# Patient Record
Sex: FEMALE | Race: WHITE | NOT HISPANIC OR LATINO | Employment: OTHER | URBAN - METROPOLITAN AREA
[De-identification: names, ages, dates, MRNs, and addresses within clinical notes are randomized per-mention and may not be internally consistent; named-entity substitution may affect disease eponyms.]

---

## 2017-01-06 ENCOUNTER — HOSPITAL ENCOUNTER (INPATIENT)
Facility: HOSPITAL | Age: 82
LOS: 4 days | Discharge: HOME | DRG: 065 | End: 2017-01-10
Attending: EMERGENCY MEDICINE | Admitting: INTERNAL MEDICINE
Payer: COMMERCIAL

## 2017-01-06 ENCOUNTER — APPOINTMENT (EMERGENCY)
Dept: CT IMAGING | Facility: HOSPITAL | Age: 82
DRG: 065 | End: 2017-01-06
Payer: COMMERCIAL

## 2017-01-06 DIAGNOSIS — I63.9 CVA (CEREBRAL VASCULAR ACCIDENT) (HCC): Primary | ICD-10-CM

## 2017-01-06 DIAGNOSIS — E11.9 DIABETES MELLITUS (HCC): ICD-10-CM

## 2017-01-06 DIAGNOSIS — R20.0 FACIAL NUMBNESS: ICD-10-CM

## 2017-01-06 DIAGNOSIS — F40.298 OTHER SPECIFIED PHOBIA: ICD-10-CM

## 2017-01-06 LAB
ALBUMIN SERPL BCP-MCNC: 3.9 G/DL (ref 3.5–5)
ALP SERPL-CCNC: 90 U/L (ref 46–116)
ALT SERPL W P-5'-P-CCNC: 36 U/L (ref 12–78)
ANION GAP SERPL CALCULATED.3IONS-SCNC: 10 MMOL/L (ref 4–13)
APTT PPP: 29 SECONDS (ref 24–36)
AST SERPL W P-5'-P-CCNC: 11 U/L (ref 5–45)
BACTERIA UR QL AUTO: ABNORMAL /HPF
BASOPHILS # BLD AUTO: 0.03 THOUSANDS/ΜL (ref 0–0.1)
BASOPHILS NFR BLD AUTO: 1 % (ref 0–1)
BILIRUB SERPL-MCNC: 0.71 MG/DL (ref 0.2–1)
BILIRUB UR QL STRIP: NEGATIVE
BUN SERPL-MCNC: 14 MG/DL (ref 5–25)
CALCIUM SERPL-MCNC: 9.2 MG/DL (ref 8.3–10.1)
CHLORIDE SERPL-SCNC: 98 MMOL/L (ref 100–108)
CLARITY UR: CLEAR
CO2 SERPL-SCNC: 28 MMOL/L (ref 21–32)
COLOR UR: YELLOW
CREAT SERPL-MCNC: 0.81 MG/DL (ref 0.6–1.3)
EOSINOPHIL # BLD AUTO: 0.12 THOUSAND/ΜL (ref 0–0.61)
EOSINOPHIL NFR BLD AUTO: 2 % (ref 0–6)
ERYTHROCYTE [DISTWIDTH] IN BLOOD BY AUTOMATED COUNT: 13.1 % (ref 11.6–15.1)
GFR SERPL CREATININE-BSD FRML MDRD: >60 ML/MIN/1.73SQ M
GLUCOSE SERPL-MCNC: 322 MG/DL (ref 65–140)
GLUCOSE UR STRIP-MCNC: ABNORMAL MG/DL
HCT VFR BLD AUTO: 42.8 % (ref 34.8–46.1)
HGB BLD-MCNC: 14.9 G/DL (ref 11.5–15.4)
HGB UR QL STRIP.AUTO: ABNORMAL
INR PPP: 1 (ref 0.86–1.16)
KETONES UR STRIP-MCNC: NEGATIVE MG/DL
LEUKOCYTE ESTERASE UR QL STRIP: NEGATIVE
LYMPHOCYTES # BLD AUTO: 1.8 THOUSANDS/ΜL (ref 0.6–4.47)
LYMPHOCYTES NFR BLD AUTO: 27 % (ref 14–44)
MCH RBC QN AUTO: 31.8 PG (ref 26.8–34.3)
MCHC RBC AUTO-ENTMCNC: 34.8 G/DL (ref 31.4–37.4)
MCV RBC AUTO: 91 FL (ref 82–98)
MONOCYTES # BLD AUTO: 0.42 THOUSAND/ΜL (ref 0.17–1.22)
MONOCYTES NFR BLD AUTO: 6 % (ref 4–12)
NEUTROPHILS # BLD AUTO: 4.28 THOUSANDS/ΜL (ref 1.85–7.62)
NEUTS SEG NFR BLD AUTO: 64 % (ref 43–75)
NITRITE UR QL STRIP: NEGATIVE
NON-SQ EPI CELLS URNS QL MICRO: ABNORMAL /HPF
NRBC BLD AUTO-RTO: 0 /100 WBCS
PH UR STRIP.AUTO: 6 [PH] (ref 4.5–8)
PLATELET # BLD AUTO: 338 THOUSANDS/UL (ref 149–390)
PMV BLD AUTO: 10.9 FL (ref 8.9–12.7)
POTASSIUM SERPL-SCNC: 4.1 MMOL/L (ref 3.5–5.3)
PROT SERPL-MCNC: 7.8 G/DL (ref 6.4–8.2)
PROT UR STRIP-MCNC: NEGATIVE MG/DL
PROTHROMBIN TIME: 13.2 SECONDS (ref 12–14.3)
RBC # BLD AUTO: 4.69 MILLION/UL (ref 3.81–5.12)
RBC #/AREA URNS AUTO: ABNORMAL /HPF
SODIUM SERPL-SCNC: 136 MMOL/L (ref 136–145)
SP GR UR STRIP.AUTO: 1.01 (ref 1–1.03)
UROBILINOGEN UR QL STRIP.AUTO: 0.2 E.U./DL
WBC # BLD AUTO: 6.65 THOUSAND/UL (ref 4.31–10.16)
WBC #/AREA URNS AUTO: ABNORMAL /HPF

## 2017-01-06 PROCEDURE — 80053 COMPREHEN METABOLIC PANEL: CPT

## 2017-01-06 PROCEDURE — 87086 URINE CULTURE/COLONY COUNT: CPT

## 2017-01-06 PROCEDURE — 85730 THROMBOPLASTIN TIME PARTIAL: CPT

## 2017-01-06 PROCEDURE — 85610 PROTHROMBIN TIME: CPT

## 2017-01-06 PROCEDURE — 70450 CT HEAD/BRAIN W/O DYE: CPT

## 2017-01-06 PROCEDURE — 36415 COLL VENOUS BLD VENIPUNCTURE: CPT

## 2017-01-06 PROCEDURE — 85025 COMPLETE CBC W/AUTO DIFF WBC: CPT

## 2017-01-06 PROCEDURE — 99285 EMERGENCY DEPT VISIT HI MDM: CPT

## 2017-01-06 PROCEDURE — 93005 ELECTROCARDIOGRAM TRACING: CPT

## 2017-01-06 PROCEDURE — 81001 URINALYSIS AUTO W/SCOPE: CPT

## 2017-01-06 RX ORDER — HYDRALAZINE HYDROCHLORIDE 20 MG/ML
5 INJECTION INTRAMUSCULAR; INTRAVENOUS EVERY 4 HOURS PRN
Status: DISCONTINUED | OUTPATIENT
Start: 2017-01-06 | End: 2017-01-10 | Stop reason: HOSPADM

## 2017-01-06 RX ORDER — GLIPIZIDE 10 MG/1
10 TABLET ORAL
Status: ON HOLD | COMMUNITY
End: 2017-01-19

## 2017-01-06 RX ORDER — SIMVASTATIN 80 MG
80 TABLET ORAL
Status: DISCONTINUED | OUTPATIENT
Start: 2017-01-07 | End: 2017-01-10 | Stop reason: HOSPADM

## 2017-01-06 RX ORDER — SIMVASTATIN 80 MG
80 TABLET ORAL
COMMUNITY
End: 2018-03-09 | Stop reason: SDUPTHER

## 2017-01-06 RX ORDER — MINERAL OIL AND PETROLATUM 150; 830 MG/G; MG/G
OINTMENT OPHTHALMIC
Status: DISCONTINUED | OUTPATIENT
Start: 2017-01-06 | End: 2017-01-06

## 2017-01-06 RX ORDER — PANTOPRAZOLE SODIUM 40 MG/1
40 TABLET, DELAYED RELEASE ORAL
Status: DISCONTINUED | OUTPATIENT
Start: 2017-01-07 | End: 2017-01-10 | Stop reason: HOSPADM

## 2017-01-06 RX ORDER — DORZOLAMIDE HCL 20 MG/ML
1 SOLUTION/ DROPS OPHTHALMIC 3 TIMES DAILY
Status: DISCONTINUED | OUTPATIENT
Start: 2017-01-06 | End: 2017-01-06

## 2017-01-06 RX ORDER — LOSARTAN POTASSIUM 50 MG/1
50 TABLET ORAL DAILY
Status: CANCELLED | OUTPATIENT
Start: 2017-01-07

## 2017-01-06 RX ORDER — HYDRALAZINE HYDROCHLORIDE 20 MG/ML
10 INJECTION INTRAMUSCULAR; INTRAVENOUS EVERY 4 HOURS PRN
Status: DISCONTINUED | OUTPATIENT
Start: 2017-01-06 | End: 2017-01-06

## 2017-01-06 RX ORDER — LOSARTAN POTASSIUM 50 MG/1
50 TABLET ORAL DAILY
Status: ON HOLD | COMMUNITY
End: 2017-01-10

## 2017-01-06 RX ORDER — CYCLOSPORINE 0.5 MG/ML
1 EMULSION OPHTHALMIC 2 TIMES DAILY
COMMUNITY
End: 2017-01-16 | Stop reason: ALTCHOICE

## 2017-01-06 RX ORDER — LOSARTAN POTASSIUM 50 MG/1
50 TABLET ORAL DAILY
Status: DISCONTINUED | OUTPATIENT
Start: 2017-01-07 | End: 2017-01-10 | Stop reason: HOSPADM

## 2017-01-06 RX ORDER — PRAVASTATIN SODIUM 40 MG
40 TABLET ORAL ONCE
Status: DISCONTINUED | OUTPATIENT
Start: 2017-01-06 | End: 2017-01-10 | Stop reason: HOSPADM

## 2017-01-06 RX ORDER — ONDANSETRON 2 MG/ML
4 INJECTION INTRAMUSCULAR; INTRAVENOUS EVERY 4 HOURS PRN
Status: DISCONTINUED | OUTPATIENT
Start: 2017-01-06 | End: 2017-01-10 | Stop reason: HOSPADM

## 2017-01-06 RX ORDER — DORZOLAMIDE HCL 20 MG/ML
1 SOLUTION/ DROPS OPHTHALMIC 3 TIMES DAILY
Status: DISCONTINUED | OUTPATIENT
Start: 2017-01-06 | End: 2017-01-10 | Stop reason: HOSPADM

## 2017-01-06 RX ORDER — ACETAMINOPHEN 325 MG/1
650 TABLET ORAL EVERY 4 HOURS PRN
Status: DISCONTINUED | OUTPATIENT
Start: 2017-01-06 | End: 2017-01-10 | Stop reason: HOSPADM

## 2017-01-06 RX ORDER — CYCLOSPORINE 0.5 MG/ML
1 EMULSION OPHTHALMIC 2 TIMES DAILY
Status: DISCONTINUED | OUTPATIENT
Start: 2017-01-06 | End: 2017-01-07 | Stop reason: SDUPTHER

## 2017-01-06 RX ORDER — POLYVINYL ALCOHOL 14 MG/ML
1 SOLUTION/ DROPS OPHTHALMIC
Status: DISCONTINUED | OUTPATIENT
Start: 2017-01-06 | End: 2017-01-10 | Stop reason: HOSPADM

## 2017-01-06 RX ORDER — ASPIRIN 81 MG/1
81 TABLET, CHEWABLE ORAL DAILY
Status: DISCONTINUED | OUTPATIENT
Start: 2017-01-07 | End: 2017-01-10 | Stop reason: HOSPADM

## 2017-01-06 RX ORDER — CYCLOSPORINE 0.5 MG/ML
1 EMULSION OPHTHALMIC 2 TIMES DAILY
Status: DISCONTINUED | OUTPATIENT
Start: 2017-01-06 | End: 2017-01-06

## 2017-01-06 RX ORDER — ASPIRIN 325 MG
325 TABLET ORAL ONCE
Status: COMPLETED | OUTPATIENT
Start: 2017-01-06 | End: 2017-01-06

## 2017-01-06 RX ADMIN — DORZOLAMIDE HYDROCHLORIDE 1 DROP: 20 SOLUTION/ DROPS OPHTHALMIC at 21:21

## 2017-01-06 RX ADMIN — HYDRALAZINE HYDROCHLORIDE 5 MG: 20 INJECTION INTRAMUSCULAR; INTRAVENOUS at 19:32

## 2017-01-06 RX ADMIN — BIMATOPROST 1 DROP: 0.1 SOLUTION/ DROPS OPHTHALMIC at 21:21

## 2017-01-06 RX ADMIN — ASPIRIN 325 MG: 325 TABLET ORAL at 19:34

## 2017-01-07 ENCOUNTER — APPOINTMENT (INPATIENT)
Dept: MRI IMAGING | Facility: HOSPITAL | Age: 82
DRG: 065 | End: 2017-01-07
Payer: COMMERCIAL

## 2017-01-07 ENCOUNTER — GENERIC CONVERSION - ENCOUNTER (OUTPATIENT)
Dept: OTHER | Facility: OTHER | Age: 82
End: 2017-01-07

## 2017-01-07 LAB
ANION GAP SERPL CALCULATED.3IONS-SCNC: 9 MMOL/L (ref 4–13)
ATRIAL RATE: 80 BPM
BACTERIA UR CULT: NORMAL
BUN SERPL-MCNC: 13 MG/DL (ref 5–25)
CALCIUM SERPL-MCNC: 8.8 MG/DL (ref 8.3–10.1)
CHLORIDE SERPL-SCNC: 100 MMOL/L (ref 100–108)
CHOLEST SERPL-MCNC: 148 MG/DL (ref 50–200)
CO2 SERPL-SCNC: 27 MMOL/L (ref 21–32)
CREAT SERPL-MCNC: 0.7 MG/DL (ref 0.6–1.3)
GFR SERPL CREATININE-BSD FRML MDRD: >60 ML/MIN/1.73SQ M
GLUCOSE SERPL-MCNC: 191 MG/DL (ref 65–140)
HDLC SERPL-MCNC: 47 MG/DL (ref 40–60)
LDLC SERPL CALC-MCNC: 69 MG/DL (ref 0–100)
P AXIS: 32 DEGREES
POTASSIUM SERPL-SCNC: 3.9 MMOL/L (ref 3.5–5.3)
PR INTERVAL: 136 MS
QRS AXIS: 2 DEGREES
QRSD INTERVAL: 82 MS
QT INTERVAL: 350 MS
QTC INTERVAL: 403 MS
SODIUM SERPL-SCNC: 136 MMOL/L (ref 136–145)
T WAVE AXIS: 58 DEGREES
TRIGL SERPL-MCNC: 160 MG/DL
VENTRICULAR RATE: 80 BPM
VIT B12 SERPL-MCNC: 138 PG/ML (ref 100–900)

## 2017-01-07 PROCEDURE — 70544 MR ANGIOGRAPHY HEAD W/O DYE: CPT

## 2017-01-07 PROCEDURE — 70551 MRI BRAIN STEM W/O DYE: CPT

## 2017-01-07 PROCEDURE — G8978 MOBILITY CURRENT STATUS: HCPCS

## 2017-01-07 PROCEDURE — G8980 MOBILITY D/C STATUS: HCPCS

## 2017-01-07 PROCEDURE — 97161 PT EVAL LOW COMPLEX 20 MIN: CPT

## 2017-01-07 PROCEDURE — 82607 VITAMIN B-12: CPT | Performed by: INTERNAL MEDICINE

## 2017-01-07 PROCEDURE — 80048 BASIC METABOLIC PNL TOTAL CA: CPT | Performed by: PHYSICIAN ASSISTANT

## 2017-01-07 PROCEDURE — G8979 MOBILITY GOAL STATUS: HCPCS

## 2017-01-07 PROCEDURE — 80061 LIPID PANEL: CPT | Performed by: PHYSICIAN ASSISTANT

## 2017-01-07 RX ORDER — LOTEPREDNOL ETABONATE 5 MG/G
1 GEL OPHTHALMIC EVERY 12 HOURS SCHEDULED
Status: DISCONTINUED | OUTPATIENT
Start: 2017-01-07 | End: 2017-01-10 | Stop reason: HOSPADM

## 2017-01-07 RX ORDER — ESCITALOPRAM OXALATE 10 MG/1
5 TABLET ORAL DAILY
Status: DISCONTINUED | OUTPATIENT
Start: 2017-01-07 | End: 2017-01-10 | Stop reason: HOSPADM

## 2017-01-07 RX ORDER — CYCLOSPORINE 0.5 MG/ML
1 EMULSION OPHTHALMIC EVERY 12 HOURS SCHEDULED
Status: DISCONTINUED | OUTPATIENT
Start: 2017-01-07 | End: 2017-01-10 | Stop reason: HOSPADM

## 2017-01-07 RX ORDER — CHLORTHALIDONE 25 MG/1
25 TABLET ORAL DAILY
Status: DISCONTINUED | OUTPATIENT
Start: 2017-01-07 | End: 2017-01-10 | Stop reason: HOSPADM

## 2017-01-07 RX ADMIN — LOTEPREDNOL ETABONATE 1 DROP: 5 GEL OPHTHALMIC at 21:47

## 2017-01-07 RX ADMIN — DORZOLAMIDE HYDROCHLORIDE 1 DROP: 20 SOLUTION/ DROPS OPHTHALMIC at 09:32

## 2017-01-07 RX ADMIN — ENOXAPARIN SODIUM 40 MG: 40 INJECTION SUBCUTANEOUS at 09:32

## 2017-01-07 RX ADMIN — LOSARTAN POTASSIUM 50 MG: 50 TABLET, FILM COATED ORAL at 09:32

## 2017-01-07 RX ADMIN — HYDRALAZINE HYDROCHLORIDE 5 MG: 20 INJECTION INTRAMUSCULAR; INTRAVENOUS at 12:11

## 2017-01-07 RX ADMIN — ESCITALOPRAM OXALATE 5 MG: 10 TABLET ORAL at 20:03

## 2017-01-07 RX ADMIN — CYCLOSPORINE 1 DROP: 0.5 EMULSION OPHTHALMIC at 21:33

## 2017-01-07 RX ADMIN — PANTOPRAZOLE SODIUM 40 MG: 40 TABLET, DELAYED RELEASE ORAL at 06:44

## 2017-01-07 RX ADMIN — CHLORTHALIDONE 25 MG: 25 TABLET ORAL at 13:15

## 2017-01-07 RX ADMIN — Medication 80 MG: at 21:13

## 2017-01-07 RX ADMIN — ASPIRIN 81 MG 81 MG: 81 TABLET ORAL at 09:32

## 2017-01-07 RX ADMIN — BIMATOPROST 1 DROP: 0.1 SOLUTION/ DROPS OPHTHALMIC at 21:13

## 2017-01-07 RX ADMIN — DORZOLAMIDE HYDROCHLORIDE 1 DROP: 20 SOLUTION/ DROPS OPHTHALMIC at 21:40

## 2017-01-08 LAB
25(OH)D3 SERPL-MCNC: 12.7 NG/ML (ref 30–100)
ANION GAP SERPL CALCULATED.3IONS-SCNC: 12 MMOL/L (ref 4–13)
BUN SERPL-MCNC: 14 MG/DL (ref 5–25)
CALCIUM SERPL-MCNC: 9.1 MG/DL (ref 8.3–10.1)
CHLORIDE SERPL-SCNC: 96 MMOL/L (ref 100–108)
CO2 SERPL-SCNC: 25 MMOL/L (ref 21–32)
CREAT SERPL-MCNC: 0.94 MG/DL (ref 0.6–1.3)
ERYTHROCYTE [DISTWIDTH] IN BLOOD BY AUTOMATED COUNT: 12.8 % (ref 11.6–15.1)
EST. AVERAGE GLUCOSE BLD GHB EST-MCNC: 189 MG/DL
GFR SERPL CREATININE-BSD FRML MDRD: 57.2 ML/MIN/1.73SQ M
GLUCOSE SERPL-MCNC: 310 MG/DL (ref 65–140)
GLUCOSE SERPL-MCNC: 341 MG/DL (ref 65–140)
GLUCOSE SERPL-MCNC: 355 MG/DL (ref 65–140)
GLUCOSE SERPL-MCNC: 357 MG/DL (ref 65–140)
HBA1C MFR BLD: 8.2 % (ref 4.2–6.3)
HCT VFR BLD AUTO: 40.8 % (ref 34.8–46.1)
HGB BLD-MCNC: 13.9 G/DL (ref 11.5–15.4)
MCH RBC QN AUTO: 30.5 PG (ref 26.8–34.3)
MCHC RBC AUTO-ENTMCNC: 34.1 G/DL (ref 31.4–37.4)
MCV RBC AUTO: 90 FL (ref 82–98)
PLATELET # BLD AUTO: 328 THOUSANDS/UL (ref 149–390)
PMV BLD AUTO: 10.4 FL (ref 8.9–12.7)
POTASSIUM SERPL-SCNC: 4 MMOL/L (ref 3.5–5.3)
RBC # BLD AUTO: 4.55 MILLION/UL (ref 3.81–5.12)
SODIUM SERPL-SCNC: 133 MMOL/L (ref 136–145)
WBC # BLD AUTO: 8.13 THOUSAND/UL (ref 4.31–10.16)

## 2017-01-08 PROCEDURE — 82306 VITAMIN D 25 HYDROXY: CPT | Performed by: INTERNAL MEDICINE

## 2017-01-08 PROCEDURE — 80048 BASIC METABOLIC PNL TOTAL CA: CPT | Performed by: INTERNAL MEDICINE

## 2017-01-08 PROCEDURE — 92610 EVALUATE SWALLOWING FUNCTION: CPT

## 2017-01-08 PROCEDURE — 85027 COMPLETE CBC AUTOMATED: CPT | Performed by: INTERNAL MEDICINE

## 2017-01-08 PROCEDURE — 83036 HEMOGLOBIN GLYCOSYLATED A1C: CPT | Performed by: INTERNAL MEDICINE

## 2017-01-08 PROCEDURE — 82948 REAGENT STRIP/BLOOD GLUCOSE: CPT

## 2017-01-08 RX ORDER — ALPRAZOLAM 0.25 MG/1
0.25 TABLET ORAL 3 TIMES DAILY PRN
Status: DISCONTINUED | OUTPATIENT
Start: 2017-01-08 | End: 2017-01-10 | Stop reason: HOSPADM

## 2017-01-08 RX ADMIN — Medication 80 MG: at 23:45

## 2017-01-08 RX ADMIN — HYDRALAZINE HYDROCHLORIDE 5 MG: 20 INJECTION INTRAMUSCULAR; INTRAVENOUS at 08:56

## 2017-01-08 RX ADMIN — INSULIN LISPRO 3 UNITS: 100 INJECTION, SOLUTION INTRAVENOUS; SUBCUTANEOUS at 16:47

## 2017-01-08 RX ADMIN — LOSARTAN POTASSIUM 50 MG: 50 TABLET, FILM COATED ORAL at 08:23

## 2017-01-08 RX ADMIN — ALPRAZOLAM 0.25 MG: 0.25 TABLET ORAL at 21:48

## 2017-01-08 RX ADMIN — CYCLOSPORINE 1 DROP: 0.5 EMULSION OPHTHALMIC at 23:44

## 2017-01-08 RX ADMIN — CYCLOSPORINE 1 DROP: 0.5 EMULSION OPHTHALMIC at 08:23

## 2017-01-08 RX ADMIN — METOPROLOL TARTRATE 12.5 MG: 25 TABLET ORAL at 21:47

## 2017-01-08 RX ADMIN — ASPIRIN 81 MG 81 MG: 81 TABLET ORAL at 08:23

## 2017-01-08 RX ADMIN — INSULIN LISPRO 4 UNITS: 100 INJECTION, SOLUTION INTRAVENOUS; SUBCUTANEOUS at 12:24

## 2017-01-08 RX ADMIN — POLYVINYL ALCOHOL 1 DROP: 14 SOLUTION/ DROPS OPHTHALMIC at 21:39

## 2017-01-08 RX ADMIN — ENOXAPARIN SODIUM 40 MG: 40 INJECTION SUBCUTANEOUS at 08:23

## 2017-01-08 RX ADMIN — LOTEPREDNOL ETABONATE 1 DROP: 5 GEL OPHTHALMIC at 21:40

## 2017-01-08 RX ADMIN — ESCITALOPRAM OXALATE 5 MG: 10 TABLET ORAL at 08:18

## 2017-01-08 RX ADMIN — DORZOLAMIDE HYDROCHLORIDE 1 DROP: 20 SOLUTION/ DROPS OPHTHALMIC at 08:23

## 2017-01-08 RX ADMIN — DORZOLAMIDE HYDROCHLORIDE 1 DROP: 20 SOLUTION/ DROPS OPHTHALMIC at 21:39

## 2017-01-08 RX ADMIN — BIMATOPROST 1 DROP: 0.1 SOLUTION/ DROPS OPHTHALMIC at 21:39

## 2017-01-08 RX ADMIN — PANTOPRAZOLE SODIUM 40 MG: 40 TABLET, DELAYED RELEASE ORAL at 07:12

## 2017-01-08 RX ADMIN — ONDANSETRON 4 MG: 2 INJECTION INTRAMUSCULAR; INTRAVENOUS at 00:27

## 2017-01-08 RX ADMIN — ALPRAZOLAM 0.25 MG: 0.25 TABLET ORAL at 12:31

## 2017-01-08 RX ADMIN — CHLORTHALIDONE 25 MG: 25 TABLET ORAL at 08:23

## 2017-01-08 RX ADMIN — LOTEPREDNOL ETABONATE 1 DROP: 5 GEL OPHTHALMIC at 08:23

## 2017-01-08 RX ADMIN — DORZOLAMIDE HYDROCHLORIDE 1 DROP: 20 SOLUTION/ DROPS OPHTHALMIC at 16:05

## 2017-01-09 ENCOUNTER — APPOINTMENT (INPATIENT)
Dept: NON INVASIVE DIAGNOSTICS | Facility: HOSPITAL | Age: 82
DRG: 065 | End: 2017-01-09
Payer: COMMERCIAL

## 2017-01-09 LAB
GLUCOSE SERPL-MCNC: 224 MG/DL (ref 65–140)
GLUCOSE SERPL-MCNC: 293 MG/DL (ref 65–140)
GLUCOSE SERPL-MCNC: 298 MG/DL (ref 65–140)
GLUCOSE SERPL-MCNC: 335 MG/DL (ref 65–140)

## 2017-01-09 PROCEDURE — 92526 ORAL FUNCTION THERAPY: CPT

## 2017-01-09 PROCEDURE — 93306 TTE W/DOPPLER COMPLETE: CPT

## 2017-01-09 PROCEDURE — 82948 REAGENT STRIP/BLOOD GLUCOSE: CPT

## 2017-01-09 PROCEDURE — 93880 EXTRACRANIAL BILAT STUDY: CPT

## 2017-01-09 RX ADMIN — CYCLOSPORINE 1 DROP: 0.5 EMULSION OPHTHALMIC at 22:47

## 2017-01-09 RX ADMIN — DORZOLAMIDE HYDROCHLORIDE 1 DROP: 20 SOLUTION/ DROPS OPHTHALMIC at 16:06

## 2017-01-09 RX ADMIN — ALPRAZOLAM 0.25 MG: 0.25 TABLET ORAL at 22:47

## 2017-01-09 RX ADMIN — DORZOLAMIDE HYDROCHLORIDE 1 DROP: 20 SOLUTION/ DROPS OPHTHALMIC at 08:02

## 2017-01-09 RX ADMIN — PANTOPRAZOLE SODIUM 40 MG: 40 TABLET, DELAYED RELEASE ORAL at 05:43

## 2017-01-09 RX ADMIN — INSULIN LISPRO 2 UNITS: 100 INJECTION, SOLUTION INTRAVENOUS; SUBCUTANEOUS at 07:56

## 2017-01-09 RX ADMIN — INSULIN LISPRO 3 UNITS: 100 INJECTION, SOLUTION INTRAVENOUS; SUBCUTANEOUS at 17:00

## 2017-01-09 RX ADMIN — CHLORTHALIDONE 25 MG: 25 TABLET ORAL at 08:01

## 2017-01-09 RX ADMIN — METOPROLOL TARTRATE 12.5 MG: 25 TABLET ORAL at 08:01

## 2017-01-09 RX ADMIN — ESCITALOPRAM OXALATE 5 MG: 10 TABLET ORAL at 08:00

## 2017-01-09 RX ADMIN — CYCLOSPORINE 1 DROP: 0.5 EMULSION OPHTHALMIC at 08:02

## 2017-01-09 RX ADMIN — LOSARTAN POTASSIUM 50 MG: 50 TABLET, FILM COATED ORAL at 08:01

## 2017-01-09 RX ADMIN — ENOXAPARIN SODIUM 40 MG: 40 INJECTION SUBCUTANEOUS at 08:01

## 2017-01-09 RX ADMIN — INSULIN LISPRO 5 UNITS: 100 INJECTION, SOLUTION INTRAVENOUS; SUBCUTANEOUS at 11:35

## 2017-01-09 RX ADMIN — LOTEPREDNOL ETABONATE 1 DROP: 5 GEL OPHTHALMIC at 08:02

## 2017-01-09 RX ADMIN — ASPIRIN 81 MG 81 MG: 81 TABLET ORAL at 08:01

## 2017-01-09 RX ADMIN — METFORMIN HYDROCHLORIDE 250 MG: 500 TABLET, FILM COATED ORAL at 17:00

## 2017-01-09 RX ADMIN — BIMATOPROST 1 DROP: 0.1 SOLUTION/ DROPS OPHTHALMIC at 21:34

## 2017-01-09 RX ADMIN — METOPROLOL TARTRATE 25 MG: 25 TABLET ORAL at 20:50

## 2017-01-09 RX ADMIN — DORZOLAMIDE HYDROCHLORIDE 1 DROP: 20 SOLUTION/ DROPS OPHTHALMIC at 20:52

## 2017-01-09 RX ADMIN — Medication 80 MG: at 21:34

## 2017-01-10 VITALS
HEIGHT: 58 IN | SYSTOLIC BLOOD PRESSURE: 140 MMHG | RESPIRATION RATE: 16 BRPM | WEIGHT: 161.82 LBS | BODY MASS INDEX: 33.97 KG/M2 | DIASTOLIC BLOOD PRESSURE: 64 MMHG | OXYGEN SATURATION: 96 % | TEMPERATURE: 97.5 F | HEART RATE: 56 BPM

## 2017-01-10 LAB
GLUCOSE SERPL-MCNC: 241 MG/DL (ref 65–140)
GLUCOSE SERPL-MCNC: 264 MG/DL (ref 65–140)

## 2017-01-10 PROCEDURE — 82948 REAGENT STRIP/BLOOD GLUCOSE: CPT

## 2017-01-10 RX ORDER — ASPIRIN 81 MG/1
81 TABLET, CHEWABLE ORAL DAILY
Qty: 30 TABLET | Refills: 0 | Status: SHIPPED | OUTPATIENT
Start: 2017-01-10 | End: 2017-01-16 | Stop reason: ALTCHOICE

## 2017-01-10 RX ORDER — LOSARTAN POTASSIUM 50 MG/1
50 TABLET ORAL DAILY
Qty: 30 TABLET | Refills: 0 | Status: SHIPPED | OUTPATIENT
Start: 2017-01-10 | End: 2017-09-11 | Stop reason: ALTCHOICE

## 2017-01-10 RX ORDER — ESCITALOPRAM OXALATE 5 MG/1
5 TABLET ORAL DAILY
Qty: 30 TABLET | Refills: 0 | Status: SHIPPED | OUTPATIENT
Start: 2017-01-10 | End: 2017-01-10

## 2017-01-10 RX ORDER — CHLORTHALIDONE 25 MG/1
25 TABLET ORAL DAILY
Qty: 30 TABLET | Refills: 0 | Status: SHIPPED | OUTPATIENT
Start: 2017-01-10 | End: 2017-01-19 | Stop reason: HOSPADM

## 2017-01-10 RX ORDER — LOSARTAN POTASSIUM 50 MG/1
50 TABLET ORAL DAILY
Qty: 30 TABLET | Refills: 0 | Status: SHIPPED | OUTPATIENT
Start: 2017-01-10 | End: 2017-01-16 | Stop reason: ALTCHOICE

## 2017-01-10 RX ORDER — ALPRAZOLAM 0.25 MG/1
0.25 TABLET ORAL 3 TIMES DAILY PRN
Qty: 20 TABLET | Refills: 0 | Status: SHIPPED | OUTPATIENT
Start: 2017-01-10 | End: 2017-01-16 | Stop reason: ALTCHOICE

## 2017-01-10 RX ORDER — CHLORTHALIDONE 25 MG/1
25 TABLET ORAL DAILY
Qty: 30 TABLET | Refills: 0 | Status: SHIPPED | OUTPATIENT
Start: 2017-01-10 | End: 2017-01-10

## 2017-01-10 RX ORDER — ASPIRIN 81 MG/1
81 TABLET, CHEWABLE ORAL DAILY
Qty: 30 TABLET | Refills: 0 | Status: SHIPPED | OUTPATIENT
Start: 2017-01-10 | End: 2017-01-10

## 2017-01-10 RX ORDER — ESCITALOPRAM OXALATE 5 MG/1
5 TABLET ORAL DAILY
Qty: 30 TABLET | Refills: 0 | Status: SHIPPED | OUTPATIENT
Start: 2017-01-10 | End: 2018-01-24 | Stop reason: SDUPTHER

## 2017-01-10 RX ADMIN — INSULIN LISPRO 3 UNITS: 100 INJECTION, SOLUTION INTRAVENOUS; SUBCUTANEOUS at 08:25

## 2017-01-10 RX ADMIN — LOSARTAN POTASSIUM 50 MG: 50 TABLET, FILM COATED ORAL at 08:22

## 2017-01-10 RX ADMIN — METOPROLOL TARTRATE 25 MG: 25 TABLET ORAL at 08:23

## 2017-01-10 RX ADMIN — CYCLOSPORINE 1 DROP: 0.5 EMULSION OPHTHALMIC at 08:24

## 2017-01-10 RX ADMIN — DORZOLAMIDE HYDROCHLORIDE 1 DROP: 20 SOLUTION/ DROPS OPHTHALMIC at 08:28

## 2017-01-10 RX ADMIN — METFORMIN HYDROCHLORIDE 250 MG: 500 TABLET, FILM COATED ORAL at 08:22

## 2017-01-10 RX ADMIN — ESCITALOPRAM OXALATE 5 MG: 10 TABLET ORAL at 08:23

## 2017-01-10 RX ADMIN — PANTOPRAZOLE SODIUM 40 MG: 40 TABLET, DELAYED RELEASE ORAL at 06:16

## 2017-01-10 RX ADMIN — ASPIRIN 81 MG 81 MG: 81 TABLET ORAL at 08:22

## 2017-01-10 RX ADMIN — CHLORTHALIDONE 25 MG: 25 TABLET ORAL at 08:25

## 2017-01-13 ENCOUNTER — ALLSCRIPTS OFFICE VISIT (OUTPATIENT)
Dept: OTHER | Facility: OTHER | Age: 82
End: 2017-01-13

## 2017-01-16 ENCOUNTER — APPOINTMENT (EMERGENCY)
Dept: RADIOLOGY | Facility: HOSPITAL | Age: 82
DRG: 683 | End: 2017-01-16
Payer: COMMERCIAL

## 2017-01-16 ENCOUNTER — HOSPITAL ENCOUNTER (INPATIENT)
Facility: HOSPITAL | Age: 82
LOS: 3 days | Discharge: HOME HEALTH | DRG: 683 | End: 2017-01-19
Attending: EMERGENCY MEDICINE | Admitting: INTERNAL MEDICINE
Payer: COMMERCIAL

## 2017-01-16 DIAGNOSIS — E87.6 HYPOKALEMIA: ICD-10-CM

## 2017-01-16 DIAGNOSIS — E87.1 HYPONATREMIA: ICD-10-CM

## 2017-01-16 DIAGNOSIS — E86.0 DEHYDRATION: ICD-10-CM

## 2017-01-16 DIAGNOSIS — D72.829 LEUKOCYTOSIS: ICD-10-CM

## 2017-01-16 DIAGNOSIS — R53.81 PHYSICAL DECONDITIONING: ICD-10-CM

## 2017-01-16 DIAGNOSIS — I10 HYPERTENSION: ICD-10-CM

## 2017-01-16 DIAGNOSIS — R11.2 NAUSEA & VOMITING: ICD-10-CM

## 2017-01-16 DIAGNOSIS — N17.9 ARF (ACUTE RENAL FAILURE) (HCC): Primary | ICD-10-CM

## 2017-01-16 DIAGNOSIS — R74.8 ELEVATED LIPASE: ICD-10-CM

## 2017-01-16 PROBLEM — F41.8 DEPRESSION WITH ANXIETY: Status: ACTIVE | Noted: 2017-01-16

## 2017-01-16 PROBLEM — R11.10 VOMITING: Status: ACTIVE | Noted: 2017-01-16

## 2017-01-16 LAB
ALBUMIN SERPL BCP-MCNC: 3.9 G/DL (ref 3.5–5)
ALP SERPL-CCNC: 83 U/L (ref 46–116)
ALT SERPL W P-5'-P-CCNC: 29 U/L (ref 12–78)
ANION GAP SERPL CALCULATED.3IONS-SCNC: 11 MMOL/L (ref 4–13)
ANION GAP SERPL CALCULATED.3IONS-SCNC: 13 MMOL/L (ref 4–13)
AST SERPL W P-5'-P-CCNC: 11 U/L (ref 5–45)
ATRIAL RATE: 54 BPM
BASOPHILS # BLD AUTO: 0.01 THOUSANDS/ΜL (ref 0–0.1)
BASOPHILS NFR BLD AUTO: 0 % (ref 0–1)
BILIRUB SERPL-MCNC: 0.74 MG/DL (ref 0.2–1)
BUN SERPL-MCNC: 52 MG/DL (ref 5–25)
BUN SERPL-MCNC: 52 MG/DL (ref 5–25)
CALCIUM SERPL-MCNC: 8.5 MG/DL (ref 8.3–10.1)
CALCIUM SERPL-MCNC: 9 MG/DL (ref 8.3–10.1)
CHLORIDE SERPL-SCNC: 85 MMOL/L (ref 100–108)
CHLORIDE SERPL-SCNC: 91 MMOL/L (ref 100–108)
CO2 SERPL-SCNC: 23 MMOL/L (ref 21–32)
CO2 SERPL-SCNC: 27 MMOL/L (ref 21–32)
CREAT SERPL-MCNC: 1.58 MG/DL (ref 0.6–1.3)
CREAT SERPL-MCNC: 1.91 MG/DL (ref 0.6–1.3)
EOSINOPHIL # BLD AUTO: 0.04 THOUSAND/ΜL (ref 0–0.61)
EOSINOPHIL NFR BLD AUTO: 0 % (ref 0–6)
ERYTHROCYTE [DISTWIDTH] IN BLOOD BY AUTOMATED COUNT: 12.2 % (ref 11.6–15.1)
GFR SERPL CREATININE-BSD FRML MDRD: 25.2 ML/MIN/1.73SQ M
GFR SERPL CREATININE-BSD FRML MDRD: 31.4 ML/MIN/1.73SQ M
GLUCOSE SERPL-MCNC: 124 MG/DL (ref 65–140)
GLUCOSE SERPL-MCNC: 163 MG/DL (ref 65–140)
GLUCOSE SERPL-MCNC: 207 MG/DL (ref 65–140)
GLUCOSE SERPL-MCNC: 213 MG/DL (ref 65–140)
HCT VFR BLD AUTO: 40.5 % (ref 34.8–46.1)
HGB BLD-MCNC: 15.1 G/DL (ref 11.5–15.4)
LIPASE SERPL-CCNC: 602 U/L (ref 73–393)
LYMPHOCYTES # BLD AUTO: 2.02 THOUSANDS/ΜL (ref 0.6–4.47)
LYMPHOCYTES NFR BLD AUTO: 17 % (ref 14–44)
MCH RBC QN AUTO: 32.1 PG (ref 26.8–34.3)
MCHC RBC AUTO-ENTMCNC: 37.3 G/DL (ref 31.4–37.4)
MCV RBC AUTO: 86 FL (ref 82–98)
MONOCYTES # BLD AUTO: 1.12 THOUSAND/ΜL (ref 0.17–1.22)
MONOCYTES NFR BLD AUTO: 10 % (ref 4–12)
NEUTROPHILS # BLD AUTO: 8.6 THOUSANDS/ΜL (ref 1.85–7.62)
NEUTS SEG NFR BLD AUTO: 73 % (ref 43–75)
NRBC BLD AUTO-RTO: 0 /100 WBCS
P AXIS: 31 DEGREES
PLATELET # BLD AUTO: 364 THOUSANDS/UL (ref 149–390)
PMV BLD AUTO: 10.5 FL (ref 8.9–12.7)
POTASSIUM SERPL-SCNC: 3 MMOL/L (ref 3.5–5.3)
POTASSIUM SERPL-SCNC: 3.5 MMOL/L (ref 3.5–5.3)
PR INTERVAL: 150 MS
PROT SERPL-MCNC: 7.3 G/DL (ref 6.4–8.2)
QRS AXIS: 4 DEGREES
QRSD INTERVAL: 80 MS
QT INTERVAL: 438 MS
QTC INTERVAL: 415 MS
RBC # BLD AUTO: 4.7 MILLION/UL (ref 3.81–5.12)
SODIUM SERPL-SCNC: 125 MMOL/L (ref 136–145)
SODIUM SERPL-SCNC: 125 MMOL/L (ref 136–145)
SPECIMEN SOURCE: NORMAL
T WAVE AXIS: 30 DEGREES
TROPONIN I BLD-MCNC: 0.01 NG/ML (ref 0–0.08)
VENTRICULAR RATE: 54 BPM
WBC # BLD AUTO: 11.79 THOUSAND/UL (ref 4.31–10.16)

## 2017-01-16 PROCEDURE — C9113 INJ PANTOPRAZOLE SODIUM, VIA: HCPCS | Performed by: EMERGENCY MEDICINE

## 2017-01-16 PROCEDURE — 84484 ASSAY OF TROPONIN QUANT: CPT

## 2017-01-16 PROCEDURE — 83690 ASSAY OF LIPASE: CPT | Performed by: EMERGENCY MEDICINE

## 2017-01-16 PROCEDURE — 36415 COLL VENOUS BLD VENIPUNCTURE: CPT | Performed by: EMERGENCY MEDICINE

## 2017-01-16 PROCEDURE — 82948 REAGENT STRIP/BLOOD GLUCOSE: CPT

## 2017-01-16 PROCEDURE — 80053 COMPREHEN METABOLIC PANEL: CPT | Performed by: EMERGENCY MEDICINE

## 2017-01-16 PROCEDURE — 85025 COMPLETE CBC W/AUTO DIFF WBC: CPT | Performed by: EMERGENCY MEDICINE

## 2017-01-16 PROCEDURE — 93005 ELECTROCARDIOGRAM TRACING: CPT | Performed by: EMERGENCY MEDICINE

## 2017-01-16 PROCEDURE — 96375 TX/PRO/DX INJ NEW DRUG ADDON: CPT

## 2017-01-16 PROCEDURE — 74022 RADEX COMPL AQT ABD SERIES: CPT

## 2017-01-16 PROCEDURE — 96374 THER/PROPH/DIAG INJ IV PUSH: CPT

## 2017-01-16 PROCEDURE — 99285 EMERGENCY DEPT VISIT HI MDM: CPT

## 2017-01-16 PROCEDURE — 80048 BASIC METABOLIC PNL TOTAL CA: CPT | Performed by: PHYSICIAN ASSISTANT

## 2017-01-16 RX ORDER — POTASSIUM CHLORIDE 20 MEQ/1
40 TABLET, EXTENDED RELEASE ORAL ONCE
Status: COMPLETED | OUTPATIENT
Start: 2017-01-16 | End: 2017-01-16

## 2017-01-16 RX ORDER — DORZOLAMIDE HYDROCHLORIDE AND TIMOLOL MALEATE 20; 5 MG/ML; MG/ML
1 SOLUTION/ DROPS OPHTHALMIC 2 TIMES DAILY
Status: DISCONTINUED | OUTPATIENT
Start: 2017-01-16 | End: 2017-01-19 | Stop reason: HOSPADM

## 2017-01-16 RX ORDER — ESCITALOPRAM OXALATE 10 MG/1
5 TABLET ORAL DAILY
Status: DISCONTINUED | OUTPATIENT
Start: 2017-01-17 | End: 2017-01-19 | Stop reason: HOSPADM

## 2017-01-16 RX ORDER — HYDRALAZINE HYDROCHLORIDE 20 MG/ML
10 INJECTION INTRAMUSCULAR; INTRAVENOUS EVERY 6 HOURS PRN
Status: DISCONTINUED | OUTPATIENT
Start: 2017-01-16 | End: 2017-01-17

## 2017-01-16 RX ORDER — SODIUM CHLORIDE AND POTASSIUM CHLORIDE .9; .15 G/100ML; G/100ML
75 SOLUTION INTRAVENOUS CONTINUOUS
Status: DISCONTINUED | OUTPATIENT
Start: 2017-01-16 | End: 2017-01-19 | Stop reason: HOSPADM

## 2017-01-16 RX ORDER — ONDANSETRON 2 MG/ML
4 INJECTION INTRAMUSCULAR; INTRAVENOUS EVERY 6 HOURS PRN
Status: DISCONTINUED | OUTPATIENT
Start: 2017-01-16 | End: 2017-01-19 | Stop reason: HOSPADM

## 2017-01-16 RX ORDER — DORZOLAMIDE HCL 20 MG/ML
1 SOLUTION/ DROPS OPHTHALMIC 3 TIMES DAILY
Status: DISCONTINUED | OUTPATIENT
Start: 2017-01-16 | End: 2017-01-16 | Stop reason: SDUPTHER

## 2017-01-16 RX ORDER — PANTOPRAZOLE SODIUM 40 MG/1
40 INJECTION, POWDER, FOR SOLUTION INTRAVENOUS ONCE
Status: COMPLETED | OUTPATIENT
Start: 2017-01-16 | End: 2017-01-16

## 2017-01-16 RX ORDER — ACETAMINOPHEN 325 MG/1
650 TABLET ORAL EVERY 6 HOURS PRN
Status: DISCONTINUED | OUTPATIENT
Start: 2017-01-16 | End: 2017-01-19 | Stop reason: HOSPADM

## 2017-01-16 RX ORDER — CYCLOSPORINE 0.5 MG/ML
1 EMULSION OPHTHALMIC 2 TIMES DAILY
Status: DISCONTINUED | OUTPATIENT
Start: 2017-01-16 | End: 2017-01-19 | Stop reason: HOSPADM

## 2017-01-16 RX ORDER — ATORVASTATIN CALCIUM 40 MG/1
40 TABLET, FILM COATED ORAL
Status: DISCONTINUED | OUTPATIENT
Start: 2017-01-16 | End: 2017-01-19 | Stop reason: HOSPADM

## 2017-01-16 RX ORDER — MINERAL OIL AND PETROLATUM 150; 830 MG/G; MG/G
OINTMENT OPHTHALMIC
Status: DISCONTINUED | OUTPATIENT
Start: 2017-01-16 | End: 2017-01-19 | Stop reason: HOSPADM

## 2017-01-16 RX ORDER — HEPARIN SODIUM 5000 [USP'U]/ML
5000 INJECTION, SOLUTION INTRAVENOUS; SUBCUTANEOUS EVERY 8 HOURS SCHEDULED
Status: DISCONTINUED | OUTPATIENT
Start: 2017-01-16 | End: 2017-01-19 | Stop reason: HOSPADM

## 2017-01-16 RX ORDER — ONDANSETRON 2 MG/ML
4 INJECTION INTRAMUSCULAR; INTRAVENOUS ONCE
Status: COMPLETED | OUTPATIENT
Start: 2017-01-16 | End: 2017-01-16

## 2017-01-16 RX ORDER — ONDANSETRON 2 MG/ML
4 INJECTION INTRAMUSCULAR; INTRAVENOUS ONCE AS NEEDED
Status: DISCONTINUED | OUTPATIENT
Start: 2017-01-16 | End: 2017-01-19 | Stop reason: HOSPADM

## 2017-01-16 RX ORDER — MAGNESIUM HYDROXIDE/ALUMINUM HYDROXICE/SIMETHICONE 120; 1200; 1200 MG/30ML; MG/30ML; MG/30ML
30 SUSPENSION ORAL EVERY 6 HOURS PRN
Status: DISCONTINUED | OUTPATIENT
Start: 2017-01-16 | End: 2017-01-19 | Stop reason: HOSPADM

## 2017-01-16 RX ORDER — CYCLOSPORINE 0.5 MG/ML
1 EMULSION OPHTHALMIC 2 TIMES DAILY
COMMUNITY
End: 2018-01-12 | Stop reason: ALTCHOICE

## 2017-01-16 RX ADMIN — DORZOLAMIDE HYDROCHLORIDE AND TIMOLOL MALEATE 1 DROP: 20; 5 SOLUTION/ DROPS OPHTHALMIC at 21:33

## 2017-01-16 RX ADMIN — CYCLOSPORINE 1 DROP: 0.5 EMULSION OPHTHALMIC at 21:33

## 2017-01-16 RX ADMIN — SODIUM CHLORIDE AND POTASSIUM CHLORIDE 75 ML/HR: .9; .15 SOLUTION INTRAVENOUS at 17:19

## 2017-01-16 RX ADMIN — SODIUM CHLORIDE 1000 ML: 0.9 INJECTION, SOLUTION INTRAVENOUS at 13:55

## 2017-01-16 RX ADMIN — PANTOPRAZOLE SODIUM 40 MG: 40 INJECTION, POWDER, FOR SOLUTION INTRAVENOUS at 13:16

## 2017-01-16 RX ADMIN — POTASSIUM CHLORIDE 40 MEQ: 1500 TABLET, EXTENDED RELEASE ORAL at 13:55

## 2017-01-16 RX ADMIN — METOPROLOL TARTRATE 12.5 MG: 25 TABLET ORAL at 21:33

## 2017-01-16 RX ADMIN — ONDANSETRON 4 MG: 2 INJECTION INTRAMUSCULAR; INTRAVENOUS at 13:20

## 2017-01-16 RX ADMIN — HEPARIN SODIUM 5000 UNITS: 5000 INJECTION, SOLUTION INTRAVENOUS; SUBCUTANEOUS at 21:33

## 2017-01-16 RX ADMIN — BIMATOPROST 1 DROP: 0.1 SOLUTION/ DROPS OPHTHALMIC at 21:33

## 2017-01-17 LAB
ANION GAP SERPL CALCULATED.3IONS-SCNC: 10 MMOL/L (ref 4–13)
BILIRUB UR QL STRIP: NEGATIVE
BUN SERPL-MCNC: 45 MG/DL (ref 5–25)
CALCIUM SERPL-MCNC: 8.1 MG/DL (ref 8.3–10.1)
CHLORIDE SERPL-SCNC: 94 MMOL/L (ref 100–108)
CLARITY UR: CLEAR
CO2 SERPL-SCNC: 24 MMOL/L (ref 21–32)
COLOR UR: YELLOW
CREAT SERPL-MCNC: 1.13 MG/DL (ref 0.6–1.3)
GFR SERPL CREATININE-BSD FRML MDRD: 46.2 ML/MIN/1.73SQ M
GLUCOSE SERPL-MCNC: 106 MG/DL (ref 65–140)
GLUCOSE SERPL-MCNC: 160 MG/DL (ref 65–140)
GLUCOSE SERPL-MCNC: 236 MG/DL (ref 65–140)
GLUCOSE SERPL-MCNC: 243 MG/DL (ref 65–140)
GLUCOSE SERPL-MCNC: 315 MG/DL (ref 65–140)
GLUCOSE UR STRIP-MCNC: ABNORMAL MG/DL
HGB UR QL STRIP.AUTO: NEGATIVE
KETONES UR STRIP-MCNC: NEGATIVE MG/DL
LEUKOCYTE ESTERASE UR QL STRIP: NEGATIVE
LIPASE SERPL-CCNC: 285 U/L (ref 73–393)
NITRITE UR QL STRIP: NEGATIVE
PH UR STRIP.AUTO: 5.5 [PH] (ref 4.5–8)
POTASSIUM SERPL-SCNC: 3.3 MMOL/L (ref 3.5–5.3)
PROT UR STRIP-MCNC: NEGATIVE MG/DL
SODIUM SERPL-SCNC: 128 MMOL/L (ref 136–145)
SP GR UR STRIP.AUTO: 1.01 (ref 1–1.03)
UROBILINOGEN UR QL STRIP.AUTO: 0.2 E.U./DL

## 2017-01-17 PROCEDURE — 80048 BASIC METABOLIC PNL TOTAL CA: CPT | Performed by: PHYSICIAN ASSISTANT

## 2017-01-17 PROCEDURE — 82948 REAGENT STRIP/BLOOD GLUCOSE: CPT

## 2017-01-17 PROCEDURE — 87493 C DIFF AMPLIFIED PROBE: CPT | Performed by: INTERNAL MEDICINE

## 2017-01-17 PROCEDURE — 97162 PT EVAL MOD COMPLEX 30 MIN: CPT | Performed by: PHYSICAL THERAPIST

## 2017-01-17 PROCEDURE — 87015 SPECIMEN INFECT AGNT CONCNTJ: CPT | Performed by: INTERNAL MEDICINE

## 2017-01-17 PROCEDURE — G8980 MOBILITY D/C STATUS: HCPCS | Performed by: PHYSICAL THERAPIST

## 2017-01-17 PROCEDURE — G8979 MOBILITY GOAL STATUS: HCPCS | Performed by: PHYSICAL THERAPIST

## 2017-01-17 PROCEDURE — 87045 FECES CULTURE AEROBIC BACT: CPT | Performed by: INTERNAL MEDICINE

## 2017-01-17 PROCEDURE — 81003 URINALYSIS AUTO W/O SCOPE: CPT | Performed by: INTERNAL MEDICINE

## 2017-01-17 PROCEDURE — 87899 AGENT NOS ASSAY W/OPTIC: CPT | Performed by: INTERNAL MEDICINE

## 2017-01-17 PROCEDURE — 87046 STOOL CULTR AEROBIC BACT EA: CPT | Performed by: INTERNAL MEDICINE

## 2017-01-17 PROCEDURE — G8978 MOBILITY CURRENT STATUS: HCPCS | Performed by: PHYSICAL THERAPIST

## 2017-01-17 PROCEDURE — 83690 ASSAY OF LIPASE: CPT | Performed by: PHYSICIAN ASSISTANT

## 2017-01-17 RX ORDER — HYDRALAZINE HYDROCHLORIDE 20 MG/ML
10 INJECTION INTRAMUSCULAR; INTRAVENOUS EVERY 6 HOURS PRN
Status: DISCONTINUED | OUTPATIENT
Start: 2017-01-17 | End: 2017-01-19 | Stop reason: HOSPADM

## 2017-01-17 RX ORDER — ASPIRIN 81 MG/1
81 TABLET ORAL DAILY
Status: DISCONTINUED | OUTPATIENT
Start: 2017-01-18 | End: 2017-01-19 | Stop reason: HOSPADM

## 2017-01-17 RX ORDER — ASPIRIN 81 MG/1
81 TABLET ORAL DAILY
Status: DISCONTINUED | OUTPATIENT
Start: 2017-01-17 | End: 2017-01-17

## 2017-01-17 RX ORDER — POTASSIUM CHLORIDE 20 MEQ/1
40 TABLET, EXTENDED RELEASE ORAL ONCE
Status: COMPLETED | OUTPATIENT
Start: 2017-01-17 | End: 2017-01-17

## 2017-01-17 RX ORDER — ALPRAZOLAM 0.25 MG/1
0.25 TABLET ORAL 2 TIMES DAILY PRN
Status: DISCONTINUED | OUTPATIENT
Start: 2017-01-17 | End: 2017-01-19 | Stop reason: HOSPADM

## 2017-01-17 RX ADMIN — DORZOLAMIDE HYDROCHLORIDE AND TIMOLOL MALEATE 1 DROP: 20; 5 SOLUTION/ DROPS OPHTHALMIC at 21:43

## 2017-01-17 RX ADMIN — INSULIN LISPRO 3 UNITS: 100 INJECTION, SOLUTION INTRAVENOUS; SUBCUTANEOUS at 16:27

## 2017-01-17 RX ADMIN — ESCITALOPRAM OXALATE 5 MG: 10 TABLET ORAL at 09:12

## 2017-01-17 RX ADMIN — INSULIN LISPRO 1 UNITS: 100 INJECTION, SOLUTION INTRAVENOUS; SUBCUTANEOUS at 12:38

## 2017-01-17 RX ADMIN — POTASSIUM CHLORIDE 40 MEQ: 1500 TABLET, EXTENDED RELEASE ORAL at 09:13

## 2017-01-17 RX ADMIN — CYCLOSPORINE 1 DROP: 0.5 EMULSION OPHTHALMIC at 21:43

## 2017-01-17 RX ADMIN — SODIUM CHLORIDE AND POTASSIUM CHLORIDE 75 ML/HR: .9; .15 SOLUTION INTRAVENOUS at 18:45

## 2017-01-17 RX ADMIN — METOPROLOL TARTRATE 12.5 MG: 25 TABLET ORAL at 09:12

## 2017-01-17 RX ADMIN — HEPARIN SODIUM 5000 UNITS: 5000 INJECTION, SOLUTION INTRAVENOUS; SUBCUTANEOUS at 13:17

## 2017-01-17 RX ADMIN — HEPARIN SODIUM 5000 UNITS: 5000 INJECTION, SOLUTION INTRAVENOUS; SUBCUTANEOUS at 22:29

## 2017-01-17 RX ADMIN — DORZOLAMIDE HYDROCHLORIDE AND TIMOLOL MALEATE 1 DROP: 20; 5 SOLUTION/ DROPS OPHTHALMIC at 09:12

## 2017-01-17 RX ADMIN — INSULIN LISPRO 2 UNITS: 100 INJECTION, SOLUTION INTRAVENOUS; SUBCUTANEOUS at 09:13

## 2017-01-17 RX ADMIN — BIMATOPROST 1 DROP: 0.1 SOLUTION/ DROPS OPHTHALMIC at 21:43

## 2017-01-17 RX ADMIN — CYCLOSPORINE 1 DROP: 0.5 EMULSION OPHTHALMIC at 09:14

## 2017-01-17 RX ADMIN — HEPARIN SODIUM 5000 UNITS: 5000 INJECTION, SOLUTION INTRAVENOUS; SUBCUTANEOUS at 05:45

## 2017-01-17 RX ADMIN — SODIUM CHLORIDE AND POTASSIUM CHLORIDE 75 ML/HR: .9; .15 SOLUTION INTRAVENOUS at 05:48

## 2017-01-18 LAB
ANION GAP SERPL CALCULATED.3IONS-SCNC: 9 MMOL/L (ref 4–13)
BUN SERPL-MCNC: 18 MG/DL (ref 5–25)
C DIFF TOX GENS STL QL NAA+PROBE: NORMAL
CALCIUM SERPL-MCNC: 8.1 MG/DL (ref 8.3–10.1)
CHLORIDE SERPL-SCNC: 97 MMOL/L (ref 100–108)
CO2 SERPL-SCNC: 25 MMOL/L (ref 21–32)
CREAT SERPL-MCNC: 0.67 MG/DL (ref 0.6–1.3)
ERYTHROCYTE [DISTWIDTH] IN BLOOD BY AUTOMATED COUNT: 12.7 % (ref 11.6–15.1)
GFR SERPL CREATININE-BSD FRML MDRD: >60 ML/MIN/1.73SQ M
GLUCOSE SERPL-MCNC: 150 MG/DL (ref 65–140)
GLUCOSE SERPL-MCNC: 168 MG/DL (ref 65–140)
GLUCOSE SERPL-MCNC: 192 MG/DL (ref 65–140)
GLUCOSE SERPL-MCNC: 250 MG/DL (ref 65–140)
HCT VFR BLD AUTO: 34.1 % (ref 34.8–46.1)
HGB BLD-MCNC: 12.1 G/DL (ref 11.5–15.4)
MCH RBC QN AUTO: 31.5 PG (ref 26.8–34.3)
MCHC RBC AUTO-ENTMCNC: 35.5 G/DL (ref 31.4–37.4)
MCV RBC AUTO: 89 FL (ref 82–98)
PLATELET # BLD AUTO: 308 THOUSANDS/UL (ref 149–390)
PMV BLD AUTO: 10.9 FL (ref 8.9–12.7)
POTASSIUM SERPL-SCNC: 3.8 MMOL/L (ref 3.5–5.3)
RBC # BLD AUTO: 3.84 MILLION/UL (ref 3.81–5.12)
SODIUM SERPL-SCNC: 131 MMOL/L (ref 136–145)
WBC # BLD AUTO: 6.2 THOUSAND/UL (ref 4.31–10.16)

## 2017-01-18 PROCEDURE — 85027 COMPLETE CBC AUTOMATED: CPT | Performed by: INTERNAL MEDICINE

## 2017-01-18 PROCEDURE — 82948 REAGENT STRIP/BLOOD GLUCOSE: CPT

## 2017-01-18 PROCEDURE — 80048 BASIC METABOLIC PNL TOTAL CA: CPT | Performed by: INTERNAL MEDICINE

## 2017-01-18 RX ORDER — LOSARTAN POTASSIUM 50 MG/1
50 TABLET ORAL DAILY
Status: DISCONTINUED | OUTPATIENT
Start: 2017-01-19 | End: 2017-01-19 | Stop reason: HOSPADM

## 2017-01-18 RX ADMIN — INSULIN LISPRO 2 UNITS: 100 INJECTION, SOLUTION INTRAVENOUS; SUBCUTANEOUS at 12:12

## 2017-01-18 RX ADMIN — SODIUM CHLORIDE AND POTASSIUM CHLORIDE 75 ML/HR: .9; .15 SOLUTION INTRAVENOUS at 08:24

## 2017-01-18 RX ADMIN — HEPARIN SODIUM 5000 UNITS: 5000 INJECTION, SOLUTION INTRAVENOUS; SUBCUTANEOUS at 22:02

## 2017-01-18 RX ADMIN — CYCLOSPORINE 1 DROP: 0.5 EMULSION OPHTHALMIC at 08:25

## 2017-01-18 RX ADMIN — SODIUM CHLORIDE AND POTASSIUM CHLORIDE 75 ML/HR: .9; .15 SOLUTION INTRAVENOUS at 23:04

## 2017-01-18 RX ADMIN — DORZOLAMIDE HYDROCHLORIDE AND TIMOLOL MALEATE 1 DROP: 20; 5 SOLUTION/ DROPS OPHTHALMIC at 17:26

## 2017-01-18 RX ADMIN — INSULIN LISPRO 1 UNITS: 100 INJECTION, SOLUTION INTRAVENOUS; SUBCUTANEOUS at 08:26

## 2017-01-18 RX ADMIN — CYCLOSPORINE 1 DROP: 0.5 EMULSION OPHTHALMIC at 22:04

## 2017-01-18 RX ADMIN — METOPROLOL TARTRATE 12.5 MG: 25 TABLET ORAL at 08:26

## 2017-01-18 RX ADMIN — BIMATOPROST 1 DROP: 0.1 SOLUTION/ DROPS OPHTHALMIC at 22:03

## 2017-01-18 RX ADMIN — HEPARIN SODIUM 5000 UNITS: 5000 INJECTION, SOLUTION INTRAVENOUS; SUBCUTANEOUS at 06:28

## 2017-01-18 RX ADMIN — HEPARIN SODIUM 5000 UNITS: 5000 INJECTION, SOLUTION INTRAVENOUS; SUBCUTANEOUS at 13:42

## 2017-01-18 RX ADMIN — ESCITALOPRAM OXALATE 5 MG: 10 TABLET ORAL at 08:27

## 2017-01-18 RX ADMIN — DORZOLAMIDE HYDROCHLORIDE AND TIMOLOL MALEATE 1 DROP: 20; 5 SOLUTION/ DROPS OPHTHALMIC at 08:26

## 2017-01-19 VITALS
DIASTOLIC BLOOD PRESSURE: 74 MMHG | WEIGHT: 165 LBS | RESPIRATION RATE: 16 BRPM | OXYGEN SATURATION: 96 % | TEMPERATURE: 97.2 F | BODY MASS INDEX: 34.49 KG/M2 | SYSTOLIC BLOOD PRESSURE: 179 MMHG | HEART RATE: 57 BPM

## 2017-01-19 PROBLEM — E87.6 HYPOKALEMIA: Status: RESOLVED | Noted: 2017-01-16 | Resolved: 2017-01-19

## 2017-01-19 PROBLEM — E86.0 DEHYDRATION: Status: ACTIVE | Noted: 2017-01-19

## 2017-01-19 PROBLEM — E86.0 DEHYDRATION: Status: RESOLVED | Noted: 2017-01-19 | Resolved: 2017-01-19

## 2017-01-19 PROBLEM — R11.10 VOMITING: Status: RESOLVED | Noted: 2017-01-16 | Resolved: 2017-01-19

## 2017-01-19 LAB
ANION GAP SERPL CALCULATED.3IONS-SCNC: 7 MMOL/L (ref 4–13)
BUN SERPL-MCNC: 8 MG/DL (ref 5–25)
CALCIUM SERPL-MCNC: 8.4 MG/DL (ref 8.3–10.1)
CHLORIDE SERPL-SCNC: 100 MMOL/L (ref 100–108)
CO2 SERPL-SCNC: 25 MMOL/L (ref 21–32)
CREAT SERPL-MCNC: 0.51 MG/DL (ref 0.6–1.3)
GFR SERPL CREATININE-BSD FRML MDRD: >60 ML/MIN/1.73SQ M
GLUCOSE SERPL-MCNC: 172 MG/DL (ref 65–140)
GLUCOSE SERPL-MCNC: 179 MG/DL (ref 65–140)
GLUCOSE SERPL-MCNC: 218 MG/DL (ref 65–140)
POTASSIUM SERPL-SCNC: 4.1 MMOL/L (ref 3.5–5.3)
SODIUM SERPL-SCNC: 132 MMOL/L (ref 136–145)

## 2017-01-19 PROCEDURE — 82948 REAGENT STRIP/BLOOD GLUCOSE: CPT

## 2017-01-19 PROCEDURE — 80048 BASIC METABOLIC PNL TOTAL CA: CPT | Performed by: INTERNAL MEDICINE

## 2017-01-19 RX ORDER — GLIPIZIDE 10 MG/1
5 TABLET ORAL
Qty: 30 TABLET | Refills: 0 | Status: SHIPPED | OUTPATIENT
Start: 2017-01-19 | End: 2018-03-01 | Stop reason: SDUPTHER

## 2017-01-19 RX ORDER — ALPRAZOLAM 0.25 MG/1
0.25 TABLET ORAL 2 TIMES DAILY PRN
Qty: 5 TABLET | Refills: 0 | Status: SHIPPED | OUTPATIENT
Start: 2017-01-19 | End: 2017-09-11 | Stop reason: ALTCHOICE

## 2017-01-19 RX ORDER — ASPIRIN 81 MG/1
81 TABLET ORAL DAILY
Qty: 30 TABLET | Refills: 0 | Status: SHIPPED | OUTPATIENT
Start: 2017-01-19 | End: 2017-09-11 | Stop reason: ALTCHOICE

## 2017-01-19 RX ADMIN — CYCLOSPORINE 1 DROP: 0.5 EMULSION OPHTHALMIC at 08:27

## 2017-01-19 RX ADMIN — LOSARTAN POTASSIUM 50 MG: 50 TABLET, FILM COATED ORAL at 08:27

## 2017-01-19 RX ADMIN — ESCITALOPRAM OXALATE 5 MG: 10 TABLET ORAL at 08:27

## 2017-01-19 RX ADMIN — ASPIRIN 81 MG: 81 TABLET, COATED ORAL at 08:27

## 2017-01-19 RX ADMIN — INSULIN LISPRO 1 UNITS: 100 INJECTION, SOLUTION INTRAVENOUS; SUBCUTANEOUS at 08:27

## 2017-01-19 RX ADMIN — DORZOLAMIDE HYDROCHLORIDE AND TIMOLOL MALEATE 1 DROP: 20; 5 SOLUTION/ DROPS OPHTHALMIC at 08:28

## 2017-01-19 RX ADMIN — HEPARIN SODIUM 5000 UNITS: 5000 INJECTION, SOLUTION INTRAVENOUS; SUBCUTANEOUS at 05:49

## 2017-01-19 RX ADMIN — INSULIN LISPRO 2 UNITS: 100 INJECTION, SOLUTION INTRAVENOUS; SUBCUTANEOUS at 11:28

## 2017-01-20 LAB
BACTERIA STL CULT: NORMAL
BACTERIA STL CULT: NORMAL

## 2017-01-24 ENCOUNTER — ALLSCRIPTS OFFICE VISIT (OUTPATIENT)
Dept: OTHER | Facility: OTHER | Age: 82
End: 2017-01-24

## 2017-01-24 ENCOUNTER — GENERIC CONVERSION - ENCOUNTER (OUTPATIENT)
Dept: OTHER | Facility: OTHER | Age: 82
End: 2017-01-24

## 2017-01-24 ENCOUNTER — LAB CONVERSION - ENCOUNTER (OUTPATIENT)
Dept: OTHER | Facility: OTHER | Age: 82
End: 2017-01-24

## 2017-01-24 LAB
CHOLEST SERPL-MCNC: 145 MG/DL (ref 125–200)
CHOLEST/HDLC SERPL: 3 (CALC)
HBA1C MFR BLD HPLC: 8.5 % OF TOTAL HGB
HDLC SERPL-MCNC: 48 MG/DL
LDL CHOLESTEROL (HISTORICAL): 67 MG/DL (CALC)
NON-HDL-CHOL (CHOL-HDL) (HISTORICAL): 97 MG/DL (CALC)
TRIGL SERPL-MCNC: 148 MG/DL

## 2017-01-27 ENCOUNTER — ALLSCRIPTS OFFICE VISIT (OUTPATIENT)
Dept: OTHER | Facility: OTHER | Age: 82
End: 2017-01-27

## 2017-01-30 ENCOUNTER — ALLSCRIPTS OFFICE VISIT (OUTPATIENT)
Dept: OTHER | Facility: OTHER | Age: 82
End: 2017-01-30

## 2017-02-03 ENCOUNTER — GENERIC CONVERSION - ENCOUNTER (OUTPATIENT)
Dept: OTHER | Facility: OTHER | Age: 82
End: 2017-02-03

## 2017-02-06 ENCOUNTER — ALLSCRIPTS OFFICE VISIT (OUTPATIENT)
Dept: OTHER | Facility: OTHER | Age: 82
End: 2017-02-06

## 2017-02-10 ENCOUNTER — ALLSCRIPTS OFFICE VISIT (OUTPATIENT)
Dept: OTHER | Facility: OTHER | Age: 82
End: 2017-02-10

## 2017-02-20 ENCOUNTER — GENERIC CONVERSION - ENCOUNTER (OUTPATIENT)
Dept: OTHER | Facility: OTHER | Age: 82
End: 2017-02-20

## 2017-03-02 ENCOUNTER — GENERIC CONVERSION - ENCOUNTER (OUTPATIENT)
Dept: OTHER | Facility: OTHER | Age: 82
End: 2017-03-02

## 2017-03-29 DIAGNOSIS — I63.9 CEREBRAL INFARCTION (HCC): ICD-10-CM

## 2017-03-29 DIAGNOSIS — R26.81 UNSTEADINESS ON FEET: ICD-10-CM

## 2017-03-29 DIAGNOSIS — M85.80 OTHER SPECIFIED DISORDERS OF BONE DENSITY AND STRUCTURE, UNSPECIFIED SITE: ICD-10-CM

## 2017-04-14 ENCOUNTER — GENERIC CONVERSION - ENCOUNTER (OUTPATIENT)
Dept: OTHER | Facility: OTHER | Age: 82
End: 2017-04-14

## 2017-04-24 ENCOUNTER — GENERIC CONVERSION - ENCOUNTER (OUTPATIENT)
Dept: OTHER | Facility: OTHER | Age: 82
End: 2017-04-24

## 2017-04-24 ENCOUNTER — LAB CONVERSION - ENCOUNTER (OUTPATIENT)
Dept: OTHER | Facility: OTHER | Age: 82
End: 2017-04-24

## 2017-04-24 LAB
A/G RATIO (HISTORICAL): 1.5 (CALC) (ref 1–2.5)
ALBUMIN SERPL BCP-MCNC: 4 G/DL (ref 3.6–5.1)
ALP SERPL-CCNC: 89 U/L (ref 33–130)
ALT SERPL W P-5'-P-CCNC: 26 U/L (ref 6–29)
AST SERPL W P-5'-P-CCNC: 11 U/L (ref 10–35)
BASOPHILS # BLD AUTO: 0.6 %
BASOPHILS # BLD AUTO: 30 CELLS/UL (ref 0–200)
BILIRUB SERPL-MCNC: 0.5 MG/DL (ref 0.2–1.2)
BUN SERPL-MCNC: 20 MG/DL (ref 7–25)
BUN/CREA RATIO (HISTORICAL): ABNORMAL (CALC) (ref 6–22)
CALCIUM SERPL-MCNC: 9.1 MG/DL (ref 8.6–10.4)
CHLORIDE SERPL-SCNC: 101 MMOL/L (ref 98–110)
CHOLEST SERPL-MCNC: 124 MG/DL (ref 125–200)
CHOLEST/HDLC SERPL: 2.3 (CALC)
CO2 SERPL-SCNC: 28 MMOL/L (ref 20–31)
CREAT SERPL-MCNC: 0.72 MG/DL (ref 0.6–0.88)
DEPRECATED RDW RBC AUTO: 13.7 % (ref 11–15)
EGFR AFRICAN AMERICAN (HISTORICAL): 90 ML/MIN/1.73M2
EGFR-AMERICAN CALC (HISTORICAL): 78 ML/MIN/1.73M2
EOSINOPHIL # BLD AUTO: 355 CELLS/UL (ref 15–500)
EOSINOPHIL # BLD AUTO: 7.1 %
GAMMA GLOBULIN (HISTORICAL): 2.6 G/DL (CALC) (ref 1.9–3.7)
GLUCOSE (HISTORICAL): 192 MG/DL (ref 65–99)
HBA1C MFR BLD HPLC: 8.8 % OF TOTAL HGB
HCT VFR BLD AUTO: 39.9 % (ref 35–45)
HDLC SERPL-MCNC: 53 MG/DL
HGB BLD-MCNC: 13.3 G/DL (ref 11.7–15.5)
LDL CHOLESTEROL (HISTORICAL): 57 MG/DL (CALC)
LYMPHOCYTES # BLD AUTO: 1870 CELLS/UL (ref 850–3900)
LYMPHOCYTES # BLD AUTO: 37.4 %
MCH RBC QN AUTO: 30.3 PG (ref 27–33)
MCHC RBC AUTO-ENTMCNC: 33.3 G/DL (ref 32–36)
MCV RBC AUTO: 91.1 FL (ref 80–100)
MONOCYTES # BLD AUTO: 415 CELLS/UL (ref 200–950)
MONOCYTES (HISTORICAL): 8.3 %
NEUTROPHILS # BLD AUTO: 2330 CELLS/UL (ref 1500–7800)
NEUTROPHILS # BLD AUTO: 46.6 %
NON-HDL-CHOL (CHOL-HDL) (HISTORICAL): 71 MG/DL (CALC)
PLATELET # BLD AUTO: 259 THOUSAND/UL (ref 140–400)
PMV BLD AUTO: 10 FL (ref 7.5–12.5)
POTASSIUM SERPL-SCNC: 4.2 MMOL/L (ref 3.5–5.3)
RBC # BLD AUTO: 4.39 MILLION/UL (ref 3.8–5.1)
SODIUM SERPL-SCNC: 136 MMOL/L (ref 135–146)
TOTAL PROTEIN (HISTORICAL): 6.6 G/DL (ref 6.1–8.1)
TRIGL SERPL-MCNC: 72 MG/DL
WBC # BLD AUTO: 5 THOUSAND/UL (ref 3.8–10.8)

## 2017-04-25 ENCOUNTER — ALLSCRIPTS OFFICE VISIT (OUTPATIENT)
Dept: OTHER | Facility: OTHER | Age: 82
End: 2017-04-25

## 2017-05-02 ENCOUNTER — HOSPITAL ENCOUNTER (OUTPATIENT)
Dept: BONE DENSITY | Facility: MEDICAL CENTER | Age: 82
Discharge: HOME | End: 2017-05-02
Payer: COMMERCIAL

## 2017-05-02 DIAGNOSIS — M85.80 SENILE OSTEOPENIA: ICD-10-CM

## 2017-05-02 DIAGNOSIS — M85.80 OTHER SPECIFIED DISORDERS OF BONE DENSITY AND STRUCTURE, UNSPECIFIED SITE: ICD-10-CM

## 2017-05-02 PROCEDURE — 77080 DXA BONE DENSITY AXIAL: CPT

## 2017-05-04 ENCOUNTER — GENERIC CONVERSION - ENCOUNTER (OUTPATIENT)
Dept: OTHER | Facility: OTHER | Age: 82
End: 2017-05-04

## 2017-05-10 ENCOUNTER — GENERIC CONVERSION - ENCOUNTER (OUTPATIENT)
Dept: OTHER | Facility: OTHER | Age: 82
End: 2017-05-10

## 2017-08-21 ENCOUNTER — GENERIC CONVERSION - ENCOUNTER (OUTPATIENT)
Dept: OTHER | Facility: OTHER | Age: 82
End: 2017-08-21

## 2017-08-21 LAB
A/G RATIO (HISTORICAL): 1.6 (CALC) (ref 1–2.5)
ALBUMIN SERPL BCP-MCNC: 4.4 G/DL (ref 3.6–5.1)
ALP SERPL-CCNC: 78 U/L (ref 33–130)
ALT SERPL W P-5'-P-CCNC: 15 U/L (ref 6–29)
AST SERPL W P-5'-P-CCNC: 9 U/L (ref 10–35)
BILIRUB SERPL-MCNC: 0.5 MG/DL (ref 0.2–1.2)
BUN SERPL-MCNC: 18 MG/DL (ref 7–25)
BUN/CREA RATIO (HISTORICAL): ABNORMAL (CALC) (ref 6–22)
CALCIUM (ADJUSTED FOR ALBUMIN) (HISTORICAL): 9.8 MG/DL (CALC) (ref 8.6–10.2)
CALCIUM SERPL-MCNC: 9.8 MG/DL (ref 8.6–10.4)
CHLORIDE SERPL-SCNC: 102 MMOL/L (ref 98–110)
CHOLEST SERPL-MCNC: 158 MG/DL (ref 125–200)
CHOLEST/HDLC SERPL: 2.6 (CALC)
CO2 SERPL-SCNC: 30 MMOL/L (ref 20–31)
CREAT SERPL-MCNC: 0.73 MG/DL (ref 0.6–0.88)
CREATININE, RANDOM URINE (HISTORICAL): 40 MG/DL (ref 20–320)
EGFR AFRICAN AMERICAN (HISTORICAL): 89 ML/MIN/1.73M2
EGFR-AMERICAN CALC (HISTORICAL): 77 ML/MIN/1.73M2
EST. AVERAGE GLUCOSE BLD GHB EST-MCNC: 171 (CALC)
EST. AVERAGE GLUCOSE BLD GHB EST-MCNC: 9.5 (CALC)
GAMMA GLOBULIN (HISTORICAL): 2.7 G/DL (CALC) (ref 1.9–3.7)
GLUCOSE (HISTORICAL): 181 MG/DL (ref 65–99)
HBA1C MFR BLD HPLC: 7.6 % OF TOTAL HGB
HDLC SERPL-MCNC: 60 MG/DL
LDL CHOLESTEROL (HISTORICAL): 73 MG/DL (CALC)
MAGNESIUM, UR (HISTORICAL): 0.3 MG/DL
MICROALBUMIN/CREATININE RATIO (HISTORICAL): 8 MCG/MG CREAT
NON-HDL-CHOL (CHOL-HDL) (HISTORICAL): 98 MG/DL (CALC)
POTASSIUM SERPL-SCNC: 4.9 MMOL/L (ref 3.5–5.3)
SODIUM SERPL-SCNC: 138 MMOL/L (ref 135–146)
TOTAL PROTEIN (HISTORICAL): 7.1 G/DL (ref 6.1–8.1)
TRIGL SERPL-MCNC: 126 MG/DL
TSH SERPL DL<=0.05 MIU/L-ACNC: 3.27 MIU/L (ref 0.4–4.5)

## 2017-08-25 ENCOUNTER — ALLSCRIPTS OFFICE VISIT (OUTPATIENT)
Dept: OTHER | Facility: OTHER | Age: 82
End: 2017-08-25

## 2017-08-28 ENCOUNTER — TRANSCRIBE ORDERS (OUTPATIENT)
Dept: ADMINISTRATIVE | Facility: HOSPITAL | Age: 82
End: 2017-08-28

## 2017-08-28 DIAGNOSIS — G47.8 SLEEP AROUSAL DISORDER: Primary | ICD-10-CM

## 2017-09-11 ENCOUNTER — HOSPITAL ENCOUNTER (INPATIENT)
Facility: HOSPITAL | Age: 82
LOS: 3 days | Discharge: HOME | DRG: 308 | End: 2017-09-14
Attending: EMERGENCY MEDICINE | Admitting: INTERNAL MEDICINE
Payer: COMMERCIAL

## 2017-09-11 ENCOUNTER — APPOINTMENT (EMERGENCY)
Dept: CT IMAGING | Facility: HOSPITAL | Age: 82
DRG: 308 | End: 2017-09-11
Payer: COMMERCIAL

## 2017-09-11 ENCOUNTER — APPOINTMENT (EMERGENCY)
Dept: RADIOLOGY | Facility: HOSPITAL | Age: 82
DRG: 308 | End: 2017-09-11
Payer: COMMERCIAL

## 2017-09-11 DIAGNOSIS — I50.9 ACUTE CONGESTIVE HEART FAILURE (HCC): Primary | ICD-10-CM

## 2017-09-11 DIAGNOSIS — I48.91 ATRIAL FIBRILLATION (HCC): ICD-10-CM

## 2017-09-11 DIAGNOSIS — R06.00 DYSPNEA ON EXERTION: ICD-10-CM

## 2017-09-11 DIAGNOSIS — R09.02 HYPOXIA: ICD-10-CM

## 2017-09-11 PROBLEM — R20.0 FACIAL NUMBNESS: Status: RESOLVED | Noted: 2017-01-06 | Resolved: 2017-09-11

## 2017-09-11 LAB
ANION GAP SERPL CALCULATED.3IONS-SCNC: 9 MMOL/L (ref 4–13)
BASOPHILS # BLD AUTO: 0.04 THOUSANDS/ΜL (ref 0–0.1)
BASOPHILS NFR BLD AUTO: 0 % (ref 0–1)
BUN SERPL-MCNC: 18 MG/DL (ref 5–25)
CALCIUM SERPL-MCNC: 9.2 MG/DL (ref 8.3–10.1)
CHLORIDE SERPL-SCNC: 100 MMOL/L (ref 100–108)
CO2 SERPL-SCNC: 26 MMOL/L (ref 21–32)
CREAT SERPL-MCNC: 0.84 MG/DL (ref 0.6–1.3)
EOSINOPHIL # BLD AUTO: 0.08 THOUSAND/ΜL (ref 0–0.61)
EOSINOPHIL NFR BLD AUTO: 1 % (ref 0–6)
ERYTHROCYTE [DISTWIDTH] IN BLOOD BY AUTOMATED COUNT: 13.9 % (ref 11.6–15.1)
GFR SERPL CREATININE-BSD FRML MDRD: 65 ML/MIN/1.73SQ M
GLUCOSE SERPL-MCNC: 234 MG/DL (ref 65–140)
GLUCOSE SERPL-MCNC: 316 MG/DL (ref 65–140)
HCT VFR BLD AUTO: 40 % (ref 34.8–46.1)
HGB BLD-MCNC: 13.9 G/DL (ref 11.5–15.4)
LYMPHOCYTES # BLD AUTO: 2.36 THOUSANDS/ΜL (ref 0.6–4.47)
LYMPHOCYTES NFR BLD AUTO: 27 % (ref 14–44)
MCH RBC QN AUTO: 31.7 PG (ref 26.8–34.3)
MCHC RBC AUTO-ENTMCNC: 34.8 G/DL (ref 31.4–37.4)
MCV RBC AUTO: 91 FL (ref 82–98)
MONOCYTES # BLD AUTO: 0.74 THOUSAND/ΜL (ref 0.17–1.22)
MONOCYTES NFR BLD AUTO: 8 % (ref 4–12)
NEUTROPHILS # BLD AUTO: 5.68 THOUSANDS/ΜL (ref 1.85–7.62)
NEUTS SEG NFR BLD AUTO: 64 % (ref 43–75)
NRBC BLD AUTO-RTO: 0 /100 WBCS
NT-PROBNP SERPL-MCNC: 2796 PG/ML
P AXIS: 0 DEGREES
PLATELET # BLD AUTO: 252 THOUSANDS/UL (ref 149–390)
PMV BLD AUTO: 11.4 FL (ref 8.9–12.7)
POTASSIUM SERPL-SCNC: 4.4 MMOL/L (ref 3.5–5.3)
QRS AXIS: 57 DEGREES
QRSD INTERVAL: 74 MS
QT INTERVAL: 290 MS
QTC INTERVAL: 399 MS
RBC # BLD AUTO: 4.38 MILLION/UL (ref 3.81–5.12)
SODIUM SERPL-SCNC: 135 MMOL/L (ref 136–145)
SPECIMEN SOURCE: NORMAL
T WAVE AXIS: 231 DEGREES
TROPONIN I BLD-MCNC: 0 NG/ML (ref 0–0.08)
TROPONIN I SERPL-MCNC: <0.02 NG/ML
TSH SERPL DL<=0.05 MIU/L-ACNC: 6.7 UIU/ML (ref 0.36–3.74)
VENTRICULAR RATE: 114 BPM
WBC # BLD AUTO: 8.9 THOUSAND/UL (ref 4.31–10.16)

## 2017-09-11 PROCEDURE — 93005 ELECTROCARDIOGRAM TRACING: CPT | Performed by: EMERGENCY MEDICINE

## 2017-09-11 PROCEDURE — 84436 ASSAY OF TOTAL THYROXINE: CPT | Performed by: PHYSICIAN ASSISTANT

## 2017-09-11 PROCEDURE — 84484 ASSAY OF TROPONIN QUANT: CPT | Performed by: PHYSICIAN ASSISTANT

## 2017-09-11 PROCEDURE — 83880 ASSAY OF NATRIURETIC PEPTIDE: CPT | Performed by: EMERGENCY MEDICINE

## 2017-09-11 PROCEDURE — 82948 REAGENT STRIP/BLOOD GLUCOSE: CPT

## 2017-09-11 PROCEDURE — 36415 COLL VENOUS BLD VENIPUNCTURE: CPT | Performed by: EMERGENCY MEDICINE

## 2017-09-11 PROCEDURE — 80048 BASIC METABOLIC PNL TOTAL CA: CPT | Performed by: EMERGENCY MEDICINE

## 2017-09-11 PROCEDURE — 85025 COMPLETE CBC W/AUTO DIFF WBC: CPT | Performed by: EMERGENCY MEDICINE

## 2017-09-11 PROCEDURE — 71275 CT ANGIOGRAPHY CHEST: CPT

## 2017-09-11 PROCEDURE — 99291 CRITICAL CARE FIRST HOUR: CPT

## 2017-09-11 PROCEDURE — 84484 ASSAY OF TROPONIN QUANT: CPT

## 2017-09-11 PROCEDURE — 84443 ASSAY THYROID STIM HORMONE: CPT | Performed by: PHYSICIAN ASSISTANT

## 2017-09-11 RX ORDER — AMLODIPINE BESYLATE 5 MG/1
5 TABLET ORAL DAILY
COMMUNITY
End: 2018-01-24 | Stop reason: SDUPTHER

## 2017-09-11 RX ORDER — AMLODIPINE BESYLATE 5 MG/1
5 TABLET ORAL DAILY
Status: DISCONTINUED | OUTPATIENT
Start: 2017-09-12 | End: 2017-09-12

## 2017-09-11 RX ORDER — MAGNESIUM HYDROXIDE/ALUMINUM HYDROXICE/SIMETHICONE 120; 1200; 1200 MG/30ML; MG/30ML; MG/30ML
30 SUSPENSION ORAL EVERY 6 HOURS PRN
Status: DISCONTINUED | OUTPATIENT
Start: 2017-09-11 | End: 2017-09-14 | Stop reason: HOSPADM

## 2017-09-11 RX ORDER — DORZOLAMIDE HCL 20 MG/ML
1 SOLUTION/ DROPS OPHTHALMIC 3 TIMES DAILY
Status: DISCONTINUED | OUTPATIENT
Start: 2017-09-11 | End: 2017-09-14 | Stop reason: HOSPADM

## 2017-09-11 RX ORDER — FUROSEMIDE 10 MG/ML
40 INJECTION INTRAMUSCULAR; INTRAVENOUS
Status: DISCONTINUED | OUTPATIENT
Start: 2017-09-12 | End: 2017-09-14

## 2017-09-11 RX ORDER — DILTIAZEM HYDROCHLORIDE 5 MG/ML
15 INJECTION INTRAVENOUS ONCE
Status: COMPLETED | OUTPATIENT
Start: 2017-09-11 | End: 2017-09-11

## 2017-09-11 RX ORDER — SIMVASTATIN 80 MG
80 TABLET ORAL
Status: DISCONTINUED | OUTPATIENT
Start: 2017-09-11 | End: 2017-09-14 | Stop reason: HOSPADM

## 2017-09-11 RX ORDER — ONDANSETRON 2 MG/ML
4 INJECTION INTRAMUSCULAR; INTRAVENOUS EVERY 6 HOURS PRN
Status: DISCONTINUED | OUTPATIENT
Start: 2017-09-11 | End: 2017-09-14 | Stop reason: HOSPADM

## 2017-09-11 RX ORDER — MINERAL OIL AND PETROLATUM 150; 830 MG/G; MG/G
OINTMENT OPHTHALMIC
Status: DISCONTINUED | OUTPATIENT
Start: 2017-09-11 | End: 2017-09-14 | Stop reason: HOSPADM

## 2017-09-11 RX ORDER — CYCLOSPORINE 0.5 MG/ML
1 EMULSION OPHTHALMIC 2 TIMES DAILY
Status: DISCONTINUED | OUTPATIENT
Start: 2017-09-11 | End: 2017-09-14 | Stop reason: HOSPADM

## 2017-09-11 RX ORDER — ACETAMINOPHEN 325 MG/1
650 TABLET ORAL EVERY 6 HOURS PRN
Status: DISCONTINUED | OUTPATIENT
Start: 2017-09-11 | End: 2017-09-14 | Stop reason: HOSPADM

## 2017-09-11 RX ORDER — ESCITALOPRAM OXALATE 5 MG/1
5 TABLET ORAL DAILY
Status: DISCONTINUED | OUTPATIENT
Start: 2017-09-12 | End: 2017-09-14 | Stop reason: HOSPADM

## 2017-09-11 RX ORDER — CYCLOSPORINE 0.5 MG/ML
1 EMULSION OPHTHALMIC 2 TIMES DAILY
Status: DISCONTINUED | OUTPATIENT
Start: 2017-09-11 | End: 2017-09-11

## 2017-09-11 RX ORDER — FUROSEMIDE 10 MG/ML
40 INJECTION INTRAMUSCULAR; INTRAVENOUS ONCE
Status: COMPLETED | OUTPATIENT
Start: 2017-09-11 | End: 2017-09-11

## 2017-09-11 RX ADMIN — INSULIN LISPRO 3 UNITS: 100 INJECTION, SOLUTION INTRAVENOUS; SUBCUTANEOUS at 22:14

## 2017-09-11 RX ADMIN — BIMATOPROST 1 DROP: 0.1 SOLUTION/ DROPS OPHTHALMIC at 22:13

## 2017-09-11 RX ADMIN — DILTIAZEM HYDROCHLORIDE 15 MG: 5 INJECTION INTRAVENOUS at 18:55

## 2017-09-11 RX ADMIN — FUROSEMIDE 40 MG: 10 INJECTION, SOLUTION INTRAMUSCULAR; INTRAVENOUS at 18:55

## 2017-09-11 RX ADMIN — DORZOLAMIDE HYDROCHLORIDE 1 DROP: 20 SOLUTION/ DROPS OPHTHALMIC at 21:58

## 2017-09-11 RX ADMIN — Medication 80 MG: at 21:58

## 2017-09-11 RX ADMIN — IOHEXOL 85 ML: 350 INJECTION, SOLUTION INTRAVENOUS at 18:19

## 2017-09-11 RX ADMIN — CYCLOSPORINE 1 DROP: 0.5 EMULSION OPHTHALMIC at 23:01

## 2017-09-11 RX ADMIN — DILTIAZEM HYDROCHLORIDE 5 MG/HR: 5 INJECTION INTRAVENOUS at 21:27

## 2017-09-11 RX ADMIN — ENOXAPARIN SODIUM 40 MG: 40 INJECTION SUBCUTANEOUS at 21:38

## 2017-09-12 LAB
ANION GAP SERPL CALCULATED.3IONS-SCNC: 5 MMOL/L (ref 4–13)
APTT PPP: 32 SECONDS (ref 23–35)
APTT PPP: 56 SECONDS (ref 23–35)
BUN SERPL-MCNC: 20 MG/DL (ref 5–25)
CALCIUM SERPL-MCNC: 8.9 MG/DL (ref 8.3–10.1)
CHLORIDE SERPL-SCNC: 100 MMOL/L (ref 100–108)
CO2 SERPL-SCNC: 29 MMOL/L (ref 21–32)
CREAT SERPL-MCNC: 0.88 MG/DL (ref 0.6–1.3)
ERYTHROCYTE [DISTWIDTH] IN BLOOD BY AUTOMATED COUNT: 13.8 % (ref 11.6–15.1)
ERYTHROCYTE [DISTWIDTH] IN BLOOD BY AUTOMATED COUNT: 13.8 % (ref 11.6–15.1)
EST. AVERAGE GLUCOSE BLD GHB EST-MCNC: 180 MG/DL
GFR SERPL CREATININE-BSD FRML MDRD: 61 ML/MIN/1.73SQ M
GLUCOSE SERPL-MCNC: 186 MG/DL (ref 65–140)
GLUCOSE SERPL-MCNC: 214 MG/DL (ref 65–140)
GLUCOSE SERPL-MCNC: 219 MG/DL (ref 65–140)
GLUCOSE SERPL-MCNC: 238 MG/DL (ref 65–140)
GLUCOSE SERPL-MCNC: 265 MG/DL (ref 65–140)
HBA1C MFR BLD: 7.9 % (ref 4.2–6.3)
HCT VFR BLD AUTO: 38 % (ref 34.8–46.1)
HCT VFR BLD AUTO: 38.2 % (ref 34.8–46.1)
HGB BLD-MCNC: 13 G/DL (ref 11.5–15.4)
HGB BLD-MCNC: 13.2 G/DL (ref 11.5–15.4)
INR PPP: 1.08 (ref 0.86–1.16)
MAGNESIUM SERPL-MCNC: 1.5 MG/DL (ref 1.6–2.6)
MCH RBC QN AUTO: 31.1 PG (ref 26.8–34.3)
MCH RBC QN AUTO: 31.5 PG (ref 26.8–34.3)
MCHC RBC AUTO-ENTMCNC: 34 G/DL (ref 31.4–37.4)
MCHC RBC AUTO-ENTMCNC: 34.7 G/DL (ref 31.4–37.4)
MCV RBC AUTO: 91 FL (ref 82–98)
MCV RBC AUTO: 91 FL (ref 82–98)
PHOSPHATE SERPL-MCNC: 4 MG/DL (ref 2.3–4.1)
PLATELET # BLD AUTO: 236 THOUSANDS/UL (ref 149–390)
PLATELET # BLD AUTO: 273 THOUSANDS/UL (ref 149–390)
PMV BLD AUTO: 11.5 FL (ref 8.9–12.7)
PMV BLD AUTO: 11.5 FL (ref 8.9–12.7)
POTASSIUM SERPL-SCNC: 3.7 MMOL/L (ref 3.5–5.3)
PROTHROMBIN TIME: 14 SECONDS (ref 12.1–14.4)
RBC # BLD AUTO: 4.18 MILLION/UL (ref 3.81–5.12)
RBC # BLD AUTO: 4.19 MILLION/UL (ref 3.81–5.12)
SODIUM SERPL-SCNC: 134 MMOL/L (ref 136–145)
T3 SERPL-MCNC: 0.7 NG/ML (ref 0.6–1.8)
T4 SERPL-MCNC: 11 UG/DL (ref 4.7–13.3)
TROPONIN I SERPL-MCNC: <0.02 NG/ML
WBC # BLD AUTO: 6.78 THOUSAND/UL (ref 4.31–10.16)
WBC # BLD AUTO: 9.31 THOUSAND/UL (ref 4.31–10.16)

## 2017-09-12 PROCEDURE — 84100 ASSAY OF PHOSPHORUS: CPT | Performed by: PHYSICIAN ASSISTANT

## 2017-09-12 PROCEDURE — 85027 COMPLETE CBC AUTOMATED: CPT | Performed by: INTERNAL MEDICINE

## 2017-09-12 PROCEDURE — 80048 BASIC METABOLIC PNL TOTAL CA: CPT | Performed by: PHYSICIAN ASSISTANT

## 2017-09-12 PROCEDURE — 85027 COMPLETE CBC AUTOMATED: CPT | Performed by: PHYSICIAN ASSISTANT

## 2017-09-12 PROCEDURE — 82948 REAGENT STRIP/BLOOD GLUCOSE: CPT

## 2017-09-12 PROCEDURE — 84484 ASSAY OF TROPONIN QUANT: CPT | Performed by: PHYSICIAN ASSISTANT

## 2017-09-12 PROCEDURE — 83036 HEMOGLOBIN GLYCOSYLATED A1C: CPT | Performed by: PHYSICIAN ASSISTANT

## 2017-09-12 PROCEDURE — 85610 PROTHROMBIN TIME: CPT | Performed by: INTERNAL MEDICINE

## 2017-09-12 PROCEDURE — 83735 ASSAY OF MAGNESIUM: CPT | Performed by: PHYSICIAN ASSISTANT

## 2017-09-12 PROCEDURE — 84480 ASSAY TRIIODOTHYRONINE (T3): CPT | Performed by: PHYSICIAN ASSISTANT

## 2017-09-12 PROCEDURE — 85730 THROMBOPLASTIN TIME PARTIAL: CPT | Performed by: INTERNAL MEDICINE

## 2017-09-12 RX ORDER — METOPROLOL SUCCINATE 25 MG/1
25 TABLET, EXTENDED RELEASE ORAL EVERY 12 HOURS
Status: DISCONTINUED | OUTPATIENT
Start: 2017-09-12 | End: 2017-09-14

## 2017-09-12 RX ORDER — DILTIAZEM HYDROCHLORIDE 5 MG/ML
10 INJECTION INTRAVENOUS EVERY 4 HOURS PRN
Status: DISCONTINUED | OUTPATIENT
Start: 2017-09-12 | End: 2017-09-14 | Stop reason: HOSPADM

## 2017-09-12 RX ORDER — HEPARIN SODIUM 10000 [USP'U]/100ML
12 INJECTION, SOLUTION INTRAVENOUS
Status: DISCONTINUED | OUTPATIENT
Start: 2017-09-12 | End: 2017-09-14 | Stop reason: HOSPADM

## 2017-09-12 RX ORDER — HEPARIN SODIUM 1000 [USP'U]/ML
2000 INJECTION, SOLUTION INTRAVENOUS; SUBCUTANEOUS AS NEEDED
Status: DISCONTINUED | OUTPATIENT
Start: 2017-09-12 | End: 2017-09-14 | Stop reason: HOSPADM

## 2017-09-12 RX ORDER — HEPARIN SODIUM 1000 [USP'U]/ML
4000 INJECTION, SOLUTION INTRAVENOUS; SUBCUTANEOUS AS NEEDED
Status: DISCONTINUED | OUTPATIENT
Start: 2017-09-12 | End: 2017-09-14 | Stop reason: HOSPADM

## 2017-09-12 RX ORDER — MAGNESIUM SULFATE HEPTAHYDRATE 40 MG/ML
4 INJECTION, SOLUTION INTRAVENOUS ONCE
Status: COMPLETED | OUTPATIENT
Start: 2017-09-12 | End: 2017-09-12

## 2017-09-12 RX ADMIN — INSULIN LISPRO 2 UNITS: 100 INJECTION, SOLUTION INTRAVENOUS; SUBCUTANEOUS at 12:14

## 2017-09-12 RX ADMIN — ESCITALOPRAM 5 MG: 5 TABLET, FILM COATED ORAL at 08:48

## 2017-09-12 RX ADMIN — Medication 80 MG: at 22:04

## 2017-09-12 RX ADMIN — DORZOLAMIDE HYDROCHLORIDE 1 DROP: 20 SOLUTION/ DROPS OPHTHALMIC at 22:03

## 2017-09-12 RX ADMIN — METOPROLOL SUCCINATE 25 MG: 25 TABLET, EXTENDED RELEASE ORAL at 12:13

## 2017-09-12 RX ADMIN — FUROSEMIDE 40 MG: 10 INJECTION, SOLUTION INTRAMUSCULAR; INTRAVENOUS at 16:12

## 2017-09-12 RX ADMIN — HEPARIN SODIUM 2000 UNITS: 1000 INJECTION, SOLUTION INTRAVENOUS; SUBCUTANEOUS at 22:40

## 2017-09-12 RX ADMIN — DILTIAZEM HYDROCHLORIDE 2.5 MG/HR: 5 INJECTION INTRAVENOUS at 16:05

## 2017-09-12 RX ADMIN — FUROSEMIDE 40 MG: 10 INJECTION, SOLUTION INTRAMUSCULAR; INTRAVENOUS at 08:48

## 2017-09-12 RX ADMIN — HEPARIN SODIUM AND DEXTROSE 12 UNITS/KG/HR: 10000; 5 INJECTION INTRAVENOUS at 15:45

## 2017-09-12 RX ADMIN — INSULIN LISPRO 1 UNITS: 100 INJECTION, SOLUTION INTRAVENOUS; SUBCUTANEOUS at 22:04

## 2017-09-12 RX ADMIN — INSULIN LISPRO 1 UNITS: 100 INJECTION, SOLUTION INTRAVENOUS; SUBCUTANEOUS at 16:16

## 2017-09-12 RX ADMIN — MAGNESIUM SULFATE HEPTAHYDRATE 4 G: 40 INJECTION, SOLUTION INTRAVENOUS at 12:47

## 2017-09-12 RX ADMIN — DILTIAZEM HYDROCHLORIDE 2.5 MG/HR: 5 INJECTION INTRAVENOUS at 15:00

## 2017-09-12 RX ADMIN — INSULIN LISPRO 2 UNITS: 100 INJECTION, SOLUTION INTRAVENOUS; SUBCUTANEOUS at 08:49

## 2017-09-12 RX ADMIN — AMLODIPINE BESYLATE 5 MG: 5 TABLET ORAL at 08:48

## 2017-09-12 RX ADMIN — BIMATOPROST 1 DROP: 0.1 SOLUTION/ DROPS OPHTHALMIC at 22:02

## 2017-09-12 RX ADMIN — DORZOLAMIDE HYDROCHLORIDE 1 DROP: 20 SOLUTION/ DROPS OPHTHALMIC at 08:48

## 2017-09-12 RX ADMIN — DORZOLAMIDE HYDROCHLORIDE 1 DROP: 20 SOLUTION/ DROPS OPHTHALMIC at 16:13

## 2017-09-12 RX ADMIN — METOPROLOL SUCCINATE 25 MG: 25 TABLET, EXTENDED RELEASE ORAL at 23:41

## 2017-09-13 LAB
APTT PPP: 102 SECONDS (ref 23–35)
APTT PPP: 58 SECONDS (ref 23–35)
APTT PPP: 63 SECONDS (ref 23–35)
GLUCOSE SERPL-MCNC: 218 MG/DL (ref 65–140)
GLUCOSE SERPL-MCNC: 254 MG/DL (ref 65–140)
GLUCOSE SERPL-MCNC: 270 MG/DL (ref 65–140)
GLUCOSE SERPL-MCNC: 468 MG/DL (ref 65–140)
MAGNESIUM SERPL-MCNC: 2.1 MG/DL (ref 1.6–2.6)

## 2017-09-13 PROCEDURE — 85730 THROMBOPLASTIN TIME PARTIAL: CPT | Performed by: INTERNAL MEDICINE

## 2017-09-13 PROCEDURE — 82948 REAGENT STRIP/BLOOD GLUCOSE: CPT

## 2017-09-13 PROCEDURE — 83735 ASSAY OF MAGNESIUM: CPT | Performed by: INTERNAL MEDICINE

## 2017-09-13 RX ORDER — DILTIAZEM HYDROCHLORIDE 60 MG/1
60 TABLET, FILM COATED ORAL EVERY 8 HOURS SCHEDULED
Status: DISCONTINUED | OUTPATIENT
Start: 2017-09-13 | End: 2017-09-14

## 2017-09-13 RX ORDER — WARFARIN SODIUM 5 MG/1
5 TABLET ORAL
Status: DISCONTINUED | OUTPATIENT
Start: 2017-09-13 | End: 2017-09-14

## 2017-09-13 RX ORDER — GLIPIZIDE 5 MG/1
5 TABLET ORAL
Status: DISCONTINUED | OUTPATIENT
Start: 2017-09-13 | End: 2017-09-14 | Stop reason: HOSPADM

## 2017-09-13 RX ADMIN — CYCLOSPORINE 1 DROP: 0.5 EMULSION OPHTHALMIC at 19:53

## 2017-09-13 RX ADMIN — GLIPIZIDE 5 MG: 5 TABLET ORAL at 16:21

## 2017-09-13 RX ADMIN — DILTIAZEM HYDROCHLORIDE 60 MG: 60 TABLET, FILM COATED ORAL at 21:33

## 2017-09-13 RX ADMIN — WARFARIN SODIUM 5 MG: 5 TABLET ORAL at 18:09

## 2017-09-13 RX ADMIN — FUROSEMIDE 40 MG: 10 INJECTION, SOLUTION INTRAMUSCULAR; INTRAVENOUS at 16:21

## 2017-09-13 RX ADMIN — BIMATOPROST 1 DROP: 0.1 SOLUTION/ DROPS OPHTHALMIC at 21:33

## 2017-09-13 RX ADMIN — Medication 80 MG: at 21:34

## 2017-09-13 RX ADMIN — DORZOLAMIDE HYDROCHLORIDE 1 DROP: 20 SOLUTION/ DROPS OPHTHALMIC at 08:37

## 2017-09-13 RX ADMIN — DILTIAZEM HYDROCHLORIDE 60 MG: 60 TABLET, FILM COATED ORAL at 15:29

## 2017-09-13 RX ADMIN — INSULIN LISPRO 5 UNITS: 100 INJECTION, SOLUTION INTRAVENOUS; SUBCUTANEOUS at 12:27

## 2017-09-13 RX ADMIN — INSULIN LISPRO 1 UNITS: 100 INJECTION, SOLUTION INTRAVENOUS; SUBCUTANEOUS at 21:42

## 2017-09-13 RX ADMIN — FUROSEMIDE 40 MG: 10 INJECTION, SOLUTION INTRAMUSCULAR; INTRAVENOUS at 08:37

## 2017-09-13 RX ADMIN — HEPARIN SODIUM AND DEXTROSE 12 UNITS/KG/HR: 10000; 5 INJECTION INTRAVENOUS at 18:12

## 2017-09-13 RX ADMIN — METOPROLOL SUCCINATE 25 MG: 25 TABLET, EXTENDED RELEASE ORAL at 12:29

## 2017-09-13 RX ADMIN — INSULIN LISPRO 3 UNITS: 100 INJECTION, SOLUTION INTRAVENOUS; SUBCUTANEOUS at 16:24

## 2017-09-13 RX ADMIN — DILTIAZEM HYDROCHLORIDE 60 MG: 60 TABLET, FILM COATED ORAL at 10:37

## 2017-09-13 RX ADMIN — INSULIN LISPRO 2 UNITS: 100 INJECTION, SOLUTION INTRAVENOUS; SUBCUTANEOUS at 08:37

## 2017-09-13 RX ADMIN — ESCITALOPRAM 5 MG: 5 TABLET, FILM COATED ORAL at 08:37

## 2017-09-13 RX ADMIN — DORZOLAMIDE HYDROCHLORIDE 1 DROP: 20 SOLUTION/ DROPS OPHTHALMIC at 16:23

## 2017-09-13 RX ADMIN — DORZOLAMIDE HYDROCHLORIDE 1 DROP: 20 SOLUTION/ DROPS OPHTHALMIC at 21:34

## 2017-09-13 RX ADMIN — HEPARIN SODIUM 2200 UNITS: 1000 INJECTION INTRAVENOUS; SUBCUTANEOUS at 20:44

## 2017-09-14 VITALS
RESPIRATION RATE: 19 BRPM | OXYGEN SATURATION: 94 % | BODY MASS INDEX: 34.19 KG/M2 | WEIGHT: 163.58 LBS | SYSTOLIC BLOOD PRESSURE: 129 MMHG | HEART RATE: 65 BPM | DIASTOLIC BLOOD PRESSURE: 61 MMHG | TEMPERATURE: 98.9 F

## 2017-09-14 LAB
ANION GAP SERPL CALCULATED.3IONS-SCNC: 8 MMOL/L (ref 4–13)
APTT PPP: 146 SECONDS (ref 23–35)
APTT PPP: 63 SECONDS (ref 23–35)
BUN SERPL-MCNC: 19 MG/DL (ref 5–25)
CALCIUM SERPL-MCNC: 8.6 MG/DL (ref 8.3–10.1)
CHLORIDE SERPL-SCNC: 97 MMOL/L (ref 100–108)
CO2 SERPL-SCNC: 31 MMOL/L (ref 21–32)
CREAT SERPL-MCNC: 0.85 MG/DL (ref 0.6–1.3)
GFR SERPL CREATININE-BSD FRML MDRD: 64 ML/MIN/1.73SQ M
GLUCOSE SERPL-MCNC: 200 MG/DL (ref 65–140)
GLUCOSE SERPL-MCNC: 259 MG/DL (ref 65–140)
GLUCOSE SERPL-MCNC: 281 MG/DL (ref 65–140)
GLUCOSE SERPL-MCNC: 378 MG/DL (ref 65–140)
INR PPP: 1.11 (ref 0.86–1.16)
POTASSIUM SERPL-SCNC: 3.2 MMOL/L (ref 3.5–5.3)
PROTHROMBIN TIME: 14.3 SECONDS (ref 12.1–14.4)
SODIUM SERPL-SCNC: 136 MMOL/L (ref 136–145)

## 2017-09-14 PROCEDURE — 85730 THROMBOPLASTIN TIME PARTIAL: CPT | Performed by: INTERNAL MEDICINE

## 2017-09-14 PROCEDURE — 82948 REAGENT STRIP/BLOOD GLUCOSE: CPT

## 2017-09-14 PROCEDURE — 80048 BASIC METABOLIC PNL TOTAL CA: CPT | Performed by: INTERNAL MEDICINE

## 2017-09-14 PROCEDURE — 85610 PROTHROMBIN TIME: CPT | Performed by: INTERNAL MEDICINE

## 2017-09-14 RX ORDER — METOPROLOL SUCCINATE 50 MG/1
50 TABLET, EXTENDED RELEASE ORAL
Status: DISCONTINUED | OUTPATIENT
Start: 2017-09-14 | End: 2017-09-14 | Stop reason: HOSPADM

## 2017-09-14 RX ORDER — POTASSIUM CHLORIDE 20 MEQ/1
20 TABLET, EXTENDED RELEASE ORAL ONCE
Status: COMPLETED | OUTPATIENT
Start: 2017-09-14 | End: 2017-09-14

## 2017-09-14 RX ORDER — METOPROLOL SUCCINATE 50 MG/1
50 TABLET, EXTENDED RELEASE ORAL
Qty: 30 TABLET | Refills: 0 | Status: SHIPPED | OUTPATIENT
Start: 2017-09-14 | End: 2018-01-24 | Stop reason: SDUPTHER

## 2017-09-14 RX ADMIN — METOPROLOL SUCCINATE 25 MG: 25 TABLET, EXTENDED RELEASE ORAL at 11:58

## 2017-09-14 RX ADMIN — GLIPIZIDE 5 MG: 5 TABLET ORAL at 08:13

## 2017-09-14 RX ADMIN — DORZOLAMIDE HYDROCHLORIDE 1 DROP: 20 SOLUTION/ DROPS OPHTHALMIC at 08:13

## 2017-09-14 RX ADMIN — DORZOLAMIDE HYDROCHLORIDE 1 DROP: 20 SOLUTION/ DROPS OPHTHALMIC at 16:04

## 2017-09-14 RX ADMIN — GLIPIZIDE 5 MG: 5 TABLET ORAL at 16:04

## 2017-09-14 RX ADMIN — INSULIN LISPRO 2 UNITS: 100 INJECTION, SOLUTION INTRAVENOUS; SUBCUTANEOUS at 08:13

## 2017-09-14 RX ADMIN — ESCITALOPRAM 5 MG: 5 TABLET, FILM COATED ORAL at 08:13

## 2017-09-14 RX ADMIN — INSULIN LISPRO 3 UNITS: 100 INJECTION, SOLUTION INTRAVENOUS; SUBCUTANEOUS at 17:48

## 2017-09-14 RX ADMIN — FUROSEMIDE 40 MG: 10 INJECTION, SOLUTION INTRAMUSCULAR; INTRAVENOUS at 08:13

## 2017-09-14 RX ADMIN — MINERAL OIL AND WHITE PETROLATUM: 150; 830 OINTMENT OPHTHALMIC at 08:13

## 2017-09-14 RX ADMIN — RIVAROXABAN 20 MG: 20 TABLET, FILM COATED ORAL at 16:04

## 2017-09-14 RX ADMIN — INSULIN LISPRO 4 UNITS: 100 INJECTION, SOLUTION INTRAVENOUS; SUBCUTANEOUS at 11:58

## 2017-09-14 RX ADMIN — DILTIAZEM HYDROCHLORIDE 60 MG: 60 TABLET, FILM COATED ORAL at 05:41

## 2017-09-14 RX ADMIN — POTASSIUM CHLORIDE 20 MEQ: 1500 TABLET, EXTENDED RELEASE ORAL at 16:04

## 2017-09-21 ENCOUNTER — ALLSCRIPTS OFFICE VISIT (OUTPATIENT)
Dept: OTHER | Facility: OTHER | Age: 82
End: 2017-09-21

## 2017-09-25 ENCOUNTER — HOSPITAL ENCOUNTER (OUTPATIENT)
Dept: SLEEP CENTER | Facility: CLINIC | Age: 82
Discharge: HOME | End: 2017-09-25
Payer: COMMERCIAL

## 2017-09-25 DIAGNOSIS — G47.33 OBSTRUCTIVE SLEEP APNEA (ADULT) (PEDIATRIC): ICD-10-CM

## 2017-09-25 DIAGNOSIS — G47.8 SLEEP AROUSAL DISORDER: ICD-10-CM

## 2017-09-25 PROCEDURE — 95810 POLYSOM 6/> YRS 4/> PARAM: CPT

## 2017-09-29 ENCOUNTER — TRANSCRIBE ORDERS (OUTPATIENT)
Dept: SLEEP CENTER | Facility: CLINIC | Age: 82
End: 2017-09-29

## 2017-09-29 DIAGNOSIS — G47.33 OSA (OBSTRUCTIVE SLEEP APNEA): Primary | ICD-10-CM

## 2017-11-13 ENCOUNTER — TRANSCRIBE ORDERS (OUTPATIENT)
Dept: ADMINISTRATIVE | Facility: HOSPITAL | Age: 82
End: 2017-11-13

## 2017-11-13 DIAGNOSIS — G47.8 SLEEP AROUSAL DISORDER: Primary | ICD-10-CM

## 2017-12-04 ENCOUNTER — ALLSCRIPTS OFFICE VISIT (OUTPATIENT)
Dept: OTHER | Facility: OTHER | Age: 82
End: 2017-12-04

## 2017-12-06 NOTE — PROGRESS NOTES
Assessment    1  Left knee pain (829 46) (M25 562)   2  Recurrent falls (V15 88) (R29 6)    Plan  Atrial fibrillation    · Metoprolol Succinate ER 50 MG Oral Tablet Extended Release 24 Hour; TAKE1 TABLET DAILY AS     DIRECTED   · Xarelto 20 MG Oral Tablet; Take 1 tablet daily  Depression with anxiety    · Escitalopram Oxalate 5 MG Oral Tablet; take 1 tablet by mouth once daily  Diabetes mellitus type 2, uncontrolled    · GlipiZIDE ER 10 MG Oral Tablet Extended Release 24 Hour; take 2 tabletdaily   · MetFORMIN HCl - 1000 MG Oral Tablet; Take 1 tablet daily  Hyperlipidemia    · Simvastatin 80 MG Oral Tablet; take 1 tablet by mouth once daily  Hypertension    · AmLODIPine Besylate 5 MG Oral Tablet; take 1 tablet by mouth once daily   · CloNIDine HCl - 0 1 MG Oral Tablet; TAKE 1 TABLET BY MOUTH IF BLOODPRESSURE >180  Recurrent falls    · There ways to avoid falling ; Status:Complete;   Done: 31LGI5734 05:11PM   · These are things you can do to prevent falls in and around the home ; Status:Complete;  Done: 06TBO8587 05:11PM    Discussion/Summary    May use tylenol for pain  Do not use motrin/ibuprofen/aleve because she is on xalreto  physical therapy but pt refused  precautions  as scheduled  Possible side effects of new medications were reviewed with the patient/guardian today  The treatment plan was reviewed with the patient/guardian  The patient/guardian understands and agrees with the treatment plan      Chief Complaint  Patient is here because family is concerned that she has been falling, since her falls she has been having pain in her left leg along with some swelling      History of Present Illness  HPI: Pt is here with her daughter  fell 2 weeks ago because she did not see the step  She fell on her knees but she can walk and had no problem  fell again last night because she did not see the recliner very well and hit the corner of recliner  She fell on her forehead, denies LOC   She hit her left knee and left knee was a little sore  She can walk without problems  dizzy, SOB, chest pain, n/v/abd pain  bleeding signs  She is on xarelto for Afib opthalmology for glaucoma regularly had a cane but she does not use it  Review of Systems   Constitutional: No fever, no chills, feels well, no tiredness, no recent weight gain or loss  Cardiovascular: no complaints of slow or fast heart rate, no chest pain, no palpitations, no leg claudication or lower extremity edema  Respiratory: no complaints of shortness of breath, no wheezing, no dyspnea on exertion, no orthopnea or PND  Gastrointestinal: no complaints of abdominal pain, no constipation, no nausea or diarrhea, no vomiting, no bloody stools  Musculoskeletal: as noted in HPI  Active Problems  1  Acute CHF (congestive heart failure) (428 0) (I50 9)   2  Atrial fibrillation (427 31) (I48 91)   3  CVA (cerebral vascular accident) (434 91) (I63 9)   4  Depression with anxiety (300 4) (F41 8)   5  Diabetes mellitus type 2, uncontrolled (250 02) (E11 65)   6  Glaucoma (365 9) (H40 9)   7  Hearing Loss (389 9)   8  Hospital discharge follow-up (V67 59) (Z09)   9  Hyperlipidemia (272 4) (E78 5)   10  Hypertension (401 9) (I10)   11  Insomnia (780 52) (G47 00)   12  Medicare annual wellness visit, initial (V70 0) (Z00 00)   13  Medicare annual wellness visit, subsequent (V70 0) (Z00 00)   14  PERI (obstructive sleep apnea) (327 23) (G47 33)   15  Osteoarthritis (715 90) (M19 90)   16  Osteoporosis (733 00) (M81 0)   17  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)   18  Periodic limb movement sleep disorder (327 51) (G47 61)   19  Sleep talking (307 49) (G47 8)   20  Tinnitus, unspecified laterality (388 30) (H93 19)   21  Tricuspid valve disorders, 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)   22  Unsteady gait (781 2) (R26 81)    Past Medical History  1  History of Arm numbness (782 0) (R20 0)   2  History of Cough (786 2) (R05)   3   History of Dysfunction of Eustachian tube, unspecified laterality (381 81) (H69 80)   4  History of Flank Pain Right (789 09)   5  History of bronchitis (V12 69) (Z87 09)   6  History of cholelithiasis (V12 79) (Z87 19)   7  History of dizziness (V13 89) (Z87 898)   8  History of hematuria (V13 09) (Z87 448)   9  History of nausea (V12 79) (Z87 898)   10  History of sinusitis (V12 69) (Z87 09)   11  History of upper respiratory infection (V12 09) (Z87 09)   12  History of urinary frequency (V13 09) (Z87 898)   13  History of urinary tract infection (V13 02) (Z87 440)   14  History of vertigo (V12 49) (Z87 898)   15  History of viral gastroenteritis (V12 09) (Z86 19)   16  History of viral gastroenteritis (V12 09) (Z86 19)   17  History of viral infection (V12 09) (Z86 19)   18  History of wheezing (V12 69) (Z87 898)   19  History of Hospital discharge follow-up (V67 59) (Z09)   20  History of Hyponatremia (276 1) (E87 1)   21  History of Lumbar Strain (847 2)   22  History of Otitis media, unspecified laterality   23  History of Tinnitus, unspecified laterality (388 30) (H93 19)    Family History  Father    1  Family history of Diabetes Mellitus (V18 0)   2  Family history of Stroke Syndrome (V17 1)  Daughter    3  Family history of Breast Cancer (V16 3)  Sister    4  Family history of Coronary Artery Disease (V17 49)    Social History   · Denied: History of Alcohol   · Never A Smoker    Surgical History    1  History of Appendectomy   2  History of Tubal Ligation    Current Meds   1  AmLODIPine Besylate 5 MG Oral Tablet; take 1 tablet by mouth once daily; Therapy: 19OOQ7776 to (Evaluate:20Jan2018)  Requested for: 38Gfd4742; Last Rx:43Cfu7689 Ordered   2  Artificial Tears 1 4 % Ophthalmic Solution; INSTILL 1-2 DROPS INTO AFFECTED EYE(S)  4 TIMES DAILY AS DIRECTED; Therapy: (Lilian Rodriguez) to Recorded   3   CloNIDine HCl - 0 1 MG Oral Tablet; TAKE 1 TABLET BY MOUTH IF BLOOD PRESSURE >180 Therapy: 23AFW5953 to (Evaluate:12Mar2017)  Requested for: 88HCB8878; Last Rx:87Ytg1715 Ordered   4  Dorzolamide HCl-Timolol Mal 22 3-6 8 MG/ML Ophthalmic Solution; SOLN OP X 30; Therapy: 60QVQ4990 to (Evaluate:02Mar2016); Last Rx:55Trd9736 Ordered   5  Escitalopram Oxalate 5 MG Oral Tablet; take 1 tablet by mouth once daily; Therapy: 77HUW7610 to (CQKZKYLB:41RAI0456)  Requested for: 01Wcs0063; Last Rx:66Zlo4439 Ordered   6  GlipiZIDE ER 10 MG Oral Tablet Extended Release 24 Hour; take 2 tablet daily; Therapy: 50CPN6032 to (Evaluate:78Ueq8419)  Requested for: 52Tra7827; Last Rx:84Fzn3434 Ordered   7  Latanoprost 0 005 % Ophthalmic Solution; SOLN OP X 25; Therapy: 09YFT1865 to (Last Olu Roc)  Requested for: 84PZL2810 Ordered   8  MetFORMIN HCl - 1000 MG Oral Tablet; Take 1 tablet daily; Therapy: 15BES1974 to (Evaluate:20Mar2018)  Requested for: 30VQC7647; Last Rx:21Bla6401 Ordered   9  Metoprolol Succinate ER 50 MG Oral Tablet Extended Release 24 Hour; TAKE 1 TABLET DAILY AS     DIRECTED; Therapy: 06GFK6723 to (Evaluate:16Sep2018)  Requested for: 29AMD8377; Last Rx:56Jbo5546 Ordered   10  OneTouch Lancets Miscellaneous; Check blood sugar 2 times daily; Therapy: 18VSD7691 to (Evaluate:06Eee7854)  Requested for: 74TJJ2741; Last  Rx:05Wjn6285 Ordered   11  OneTouch Ultra Blue In [employer]; test twice daily; Therapy: 71FWI8214 to (YFTRUMDK:48PEL8854)  Requested for: 18AWQ1780; Last  Rx:34Xqp0706 Ordered   12  Restasis 0 05 % Ophthalmic Emulsion; USE AS DIRECTED; Therapy: (Recorded:28Tiu7155) to Recorded   13  Simvastatin 80 MG Oral Tablet; take 1 tablet by mouth once daily; Therapy: 95SCL1396 to (Evaluate:19Mar2018)  Requested for: 07RVD0989; Last  Rx:12Vfi8398 Ordered   14  Xarelto 20 MG Oral Tablet; Take 1 tablet daily; Therapy: 82ZUC0333 to (Evaluate:20Mar2018)  Requested for: 93LQX5030; Last  Rx:34Kdz3596 Ordered    Allergies  1  HydroCHLOROthiazide TABS   2  Metoprolol Tartrate TABS   3  Aspirin Buffered TABS   4  Lipitor TABS   5   Zocor TABS    Vitals Recorded: 45SFY8857 04:25PM   Temperature 98 4 F   Heart Rate 60   Respiration 16   Systolic 195   Diastolic 78   Height 4 ft 10 in   Weight 166 lb 12 8 oz   BMI Calculated 34 86   BSA Calculated 1 69       Physical Exam   Constitutional  General appearance: No acute distress, well appearing and well nourished  Pulmonary  Respiratory effort: No increased work of breathing or signs of respiratory distress  Auscultation of lungs: Clear to auscultation  Cardiovascular  Auscultation of heart: Normal rate and rhythm, normal S1 and S2, without murmurs  Examination of extremities for edema and/or varicosities: Normal    Carotid pulses: Normal    Abdomen  Abdomen: Non-tender, no masses  Liver and spleen: No hepatomegaly or splenomegaly  Lymphatic  Palpation of lymph nodes in neck: No lymphadenopathy  Musculoskeletal  Gait and station: Normal    Inspection/palpation of joints, bones, and muscles: Abnormal  -- Left knee skin abrasion  No bruise or erythema  Future Appointments    Date/Time Provider Specialty Site   12/22/2017 01:20 PM Jim Costello   02/07/2018 03:00 PM Don Colin MD [facility] [address]       Signatures   Electronically signed by :  Robert Dean MD; Dec  4 2017  5:12PM EST                       (Author)

## 2017-12-08 ENCOUNTER — GENERIC CONVERSION - ENCOUNTER (OUTPATIENT)
Dept: OTHER | Facility: OTHER | Age: 82
End: 2017-12-08

## 2017-12-12 ENCOUNTER — GENERIC CONVERSION - ENCOUNTER (OUTPATIENT)
Dept: OTHER | Facility: OTHER | Age: 82
End: 2017-12-12

## 2017-12-18 ENCOUNTER — GENERIC CONVERSION - ENCOUNTER (OUTPATIENT)
Dept: OTHER | Facility: OTHER | Age: 82
End: 2017-12-18

## 2017-12-18 LAB
A/G RATIO (HISTORICAL): 1.7 (CALC) (ref 1–2.5)
ALBUMIN SERPL BCP-MCNC: 4.3 G/DL (ref 3.6–5.1)
ALP SERPL-CCNC: 77 U/L (ref 33–130)
ALT SERPL W P-5'-P-CCNC: 14 U/L (ref 6–29)
AST SERPL W P-5'-P-CCNC: 10 U/L (ref 10–35)
BILIRUB SERPL-MCNC: 0.5 MG/DL (ref 0.2–1.2)
BUN SERPL-MCNC: 21 MG/DL (ref 7–25)
BUN/CREA RATIO (HISTORICAL): ABNORMAL (CALC) (ref 6–22)
CALCIUM (ADJUSTED FOR ALBUMIN) (HISTORICAL): 9.5 MG/DL (CALC) (ref 8.6–10.2)
CALCIUM SERPL-MCNC: 9.4 MG/DL (ref 8.6–10.4)
CHLORIDE SERPL-SCNC: 103 MMOL/L (ref 98–110)
CHOLEST SERPL-MCNC: 130 MG/DL
CHOLEST/HDLC SERPL: 2.5 (CALC)
CO2 SERPL-SCNC: 28 MMOL/L (ref 20–31)
CREAT SERPL-MCNC: 0.79 MG/DL (ref 0.6–0.88)
EGFR AFRICAN AMERICAN (HISTORICAL): 81 ML/MIN/1.73M2
EGFR-AMERICAN CALC (HISTORICAL): 70 ML/MIN/1.73M2
EST. AVERAGE GLUCOSE BLD GHB EST-MCNC: 177 (CALC)
EST. AVERAGE GLUCOSE BLD GHB EST-MCNC: 9.8 (CALC)
GAMMA GLOBULIN (HISTORICAL): 2.6 G/DL (CALC) (ref 1.9–3.7)
GLUCOSE (HISTORICAL): 216 MG/DL (ref 65–99)
HBA1C MFR BLD HPLC: 7.8 % OF TOTAL HGB
HDLC SERPL-MCNC: 51 MG/DL
LDL CHOLESTEROL (HISTORICAL): 61 MG/DL (CALC)
NON-HDL-CHOL (CHOL-HDL) (HISTORICAL): 79 MG/DL (CALC)
POTASSIUM SERPL-SCNC: 4.8 MMOL/L (ref 3.5–5.3)
SODIUM SERPL-SCNC: 138 MMOL/L (ref 135–146)
TOTAL PROTEIN (HISTORICAL): 6.9 G/DL (ref 6.1–8.1)
TRIGL SERPL-MCNC: 99 MG/DL

## 2017-12-22 ENCOUNTER — GENERIC CONVERSION - ENCOUNTER (OUTPATIENT)
Dept: OTHER | Facility: OTHER | Age: 82
End: 2017-12-22

## 2018-01-09 NOTE — MISCELLANEOUS
Message   Recorded as Task   Date: 07/01/2016 11:32 AM, Created By: Ebony Flood   Task Name: Medical Complaint Callback   Assigned To: Berta Whipple   Regarding Patient: Autumn Ponce, Status: Active   Comment:    Goldie Cisneros - 01 Jul 2016 11:32 AM     TASK CREATED  Caller: Melissa Sexton, Adult Child  Patient seen Tuesday,  has 1 antibiotic left  Patient not much better  Told daughter that med works for    10 days  Please advise     [phone]   Irvin Dubois - 01 Jul 2016 3:24 PM     TASK REPLIED TO: Previously Assigned To [address]  daughter back  Daughter states pt still cough and wheezing  Denies fever, SOB, CP, n/v/abd pain  Pt uses rescue inhaler once per day  I will send prednisone to pharmacy  Go to ER if symptoms get worse or develop fever, SOB, CP etc  Daughter agreed  Plan  Wheezing    · PredniSONE 10 MG Oral Tablet; Take 3 tablets daily for 3 days, 2 tablets daily for 3  days, one tablet daily for 3 days then stop  Take with food    Signatures   Electronically signed by :  Rico Grigsby MD; Jul 1 2016  3:24PM EST                       (Author)

## 2018-01-09 NOTE — PSYCH
Reason For Visit  Reason For Visit Free Text Note Form: Met with patient for 20 minutes  Unfortunately, insurance not covering visits here at [address] [place] [address]  since her PCP is [facility]  Made patient aware of same  However, her mood continues to be more stable  Has malena more engaged with family and has been more active and receptive to activities outside of home  Denies any depressive symptoms  Patient does not feel she needs continued sessions and I agree  Provided my contact information and reinforced that she could see me at [facility] if needed moving forward  Active Problems    1  Anxiety and depression (300 00,311) (F41 9,F32 9)   2  CVA (cerebral vascular accident) (434 91) (I63 9)   3  Depression with anxiety (300 4) (F41 8)   4  Diabetes mellitus type 2, uncontrolled (250 02) (E11 65)   5  Dizziness (780 4) (R42)   6  Glaucoma (365 9) (H40 9)   7  Hearing Loss (389 9)   8  Hyperlipidemia (272 4) (E78 5)   9  Hypertension (401 9) (I10)   10  Hyponatremia (276 1) (E87 1)   11  Insomnia (780 52) (G47 00)   12  Medicare annual wellness visit, initial (V70 0) (Z00 00)   13  Osteoarthritis (715 90) (M19 90)   14  Osteopenia (733 90) (M85 80)   15  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)   16  Tinnitus, unspecified laterality (388 30) (H93 19)   17  Tricuspid valve disorders, 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)   18  Unsteady gait (781 2) (R26 81)    Current Meds   1  ALPRAZolam 0 25 MG Oral Tablet; TAKE 1 TABLET Every 8 hours PRN anxiety; Therapy: 19FSA9487 to (Evaluate:23Jan2017); Last Rx:13Jan2017 Ordered   2  AmLODIPine Besylate 5 MG Oral Tablet; Take 1 tablet daily; Therapy: 86EKI0284 to (Evaluate:09Aug2017)  Requested for: 55XUY7407; Last   Rx:10Feb2017 Ordered   3  Artificial Tears 1 4 % Ophthalmic Solution; INSTILL 1-2 DROPS INTO AFFECTED EYE(S)    4 TIMES DAILY AS DIRECTED; Therapy: (Dorinda Ku) to Recorded   4   CloNIDine HCl - 0 1 MG Oral Tablet; TAKE 1 TABLET BY MOUTH IF BLOOD PRESSURE   >180   Therapy: 89PQV2016 to (Evaluate:12Mar2017)  Requested for: 26ORE2545; Last   Rx:51Okv8155 Ordered   5  Dorzolamide HCl-Timolol Mal 22 3-6 8 MG/ML Ophthalmic Solution; SOLN OP X 30; Therapy: 91KMS8276 to (Evaluate:02Mar2016); Last Rx:45Kec3899 Ordered   6  Escitalopram Oxalate 5 MG Oral Tablet; TAKE 1 TABLET DAILY; Therapy: 32GBI2301 to (Evaluate:02Aug2017)  Requested for: 28GQQ2558; Last   Rx:44Yiz8089 Ordered   7  GlipiZIDE XL 10 MG Oral Tablet Extended Release 24 Hour; Take 2 tablets daily; Therapy: 65WME2967 to (Evaluate:12Aug2017)  Requested for: 71ZTL1550; Last   Rx:17Aka3134 Ordered   8  Latanoprost 0 005 % Ophthalmic Solution; SOLN OP X 25; Therapy: 54FFD9453 to (Last [facility])  Requested for: 31QML8070 Ordered   9  Losartan Potassium 50 MG Oral Tablet; Take 1 tablet twice daily; Therapy: 81TWE8378 to (Evaluate:02Aug2017)  Requested for: 30NXS1604; Last   Rx:14Psl3445 Ordered   10  OneTouch Lancets Miscellaneous; Check blood sugar 1 time daily; Therapy: 19EON8660 to (Evaluate:23Jul2017)  Requested for: 08Gjh2684; Last    Rx:33Bck6134 Ordered   11  OneTouch Ultra Blue In [employer] Strip; TEST ONCE DAILY; Therapy: 99HMS6015 to (Evaluate:13Lcs5236)  Requested for: 53Nko2345; Last    Rx:38Ial1060 Ordered   12  Restasis 0 05 % Ophthalmic Emulsion; USE AS DIRECTED; Therapy: (Recorded:66Oix6364) to Recorded   13  Simvastatin 80 MG Oral Tablet; take 1 tablet by mouth once daily; Therapy: 86QOU0804 to (Evaluate:20Mar2017)  Requested for: 26BFL4703; Last    Rx:57Wcm6475 Ordered    Allergies    1  HydroCHLOROthiazide TABS   2  Metoprolol Tartrate TABS   3  Aspirin Buffered TABS   4  Lipitor TABS   5   Zocor TABS    Signatures   Electronically signed by : William Barker LCSW; Feb 20 2017  1:56PM EST                       (Author)

## 2018-01-10 ENCOUNTER — HOSPITAL ENCOUNTER (OUTPATIENT)
Dept: SLEEP CENTER | Facility: CLINIC | Age: 83
Discharge: HOME | End: 2018-01-11
Payer: COMMERCIAL

## 2018-01-10 ENCOUNTER — TRANSCRIBE ORDERS (OUTPATIENT)
Dept: SLEEP CENTER | Facility: CLINIC | Age: 83
End: 2018-01-10

## 2018-01-10 ENCOUNTER — GENERIC CONVERSION - ENCOUNTER (OUTPATIENT)
Dept: FAMILY MEDICINE CLINIC | Facility: CLINIC | Age: 83
End: 2018-01-10

## 2018-01-10 DIAGNOSIS — G25.81 RESTLESS LEG SYNDROME: Primary | ICD-10-CM

## 2018-01-10 DIAGNOSIS — G47.8 SLEEP AROUSAL DISORDER: ICD-10-CM

## 2018-01-10 NOTE — PROGRESS NOTES
Cons[address]ation - [address] 1935  MRN: [id number]      Assessment:  1  Obstructive sleep apnea-no clear indication for treatment  2  Periodic limb movement syndrome-given the patient's daytime sleepiness, a trial of therapy is worthwhile  I will start pramipexole and assess response to treatment  3   REM behavior disorder-no treatment necessary early unless the patient becomes more active during sleep and puts herself at risk  Plan:  Pramipexole 0 125 mg up to 0 75 mg at bedtime    Follow up:  2 months    History of Present Illness:  80 y  o female with a history of crying out during the night  Her speech is purposeful and seems to be related to dreams  She sometimes has dream recall  She has not experienced physical harm as a result of these episodes  She has not gotten out of bed  The episodes occur 2 to 3 times per week  She has no history of Parkinson's disease, dementia or any neurologic illness  She has a history of stroke from which she recovered completely  She underwent nocturnal polysomnography which demonstrated mild obstructive sleep apnea  She does have a history of snoring but denies awakening with a choking sensation  She has excessive daytime sleepiness  Her AHI = 12 4  She also had frequent periodic limb movements which were associated with arousal   Her periodic limb movement index was 79 0 with an arousal index of 28 1  She denies restless legs      Review of Systems:  Headache, chronic pain, irrepressible sleep, visual hallucinations, thrashing during sleep, somniloquoy, disturbing dreams, poor short-term memory, poor concentration, depression and anxiety    Historical Information    Past Medical History:  Diabetes, glaucoma, atrial fibrillation    Past Surgical History:  Appendectomy, tubal ligation    Social History  History   Alcohol use: Not on file     History   Drug Use Not on file     History   Smoking Status    Not on file   Smokeless Tobacco    Not on file     Family History: non-contributory     Sleep History: See above     Sleep Schedule:  Unremarkable     Snoring/Apnea:  Snoring but no apnea    Medications/Allergies:  See chart    Objective:    Vital Signs:   See Chart    Independence Sleepiness Scale:  10    Physical Exam:    General: Alert, appropriate, cooperative, overweight    Head: NC/AT, mild retrognathia    Nose: No septal deviation, nares partially obstructed, mucosa normal    Throat: Airway lumen narrowed, tongue base thickened, no tonsils visualized    Neck: Normal, no thyromegaly or lymphadenopathy, no JVD    Heart: RR, normal S1 and S2, no murmurs    Chest: Clear bilaterally    Extremity: No clubbing, cyanosis, no edema    Skin: Warm, dry    Neuro: No motor abnormalities, cranial nerves appear intact    Sleep Study Results:   As above      Counseling / Coordination of Care  Total clinic time spent today 30 minutes  Greater than 50% of total time was spent with the patient and / or family counseling and / or coordination of care  A description of the counseling / coordination of care:  Discussed the pathophysiology of REM behavior disorder    HALIMA Moura    Board Certified Sleep Specialist

## 2018-01-10 NOTE — PSYCH
History of Present Illness  Psychotherapy Provided [facility]: Individual Psychotherapy 40 minutes minutes provided today  Goals addressed in session:   Met with patient along with her daughter Dorothy Chu son-in-law, with whom she resides  Has become more depressed and anxious  Has suffered multiple losses of children ad   Has become more sedentary and isolative which has not been helpful  At present time she is not sleeping consistently  Patient verbal nd cooperative  HPI - Psych: Despite hr history, she has never seen a counselor  Has experienced multiple losses and has now transitioned to living at daughter's home  Has become less active physically and socially  Has not been getting out around other people  Having issues with anxiety/worry, as well as with lack of energy, motivation and emotion  Denies any SI   Note   Note:   Provided supportive therapy  Bean reviewing coping strategies to utilize to break pattern of isolation and to become more active to alleviate depression and anxiety  Will see her in one week  Assessment    1   Depression with anxiety (300 4) (F41 8)    Signatures   Electronically signed by : Jennifer Delaney LCSW; Jan 24 2017 12:20PM EST                       (Author)

## 2018-01-10 NOTE — PROGRESS NOTES
Assessment    1  Diabetes mellitus type 2, uncontrolled (250 02) (E11 65)   2  Hyperlipidemia (272 4) (E78 5)   3  Hypertension (401 9) (I10)   4  Depression with anxiety (300 4) (F41 8)    Plan  Depression with anxiety, Diabetes mellitus type 2, uncontrolled, Hyperlipidemia,  Hypertension    · (Q) CBC (INCLUDES DIFF/PLT) (REFL); Status:Active; Requested for:01May2016;    · (Q) COMPREHENSIVE METABOLIC PNL W/ADJUSTED CALCIUM; Status:Active; Requested for:01May2016;    · (Q) HEMOGLOBIN A1c WITH eAG; Status:Active; Requested for:01May2016;    · (Q) LIPID PANEL WITH REFLEX TO DIRECT LDL; Status:Active; Requested  for:01May2016;    · (Q) MICROALBUMIN, RANDOM URINE (W/CREATININE); Status:Active; Requested  for:01May2016;    · (Q) TSH, 3RD GENERATION W/REFLEX TO FT4; Status:Active; Requested  for:01May2016;   Diabetes mellitus type 2, uncontrolled    · GlipiZIDE ER 10 MG Oral Tablet Extended Release 24 Hour   · MetFORMIN HCl - 1000 MG Oral Tablet; take 1 tablet by mouth twice a day   · GlipiZIDE XL 10 MG Oral Tablet Extended Release 24 Hour; take 1 tablet by  mouth twice a day  Hyperlipidemia    · Simvastatin 80 MG Oral Tablet; take 1 tablet by mouth once daily  Hypertension    · Losartan Potassium 50 MG Oral Tablet; TAKE 1 TABLET DAILY    Discussion/Summary    Reviewed lab in 1/2016  Lipid 138/164/51/54  hgA1C 8 7 worse than 8 1 Pt states she ate a lot of cookies during holidays  CMP ok  Cont current meds  Pt refused more meds for BS control  Would like to try low carb diet more  FU opthalmology and podiatry as scheduled  Refused flu shot and pneumovax/prevnar  Dexa 8/2012 showed osteoporosis in forearm; Pt states she is not taking Ca/VitD or bisphosphate  She knows risk of fracture  Refused more Dexa  Fall precautions  RTO in 4 months  Chief Complaint  Patient is here for her 4 months f/u exam visit      History of Present Illness  Pt is here by herself    DM---1/2016 HgA1C 8 7   FU opthalmology   Lynn Q 4 months for glaucoma  Next appt 1/28/2016  FU podiatry Q 3 months per pt  A lot of stress in her life recently  Denies suicidal ideation  She refused to take any meds for depression now  Feels tired  Pt lives with son and granddaughter  Does all ADL's  Still driving  Denies recent falls  The patient states her hyperlipidemia has been under good control since the last visit  Comorbid Illnesses: diabetes mellitus and hypertension  Symptoms: denies chest pain, denies intermittent leg claudication, denies muscle pain and denies muscle weakness  Medications: the patient is adherent with her medication regimen  She denies medication side effects  the patient's LDL goal is 100 mg/dL  the patient's last LDL was 73 mg/dL  1/2016  The patient states she has been stable with her Type II Diabetes control since the last visit  Comorbid Illnesses: hyperlipidemia  Complications: retinopathy, but no peripheral neuropathy, no nephropathy and no coronary artery disease  Symptoms: denies numbness of the feet, denies a foot ulcer, denies foot pain, denies vomiting and denies visual impairment  Home monitoring: The patient checks her blood sugar sporadically  the patient reports no symptomatic hypoglycemic episodes  BS at home 130-150  Lifestyle: Diet: She consumes a diverse and healthy diet  Weight Issues: She has weight concerns  Exercise: She exercises regularly  Smoking: She does not use tobacco Alcohol: She denies alcohol use  Drug Use: She denies drug use  Medications: the patient is adherent with her medication regimen  She denies medication side effects  The patient presents for follow-up of essential hypertension  The patient states she has been doing well with her blood pressure control since the last visit  She has no comorbid illnesses  Symptoms: denies impaired vision, denies dyspnea, denies chest pain, denies intermittent leg claudication and denies lower extremity edema     Home monitoring: The patient is not checking blood pressure at home  Medications: the patient is adherent with her medication regimen  She denies medication side effects  Review of Systems    Constitutional: No fever, no chills, feels well, no tiredness, no recent weight gain or weight loss  ENT: no complaints of earache, no loss of hearing, no nose bleeds, no nasal discharge, no sore throat, no hoarseness  Cardiovascular: No complaints of slow heart rate, no fast heart rate, no chest pain, no palpitations, no leg claudication, no lower extremity edema  Respiratory: No complaints of shortness of breath, no wheezing, no cough, no SOB on exertion, no orthopnea, no PND  Gastrointestinal: No complaints of abdominal pain, no constipation, no nausea or vomiting, no diarrhea, no bloody stools  Musculoskeletal: No complaints of arthralgias, no myalgias, no joint swelling or stiffness, no limb pain or swelling  Preventive Quality 65 and Older: The patient is currently asymptomatic Symptoms Include: no recent fall       Active Problems    1  Arm numbness (782 0) (R20 0)   2  Bronchitis (490) (J40)   3  Depression with anxiety (300 4) (F41 8)   4  Diabetes mellitus type 2, uncontrolled (250 02) (E11 65)   5  Dizziness (780 4) (R42)   6  Hearing Loss (389 9)   7  Hyperlipidemia (272 4) (E78 5)   8  Hypertension (401 9) (I10)   9  Osteoarthritis (715 90) (M19 90)   10  Osteopenia (733 90) (M85 80)   11  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)   12  Tinnitus, unspecified laterality (388 30) (H93 19)   13  Tricuspid valve disorders, 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)    Past Medical History    1  History of Dysfunction of eustachian tube, unspecified laterality (381 81) (H69 80)   2  History of Flank Pain Right (789 09)   3  History of cholelithiasis (V12 79) (Z87 19)   4  History of hematuria (V13 09) (Z87 448)   5  History of nausea (V12 79) (Z87 898)   6  History of sinusitis (V12 69) (Z87 09)   7  History of upper respiratory infection (V12 09) (Z87 09)   8  History of urinary frequency (V13 09) (Z87 898)   9  History of vertigo (V12 49) (Z87 898)   10  History of viral gastroenteritis (V12 09) (Z86 19)   11  History of viral infection (V12 09) (Z86 19)   12  History of Lumbar Strain (847 2)   13  History of Otitis media, unspecified laterality   14  History of Tinnitus, unspecified laterality (388 30) (H93 19)    Surgical History    1  History of Appendectomy   2  History of Tubal Ligation    Family History    1  Family history of Diabetes Mellitus (V18 0)   2  Family history of Stroke Syndrome (V17 1)    3  Family history of Breast Cancer (V16 3)    4  Family history of Coronary Artery Disease (V17 49)    Social History    · Denied: History of Alcohol   · Never A Smoker    Current Meds   1  Artificial Tears 1 4 % Ophthalmic Solution; INSTILL 1-2 DROPS INTO AFFECTED EYE(S)    4 TIMES DAILY AS DIRECTED; Therapy: (Recorded:14Svi3707) to Recorded   2  GlipiZIDE ER 10 MG Oral Tablet Extended Release 24 Hour; TAKE ONE (1) TABLET(S)   TWICE DAILY; Therapy: 66GNX5791 to (Arely Florentino)  Requested for: 78WZJ1361; Last   MU:49NFR0939 Ordered   3  GlipiZIDE XL 10 MG Oral Tablet Extended Release 24 Hour; take 1 tablet by mouth twice   a day; Therapy: 18AVV6223 to (Evaluate:25Jun2016)  Requested for: 0676 543 19 15; Last   Rx:39Nhv9146 Ordered   4  Latanoprost 0 005 % Ophthalmic Solution; SOLN OP X 25; Therapy: 43OPD9654 to ([facility])  Requested for: 52NOW2119 Ordered   5  Losartan Potassium 50 MG Oral Tablet; TAKE 1 TABLET DAILY; Therapy: 59DHQ0843 to (Evaluate:24Apr2016)  Requested for: 77Yxv6406; Last   Rx:60Exj4604 Ordered   6  MetFORMIN HCl - 1000 MG Oral Tablet; take 1 tablet by mouth twice a day; Therapy: 06CTW2694 to (Evaluate:05Jan2016)  Requested for: 16PQK8560; Last   Rx:[id number] Ordered   7  OneTouch Lancets Miscellaneous; Check blood sugar 1 time daily;    Therapy: 98BHP7013 to (Evaluate:84Jlp3128)  Requested for: 05AFY0189; Last   Rx:15Tpf7389 Ordered   8  OneTouch Ultra Blue In Vitro Strip; TEST ONCE DAILY; Therapy: 59EXS3885 to (Evaluate:93Txa3946)  Requested for: 31WAF6450; Last   Rx:17Wqu7506 Ordered   9  Restasis 0 05 % Ophthalmic Emulsion; USE AS DIRECTED; Therapy: (Flora Jackson) to Recorded   10  Simvastatin 80 MG Oral Tablet; take 1 tablet by mouth once daily; Therapy: 86WVD6164 to (Evaluate:28Mcm1266)  Requested for: 42BYE0744; Last    Rx:84Hkb2072 Ordered    Allergies    1  Hydrochlorothiazide TABS   2  Metoprolol Tartrate TABS   3  Aspirin Buffered TABS   4  Lipitor TABS   5  Zocor TABS    Vitals  Vital Signs [Data Includes: Current Encounter]    Recorded: 48WXB4950 08:34AM   Temperature 97 7 F   Heart Rate 64   Respiration 16   Systolic 719   Diastolic 80   Height 4 ft 10 in   Weight 161 lb    BMI Calculated 33 65   BSA Calculated 1 65   O2 Saturation 98   Pain Scale 0     Physical Exam    Constitutional   General appearance: No acute distress, well appearing and well nourished  Ears, Nose, Mouth, and Throat   External inspection of ears and nose: Normal     Otoscopic examination: Tympanic membranes translucent with normal light reflex  Canals patent without erythema  Nasal mucosa, septum, and turbinates: Normal without edema or erythema  Oropharynx: Normal with no erythema, edema, exudate or lesions  Pulmonary   Respiratory effort: No increased work of breathing or signs of respiratory distress  Auscultation of lungs: Clear to auscultation  Cardiovascular   Auscultation of heart: Normal rate and rhythm, normal S1 and S2, without murmurs  Examination of extremities for edema and/or varicosities: Normal     Carotid pulses: Normal     Abdomen   Abdomen: Non-tender, no masses  Liver and spleen: No hepatomegaly or splenomegaly  Lymphatic   Palpation of lymph nodes in neck: No lymphadenopathy      Musculoskeletal   Gait and station: Normal  Results/Data  Results   (1) COMPREHENSIVE METABOLIC PANEL [id number] 75:18YJ Matthew Matamoros     Test Name Result Flag Reference   GLUCOSE 258 mg/dL H 65-99   Fasting reference interval   UREA NITROGEN (BUN) 17 mg/dL  7-25   CREATININE 0 66 mg/dL  0 60-0 88   For patients >52years of age, the reference limit  for Creatinine is approximately 13% higher for people  identified as -American  eGFR NON-AFR  AMERICAN 83 mL/min/1 73m2  > OR = 60   eGFR AFRICAN AMERICAN 97 mL/min/1 73m2  > OR = 60   BUN/CREATININE RATIO   5-30   NOT APPLICABLE (calc)   SODIUM 136 mmol/L  135-146   POTASSIUM 4 9 mmol/L  3 5-5 3   CHLORIDE 98 mmol/L     CARBON DIOXIDE 29 mmol/L  19-30   CALCIUM 9 6 mg/dL  8 6-10 4   PROTEIN, TOTAL 6 7 g/dL  6 1-8 1   ALBUMIN 4 3 g/dL  3 6-5 1   GLOBULIN 2 4 g/dL (calc)  1 9-3 7   ALBUMIN/GLOBULIN RATIO 1 8 (calc)  1 0-2 5   BILIRUBIN, TOTAL 0 8 mg/dL  0 2-1 2   ALKALINE PHOSPHATASE 74 U/L     AST 7 U/L L 10-35   ALT 14 U/L  6-29     (Q) LIPID PANEL WITH REFLEX TO DIRECT LDL [id number] 08:05AM Matthew Matamoros     Test Name Result Flag Reference   CHOLESTEROL, TOTAL 138 mg/dL  125-200   HDL CHOLESTEROL 51 mg/dL  > OR = 46   TRIGLICERIDES 813 mg/dL H <150   LDL-CHOLESTEROL 54 mg/dL (calc)  <130   Desirable range <100 mg/dL for patients with CHD or  diabetes and <70 mg/dL for diabetic patients with  known heart disease  CHOL/HDLC RATIO 2 7 (calc)  < OR = 5 0   NON HDL CHOLESTEROL 87 mg/dL (calc)     Target for non-HDL cholesterol is 30 mg/dL higher than   LDL cholesterol target  (Q) HEMOGLOBIN A1c [id number] 08:05AM Matthew Matamoros   REPORT COMMENT:  FASTING:YES     Test Name Result Flag Reference   HEMOGLOBIN A1c 8 7 % of total Hgb H <5 7   According to ADA guidelines, hemoglobin A1c <7 0%  represents optimal control in non-pregnant diabetic  patients  Different metrics may apply to specific  patient populations  Standards of Medical Care in    Diabetes Care   2013;36:s11-s66     For the purpose of screening for the presence of  diabetes  <5 7%       Consistent with the absence of diabetes  5 7-6 4%    Consistent with increased risk for diabetes              (prediabetes)  >or=6 5%    Consistent with diabetes     This assay result is consistent with diabetes  mellitus  Currently, no consensus exists for use of hemoglobin  A1c for diagnosis of diabetes for children  (Q) COMPREHENSIVE METABOLIC PNL W/ADJUSTED CALCIUM [id number] 08:08AM [facility]     Test Name Result Flag Reference   GLUCOSE 235 mg/dL H 65-99   Fasting reference interval   UREA NITROGEN (BUN) 16 mg/dL  7-25   CREATININE 0 66 mg/dL  0 60-0 88   For patients >52years of age, the reference limit  for Creatinine is approximately 13% higher for people  identified as -American  eGFR NON-AFR   AMERICAN 83 mL/min/1 73m2  > OR = 60   eGFR AFRICAN AMERICAN 97 mL/min/1 73m2  > OR = 60   BUN/CREATININE RATIO   2-39   NOT APPLICABLE (calc)   SODIUM 140 mmol/L  135-146   POTASSIUM 4 8 mmol/L  3 5-5 3   CHLORIDE 101 mmol/L     CARBON DIOXIDE 27 mmol/L  19-30   CALCIUM 10 0 mg/dL  8 6-10 4   CALCIUM (ADJUSTED FOR$ALBUMIN) 9 9 mg/dL (calc)  8 6-10 2   PROTEIN, TOTAL 7 0 g/dL  6 1-8 1   ALBUMIN 4 5 g/dL  3 6-5 1   GLOBULIN 2 5 g/dL (calc)  1 9-3 7   ALBUMIN/GLOBULIN RATIO 1 8 (calc)  1 0-2 5   BILIRUBIN, TOTAL 0 6 mg/dL  0 2-1 2   ALKALINE PHOSPHATASE 66 U/L     AST 8 U/L L 10-35   ALT 16 U/L  6-29     (Q) CBC (INCLUDES DIFF/PLT) (REFL) [id number] 08:08AM [facility]     Test Name Result Flag Reference   WHITE BLOOD CELL COUNT 7 0 Thousand/uL  3 8-10 8   RED BLOOD CELL COUNT 4 52 Million/uL  3 80-5 10   HEMOGLOBIN 13 9 g/dL  11 7-15 5   HEMATOCRIT 43 3 %  35 0-45 0   MCV 95 8 fL  80 0-100 0   MCH 30 9 pg  27 0-33 0   MCHC 32 2 g/dL  32 0-36 0   RDW 14 5 %  11 0-15 0   PLATELET COUNT 510 Thousand/uL  140-400   MPV 9 9 fL  7 5-11 5   ABSOLUTE NEUTROPHILS 4263 cells/uL  8546-9821   ABSOLUTE LYMPHOCYTES 2002 cells/uL  850-3900 ABSOLUTE MONOCYTES 546 cells/uL  200-950   ABSOLUTE EOSINOPHILS 168 cells/uL     ABSOLUTE BASOPHILS 21 cells/uL  0-200   NEUTROPHILS 60 9 %     LYMPHOCYTES 28 6 %     MONOCYTES 7 8 %     EOSINOPHILS 2 4 %     BASOPHILS 0 3 %       (Q) HEMOGLOBIN A1c WITH eAG [id number] 08:08AM Reyes Serg   REPORT COMMENT:  FASTING:YES     Test Name Result Flag Reference   HEMOGLOBIN A1c 8 1 % of total Hgb H <5 7   According to ADA guidelines, hemoglobin A1c <7 0%  represents optimal control in non-pregnant diabetic  patients  Different metrics may apply to specific  patient populations  Standards of Medical Care in    Diabetes Care  2013;36:s11-s66     For the purpose of screening for the presence of  diabetes  <5 7%       Consistent with the absence of diabetes  5 7-6 4%    Consistent with increased risk for diabetes              (prediabetes)  >or=6 5%    Consistent with diabetes     This assay result is consistent with diabetes  mellitus  Currently, no consensus exists for use of hemoglobin  A1c for diagnosis of diabetes for children  eAG (mg/dL) 186 (calc)     eAG (mmol/L) 10 3 (calc)       (Q) MICROALBUMIN, RANDOM URINE (W/CREATININE) [id number] 08:08AM Reyes Serg     Test Name Result Flag Reference   CREATININE, RANDOM URINE 63 mg/dL     MICROALBUMIN 0 4 mg/dL     Reference Range  Not established   MICROALBUMIN/CREATININE$RATIO, RANDOM URINE 6 mcg/mg creat  <30   The ADA defines abnormalities in albumin  excretion as follows:     Category         Result (mcg/mg creatinine)     Normal                    <30  Microalbuminuria            Clinical albuminuria   > OR = 300     The ADA recommends that at least two of three  specimens collected within a 3-6 month period be  abnormal before considering a patient to be  within a diagnostic category       (Q) LIPID PANEL WITH DIRECT LDL [id number] 08:08AM Amy Ulrich     Test Name Result Flag Reference   CHOLESTEROL, TOTAL 122 mg/dL L 125-200   HDL CHOLESTEROL 49 mg/dL  > OR = 46   TRIGLICERIDES 723 mg/dL  <150   DIRECT LDL 61 mg/dL  <130   Desirable range <100 mg/dL for patients with CHD or  diabetes and <70 mg/dL for diabetic patients with  known heart disease  CHOL/HDLC RATIO 2 5 (calc)  < OR = 5 0   NON HDL CHOLESTEROL 73 mg/dL (calc)     Target for non-HDL cholesterol is 30 mg/dL higher than   LDL cholesterol target  (Q) TSH, 3RD GENERATION W/REFLEX TO FT4 [id number] 08:08AM Josiah Estuardo     Test Name Result Flag Reference   TSH W/REFLEX TO FT4 2 29 mIU/L  0 40-4 50     Signatures   Electronically signed by :  Melba Ladd MD; Jan 22 2016  9:10AM EST                       (Author)

## 2018-01-11 NOTE — RESULT NOTES
Message   Urine culture showed no growth  If pt still has symptoms, please let me know        Verified Results  (1) URINE CULTURE [id number] 12:00AM Dmitri Sow     Test Name Result Flag Reference   CULTURE, URINE, ROUTINE      CULTURE, URINE, ROUTINE         MICRO NUMBER:      [id number]    TEST STATUS:       FINAL    SPECIMEN SOURCE:   URINE    SPECIMEN QUALITY:  ADEQUATE    RESULT:            No Growth

## 2018-01-11 NOTE — RESULT NOTES
Message   DW pt on 5/20/2016 OV  Verified Results  (Q) CBC (INCLUDES DIFF/PLT) (REFL) 30Apr2016 07:43AM [facility]     Test Name Result Flag Reference   WHITE BLOOD CELL COUNT 5 7 Thousand/uL  3 8-10 8   RED BLOOD CELL COUNT 4 43 Million/uL  3 80-5 10   HEMOGLOBIN 13 6 g/dL  11 7-15 5   HEMATOCRIT 41 8 %  35 0-45 0   MCV 94 5 fL  80 0-100 0   MCH 30 8 pg  27 0-33 0   MCHC 32 6 g/dL  32 0-36 0   RDW 14 2 %  11 0-15 0   PLATELET COUNT 603 Thousand/uL  140-400   MPV 9 9 fL  7 5-11 5   ABSOLUTE NEUTROPHILS 3158 cells/uL  0045-2533   ABSOLUTE LYMPHOCYTES 1853 cells/uL  850-3900   ABSOLUTE MONOCYTES 428 cells/uL  200-950   ABSOLUTE EOSINOPHILS 217 cells/uL     ABSOLUTE BASOPHILS 46 cells/uL  0-200   NEUTROPHILS 55 4 %     LYMPHOCYTES 32 5 %     MONOCYTES 7 5 %     EOSINOPHILS 3 8 %     BASOPHILS 0 8 %       (Q) COMPREHENSIVE METABOLIC PNL W/ADJUSTED CALCIUM 30Apr2016 07:43AM [facility]     Test Name Result Flag Reference   GLUCOSE 245 mg/dL H 65-99   Fasting reference interval   UREA NITROGEN (BUN) 17 mg/dL  7-25   CREATININE 0 65 mg/dL  0 60-0 88   For patients >52years of age, the reference limit  for Creatinine is approximately 13% higher for people  identified as -American  eGFR NON-AFR   AMERICAN 83 mL/min/1 73m2  > OR = 60   eGFR AFRICAN AMERICAN 97 mL/min/1 73m2  > OR = 60   BUN/CREATININE RATIO   6-15   NOT APPLICABLE (calc)   SODIUM 138 mmol/L  135-146   POTASSIUM 4 6 mmol/L  3 5-5 3   CHLORIDE 102 mmol/L     CARBON DIOXIDE 26 mmol/L  19-30   CALCIUM 9 2 mg/dL  8 6-10 4   CALCIUM (ADJUSTED FOR$ALBUMIN) 9 4 mg/dL (calc)  8 6-10 2   PROTEIN, TOTAL 6 7 g/dL  6 1-8 1   ALBUMIN 4 1 g/dL  3 6-5 1   GLOBULIN 2 6 g/dL (calc)  1 9-3 7   ALBUMIN/GLOBULIN RATIO 1 6 (calc)  1 0-2 5   BILIRUBIN, TOTAL 0 5 mg/dL  0 2-1 2   ALKALINE PHOSPHATASE 63 U/L     AST 9 U/L L 10-35   ALT 19 U/L  6-29     (Q) LIPID PANEL WITH REFLEX TO DIRECT LDL 30Apr2016 07:43AM Beny Morales     Test Name Result Flag Reference   CHOLESTEROL, TOTAL 150 mg/dL  125-200   HDL CHOLESTEROL 45 mg/dL L > OR = 46   TRIGLICERIDES 235 mg/dL  <150   LDL-CHOLESTEROL 81 mg/dL (calc)  <130   Desirable range <100 mg/dL for patients with CHD or  diabetes and <70 mg/dL for diabetic patients with  known heart disease  CHOL/HDLC RATIO 3 3 (calc)  < OR = 5 0   NON HDL CHOLESTEROL 105 mg/dL (calc)     Target for non-HDL cholesterol is 30 mg/dL higher than   LDL cholesterol target  (Q) TSH, 3RD GENERATION W/REFLEX TO FT4 30Apr2016 07:43AM Katia Maria     Test Name Result Flag Reference   TSH W/REFLEX TO FT4 2 30 mIU/L  0 40-4 50     (Q) MICROALBUMIN, RANDOM URINE (W/CREATININE) 30Apr2016 07:43AM Katia Maria     Test Name Result Flag Reference   CREATININE, RANDOM URINE 97 mg/dL     MICROALBUMIN 0 9 mg/dL     Reference Range  Not established   MICROALBUMIN/CREATININE$RATIO, RANDOM URINE 9 mcg/mg creat  <30   The ADA defines abnormalities in albumin  excretion as follows:     Category         Result (mcg/mg creatinine)     Normal                    <30  Microalbuminuria            Clinical albuminuria   > OR = 300     The ADA recommends that at least two of three  specimens collected within a 3-6 month period be  abnormal before considering a patient to be  within a diagnostic category  (Q) HEMOGLOBIN A1c WITH eAG 30Apr2016 07:43AM Katia Maria   REPORT COMMENT:  FASTING:YES     Test Name Result Flag Reference   HEMOGLOBIN A1c 8 6 % of total Hgb H <5 7   According to ADA guidelines, hemoglobin A1c <7 0%  represents optimal control in non-pregnant diabetic  patients  Different metrics may apply to specific  patient populations  Standards of Medical Care in    Diabetes Care   2013;36:s11-s66     For the purpose of screening for the presence of  diabetes  <5 7%       Consistent with the absence of diabetes  5 7-6 4%    Consistent with increased risk for diabetes              (prediabetes)  >or=6 5%    Consistent with diabetes     This assay result is consistent with diabetes  mellitus  Currently, no consensus exists for use of hemoglobin  A1c for diagnosis of diabetes for children     eAG (mg/dL) 200 (calc)     eAG (mmol/L) 11 1 (calc)

## 2018-01-11 NOTE — MISCELLANEOUS
Assessment    1  Hospital discharge follow-up (V67 59) (Z09)   2  Atrial fibrillation (427 31) (I48 91)   3  Diabetes mellitus type 2, uncontrolled (250 02) (E11 65)   4  Hypertension (401 9) (I10)   5  Acute CHF (congestive heart failure) (428 0) (I50 9)    Plan  Atrial fibrillation    · Metoprolol Succinate ER 50 MG Oral Tablet Extended Release 24 Hour; TAKE 1  TABLET DAILY AS     DIRECTED   Rx By: Gilmar Mccarty; Dispense: 90 Days ; #:90 Tablet Extended Release 24 Hour; Refill: 3; For: Atrial fibrillation; DIAN = N; Verified Transmission to [place] [address]; Last Updated By: System, [employer]; 9/21/2017 2:35:12 PM   · Xarelto 20 MG Oral Tablet; Take 1 tablet daily   Rx By: Gilmar Mccarty; Dispense: 30 Days ; #:30 Tablet; Refill: 5; For: Atrial fibrillation; DIAN = N; Verified Transmission to [address] [place] [address]; Last Updated By: System, [employer]; 9/21/2017 2:35:12 PM  Depression with anxiety    · Escitalopram Oxalate 5 MG Oral Tablet; take 1 tablet by mouth once daily   Rx By: Gilmar Mccarty; Dispense: 30 Days ; #:30 TAB; Refill: 5; For: Depression with anxiety; DIAN = N; Verified Transmission to [address]  Diabetes mellitus type 2, uncontrolled    · From  MetFORMIN HCl - 1000 MG Oral Tablet Take 1 tablet twice daily To  MetFORMIN HCl - 1000 MG Oral Tablet Take 1 tablet daily   Rx By: Gilmar Mccarty; Dispense: 30 Days ; #:30 Tablet; Refill: 5; For: Diabetes mellitus type 2, uncontrolled; DIAN = N; Record   · GlipiZIDE ER 10 MG Oral Tablet Extended Release 24 Hour; take 2 tablet daily   Rx By: Gilmar Mccarty; Dispense: 30 Days ; #:60 TAB; Refill: 5; For: Diabetes mellitus type 2, uncontrolled; DIAN = N; Verified Transmission to [address]  Hyperlipidemia    · Simvastatin 80 MG Oral Tablet; take 1 tablet by mouth once daily   Rx By: Gilmar Mccarty; Dispense: 90 Days ; #:90 TAB;  Refill: 1; For: Hyperlipidemia; DIAN = N; Verified Transmission to [address] [place] [address] ST  Hypertension    · AmLODIPine Besylate 5 MG Oral Tablet; take 1 tablet by mouth once daily   Rx By: Valencia Flores; Dispense: 30 Days ; #:30 TAB; Refill: 5; For: Hypertension; DIAN = N; Verified Transmission to [address]   · CloNIDine HCl - 0 1 MG Oral Tablet; TAKE 1 TABLET BY MOUTH IF BLOOD  PRESSURE >180   Rx By: Valencia Flores; Dispense: 15 Days ; #:15 Tablet; Refill: 1; For: Hypertension; DIAN = N; Verified Transmission to [address] [place] [address]    Discussion/Summary  Discussion Summary:   Reviewed hospital records  Continue current meds  Educated pt about SE of Xarelto and metoprolol  Fall precautions  RTO in 3 months  Medication SE Review and Pt Understands Tx: Possible side effects of new medications were reviewed with the patient/guardian today  The treatment plan was reviewed with the patient/guardian  The patient/guardian understands and agrees with the treatment plan      Chief Complaint  Chief Complaint Free Text Note Form: Atrial fibrillation with rapid ventricular response  History of Present Illness  TCM Communication [facility]: The patient is being contacted for follow-up after hospitalization  She was hospitalized at   The dates of hospitalization: 09/11/2017-09/14/2017, date of admission: 09/11/17, date of discharge: 09/14/17  Diagnosis: Atrial fibrillation  She was discharged to home  Medications were not reviewed today  She scheduled a follow up appointment  Counseling was provided to the patient  Topics counseled included importance of compliance with treatment  Communication performed and completed by Raheem Guerra   HPI: Pt is here with daughter  Was in hospital 9/11-9/14/2017 for "Afib, acute CHF"  Pt had SOB and went to hospital  BNP was 2796  CT showed no PE  EKG showed Afib  Cardiology consulted  Pt started xarelto and metoprolol  Discharged home  Pt feels fine now  Denies fever, SOB, chest pain, palpitation, n/v/abd pain  DM---Pt states she cannot tolerate metformin 1000mg bid, she had headache, diarrhea etc  She can only take metformin 1000mg QD  Review of Systems  Complete-Female:   Constitutional: No fever, no chills, feels well, no tiredness, no recent weight gain or weight loss  ENT: no complaints of earache, no loss of hearing, no nose bleeds, no nasal discharge, no sore throat, no hoarseness  Cardiovascular: No complaints of slow heart rate, no fast heart rate, no chest pain, no palpitations, no leg claudication, no lower extremity edema  Respiratory: No complaints of shortness of breath, no wheezing, no cough, no SOB on exertion, no orthopnea, no PND  Gastrointestinal: No complaints of abdominal pain, no constipation, no nausea or vomiting, no diarrhea, no bloody stools  Musculoskeletal: No complaints of arthralgias, no myalgias, no joint swelling or stiffness, no limb pain or swelling  Active Problems    1  CVA (cerebral vascular accident) (434 91) (I63 9)   2  Depression with anxiety (300 4) (F41 8)   3  Diabetes mellitus type 2, uncontrolled (250 02) (E11 65)   4  Glaucoma (365 9) (H40 9)   5  Hearing Loss (389 9)   6  Hyperlipidemia (272 4) (E78 5)   7  Hypertension (401 9) (I10)   8  Insomnia (780 52) (G47 00)   9  Medicare annual wellness visit, initial (V70 0) (Z00 00)   10  Medicare annual wellness visit, subsequent (V70 0) (Z00 00)   11  Osteoarthritis (715 90) (M19 90)   12  Osteoporosis (733 00) (M81 0)   13  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)   14  Sleep talking (307 49) (G47 8)   15  Tinnitus, unspecified laterality (388 30) (H93 19)   16  Tricuspid valve disorders, 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)   17  Unsteady gait (781 2) (R26 81)    Past Medical History    1  History of Arm numbness (782 0) (R20 0)   2  History of Cough (786 2) (R05)   3  History of Dysfunction of Eustachian tube, unspecified laterality (381 81) (H69 80)   4   History of Flank Pain Right (789 09) 5  History of bronchitis (V12 69) (Z87 09)   6  History of cholelithiasis (V12 79) (Z87 19)   7  History of dizziness (V13 89) (Z87 898)   8  History of hematuria (V13 09) (Z87 448)   9  History of nausea (V12 79) (Z87 898)   10  History of sinusitis (V12 69) (Z87 09)   11  History of upper respiratory infection (V12 09) (Z87 09)   12  History of urinary frequency (V13 09) (Z87 898)   13  History of urinary tract infection (V13 02) (Z87 440)   14  History of vertigo (V12 49) (Z87 898)   15  History of viral gastroenteritis (V12 09) (Z86 19)   16  History of viral gastroenteritis (V12 09) (Z86 19)   17  History of viral infection (V12 09) (Z86 19)   18  History of wheezing (V12 69) (Z87 898)   19  History of Hospital discharge follow-up (V67 59) (Z09)   20  History of Hyponatremia (276 1) (E87 1)   21  History of Lumbar Strain (847 2)   22  History of Otitis media, unspecified laterality   23  History of Tinnitus, unspecified laterality (388 30) (H93 19)    Surgical History    1  History of Appendectomy   2  History of Tubal Ligation    Family History  Father    1  Family history of Diabetes Mellitus (V18 0)   2  Family history of Stroke Syndrome (V17 1)  Daughter    3  Family history of Breast Cancer (V16 3)  Sister    4  Family history of Coronary Artery Disease (V17 49)    Social History    · Denied: History of Alcohol   · Never A Smoker    Current Meds   1  AmLODIPine Besylate 5 MG Oral Tablet; take 1 tablet by mouth once daily; Therapy: 86TCQ5806 to (Evaluate:20Jan2018)  Requested for: 34Vcd7845; Last   Rx:[id number] Ordered   2  Artificial Tears 1 4 % Ophthalmic Solution; INSTILL 1-2 DROPS INTO AFFECTED EYE(S)    4 TIMES DAILY AS DIRECTED; Therapy: (Aga Hameed) to Recorded   3  CloNIDine HCl - 0 1 MG Oral Tablet; TAKE 1 TABLET BY MOUTH IF BLOOD PRESSURE   >180   Therapy: 09YJN1892 to (Evaluate:12Mar2017)  Requested for: 28Sur7719; Last   Rx:08Isp5992 Ordered   4   Dorzolamide HCl-Timolol Mal 22 3-6 8 MG/ML Ophthalmic Solution; SOLN OP X 30; Therapy: 20PRX0216 to (Evaluate:02Mar2016); Last Rx:[id number] Ordered   5  Escitalopram Oxalate 5 MG Oral Tablet; take 1 tablet by mouth once daily; Therapy: 50IXM5977 to [phone])  Requested for: 72Swr0167; Last   Rx:04Hgu7340 Ordered   6  GlipiZIDE ER 10 MG Oral Tablet Extended Release 24 Hour; take 2 tablet daily; Therapy: 97GFB1206 to (Evaluate:25Vhx5979)  Requested for: 01Txb6623; Last   Rx:20Dyd6887 Ordered   7  Latanoprost 0 005 % Ophthalmic Solution; SOLN OP X 25; Therapy: 75LUP1865 to (Last Shade Cocker)  Requested for: 30EFO1469 Ordered   8  MetFORMIN HCl - 1000 MG Oral Tablet; Take 1 tablet twice daily; Therapy: 01JGY1256 to (21 )  Requested for: 03Sgk2002; Last   Rx:25Apr2017 Ordered   9  Metoprolol Succinate ER 50 MG Oral Tablet Extended Release 24 Hour; TAKE 1 TABLET   DAILY AS     DIRECTED; Therapy: 64EWY4866 to (Evaluate:00Thz6619) Recorded   10  OneTouch Lancets Miscellaneous; Check blood sugar 2 times daily; Therapy: 01BSB0419 to (Evaluate:98Yrf2304)  Requested for: 20CEH6735; Last    Rx:10Fxh8952 Ordered   11  OneTouch Ultra Blue In [employer]; test twice daily; Therapy: 54HMN5046 to (BFZBQAF:50CKG0018)  Requested for: 88RJU0432; Last    Rx:11Uht2595 Ordered   12  Restasis 0 05 % Ophthalmic Emulsion; USE AS DIRECTED; Therapy: (Recorded:74Kwc6143) to Recorded   13  Simvastatin 80 MG Oral Tablet; take 1 tablet by mouth once daily; Therapy: 82HYS0757 to (21 )  Requested for: 61Aen6752; Last    Rx:25Apr2017 Ordered   14  Xarelto 20 MG Oral Tablet; Take 1 tablet daily; Therapy: 46IKJ7852 to (Evaluate:21Oct2017) Recorded    Allergies    1  HydroCHLOROthiazide TABS   2  Metoprolol Tartrate TABS   3  Aspirin Buffered TABS   4  Lipitor TABS   5   Zocor TABS    Vitals  Signs   Recorded: 49UIR8787 79:88SV   Systolic: 467  Diastolic: 72  Recorded: 70KSV4239 02:11PM   Temperature: 97 9 F, Tympanic  Heart Rate: 60, L Radial  Respiration: 16  Systolic: 193, LUE  Diastolic: 98, LUE  Height: 4 ft 10 in  Weight: 163 lb 6 4 oz  BMI Calculated: 34 15  BSA Calculated: 1 67  Pain Scale: 0    Physical Exam    Constitutional   General appearance: No acute distress, well appearing and well nourished  Pulmonary   Respiratory effort: No increased work of breathing or signs of respiratory distress  Auscultation of lungs: Clear to auscultation  Cardiovascular   Auscultation of heart: Normal rate and rhythm, normal S1 and S2, without murmurs  Examination of extremities for edema and/or varicosities: Normal     Carotid pulses: Normal     Abdomen   Abdomen: Non-tender, no masses  Liver and spleen: No hepatomegaly or splenomegaly  Lymphatic   Palpation of lymph nodes in neck: No lymphadenopathy  Musculoskeletal   Gait and station: Normal          Signatures   Electronically signed by :  Olga Malik MD; Sep 21 2017  2:50PM EST                       (Author)

## 2018-01-11 NOTE — MISCELLANEOUS
Assessment    1  Hospital discharge follow-up (V67 59) (Z09)   2  Anxiety and depression (300 00,311) (F41 9,F32 9)   3  Diabetes mellitus type 2, uncontrolled (250 02) (E11 65)   4  Depression with anxiety (300 4) (F41 8)   5  CVA (cerebral vascular accident) (434 91) (I63 9)   6  Hypertension (401 9) (I10)   7  Viral gastroenteritis (008 8) (A08 4)   8  Hyponatremia (276 1) (E87 1)    Plan  CVA (cerebral vascular accident), Depression with anxiety, Diabetes mellitus type 2,  uncontrolled, Dizziness, Hyperlipidemia, Hypertension, Insomnia    · (1) COMPREHENSIVE METABOLIC PANEL; Status:Hold For - Exact Date; Requested  for:Approx [id number];    Perform:[facility];Ordered; For:CVA (cerebral vascular accident), Depression with anxiety, Diabetes mellitus type 2, uncontrolled, Dizziness, Hyperlipidemia, Hypertension, Insomnia; Ordered By:Mary Mead;  CVA (cerebral vascular accident), Diabetes mellitus type 2, uncontrolled, Hyperlipidemia,  Hypertension    · (1) LIPID PANEL FASTING W DIRECT LDL REFLEX; Status:Hold For - Exact Date; Requested for:Approx [id number];    Perform:[facility];Ordered; For:CVA (cerebral vascular accident), Diabetes mellitus type 2, uncontrolled, Hyperlipidemia, Hypertension; Ordered By:Mary Mead;  CVA (cerebral vascular accident), Hyperlipidemia, Hypertension, Insomnia, Other  nonrheumatic aortic valve disorder    · (1) CBC/PLT/DIFF; Status:Hold For - Exact Date; Requested for:Approx [id number];    Perform:[facility];Ordered; For:CVA (cerebral vascular accident), Hyperlipidemia, Hypertension, Insomnia, Other nonrheumatic aortic valve disorder; Ordered By:Mary Mead;  Diabetes mellitus type 2, uncontrolled    · GlipiZIDE 10 MG Oral Tablet   Rx By: Negin Robins; Dispense: 90 Days ; #:180 Tablet;  Refill: 1; For: Diabetes mellitus type 2, uncontrolled; DIAN = N; Record   · GlipiZIDE XL 10 MG Oral Tablet Extended Release 24 Hour   Rx By: Dash Sheikh; Dispense: 30 Days ; #:60 Tablet Extended Release 24 Hour; Refill: 5; For: Diabetes mellitus type 2, uncontrolled; DIAN = Y; Sent To: [facility]; Last Updated By: Victoria Lara; 1/27/2017 9:49:59 AM   · MetFORMIN HCl - 1000 MG Oral Tablet   Rx By: Dash Sheikh; Dispense: 30 Days ; #:60 X 60 Tablet Bottle; Refill: 5; For: Diabetes mellitus type 2, uncontrolled; DIAN = N; Sent To: [facility]; Last Updated By: Victoria Lara; 1/27/2017 9:51:56 AM   · GlipiZIDE XL 10 MG Oral Tablet Extended Release 24 Hour; Take 2 tablets daily   Rx By: Victoria Lara; Dispense: 90 Days ; #:180 Tablet Extended Release 24 Hour; Refill: 1; For: Diabetes mellitus type 2, uncontrolled; DIAN = N; Verified Transmission to [facility]; Last Updated By: System, [employer]; 1/27/2017 12:13:55 PM  Diabetes mellitus type 2, uncontrolled, Hyponatremia    · (1) HEMOGLOBIN A1C; Status:Hold For - Exact Date; Requested for:Approx [id number];    Perform:[facility];Ordered; For:Diabetes mellitus type 2, uncontrolled, Hyponatremia; Ordered By:Leighann Mead;  Hypertension    · Chlorthalidone 25 MG Oral Tablet   Rx By: Dash Sheikh; Dispense: 30 Days ; #:30 Tablet; Refill: 0; For: Hypertension; DIAN = N; Record; Last Updated By: Victoria Lara; 1/27/2017 9:49:59 AM   · Metoprolol Tartrate 25 MG Oral Tablet   Rx By: Dash Sheikh; Dispense: 30 Days ; #:60 Tablet; Refill: 0; For: Hypertension; DIAN = N; Record; Last Updated By: Victoria Lara; 1/27/2017 9:49:59 AM   · From  Losartan Potassium 50 MG Oral Tablet take 1 tablet by mouth once  daily To Losartan Potassium 100 MG Oral Tablet take 1 tablet by mouth once daily   Rx By: Victoria Lara; Dispense: 30 Days ; #:30 Tablet; Refill: 6; For: Hypertension; DIAN = N; Record    Discussion/Summary  Discussion Summary:   Recent hospital records reviewed today  1  Viral Gastroenteritis- resolved  2  Acute hyponatremia- Recent Sodium at 133  Will continue to monitor  3  Hypokalemia- resolved  4  MOHINDER- resolved  Will continue to monitor kidney function  Stay well-hydrated  Chlorthalidone discontinued  5  DM Type 2- uncontrolled  When I was speaking to the patient's son in law, Dorothy Ko, on the phone, he stated that he is not comfortable with us increasing her Glipizide dosage and that we need to "give it time" I explained that her Metformin was also recently stopped  Has been taking Glipizide 10mg for over a week and was taking Glipizide ER 10mg for the past few years  The phone connection was poor  I will call him later today to discuss further but my recommendation at this time is to increase dosage of Glipizide for better glucose control  Continue to check blood sugars BID and drop log off in 2 weeks  Follow a low carb diet  5  Sinus bradycardia- improved with the discontinuation of Metoprolol  Patient is asymptomatic  6  HTN- BP is elevated following the stop of Chlorthalidone and Metoprolol  Will increase Losartan to 100mg once daily  She may take Losartan 50mg two tablets until she runs out of these  I updated her med list so when she needs a refill, it will be for the correct current dosage of Losartan 100mg  Watch salt intake  7  Anxiety with depression- I again explained that Lexapro may take 4-6 weeks to see the full effects  If no improvement in 2 weeks, we will increase dosage to 10mg  She or her family will call with an update in 2 weeks  Continue Xanax prn  Trazodone nightly for insomnia  Follow up with therapist as scheduled on Monday, 1/30/17  She also has Mickie Arguello (behavioral therapist) contact information  8  H/O CVA- Continue Simvastatin  We discussed the importance of blood pressure control today related to CVAs  Refuses ASA or Plavix at this time  [facility] will continue to follow with patient  To get blood work 1-2 weeks prior to next appointment  F/u 3 months/ sooner if needed  Medication SE Review and Pt Understands Tx: Possible side effects of new medications were reviewed with the patient/guardian today  The treatment plan was reviewed with the patient/guardian  The patient/guardian understands and agrees with the treatment plan      Chief Complaint  Chief Complaint Free Text Note Form: Patient is an 78-year-old female who lives with her daughter  She presents with 2-3 days of vomiting and lethargy  History of Present Illness  TCM Communication [facility]: The patient is being contacted for follow-up after hospitalization  She was hospitalized at [facility]  The dates of hospitalization: 01/16/2017-01/19/2017, date of admission: 01/16/2017, date of discharge: 01/19/2017  Diagnosis: Hyponatremia  She was discharged to home  Medications were not reviewed today  She scheduled a follow up appointment  Counseling was provided to the patient  Topics counseled included importance of compliance with treatment  Communication performed and completed by Angel Gil CMA   HPI: Pt presents with her son today for a KACI  She was reently seen by Dr Maria C Bauman for a KACI on 1/13/17 following a possible TIA  More recently, she was admitted to [facility] from 1/15/17- 1/19/17 for viral gastroenteritis  Stool cultures and C diff negative  KUB without any obstruction  Symtpoms resolved with IV fluids and antiemetics  Acute hyponatremia- most likely 2/2 to hypovolemia, vomitting and diarrhea  Possibly related to Chlorthalidone- this was stopped for this reason  Her sodium improved with isotonic fluids  Sodium at time of discharge at 132  She recently had blood work done on 1/23/17 which showed a sodium of 133  Hypokalemia- d/t vomiting and diarrhea as well as diuretic  Potassium was replaced and resolved  Recent potassium at 4 6  MOHINDER- d/t volume depletion/ dehydration  Creatinine at 1 9 originally  IV fluids were given   Creatinine on day of discharge at 0 5 Recent Creatinine at 0 79  Again, her Chlorthalidone was discontinued  DM Type 2- Her son in law, Dorothy Ko reports her Glipizide was changed from Glipizide ER 10mg to Glipizide 10mg  Metformin was discontinued for potential GI upset/ intolerance  Her Blood sugar log which she brought today shows her sugars are still elevated  FBS range from 158 to 242  Her son that is with her today reports that Dorothy Ko (Nevaeh's son in law) handles all of her medications  We were able to briefly speak to Jeremy on the phone who informed us of the change in Glipizide  She is limiting her carb and sweets intake  Recent A1C at 8 5%, FBS at 178  Sinus bradycardia- 2/2 to beta blocker, Metoprolol  Her Metoprolol was discontinued and her HR improved  HTN- Taking Losartan 50mg daily  Metoprolol discontinued  Chlorthalidone discontinued d/t hyponatremia and dehydration  BP at home have been elevated ranging 150-low 200s/ 60s-80s  Denies frequent HAs, tinnitus, facial flushing, change in vision, CP, palpitations, SOB, edema  Anxiety with depression- Is taking Lexapro 5mg and Xanax prn  She has been on the Lexapro for about 2 weeks with slight improvement  Reports she feels like a burden to her family  Was also recently started on Trazodone for insomnia  She reports she has an appointment with a therapist with [facility] on [facility] on Monday, 1/ 30/17  She denies any SI      H/O chronic CVA- She was advised to take simvastatin and ASA  Refuses ASA d/t GI upset  She refused to start Plavix  Agrees to take Simvastatin  Today, she reports she is feeling well  Feels her health is declining and worries about this  Wishes she could do activities as she used to do  Denies any N/V/D, abdominal pain, CP, palpitations, edema, SOB, fevers/chills  [facility] home health is seeing the patient twice weekly  Review of Systems  Complete-Female:   Constitutional: no fever, not feeling poorly, no chills and not feeling tired     ENT: no sore throat and no nasal discharge  Cardiovascular: no chest pain, no intermittent leg claudication, no palpitations and no lower extremity edema  Respiratory: no shortness of breath, no cough, no wheezing and no shortness of breath during exertion  Gastrointestinal: no abdominal pain, no nausea, no vomiting, no constipation, no diarrhea and no blood in stools  Genitourinary: no dysuria  Integumentary: Bruising form recent IVs/ blood draws, but no rashes  Neurological: no headache, no numbness, no tingling and no dizziness  Psychiatric: anxiety, sleep disturbances and depression, but not suicidal  no feelings of weakness   Hematologic/Lymphatic: no swollen glands, no tendency for easy bleeding and no tendency for easy bruising  ROS Reviewed:   ROS reviewed  Active Problems    1  Anxiety and depression (300 00,311) (F41 9,F32 9)   2  Arm numbness (782 0) (R20 0)   3  CVA (cerebral vascular accident) (434 91) (I63 9)   4  Depression with anxiety (300 4) (F41 8)   5  Diabetes mellitus type 2, uncontrolled (250 02) (E11 65)   6  Dizziness (780 4) (R42)   7  Glaucoma (365 9) (H40 9)   8  Hearing Loss (389 9)   9  Hyperlipidemia (272 4) (E78 5)   10  Hypertension (401 9) (I10)   11  Insomnia (780 52) (G47 00)   12  Medicare annual wellness visit, initial (V70 0) (Z00 00)   13  Nausea (787 02) (R11 0)   14  Osteoarthritis (715 90) (M19 90)   15  Osteopenia (733 90) (M85 80)   16  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)   17  Tinnitus, unspecified laterality (388 30) (H93 19)   18  Tricuspid valve disorders, 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)   19  Unsteady gait (781 2) (R26 81)    Past Medical History    1  History of Cough (786 2) (R05)   2  History of Dysfunction of eustachian tube, unspecified laterality (381 81) (H69 80)   3  History of Flank Pain Right (789 09)   4  History of bronchitis (V12 69) (Z87 09)   5  History of cholelithiasis (V12 79) (Z87 19)   6   History of hematuria (V13 09) (Z87 448)   7  History of sinusitis (V12 69) (Z87 09)   8  History of upper respiratory infection (V12 09) (Z87 09)   9  History of urinary frequency (V13 09) (Z87 898)   10  History of urinary tract infection (V13 02) (Z87 440)   11  History of vertigo (V12 49) (Z87 898)   12  History of viral gastroenteritis (V12 09) (Z86 19)   13  History of viral infection (V12 09) (Z86 19)   14  History of wheezing (V12 69) (Z87 898)   15  History of Lumbar Strain (847 2)   16  History of Otitis media, unspecified laterality   17  History of Tinnitus, unspecified laterality (388 30) (H93 19)    Surgical History    1  History of Appendectomy   2  History of Tubal Ligation    Family History  Father    1  Family history of Diabetes Mellitus (V18 0)   2  Family history of Stroke Syndrome (V17 1)  Daughter    3  Family history of Breast Cancer (V16 3)  Sister    4  Family history of Coronary Artery Disease (V17 49)    Social History    · Denied: History of Alcohol   · Never A Smoker  Social History Reviewed: The social history was reviewed and updated today  The social history was reviewed and is unchanged  Current Meds   1  ALPRAZolam 0 25 MG Oral Tablet; TAKE 1 TABLET Every 8 hours PRN anxiety; Therapy: 26KPJ3930 to (Evaluate:23Jan2017); Last Rx:13Jan2017 Ordered   2  Artificial Tears 1 4 % Ophthalmic Solution; INSTILL 1-2 DROPS INTO AFFECTED EYE(S)    4 TIMES DAILY AS DIRECTED; Therapy: (Meir Iraheta) to Recorded   3  Chlorthalidone 25 MG Oral Tablet; Take 1 tablet daily; Therapy: 74QGP4608 to (Evaluate:12Feb2017); Last Rx:13Jan2017 Ordered   4  Dorzolamide HCl-Timolol Mal 22 3-6 8 MG/ML Ophthalmic Solution; SOLN OP X 30; Therapy: 48PTJ9460 to (Evaluate:02Mar2016); Last Rx:01Feb2016 Ordered   5  Escitalopram Oxalate 5 MG Oral Tablet; TAKE 1 TABLET DAILY; Therapy: 15DGB1759 to (Evaluate:86Key8593); Last Rx:13Jan2017 Ordered   6   GlipiZIDE XL 10 MG Oral Tablet Extended Release 24 Hour; take 1 tablet by mouth twice   a day; Therapy: 17KHK1389 to (21 )  Requested for: 43IIY3985; Last   Rx:76Nsc8135 Ordered   7  Latanoprost 0 005 % Ophthalmic Solution; SOLN OP X 25; Therapy: 42IUF5671 to (Last Felicita Gilbert)  Requested for: 57PYN4736 Ordered   8  Losartan Potassium 50 MG Oral Tablet; take 1 tablet by mouth once daily; Therapy: 17PWN4930 to (Evaluate:73Npz3035)  Requested for: 52PFV0389; Last   Rx:52Ymi5450 Ordered   9  MetFORMIN HCl - 1000 MG Oral Tablet; take 1 tablet by mouth twice a day; Therapy: 40OXX7986 to (Olivia Pierce)  Requested for: 79NGB2362; Last   Rx:53Bhr4752 Ordered   10  Metoprolol Tartrate 25 MG Oral Tablet; TAKE 1 TABLET TWICE DAILY; Therapy: 16XUI1793 to (Evaluate:12Gmw3056); Last Rx:13Jan2017 Ordered   11  OneTouch Lancets Miscellaneous; Check blood sugar 1 time daily; Therapy: 36VVG7632 to (Evaluate:03Rlj2848)  Requested for: 93Mlr7530; Last    Rx:21Nhs1773 Ordered   12  OneTouch Ultra Blue In Vitro Strip; TEST ONCE DAILY; Therapy: 33ALM3160 to (Evaluate:64Dsu0934)  Requested for: 87Ypo7754; Last    Rx:41Udz2492 Ordered   13  Restasis 0 05 % Ophthalmic Emulsion; USE AS DIRECTED; Therapy: (Recorded:86Bxi4742) to Recorded   14  Simvastatin 80 MG Oral Tablet; take 1 tablet by mouth once daily; Therapy: 76LIA4227 to (Evaluate:20Mar2017)  Requested for: 66GRJ5467; Last    Rx:17Pac0735 Ordered   15  TraZODone HCl - 50 MG Oral Tablet; Take 1 tablet daily; Therapy: 84USI9282 to 585 812 351)  Requested for: 96LJS4711; Last    Rx:25Jan2017 Ordered  Medication List Reviewed: The medication list was reviewed and updated today  Allergies    1  HydroCHLOROthiazide TABS   2  Metoprolol Tartrate TABS   3  Aspirin Buffered TABS   4  Lipitor TABS   5   Zocor TABS    Vitals  Signs   Recorded: 84FQD1931 79:65OA   Systolic: 429, LUE, Sitting  Diastolic: 92, LUE, Sitting  Recorded: 68ZZU6815 09:00AM   Temperature: 97 6 F  Heart Rate: 56  Respiration: 16  Systolic: 639  Diastolic: 74  Height: 4 ft 10 in  Weight: 158 lb   BMI Calculated: 33 02  BSA Calculated: 1 64  Pain Scale: 0    Physical Exam    Constitutional   General appearance: No acute distress, well appearing and well nourished  Eyes   Conjunctiva and lids: No swelling, erythema or discharge  Pupils and irises: Equal, round and reactive to light  Ears, Nose, Mouth, and Throat   External inspection of ears and nose: Normal     Otoscopic examination: Tympanic membranes translucent with normal light reflex  Canals patent without erythema  Nasal mucosa, septum, and turbinates: Normal without edema or erythema  Oropharynx: Normal with no erythema, edema, exudate or lesions  Pulmonary   Respiratory effort: No increased work of breathing or signs of respiratory distress  Auscultation of lungs: Clear to auscultation  Cardiovascular   Auscultation of heart: Normal rate and rhythm, normal S1 and S2, without murmurs  Examination of extremities for edema and/or varicosities: Normal     Carotid pulses: Normal     Abdomen   Abdomen: Non-tender, no masses  Liver and spleen: No hepatomegaly or splenomegaly  Lymphatic   Palpation of lymph nodes in neck: No lymphadenopathy  Musculoskeletal   Gait and station: Normal     Skin   Skin and subcutaneous tissue: Abnormal   B/L AC ecchymosis  Neurologic   Cranial nerves: Cranial nerves 2-12 intact  Sensation: No sensory loss  Psychiatric   Orientation to person, place, and time: Normal     Mood and affect: Normal          Attending Note  Collaborating Physician Note: Collaborating Physician: I did not interview and examine the patient and I agree with the Advanced Practitioner note        Future Appointments    Date/Time Provider Specialty Site   04/28/2017 02:20 PM Jim Parra   01/30/2017 01:30 PM Beatriz Gabriel  [facility] [address] [facility] PCP MELANIE Benjamin Electronically signed by : Yon Dukes, [address] ; Jan 27 2017 11:55AM EST                       (Author)    Electronically signed by :  Althea James MD; Jan 27 2017 12:22PM EST                       (Author)

## 2018-01-11 NOTE — RESULT NOTES
Message   DW pt on 9/21/2016 OV  Verified Results  (1) COMPREHENSIVE METABOLIC PANEL 61QXN0675 55:76NG Leta Macias     Test Name Result Flag Reference   GLUCOSE 180 mg/dL H 65-99   Fasting reference interval   UREA NITROGEN (BUN) 13 mg/dL  7-25   CREATININE 0 71 mg/dL  0 60-0 88   For patients >52years of age, the reference limit  for Creatinine is approximately 13% higher for people  identified as -American  eGFR NON-AFR  AMERICAN 80 mL/min/1 73m2  > OR = 60   eGFR AFRICAN AMERICAN 93 mL/min/1 73m2  > OR = 60   BUN/CREATININE RATIO   2-09   NOT APPLICABLE (calc)   ALT 15 U/L  6-29   ALBUMIN 4 2 g/dL  3 6-5 1   GLOBULIN 2 7 g/dL (calc)  1 9-3 7   ALBUMIN/GLOBULIN RATIO 1 6 (calc)  1 0-2 5   BILIRUBIN, TOTAL 0 5 mg/dL  0 2-1 2   ALKALINE PHOSPHATASE 66 U/L     AST 9 U/L L 10-35   SODIUM 137 mmol/L  135-146   POTASSIUM 5 0 mmol/L  3 5-5 3   CHLORIDE 101 mmol/L     CARBON DIOXIDE 28 mmol/L  20-31   CALCIUM 9 5 mg/dL  8 6-10 4   PROTEIN, TOTAL 6 9 g/dL  6 1-8 1     (Q) HEMOGLOBIN A1c 35Unj0721 07:57AM Leta Macias   REPORT COMMENT:  FASTING:YES     Test Name Result Flag Reference   HEMOGLOBIN A1c 8 1 % of total Hgb H <5 7   According to ADA guidelines, hemoglobin A1c <7 0%  represents optimal control in non-pregnant diabetic  patients  Different metrics may apply to specific  patient populations  Standards of Medical Care in    Diabetes Care  2013;36:s11-s66     For the purpose of screening for the presence of  diabetes  <5 7%       Consistent with the absence of diabetes  5 7-6 4%    Consistent with increased risk for diabetes              (prediabetes)  >or=6 5%    Consistent with diabetes     This assay result is consistent with diabetes  mellitus  Currently, no consensus exists for use of hemoglobin  A1c for diagnosis of diabetes for children

## 2018-01-11 NOTE — MISCELLANEOUS
Assessment   1  CVA (cerebral vascular accident) (434 91) (I63 9)  2  Anxiety and depression (300 00,311) (F41 9,F32 9)  3  Hypertension (401 9) (I10)  4  Diabetes mellitus type 2, uncontrolled (250 02) (E11 65)  5  Nausea (787 02) (R11 0)  6  Unsteady gait (781 2) (R26 81)    Plan  Anxiety and depression    · Start: ALPRAZolam 0 25 MG Oral Tablet; TAKE 1 TABLET Every 8 hours PRN anxiety  Rx By: Huber Freeman; Dispense: 10 Days ; #:30 Tablet; Refill: 0;For: Anxiety and depression;   DIAN = N; Record   · Start: Escitalopram Oxalate 5 MG Oral Tablet; TAKE 1 TABLET DAILY  Rx By: Huber Freeman; Dispense: 30 Days ; #:30 Tablet; Refill: 0;For: Anxiety and depression;   DIAN = N; Record   · 1 - Delgado Van (Licensed Social Worker) Physician Referral  Consult Only: the  expectation is that the referring provider will communicate back to the patient on  treatment options  Evaluation and Treatment: the expectation is that the referred to  provider will communicate back to the patient on treatment options  Status: Active   Requested for: 38ESN5775  Ordered; For: Anxiety and depression;  Ordered By: Huber Freeman  Performed:   Due:   03EHM0494; Last Updated By: Danial David; 1/13/2017 9:50:36 AM  () Care Summary provided  : Yes  Anxiety and depression, CVA (cerebral vascular accident), Diabetes mellitus type 2,  uncontrolled, Hypertension    · [facility]  Check BP and BS  Status: Hold For -  Scheduling  Requested for: 36RWF6460  Ordered; For: Anxiety and depression, CVA (cerebral vascular accident), Diabetes mellitus   type 2, uncontrolled, Hypertension;  Ordered By: Huber Freeman  Performed:     Due: 85DWZ1124  () Care Summary provided  : Yes  CVA (cerebral vascular accident)    · Neurology Follow Up Evaluation and Treatment  Follow-up  Status: Hold For - Scheduling   Requested for: 76FQK1127  Ordered; For: CVA (cerebral vascular accident);   Ordered By: Huber Freeman  Performed:     Due: 54BMH2777  Diabetes mellitus type 2, uncontrolled    · Continue: GlipiZIDE XL 10 MG Oral Tablet Extended Release 24 Hour; take 1 tablet by  mouth twice a day  Rx By: Chong Whaley; Dispense: 30 Days ; #:60 Tablet Extended Release 24 Hour; Refill:   5;For: Diabetes mellitus type 2, uncontrolled; DIAN = Y; Verified Transmission to [address]   · Continue: MetFORMIN HCl - 1000 MG Oral Tablet; take 1 tablet by mouth twice a day  Rx By: Chong Whaley; Dispense: 30 Days ; #:60 X 60 Tablet Bottle; Refill: 5;For: Diabetes   mellitus type 2, uncontrolled; DIAN = N; Verified Transmission to [address]  Hyperlipidemia    · Continue: Simvastatin 80 MG Oral Tablet; take 1 tablet by mouth once daily  Rx By: Chong Wahley; Dispense: 90 Days ; #:90 Tablet; Refill: 1;For: Hyperlipidemia; DIAN =   N; Verified Transmission to [address] [place] [address]  Hypertension    · Start: Chlorthalidone 25 MG Oral Tablet; Take 1 tablet daily  Rx By: Chong Whaley; Dispense: 30 Days ; #:30 Tablet; Refill: 0;For: Hypertension; DIAN =   N; Record   · Start: Metoprolol Tartrate 25 MG Oral Tablet; TAKE 1 TABLET TWICE DAILY  Rx By: Chong Whaley; Dispense: 30 Days ; #:60 Tablet; Refill: 0;For: Hypertension; DIAN =   N; Record   · Continue: Losartan Potassium 50 MG Oral Tablet; take 1 tablet by mouth once daily  Rx By: Chong Whaley; Dispense: 30 Days ; #:30 TAB; Refill: 5;For: Hypertension; DIAN = N;   Verified Transmission to [place] [address] [address]    Discussion/Summary  Discussion Summary:   Reviewed hospital records  [facility] neurology as scheduled  Pt cannot tolerate ASA, consider plavix, will discuss with neurology  Son-in-law states will make an appt ASAP  Refer to [employer]  Consider psychiatry  Check BP at home 2/day  If nausea continues, call office  Pt had hx of nausea while taking HCTZ  She is on chlorthalidone now  If symptoms continue, need to change meds  Check fasting BS at home  Refer to [facility]     RTO in 2 weeks as scheduled  Medication SE Review and Pt Understands Tx: Possible side effects of new medications were reviewed with the patient/guardian today  The treatment plan was reviewed with the patient/guardian  The patient/guardian understands and agrees with the treatment plan      Chief Complaint  Chief Complaint Free Text Note Form: [facility] follow up from Hospital stay from 1/6/2017 to 1/10/2017 at [address] [place] [address]  Facial Numbness      History of Present Illness  TCM Communication [facility]: The patient is being contacted for follow-up after hospitalization and [facility] 1/[facility]/17 to 1/10/17  She [place] [facility]  The dates of hospitalization: 4, date of admission: 1/6/2017, date of discharge: 1/10/2017  Diagnosis: Confusion  She was discharged to home  Medications were not reviewed today  She scheduled a follow up appointment  Counseling was provided to the patient and patient's family  Topics counseled included importance of compliance with treatment  Communication performed and completed by Balta Olmos       HPI: Pt is here with son-in-law  Pt was in hospital 1/6/2017-1/10/2017 for facial numbness and confusion  MRA brain showed no focal stenosis or aneurysm  MRI brain showed subacute infarct  Echo showed EF 60-65%  Neurology consulted  Suggest ASA  Pt states she cannot tolerate ASA and she did not take it  1/7/2017 Lipid panel 148/160/47/69  Pt is on simvastatin 80mg daily  HTN---BP was very high in the hospital  Was given chlorthalidone and metoprolol  Pt does not check BP at home  Today /80  Pt states she felt nausea after medications  DM---1/8/2017 HgA1C 8 2 She is on glipizide and metformin  Pt was very depressed and anxiety also  Psychiatry Dr Beryle Cradle consulted in the hospital  Pt started lexapro 5mg daily and xanax 0 25mg tid prn  Pt discharged home  Pt lives with daughter and son-in-law   Daughter plans to take times off to take care of her    Pt states her gait is not stable, but she refused to use cane or walker  Pt needs help with walking and help with ADL's  1    Denies fever, Sob, CP, vomiting or abdominal pain  1 Amended By: Mario Nuno; Feb 17 2017 2:02 PM EST    Review of Systems  Complete-Female:   Constitutional: No fever, no chills, feels well, no tiredness, no recent weight gain or weight loss  Cardiovascular: No complaints of slow heart rate, no fast heart rate, no chest pain, no palpitations, no leg claudication, no lower extremity edema  Respiratory: No complaints of shortness of breath, no wheezing, no cough, no SOB on exertion, no orthopnea, no PND  Gastrointestinal: No complaints of abdominal pain, no constipation, no nausea or vomiting, no diarrhea, no bloody stools  Musculoskeletal: No complaints of arthralgias, no myalgias, no joint swelling or stiffness, no limb pain or swelling  Preventive Quality 65 Older:   Preventive Quality 65 and Older: The patient is currently asymptomatic Symptoms Include: no recent fall       Active Problems   1  Arm numbness (782 0) (R20 0)  2  Depression with anxiety (300 4) (F41 8)  3  Diabetes mellitus type 2, uncontrolled (250 02) (E11 65)  4  Dizziness (780 4) (R42)  5  Glaucoma (365 9) (H40 9)  6  Hearing Loss (389 9)  7  Hyperlipidemia (272 4) (E78 5)  8  Hypertension (401 9) (I10)  9  Medicare annual wellness visit, initial (V70 0) (Z00 00)  10  Nausea (787 02) (R11 0)  11  Osteoarthritis (715 90) (M19 90)  12  Osteopenia (733 90) (M85 80)  13  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)  14  Tinnitus, unspecified laterality (388 30) (H93 19)  15  Tricuspid valve disorders, 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)    Past Medical History   1  History of Cough (786 2) (R05)  2  History of Dysfunction of eustachian tube, unspecified laterality (381 81) (H69 80)  3  History of Flank Pain Right (789 09)  4  History of bronchitis (V12 69) (Z87 09)  5   History of cholelithiasis (V12 79) (Z87 19)  6  History of hematuria (V13 09) (Z87 448)  7  History of sinusitis (V12 69) (Z87 09)  8  History of upper respiratory infection (V12 09) (Z87 09)  9  History of urinary frequency (V13 09) (Z87 898)  10  History of urinary tract infection (V13 02) (Z87 440)  11  History of vertigo (V12 49) (Z87 898)  12  History of viral gastroenteritis (V12 09) (Z86 19)  13  History of viral infection (V12 09) (Z86 19)  14  History of wheezing (V12 69) (Z87 898)  15  History of Lumbar Strain (847 2)  16  History of Otitis media, unspecified laterality  17  History of Tinnitus, unspecified laterality (388 30) (H93 19)    Surgical History   1  History of Appendectomy  2  History of Tubal Ligation    Family History  Father   1  Family history of Diabetes Mellitus (V18 0)  2  Family history of Stroke Syndrome (V17 1)  Daughter   3  Family history of Breast Cancer (V16 3)  Sister   4  Family history of Coronary Artery Disease (V17 49)    Social History    · Denied: History of Alcohol   · Never A Smoker    Current Meds  1  Artificial Tears 1 4 % Ophthalmic Solution; INSTILL 1-2 DROPS INTO AFFECTED EYE(S)    4 TIMES DAILY AS DIRECTED; Therapy: (Recorded:15Mtz7720) to Recorded  2  Dorzolamide HCl-Timolol Mal 22 3-6 8 MG/ML Ophthalmic Solution; SOLN OP X 30; Therapy: 82SCE5193 to (Evaluate:02Mar2016); Last Rx:99Xyu6534 Ordered  3  GlipiZIDE XL 10 MG Oral Tablet Extended Release 24 Hour; take 1 tablet by mouth twice   a day; Therapy: 86ZGE5468 to ([phone])  Requested for: 38Onh0337; Last   Rx:78Zne2341 Ordered  4  Latanoprost 0 005 % Ophthalmic Solution; SOLN OP X 25; Therapy[facility] 59NSH6912 [facility]  Requested for: 97LEH7134 Ordered  5  Losartan Potassium 50 MG Oral Tablet; take 1 tablet by mouth once daily; Therapy: 62KFO6756 to (Evaluate:82Bdc7950)  Requested for: 21Wwc8867; Last   Rx:11Nov2016 Ordered  6  MetFORMIN HCl - 1000 MG Oral Tablet; take 1 tablet by mouth twice a day;    Therapy: 58NFU4747 to ([facility])  Requested for: 54Xao4241; Last   Rx:93Cwm8800 Ordered  7  OneTouch Lancets Miscellaneous; Check blood sugar 1 time daily; Therapy: 41QHY1898 to (Evaluate:48Jma3353)  Requested for: 04Jft6686; Last   Rx:45Jki1957 Ordered  8  OneTouch Ultra Blue In Vitro Strip; TEST ONCE DAILY; Therapy: 80GVX3191 to (Evaluate:43Abg9056)  Requested for: 67Hgv4494; Last   Rx:35Svp8700 Ordered  9  Restasis 0 05 % Ophthalmic Emulsion; USE AS DIRECTED; Therapy: (Recorded:21Sep2016) to Recorded  10  Simvastatin 80 MG Oral Tablet; take 1 tablet by mouth once daily; Therapy: 35NZV3909 to (Evaluate:20Mar2017)  Requested for: 12Ytq7252; Last    Rx:16Ryq7560 Ordered    Allergies   1  HydroCHLOROthiazide TABS  2  Metoprolol Tartrate TABS  3  Aspirin Buffered TABS  4  Lipitor TABS  5  Zocor TABS    Vitals  Signs   Recorded: 05YLR4473 09:05AM   Temperature: 97 F  Heart Rate: 60  Respiration: 16  Systolic: 635  Diastolic: 80  Height: 4 ft 10 in  Weight: 160 lb 4 00 oz  BMI Calculated: 33 49  BSA Calculated: 1 65    Physical Exam    Constitutional   General appearance: No acute distress, well appearing and well nourished  Pulmonary   Respiratory effort: No increased work of breathing or signs of respiratory distress  Auscultation of lungs: Clear to auscultation  Cardiovascular   Auscultation of heart: Normal rate and rhythm, normal S1 and S2, without murmurs  Examination of extremities for edema and/or varicosities: Normal     Carotid pulses: Normal     Abdomen   Abdomen: Non-tender, no masses  Liver and spleen: No hepatomegaly or splenomegaly  Lymphatic   Palpation of lymph nodes in neck: No lymphadenopathy      Musculoskeletal   Gait and station: Normal          Future Appointments    Date/Time Provider Specialty Site   01/27/2017 09:00 AM Avery Ham MD Family Medicine [address] [place] [address] Drive   01/24/2017 11:00 AM Lyla Luis LCSW  [address] [place] [address] [place]     Signatures   Electronically signed by : Mordechai Nyhan, MD; Jan 13 2017 10:34AM EST                       (Author)    Electronically signed by :  Mordechai Nyhan, MD; Feb 17 2017  2:02PM EST                       (Author)

## 2018-01-12 ENCOUNTER — APPOINTMENT (INPATIENT)
Dept: MRI IMAGING | Facility: HOSPITAL | Age: 83
DRG: 069 | End: 2018-01-12
Payer: COMMERCIAL

## 2018-01-12 ENCOUNTER — HOSPITAL ENCOUNTER (INPATIENT)
Facility: HOSPITAL | Age: 83
LOS: 2 days | Discharge: HOME | DRG: 069 | End: 2018-01-14
Attending: EMERGENCY MEDICINE | Admitting: INTERNAL MEDICINE
Payer: COMMERCIAL

## 2018-01-12 ENCOUNTER — APPOINTMENT (EMERGENCY)
Dept: CT IMAGING | Facility: HOSPITAL | Age: 83
DRG: 069 | End: 2018-01-12
Payer: COMMERCIAL

## 2018-01-12 ENCOUNTER — APPOINTMENT (EMERGENCY)
Dept: RADIOLOGY | Facility: HOSPITAL | Age: 83
DRG: 069 | End: 2018-01-12
Payer: COMMERCIAL

## 2018-01-12 VITALS
HEIGHT: 58 IN | HEART RATE: 60 BPM | SYSTOLIC BLOOD PRESSURE: 122 MMHG | RESPIRATION RATE: 16 BRPM | BODY MASS INDEX: 33.64 KG/M2 | DIASTOLIC BLOOD PRESSURE: 80 MMHG | WEIGHT: 160.25 LBS | TEMPERATURE: 97 F

## 2018-01-12 DIAGNOSIS — I63.9 STROKE (HCC): Primary | ICD-10-CM

## 2018-01-12 DIAGNOSIS — E11.9 DIABETES MELLITUS (HCC): ICD-10-CM

## 2018-01-12 DIAGNOSIS — R53.83 LETHARGY: ICD-10-CM

## 2018-01-12 DIAGNOSIS — R27.0 ATAXIA: ICD-10-CM

## 2018-01-12 DIAGNOSIS — N39.0 UTI (URINARY TRACT INFECTION): ICD-10-CM

## 2018-01-12 DIAGNOSIS — I10 HYPERTENSION: ICD-10-CM

## 2018-01-12 DIAGNOSIS — R29.818 NEUROLOGICAL DEFICIT, TRANSIENT: ICD-10-CM

## 2018-01-12 DIAGNOSIS — R47.81 SLURRED SPEECH: ICD-10-CM

## 2018-01-12 PROBLEM — E11.65 TYPE 2 DIABETES MELLITUS WITH HYPERGLYCEMIA (HCC): Status: ACTIVE | Noted: 2018-01-12

## 2018-01-12 LAB
ALBUMIN SERPL BCP-MCNC: 4 G/DL (ref 3.5–5)
ALP SERPL-CCNC: 86 U/L (ref 46–116)
ALT SERPL W P-5'-P-CCNC: 28 U/L (ref 12–78)
ANION GAP SERPL CALCULATED.3IONS-SCNC: 8 MMOL/L (ref 4–13)
APTT PPP: 44 SECONDS (ref 23–35)
AST SERPL W P-5'-P-CCNC: 12 U/L (ref 5–45)
ATRIAL RATE: 63 BPM
BACTERIA UR QL AUTO: ABNORMAL /HPF
BASOPHILS # BLD AUTO: 0.03 THOUSANDS/ΜL (ref 0–0.1)
BASOPHILS NFR BLD AUTO: 0 % (ref 0–1)
BILIRUB SERPL-MCNC: 0.4 MG/DL (ref 0.2–1)
BILIRUB UR QL STRIP: NEGATIVE
BUN SERPL-MCNC: 21 MG/DL (ref 5–25)
CALCIUM SERPL-MCNC: 9.5 MG/DL (ref 8.3–10.1)
CHLORIDE SERPL-SCNC: 101 MMOL/L (ref 100–108)
CLARITY UR: CLEAR
CO2 SERPL-SCNC: 27 MMOL/L (ref 21–32)
COLOR UR: YELLOW
CREAT SERPL-MCNC: 0.91 MG/DL (ref 0.6–1.3)
EOSINOPHIL # BLD AUTO: 0.13 THOUSAND/ΜL (ref 0–0.61)
EOSINOPHIL NFR BLD AUTO: 2 % (ref 0–6)
ERYTHROCYTE [DISTWIDTH] IN BLOOD BY AUTOMATED COUNT: 13.8 % (ref 11.6–15.1)
GFR SERPL CREATININE-BSD FRML MDRD: 59 ML/MIN/1.73SQ M
GLUCOSE SERPL-MCNC: 122 MG/DL (ref 65–140)
GLUCOSE SERPL-MCNC: 206 MG/DL (ref 65–140)
GLUCOSE SERPL-MCNC: 276 MG/DL (ref 65–140)
GLUCOSE UR STRIP-MCNC: ABNORMAL MG/DL
HCT VFR BLD AUTO: 41.2 % (ref 34.8–46.1)
HGB BLD-MCNC: 14.3 G/DL (ref 11.5–15.4)
HGB UR QL STRIP.AUTO: ABNORMAL
INR PPP: 2.18 (ref 0.86–1.16)
KETONES UR STRIP-MCNC: NEGATIVE MG/DL
LEUKOCYTE ESTERASE UR QL STRIP: ABNORMAL
LYMPHOCYTES # BLD AUTO: 2.35 THOUSANDS/ΜL (ref 0.6–4.47)
LYMPHOCYTES NFR BLD AUTO: 31 % (ref 14–44)
MCH RBC QN AUTO: 31.4 PG (ref 26.8–34.3)
MCHC RBC AUTO-ENTMCNC: 34.7 G/DL (ref 31.4–37.4)
MCV RBC AUTO: 90 FL (ref 82–98)
MONOCYTES # BLD AUTO: 0.54 THOUSAND/ΜL (ref 0.17–1.22)
MONOCYTES NFR BLD AUTO: 7 % (ref 4–12)
NEUTROPHILS # BLD AUTO: 4.46 THOUSANDS/ΜL (ref 1.85–7.62)
NEUTS SEG NFR BLD AUTO: 60 % (ref 43–75)
NITRITE UR QL STRIP: NEGATIVE
NON-SQ EPI CELLS URNS QL MICRO: ABNORMAL /HPF
NRBC BLD AUTO-RTO: 0 /100 WBCS
P AXIS: 46 DEGREES
PH UR STRIP.AUTO: 5.5 [PH] (ref 4.5–8)
PLATELET # BLD AUTO: 279 THOUSANDS/UL (ref 149–390)
PMV BLD AUTO: 11 FL (ref 8.9–12.7)
POTASSIUM SERPL-SCNC: 4.3 MMOL/L (ref 3.5–5.3)
PR INTERVAL: 160 MS
PROT SERPL-MCNC: 7.7 G/DL (ref 6.4–8.2)
PROT UR STRIP-MCNC: NEGATIVE MG/DL
PROTHROMBIN TIME: 24.5 SECONDS (ref 12.1–14.4)
QRS AXIS: -3 DEGREES
QRSD INTERVAL: 84 MS
QT INTERVAL: 396 MS
QTC INTERVAL: 405 MS
RBC # BLD AUTO: 4.56 MILLION/UL (ref 3.81–5.12)
RBC #/AREA URNS AUTO: ABNORMAL /HPF
SODIUM SERPL-SCNC: 136 MMOL/L (ref 136–145)
SP GR UR STRIP.AUTO: 1.01 (ref 1–1.03)
SPECIMEN SOURCE: NORMAL
T WAVE AXIS: 62 DEGREES
TROPONIN I BLD-MCNC: 0 NG/ML (ref 0–0.08)
UROBILINOGEN UR QL STRIP.AUTO: 0.2 E.U./DL
VENTRICULAR RATE: 63 BPM
WBC # BLD AUTO: 7.51 THOUSAND/UL (ref 4.31–10.16)
WBC #/AREA URNS AUTO: ABNORMAL /HPF

## 2018-01-12 PROCEDURE — 82948 REAGENT STRIP/BLOOD GLUCOSE: CPT

## 2018-01-12 PROCEDURE — 84484 ASSAY OF TROPONIN QUANT: CPT

## 2018-01-12 PROCEDURE — 85610 PROTHROMBIN TIME: CPT | Performed by: EMERGENCY MEDICINE

## 2018-01-12 PROCEDURE — 81001 URINALYSIS AUTO W/SCOPE: CPT | Performed by: EMERGENCY MEDICINE

## 2018-01-12 PROCEDURE — 85025 COMPLETE CBC W/AUTO DIFF WBC: CPT | Performed by: EMERGENCY MEDICINE

## 2018-01-12 PROCEDURE — 70551 MRI BRAIN STEM W/O DYE: CPT

## 2018-01-12 PROCEDURE — 93005 ELECTROCARDIOGRAM TRACING: CPT

## 2018-01-12 PROCEDURE — 70450 CT HEAD/BRAIN W/O DYE: CPT

## 2018-01-12 PROCEDURE — 93005 ELECTROCARDIOGRAM TRACING: CPT | Performed by: EMERGENCY MEDICINE

## 2018-01-12 PROCEDURE — 99285 EMERGENCY DEPT VISIT HI MDM: CPT

## 2018-01-12 PROCEDURE — 36415 COLL VENOUS BLD VENIPUNCTURE: CPT

## 2018-01-12 PROCEDURE — 71046 X-RAY EXAM CHEST 2 VIEWS: CPT

## 2018-01-12 PROCEDURE — 85730 THROMBOPLASTIN TIME PARTIAL: CPT | Performed by: EMERGENCY MEDICINE

## 2018-01-12 PROCEDURE — 80053 COMPREHEN METABOLIC PANEL: CPT | Performed by: EMERGENCY MEDICINE

## 2018-01-12 RX ORDER — CYCLOSPORINE 0.5 MG/ML
1 EMULSION OPHTHALMIC 2 TIMES DAILY
Status: DISCONTINUED | OUTPATIENT
Start: 2018-01-12 | End: 2018-01-14 | Stop reason: HOSPADM

## 2018-01-12 RX ORDER — DORZOLAMIDE HCL 20 MG/ML
1 SOLUTION/ DROPS OPHTHALMIC 3 TIMES DAILY
Status: DISCONTINUED | OUTPATIENT
Start: 2018-01-12 | End: 2018-01-14 | Stop reason: HOSPADM

## 2018-01-12 RX ORDER — PRAVASTATIN SODIUM 40 MG
80 TABLET ORAL
Status: CANCELLED | OUTPATIENT
Start: 2018-01-12

## 2018-01-12 RX ORDER — CYCLOSPORINE 0.5 MG/ML
1 EMULSION OPHTHALMIC 2 TIMES DAILY
COMMUNITY
End: 2018-03-02 | Stop reason: ALTCHOICE

## 2018-01-12 RX ORDER — TIMOLOL MALEATE 5 MG/ML
1 SOLUTION/ DROPS OPHTHALMIC 2 TIMES DAILY
Status: DISCONTINUED | OUTPATIENT
Start: 2018-01-12 | End: 2018-01-14 | Stop reason: HOSPADM

## 2018-01-12 RX ORDER — SIMVASTATIN 40 MG
80 TABLET ORAL
Status: DISCONTINUED | OUTPATIENT
Start: 2018-01-12 | End: 2018-01-13 | Stop reason: SDUPTHER

## 2018-01-12 RX ORDER — ACETAMINOPHEN 325 MG/1
650 TABLET ORAL EVERY 6 HOURS PRN
Status: DISCONTINUED | OUTPATIENT
Start: 2018-01-12 | End: 2018-01-14 | Stop reason: HOSPADM

## 2018-01-12 RX ORDER — METOPROLOL SUCCINATE 25 MG/1
25 TABLET, EXTENDED RELEASE ORAL
Status: DISCONTINUED | OUTPATIENT
Start: 2018-01-12 | End: 2018-01-14 | Stop reason: HOSPADM

## 2018-01-12 RX ORDER — ESCITALOPRAM OXALATE 5 MG/1
2.5 TABLET ORAL DAILY
Status: DISCONTINUED | OUTPATIENT
Start: 2018-01-13 | End: 2018-01-14 | Stop reason: HOSPADM

## 2018-01-12 RX ORDER — MINERAL OIL AND PETROLATUM 150; 830 MG/G; MG/G
OINTMENT OPHTHALMIC 2 TIMES DAILY
Status: DISCONTINUED | OUTPATIENT
Start: 2018-01-12 | End: 2018-01-14 | Stop reason: HOSPADM

## 2018-01-12 RX ORDER — CEPHALEXIN 250 MG/1
500 CAPSULE ORAL ONCE
Status: COMPLETED | OUTPATIENT
Start: 2018-01-12 | End: 2018-01-12

## 2018-01-12 RX ORDER — METOPROLOL SUCCINATE 50 MG/1
50 TABLET, EXTENDED RELEASE ORAL
Status: CANCELLED | OUTPATIENT
Start: 2018-01-12

## 2018-01-12 RX ORDER — AMLODIPINE BESYLATE 5 MG/1
5 TABLET ORAL DAILY
Status: DISCONTINUED | OUTPATIENT
Start: 2018-01-13 | End: 2018-01-14 | Stop reason: HOSPADM

## 2018-01-12 RX ORDER — DORZOLAMIDE HYDROCHLORIDE AND TIMOLOL MALEATE 20; 5 MG/ML; MG/ML
1 SOLUTION/ DROPS OPHTHALMIC 2 TIMES DAILY
Status: CANCELLED | OUTPATIENT
Start: 2018-01-12

## 2018-01-12 RX ORDER — SENNOSIDES 8.6 MG
2 TABLET ORAL
Status: DISCONTINUED | OUTPATIENT
Start: 2018-01-12 | End: 2018-01-14 | Stop reason: HOSPADM

## 2018-01-12 RX ORDER — BRIMONIDINE TARTRATE 0.15 %
1 DROPS OPHTHALMIC (EYE) 2 TIMES DAILY
Status: DISCONTINUED | OUTPATIENT
Start: 2018-01-12 | End: 2018-01-14 | Stop reason: HOSPADM

## 2018-01-12 RX ORDER — ASPIRIN 81 MG/1
81 TABLET, CHEWABLE ORAL DAILY
Status: DISCONTINUED | OUTPATIENT
Start: 2018-01-13 | End: 2018-01-14 | Stop reason: HOSPADM

## 2018-01-12 RX ORDER — DORZOLAMIDE HCL 20 MG/ML
1 SOLUTION/ DROPS OPHTHALMIC 3 TIMES DAILY
Status: CANCELLED | OUTPATIENT
Start: 2018-01-12

## 2018-01-12 RX ORDER — ASPIRIN 81 MG/1
324 TABLET, CHEWABLE ORAL ONCE
Status: DISCONTINUED | OUTPATIENT
Start: 2018-01-12 | End: 2018-01-12

## 2018-01-12 RX ORDER — FAMOTIDINE 20 MG/1
20 TABLET, FILM COATED ORAL DAILY
Status: DISCONTINUED | OUTPATIENT
Start: 2018-01-13 | End: 2018-01-14 | Stop reason: HOSPADM

## 2018-01-12 RX ADMIN — MINERAL OIL AND WHITE PETROLATUM: 150; 830 OINTMENT OPHTHALMIC at 21:28

## 2018-01-12 RX ADMIN — Medication 1000 MG: at 20:21

## 2018-01-12 RX ADMIN — CEPHALEXIN 500 MG: 250 CAPSULE ORAL at 17:37

## 2018-01-12 RX ADMIN — METOPROLOL SUCCINATE 25 MG: 25 TABLET, EXTENDED RELEASE ORAL at 21:33

## 2018-01-12 NOTE — RESULT NOTES
Verified Results  (1) CBC/PLT/DIFF 22Apr2017 08:15AM Maureen Mcfarland     Test Name Result Flag Reference   WHITE BLOOD CELL COUNT 5 0 Thousand/uL  3 8-10 8   RED BLOOD CELL COUNT 4 39 Million/uL  3 80-5 10   HEMOGLOBIN 13 3 g/dL  11 7-15 5   HEMATOCRIT 39 9 %  35 0-45 0   MCV 91 1 fL  80 0-100 0   MCH 30 3 pg  27 0-33 0   MCHC 33 3 g/dL  32 0-36 0   RDW 13 7 %  11 0-15 0   PLATELET COUNT 785 Thousand/uL  140-400   ABSOLUTE NEUTROPHILS 2330 cells/uL  6606-1199   ABSOLUTE LYMPHOCYTES 1870 cells/uL  850-3900   ABSOLUTE MONOCYTES 415 cells/uL  200-950   ABSOLUTE EOSINOPHILS 355 cells/uL     ABSOLUTE BASOPHILS 30 cells/uL  0-200   NEUTROPHILS 46 6 %     LYMPHOCYTES 37 4 %     MONOCYTES 8 3 %     EOSINOPHILS 7 1 %     BASOPHILS 0 6 %     MPV 10 0 fL  7 5-12 5     (1) COMPREHENSIVE METABOLIC PANEL 59NXD3633 20:36PG Maureen Mcfarland     Test Name Result Flag Reference   GLUCOSE 192 mg/dL H 65-99   Fasting reference interval     For someone without known diabetes, a glucose  value >125 mg/dL indicates that they may have  diabetes and this should be confirmed with a  follow-up test    UREA NITROGEN (BUN) 20 mg/dL  7-25   CREATININE 0 72 mg/dL  0 60-0 88   For patients >52years of age, the reference limit  for Creatinine is approximately 13% higher for people  identified as -American  eGFR NON-AFR   AMERICAN 78 mL/min/1 73m2  > OR = 60   eGFR AFRICAN AMERICAN 90 mL/min/1 73m2  > OR = 60   BUN/CREATININE RATIO   3-22   NOT APPLICABLE (calc)   SODIUM 136 mmol/L  135-146   POTASSIUM 4 2 mmol/L  3 5-5 3   CHLORIDE 101 mmol/L     CARBON DIOXIDE 28 mmol/L  20-31   CALCIUM 9 1 mg/dL  8 6-10 4   PROTEIN, TOTAL 6 6 g/dL  6 1-8 1   ALBUMIN 4 0 g/dL  3 6-5 1   GLOBULIN 2 6 g/dL (calc)  1 9-3 7   ALBUMIN/GLOBULIN RATIO 1 5 (calc)  1 0-2 5   BILIRUBIN, TOTAL 0 5 mg/dL  0 2-1 2   ALKALINE PHOSPHATASE 89 U/L     AST 11 U/L  10-35   ALT 26 U/L  6-29     (Q) LIPID PANEL WITH REFLEX TO DIRECT LDL 22Apr2017 08: 15AM Caden Edgar     Test Name Result Flag Reference   CHOLESTEROL, TOTAL 124 mg/dL L 125-200   HDL CHOLESTEROL 53 mg/dL  > OR = 46   TRIGLICERIDES 72 mg/dL  <519   LDL-CHOLESTEROL 57 mg/dL (calc)  <130   Desirable range <100 mg/dL for patients with CHD or  diabetes and <70 mg/dL for diabetic patients with  known heart disease  CHOL/HDLC RATIO 2 3 (calc)  < OR = 5 0   NON HDL CHOLESTEROL 71 mg/dL (calc)     Target for non-HDL cholesterol is 30 mg/dL higher than   LDL cholesterol target  (Q) HEMOGLOBIN A1c 22Apr2017 08:15AM Caden Edgar   REPORT COMMENT:  FASTING:YES     Test Name Result Flag Reference   HEMOGLOBIN A1c 8 8 % of total Hgb H <5 7   For someone without known diabetes, a hemoglobin A1c  value of 6 5% or greater indicates that they may have   diabetes and this should be confirmed with a follow-up   test      For someone with known diabetes, a value <7% indicates   that their diabetes is well controlled and a value   greater than or equal to 7% indicates suboptimal   control  A1c targets should be individualized based on   duration of diabetes, age, comorbid conditions, and   other considerations  Currently, no consensus exists regarding use of  hemoglobin A1c for diagnosis of diabetes for children

## 2018-01-12 NOTE — MISCELLANEOUS
Message   Recorded as Task   Date: 03/01/2017 10:50 AM, Created By: Laura Kenyon   Task Name: Med Renewal Request   Assigned To: Marjan Cisneros   Regarding Patient: Raad Cagle, Status: Active   Comment:    Tamar Sanchez - 01 Mar 2017 10:50 AM     TASK CREATED  pts glipizide needs to be refilled  not to [facility]  they need to be sent to [facility] [address]  I spoke to Dai Hammonds the son in law ([phone]) the EMR says 10mg pills twice a day but the son in law says she has 10mg pills that she cuts in half and takes half in the morning and half at night so can you look into what the dosage actually is? pt only has enough pills for today so if you could refill asap that would be good   Goldie Cisneros - 01 Mar 2017 4:00 PM     TASK EDITED  Please call regarding her meds  Irvin Dubois - 02 Mar 2017 1:19 PM     TASK REPLIED TO: Previously Assigned To Irvin Dubois  I called Dai Hammonds  He states pt BS was around 200 recently  I reviewed labs 1/2017 HgA1C 8 5  I told Dai Hammonds that I would like pt to take glipizide 10mg bid  If she cannot tolerate, let me know  Dai Hammonds agreed  Signatures   Electronically signed by :  Paul Small MD; Mar  2 2017  1:20PM EST                       (Author)

## 2018-01-12 NOTE — MISCELLANEOUS
Message  Got a letter stating "escitalopram and trazodone can increase QT interval prolongation"  [facility] called today and states pt HR was 48  I called daughter  Daughter states pt feels fine  Denies dizzy, lightheaded, SOB, CP etc  Advised stopping trazodone  Advised monitor pt heart rate  If HR <45 or pt feels any symptoms go to ER  Daughter agreed  Plan  Insomnia    · TraZODone HCl - 50 MG Oral Tablet    Signatures   Electronically signed by :  Esther Arnold MD; Feb  3 2017  4:48PM EST                       (Author)

## 2018-01-12 NOTE — H&P
History and Physical - [facility]    Patient Information: Louie Gibson 80 y o  female MRN: [id number]  Unit/Bed#: E4 -01 Encounter: [id number]  Admitting Physician: ROBBI Tiwari  PCP: Arline Kelly MD  Date of Admission:  01/12/18    Assessment/Plan:    Hospital Problem List:     Principal Problem:    Neurological deficit, transient  Active Problems:    Slurred speech    Diabetes mellitus ([facility] [address] )    Glaucoma    Hypercholesterolemia    Hypertension    Depression with anxiety    Atrial fibrillation ([facility] [address] )    Lethargy    Ataxia      Plan for the Primary Problem(s):  · TIA vs CVA  · Yesterday patient had slurred speech, ataxia, lethargy, lightheadedness, and overall weakness  · PMH:  CVA January 2017 with no residual deficits  Per patient she did not go to the hospital then because she was not aware she had a CVA  · Brain CT:  Negative for acute intracranial pathology  No mass, hemorrhage or signs of ischemia  Old right frontal lobe infarct  · Continue Xarelto 20 mg  · Continue simvastatin 80 mg  · Brain MRI, ECHO, and Carotid Study ordered  · Neuro checks; dysphagia screen  · Telemetry monitoring  · Neurology consult  · PT, OT, speech language evaluation and treatment  · Check Hg A1c and lipid panel    Plan for Additional Problems:   · AFib:    · EKG:  NSR Rate 63, sinus rhythm has replaced Afib  (prior EKG September 2017 showed AFib with RVR rate 114)  · Continue Xarelto and Toprol XL 25 mg at bedtime  · Echo ordered    · UTI:  Moderate leukocytes, WBC, and bacteria  States she feels warm but did not check her temperature  Empirical treatment for UTI considering signs and symptoms of frequency and urgency  Start ceftriaxone  · DM: On metformin and glipizide at home    Discontinued during hospitalization, start insulin sliding scale    · Hypertension:  Continue Toprol XL 25 mg hs and Norvasc 5 mg qd    · Hyperlipidemia:  Continue simvastatin 80 mg; wants to continue home medication will not accept alternate statin    · Anxiety:  Continue Lexapro 2 5 mg    · Glaucoma:  Continue home meds:  Restasis, Lumigan, dorzolamide, combigan and artificial tears    VTE Prophylaxis: Rivaroxaban (Xarelto)  / sequential compression device   Code Status: Full code  POLST: POLST is not applicable to this patient    Anticipated Length of Stay:  Patient will be admitted on an Inpatient basis with an anticipated length of stay of  Greater than 2 midnights  Justification for Hospital Stay:  Signs and symptoms concurrent with CVA, requires  close neurologic observation    Total Time for Visit, including Counseling / Coordination of Care: 1 hour  Greater than 50% of this total time spent on direct patient counseling and coordination of care  Chief Complaint:   Slurred speech, not walking straight, lethargy, lightheadedness yesterday    History of Present Illness:    Roya Bryan is a 80 y o  female who presents with complaints of weakness, slurred speech, ataxia, body felt tingly, and lightheadedness  She sat on the couch all day yesterday  As per family when she woke up her face was not right, and she would go right back to sleep  States she had a CVA last year in January 2017  Claims she did not seek medical attention because she was unaware  Currently denies above symptoms except feeling lightheaded and weak  Denies chest pain, chest discomfort, or palpitations; denies recent illness, shortness of breath, fever or chills  Complains of stuffy nose, feeling warm, urgency and frequency  Denies loss of appetite, abdominal pain, or NVCD  Lives at home with her spouse and daughter, ambulatory  Review of Systems:    Review of Systems   Constitutional: Positive for activity change  Negative for appetite change, chills, diaphoresis and fatigue  Feels warm   HENT: Positive for congestion   Negative for postnasal drip, rhinorrhea, sinus pain, sneezing, sore throat, tinnitus and trouble swallowing  Eyes: Negative for pain and discharge  Respiratory: Negative  Negative for apnea, choking and chest tightness  Cardiovascular: Negative  Gastrointestinal: Negative  Genitourinary: Positive for dysuria, frequency and urgency  Negative for difficulty urinating and hematuria  Musculoskeletal: Negative for arthralgias  Neurological: Positive for weakness and light-headedness  Negative for dizziness, facial asymmetry and headaches  Slurred speech, paresthesias and ataxia resolved   Psychiatric/Behavioral: Negative for agitation, behavioral problems and confusion  Past Medical and Surgical History:     Past Medical History:   Diagnosis Date    [facility]     Depression with anxiety     Diabetes mellitus ([facility] )     niddm    Glaucoma     Hypercholesterolemia     Hypertension     Obstructive sleep apnea     OP (osteoporosis)     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  Authorizing Provider   amLODIPine (NORVASC) 5 mg tablet Take 5 mg by mouth daily   Yes Historical Provider, MD   Bimatoprost (LUMIGAN OP) Apply to eye daily at bedtime   Yes Historical Provider, MD   Brimonidine Tartrate-Timolol (COMBIGAN OP) Apply to eye   Yes Historical Provider, MD   cycloSPORINE (RESTASIS) 0 05 % ophthalmic emulsion 1 drop 2 (two) times a day   Yes Historical Provider, MD   DORZOLAMIDE HCL OP Apply to eye 3 (three) times a day   Yes Historical Provider, MD   escitalopram (LEXAPRO) 5 mg tablet Take 1 tablet by mouth daily for 30 days 1/10/17 1/12/18 Yes Loli Mon MD   glipiZIDE (GLUCOTROL) 10 mg tablet Take 0 5 tablets by mouth 2 (two) times a day before meals for 30 days 1/19/17 1/12/18 Yes Alfie Montenegro MD   Hypromellose (ARTIFICIAL TEARS OP) Apply to eye   Yes Historical Provider, MD   metFORMIN (GLUCOPHAGE) 1000 MG tablet Take 1,000 mg by mouth daily with breakfast   Yes Historical Provider, MD   metoprolol succinate (TOPROL-XL) 50 mg 24 hr tablet Take 1 tablet by mouth daily at bedtime 9/14/17  Yes Claudene Bradford, MD   rivaroxaban Christie Hi) 20 mg tablet Take 1 tablet by mouth daily with dinner 9/14/17  Yes Claudene Bradford, MD   simvastatin (ZOCOR) 80 mg tablet Take 80 mg by mouth daily at bedtime   Yes Historical Provider, MD   cycloSPORINE (RESTASIS) 0 05 % ophthalmic emulsion 1 drop 2 (two) times a day  1/12/18  Historical Provider, MD     I have reviewed home medications with patient personally  Allergies: Allergies   Allergen Reactions    Lipitor [Atorvastatin] GI Intolerance     Nausea stomach ache    Aspirin GI Intolerance       Social History:     Marital Status:    Occupation: retired  Patient Pre-hospital Living Situation: lives with spouse, daughter and son in law  Patient Pre-hospital Level of Mobility: ambulates  Patient Pre-hospital Diet Restrictions: low salt  Substance Use History:   History   Alcohol Use No     History   Smoking Status    Never Smoker   Smokeless Tobacco    Never Used     History   Drug Use No       Family History:    non-contributory    Physical Exam:     Vitals:   Blood Pressure: 161/69 (01/12/18 1739)  Pulse: 71 (01/12/18 1739)  Temperature: 98 7 °F (37 1 °C) (01/12/18 1346)  Temp Source: Oral (01/12/18 1346)  Respirations: 18 (01/12/18 1739)  Weight - Scale: 74 6 kg (164 lb 7 4 oz) (01/12/18 1346)  SpO2: 95 % (01/12/18 1739)    Physical Exam   Constitutional: She is oriented to person, place, and time  She appears well-developed and well-nourished  No distress  Obesity   HENT:   Head: Normocephalic and atraumatic  Eyes: EOM are normal  Pupils are equal, round, and reactive to light  Right eye exhibits no discharge  Left eye exhibits no discharge  Neck: Neck supple  No JVD present     Cardiovascular: Normal rate, regular rhythm and normal heart sounds  No murmur heard  Pulmonary/Chest: Breath sounds normal  No respiratory distress  She has no wheezes  She has no rales  She exhibits no tenderness  Abdominal: Soft  Bowel sounds are normal  She exhibits no distension  There is no tenderness  There is no rebound and no guarding  Musculoskeletal: She exhibits no edema or deformity  Neurological: She is alert and oriented to person, place, and time  Equal strength bilateral upper and lower extremities, no pronator drift, PERRL, brisk reaction, EOMI, speech clear   Skin: Skin is warm and dry  Psychiatric: She has a normal mood and affect  Additional Data:     Lab Results: I have personally reviewed pertinent reports  Results from last 7 days  Lab Units 01/12/18  1421   WBC Thousand/uL 7 51   HEMOGLOBIN g/dL 14 3   HEMATOCRIT % 41 2   PLATELETS Thousands/uL 279   NEUTROS PCT % 60   LYMPHS PCT % 31   MONOS PCT % 7   EOS PCT % 2       Results from last 7 days  Lab Units 01/12/18  1421   SODIUM mmol/L 136   POTASSIUM mmol/L 4 3   CHLORIDE mmol/L 101   CO2 mmol/L 27   BUN mg/dL 21   CREATININE mg/dL 0 91   CALCIUM mg/dL 9 5   TOTAL PROTEIN g/dL 7 7   BILIRUBIN TOTAL mg/dL 0 40   ALK PHOS U/L 86   ALT U/L 28   AST U/L 12   GLUCOSE RANDOM mg/dL 206*       Results from last 7 days  Lab Units 01/12/18  1421   INR  2 18*       Imaging: I have personally reviewed pertinent reports  X-ray Chest 2 Views    Result Date: 1/12/2018  Narrative: CHEST INDICATION:  chest pain  History taken directly from the electronic ordering system  COMPARISON:  Chest x-ray from 6/20/2016 VIEWS:  Frontal and lateral projections IMAGES:  2 FINDINGS:     There is mild cardiomegaly, stable  Dense aortic wall calcification is seen  The lungs are clear  No pneumothorax or pleural effusion  Multilevel degenerative spurring is noted of the spine  Impression: Stable mild cardiomegaly  No active pulmonary disease  Workstation performed: [id number]     Ct Head Without Contrast    Result Date: 1/12/2018  Narrative: CT BRAIN - WITHOUT CONTRAST INDICATION:  Slurred speech yesterday that continues today  COMPARISON:  1/6/2017 TECHNIQUE:  Multiple contiguous axial CT images were obtained at 2 5 mm intervals through the posterior fossa followed by 5 mm intervals through the remainder of the brain  This examination, like all CT scans performed in the [facility],  was performed utilizing techniques to minimize radiation dose exposure, including the use of iterative reconstruction and automated exposure control  Rad dosage 980 24 mGy-cm IMAGE QUALITY:  Diagnostic FINDINGS:  PARENCHYMA: Mild age-appropriate atrophy  Stable periventricular white matter hypodensity suggestive of chronic microvascular ischemic disease  No mass, mass effect or midline shift  No hemorrhage  No signs of acute ischemia  Old right frontal lobe infarct  VENTRICLES AND EXTRA-AXIAL SPACES:    Mildly enlarged, consistent with the degree of atrophy  VISUALIZED ORBITS AND PARANASAL SINUSES:  Normal  CALVARIUM AND EXTRACRANIAL SOFT TISSUES:  Normal      Impression: 1  No acute intracranial pathology  Workstation performed: [id number]       EKG, Pathology, and Other Studies Reviewed on Admission:   · EKG:  NSR rate 63; sinus rhythm has replaced AFib    Allscripts / Epic Records Reviewed: Yes     ** Please Note: This note has been constructed using a voice recognition system   **

## 2018-01-12 NOTE — ED PROVIDER NOTES
History  Chief Complaint   Patient presents with    Slurred Speech     Per family pt had normal speech day before yesterday today, yesterday 9 am pt with slurred speech, weakness, and light headed     19-year-old female presents for evaluation of slurred speech which occurred yesterday and is improved, but persistent today  Patient has a history of stroke approximately 1 year ago  The family noted the significant slurring of the speech yesterday however the patient did not want to come to the hospital until today  The patient denies any associated focal neurologic deficit or weakness  She denies any fevers or chills  She does complain of mild associated headache and generalized body tingling  Nothing has made the symptoms better or worse although they have improved spontaneously in the past 24 hours        History provided by:  Patient      Prior to Admission Medications   Prescriptions Last Dose Informant Patient Reported? Taking?    Bimatoprost (LUMIGAN OP)   Yes Yes   Sig: Apply to eye daily at bedtime   Brimonidine Tartrate-Timolol (COMBIGAN OP)   Yes Yes   Sig: Apply to eye   DORZOLAMIDE HCL OP   Yes Yes   Sig: Apply to eye 3 (three) times a day   Hypromellose (ARTIFICIAL TEARS OP)   Yes Yes   Sig: Apply to eye   amLODIPine (NORVASC) 5 mg tablet   Yes Yes   Sig: Take 5 mg by mouth daily   escitalopram (LEXAPRO) 5 mg tablet   No Yes   Sig: Take 1 tablet by mouth daily for 30 days   glipiZIDE (GLUCOTROL) 10 mg tablet   No Yes   Sig: Take 0 5 tablets by mouth 2 (two) times a day before meals for 30 days   metFORMIN (GLUCOPHAGE) 1000 MG tablet   Yes Yes   Sig: Take 1,000 mg by mouth daily with breakfast   metoprolol succinate (TOPROL-XL) 50 mg 24 hr tablet   No Yes   Sig: Take 1 tablet by mouth daily at bedtime   rivaroxaban (XARELTO) 20 mg tablet   No Yes   Sig: Take 1 tablet by mouth daily with dinner   simvastatin (ZOCOR) 80 mg tablet   Yes Yes   Sig: Take 80 mg by mouth daily at bedtime Facility-Administered Medications: None       Past Medical History:   Diagnosis Date    Diabetes mellitus ([facility] [address] )     niddm    Glaucoma     Hypercholesterolemia     Hypertension     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 [facility])        Past Surgical History:   Procedure Laterality Date    GLAUCOMA SURGERY  01/03/2016       Family History   Problem Relation Age of Onset    No Known Problems Mother     No Known Problems Father     No Known Problems Sister     No Known Problems Brother     No Known Problems Son     No Known Problems Daughter     No Known Problems Maternal Grandmother     No Known Problems Maternal Grandfather     No Known Problems Paternal Grandmother     No Known Problems Paternal Grandfather     No Known Problems Cousin     Rheum arthritis Neg Hx     Osteoarthritis Neg Hx     Asthma Neg Hx     Diabetes Neg Hx     Heart failure Neg Hx     Hyperlipidemia Neg Hx     Hypertension Neg Hx     Migraines Neg Hx     Rashes / Skin problems Neg Hx     Seizures Neg Hx     Stroke Neg Hx     Thyroid disease Neg Hx      I have reviewed and agree with the history as documented  Social History   Substance Use Topics    Smoking status: Never Smoker    Smokeless tobacco: Never Used    Alcohol use No        Review of Systems   Constitutional: Negative for chills, fatigue and fever  HENT: Negative for sore throat  Eyes: Negative for visual disturbance  Respiratory: Negative for shortness of breath  Cardiovascular: Negative for chest pain  Gastrointestinal: Negative for abdominal pain, diarrhea, nausea and vomiting  Genitourinary: Negative for difficulty urinating, dysuria and pelvic pain  Musculoskeletal: Negative for back pain  Skin: Negative for rash  Neurological: Positive for speech difficulty and headaches  Negative for syncope and weakness  All other systems reviewed and are negative        Physical Exam  ED Triage Vitals [01/12/18 1346] Temperature Pulse Respirations Blood Pressure SpO2   98 7 °F (37 1 °C) 73 20 (!) 175/85 95 %      Temp Source Heart Rate Source Patient Position - Orthostatic VS BP Location FiO2 (%)   Oral Monitor Sitting Left arm --      Pain Score       2           Orthostatic Vital Signs  Vitals:    01/12/18 1346   BP: (!) 175/85   Pulse: 73   Patient Position - Orthostatic VS: Sitting       Physical Exam   Constitutional: She is oriented to person, place, and time  She appears well-developed and well-nourished  No distress  HENT:   Head: Normocephalic and atraumatic  Right Ear: External ear normal    Left Ear: External ear normal    Eyes: Conjunctivae and EOM are normal  Pupils are equal, round, and reactive to light  No scleral icterus  Neck: Normal range of motion  Cardiovascular: Normal rate, regular rhythm and normal heart sounds  Pulmonary/Chest: Effort normal and breath sounds normal  No respiratory distress  Abdominal: Soft  Bowel sounds are normal  There is no tenderness  There is no rebound and no guarding  Musculoskeletal: Normal range of motion  She exhibits no edema  Neurological: She is alert and oriented to person, place, and time  She has normal strength  No cranial nerve deficit or sensory deficit  Coordination normal  GCS eye subscore is 4  GCS verbal subscore is 5  GCS motor subscore is 6  Skin: Skin is warm and dry  No rash noted  Psychiatric: She has a normal mood and affect  Nursing note and vitals reviewed        ED Medications  Medications - No data to display    Diagnostic Studies  Results Reviewed     Procedure Component Value Units Date/Time    Comprehensive metabolic panel [[id number]]  (Abnormal) Collected:  01/12/18 1421    Lab Status:  Final result Specimen:  Blood from Arm, Left Updated:  01/12/18 1444     Sodium 136 mmol/L      Potassium 4 3 mmol/L      Chloride 101 mmol/L      CO2 27 mmol/L      Anion Gap 8 mmol/L      BUN 21 mg/dL      Creatinine 0 91 mg/dL      Glucose 206 (H) mg/dL      Calcium 9 5 mg/dL      AST 12 U/L      ALT 28 U/L      Alkaline Phosphatase 86 U/L      Total Protein 7 7 g/dL      Albumin 4 0 g/dL      Total Bilirubin 0 40 mg/dL      eGFR 59 ml/min/1 73sq m     Narrative:         National Kidney Disease Education Program recommendations are as follows:  GFR calculation is accurate only with a steady state creatinine  Chronic Kidney disease less than 60 ml/min/1 73 sq  meters  Kidney failure less than 15 ml/min/1 73 sq  meters  Protime-INR [[id number]]  (Abnormal) Collected:  01/12/18 1421    Lab Status:  Final result Specimen:  Blood from Arm, Left Updated:  01/12/18 1441     Protime 24 5 (H) seconds      INR 2 18 (H)    APTT [[id number]]  (Abnormal) Collected:  01/12/18 1421    Lab Status:  Final result Specimen:  Blood from Arm, Left Updated:  01/12/18 1441     PTT 44 (H) seconds     Narrative: Therapeutic Heparin Range = 60-90 seconds    POCT troponin [[id number]]  (Normal) Collected:  01/12/18 1427    Lab Status:  Final result Updated:  01/12/18 1440     POC Troponin I 0 00 ng/ml      Specimen Type VENOUS    Narrative:         [employer] i-Stat handheld analyzer 99% cutoff is > 0 08ng/mL in [facility] Emergency Departments    o cTnI 99% cutoff is useful only when applied to patients in the clinical setting of myocardial ischemia  o cTnI 99% cutoff should be interpreted in the context of clinical history, ECG findings and possibly cardiac imaging to establish correct diagnosis  o cTnI 99% cutoff may be suggestive but clearly not indicative of a coronary event without the clinical setting of myocardial ischemia      CBC and differential [[id number]]  (Normal) Collected:  01/12/18 1421    Lab Status:  Final result Specimen:  Blood from Arm, Left Updated:  01/12/18 1432     WBC 7 51 Thousand/uL      RBC 4 56 Million/uL      Hemoglobin 14 3 g/dL      Hematocrit 41 2 %      MCV 90 fL      MCH 31 4 pg      MCHC 34 7 g/dL      RDW 13 8 %      MPV 11 0 fL Platelets 325 Thousands/uL      nRBC 0 /100 WBCs      Neutrophils Relative 60 %      Lymphocytes Relative 31 %      Monocytes Relative 7 %      Eosinophils Relative 2 %      Basophils Relative 0 %      Neutrophils Absolute 4 46 Thousands/µL      Lymphocytes Absolute 2 35 Thousands/µL      Monocytes Absolute 0 54 Thousand/µL      Eosinophils Absolute 0 13 Thousand/µL      Basophils Absolute 0 03 Thousands/µL                  X-ray chest 2 views    (Results Pending)              Procedures  ECG 12 Lead Documentation  Date/Time: 1/12/2018 3:05 PM  Performed by: Bassem Nur  Authorized by: Caryle Fine B     Indications / Diagnosis:  Stroke  ECG reviewed by me, the ED Provider: yes    Patient location:  ED  Previous ECG:     Previous ECG:  Compared to current    Comparison ECG info:  NSR has replaced Afib from9/11/2017    Similarity:  Changes noted  Interpretation:     Interpretation: normal    Rate:     ECG rate:  63    ECG rate assessment: normal    Rhythm:     Rhythm: sinus rhythm    Ectopy:     Ectopy: none    QRS:     QRS axis:  Normal    QRS intervals:  Normal  Conduction:     Conduction: normal    ST segments:     ST segments:  Normal  T waves:     T waves: non-specific    Comments:      Old septal infarct           Phone Contacts  ED Phone Contact    ED Course  ED Course as of Jan 13 0005 Fri Jan 12, 2018   1637 I discussed the case with the hospitalist  We reviewed the HPI, pertinent PMH, ED course and workup   Hospitalist agreed with plan and will admit the patient to the hospital                                 MDM  Number of Diagnoses or Management Options  Diabetes mellitus ([facility] [address] ): minor  Hypertension: minor  Stroke [facility]): new and requires workup  UTI (urinary tract infection): new and requires workup     Amount and/or Complexity of Data Reviewed  Clinical lab tests: ordered and reviewed  Tests in the radiology section of CPT®: ordered and reviewed  Tests in the medicine section of CPT®: reviewed and ordered  Discuss the patient with other providers: yes      CritCare Time    Disposition  Final diagnoses:   None     ED Disposition     None      Follow-up Information    None       Patient's Medications   Discharge Prescriptions    No medications on file     No discharge procedures on file      ED Provider  Electronically Signed by           Aftab Caruso DO  01/13/18 0006

## 2018-01-12 NOTE — RESULT NOTES
Message   DW pt on 1/22/2016 OV  Verified Results  (Q) LIPID PANEL WITH REFLEX TO DIRECT LDL [id number] 08:05AM Sofia Rodríguez     Test Name Result Flag Reference   CHOLESTEROL, TOTAL 138 mg/dL  125-200   HDL CHOLESTEROL 51 mg/dL  > OR = 46   TRIGLICERIDES 657 mg/dL H <150   LDL-CHOLESTEROL 54 mg/dL (calc)  <130   Desirable range <100 mg/dL for patients with CHD or  diabetes and <70 mg/dL for diabetic patients with  known heart disease  CHOL/HDLC RATIO 2 7 (calc)  < OR = 5 0   NON HDL CHOLESTEROL 87 mg/dL (calc)     Target for non-HDL cholesterol is 30 mg/dL higher than   LDL cholesterol target  (Q) HEMOGLOBIN A1c [id number] 08:05AM Sofia Rodríguez   REPORT COMMENT:  FASTING:YES     Test Name Result Flag Reference   HEMOGLOBIN A1c 8 7 % of total Hgb H <5 7   According to ADA guidelines, hemoglobin A1c <7 0%  represents optimal control in non-pregnant diabetic  patients  Different metrics may apply to specific  patient populations  Standards of Medical Care in    Diabetes Care  2013;36:s11-s66     For the purpose of screening for the presence of  diabetes  <5 7%       Consistent with the absence of diabetes  5 7-6 4%    Consistent with increased risk for diabetes              (prediabetes)  >or=6 5%    Consistent with diabetes     This assay result is consistent with diabetes  mellitus  Currently, no consensus exists for use of hemoglobin  A1c for diagnosis of diabetes for children

## 2018-01-13 VITALS
SYSTOLIC BLOOD PRESSURE: 152 MMHG | HEART RATE: 62 BPM | HEIGHT: 58 IN | RESPIRATION RATE: 16 BRPM | WEIGHT: 156 LBS | TEMPERATURE: 97.8 F | BODY MASS INDEX: 32.75 KG/M2 | DIASTOLIC BLOOD PRESSURE: 94 MMHG

## 2018-01-13 VITALS
RESPIRATION RATE: 18 BRPM | WEIGHT: 156.4 LBS | BODY MASS INDEX: 32.83 KG/M2 | HEART RATE: 62 BPM | HEIGHT: 58 IN | SYSTOLIC BLOOD PRESSURE: 140 MMHG | DIASTOLIC BLOOD PRESSURE: 80 MMHG | TEMPERATURE: 95.7 F

## 2018-01-13 VITALS
WEIGHT: 163.4 LBS | BODY MASS INDEX: 34.3 KG/M2 | HEIGHT: 58 IN | DIASTOLIC BLOOD PRESSURE: 72 MMHG | SYSTOLIC BLOOD PRESSURE: 130 MMHG | TEMPERATURE: 97.9 F | RESPIRATION RATE: 16 BRPM | HEART RATE: 60 BPM

## 2018-01-13 VITALS
RESPIRATION RATE: 16 BRPM | WEIGHT: 161.8 LBS | HEIGHT: 58 IN | SYSTOLIC BLOOD PRESSURE: 120 MMHG | TEMPERATURE: 98.4 F | HEART RATE: 68 BPM | DIASTOLIC BLOOD PRESSURE: 72 MMHG | BODY MASS INDEX: 33.96 KG/M2

## 2018-01-13 LAB
ANION GAP SERPL CALCULATED.3IONS-SCNC: 9 MMOL/L (ref 4–13)
BUN SERPL-MCNC: 16 MG/DL (ref 5–25)
CALCIUM SERPL-MCNC: 8.8 MG/DL (ref 8.3–10.1)
CHLORIDE SERPL-SCNC: 103 MMOL/L (ref 100–108)
CHOLEST SERPL-MCNC: 127 MG/DL (ref 50–200)
CO2 SERPL-SCNC: 26 MMOL/L (ref 21–32)
CREAT SERPL-MCNC: 0.7 MG/DL (ref 0.6–1.3)
ERYTHROCYTE [DISTWIDTH] IN BLOOD BY AUTOMATED COUNT: 13.7 % (ref 11.6–15.1)
EST. AVERAGE GLUCOSE BLD GHB EST-MCNC: 186 MG/DL
GFR SERPL CREATININE-BSD FRML MDRD: 81 ML/MIN/1.73SQ M
GLUCOSE SERPL-MCNC: 117 MG/DL (ref 65–140)
GLUCOSE SERPL-MCNC: 162 MG/DL (ref 65–140)
GLUCOSE SERPL-MCNC: 164 MG/DL (ref 65–140)
GLUCOSE SERPL-MCNC: 180 MG/DL (ref 65–140)
GLUCOSE SERPL-MCNC: 218 MG/DL (ref 65–140)
HBA1C MFR BLD: 8.1 % (ref 4.2–6.3)
HCT VFR BLD AUTO: 39.8 % (ref 34.8–46.1)
HDLC SERPL-MCNC: 55 MG/DL (ref 40–60)
HGB BLD-MCNC: 13.5 G/DL (ref 11.5–15.4)
LDLC SERPL CALC-MCNC: 50 MG/DL (ref 0–100)
MCH RBC QN AUTO: 30.8 PG (ref 26.8–34.3)
MCHC RBC AUTO-ENTMCNC: 33.9 G/DL (ref 31.4–37.4)
MCV RBC AUTO: 91 FL (ref 82–98)
PLATELET # BLD AUTO: 245 THOUSANDS/UL (ref 149–390)
PMV BLD AUTO: 11.1 FL (ref 8.9–12.7)
POTASSIUM SERPL-SCNC: 3.7 MMOL/L (ref 3.5–5.3)
RBC # BLD AUTO: 4.39 MILLION/UL (ref 3.81–5.12)
SODIUM SERPL-SCNC: 138 MMOL/L (ref 136–145)
TRIGL SERPL-MCNC: 110 MG/DL
TSH SERPL DL<=0.05 MIU/L-ACNC: 3.72 UIU/ML (ref 0.36–3.74)
WBC # BLD AUTO: 5.99 THOUSAND/UL (ref 4.31–10.16)

## 2018-01-13 PROCEDURE — G8979 MOBILITY GOAL STATUS: HCPCS

## 2018-01-13 PROCEDURE — 83036 HEMOGLOBIN GLYCOSYLATED A1C: CPT | Performed by: NURSE PRACTITIONER

## 2018-01-13 PROCEDURE — 80061 LIPID PANEL: CPT | Performed by: NURSE PRACTITIONER

## 2018-01-13 PROCEDURE — 82948 REAGENT STRIP/BLOOD GLUCOSE: CPT

## 2018-01-13 PROCEDURE — 84443 ASSAY THYROID STIM HORMONE: CPT | Performed by: PHYSICIAN ASSISTANT

## 2018-01-13 PROCEDURE — G8978 MOBILITY CURRENT STATUS: HCPCS

## 2018-01-13 PROCEDURE — G8980 MOBILITY D/C STATUS: HCPCS

## 2018-01-13 PROCEDURE — 85027 COMPLETE CBC AUTOMATED: CPT | Performed by: NURSE PRACTITIONER

## 2018-01-13 PROCEDURE — 87086 URINE CULTURE/COLONY COUNT: CPT | Performed by: PHYSICIAN ASSISTANT

## 2018-01-13 PROCEDURE — 80048 BASIC METABOLIC PNL TOTAL CA: CPT | Performed by: NURSE PRACTITIONER

## 2018-01-13 PROCEDURE — 97163 PT EVAL HIGH COMPLEX 45 MIN: CPT

## 2018-01-13 RX ORDER — SIMVASTATIN 40 MG
80 TABLET ORAL
Status: DISCONTINUED | OUTPATIENT
Start: 2018-01-13 | End: 2018-01-13 | Stop reason: CLARIF

## 2018-01-13 RX ORDER — SIMVASTATIN 80 MG
80 TABLET ORAL
Status: DISCONTINUED | OUTPATIENT
Start: 2018-01-13 | End: 2018-01-14 | Stop reason: HOSPADM

## 2018-01-13 RX ADMIN — TIMOLOL MALEATE 1 DROP: 5 SOLUTION/ DROPS OPHTHALMIC at 08:47

## 2018-01-13 RX ADMIN — RIVAROXABAN 20 MG: 20 TABLET, FILM COATED ORAL at 07:54

## 2018-01-13 RX ADMIN — INSULIN LISPRO 1 UNITS: 100 INJECTION, SOLUTION INTRAVENOUS; SUBCUTANEOUS at 07:54

## 2018-01-13 RX ADMIN — DORZOLAMIDE HCL 1 DROP: 20 SOLUTION/ DROPS OPHTHALMIC at 00:40

## 2018-01-13 RX ADMIN — Medication 80 MG: at 22:10

## 2018-01-13 RX ADMIN — INSULIN LISPRO 2 UNITS: 100 INJECTION, SOLUTION INTRAVENOUS; SUBCUTANEOUS at 12:17

## 2018-01-13 RX ADMIN — Medication 1000 MG: at 20:30

## 2018-01-13 RX ADMIN — BRIMONIDINE TARTRATE 1 DROP: 1.5 SOLUTION/ DROPS OPHTHALMIC at 17:02

## 2018-01-13 RX ADMIN — DORZOLAMIDE HCL 1 DROP: 20 SOLUTION/ DROPS OPHTHALMIC at 20:37

## 2018-01-13 RX ADMIN — BIMATOPROST 1 DROP: 0.1 SOLUTION/ DROPS OPHTHALMIC at 22:14

## 2018-01-13 RX ADMIN — CYCLOSPORINE 1 DROP: 0.5 EMULSION OPHTHALMIC at 17:02

## 2018-01-13 RX ADMIN — CYCLOSPORINE 1 DROP: 0.5 EMULSION OPHTHALMIC at 10:10

## 2018-01-13 RX ADMIN — MINERAL OIL AND WHITE PETROLATUM: 150; 830 OINTMENT OPHTHALMIC at 08:47

## 2018-01-13 RX ADMIN — BRIMONIDINE TARTRATE 1 DROP: 1.5 SOLUTION/ DROPS OPHTHALMIC at 08:47

## 2018-01-13 RX ADMIN — TIMOLOL MALEATE 1 DROP: 5 SOLUTION/ DROPS OPHTHALMIC at 17:02

## 2018-01-13 RX ADMIN — DORZOLAMIDE HCL 1 DROP: 20 SOLUTION/ DROPS OPHTHALMIC at 08:47

## 2018-01-13 RX ADMIN — DORZOLAMIDE HCL 1 DROP: 20 SOLUTION/ DROPS OPHTHALMIC at 17:02

## 2018-01-13 RX ADMIN — ESCITALOPRAM 2.5 MG: 5 TABLET, FILM COATED ORAL at 08:47

## 2018-01-13 RX ADMIN — CYCLOSPORINE 1 DROP: 0.5 EMULSION OPHTHALMIC at 00:40

## 2018-01-13 RX ADMIN — BRIMONIDINE TARTRATE 1 DROP: 1.5 SOLUTION/ DROPS OPHTHALMIC at 00:39

## 2018-01-13 RX ADMIN — AMLODIPINE BESYLATE 5 MG: 5 TABLET ORAL at 08:46

## 2018-01-13 RX ADMIN — TIMOLOL MALEATE 1 DROP: 5 SOLUTION/ DROPS OPHTHALMIC at 00:40

## 2018-01-13 RX ADMIN — BIMATOPROST 1 DROP: 0.1 SOLUTION/ DROPS OPHTHALMIC at 00:39

## 2018-01-13 RX ADMIN — INSULIN LISPRO 1 UNITS: 100 INJECTION, SOLUTION INTRAVENOUS; SUBCUTANEOUS at 22:26

## 2018-01-13 RX ADMIN — FAMOTIDINE 20 MG: 20 TABLET, FILM COATED ORAL at 08:46

## 2018-01-13 NOTE — PHYSICAL THERAPY NOTE
PHYSICAL THERAPY NOTE      PT EVALUATION    80 y o     [id number]    Diabetes mellitus ([facility] ) [E11 9]  UTI (urinary tract infection) [N39 0]  Hypertension [I10]  Stroke ([facility] ) [I63 9]  Slurred speech [R47 81]    Past Medical History:   Diagnosis Date    Depression with anxiety     Diabetes mellitus ([place] [address] )     niddm    Glaucoma     Hypercholesterolemia     Hypertension     Ischemic stroke of frontal lobe (HCC)     Right     Obstructive sleep apnea     OP (osteoporosis)     PAF (paroxysmal atrial fibrillation) ([place] [address] )          Past Surgical History:   Procedure Laterality Date    GLAUCOMA SURGERY  01/03/2016             Patient Name: Devon Roth  PHZRW'S Date: 1/13/2018 01/13/18 1410   Note Type   Note type Eval only   Pain Assessment   Pain Assessment No/denies pain   Home Living   Type of [address] One level   Bathroom Accessibility Accessible   Home Equipment [employer] [place]   Additional Comments I PTA without use of cane   Prior Function   Level of [place] Independent with ADLs and functional mobility   Lives With [facility] Help From Family   ADL Assistance Independent   IADLs Independent   Falls in the last 6 months 0   Comments Family reports goes out rarely and when she does is always accompanied by family  Restrictions/Precautions   Weight Bearing Precautions Per Order No   Other Precautions Telemetry; Fall Risk   General   Additional Pertinent History Pt is 81 y/o female admitted with transient slurred speech  Hx of previous CVA  MRI negative for new event  R/O UTI     Family/Caregiver Present Yes   Cognition   Overall Cognitive Status WFL   RUE Assessment   RUE Assessment WFL   LUE Assessment   LUE Assessment WFL   RLE Assessment   RLE Assessment WFL   LLE Assessment   LLE Assessment WFL   Coordination   Movements are Fluid and Coordinated 1   Bed Mobility   Supine to Sit 5  Supervision   Additional items HOB elevated   Sit to Supine 5  Supervision   Additional items HOB elevated   Transfers   Sit to Stand 5  Supervision   Additional items Assist x 1; Increased time required   Stand to Sit 5  Supervision   Additional items Assist x 1; Increased time required   Stand pivot 5  Supervision   Additional Comments Tinetti Score:  24/28 indicating low risk for falls  Ambulation/Elevation   Gait pattern Improper Weight shift;Decreased foot clearance; Antalgic; Inconsistent aubrey   Gait Assistance 5  Supervision   Additional items Assist x 1   Assistive Device None   Distance 75'x2   Balance   Static Sitting Good   Dynamic Sitting Fair +   Static Standing Fair +   Dynamic Standing Fair   Ambulatory Fair   Endurance Deficit   Endurance Deficit No   Activity Tolerance   Activity Tolerance Patient tolerated treatment well   Medical Staff Made Aware NurseVictoria   Nurse Made Aware yes   Assessment   Prognosis Good   Problem List Impaired balance;Decreased mobility; Decreased endurance   Assessment 79 y/o female admitted to r/o CVA  Hx of previous CVA   MRI negative  Presents wtih slight gait deviations as noted with inconsistent pace, slightly antalgic and decreased foot clearance  No overt LOB despite  Tinetti score 24/28 indicating low risk for falls  Mild decrease in balance noted, however no overt LOB noted during mobitly assessment  Intermittent dizziness reported, none currently and per nsg + orthostatic BPs earlier  At present no further IPPT needs apparent and will d/c PT  Please advise if needs change  Anticipate safe d/c to home with family support  Goals   Patient Goals go home   Treatment Day 0   Plan   Treatment/Interventions Functional transfer training; Endurance training;Bed mobility;Gait training;Spoke to nursing   PT Frequency One time visit   Recommendation   Recommendation Home with family support   PT - OK to Discharge Yes   Modified [place] Scale   Modified [place] Scale 0   Barthel Index   Feeding 10   Bathing 5   Grooming Score 5   Dressing Score 10   Bladder Score 10   Bowels Score 10   Toilet Use Score 10   Transfers (Bed/Chair) Score 15   Mobility (Level Surface) Score 10   Stairs Score 0   Barthel Index Score 85   History: multiple co - morbidities, fall risk, r/o UTI, orthostatic BPs  Exam: gait deviations, tinetti 24/28, Modified Heard 0, Barthel 85/100, decreased balance, decreased activity tolerance  Clinical: unpredictable ( orthostatic BPs with intermittent dizziness and fall risk 2* same)  Complexity: high  Tenzin Cheng, PT

## 2018-01-13 NOTE — MISCELLANEOUS
Message   Recorded as Task   Date: 05/04/2017 08:09 AM, Created By: Chayo Maldonado   Task Name: Call Patient with results   Assigned To: Chayo Maldonado   Regarding Patient: Brenda Gordillo, Status: In Progress   Comment:    Irvin Dubois - 04 May 2017 8:09 AM     Patient Phone: [phone]      Dexa showed osteoporosis, worse than before  Pt should take calcium 1200mg daily, vitD 1000IU daily  Will she take medications for it? Let me know  Rosario Candelaria - 04 May 2017 2:24 PM     TASK IN PROGRESS   Rosario Candelaria - 04 May 2017 2:27 PM     TASK REASSIGNED: Previously Assigned To Chayo Maldonado  Patient given result and instructions  States she will take medications, please send to [employer]  Irvin Dubois - 04 May 2017 4:36 PM     TASK REASSIGNED: Previously Assigned To Irvin Dubois  I send alendronate for osteoporosis  Side effects may include GI upset, jaw pain, osteonecrosis etc  If pt cannot tolerate medication, call office  Rosario Candelaria - 04 May 2017 4:44 PM     TASK REASSIGNED: Previously Assigned To Rosario Candelaria  Patient notified, unsure about taking med wants to speak with Irvin Suresh - 04 May 2017 4:47 PM     TASK REPLIED TO: Previously Assigned To Chayo Maldonado  Talked with pt  Refused to take alendronate or use prolia after hearing Side effects  She states she will take calcium and vitD  Signatures   Electronically signed by :  Eyal Acevedo MD; May  4 2017  4:47PM EST                       (Author)

## 2018-01-13 NOTE — SPEECH THERAPY NOTE
Consult received  Records reviewed & spoke c staff  Pt presented with transient slurred speech  Pt passed RN dysphagia assessment and is tolerating regular diet with thin liquids with no s/s of aspiration  Speech/Language deficits also denied  Therefore, formal speech/swallow evaluation not indicated at this time   If new concerns arise, please reconsult

## 2018-01-13 NOTE — PROGRESS NOTES
[facility] Progress Note  Patient: Verónica Pineda 80 y o  female   MRN: [id number]  PCP: Clarisa Walter MD  Unit/Bed#: E4 -79 Encounter: [id number]  Date Of Visit: 01/13/18    Assessment:    Principal Problem:    Neurological deficit, transient  Active Problems:    Diabetes mellitus ([facility] )    Glaucoma    Hypercholesterolemia    Hypertension    Depression with anxiety    Atrial fibrillation ([facility] )    Slurred speech    Lethargy    Ataxia    Type 2 diabetes mellitus with hyperglycemia ([facility] )      Plan:  #1 altered mental status/confusion  Likely due to delirium or TIA  Acute CVA ruled out by negative MRI/awaiting 2-D echocardiogram  Symptoms continued to improve  Patient is on full anticoagulation at this point    #2urinary tract infection  Continue current antibiotic regimen/Rocephin      #3DM Type II:    Begin no concentrated carbohydrate diet  Cover with Low dose Aspart SSI as needed   Will order HgbA1C to assess status of recent glycemic control  Well initiate home medications once Medication Reconciliation is completed and confirmed by pharmacy  #4History of HTN:       We will continue patient home medications  Although initially his blood pressure was higher in emergency department, it later stabilized  Well also initiate IV hydralazine and IV metoprolol for SBP greater than 1 60 mmHg  We will advise patient to follow-up with next PCP in regards to further better management of ongoing HTN  #5 A  Fib  Full anticoagulation with Xarelto  Currently sinus rhythm    #6 Hyperlipidemia:    Currently on statin therapy for elevated lipids  Previously not at goal of LDL < 100 as indicated in PMHx  Will order fasting lipid panel to assess status of hyperlipidemia  Will restart simvastatin 40 mg po QHS  Patient was counseled in regards to Appropriate nutritional and lifestyle modifications  Disposition: Plan to DC in a m   If clinical course allows mental status improves await for cultures  VTE Pharmacologic Prophylaxis:   Pharmacologic: Heparin  Mechanical VTE Prophylaxis in Place: Yes    Patient Centered Rounds: I have performed bedside rounds with nursing staff today  Discussions with Specialists or Other Care Team Provider: Yes    Education and Discussions with Family / Patient: Yes    Time Spent for Care: 45 minutes  More than 50% of total time spent on counseling and coordination of care as described above  Current Length of Stay: 1 day(s)    Current Patient Status: Inpatient   Certification Statement: The patient will continue to require additional inpatient hospital stay due to mental status/delirium/UTI    Discharge Plan / Estimated Discharge Date: likely in a m  Code Status: Level 1 - Full Code      Subjective:   No events overnight    Objective:     Vitals:   Temp (24hrs), Av 8 °F (36 6 °C), Min:96 5 °F (35 8 °C), Max:98 7 °F (37 1 °C)    HR:  [50-74] 57  Resp:  [16-18] 18  BP: (111-179)/(59-97) 126/61  SpO2:  [92 %-96 %] 95 %  Body mass index is 34 37 kg/m²  Input and Output Summary (last 24 hours):     No intake or output data in the 24 hours ending 18    Physical Exam:     Physical Exam    GENERAL APPEARANCE: WD/WN in NAD pleasant  SKIN: no rash  HEENT: NC/AT, PERRLA (B), moist MM, no epistaxis  NECK: Supple, no JVD    LUNGS: CTA (B) mildly prolonged expiratory phase,   no use of accessory muscles    HEART:          S1S 2, RRR  , PMI is not displaced  ABDOMEN: Soft, nontender, nondistended, +BS  Rectal exam:  EXTREMITIES: no edema   PERIPHERAL VASCULAR: palpable pulses   NEURO:  AAO x 2, no current neurologic deficits or complaints reported also patient appears to be mildly confused she is unable to follow cognitive complex tasks  Additional Data:     Labs:      Results from last 7 days  Lab Units 18  0454 18  1421   WBC Thousand/uL 5 99 7 51   HEMOGLOBIN g/dL 13 5 14 3   HEMATOCRIT % 39 8 41 2   PLATELETS Thousands/uL 245 279   NEUTROS PCT %  --  60   LYMPHS PCT %  --  31   MONOS PCT %  --  7   EOS PCT %  --  2       Results from last 7 days  Lab Units 01/13/18  0454 01/12/18  1421   SODIUM mmol/L 138 136   POTASSIUM mmol/L 3 7 4 3   CHLORIDE mmol/L 103 101   CO2 mmol/L 26 27   BUN mg/dL 16 21   CREATININE mg/dL 0 70 0 91   CALCIUM mg/dL 8 8 9 5   TOTAL PROTEIN g/dL  --  7 7   BILIRUBIN TOTAL mg/dL  --  0 40   ALK PHOS U/L  --  86   ALT U/L  --  28   AST U/L  --  12   GLUCOSE RANDOM mg/dL 164* 206*       Results from last 7 days  Lab Units 01/12/18  1421   INR  2 18*       * I Have Reviewed All Lab Data Listed Above  * Additional Pertinent Lab Tests Reviewed: [facility] Admission Reviewed    Imaging:    Imaging Reports Reviewed Today Include: Yes  Imaging Personally Reviewed by Myself Includes:  DS MRI    Recent Cultures (last 7 days):           Last 24 Hours Medication List:     amLODIPine 5 mg Oral Daily   artificial tear  Both Eyes BID   aspirin 81 mg Oral Daily   bimatoprost 1 drop Both Eyes HS   brimonidine 1 drop Both Eyes BID   cefTRIAXone 1,000 mg Intravenous Q24H   cycloSPORINE 1 drop Both Eyes BID   dorzolamide 1 drop Left Eye TID   escitalopram 2 5 mg Oral Daily   famotidine 20 mg Oral Daily   insulin lispro 1-6 Units Subcutaneous TID AC   insulin lispro 1-6 Units Subcutaneous HS   metoprolol succinate 25 mg Oral HS   rivaroxaban 20 mg Oral Daily With Breakfast   simvastatin 80 mg Oral HS   timolol 1 drop Both Eyes BID        Today, Patient Was Seen By: Demetrius Johnson MD    ** Please Note: This note has been constructed using a voice recognition system   **

## 2018-01-13 NOTE — CONSULTS
Consultation - Neurology   Devon Roth 80 y o  female MRN: [id number]  Unit/Bed#: E4 -01 Encounter: [id number]      Assessment/Plan   Assessment/Plan:   3 80year old female admitted after episode of slurred speech, ataxia, lethargy, full body tingling and lightheadedness which resolved spontaneously   - MRI brain completed and no acute ischemia noted  - Symptoms may be secondary to UTI vs possible headache phenomenon as patient does note that she has had headaches fairly frequently over the past week and she thinks she had a headache when symptoms were noted  Given prior stroke noted on MRI, symptoms may be recrudescence in setting of infection     - Would continue with Rivaroxaban and statin for secondary stroke prevention   - Check TSH, await hemoglobin A1c  Fasting lipid panel reviewed, total cholesterol 127, triglycerides 110, HDL 55 and LDL 50     - Echocardiogram and carotid dopplers pending     - Continue to treat underlying UTI as per primary service  Urine culture ordered  - No additional neurologic work-up recommended at this time  Patient should follow-up with vascular neurology team as an outpatient  2  UTI   - Urine culture ordered   - Antibiotics as per primary service  3  Prior R frontal CVA   - Continue with Xarelto and statin for secondary stroke prevention   - Outpatient vascular neurology follow-up recommended  4  DM    - Await hemoglobin A1c      - Continue with insulin as per primary team      5  Afib   - Currently in sinus rhythm    - Continue on rate control as per primary team      6  HTN   - Continue antihypertensives as per medicine team, BPs slightly on the higher side at this time  Adjustment of medications as per primary team     - Concern for possible orthostatic hypotension as patient notes lightheadedness with changes in position  Will check orthostatic blood pressures       History of Present Illness     Reason for Consult / Principal Problem: Slurred speech/Ataxia/Neurological deficit, transient/Lethargy    HPI: Michelle Flores is a 80 y o   female who presented to the hospital after an episode of slurred speech, ataxia, full body tingling and lightheadedness  She notes that she went to the eye doctor on Thursday for visual fields testing and when she went to go get something from the refrigerator, her son-in-law noted that she was ataxic and had slurred speech  She notes that she did not think that any of this was happening  She notes that she had not felt well that day and had "tingling from the neck down " She also noted that she had urinary urgency and frequency as well as back pain  Per chart review, she was noted to be sleeping for significant amount of day on Thursday and on Friday family convinced her t come to the hospital  She notes that for about 1 week, she has noted some elevated blood sugars primarily in the evening and also noted intermittent headaches  She thinks that she had a headache during the noted event on Thursday  She also described lightheadedness at times when she stands up  She currently denies any symptoms, noted urinary frequency and urgency have improved, denies HA, tingling and speech difficulty  Inpatient consult to Neurology  Consult performed by: Stevie Kirk ordered by: Juvenal Dunham          Review of Systems   Constitutional: Positive for fever  Negative for diaphoresis and fatigue  HENT: Negative for trouble swallowing  Eyes: Negative for pain and visual disturbance  Respiratory: Negative for shortness of breath  Cardiovascular: Negative for chest pain  Gastrointestinal: Negative for abdominal pain and nausea  Genitourinary: Positive for frequency and urgency  Musculoskeletal: Positive for back pain  Neurological: Positive for light-headedness and headaches  Negative for tremors, seizures, syncope, facial asymmetry, speech difficulty, weakness and numbness     Psychiatric/Behavioral: Negative for confusion  Historical Information   Past Medical History:   Diagnosis Date    Depression with anxiety     Diabetes mellitus ([address] )     niddm    Glaucoma     Hypercholesterolemia     Hypertension     Ischemic stroke of frontal lobe (HCC)     Right     Obstructive sleep apnea     OP (osteoporosis)     PAF (paroxysmal atrial fibrillation) (HCC)      Past Surgical History:   Procedure Laterality Date    GLAUCOMA SURGERY  01/03/2016     Social History   History   Alcohol Use No     History   Drug Use No     History   Smoking Status    Never Smoker   Smokeless Tobacco    Never Used     Family History:   Family History   Problem Relation Age of Onset    No Known Problems Mother     No Known Problems Father     Coronary artery disease Sister     No Known Problems Brother     No Known Problems Son     No Known Problems Daughter     No Known Problems Maternal Grandmother     No Known Problems Maternal Grandfather     No Known Problems Paternal Grandmother     No Known Problems Paternal Grandfather     No Known Problems Cousin     Rheum arthritis Neg Hx     Osteoarthritis Neg Hx     Asthma Neg Hx     Diabetes Neg Hx     Heart failure Neg Hx     Hyperlipidemia Neg Hx     Hypertension Neg Hx     Migraines Neg Hx     Rashes / Skin problems Neg Hx     Seizures Neg Hx     Stroke Neg Hx     Thyroid disease Neg Hx        Review of previous medical records was completed  Patient was admitted to the hospital in September 2017 for SOB, she was found to be in atrial fibrillation with rapid ventricular response  She was started on rate control medication as well as anticoagulation  She was also hospitalized twice in January 2017, once for viral gastroenteritis with associated MOHINDER, hyponatremia and hypokalemia and once for episodes of slurred speech  She was evaluated by neurology at that time and felt that symptoms may be related to underlying headache phenomenon and possible anxiety  She was noted at that time to have chronic R frontal infarct that she had previously been unaware of  She was to take ASA and statin and follow-up with neurology at that time but per records review did not follow-up  Meds/Allergies   Scheduled Meds:  amLODIPine 5 mg Oral Daily   artificial tear  Both Eyes BID   aspirin 81 mg Oral Daily   bimatoprost 1 drop Both Eyes HS   brimonidine 1 drop Both Eyes BID   cefTRIAXone 1,000 mg Intravenous Q24H   cycloSPORINE 1 drop Both Eyes BID   dorzolamide 1 drop Left Eye TID   escitalopram 2 5 mg Oral Daily   famotidine 20 mg Oral Daily   insulin lispro 1-6 Units Subcutaneous TID AC   insulin lispro 1-6 Units Subcutaneous HS   metoprolol succinate 25 mg Oral HS   rivaroxaban 20 mg Oral Daily With Breakfast   simvastatin 80 mg Oral HS   timolol 1 drop Both Eyes BID     Continuous Infusions:   PRN Meds:   acetaminophen    senna      Allergies   Allergen Reactions    Lipitor [Atorvastatin] GI Intolerance     Nausea stomach ache    Aspirin GI Intolerance       Objective   Vitals:Blood pressure 150/65, pulse 58, temperature 97 7 °F (36 5 °C), temperature source Tympanic, resp  rate 18, weight 74 6 kg (164 lb 7 4 oz), SpO2 96 %  ,Body mass index is 34 37 kg/m²  No intake or output data in the 24 hours ending 01/13/18 1009    Invasive Devices: Invasive Devices     Peripheral Intravenous Line            Peripheral IV 01/12/18 Right Antecubital less than 1 day                Physical Exam   Constitutional: She is oriented to person, place, and time  She appears well-developed and well-nourished  No distress  HENT:   Head: Normocephalic and atraumatic  Eyes: Conjunctivae and EOM are normal  Pupils are equal, round, and reactive to light  No scleral icterus  Neck: Normal range of motion  Neck supple  Cardiovascular: Normal rate and regular rhythm  Murmur heard  Pulmonary/Chest: Effort normal and breath sounds normal  No respiratory distress  Abdominal: Soft   Bowel sounds are normal  She exhibits no distension  There is no tenderness  Musculoskeletal: Normal range of motion  She exhibits no edema  Neurological: She is alert and oriented to person, place, and time  She has a normal Finger-Nose-Finger Test and a normal Heel to [employer]  Gait normal    Reflex Scores:       Bicep reflexes are 1+ on the right side and 1+ on the left side  Brachioradialis reflexes are 1+ on the right side and 1+ on the left side  Patellar reflexes are 1+ on the right side and 1+ on the left side  Achilles reflexes are 0 on the right side and 0 on the left side  Skin: Skin is warm and dry  No rash noted  She is not diaphoretic  No erythema  No pallor  Psychiatric: She has a normal mood and affect  Her speech is normal and behavior is normal      Neurologic Exam     Mental Status   Oriented to person, place, and time  Oriented to person  Oriented to place  Registration: recalls 3 of 3 objects  Recall of objects at 5 minutes: Unable to recall objects with or without cues  Attention: normal  Concentration: normal    Speech: speech is normal   Level of consciousness: alert  Knowledge: good  Normal comprehension  Able to spell word "world" forward but not backward (was off by 1 letter)  Cranial Nerves     CN II   Right visual field deficit: none  Left visual field deficit: none     CN III, IV, VI   Pupils are equal, round, and reactive to light  Extraocular motions are normal    Right pupil: Size: 4 mm  Shape: regular  Reactivity: brisk  Consensual response: intact  Left pupil: Size: 4 mm  Shape: regular  Reactivity: brisk  Consensual response: intact     Nystagmus: none   Diplopia: none  Ophthalmoparesis: none  Upgaze: normal  Downgaze: normal  Conjugate gaze: present    CN V   Right facial sensation deficit: none  Left facial sensation deficit: none    CN VII   Right facial weakness: none  Left facial weakness: none    CN VIII   Hearing: intact    CN IX, X Palate: symmetric    CN XI   Right sternocleidomastoid strength: normal  Left sternocleidomastoid strength: normal    CN XII   Tongue: not atrophic  Fasciculations: absent  Tongue deviation: none    Motor Exam   Muscle bulk: normal  Overall muscle tone: normal  Right arm tone: normal  Left arm tone: normal  Right arm pronator drift: absent  Left arm pronator drift: absent  Right leg tone: normal  Left leg tone: normalMotor strength 5/5 B/L UE and LE     Sensory Exam   Light touch normal    Right arm light touch: normal  Left arm light touch: normal  Right leg light touch: normal  Left leg light touch: normal  Vibration normal    Right arm vibration: normal  Left arm vibration: normal  Right leg vibration: normal  Left leg vibration: normal  Pinprick normal    Right arm pinprick: normal  Left arm pinprick: normal  Right leg pinprick: normal  Left leg pinprick: normal    Gait, Coordination, and Reflexes     Gait  Gait: normal    Coordination   Finger to nose coordination: normal  Heel to shin coordination: normal    Tremor   Resting tremor: absent  Intention tremor: absent  Action tremor: absent    Reflexes   Right brachioradialis: 1+  Left brachioradialis: 1+  Right biceps: 1+  Left biceps: 1+  Right patellar: 1+  Left patellar: 1+  Right achilles: 0  Left achilles: 0  Right plantar: normal  Left plantar: normal      Lab Results:   Recent Results (from the past 24 hour(s))   ECG 12 lead    Collection Time: 01/12/18  2:01 PM   Result Value Ref Range    Ventricular Rate 63 BPM    Atrial Rate 63 BPM    NJ Interval 160 ms    QRSD Interval 84 ms    QT Interval 396 ms    QTC Interval 405 ms    P Axis 46 degrees    QRS Axis -3 degrees    T Wave Axis 62 degrees   Comprehensive metabolic panel    Collection Time: 01/12/18  2:21 PM   Result Value Ref Range    Sodium 136 136 - 145 mmol/L    Potassium 4 3 3 5 - 5 3 mmol/L    Chloride 101 100 - 108 mmol/L    CO2 27 21 - 32 mmol/L    Anion Gap 8 4 - 13 mmol/L    BUN 21 5 - 25 mg/dL    Creatinine 0 91 0 60 - 1 30 mg/dL    Glucose 206 (H) 65 - 140 mg/dL    Calcium 9 5 8 3 - 10 1 mg/dL    AST 12 5 - 45 U/L    ALT 28 12 - 78 U/L    Alkaline Phosphatase 86 46 - 116 U/L    Total Protein 7 7 6 4 - 8 2 g/dL    Albumin 4 0 3 5 - 5 0 g/dL    Total Bilirubin 0 40 0 20 - 1 00 mg/dL    eGFR 59 ml/min/1 73sq m   CBC and differential    Collection Time: 01/12/18  2:21 PM   Result Value Ref Range    WBC 7 51 4 31 - 10 16 Thousand/uL    RBC 4 56 3 81 - 5 12 Million/uL    Hemoglobin 14 3 11 5 - 15 4 g/dL    Hematocrit 41 2 34 8 - 46 1 %    MCV 90 82 - 98 fL    MCH 31 4 26 8 - 34 3 pg    MCHC 34 7 31 4 - 37 4 g/dL    RDW 13 8 11 6 - 15 1 %    MPV 11 0 8 9 - 12 7 fL    Platelets 196 335 - 356 Thousands/uL    nRBC 0 /100 WBCs    Neutrophils Relative 60 43 - 75 %    Lymphocytes Relative 31 14 - 44 %    Monocytes Relative 7 4 - 12 %    Eosinophils Relative 2 0 - 6 %    Basophils Relative 0 0 - 1 %    Neutrophils Absolute 4 46 1 85 - 7 62 Thousands/µL    Lymphocytes Absolute 2 35 0 60 - 4 47 Thousands/µL    Monocytes Absolute 0 54 0 17 - 1 22 Thousand/µL    Eosinophils Absolute 0 13 0 00 - 0 61 Thousand/µL    Basophils Absolute 0 03 0 00 - 0 10 Thousands/µL   Protime-INR    Collection Time: 01/12/18  2:21 PM   Result Value Ref Range    Protime 24 5 (H) 12 1 - 14 4 seconds    INR 2 18 (H) 0 86 - 1 16   APTT    Collection Time: 01/12/18  2:21 PM   Result Value Ref Range    PTT 44 (H) 23 - 35 seconds   POCT troponin    Collection Time: 01/12/18  2:27 PM   Result Value Ref Range    POC Troponin I 0 00 0 00 - 0 08 ng/ml    Specimen Type VENOUS    UA w Reflex to Microscopic    Collection Time: 01/12/18  3:45 PM   Result Value Ref Range    Color, UA Yellow     Clarity, UA Clear     Specific Edgar, UA 1 010 1 003 - 1 030    pH, UA 5 5 4 5 - 8 0    Leukocytes, UA Moderate (A) Negative    Nitrite, UA Negative Negative    Protein, UA Negative Negative mg/dl    Glucose,  (1/10%) (A) Negative mg/dl    Ketones, UA Negative Negative mg/dl    Urobilinogen, UA 0 2 0 2, 1 0 E U /dl E U /dl    Bilirubin, UA Negative Negative    Blood, UA Trace-Intact Negative, Trace-Intact   Urine Microscopic    Collection Time: 01/12/18  3:45 PM   Result Value Ref Range    RBC, UA 0-1 (A) None Seen, 0-5 /hpf    WBC, UA 4-10 (A) None Seen, 0-5, 5-55, 5-65 /hpf    Epithelial Cells Occasional None Seen, Occasional /hpf    Bacteria, UA Occasional None Seen, Occasional /hpf   Fingerstick Glucose (POCT)    Collection Time: 01/12/18  8:14 PM   Result Value Ref Range    POC Glucose 122 65 - 140 mg/dl   Fingerstick Glucose (POCT)    Collection Time: 01/12/18  9:20 PM   Result Value Ref Range    POC Glucose 276 (H) 65 - 140 mg/dl   Lipid Panel with Direct LDL reflex    Collection Time: 01/13/18  4:54 AM   Result Value Ref Range    Cholesterol 127 50 - 200 mg/dL    Triglycerides 110 <=150 mg/dL    HDL, Direct 55 40 - 60 mg/dL    LDL Calculated 50 0 - 100 mg/dL   Basic metabolic panel    Collection Time: 01/13/18  4:54 AM   Result Value Ref Range    Sodium 138 136 - 145 mmol/L    Potassium 3 7 3 5 - 5 3 mmol/L    Chloride 103 100 - 108 mmol/L    CO2 26 21 - 32 mmol/L    Anion Gap 9 4 - 13 mmol/L    BUN 16 5 - 25 mg/dL    Creatinine 0 70 0 60 - 1 30 mg/dL    Glucose 164 (H) 65 - 140 mg/dL    Calcium 8 8 8 3 - 10 1 mg/dL    eGFR 81 ml/min/1 73sq m   CBC (With Platelets)    Collection Time: 01/13/18  4:54 AM   Result Value Ref Range    WBC 5 99 4 31 - 10 16 Thousand/uL    RBC 4 39 3 81 - 5 12 Million/uL    Hemoglobin 13 5 11 5 - 15 4 g/dL    Hematocrit 39 8 34 8 - 46 1 %    MCV 91 82 - 98 fL    MCH 30 8 26 8 - 34 3 pg    MCHC 33 9 31 4 - 37 4 g/dL    RDW 13 7 11 6 - 15 1 %    Platelets 677 970 - 767 Thousands/uL    MPV 11 1 8 9 - 12 7 fL   Fingerstick Glucose (POCT)    Collection Time: 01/13/18  7:26 AM   Result Value Ref Range    POC Glucose 162 (H) 65 - 140 mg/dl       Imaging Studies: I have personally reviewed pertinent reports     and I have personally reviewed pertinent films in PACS  Personally reviewed images and reports in PACs  MRI brain without contrast- No evidence of acute infarct, hemorrhage or mass  Chronic right frontal infarct  Periventricular and subcortical white matter lesions, consistent with microangiopathic disease  CTH- No acute intracranial pathology       VTE Prophylaxis: Rivaroxaban

## 2018-01-13 NOTE — RESULT NOTES
Verified Results  * DXA BONE DENSITY SPINE HIP AND PELVIS [id number] 09:54AM [facility] Order Number: [id number]    - Patient Instructions: To schedule this appointment, please contact Central Scheduling at [phone]  Test Name Result Flag Reference   DXA BONE DENSITY SPINE HIP AND PELVIS (Report)     CENTRAL DXA SCAN     CLINICAL HISTORY:  80year old post-menopausal  female risk factors include estrogen deficiency  Osteopenia  TECHNIQUE: Bone densitometry was performed using a [employer]'s W bone densitometer  Regions of interest appear properly placed  There are no obvious fractures or other confounding variables which could limit the study  Degenerative or    osteoarthritic changes are noted on the spine image in particular at L2 and L3 which may falsely alter the true bone density     COMPARISON: Follow-up     RESULTS:    LUMBAR SPINE: L1-L4:   BMD 0 930 gm/cm2   T-score below normal, -1 1 and 4% higher than in 2012, statistically significant  Z-score +1 7     LEFT TOTAL HIP:   BMD 0 856 gm/cm2   T-score normal, -0 7   Z-score +1 5     LEFT FEMORAL NECK:   BMD 0 568 gm/cm2   T-score below normal, -2 5, osteoporosis, and 12% less than 2012 when the T score was -1 8    Z-score -0 1     The forearm BMD is 0 549 and the T score is below normal, -2 4 and unchanged from 2012  The Z score is +1 1  A 25-hydroxy vitamin D level and an intact PTH and a comprehensive metabolic panel are suggested in view of the femoral neck results  Treatment is a consideration if appropriate to total clinical health evaluation and might include intravenous Reclast or Prolia ;       IMPRESSION:   1  Based on the [facility] classification, this study identifies osteoporosis as the primary diagnosis at the femoral neck and the patient is considered at elevated risk for fracture         2  A daily intake of calcium of at least 1200 mg and vitamin D, 800-1000 IU, as well as weight bearing and muscle strengthening exercise, fall prevention and avoidance of tobacco and excessive alcohol intake as basic preventive measures are recommended  3  Repeat DXA scan on the same equipment in 18-24 months as clinically indicated  The 10 year risk of hip fracture is 6 0%, with the 10 year risk of major osteoporotic fracture being 18%, as calculated by the [facility] fracture risk assessment tool (FRAX)  The current NOF guidelines recommend treating patients with FRAX 10 year risk score    of >3% for hip fracture and >20% for major osteoporotic fracture  Hip fracture risk significantly exceeds treatment thresholds  WHO CLASSIFICATION:   Normal (a T-score of -1 0 or higher)   Low bone mineral density (a T-score of less than -1 0 but higher than -2 5)   Osteoporosis (a T-score of -2 5 or less)   Severe osteoporosis (a T-score of -2 5 or less with a fragility fracture)      Thank you for allowing us the opportunity to participate in your patient care  The expanded DEXA report will no longer be arriving in your mail  If you desire to view the full report please contact [facility] or access the PACS system         Workstation performed: [id number]     Signed by:   Pat Beckwith MD   5/3/17

## 2018-01-13 NOTE — CASE MANAGEMENT
Initial Clinical Review    Admission: Date/Time/Statement: 1/12/18 @ 1642     Orders Placed This Encounter   Procedures    Inpatient Admission (expected length of stay for this patient is greater than two midnights)     Standing Status:   Standing     Number of Occurrences:   1     Order Specific Question:   Admitting Physician     Answer:   JOHNNY MOSS [[id number]]     Order Specific Question:   Level of Care     Answer:   Med Surg [[id number]]     Order Specific Question:   Estimated length of stay     Answer:   More than 2 Midnights     Order Specific Question:   Certification     Answer:   I certify that inpatient services are medically necessary for this patient for a duration of greater than two midnights  See H&P and MD Progress Notes for additional information about the patient's course of treatment  ED: Date/Time/Mode of Arrival:   ED Arrival Information     Expected Arrival Acuity Means of Arrival Escorted By Service Admission Type    - 1/12/2018 13:40 Emergent Walk-In Family Member General Medicine Emergency    Arrival Complaint    SLUR SPEECH          Chief Complaint:   Chief Complaint   Patient presents with    Slurred Speech     Per family pt had normal speech day before yesterday today, yesterday 9 am pt with slurred speech, weakness, and light headed       History of Illness: Sondra Cabral is a 80 y o  female who presents with complaints of weakness, slurred speech, ataxia, body felt tingly, and lightheadedness  She sat on the couch all day yesterday  As per family when she woke up her face was not right, and she would go right back to sleep  States she had a CVA last year in January 2017  Claims she did not seek medical attention because she was unaware  Currently denies above symptoms except feeling lightheaded and weak  Denies chest pain, chest discomfort, or palpitations; denies recent illness, shortness of breath, fever or chills    Complains of stuffy nose, feeling warm, urgency and frequency  Denies loss of appetite, abdominal pain, or NVCD         ED Vital Signs:   ED Triage Vitals [01/12/18 1346]   Temperature Pulse Respirations Blood Pressure SpO2   98 7 °F (37 1 °C) 73 20 (!) 175/85 95 %      Temp Source Heart Rate Source Patient Position - Orthostatic VS BP Location FiO2 (%)   Oral Monitor Sitting Left arm --      Pain Score       2        Wt Readings from Last 1 Encounters:   01/12/18 74 6 kg (164 lb 7 4 oz)       Vital Signs (abnormal): wnl    Abnormal Labs/Diagnostic Test Results: gluc 206  Pt inr  24 5  2 18, ptt  44  CT head- wnl   CXR - Stable mild cardiomegaly   No active pulmonary disease  EKG - NSR     ED Treatment:   Medication Administration from 01/12/2018 1340 to 01/12/2018 1830       Date/Time Order Dose Route Action Action by Comments     01/12/2018 1810 aspirin chewable tablet 324 mg 324 mg Oral Not Given Ivette Rogel RN Pt states she is allergic     01/12/2018 1737 cephalexin (KEFLEX) capsule 500 mg 500 mg Oral Given Ivette Rogel RN           Past Medical/Surgical History:    Active Ambulatory Problems     Diagnosis Date Noted    Diabetes mellitus ([facility] )     Glaucoma     Hypercholesterolemia     Hypertension     Hyponatremia 01/16/2017    Depression with anxiety 01/16/2017    Atrial fibrillation (HCC) 09/11/2017    Acute congestive heart failure ([facility] ) 09/11/2017     Resolved Ambulatory Problems     Diagnosis Date Noted    Facial numbness 01/06/2017    Hypokalemia 01/16/2017    Vomiting 01/16/2017    Dehydration 01/19/2017     Past Medical History:   Diagnosis Date    Depression with anxiety     Diabetes mellitus ([facility] )     Glaucoma     Hypercholesterolemia     Hypertension     Ischemic stroke of frontal lobe (HCC)     Obstructive sleep apnea     OP (osteoporosis)     PAF (paroxysmal atrial fibrillation) ([facility])        Admitting Diagnosis: Diabetes mellitus ([facility] ) [E11 9]  UTI (urinary tract infection) [N39 0]  Hypertension [I10]  Stroke ([facility] ) [I63 9]  Slurred speech [R47 81]    Age/Sex: 80 y o  female    Assessment/Plan: Hospital Problem List:      Principal Problem:    Neurological deficit, transient  Active Problems:    Slurred speech    Diabetes mellitus ([facility] )    Glaucoma    Hypercholesterolemia    Hypertension    Depression with anxiety    Atrial fibrillation ([facility] )    Lethargy    Ataxia   Plan for the Primary Problem(s):  · TIA vs CVA  ? Yesterday patient had slurred speech, ataxia, lethargy, lightheadedness, and overall weakness  ? PMH:  CVA January 2017 with no residual deficits  Per patient she did not go to the hospital then because she was not aware she had a CVA  ? Brain CT:  Negative for acute intracranial pathology  No mass, hemorrhage or signs of ischemia  Old right frontal lobe infarct  ? Continue Xarelto 20 mg  ? Continue simvastatin 80 mg  ? Brain MRI, ECHO, and Carotid Study ordered  ? Neuro checks; dysphagia screen  ? Telemetry monitoring  ? Neurology consult  ? PT, OT, speech language evaluation and treatment  ? Check Hg A1c and lipid panel     Plan for Additional Problems:   · AFib:    ? EKG:  NSR Rate 63, sinus rhythm has replaced Afib  (prior EKG September 2017 showed AFib with RVR rate 114)  ? Continue Xarelto and Toprol XL 25 mg at bedtime  ? Echo ordered     · UTI:  Moderate leukocytes, WBC, and bacteria  States she feels warm but did not check her temperature  Empirical treatment for UTI considering signs and symptoms of frequency and urgency  Start ceftriaxone      · DM: On metformin and glipizide at home    Discontinued during hospitalization, start insulin sliding scale     · Hypertension:  Continue Toprol XL 25 mg hs and Norvasc 5 mg qd     · Hyperlipidemia:  Continue simvastatin 80 mg; wants to continue home medication will not accept alternate statin     · Anxiety:  Continue Lexapro 2 5 mg     · Glaucoma:  Continue home meds:  Restasis, Lumigan, dorzolamide, combigan and artificial tears     VTE Prophylaxis: Rivaroxaban (Xarelto)  / sequential compression device   Code Status: Full code  POLST: POLST is not applicable to this patient     Anticipated Length of Stay:  Patient will be admitted on an Inpatient basis with an anticipated length of stay of  Greater than 2 midnights  Justification for Hospital Stay:  Signs and symptoms concurrent with CVA, requires  close neurologic observation     Admission Orders:  Scheduled Meds:   amLODIPine 5 mg Oral Daily   artificial tear  Both Eyes BID   aspirin 81 mg Oral Daily   bimatoprost 1 drop Both Eyes HS   brimonidine 1 drop Both Eyes BID   cefTRIAXone 1,000 mg Intravenous Q24H   cycloSPORINE 1 drop Both Eyes BID   dorzolamide 1 drop Left Eye TID   escitalopram 2 5 mg Oral Daily   famotidine 20 mg Oral Daily   insulin lispro 1-6 Units Subcutaneous TID AC   insulin lispro 1-6 Units Subcutaneous HS   metoprolol succinate 25 mg Oral HS   rivaroxaban 20 mg Oral Daily With Breakfast   simvastatin 80 mg Oral HS   timolol 1 drop Both Eyes BID     Continuous Infusions:    PRN Meds:   acetaminophen    senna     Fingerstick ac and hs   Up w/ assist   Cons carb diet   Neuro consult   Tele   Orthostatic bp   OT PT eval   SCD  Dysphagia eval  1/13 lipid profile, cmp   hgb a1c , cbc , tsh     Gluc   164  hgb a1c 8 1    Neuro consult  1/13  Assessment/Plan:   3 80year old female admitted after episode of slurred speech, ataxia, lethargy, full body tingling and lightheadedness which resolved spontaneously   - MRI brain completed and no acute ischemia noted  - Symptoms may be secondary to UTI vs possible headache phenomenon as patient does note that she has had headaches fairly frequently over the past week and she thinks she had a headache when symptoms were noted  Given prior stroke noted on MRI, symptoms may be recrudescence in setting of infection     - Would continue with Rivaroxaban and statin for secondary stroke prevention   - Check TSH, await hemoglobin A1c  Fasting lipid panel reviewed, total cholesterol 127, triglycerides 110, HDL 55 and LDL 50     - Echocardiogram and carotid dopplers pending     - Continue to treat underlying UTI as per primary service  Urine culture ordered  - No additional neurologic work-up recommended at this time  Patient should follow-up with vascular neurology team as an outpatient       2  UTI   - Urine culture ordered   - Antibiotics as per primary service       3  Prior R frontal CVA   - Continue with Xarelto and statin for secondary stroke prevention   - Outpatient vascular neurology follow-up recommended       4  DM    - Await hemoglobin A1c      - Continue with insulin as per primary team       5  Afib   - Currently in sinus rhythm    - Continue on rate control as per primary team       6  HTN   - Continue antihypertensives as per medicine team, BPs slightly on the higher side at this time  Adjustment of medications as per primary team     - Concern for possible orthostatic hypotension as patient notes lightheadedness with changes in position  Will check orthostatic blood pressures            Thank you,  [address] [place] [address] in the [facility] by Wyatt Armas for 2017  Network Utilization Review Department  Phone: [phone]; Fax [phone]  ATTENTION: The Network Utilization Review Department is now centralized for our 7 Facilities  Make a note that we have a new phone and fax numbers for our Department  Please call with any questions or concerns to [phone] and carefully follow the prompts so that you are directed to the right person  All voicemails are confidential  Fax any determinations, approvals, denials, and requests for initial or continue stay review clinical to [phone]  Due to HIGH CALL volume, it would be easier if you could please send faxed requests to expedite your requests and in part, help us provide discharge notifications faster

## 2018-01-14 VITALS
BODY MASS INDEX: 34.37 KG/M2 | RESPIRATION RATE: 18 BRPM | HEART RATE: 62 BPM | SYSTOLIC BLOOD PRESSURE: 147 MMHG | WEIGHT: 164.46 LBS | DIASTOLIC BLOOD PRESSURE: 92 MMHG | TEMPERATURE: 98.8 F | OXYGEN SATURATION: 95 %

## 2018-01-14 LAB
BACTERIA UR CULT: NORMAL
GLUCOSE SERPL-MCNC: 205 MG/DL (ref 65–140)
GLUCOSE SERPL-MCNC: 351 MG/DL (ref 65–140)

## 2018-01-14 PROCEDURE — 82948 REAGENT STRIP/BLOOD GLUCOSE: CPT

## 2018-01-14 RX ORDER — CIPROFLOXACIN 250 MG/1
500 TABLET, FILM COATED ORAL EVERY 12 HOURS SCHEDULED
Qty: 12 TABLET | Refills: 0 | Status: SHIPPED | OUTPATIENT
Start: 2018-01-14 | End: 2018-01-17

## 2018-01-14 RX ADMIN — DORZOLAMIDE HCL 1 DROP: 20 SOLUTION/ DROPS OPHTHALMIC at 08:01

## 2018-01-14 RX ADMIN — INSULIN LISPRO 6 UNITS: 100 INJECTION, SOLUTION INTRAVENOUS; SUBCUTANEOUS at 11:48

## 2018-01-14 RX ADMIN — INSULIN LISPRO 2 UNITS: 100 INJECTION, SOLUTION INTRAVENOUS; SUBCUTANEOUS at 07:57

## 2018-01-14 RX ADMIN — TIMOLOL MALEATE 1 DROP: 5 SOLUTION/ DROPS OPHTHALMIC at 08:01

## 2018-01-14 RX ADMIN — FAMOTIDINE 20 MG: 20 TABLET, FILM COATED ORAL at 08:01

## 2018-01-14 RX ADMIN — BRIMONIDINE TARTRATE 1 DROP: 1.5 SOLUTION/ DROPS OPHTHALMIC at 08:01

## 2018-01-14 RX ADMIN — ESCITALOPRAM 2.5 MG: 5 TABLET, FILM COATED ORAL at 08:01

## 2018-01-14 RX ADMIN — RIVAROXABAN 20 MG: 20 TABLET, FILM COATED ORAL at 07:57

## 2018-01-14 RX ADMIN — AMLODIPINE BESYLATE 5 MG: 5 TABLET ORAL at 08:01

## 2018-01-14 RX ADMIN — CYCLOSPORINE 1 DROP: 0.5 EMULSION OPHTHALMIC at 08:01

## 2018-01-14 NOTE — DISCHARGE SUMMARY
Discharge Summary - Iona Martin 80 y o  female MRN: [id number]    Unit/Bed#: E4 -01 Encounter: [id number]    Admission Date: 1/12/2018     Admitting Diagnosis: Diabetes mellitus ([facility] ) [E11 9]  UTI (urinary tract infection) [N39 0]  Hypertension [I10]  Stroke ([facility] ) [I63 9]  Slurred speech [R47 81]    HPI:    Iona Martin is a 80 y o  female who presents with complaints of weakness, slurred speech, ataxia, body felt tingly, and lightheadedness  She sat on the couch all day yesterday  As per family when she woke up her face was not right, and she would go right back to sleep  States she had a CVA last year in January 2017  Claims she did not seek medical attention because she was unaware  Currently denies above symptoms except feeling lightheaded and weak  Denies chest pain, chest discomfort, or palpitations; denies recent illness, shortness of breath, fever or chills  Complains of stuffy nose, feeling warm, urgency and frequency  Denies loss of appetite, abdominal pain, or NVCD  Procedures Performed:   Orders Placed This Encounter   Procedures    ED ECG Documentation Only       Hospital Course: patient was subsequently admitted with TIA versus CVA rule out  Her symptoms quickly improved and currently does not have any residual weakness or neurologic deficits  Workup included MRI of the brain which showed no acute intracranial pathology  She also underwent evaluation by neurology at this time patient is requesting to be discharged as she has completely returned to baseline, she was treated for UTI with Rocephin she reports she needs to go home" to take care of her  who has brain does not want any further studies she certainly would be stable currently does not exhibit any focal neurologic deficits    Her discharge exam is as following:      GENERAL APPEARANCE: WD/WN in NAD pleasant  SKIN: no rash  HEENT: NC/AT, PERRLA (B), moist MM, no epistaxis  NECK: Supple, no JVD    LUNGS: CTA (B) mildly prolonged expiratory phase,   no use of accessory muscles  HEART:          S1S 2, RRR  , PMI is not displaced  ABDOMEN: Soft, nontender, nondistended, +BS  Rectal exam:  EXTREMITIES: no edema   PERIPHERAL VASCULAR: palpable pulses   NEURO:  AAO x 3, CN 2-12: non focal  MUSCLE STRENGHT: 5/5 (B), SENSATION: nonfocal  DTR: ++, CEREBELLAR: non focal    Significant Findings, Care, Treatment and Services Provided: Treatment described as above    Complications:no known complications    Discharge Diagnosis:   Treatment described as about    Condition at Discharge: good     Discharge instructions/Information to patient and family:   See after visit summary for information provided to patient and family  Provisions for Follow-Up Care:  See after visit summary for information related to follow-up care and any pertinent home health orders  Disposition: Home    Planned Readmission: No    Discharge Statement   I spent 40 minutes discharging the patient  This time was spent on the day of discharge  I had direct contact with the patient on the day of discharge  Additional documentation is required if more than 30 minutes were spent on discharge  Discharge Medications:  See after visit summary for reconciled discharge medications provided to patient and family

## 2018-01-14 NOTE — PLAN OF CARE
Activity Intolerance/Impaired Mobility     Mobility/activity is maintained at optimum level for patient Progressing        Neurological Deficit     Neurological status is stable or improving Progressing        Potential for Aspiration     Non-ventilated patient's risk of aspiration is minimized Progressing        Potential for Falls     Patient will remain free of falls Progressing

## 2018-01-14 NOTE — DISCHARGE INSTRUCTIONS

## 2018-01-14 NOTE — PSYCH
History of Present Illness  Psychotherapy Provided [facility]: Individual Psychotherapy 30 minutes minutes provided today  Goals addressed in session:   Patient reports improvement in sleep and a decrease in depression  Presents as brighter affect and more energetic  Conformed by son-in-law  Patient verbal and cooperative  HPI - Psych: Patient reports some continued issues with energy and motivation but not to degree when last seen  Continues to be less active outside of home due to winter temperatures  However, attends errands with family and continues to with hobbies/interests at home  Denies any SI   Note   Note:   Patient provided great deal of positive reinforcement for her efforts  Encouraged to push self to increase activity outside of home via [employer] to further develop more energy and interest and she agrees  Assessment    1   Depression with anxiety (300 4) (F41 8)    Signatures   Electronically signed by : Julieta Mansfield LCSW; Jan 30 2017  2:47PM EST                       (Author)

## 2018-01-15 VITALS
TEMPERATURE: 97.6 F | RESPIRATION RATE: 16 BRPM | HEIGHT: 58 IN | WEIGHT: 158 LBS | BODY MASS INDEX: 33.17 KG/M2 | HEART RATE: 56 BPM | DIASTOLIC BLOOD PRESSURE: 92 MMHG | SYSTOLIC BLOOD PRESSURE: 146 MMHG

## 2018-01-15 NOTE — CASE MANAGEMENT
Notification of Discharge  This is a Notification of Discharge from our facility [address]  Please be advised that this patient has been discharge from our facility  Below you will find the admission and discharge date and time including the patients disposition  PRESENTATION DATE: 1/12/2018  1:50 PM  IP ADMISSION DATE: 1/12/18 1642  DISCHARGE DATE: 1/14/2018 12:20 PM  DISPOSITION: Home/Self Care    [address] [facility] in the [facility] by Wyatt Armas for 2017  Network Utilization Review Department  Phone: [phone]; Fax [phone]  ATTENTION: The Network Utilization Review Department is now centralized for our 7 Facilities  Make a note that we have a new phone and fax numbers for our Department  Please call with any questions or concerns to [phone] and carefully follow the prompts so that you are directed to the right person  All voicemails are confidential  Fax any determinations, approvals, denials, and requests for initial or continue stay review clinical to [phone]  Due to HIGH CALL volume, it would be easier if you could please send faxed requests to expedite your requests and in part, help us provide discharge notifications faster

## 2018-01-15 NOTE — RESULT NOTES
Verified Results  (Q) COMPREHENSIVE METABOLIC PNL W/ADJUSTED CALCIUM [id number] 07:36AM Milly Stockton     Test Name Result Flag Reference   GLUCOSE 181 mg/dL H 65-99   Fasting reference interval     For someone without known diabetes, a glucose  value >125 mg/dL indicates that they may have  diabetes and this should be confirmed with a  follow-up test    UREA NITROGEN (BUN) 18 mg/dL  7-25   CREATININE 0 73 mg/dL  0 60-0 88   For patients >52years of age, the reference limit  for Creatinine is approximately 13% higher for people  identified as -American  eGFR NON-AFR  AMERICAN 77 mL/min/1 73m2  > OR = 60   eGFR AFRICAN AMERICAN 89 mL/min/1 73m2  > OR = 60   BUN/CREATININE RATIO   7-69   NOT APPLICABLE (calc)   SODIUM 138 mmol/L  135-146   POTASSIUM 4 9 mmol/L  3 5-5 3   CHLORIDE 102 mmol/L     CARBON DIOXIDE 30 mmol/L  20-31   CALCIUM 9 8 mg/dL  8 6-10 4   CALCIUM (ADJUSTED FOR$ALBUMIN) 9 8 mg/dL (calc)  8 6-10 2   PROTEIN, TOTAL 7 1 g/dL  6 1-8 1   ALBUMIN 4 4 g/dL  3 6-5 1   GLOBULIN 2 7 g/dL (calc)  1 9-3 7   ALBUMIN/GLOBULIN RATIO 1 6 (calc)  1 0-2 5   BILIRUBIN, TOTAL 0 5 mg/dL  0 2-1 2   ALKALINE PHOSPHATASE 78 U/L     AST 9 U/L L 10-35   ALT 15 U/L  6-29     (Q) HEMOGLOBIN A1c WITH eAG [id number] 07:36AM Milly Stockton   REPORT COMMENT:  FASTING:YES     Test Name Result Flag Reference   HEMOGLOBIN A1c 7 6 % of total Hgb H <5 7   For someone without known diabetes, a hemoglobin A1c  value of 6 5% or greater indicates that they may have   diabetes and this should be confirmed with a follow-up   test      For someone with known diabetes, a value <7% indicates   that their diabetes is well controlled and a value   greater than or equal to 7% indicates suboptimal   control  A1c targets should be individualized based on   duration of diabetes, age, comorbid conditions, and   other considerations       Currently, no consensus exists regarding use of  hemoglobin A1c for diagnosis of diabetes for children  eAG (mg/dL) 171 (calc)     eAG (mmol/L) 9 5 (calc)       (Q) LIPID PANEL WITH REFLEX TO DIRECT LDL [id number] 07:36AM Corinn Balloon     Test Name Result Flag Reference   CHOLESTEROL, TOTAL 158 mg/dL  125-200   HDL CHOLESTEROL 60 mg/dL  > OR = 46   TRIGLICERIDES 878 mg/dL  <150   LDL-CHOLESTEROL 73 mg/dL (calc)  <130   Desirable range <100 mg/dL for patients with CHD or  diabetes and <70 mg/dL for diabetic patients with  known heart disease  CHOL/HDLC RATIO 2 6 (calc)  < OR = 5 0   NON HDL CHOLESTEROL 98 mg/dL (calc)     Target for non-HDL cholesterol is 30 mg/dL higher than   LDL cholesterol target  (Q) TSH, 3RD GENERATION W/REFLEX TO FT4 [id number] 07:36AM Corinn Balloon     Test Name Result Flag Reference   TSH W/REFLEX TO FT4 3 27 mIU/L  0 40-4 50     (Q) MICROALBUMIN, RANDOM URINE (W/CREATININE) [id number] 07:36AM Corinn Balloon     Test Name Result Flag Reference   CREATININE, RANDOM URINE 40 mg/dL     MICROALBUMIN 0 3 mg/dL     Reference Range  Not established   MICROALBUMIN/CREATININE$RATIO, RANDOM URINE 8 mcg/mg creat  <30   The ADA defines abnormalities in albumin  excretion as follows:     Category         Result (mcg/mg creatinine)     Normal                    <30  Microalbuminuria            Clinical albuminuria   > OR = 300     The ADA recommends that at least two of three  specimens collected within a 3-6 month period be  abnormal before considering a patient to be  within a diagnostic category

## 2018-01-16 NOTE — MISCELLANEOUS
Message  Message Free Text Note Form: Son called answering service  Pt had low BP today  BP was 109/88 in the morning and now BP is 103/80  Pt felt a little lightheaded  Denies SOB, CP, n/v/abd pain  Advised pt to hold losartan tonight  Keep hydrated  Recheck BP tomorrow morning  If BP<100/60 or any symptoms, needs to be seen tomorrow  Son agreed  Signatures   Electronically signed by :  Esther Arnold MD; Apr 14 2017  5:54PM EST                       (Author)

## 2018-01-16 NOTE — RESULT NOTES
Message   DW pt on 1/27/2017 OV  Verified Results  (1) COMPREHENSIVE METABOLIC PANEL [id number] 54:94GN [employer]     Test Name Result Flag Reference   GLUCOSE 178 mg/dL H 65-99   Fasting reference interval   UREA NITROGEN (BUN) 9 mg/dL  7-25   CREATININE 0 79 mg/dL  0 60-0 88   For patients >52years of age, the reference limit  for Creatinine is approximately 13% higher for people  identified as -American  eGFR NON-AFR   AMERICAN 70 mL/min/1 73m2  > OR = 60   eGFR AFRICAN AMERICAN 81 mL/min/1 73m2  > OR = 60   BUN/CREATININE RATIO   6-58   NOT APPLICABLE (calc)   SODIUM 133 mmol/L L 135-146   POTASSIUM 4 6 mmol/L  3 5-5 3   CHLORIDE 95 mmol/L L    CARBON DIOXIDE 31 mmol/L  20-31   CALCIUM 9 6 mg/dL  8 6-10 4   PROTEIN, TOTAL 6 8 g/dL  6 1-8 1   ALBUMIN 4 0 g/dL  3 6-5 1   GLOBULIN 2 8 g/dL (calc)  1 9-3 7   ALBUMIN/GLOBULIN RATIO 1 4 (calc)  1 0-2 5   BILIRUBIN, TOTAL 0 6 mg/dL  0 2-1 2   ALKALINE PHOSPHATASE 75 U/L     AST 16 U/L  10-35   ALT 31 U/L H 6-29     (1) CBC/PLT/DIFF [id number] 06:49AM [employer]     Test Name Result Flag Reference   WHITE BLOOD CELL COUNT 7 8 Thousand/uL  3 8-10 8   RED BLOOD CELL COUNT 4 48 Million/uL  3 80-5 10   HEMOGLOBIN 13 9 g/dL  11 7-15 5   HEMATOCRIT 41 1 %  35 0-45 0   MCV 91 8 fL  80 0-100 0   MCH 31 1 pg  27 0-33 0   MCHC 33 9 g/dL  32 0-36 0   RDW 13 8 %  11 0-15 0   PLATELET COUNT 734 Thousand/uL  140-400   MPV 9 7 fL  7 5-12 5   ABSOLUTE NEUTROPHILS 4563 cells/uL  8372-9389   ABSOLUTE LYMPHOCYTES 2535 cells/uL  850-3900   ABSOLUTE MONOCYTES 538 cells/uL  200-950   ABSOLUTE EOSINOPHILS 125 cells/uL     ABSOLUTE BASOPHILS 39 cells/uL  0-200   NEUTROPHILS 58 5 %     LYMPHOCYTES 32 5 %     MONOCYTES 6 9 %     EOSINOPHILS 1 6 %     BASOPHILS 0 5 %       (Q) LIPID PANEL WITH REFLEX TO DIRECT LDL [id number] 06:49AM [employer]     Test Name Result Flag Reference   CHOLESTEROL, TOTAL 145 mg/dL  125-200   HDL CHOLESTEROL 48 mg/dL  > OR = 46 TRIGLICERIDES 975 mg/dL  <150   LDL-CHOLESTEROL 67 mg/dL (calc)  <130   Desirable range <100 mg/dL for patients with CHD or  diabetes and <70 mg/dL for diabetic patients with  known heart disease  CHOL/HDLC RATIO 3 0 (calc)  < OR = 5 0   NON HDL CHOLESTEROL 97 mg/dL (calc)     Target for non-HDL cholesterol is 30 mg/dL higher than   LDL cholesterol target  (Q) HEMOGLOBIN A1c [id number] 06:49AM Primo Khan   REPORT COMMENT:  FASTING:YES     Test Name Result Flag Reference   HEMOGLOBIN A1c 8 5 % of total Hgb H <5 7   According to ADA guidelines, hemoglobin A1c <7 0%  represents optimal control in non-pregnant diabetic  patients  Different metrics may apply to specific  patient populations  Standards of Medical Care in  [phone]  Diabetes Care  2013;36:s11-s66     For the purpose of screening for the presence of  diabetes  <5 7%       Consistent with the absence of diabetes  5 7-6 4%    Consistent with increased risk for diabetes              (prediabetes)  >or=6 5%    Consistent with diabetes     This assay result is consistent with diabetes  mellitus  Currently, no consensus exists for use of hemoglobin  A1c for diagnosis of diabetes for children

## 2018-01-18 NOTE — PROGRESS NOTES
History of Present Illness  Care Coordination Encounter Information:   Type of Encounter: Telephonic   Contact: Initial Contact    Spoke to Patient   Outreached patient for care coordination services She feels she is doing fine  Her daughter is trying to get her a scooter  She also has been seeing Jimi Bray and will call him on Friday with a billing question  Will call the office if any needs arise  Active Problems    1  CVA (cerebral vascular accident) (434 91) (I63 9)   2  Depression with anxiety (300 4) (F41 8)   3  Diabetes mellitus type 2, uncontrolled (250 02) (E11 65)   4  Glaucoma (365 9) (H40 9)   5  Hearing Loss (389 9)   6  Hyperlipidemia (272 4) (E78 5)   7  Hypertension (401 9) (I10)   8  Insomnia (780 52) (G47 00)   9  Medicare annual wellness visit, initial (V70 0) (Z00 00)   10  Osteoarthritis (715 90) (M19 90)   11  Osteoporosis (733 00) (M81 0)   12  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)   13  Tinnitus, unspecified laterality (388 30) (H93 19)   14  Tricuspid valve disorders, 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)   15  Unsteady gait (781 2) (R26 81)    Past Medical History    1  History of Arm numbness (782 0) (R20 0)   2  History of Cough (786 2) (R05)   3  History of Dysfunction of eustachian tube, unspecified laterality (381 81) (H69 80)   4  History of Flank Pain Right (789 09)   5  History of bronchitis (V12 69) (Z87 09)   6  History of cholelithiasis (V12 79) (Z87 19)   7  History of dizziness (V13 89) (Z87 898)   8  History of hematuria (V13 09) (Z87 448)   9  History of nausea (V12 79) (Z87 898)   10  History of sinusitis (V12 69) (Z87 09)   11  History of upper respiratory infection (V12 09) (Z87 09)   12  History of urinary frequency (V13 09) (Z87 898)   13  History of urinary tract infection (V13 02) (Z87 440)   14  History of vertigo (V12 49) (Z87 898)   15  History of viral gastroenteritis (V12 09) (Z86 19)   16   History of viral gastroenteritis (V12 09) Rx:08Rrp3361 Ordered    8  AmLODIPine Besylate 5 MG Oral Tablet; Take 1 tablet daily; Therapy: 72BKG9004 to (Evaluate:86Tyr5461)  Requested for: 25Apr2017; Last   Rx:09Rsy2907 Ordered   9  CloNIDine HCl - 0 1 MG Oral Tablet; TAKE 1 TABLET BY MOUTH IF BLOOD PRESSURE   >180   Therapy: 54XHR4409 to (Evaluate:12Mar2017)  Requested for: 25Apr2017; Last   Rx:93Zld9333 Ordered    10  Alendronate Sodium 70 MG Oral Tablet; TAKE 1 TABLET ONCE WEEKLY; Therapy: 08QAA4380 to ([id number])  Requested for: 69YDL6885; Last    NU:95GKE1552 Ordered    11  Artificial Tears 1 4 % Ophthalmic Solution; INSTILL 1-2 DROPS INTO AFFECTED EYE(S)     4 TIMES DAILY AS DIRECTED; Therapy: (Keren Grimaldo) to Recorded   12  Latanoprost 0 005 % Ophthalmic Solution; SOLN OP X 25; Therapy: 36SKN1945 to (Last Irina Mis)  Requested for: 27UQP1221 Ordered   13  Restasis 0 05 % Ophthalmic Emulsion; USE AS DIRECTED; Therapy: (Recorded:47Ecu7133) to Recorded    Allergies    1  HydroCHLOROthiazide TABS   2  Metoprolol Tartrate TABS   3  Aspirin Buffered TABS   4  Lipitor TABS   5  Zocor TABS    End of Encounter Meds    1  Escitalopram Oxalate 5 MG Oral Tablet; TAKE 1 TABLET DAILY; Therapy: 73FGW8898 to (Evaluate:31Ovo4461)  Requested for: 25Apr2017; Last   Rx:19Nmh9725 Ordered    2  GlipiZIDE XL 10 MG Oral Tablet Extended Release 24 Hour; Take 2 tablets daily; Therapy: 06VHR6410 to (Evaluate:25Nyi8767)  Requested for: 25Apr2017; Last   Rx:02Mar2017 Ordered   3  MetFORMIN HCl - 1000 MG Oral Tablet; Take 1 tablet twice daily; Therapy: 86SCJ6177 to (77 873 135)  Requested for: 25Apr2017; Last   Rx:25Apr2017 Ordered   4  OneTouch Lancets Miscellaneous; Check blood sugar 2 times daily; Therapy: 62KRI6819 to (Evaluate:64Hqp2878)  Requested for: 60Ifk0296; Last   Rx:82Dxm9329 Ordered   5  OneTouch Ultra Blue In [employer]; test twice daily;    Therapy: 37KGC0214 to (FEZDZKCL:04JVD3851)  Requested for: 38XYC7985; Last Rx: 88PPJ1144 Ordered    6  Dorzolamide HCl-Timolol Mal 22 3-6 8 MG/ML Ophthalmic Solution; SOLN OP X 30; Therapy: 00XQF1702 to (Evaluate:02Mar2016); Last Rx:62Rnl6662 Ordered    7  Simvastatin 80 MG Oral Tablet; take 1 tablet by mouth once daily; Therapy: 69QWH9389 to (21 234 )  Requested for: 19Pqf2006; Last   Rx:85Ubb1545 Ordered    8  AmLODIPine Besylate 5 MG Oral Tablet; Take 1 tablet daily; Therapy: 21WQO6195 to (Evaluate:58Zoo7070)  Requested for: 25Apr2017; Last   Rx:68Pbk4514 Ordered   9  CloNIDine HCl - 0 1 MG Oral Tablet; TAKE 1 TABLET BY MOUTH IF BLOOD PRESSURE   >180   Therapy: 88AIQ2787 to (Evaluate:12Mar2017)  Requested for: 25Apr2017; Last   Rx:13Etm2424 Ordered    10  Alendronate Sodium 70 MG Oral Tablet; TAKE 1 TABLET ONCE WEEKLY; Therapy: 92HFB6423 to (21 234 )  Requested for: 36HQL7181; Last    CE:45NTY7471 Ordered    11  Artificial Tears 1 4 % Ophthalmic Solution; INSTILL 1-2 DROPS INTO AFFECTED EYE(S)     4 TIMES DAILY AS DIRECTED; Therapy: (Yessica Roach) to Recorded   12  Latanoprost 0 005 % Ophthalmic Solution; SOLN OP X 25; Therapy: 84BZQ7674 to (Last Francis Valente)  Requested for: 06NVE7081 Ordered   13  Restasis 0 05 % Ophthalmic Emulsion; USE AS DIRECTED;     Therapy: (Recorded:77Hjk4188) to Recorded    Future Appointments    Date/Time Provider Specialty Site   08/25/2017 02:20 PM Venessa Jones MD Family Medicine [address] [place] [address]     Signatures   Electronically signed by : Pita Marie RN; May 10 2017 11:56AM EST                       (Author)

## 2018-01-22 VITALS
WEIGHT: 166.8 LBS | SYSTOLIC BLOOD PRESSURE: 140 MMHG | BODY MASS INDEX: 35.01 KG/M2 | RESPIRATION RATE: 16 BRPM | HEART RATE: 60 BPM | TEMPERATURE: 98.4 F | DIASTOLIC BLOOD PRESSURE: 78 MMHG | HEIGHT: 58 IN

## 2018-01-23 NOTE — MISCELLANEOUS
Message   Recorded as Task   Date: 12/08/2017 11:42 AM, Created By: Rita Alcazar   Task Name: Medical Complaint Callback   Assigned To: Vi Ambrocio   Regarding Patient: Carly Pinzon, Status: Active   Comment:    AmeliaGoldie - 08 Dec 2017 11:42 AM     TASK CREATED  Caller: [address] [place] [address], Other  Nurse  from [address] [place] [address]  Seeing patient at her home  States HR has     been consistenly below 60  Patient has increased falling episodes  Decrease dose of Metroprol? Please advise     [phone]    Tiesha Ovens - 08 Dec 2017 4:04 PM     TASK REPLIED TO: Previously Assigned To Vi Ambrocio  I called Clau back  She states patient's HR was around 52  Denies dizzy, SOB, CP, n/v/abd pain  Pt had more falls recently  Will decrease metoprolol to 25mg QD  Check BP at home  Call office if BP always >140/90  Kimberlyn Babak will let the daughter know  Signatures   Electronically signed by :  Abdifatah Martinez MD; Dec  8 2017  4:04PM EST                       (Author)

## 2018-01-23 NOTE — RESULT NOTES
Verified Results  (Q) COMPREHENSIVE METABOLIC PNL W/ADJUSTED CALCIUM 45MWI2648 09:53AM Leila Martin     Test Name Result Flag Reference   GLUCOSE 216 mg/dL H 65-99   Fasting reference interval     For someone without known diabetes, a glucose  value >125 mg/dL indicates that they may have  diabetes and this should be confirmed with a  follow-up test    UREA NITROGEN (BUN) 21 mg/dL  7-25   CREATININE 0 79 mg/dL  0 60-0 88   For patients >52years of age, the reference limit  for Creatinine is approximately 13% higher for people  identified as -American  eGFR NON-AFR  AMERICAN 70 mL/min/1 73m2  > OR = 60   eGFR AFRICAN AMERICAN 81 mL/min/1 73m2  > OR = 60   BUN/CREATININE RATIO   1-17   NOT APPLICABLE (calc)   SODIUM 138 mmol/L  135-146   POTASSIUM 4 8 mmol/L  3 5-5 3   CHLORIDE 103 mmol/L     CARBON DIOXIDE 28 mmol/L  20-31   CALCIUM 9 4 mg/dL  8 6-10 4   CALCIUM (ADJUSTED FOR$ALBUMIN) 9 5 mg/dL (calc)  8 6-10 2   PROTEIN, TOTAL 6 9 g/dL  6 1-8 1   ALBUMIN 4 3 g/dL  3 6-5 1   GLOBULIN 2 6 g/dL (calc)  1 9-3 7   ALBUMIN/GLOBULIN RATIO 1 7 (calc)  1 0-2 5   BILIRUBIN, TOTAL 0 5 mg/dL  0 2-1 2   ALKALINE PHOSPHATASE 77 U/L     AST 10 U/L  10-35   ALT 14 U/L  6-29     (Q) HEMOGLOBIN A1c WITH eAG 59WFK0709 09:53AM Leila Martin   REPORT COMMENT:  FASTING:YES     Test Name Result Flag Reference   HEMOGLOBIN A1c 7 8 % of total Hgb H <5 7   For someone without known diabetes, a hemoglobin A1c  value of 6 5% or greater indicates that they may have   diabetes and this should be confirmed with a follow-up   test      For someone with known diabetes, a value <7% indicates   that their diabetes is well controlled and a value   greater than or equal to 7% indicates suboptimal   control  A1c targets should be individualized based on   duration of diabetes, age, comorbid conditions, and   other considerations       Currently, no consensus exists regarding use of  hemoglobin A1c for diagnosis of diabetes for children  eAG (mg/dL) 177 (calc)     eAG (mmol/L) 9 8 (calc)       (Q) LIPID PANEL WITH REFLEX TO DIRECT LDL 50DQV3888 09:53AM Wendie De Guzman     Test Name Result Flag Reference   CHOLESTEROL, TOTAL 130 mg/dL  <200   HDL CHOLESTEROL 51 mg/dL  >50   TRIGLICERIDES 99 mg/dL  <684   LDL-CHOLESTEROL 61 mg/dL (calc)     Reference range: <100     Desirable range <100 mg/dL for patients with CHD or  diabetes and <70 mg/dL for diabetic patients with  known heart disease  LDL-C is now calculated using the Tyson-Andrade   calculation, which is a validated novel method providing   better accuracy than the Friedewald equation in the   estimation of LDL-C  Latanya Stovall al  [facility]  8437;396(72): 9446-8796   (http://education  [employer]/faq/DIR667)   CHOL/HDLC RATIO 2 5 (calc)  <5 0   NON HDL CHOLESTEROL 79 mg/dL (calc)  <130   For patients with diabetes plus 1 major ASCVD risk   factor, treating to a non-HDL-C goal of <100 mg/dL   (LDL-C of <70 mg/dL) is considered a therapeutic   option

## 2018-01-24 ENCOUNTER — OFFICE VISIT (OUTPATIENT)
Dept: FAMILY MEDICINE CLINIC | Facility: CLINIC | Age: 83
End: 2018-01-24
Payer: COMMERCIAL

## 2018-01-24 ENCOUNTER — TRANSITIONAL CARE MANAGEMENT (OUTPATIENT)
Dept: FAMILY MEDICINE CLINIC | Facility: CLINIC | Age: 83
End: 2018-01-24

## 2018-01-24 VITALS
HEIGHT: 58 IN | BODY MASS INDEX: 34.97 KG/M2 | WEIGHT: 166.6 LBS | TEMPERATURE: 98.5 F | HEART RATE: 56 BPM | DIASTOLIC BLOOD PRESSURE: 64 MMHG | RESPIRATION RATE: 16 BRPM | SYSTOLIC BLOOD PRESSURE: 132 MMHG

## 2018-01-24 VITALS
WEIGHT: 166.25 LBS | RESPIRATION RATE: 12 BRPM | HEIGHT: 58 IN | SYSTOLIC BLOOD PRESSURE: 142 MMHG | HEART RATE: 60 BPM | BODY MASS INDEX: 34.9 KG/M2 | DIASTOLIC BLOOD PRESSURE: 80 MMHG | TEMPERATURE: 96 F

## 2018-01-24 DIAGNOSIS — N30.00 ACUTE CYSTITIS WITHOUT HEMATURIA: ICD-10-CM

## 2018-01-24 DIAGNOSIS — I48.21 PERMANENT ATRIAL FIBRILLATION (HCC): ICD-10-CM

## 2018-01-24 DIAGNOSIS — I10 ESSENTIAL HYPERTENSION: ICD-10-CM

## 2018-01-24 DIAGNOSIS — Z09 HOSPITAL DISCHARGE FOLLOW-UP: Primary | ICD-10-CM

## 2018-01-24 DIAGNOSIS — F32.89 OTHER DEPRESSION: ICD-10-CM

## 2018-01-24 DIAGNOSIS — E11.3559 TYPE 2 DIABETES MELLITUS WITH STABLE PROLIFERATIVE RETINOPATHY, WITHOUT LONG-TERM CURRENT USE OF INSULIN, UNSPECIFIED LATERALITY (HCC): ICD-10-CM

## 2018-01-24 DIAGNOSIS — R47.81 SLURRED SPEECH: ICD-10-CM

## 2018-01-24 PROCEDURE — 99495 TRANSJ CARE MGMT MOD F2F 14D: CPT | Performed by: FAMILY MEDICINE

## 2018-01-24 RX ORDER — ESCITALOPRAM OXALATE 5 MG/1
5 TABLET ORAL DAILY
Refills: 0
Start: 2018-01-24 | End: 2018-04-12

## 2018-01-24 RX ORDER — METOPROLOL SUCCINATE 50 MG/1
50 TABLET, EXTENDED RELEASE ORAL
Start: 2018-01-24 | End: 2018-04-06 | Stop reason: HOSPADM

## 2018-01-24 RX ORDER — AMLODIPINE BESYLATE 5 MG/1
5 TABLET ORAL DAILY
Refills: 0
Start: 2018-01-24 | End: 2018-03-04 | Stop reason: HOSPADM

## 2018-01-24 NOTE — PROGRESS NOTES
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, without long-term current use of insulin, unspecified laterality ([facility] )  Comments:  uncontrolled  1/2018 HgA1C 8 1 stable  Continue metformin and glipizide    Permanent atrial fibrillation ([facility] )  Comments:  continue xarelto    Essential hypertension  Comments:  controlled  continue amlodipine and metoprolol  Orders:  -     amLODIPine (NORVASC) 5 mg tablet; Take 1 tablet by mouth daily  -     metoprolol succinate (TOPROL-XL) 50 mg 24 hr tablet; Take 1 tablet by mouth daily at bedtime    Other depression  -     escitalopram (LEXAPRO) 5 mg tablet; Take 1 tablet by mouth daily for 30 days        Hospital records reviewed  Reviewed MRI and Labs  1  UTI---Finished antibiotics  No symptoms  Resolved  2  Slurred speech---No symptoms  Resolved  3  DM2---uncontrolled  Hg 8 1 stable  Continue current meds  4  Afib---continue xarelto  5  HTN---BP at home stable 140/80  Continue current meds  Chief Complaint   Patient presents with    Transition of Care Management     UTI       Subjective:      Patient ID: Marci Crowe is a 80 y o  female  HPI  Patient is here with her daughter-in-law  Was admitted in hospital 1/12-1/14/2018 for slurred speech  MRI was normal   Labs showed UTI  Pt was treated with antibiotics  Pt discharged home and feels fine now  The following portions of the patient's history were reviewed and updated as appropriate: allergies, current medications, past family history, past medical history, past social history, past surgical history and problem list     Review of Systems   Constitutional: Negative for appetite change, chills and fever  HENT: Negative for congestion, ear pain, sinus pain and sore throat      Eyes: Negative for discharge and itching  Respiratory: Negative for apnea, cough, chest tightness, shortness of breath and wheezing  Cardiovascular: Negative for chest pain, palpitations and leg swelling  Gastrointestinal: Negative for abdominal pain, anal bleeding, constipation, diarrhea, nausea and vomiting  Endocrine: Negative for cold intolerance, heat intolerance and polyuria  Genitourinary: Negative for difficulty urinating and dysuria  Musculoskeletal: Negative for arthralgias, back pain and myalgias  Skin: Negative for rash  Neurological: Negative for dizziness and headaches  Psychiatric/Behavioral: Negative for agitation  Objective:     Physical Exam   Constitutional: She appears well-developed  No distress  HENT:   Head: Normocephalic  Right Ear: External ear normal    Left Ear: External ear normal    Nose: Nose normal    Mouth/Throat: Oropharynx is clear and moist    Eyes: Conjunctivae are normal  Pupils are equal, round, and reactive to light  Right eye exhibits no discharge  Left eye exhibits no discharge  Neck: Normal range of motion  No thyromegaly present  Cardiovascular: Normal rate, regular rhythm and normal heart sounds  Exam reveals no gallop and no friction rub  No murmur heard  Pulmonary/Chest: Effort normal and breath sounds normal  No respiratory distress  She has no wheezes  She has no rales  She exhibits no tenderness  Abdominal: Soft  Bowel sounds are normal  She exhibits no distension and no mass  There is no tenderness  There is no rebound and no guarding  Musculoskeletal: Normal range of motion  She exhibits no edema, tenderness or deformity  Lymphadenopathy:     She has no cervical adenopathy  Neurological: She is alert  Psychiatric: She has a normal mood and affect           MRI brain wo contrast   Status: Final result   PACS Images     Show images for MRI brain wo contrast   Order Report      Order Details   Study Result     MRI BRAIN WITHOUT CONTRAST     INDICATION:  Slurred speech and weakness      COMPARISON:   1/12/2018 and 1/7/2017     TECHNIQUE:  Sagittal T1, axial T2, axial FLAIR, axial T1, axial [place] and axial diffusion imaging           IMAGE QUALITY:  Diagnostic      FINDINGS:     BRAIN PARENCHYMA:  There is a chronic right frontal and frontal opercular infarct  There is no evidence of restricted diffusion      Periventricular and subcortical T2/FLAIR hyperintense foci, consistent with microangiopathic disease      No evidence of hemorrhage, mass or mass effect          VENTRICLES:  No hydrocephalus or extra-axial collection      SELLA AND PITUITARY GLAND:  Normal      ORBITS:  Lens replacements noted  No retro-orbital inflammation or mass      PARANASAL SINUSES:  No significant paranasal sinus disease  Few foci of fluid in mastoid air cells without evidence of mastoiditis      VASCULATURE:  Evaluation of the major intracranial vasculature demonstrates appropriate flow voids      CALVARIUM AND SKULL BASE:  Normal      EXTRACRANIAL SOFT TISSUES:  Normal      IMPRESSION:     1  No evidence of acute infarct, hemorrhage or mass      2  Chronic right frontal infarct  Periventricular and subcortical white matter lesions, consistent with microangiopathic disease         Workstation performed: [id number]      Imaging     MRI brain wo contrast (Order #[id number]) on 1/12/2018 - Imaging Information   Signed by     Signed Date/Time  Phone Pager   Starmarko Mercury 1/13/2018 00:23 [phone]    Exam Information     Status Exam Begun  Exam Ended    Final [99] 1/12/2018 22:57 1/12/2018 23:28   Screening Form Questions      Answer Comment   What are your symptoms? slurred speech    Do you have a cardiac pacemaker or internal defibrillator? NA    Please list /make/model:     Have you had any HEART surgery? None    Please list make/model:     Have you had any BRAIN surgery?   None    Please list  or describe implant:     Have you had any EYE surgery? Cataracts     Implants in your eyes    Have you ever injured your eyes with metal or metal fragments (grinding,metallic slivers)? No    Please describe:     Have you had any EAR surgery? None    Do you have an electronic "stimulation" device? None    Please provide  information:     Do you have any electronic "stimulation" device? (cont'd ) None    Please provide  information:     Have you had any abdominal or pelvic surgery? No    Have you had any of the following abdominal or pelvic surgeries:     If "other", please describe:     Do you have any ORTHOPEDIC implants/devices? None    Do you have any ORTHOPEDIC implants/devices? (cont'd ) None    Do you have the following: None    Do you have liver disease? None    Do you have kidney disease? None    Do you have kidney disease? (cont'd  ) High blood pressure, being treated for    Have you had a problem with a previous MRI? No    Does the patient require pre-medication? Do you have a personal history of cancer? None    If "other", please specify:       Are you wearing medication patches?   No    Are you wearing any of the following: None    Date of last menstrual cycle: unknown    Pregnant? No    Postmenopausal? Yes    Breastfeeding? No    Breast tissue expander? No    Do any of the following apply to you: None    Please specify: For inpatients, do you have the following: None    For inpatients, do you have the following: None    Did the patient provide this information? Yes    Who provided this information (if not the patient)?      Relationship to the patient:     Contact number:     MRI STAFF ONLY- Prior imaging reviewed in PACS (initials): kfs    MRI STAFF ONLY- Epic chart reviewed (initials): kfs    External Results Report     Open External Results Report    Encounter     View Encounter          Patient Care Timeline     No data selected in time range   Pre-op Summary     Pre-op           Recovery Summary     Recovery     Procedure With Code                Results   TSH, 3rd generation (Order [id number])   External Results Report     Open External Results Report   Lab Component SmartPhrase Guide     TSH, 3rd generation (Order #[id number]) on 1/13/18   TSH, 3rd generation   Order: [id number]   Status:  Final result   Visible to patient:  No (Blocked)   Next appt:  02/07/2018 at 03:00 PM in [address]  oLrie Vieyra MD)    Ref Range & Units 1/13/18  4:54 AM   TSH 3RD GENERATON 0 358 - 3 740 uIU/mL 3 722    Narrative       Patients undergoing fluorescein dye angiography may retain small amounts of fluorescein in the body for 48-72 hours post procedure  Samples containing fluorescein can produce falsely depressed TSH values  If the patient had this procedure,a specimen should be resubmitted post fluorescein clearance  The recommended reference ranges for TSH during pregnancy are as follows:  First trimester 0 1 to 2 5 uIU/mL  Second trimester  0 2 to 3 0 uIU/mL  Third trimester 0 3 to 3 0 uIU/m      Specimen Collected: 01/13/18  4:54 AM Last Resulted: 01/13/18 11:45 AM                Related Result Highlights         Lipid Panel with Direct LDL reflex  Final result 1/13/2018             Basic metabolic panel  Final result 1/13/2018             CBC (With Platelets)  Final result 1/13/2018 1/13/2018 11:45 AM     Component Results     Component Value Flag Ref Range Units Status   TSH 3RD GENERATON 3 722   0 358 - 3 740 uIU/mL Final   Narrative       Patients undergoing fluorescein dye angiography may retain small amounts of fluorescein in the body for 48-72 hours post procedure  Samples containing fluorescein can produce falsely depressed TSH values  If the patient had this procedure,a specimen should be resubmitted post fluorescein clearance            The recommended reference ranges for TSH during pregnancy are as follows:  First trimester 0 1 to 2 5 uIU/mL  Second trimester  0 2 to 3 0 uIU/mL  Third trimester 0 3 to 3 0 uIU/m          Lab Information     Lab   [facility]   [address] [facility] [address]  Tel: [phone]  : Otis Tran MD-Lab Medical Director                 Additional Information     Specimen ID Bill Type Client ID   [id number]     Specimen Date Taken Specimen Time Taken Specimen Received Date Specimen Received Time Result Date Result Time   Jan 13, 2018  4:54 AM Jan 13, 2018  5:06 AM Jan 13, 2018 11:45 AM   1/13/2018 11:45 AM - Lab, Background User     Component Results     Component Value Ref Range & Units Status Collected Lab   TSH 3RD GENERATON 3 722  0 358 - 3 740 uIU/mL Final 01/13/2018  4:54 AM [facility]   Narrative       Patients undergoing fluorescein dye angiography may retain small amounts of fluorescein in the body for 48-72 hours post procedure  Samples containing fluorescein can produce falsely depressed TSH values  If the patient had this procedure,a specimen should be resubmitted post fluorescein clearance  The recommended reference ranges for TSH during pregnancy are as follows:  First trimester 0 1 to 2 5 uIU/mL  Second trimester  0 2 to 3 0 uIU/mL  Third trimester 0 3 to 3 0 uIU/m   Lab and Collection     TSH, 3rd generation on 1/13/2018   Routing History     Priority Sent On From To Message Type    1/14/2018  1:41 PM Lab, Background User MYKEL Ruvalcaba [employer] Results Routing Results   Results Routing Details for Order: [id number]   Results contact on 1/13/18 - Final result   Outcome: Primary notification suppressed, CC list and Care Team evaluated   Routing Scheme Used: [employer] SYSTEM DEFINITION RESULTS ROUTING [de-identified]   Routing Scheme Line: Default   Resulting User: Lab, Background User [employer]   Routing Instant: Sat Jan 13, 2018 11:45 AM   Comments: Result suppressed based on patient status per System Definitions (LSD 2997)  The status was:  Inpatient Pre-Discharge   Current Status: Routing Complete   Status History: Result contact created Sat Jan 13, 2018 11:23 AM    Routing started Sat Jan 13, 2018 11:23 AM    Routing Complete Sat Jan 13, 2018 11:23 AM    Routing started Sat Jan 13, 2018 11:45 AM    Routing Complete Sat Jan 13, 2018 11:45 AM           Results   CBC (With Platelets) (Order [id number])   External Results Report     Open External Results Report   Lab Component SmartPhrase Guide     CBC (With Platelets) (Order #[id number]) on 1/13/18   CBC (With Platelets)   Order: [id number]   Status:  Final result   Visible to patient:  No (Blocked)   Next appt:  02/07/2018 at 03:00 PM in Geriatric Medicine Jose David Anderson MD)    Ref Range & Units 1/13/18  4:54 AM   WBC 4 31 - 10 16 Thousand/uL 5 99    RBC 3 81 - 5 12 Million/uL 4 39    Hemoglobin 11 5 - 15 4 g/dL 13 5    Hematocrit 34 8 - 46 1 % 39 8    MCV 82 - 98 fL 91    MCH 26 8 - 34 3 pg 30 8    MCHC 31 4 - 37 4 g/dL 33 9    RDW 11 6 - 15 1 % 13 7    Platelets 178 - 395 Thousands/uL 245    MPV 8 9 - 12 7 fL 11 1       Specimen Collected: 01/13/18  4:54 AM Last Resulted: 01/13/18  5:12 AM                Related Result Highlights         Basic metabolic panel  Final result 1/13/2018 1/13/2018  5:12 AM     Component Results     Component Value Flag Ref Range Units Status   WBC 5 99   4 31 - 10 16 Thousand/uL Final   RBC 4 39   3 81 - 5 12 Million/uL Final   Hemoglobin 13 5   11 5 - 15 4 g/dL Final   Hematocrit 39 8   34 8 - 46 1 % Final   MCV 91   82 - 98 fL Final   MCH 30 8   26 8 - 34 3 pg Final   MCHC 33 9   31 4 - 37 4 g/dL Final   RDW 13 7   11 6 - 15 1 % Final   Platelets 844   243 - 390 Thousands/uL Final   MPV 11 1   8 9 - 12 7 fL Final          Lab Information     Lab   [facility]   [address] [place] [address]  Tel: [phone]  : Celena Butcher MD-Lab Medical Director                 Additional Information     Specimen ID Bill Type Client ID   [id number]     Specimen Date Taken Specimen Time Taken Specimen Received Date Specimen Received Time Result Date Result Time   Jan 13, 2018  4:54 AM Jan 13, 2018  5:05 AM Jan 13, 2018  5:12 AM   1/13/2018  5:12 AM - Lab, Background User     Component Results     Component Value Ref Range & Units Status Collected Lab   WBC 5 99  4 31 - 10 16 Thousand/uL Final 01/13/2018  4:54 AM [facility]   RBC 4 39  3 81 - 5 12 Million/uL Final 01/13/2018  4:54 AM [facility]   Hemoglobin 13 5  11 5 - 15 4 g/dL Final 01/13/2018  4:54 AM [facility]   Hematocrit 39 8  34 8 - 46 1 % Final 01/13/2018  4:54 AM [facility]   MCV 91  82 - 98 fL Final 01/13/2018  4:54 AM [facility]   MCH 30 8  26 8 - 34 3 pg Final 01/13/2018  4:54 AM [facility]   MCHC 33 9  31 4 - 37 4 g/dL Final 01/13/2018  4:54 AM [facility]   RDW 13 7  11 6 - 15 1 % Final 01/13/2018  4:54 AM [facility]   Platelets 658  654 - 390 Thousands/uL Final 01/13/2018  4:54 AM [facility]   MPV 11 1  8 9 - 12 7 fL Final 01/13/2018  4:54 AM [facility]   Lab and Collection     CBC (With Platelets) on 4/18/4231   Routing History     Priority Sent On From To Message Type    1/14/2018  1:41 PM Lab, Background User P Sl [employer] Results Routing Results   Results Routing Details for Order: [id number]   Results contact on 1/13/18 - Final result   Outcome: Primary notification suppressed, CC list and Care Team evaluated   Routing Scheme Used: [employer] SYSTEM DEFINITION RESULTS ROUTING [de-identified]   Routing Scheme Line: Default   Resulting User: Lab, Background User [employer]   Routing Instant: Sat Jan 13, 2018 5:12 AM   Comments: Result suppressed based on patient status per System Definitions (LSD 1979)  The status was:  Inpatient Pre-Discharge   Current Status: Routing Complete   Status History: Result contact created Sat Jan 13, 2018 5:05 AM    Routing started Sat Jan 13, 2018 5:05 AM    Routing Complete Sat Jan 13, 2018 5:05 AM    Routing started Sat Jan 13, 2018 5:12 AM    Routing Complete Sat Jan 13, 2018 5:12 AM         Results    Basic metabolic panel (Order [id number])   External Results Report     Open External Results Report   Lab Component SmartPhrase Guide     Basic metabolic panel (Order #[id number]) on 5/18/61   Basic metabolic panel   Order: [id number]   Status:  Final result   Visible to patient:  No (Blocked)   Next appt:  02/07/2018 at 03:00 PM in Geriatric Medicine Salina Shah MD)    Ref Range & Units 1/13/18  4:54 AM Flag   Sodium 136 - 145 mmol/L 138     Potassium 3 5 - 5 3 mmol/L 3 7     Chloride 100 - 108 mmol/L 103     CO2 21 - 32 mmol/L 26     Anion Gap 4 - 13 mmol/L 9     BUN 5 - 25 mg/dL 16     Creatinine 0 60 - 1 30 mg/dL 0 70     Comments: Standardized to IDMS reference method   Glucose 65 - 140 mg/dL 164   H    Comments:    If the patient is fasting, the ADA then defines impaired fasting glucose as > 100 mg/dL and diabetes as > or equal to 123 mg/dL  Specimen collection should occur prior to Sulfasalazine administration due to the potential for falsely depressed results  Specimen collection should occur prior to Sulfapyridine administration due to the potential for falsely elevated results  Calcium 8 3 - 10 1 mg/dL 8 8     eGFR ml/min/1 73sq m 81     Narrative       National Kidney Disease Education Program recommendations are as follows:  GFR calculation is accurate only with a steady state creatinine  Chronic Kidney disease less than 60 ml/min/1 73 sq  meters  Kidney failure less than 15 ml/min/1 73 sq  meters        Specimen Collected: 01/13/18  4:54 AM Last Resulted: 01/13/18  6:03 AM                Related Result Highlights         TSH, 3rd generation  Final result 1/13/2018             Lipid Panel with Direct LDL reflex  Final result 1/13/2018             CBC (With Platelets)  Final result 1/13/2018 1/13/2018  6:03 AM     Component Results     Component Value Flag Ref Range Units Status   Sodium 138   136 - 145 mmol/L Final   Potassium 3 7   3 5 - 5 3 mmol/L Final   Chloride 103   100 - 108 mmol/L Final   CO2 26   21 - 32 mmol/L Final Anion Gap 9   4 - 13 mmol/L Final   BUN 16   5 - 25 mg/dL Final   Creatinine 0 70   0 60 - 1 30 mg/dL Final   Comment:   Standardized to IDMS reference method   Glucose 164   H  65 - 140 mg/dL Final   Comment:     If the patient is fasting, the ADA then defines impaired fasting glucose as > 100 mg/dL and diabetes as > or equal to 123 mg/dL  Specimen collection should occur prior to Sulfasalazine administration due to the potential for falsely depressed results  Specimen collection should occur prior to Sulfapyridine administration due to the potential for falsely elevated results     Calcium 8 8   8 3 - 10 1 mg/dL Final   eGFR 81    ml/min/1 73sq m Final   Narrative       National Kidney Disease Education Program recommendations are as follows:  GFR calculation is accurate only with a steady state creatinine  Chronic Kidney disease less than 60 ml/min/1 73 sq  meters  Kidney failure less than 15 ml/min/1 73 sq  meters           Lab Information     Lab   [facility]   [address] [place] [address]  Tel: [phone]  : Tre Childers MD-Lab Medical Director                 Additional Information     Specimen ID Bill Type Client ID   [id number]     Specimen Date Taken Specimen Time Taken Specimen Received Date Specimen Received Time Result Date Result Time   Jan 13, 2018  4:54 AM Jan 13, 2018  5:06 AM Jan 13, 2018  6:03 AM   1/13/2018  6:03 AM - Lab, Background User     Component Results     Component Value Ref Range & Units Status Collected Lab   Sodium 138  136 - 145 mmol/L Final 01/13/2018  4:54 AM [facility]   Potassium 3 7  3 5 - 5 3 mmol/L Final 01/13/2018  4:54 AM [facility]   Chloride 103  100 - 108 mmol/L Final 01/13/2018  4:54 AM [facility]   CO2 26  21 - 32 mmol/L Final 01/13/2018  4:54 AM [facility]   Anion Gap 9  4 - 13 mmol/L Final 01/13/2018  4:54 AM [facility]   BUN 16  5 - 25 mg/dL Final 01/13/2018  4:54 AM [facility]   Creatinine 0 70  0 60 - 1 30 mg/dL Final 01/13/2018  4:54 AM [facility] Standardized to IDMS reference method   Glucose 164   65 - 140 mg/dL Final 01/13/2018  4:54 AM [facility]     If the patient is fasting, the ADA then defines impaired fasting glucose as > 100 mg/dL and diabetes as > or equal to 123 mg/dL  Specimen collection should occur prior to Sulfasalazine administration due to the potential for falsely depressed results  Specimen collection should occur prior to Sulfapyridine administration due to the potential for falsely elevated results  Calcium 8 8  8 3 - 10 1 mg/dL Final 01/13/2018  4:54 AM [facility]   eGFR 81  ml/min/1 73sq m Final 01/13/2018  4:54 AM [facility]   Narrative       National Kidney Disease Education Program recommendations are as follows:  GFR calculation is accurate only with a steady state creatinine  Chronic Kidney disease less than 60 ml/min/1 73 sq  meters  Kidney failure less than 15 ml/min/1 73 sq  meters  Lab and Collection     Basic metabolic panel on 4/48/4042   Routing History     Priority Sent On From To Message Type    1/14/2018  1:41 PM Lab, Background User P Peg [employer] Results Routing Results   Results Routing Details for Order: [id number]   Results contact on 1/13/18 - Final result   Outcome: Primary notification suppressed, CC list and Care Team evaluated   Routing Scheme Used: [employer] SYSTEM DEFINITION RESULTS ROUTING [de-identified]   Routing Scheme Line: Default   Resulting User: Lab, Background User [employer]   Routing Instant: Sat Jan 13, 2018 6:03 AM   Comments: Result suppressed based on patient status per System Definitions (LSD 3899)  The status was:  Inpatient Pre-Discharge   Current Status: Routing Complete   Status History: Result contact created Sat Jan 13, 2018 5:06 AM    Routing started Sat Jan 13, 2018 5:06 AM    Routing Complete Sat Jan 13, 2018 5:06 AM    Routing started Sat Jan 13, 2018 6:03 AM    Routing Complete Sat Jan 13, 2018 6:03 AM           Results    Hemoglobin A1c (Order [id number])   External Results Report     Open External Results Report   Lab Component SmartPhrase Guide     Hemoglobin A1c (Order #[id number]) on 1/13/18   Hemoglobin A1c   Order: [id number]   Status:  Final result   Visible to patient:  No (Blocked)   Next appt:  02/07/2018 at 03:00 PM in Geriatric Medicine Juanita Guzman MD)    Ref Range & Units 1/13/18  4:54 AM Flag   Hemoglobin A1C 4 2 - 6 3 % 8 1   H    EAG mg/dl 186        Specimen Collected: 01/13/18  4:54 AM Last Resulted: 01/13/18 11:59 AM                      1/13/2018 11:59 AM     Component Results     Component Value Flag Ref Range Units Status   Hemoglobin A1C 8 1   H  4 2 - 6 3 % Final       mg/dl Final          Lab Information     Lab   [facility]   [address] [address]  Tel: [phone]  : Brittany Cruz MD-Lab Medical Director                 Additional Information     Specimen ID Bill Type Client ID   [id number] Client    Specimen Date Taken Specimen Time Taken Specimen Received Date Specimen Received Time Result Date Result Time   Jan 13, 2018  4:54 AM Jan 13, 2018 11:30 AM Jan 13, 2018 11:59 AM   1/13/2018 11:59 AM - Lab, Background User     Component Results     Component Value Ref Range & Units Status Collected Lab   Hemoglobin A1C 8 1   4 2 - 6 3 % Final 01/13/2018  4:54 AM [facility]     mg/dl Final 01/13/2018  4:54 AM [facility]   Lab and Collection     Hemoglobin A1c on 1/13/2018   Routing History     Priority Sent On From To Message Type    1/14/2018  1:41 PM Lab, Background User P [employer] Results Routing Results   Results Routing Details for Order: [id number]   Results contact on 1/13/18 - Final result   Outcome: Primary notification suppressed, CC list and Care Team evaluated   Routing Scheme Used: [employer] SYSTEM DEFINITION RESULTS ROUTING [de-identified]   Routing Scheme Line: Default   Resulting User: Lab, Background User [email or web address]   Routing Instant: Sat Jan 13, 2018 11:59 AM   Comments: Result suppressed based on patient status per System Definitions (LSD 2100)  The status was: Inpatient Pre-Discharge   Current Status: Routing Complete   Status History: Result contact created Sat Jan 13, 2018 5:06 AM    Routing started Sat Jan 13, 2018 5:06 AM    Routing Complete Sat Jan 13, 2018 5:06 AM    Routing started Sat Jan 13, 2018 11:59 AM    Routing Complete Sat Jan 13, 2018 11:59 AM           Results   Lipid Panel with Direct LDL reflex (Order [id number])   External Results Report     Open External Results Report   Lab Component SmartPhrase Guide     Lipid Panel with Direct LDL reflex (Order #[id number]) on 1/13/18   Question     Collection Question Answer   Has the patient been fasting for more than 10 hours? Unknown   Lipid Panel with Direct LDL reflex   Order: [id number]   Status:  Final result   Visible to patient:  No (Blocked)   Next appt:  02/07/2018 at 03:00 PM in [address] [place], [place] [id number] Chelsy Tenorio MD)    Ref Range & Units 1/13/18  4:54 AM   Cholesterol 50 - 200 mg/dL 127    Comments:    Cholesterol:       Desirable         <200 mg/dl       Borderline         200-239 mg/dl       High              >239          Triglycerides <=150 mg/dL 110    Comments: Specimen collection should occur prior to N-Acetylcysteine or Metamizole administration due to the potential for falsely depressed results  HDL, Direct 40 - 60 mg/dL 55    Comments:    HDL Cholesterol:       High    >59 mg/dL       Low     <41 mg/dL   HDL Cholesterol:       High    >59 mg/dL       Low     <41 mg/dL   LDL Calculated 0 - 100 mg/dL 50    Comments: This screening LDL is a calculated result  It does not have the accuracy of the Direct Measured LDL in the monitoring of patients with hyperlipidemia and/or statin therapy  Direct Measure LDL ([id number]) must be ordered separately in these patients     Narrative       Triglyceride:        Normal               <150 mg/dl        Borderline High     150-199 mg/dl        High               200-499 mg/dl        Very High           >499 mg/dl      Specimen Collected: 01/13/18  4:54 AM Last Resulted: 01/13/18  6:03 AM                Related Result Highlights         TSH, 3rd generation  Final result 1/13/2018             Basic metabolic panel  Final result 1/13/2018             CBC (With Platelets)  Final result 1/13/2018 1/13/2018  6:03 AM     Component Results     Component Value Flag Ref Range Units Status   Cholesterol 127   50 - 200 mg/dL Final   Comment:     Cholesterol:       Desirable         <200 mg/dl       Borderline         200-239 mg/dl       High              >239          Triglycerides 110   <=150 mg/dL Final   Comment:   Specimen collection should occur prior to N-Acetylcysteine or Metamizole administration due to the potential for falsely depressed results  HDL, Direct 55   40 - 60 mg/dL Final   Comment:     HDL Cholesterol:       High    >59 mg/dL       Low     <41 mg/dL   HDL Cholesterol:       High    >59 mg/dL       Low     <41 mg/dL   LDL Calculated 50   0 - 100 mg/dL Final   Comment: This screening LDL is a calculated result  It does not have the accuracy of the Direct Measured LDL in the monitoring of patients with hyperlipidemia and/or statin therapy  Direct Measure LDL ([id number]) must be ordered separately in these patients     Narrative       Triglyceride:        Normal               <150 mg/dl        Borderline High     150-199 mg/dl        High               200-499 mg/dl        Very High           >499 mg/dl          Lab Information     Lab   [facility]   [address] [place] [id number]  Tel: [phone]  : Nita West MD-Lab Medical Director                 Additional Information     Specimen ID Bill Type Client ID   [id number]     Specimen Date Taken Specimen Time Taken Specimen Received Date Specimen Received Time Result Date Result Time   Jan 13, 2018  4:54 AM Jan 13, 2018  5:06 AM Jan 13, 2018  6:03 AM   1/13/2018  6:03 AM - Lab, Background User Component Results     Component Value Ref Range & Units Status Collected Lab   Cholesterol 127  50 - 200 mg/dL Final 01/13/2018  4:54 AM [facility]     Cholesterol:       Desirable         <200 mg/dl       Borderline         200-239 mg/dl       High              >239          Triglycerides 110  <=150 mg/dL Final 01/13/2018  4:54 AM [facility]   Specimen collection should occur prior to N-Acetylcysteine or Metamizole administration due to the potential for falsely depressed results  HDL, Direct 55  40 - 60 mg/dL Final 01/13/2018  4:54 AM [facility]     HDL Cholesterol:       High    >59 mg/dL       Low     <41 mg/dL   HDL Cholesterol:       High    >59 mg/dL       Low     <41 mg/dL   LDL Calculated 50  0 - 100 mg/dL Final 01/13/2018  4:54 AM [facility]     This screening LDL is a calculated result  It does not have the accuracy of the Direct Measured LDL in the monitoring of patients with hyperlipidemia and/or statin therapy  Direct Measure LDL ([id number]) must be ordered separately in these patients  Narrative       Triglyceride:        Normal               <150 mg/dl        Borderline High     150-199 mg/dl        High               200-499 mg/dl        Very High           >499 mg/dl   Lab and Collection     Lipid Panel with Direct LDL reflex on 1/13/2018   Routing History     Priority Sent On From To Message Type    1/14/2018  1:41 PM Lab, Background User [employer] Results Routing Results   Results Routing Details for Order: [id number]   Results contact on 1/13/18 - Final result   Outcome: Primary notification suppressed, CC list and Care Team evaluated   Routing Scheme Used: SL SYSTEM DEFINITION RESULTS ROUTING [de-identified]   Routing Scheme Line: Default   Resulting User: Lab, Background User [employer]   Routing Instant: Sat Jan 13, 2018 6:03 AM   Comments: Result suppressed based on patient status per System Definitions (LSD 6695)  The status was:  Inpatient Pre-Discharge   Current Status: Routing Complete Status History: Result contact created Sat Jan 13, 2018 5:06 AM    Routing started Sat Jan 13, 2018 5:06 AM    Routing Complete Sat Jan 13, 2018 5:06 AM    Routing started Sat Jan 13, 2018 6:03 AM    Routing Complete Sat Jan 13, 2018 6:03 AM

## 2018-01-25 ENCOUNTER — TELEPHONE (OUTPATIENT)
Dept: FAMILY MEDICINE CLINIC | Facility: CLINIC | Age: 83
End: 2018-01-25

## 2018-01-25 NOTE — TELEPHONE ENCOUNTER
Are the medications the patient is taking and according to patient she was to call this in to check which medication is making her light headed    Metformin 1000 mg 1 a day  Glipizide 10 mg tbs 2 a day  netprolol 25 mg at bed time  anlodipine  5 mg take 1 tb daily  escitalopran 5 mg tb 1 a day  xarelto 20 mg tb 1 daily  sinbasgatin tbs 1 daily

## 2018-01-30 DIAGNOSIS — E13.9 DIABETES 1.5, MANAGED AS TYPE 2 (HCC): Primary | ICD-10-CM

## 2018-01-31 RX ORDER — GLIPIZIDE 10 MG/1
TABLET, FILM COATED, EXTENDED RELEASE ORAL
Qty: 60 TABLET | Refills: 11 | Status: SHIPPED | OUTPATIENT
Start: 2018-01-31 | End: 2018-03-04 | Stop reason: HOSPADM

## 2018-02-19 DIAGNOSIS — E13.9 DIABETES 1.5, MANAGED AS TYPE 2 (HCC): Primary | ICD-10-CM

## 2018-02-19 RX ORDER — LANCETS 33 GAUGE
EACH MISCELLANEOUS
Qty: 200 EACH | Refills: 3 | Status: SHIPPED | OUTPATIENT
Start: 2018-02-19 | End: 2019-03-10 | Stop reason: SDUPTHER

## 2018-03-01 ENCOUNTER — OFFICE VISIT (OUTPATIENT)
Dept: FAMILY MEDICINE CLINIC | Facility: CLINIC | Age: 83
End: 2018-03-01
Payer: COMMERCIAL

## 2018-03-01 ENCOUNTER — APPOINTMENT (OUTPATIENT)
Dept: LAB | Facility: HOSPITAL | Age: 83
DRG: 291 | End: 2018-03-01
Payer: COMMERCIAL

## 2018-03-01 ENCOUNTER — HOSPITAL ENCOUNTER (OUTPATIENT)
Dept: RADIOLOGY | Facility: HOSPITAL | Age: 83
Discharge: HOME | DRG: 291 | End: 2018-03-01
Payer: COMMERCIAL

## 2018-03-01 VITALS
SYSTOLIC BLOOD PRESSURE: 130 MMHG | HEIGHT: 58 IN | RESPIRATION RATE: 28 BRPM | TEMPERATURE: 98.6 F | DIASTOLIC BLOOD PRESSURE: 60 MMHG | OXYGEN SATURATION: 96 % | HEART RATE: 78 BPM | WEIGHT: 172.2 LBS | BODY MASS INDEX: 36.15 KG/M2

## 2018-03-01 DIAGNOSIS — R06.02 SOB (SHORTNESS OF BREATH): Primary | ICD-10-CM

## 2018-03-01 DIAGNOSIS — R06.02 SOB (SHORTNESS OF BREATH): ICD-10-CM
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
## 2018-03-01 DIAGNOSIS — E11.3559: ICD-10-CM

## 2018-03-01 DIAGNOSIS — I50.9 ACUTE ON CHRONIC CONGESTIVE HEART FAILURE, UNSPECIFIED CONGESTIVE HEART FAILURE TYPE: Primary | ICD-10-CM

## 2018-03-01 DIAGNOSIS — M54.50 BILATERAL LOW BACK PAIN WITHOUT SCIATICA, UNSPECIFIED CHRONICITY: ICD-10-CM

## 2018-03-01 DIAGNOSIS — E11.65: ICD-10-CM

## 2018-03-01 PROBLEM — M25.562 LEFT KNEE PAIN: Status: ACTIVE | Noted: 2017-12-04

## 2018-03-01 PROBLEM — G47.00 INSOMNIA: Status: ACTIVE | Noted: 2017-01-25

## 2018-03-01 PROBLEM — G47.61 PERIODIC LIMB MOVEMENT SLEEP DISORDER: Status: ACTIVE | Noted: 2017-09-29

## 2018-03-01 PROBLEM — I63.9 CVA (CEREBRAL VASCULAR ACCIDENT) (HCC): Status: ACTIVE | Noted: 2017-01-13

## 2018-03-01 PROBLEM — M81.0 OSTEOPOROSIS: Status: ACTIVE | Noted: 2017-05-04

## 2018-03-01 PROBLEM — G47.8 SLEEP TALKING: Status: ACTIVE | Noted: 2017-08-25

## 2018-03-01 PROBLEM — G47.33 OSA (OBSTRUCTIVE SLEEP APNEA): Status: ACTIVE | Noted: 2017-09-29

## 2018-03-01 PROBLEM — R26.81 UNSTEADY GAIT: Status: ACTIVE | Noted: 2017-01-13

## 2018-03-01 LAB
ALBUMIN SERPL BCP-MCNC: 3.5 G/DL (ref 3.5–5)
ALP SERPL-CCNC: 95 U/L (ref 46–116)
ALT SERPL W P-5'-P-CCNC: 23 U/L (ref 12–78)
ANION GAP SERPL CALCULATED.3IONS-SCNC: 9 MMOL/L (ref 4–13)
AST SERPL W P-5'-P-CCNC: 10 U/L (ref 5–45)
BILIRUB SERPL-MCNC: 0.6 MG/DL (ref 0.2–1)
BUN SERPL-MCNC: 17 MG/DL (ref 5–25)
CALCIUM SERPL-MCNC: 9.4 MG/DL (ref 8.3–10.1)
CHLORIDE SERPL-SCNC: 98 MMOL/L (ref 100–108)
CO2 SERPL-SCNC: 28 MMOL/L (ref 21–32)
CREAT SERPL-MCNC: 0.98 MG/DL (ref 0.6–1.3)
GFR SERPL CREATININE-BSD FRML MDRD: 54 ML/MIN/1.73SQ M
GLUCOSE SERPL-MCNC: 355 MG/DL (ref 65–140)
NT-PROBNP SERPL-MCNC: 3090 PG/ML
POTASSIUM SERPL-SCNC: 4.4 MMOL/L (ref 3.5–5.3)
PROT SERPL-MCNC: 7.5 G/DL (ref 6.4–8.2)
SODIUM SERPL-SCNC: 135 MMOL/L (ref 136–145)

## 2018-03-01 PROCEDURE — 36415 COLL VENOUS BLD VENIPUNCTURE: CPT

## 2018-03-01 PROCEDURE — 71046 X-RAY EXAM CHEST 2 VIEWS: CPT

## 2018-03-01 PROCEDURE — 83880 ASSAY OF NATRIURETIC PEPTIDE: CPT

## 2018-03-01 PROCEDURE — 99214 OFFICE O/P EST MOD 30 MIN: CPT | Performed by: FAMILY MEDICINE

## 2018-03-01 PROCEDURE — 80053 COMPREHEN METABOLIC PANEL: CPT

## 2018-03-01 PROCEDURE — 72100 X-RAY EXAM L-S SPINE 2/3 VWS: CPT

## 2018-03-01 RX ORDER — BRIMONIDINE TARTRATE/TIMOLOL 0.2%-0.5%
DROPS OPHTHALMIC (EYE)
Refills: 0 | COMMUNITY
Start: 2018-02-08 | End: 2019-01-11 | Stop reason: ALTCHOICE

## 2018-03-01 RX ORDER — GLIPIZIDE 10 MG/1
10 TABLET ORAL
Qty: 60 TABLET | Refills: 5 | Status: SHIPPED | OUTPATIENT
Start: 2018-03-01 | End: 2018-04-12

## 2018-03-01 RX ORDER — FUROSEMIDE 20 MG/1
20 TABLET ORAL DAILY
Qty: 30 TABLET | Refills: 5 | Status: SHIPPED | OUTPATIENT
Start: 2018-03-01 | End: 2018-04-12

## 2018-03-01 NOTE — PROGRESS NOTES
Patient is here for a few different things, Blood sugars, wheezing and sob  Assessment/Plan:    Advised pt to go back to metformin 500mg Qd  If BS still >200 always, call office  Will check labs and CXR  Will check lumbar xray  Fall precautions  RTO as scheduled  Diagnoses and all orders for this visit:    SOB (shortness of breath)  -     NT-BNP PRO; Future  -     Comprehensive metabolic panel; Future  -     XR chest pa & lateral; Future    Uncontrolled type 2 diabetes mellitus with stable proliferative retinopathy, without long-term current use of insulin, unspecified laterality (HCC)  -     glipiZIDE (GLUCOTROL) 10 mg tablet; Take 1 tablet (10 mg total) by mouth 2 (two) times a day before meals for 30 days Pt takes 10 mg two times a day  -     metFORMIN (GLUCOPHAGE) 500 mg tablet; Take 1 tablet (500 mg total) by mouth daily with breakfast for 90 days    Bilateral low back pain without sciatica, unspecified chronicity  -     XR spine lumbar 2 or 3 views injury; Future    Fall, initial encounter  -     XR spine lumbar 2 or 3 views injury; Future    Other orders  -     COMBIGAN 0 2-0 5 %; instill 1 drop into both eyes twice a day          Subjective:      Patient ID: Teresa Ragsdale is a 80 y o  female  HPI    Pt is here with son and daughter-in-law  C/o fell several times in last several month and hurt her back  She fell because she missed her steps and fell from her bed while rolling in her bed at night  Denies swollen or bruise  Lower back pain constant, 4/10  Denies radiating to lower legs  Denies fever or urinary symptoms  Pt states she thought her back pain maybe from metformin  So she cut her metformin in half last week, 250mg Qd  Now her Blood glucose was 300 at home and she is worry about it  Feels SOB in last 2 days  Worse after exertion  Some wheezing  Denies chest pain, nausea, vomiting or abdominal pain  Hx of acute CHF and stroke       The following portions of the patient's history were reviewed and updated as appropriate: allergies, current medications, past family history, past medical history, past social history, past surgical history and problem list     Review of Systems   Constitutional: Negative for appetite change, chills and fever  HENT: Negative for congestion, ear pain, sinus pain and sore throat  Eyes: Negative for discharge and itching  Respiratory: Positive for shortness of breath and wheezing  Negative for apnea, cough and chest tightness  Cardiovascular: Negative for chest pain, palpitations and leg swelling  Gastrointestinal: Negative for abdominal pain, anal bleeding, constipation, diarrhea, nausea and vomiting  Endocrine: Negative for cold intolerance, heat intolerance and polyuria  Genitourinary: Negative for difficulty urinating and dysuria  Musculoskeletal: Positive for back pain  Negative for arthralgias and myalgias  Skin: Negative for rash  Neurological: Negative for dizziness and headaches  Psychiatric/Behavioral: Negative for agitation  Objective:      /60   Pulse 78   Temp 98 6 °F (37 °C) (Tympanic)   Resp (!) 28   Ht 4' 9 5" (1 461 m)   Wt 78 1 kg (172 lb 3 2 oz)   SpO2 96%   BMI 36 62 kg/m²          Physical Exam   Constitutional: She appears well-developed  No distress  HENT:   Head: Normocephalic  Right Ear: External ear normal    Left Ear: External ear normal    Nose: Nose normal    Mouth/Throat: Oropharynx is clear and moist    Eyes: Conjunctivae are normal  Pupils are equal, round, and reactive to light  Right eye exhibits no discharge  Left eye exhibits no discharge  Neck: Normal range of motion  No thyromegaly present  Cardiovascular: Normal rate, regular rhythm and normal heart sounds  Exam reveals no gallop and no friction rub  No murmur heard  Pulmonary/Chest: Effort normal and breath sounds normal  No respiratory distress  She has no wheezes  She has no rales   She exhibits no tenderness  Abdominal: Soft  Bowel sounds are normal  She exhibits no distension and no mass  There is no tenderness  There is no rebound and no guarding  Musculoskeletal: Normal range of motion  She exhibits no edema, tenderness or deformity  Lower back tenderness, no swollen or bruise   Lymphadenopathy:     She has no cervical adenopathy  Neurological: She is alert  Psychiatric: She has a normal mood and affect

## 2018-03-02 ENCOUNTER — APPOINTMENT (EMERGENCY)
Dept: RADIOLOGY | Facility: HOSPITAL | Age: 83
DRG: 291 | End: 2018-03-02
Payer: COMMERCIAL

## 2018-03-02 ENCOUNTER — HOSPITAL ENCOUNTER (INPATIENT)
Facility: HOSPITAL | Age: 83
LOS: 2 days | Discharge: HOME | DRG: 291 | End: 2018-03-04
Attending: INTERNAL MEDICINE | Admitting: INTERNAL MEDICINE
Payer: COMMERCIAL

## 2018-03-02 ENCOUNTER — TELEPHONE (OUTPATIENT)
Dept: FAMILY MEDICINE CLINIC | Facility: CLINIC | Age: 83
End: 2018-03-02

## 2018-03-02 ENCOUNTER — APPOINTMENT (INPATIENT)
Dept: NON INVASIVE DIAGNOSTICS | Facility: HOSPITAL | Age: 83
DRG: 291 | End: 2018-03-02
Payer: COMMERCIAL

## 2018-03-02 DIAGNOSIS — I50.31 ACUTE DIASTOLIC CONGESTIVE HEART FAILURE (HCC): ICD-10-CM

## 2018-03-02 DIAGNOSIS — I50.9 CHF EXACERBATION (HCC): Primary | ICD-10-CM

## 2018-03-02 PROBLEM — Z86.73 HISTORY OF CARDIOEMBOLIC CEREBROVASCULAR ACCIDENT (CVA): Status: ACTIVE | Noted: 2018-03-02

## 2018-03-02 LAB
ALBUMIN SERPL BCP-MCNC: 3.7 G/DL (ref 3.5–5)
ALP SERPL-CCNC: 101 U/L (ref 46–116)
ALT SERPL W P-5'-P-CCNC: 21 U/L (ref 12–78)
ANION GAP SERPL CALCULATED.3IONS-SCNC: 10 MMOL/L (ref 4–13)
AST SERPL W P-5'-P-CCNC: 9 U/L (ref 5–45)
BASOPHILS # BLD AUTO: 0.02 THOUSANDS/ΜL (ref 0–0.1)
BASOPHILS NFR BLD AUTO: 0 % (ref 0–1)
BILIRUB SERPL-MCNC: 0.71 MG/DL (ref 0.2–1)
BUN SERPL-MCNC: 14 MG/DL (ref 5–25)
CALCIUM SERPL-MCNC: 9.2 MG/DL (ref 8.3–10.1)
CHLORIDE SERPL-SCNC: 99 MMOL/L (ref 100–108)
CO2 SERPL-SCNC: 27 MMOL/L (ref 21–32)
CREAT SERPL-MCNC: 0.8 MG/DL (ref 0.6–1.3)
EOSINOPHIL # BLD AUTO: 0.14 THOUSAND/ΜL (ref 0–0.61)
EOSINOPHIL NFR BLD AUTO: 2 % (ref 0–6)
ERYTHROCYTE [DISTWIDTH] IN BLOOD BY AUTOMATED COUNT: 13.4 % (ref 11.6–15.1)
GFR SERPL CREATININE-BSD FRML MDRD: 69 ML/MIN/1.73SQ M
GLUCOSE SERPL-MCNC: 186 MG/DL (ref 65–140)
GLUCOSE SERPL-MCNC: 219 MG/DL (ref 65–140)
GLUCOSE SERPL-MCNC: 324 MG/DL (ref 65–140)
GLUCOSE SERPL-MCNC: 336 MG/DL (ref 65–140)
HCT VFR BLD AUTO: 41.8 % (ref 34.8–46.1)
HGB BLD-MCNC: 14 G/DL (ref 11.5–15.4)
LYMPHOCYTES # BLD AUTO: 1.39 THOUSANDS/ΜL (ref 0.6–4.47)
LYMPHOCYTES NFR BLD AUTO: 16 % (ref 14–44)
MCH RBC QN AUTO: 30.9 PG (ref 26.8–34.3)
MCHC RBC AUTO-ENTMCNC: 33.5 G/DL (ref 31.4–37.4)
MCV RBC AUTO: 92 FL (ref 82–98)
MONOCYTES # BLD AUTO: 0.58 THOUSAND/ΜL (ref 0.17–1.22)
MONOCYTES NFR BLD AUTO: 7 % (ref 4–12)
NEUTROPHILS # BLD AUTO: 6.81 THOUSANDS/ΜL (ref 1.85–7.62)
NEUTS SEG NFR BLD AUTO: 75 % (ref 43–75)
NT-PROBNP SERPL-MCNC: 2657 PG/ML
PLATELET # BLD AUTO: 278 THOUSANDS/UL (ref 149–390)
PMV BLD AUTO: 11.6 FL (ref 8.9–12.7)
POTASSIUM SERPL-SCNC: 4.3 MMOL/L (ref 3.5–5.3)
PROT SERPL-MCNC: 7.9 G/DL (ref 6.4–8.2)
RBC # BLD AUTO: 4.53 MILLION/UL (ref 3.81–5.12)
SODIUM SERPL-SCNC: 136 MMOL/L (ref 136–145)
TROPONIN I SERPL-MCNC: <0.02 NG/ML
WBC # BLD AUTO: 8.94 THOUSAND/UL (ref 4.31–10.16)

## 2018-03-02 PROCEDURE — 36415 COLL VENOUS BLD VENIPUNCTURE: CPT

## 2018-03-02 PROCEDURE — 71046 X-RAY EXAM CHEST 2 VIEWS: CPT

## 2018-03-02 PROCEDURE — 84484 ASSAY OF TROPONIN QUANT: CPT | Performed by: INTERNAL MEDICINE

## 2018-03-02 PROCEDURE — 83880 ASSAY OF NATRIURETIC PEPTIDE: CPT | Performed by: INTERNAL MEDICINE

## 2018-03-02 PROCEDURE — 85025 COMPLETE CBC W/AUTO DIFF WBC: CPT | Performed by: INTERNAL MEDICINE

## 2018-03-02 PROCEDURE — 99285 EMERGENCY DEPT VISIT HI MDM: CPT

## 2018-03-02 PROCEDURE — 99223 1ST HOSP IP/OBS HIGH 75: CPT | Performed by: INTERNAL MEDICINE

## 2018-03-02 PROCEDURE — 93306 TTE W/DOPPLER COMPLETE: CPT

## 2018-03-02 PROCEDURE — 99222 1ST HOSP IP/OBS MODERATE 55: CPT | Performed by: INTERNAL MEDICINE

## 2018-03-02 PROCEDURE — 82948 REAGENT STRIP/BLOOD GLUCOSE: CPT

## 2018-03-02 PROCEDURE — 93005 ELECTROCARDIOGRAM TRACING: CPT | Performed by: INTERNAL MEDICINE

## 2018-03-02 PROCEDURE — 80053 COMPREHEN METABOLIC PANEL: CPT | Performed by: INTERNAL MEDICINE

## 2018-03-02 PROCEDURE — 93306 TTE W/DOPPLER COMPLETE: CPT | Performed by: INTERNAL MEDICINE

## 2018-03-02 RX ORDER — CALCIUM CARBONATE 200(500)MG
1000 TABLET,CHEWABLE ORAL DAILY PRN
Status: DISCONTINUED | OUTPATIENT
Start: 2018-03-02 | End: 2018-03-04 | Stop reason: HOSPADM

## 2018-03-02 RX ORDER — ESCITALOPRAM OXALATE 5 MG/1
5 TABLET ORAL DAILY
Status: DISCONTINUED | OUTPATIENT
Start: 2018-03-02 | End: 2018-03-04 | Stop reason: HOSPADM

## 2018-03-02 RX ORDER — ACETAMINOPHEN 325 MG/1
650 TABLET ORAL EVERY 4 HOURS PRN
Status: DISCONTINUED | OUTPATIENT
Start: 2018-03-02 | End: 2018-03-04 | Stop reason: HOSPADM

## 2018-03-02 RX ORDER — LISINOPRIL 5 MG/1
5 TABLET ORAL DAILY
Status: DISCONTINUED | OUTPATIENT
Start: 2018-03-02 | End: 2018-03-04 | Stop reason: HOSPADM

## 2018-03-02 RX ORDER — DORZOLAMIDE HYDROCHLORIDE AND TIMOLOL MALEATE 20; 5 MG/ML; MG/ML
1 SOLUTION/ DROPS OPHTHALMIC 2 TIMES DAILY
Status: DISCONTINUED | OUTPATIENT
Start: 2018-03-02 | End: 2018-03-02

## 2018-03-02 RX ORDER — INSULIN GLARGINE 100 [IU]/ML
15 INJECTION, SOLUTION SUBCUTANEOUS
Status: DISCONTINUED | OUTPATIENT
Start: 2018-03-02 | End: 2018-03-04

## 2018-03-02 RX ORDER — FUROSEMIDE 10 MG/ML
20 INJECTION INTRAMUSCULAR; INTRAVENOUS ONCE
Status: COMPLETED | OUTPATIENT
Start: 2018-03-02 | End: 2018-03-02

## 2018-03-02 RX ORDER — METOPROLOL SUCCINATE 25 MG/1
25 TABLET, EXTENDED RELEASE ORAL
Status: DISCONTINUED | OUTPATIENT
Start: 2018-03-02 | End: 2018-03-02

## 2018-03-02 RX ORDER — AMLODIPINE BESYLATE 5 MG/1
5 TABLET ORAL DAILY
Status: DISCONTINUED | OUTPATIENT
Start: 2018-03-02 | End: 2018-03-02

## 2018-03-02 RX ORDER — ONDANSETRON 2 MG/ML
4 INJECTION INTRAMUSCULAR; INTRAVENOUS EVERY 6 HOURS PRN
Status: DISCONTINUED | OUTPATIENT
Start: 2018-03-02 | End: 2018-03-04 | Stop reason: HOSPADM

## 2018-03-02 RX ORDER — DORZOLAMIDE HYDROCHLORIDE AND TIMOLOL MALEATE 20; 5 MG/ML; MG/ML
1 SOLUTION/ DROPS OPHTHALMIC 2 TIMES DAILY
Status: DISCONTINUED | OUTPATIENT
Start: 2018-03-02 | End: 2018-03-04 | Stop reason: HOSPADM

## 2018-03-02 RX ORDER — AMLODIPINE BESYLATE 5 MG/1
5 TABLET ORAL DAILY
Status: DISCONTINUED | OUTPATIENT
Start: 2018-03-03 | End: 2018-03-04

## 2018-03-02 RX ORDER — FUROSEMIDE 10 MG/ML
40 INJECTION INTRAMUSCULAR; INTRAVENOUS
Status: DISCONTINUED | OUTPATIENT
Start: 2018-03-02 | End: 2018-03-03

## 2018-03-02 RX ORDER — GLIPIZIDE 5 MG/1
10 TABLET ORAL
Status: DISCONTINUED | OUTPATIENT
Start: 2018-03-02 | End: 2018-03-02

## 2018-03-02 RX ORDER — METOPROLOL SUCCINATE 25 MG/1
25 TABLET, EXTENDED RELEASE ORAL
Status: DISCONTINUED | OUTPATIENT
Start: 2018-03-02 | End: 2018-03-04 | Stop reason: HOSPADM

## 2018-03-02 RX ORDER — DORZOLAMIDE HCL 20 MG/ML
1 SOLUTION/ DROPS OPHTHALMIC 3 TIMES DAILY
Status: DISCONTINUED | OUTPATIENT
Start: 2018-03-02 | End: 2018-03-02

## 2018-03-02 RX ORDER — PRAVASTATIN SODIUM 80 MG/1
80 TABLET ORAL
Status: DISCONTINUED | OUTPATIENT
Start: 2018-03-02 | End: 2018-03-04 | Stop reason: HOSPADM

## 2018-03-02 RX ORDER — MINERAL OIL AND PETROLATUM 150; 830 MG/G; MG/G
OINTMENT OPHTHALMIC
Status: DISCONTINUED | OUTPATIENT
Start: 2018-03-02 | End: 2018-03-04 | Stop reason: HOSPADM

## 2018-03-02 RX ADMIN — ACETAMINOPHEN 650 MG: 325 TABLET, FILM COATED ORAL at 21:23

## 2018-03-02 RX ADMIN — FUROSEMIDE 20 MG: 10 INJECTION, SOLUTION INTRAMUSCULAR; INTRAVENOUS at 10:51

## 2018-03-02 RX ADMIN — METOPROLOL SUCCINATE 25 MG: 25 TABLET, EXTENDED RELEASE ORAL at 21:24

## 2018-03-02 RX ADMIN — ESCITALOPRAM 5 MG: 5 TABLET, FILM COATED ORAL at 11:49

## 2018-03-02 RX ADMIN — INSULIN LISPRO 1 UNITS: 100 INJECTION, SOLUTION INTRAVENOUS; SUBCUTANEOUS at 17:10

## 2018-03-02 RX ADMIN — FUROSEMIDE 40 MG: 10 INJECTION, SOLUTION INTRAMUSCULAR; INTRAVENOUS at 17:10

## 2018-03-02 RX ADMIN — INSULIN LISPRO 2 UNITS: 100 INJECTION, SOLUTION INTRAVENOUS; SUBCUTANEOUS at 21:24

## 2018-03-02 RX ADMIN — DORZOLAMIDE HYDROCHLORIDE AND TIMOLOL MALEATE 1 DROP: 20; 5 SOLUTION/ DROPS OPHTHALMIC at 17:10

## 2018-03-02 RX ADMIN — LISINOPRIL 5 MG: 5 TABLET ORAL at 17:09

## 2018-03-02 RX ADMIN — INSULIN LISPRO 4 UNITS: 100 INJECTION, SOLUTION INTRAVENOUS; SUBCUTANEOUS at 12:01

## 2018-03-02 RX ADMIN — INSULIN GLARGINE 15 UNITS: 100 INJECTION, SOLUTION SUBCUTANEOUS at 21:25

## 2018-03-02 RX ADMIN — AMLODIPINE BESYLATE 5 MG: 5 TABLET ORAL at 11:49

## 2018-03-02 RX ADMIN — BIMATOPROST 1 DROP: 0.1 SOLUTION/ DROPS OPHTHALMIC at 21:22

## 2018-03-02 RX ADMIN — FUROSEMIDE 20 MG: 10 INJECTION, SOLUTION INTRAMUSCULAR; INTRAVENOUS at 06:53

## 2018-03-02 RX ADMIN — DORZOLAMIDE HYDROCHLORIDE AND TIMOLOL MALEATE 1 DROP: 20; 5 SOLUTION/ DROPS OPHTHALMIC at 12:01

## 2018-03-02 RX ADMIN — PRAVASTATIN SODIUM 80 MG: 80 TABLET ORAL at 17:09

## 2018-03-02 NOTE — CONSULTS
Consult - Cardiology   Dmitri Perez 80 y o  female MRN: [id number]  Unit/Bed#: E4 -01 Encounter: [id number]        Reason For Consult:  CHF             Assessment and Plan:     1  Acute hypoxic respiratory failure:  Secondary to problem #2  2   Chronic and probable acute CHF - likely diastolic:   -Precipitant for decompensation is unclear   -Subjectively improved with IV diuresis ~~> continue same   -will add low dose ACEi  3  History of paroxysmal atrial fibrillation:   -Patient historically poorly aware of heart rate rhythm  -Presenting ECG in available telemetry showed only normal sinus rhythm thus far ~~> continue to monitor for signs of recurrence   -continue AV blocking Rx-metoprolol and anticoagulation-Xarelto  4  Hypertension:  Currently controlled   -continue metoprolol as taken prior to admission  ACE-inhibitor added  Will follow BP trend with possible need for exclusion of Norvasc  5  Valvular heart disease: Moderate AS and trace TR per echo of 01/2017   -the repeat echo completed earlier today ~~> review forthcoming for re-quantification      History Of Present Illness: This woman is an 80 a patient of Dr Olga Malik (PCP)  She is not followed by a cardiologist (she refused)  Review of records from her PCP notes that the patient has not infrequently also refused other recommendations from her PCP including: mammogram, colonoscopy, flu shot, Pneumovax and Rx alendronate for her osteoporosis  Her medical history includes diabetes mellitus, anxiety/depression, previous CVA and TIA, hypertension, dyslipidemia, valvular heart disease (moderate AS per echo 1/2017), atrial fibrillation (diagnosed 09/2017) and probable diastolic CHF  We had seen Ms Candido Tony in consultation during a hospitalization in September 2017  As mentioned above, the patient at that time had a new diagnosis of atrial fibrillation with increased ventricular rate  With this, she had some diastolic CHF    She had spontaneously converted to normal sinus rhythm and was discharged on AV blocking Rx + anticoagulation (Toprol & Xarelto, respectively)  At present the patient reports approximately a 1 week history of intermittent and inconsistent exertional dyspnea  She denies any associated chest pain or perceived cardiac ectopy  She reports her baseline weight is approximately 161lb  Though not checked daily she does not subjectively feel as though she has had much weight gain in the last week  On 03/01 she had seen her PCP for this as well as report of recent mechanical fall and fall from bed  Her weight at the visit was recorded as 172 lb with pulmonary exam reporting no rales or wheeze  She was instructed to go to the emergency department if her symptoms worsened and she was referred for a chest x-ray  This reported mild vascular congestion and small bilateral effusions  During the overnight hours early this morning the patient had some increased dyspnea  Upon waking her symptoms were worse with her family describing conversational dyspnea  For this reason she was brought to the hospital   Oxygen saturation in the ED triage was recorded as 86%  She was given 20 mg of IV Lasix in the ED  Repeat chest x-ray reported moderate diffuse interstitial edema  Troponin was less than 0 02 with proBNP level of 3090  ECG on arrival was normal sinus rhythm    At the time of my visit she is feeling improved      Past Medical History:        Past Medical History:   Diagnosis Date    Cholelithiasis     Depression with anxiety     Diabetes mellitus ([facility] [address] )     niddm    Dysfunction of eustachian tube     Glaucoma     Hematuria     Hypercholesterolemia     Hypertension     Hyponatremia     Ischemic stroke of frontal lobe (HCC)     Right     Obstructive sleep apnea     OP (osteoporosis)     PAF (paroxysmal atrial fibrillation) (HCC)     Urinary frequency     Vertigo     Viral gastroenteritis     Viral gastroenteritis  Viral infection     Past Surgical History:   Procedure Laterality Date    APPENDECTOMY      GLAUCOMA SURGERY  01/03/2016    TUBAL LIGATION          Allergy:        Allergies   Allergen Reactions    Lipitor [Atorvastatin] GI Intolerance     Nausea stomach ache    Hydrochlorothiazide GI Intolerance    Metoprolol Other (See Comments)    Simvastatin     Aspirin GI Intolerance       Medications:       Prior to Admission medications    Medication Sig Start Date End Date Taking? Authorizing Provider   amLODIPine (NORVASC) 5 mg tablet Take 1 tablet by mouth daily 1/24/18  Yes Radha Still MD   Bimatoprost (LUMIGAN OP) Apply to eye daily at bedtime   Yes Historical Provider, MD   COMBIGAN 0 2-0 5 % instill 1 drop into both eyes twice a day 2/8/18  Yes Historical Provider, MD   escitalopram (LEXAPRO) 5 mg tablet Take 1 tablet by mouth daily for 30 days 1/24/18 3/2/18 Yes Radha Still MD   furosemide (LASIX) 20 mg tablet Take 1 tablet (20 mg total) by mouth daily for 30 days 3/1/18 3/31/18 Yes Radha Still MD   glipiZIDE (GLUCOTROL XL) 10 mg 24 hr tablet TAKE 2 TABS DAILY   1/31/18 3/2/18 Yes Radha Still MD   glipiZIDE (GLUCOTROL) 10 mg tablet Take 1 tablet (10 mg total) by mouth 2 (two) times a day before meals for 30 days Pt takes 10 mg two times a day 3/1/18 3/31/18 Yes Radha Still MD   Hypromellose (ARTIFICIAL TEARS OP) Apply to eye   Yes Historical Provider, MD   metFORMIN (GLUCOPHAGE) 500 mg tablet Take 1 tablet (500 mg total) by mouth daily with breakfast for 90 days  Patient taking differently: Take 1,000 mg by mouth daily with breakfast   3/1/18 5/30/18 Yes Radha Still MD   metoprolol succinate (TOPROL-XL) 50 mg 24 hr tablet Take 1 tablet by mouth daily at bedtime  Patient taking differently: Take 25 mg by mouth daily at bedtime   1/24/18  Yes Radha Still MD   ONE TOUCH ULTRA TEST test strip TEST twice a day 2/19/18  Yes MD Deni Tavarez LANCETS 12K [address] [facility] BLOOD SUGAR TWICE DAILY 2/19/18  Yes Saurabh Thorne Ria Gaviria MD   rivaroxaban (XARELTO) 20 mg tablet Take 1 tablet by mouth daily with dinner  Patient taking differently: Take 20 mg by mouth daily with breakfast   9/14/17  Yes Reynaldo Barrett MD   simvastatin (ZOCOR) 80 mg tablet Take 80 mg by mouth daily at bedtime   Yes Historical Provider, MD   DORZOLAMIDE HCL OP Apply to eye 3 (three) times a day    Historical Provider, MD   cycloSPORINE (RESTASIS) 0 05 % ophthalmic emulsion 1 drop 2 (two) times a day  3/2/18  Historical Provider, MD       Family History:     Family History   Problem Relation Age of Onset    No Known Problems Mother     Diabetes Father     Stroke Father     Coronary artery disease Sister     No Known Problems Brother     No Known Problems Son     Breast cancer Daughter     No Known Problems Maternal Grandmother     No Known Problems Maternal Grandfather     No Known Problems Paternal Grandmother     No Known Problems Paternal Grandfather     No Known Problems Cousin     Rheum arthritis Neg Hx     Osteoarthritis Neg Hx     Asthma Neg Hx     Heart failure Neg Hx     Hyperlipidemia Neg Hx     Hypertension Neg Hx     Migraines Neg Hx     Rashes / Skin problems Neg Hx     Seizures Neg Hx     Thyroid disease Neg Hx         Social History:       Social History     Social History    Marital status:      Spouse name: N/A    Number of children: N/A    Years of education: N/A     Social History Main Topics    Smoking status: Never Smoker    Smokeless tobacco: Never Used    Alcohol use No    Drug use: No    Sexual activity: Not Currently     Other Topics Concern    None     Social History Narrative    None       ROS:  Symptoms per HPI  Else negative    Exam:  General:  Alert, and cooperative elderly female who appears comfortable at time of my visit  Head: Normocephalic, atraumatic  Eyes:  EOMI  Pupils - equal, round, reactive to accomodation  No icterus  Normal Conjunctiva     Oropharynx:  Moist without lesion  Neck:  Mild right bruit versus transmitted cardiac murmur  Heart:  Currently regular with controlled rate  Basilar CJ with soft S2  Lungs: Moderate excursion and air exchange with trace rales and mild blunting at bases  Abdomen:   Soft and nontender with normoactive bowel sounds  No gross organomegaly or mass  Lower Limbs:  Trace edema  Pulses[de-identified]  RLE - DP:  2+                 LLE - DP:  2+  Musculoskeletal: Independent movement of limbs observed, Formal ROM and strength eval not performed  Neurologic:    Oriented to: person , place, situation  Cranial Nerves: grossly intact - vision, smell, taste, and hearing  were not tested  Motor function:grossly normal, symmetric   Sensation: Was not tested    DATA:              Telemetry:  Normal sinus rhythm with heart rate 60-70s          Echocardiogram:         Transthoracic echocardiogram, 1/9/2017[facility]  SUMMARY::   LEFT VENTRICLE:  Systolic function was normal  Ejection fraction was estimated in the range of  60 % to 65 %  There were no regional wall motion abnormalities  Wall thickness was mildly increased  There was mild concentric hypertrophy  Doppler parameters were consistent with abnormal left ventricular relaxation  (grade 1 diastolic dysfunction)      AORTIC VALVE:  The valve was trileaflet  Leaflets exhibited moderately increased thickness,  moderate calcification, and moderately reduced cuspal separation  Transaortic velocity was increased due to valvular stenosis  There was moderate stenosis      TRICUSPID VALVE:  There was trace regurgitation      AORTA:  The root exhibited normal size and mild fibrocalcific change      PULMONARY ARTERIES:  Systolic pressure was at the upper limits of normal  Estimated peak pressure  was in the range of 30 mmHg to 35 mmHg  Weights:     Wt Readings from Last 3 Encounters:   03/02/18 76 8 kg (169 lb 5 oz)   03/01/18 78 1 kg (172 lb 3 2 oz)   01/24/18 75 6 kg (166 lb 9 6 oz)   , Body mass index is 36 64 kg/m²

## 2018-03-02 NOTE — PLAN OF CARE
CARDIOVASCULAR - ADULT     Maintains optimal cardiac output and hemodynamic stability Progressing     Absence of cardiac dysrhythmias or at baseline rhythm Progressing        DISCHARGE PLANNING     Discharge to home or other facility with appropriate resources Progressing        INFECTION - ADULT     Absence or prevention of progression during hospitalization Progressing     Absence of fever/infection during neutropenic period Progressing        Knowledge Deficit     Patient/family/caregiver demonstrates understanding of disease process, treatment plan, medications, and discharge instructions Progressing        PAIN - ADULT     Verbalizes/displays adequate comfort level or baseline comfort level Progressing        Potential for Falls     Patient will remain free of falls Progressing        SAFETY ADULT     Maintain or return to baseline ADL function Progressing     Maintain or return mobility status to optimal level Progressing

## 2018-03-02 NOTE — ED PROVIDER NOTES
History  Chief Complaint   Patient presents with    Shortness of Breath     pt reports recent diagnosis of CHF, taking lasix, daughter reports "shes just getting winded", pt reports she cant catch her breath, o2 stat 86% in triage     Teresa Ragsdale is a 80 y o  female w PMH DM, HTN, HLD, PERI, PAF, CVA, CHF who presents for evaluation of shortness of breath  Pt fatigued for several days  Sob began 2 days ago but got worse last night  Got CXR and labs yesterday by PCP, told to come here if condition worsened  She does not wear O2 at home  Denies cp / tightness, f/c but daughter did feel she felt warm last night  She denies abd pain / n/v/d/c  Slight LE edema  No coughing  No recent travel / trauma / surg, no malignancy, no prior vte  Prior to Admission Medications   Prescriptions Last Dose Informant Patient Reported? Taking? Bimatoprost (LUMIGAN OP)  Child Yes Yes   Sig: Apply to eye daily at bedtime   COMBIGAN 0 2-0 5 %  Child Yes Yes   Sig: instill 1 drop into both eyes twice a day   DORZOLAMIDE HCL OP  Child Yes No   Sig: Apply to eye 3 (three) times a day   Hypromellose (ARTIFICIAL TEARS OP)  Child Yes Yes   Sig: Apply to eye   ONE TOUCH ULTRA TEST test strip  Child No Yes   Sig: TEST twice a day   ONETOUCH DELICA LANCETS 19I MISC  Child No Yes   Sig: CHECK BLOOD SUGAR TWICE DAILY   amLODIPine (NORVASC) 5 mg tablet  Child No Yes   Sig: Take 1 tablet by mouth daily   escitalopram (LEXAPRO) 5 mg tablet   No Yes   Sig: Take 1 tablet by mouth daily for 30 days   furosemide (LASIX) 20 mg tablet   No Yes   Sig: Take 1 tablet (20 mg total) by mouth daily for 30 days   glipiZIDE (GLUCOTROL XL) 10 mg 24 hr tablet  Child No Yes   Sig: TAKE 2 TABS DAILY     glipiZIDE (GLUCOTROL) 10 mg tablet   No Yes   Sig: Take 1 tablet (10 mg total) by mouth 2 (two) times a day before meals for 30 days Pt takes 10 mg two times a day   metFORMIN (GLUCOPHAGE) 500 mg tablet   No Yes   Sig: Take 1 tablet (500 mg total) by mouth daily with breakfast for 90 days   Patient taking differently: Take 1,000 mg by mouth daily with breakfast     metoprolol succinate (TOPROL-XL) 50 mg 24 hr tablet  Child No Yes   Sig: Take 1 tablet by mouth daily at bedtime   Patient taking differently: Take 25 mg by mouth daily at bedtime     rivaroxaban (XARELTO) 20 mg tablet  Child No Yes   Sig: Take 1 tablet by mouth daily with dinner   Patient taking differently: Take 20 mg by mouth daily with breakfast     simvastatin (ZOCOR) 80 mg tablet  Child Yes Yes   Sig: Take 80 mg by mouth daily at bedtime      Facility-Administered Medications: None       Past Medical History:   Diagnosis Date    Cholelithiasis     Depression with anxiety     Diabetes mellitus ([facility] [address] )     niddm    Dysfunction of eustachian tube     Glaucoma     Hematuria     Hypercholesterolemia     Hypertension     Hyponatremia     Ischemic stroke of frontal lobe (HCC)     Right     Obstructive sleep apnea     OP (osteoporosis)     PAF (paroxysmal atrial fibrillation) (HCC)     Urinary frequency     Vertigo     Viral gastroenteritis     Viral gastroenteritis     Viral infection        Past Surgical History:   Procedure Laterality Date    APPENDECTOMY      GLAUCOMA SURGERY  01/03/2016    TUBAL LIGATION         Family History   Problem Relation Age of Onset    No Known Problems Mother     Diabetes Father     Stroke Father     Coronary artery disease Sister     No Known Problems Brother     No Known Problems Son     Breast cancer Daughter     No Known Problems Maternal Grandmother     No Known Problems Maternal Grandfather     No Known Problems Paternal Grandmother     No Known Problems Paternal Grandfather     No Known Problems Cousin     Rheum arthritis Neg Hx     Osteoarthritis Neg Hx     Asthma Neg Hx     Heart failure Neg Hx     Hyperlipidemia Neg Hx     Hypertension Neg Hx     Migraines Neg Hx     Rashes / Skin problems Neg Hx     Seizures Neg Hx     Thyroid disease Neg Hx      I have reviewed and agree with the history as documented  Social History   Substance Use Topics    Smoking status: Never Smoker    Smokeless tobacco: Never Used    Alcohol use No        Review of Systems   Constitutional: Negative for chills, diaphoresis and fever  HENT: Negative for congestion and sore throat  Eyes: Negative for visual disturbance  Respiratory: Positive for shortness of breath  Negative for cough, chest tightness and wheezing  Cardiovascular: Positive for leg swelling  Negative for chest pain  Gastrointestinal: Negative for abdominal pain, constipation, diarrhea, nausea and vomiting  Genitourinary: Negative for difficulty urinating, dysuria, frequency, hematuria, urgency, vaginal bleeding, vaginal discharge and vaginal pain  Musculoskeletal: Negative for arthralgias and myalgias  Neurological: Negative for dizziness, weakness, light-headedness, numbness and headaches  Psychiatric/Behavioral: The patient is not nervous/anxious  Physical Exam  ED Triage Vitals   Temperature Pulse Respirations Blood Pressure SpO2   03/02/18 0605 03/02/18 0559 03/02/18 0559 03/02/18 0559 03/02/18 0559   97 7 °F (36 5 °C) 88 (!) 23 152/78 (!) 86 %      Temp Source Heart Rate Source Patient Position - Orthostatic VS BP Location FiO2 (%)   03/02/18 0605 03/02/18 0559 -- 03/02/18 0559 --   Oral Monitor  Right arm       Pain Score       03/02/18 0656       No Pain           Orthostatic Vital Signs  Vitals:    03/02/18 0559 03/02/18 0656   BP: 152/78 155/78   Pulse: 88 69       Physical Exam   Constitutional: She is oriented to person, place, and time  She appears well-developed and well-nourished  No distress  HENT:   Head: Normocephalic and atraumatic  Eyes: Pupils are equal, round, and reactive to light  Neck: Normal range of motion  Neck supple  No tracheal deviation present     Cardiovascular: Normal rate, regular rhythm, normal heart sounds and intact distal pulses  Exam reveals no gallop and no friction rub  No murmur heard  Pulmonary/Chest: Effort normal and breath sounds normal  No respiratory distress  She has no wheezes  She has no rales  sats mid 90s on 2L NC   Abdominal: Soft  Bowel sounds are normal  She exhibits no distension and no mass  There is no tenderness  There is no guarding  Musculoskeletal: She exhibits no edema or deformity  Neurological: She is alert and oriented to person, place, and time  Skin: Skin is warm and dry  She is not diaphoretic  Trace pitting edema LE / ankles    Psychiatric: She has a normal mood and affect  Her behavior is normal    Nursing note and vitals reviewed  ED Medications  Medications   furosemide (LASIX) injection 20 mg (20 mg Intravenous Given 3/2/18 0653)       Diagnostic Studies  Results Reviewed     Procedure Component Value Units Date/Time    BNP [[id number]]  (Abnormal) Collected:  03/02/18 0608    Lab Status:  Final result Specimen:  Blood from Arm, Left Updated:  03/02/18 0639     NT-proBNP 2,657 (H) pg/mL     Troponin I [[id number]]  (Normal) Collected:  03/02/18 0608    Lab Status:  Final result Specimen:  Blood from Arm, Left Updated:  03/02/18 0636     Troponin I <0 02 ng/mL     Narrative:         [employer] Chemistry analyzer 99% cutoff is > 0 04 ng/mL in network labs    o cTnI 99% cutoff is useful only when applied to patients in the clinical setting of myocardial ischemia  o cTnI 99% cutoff should be interpreted in the context of clinical history, ECG findings and possibly cardiac imaging to establish correct diagnosis  o cTnI 99% cutoff may be suggestive but clearly not indicative of a coronary event without the clinical setting of myocardial ischemia      Comprehensive metabolic panel [[id number]]  (Abnormal) Collected:  03/02/18 0608    Lab Status:  Final result Specimen:  Blood from Arm, Left Updated:  03/02/18 7183     Sodium 136 mmol/L      Potassium 4 3 mmol/L      Chloride 99 (L) mmol/L CO2 27 mmol/L      Anion Gap 10 mmol/L      BUN 14 mg/dL      Creatinine 0 80 mg/dL      Glucose 336 (H) mg/dL      Calcium 9 2 mg/dL      AST 9 U/L      ALT 21 U/L      Alkaline Phosphatase 101 U/L      Total Protein 7 9 g/dL      Albumin 3 7 g/dL      Total Bilirubin 0 71 mg/dL      eGFR 69 ml/min/1 73sq m     Narrative:         National Kidney Disease Education Program recommendations are as follows:  GFR calculation is accurate only with a steady state creatinine  Chronic Kidney disease less than 60 ml/min/1 73 sq  meters  Kidney failure less than 15 ml/min/1 73 sq  meters  CBC and differential [[id number]]  (Normal) Collected:  03/02/18 0608    Lab Status:  Final result Specimen:  Blood from Arm, Left Updated:  03/02/18 0613     WBC 8 94 Thousand/uL      RBC 4 53 Million/uL      Hemoglobin 14 0 g/dL      Hematocrit 41 8 %      MCV 92 fL      MCH 30 9 pg      MCHC 33 5 g/dL      RDW 13 4 %      MPV 11 6 fL      Platelets 942 Thousands/uL      Neutrophils Relative 75 %      Lymphocytes Relative 16 %      Monocytes Relative 7 %      Eosinophils Relative 2 %      Basophils Relative 0 %      Neutrophils Absolute 6 81 Thousands/µL      Lymphocytes Absolute 1 39 Thousands/µL      Monocytes Absolute 0 58 Thousand/µL      Eosinophils Absolute 0 14 Thousand/µL      Basophils Absolute 0 02 Thousands/µL                  X-ray chest 2 views   ED Interpretation by Scott Montenegro PA-C (03/02 4424)   Worsened congestion since yesterdays film  L sided pleural effusion  Procedures  Procedures       Phone Contacts  ED Phone Contact    ED Course  ED Course as of Mar 02 0716   Fri Mar 02, 2018   0569 EKG Interpretation    Rate: 79 BPM  Rhythm: sinus arrhythmia   Axis: normal  Intervals: Normal, no blocks, QTc 412ms  Q waves: no  T waves: flattened in aVL  ST segments: no depression / elevation     Impression: abnormal EKG   EKG for comparison: no significant change compared to film from 1/12/18   EKG interpreted by me                                    MDM  Number of Diagnoses or Management Options  CHF exacerbation [facility]):   Diagnosis management comments: DDX includes but not ltd to:   Concern for CHF exacerbation, less likely pna / bronchitis / PE   Not consistent w CHF    Plan is to obtain:  EKG/trop to check for ischemic changes   CBC to check for anemia, leukocytosis, hydration status  Chemistry panel to check for lyte abnormalities, organ function   BNP to check for CHF/ congestion    Based on results:  CXR w worsened congestion since yesterday  Requiring O2 here  BNP remains elevated  Will admit for diuresis  Total time providing critical care: CHF exacerbation requiring supplemental O2 and IV lasix - CC time 15 min         Disposition  Final diagnoses:   CHF exacerbation ([address] )     Time reflects when diagnosis was documented in both MDM as applicable and the Disposition within this note     Time User Action Codes Description Comment    3/2/2018  6:45 AM Skinny Al [I50 9] CHF exacerbation [facility])       ED Disposition     ED Disposition Condition Comment    Admit  Case was discussed with Yonis and the patient's admission status was agreed to be Admission Status: inpatient status to the service of Dr Andie Cortes   Follow-up Information    None       Patient's Medications   Discharge Prescriptions    No medications on file     No discharge procedures on file      ED Provider  Electronically Signed by           Harpreet Gardner PA-C  03/02/18 9311

## 2018-03-02 NOTE — H&P
History and Physical - [facility] Internal Medicine    Patient Information: Sondra Cabral 80 y o  female MRN: [id number]  Unit/Bed#: E4 -01 Encounter: [id number]  Admitting Physician: Shimon Cavazos PA-C  PCP: Abel Hall MD  Date of Admission:  03/02/18    Chief Complaint:   Shortness of breath x 2 days    History of Present Illness:   Sondra Cabral is a 80 y o  female with a PMH of CHF, DM type 2, HTN, paroxysmal atrial fibrillation, CVA who presents ED with complaint of shortness of breath x2 days with worsening symptoms since yesterday evening  Patient's baseline weight 166 lb; weight at time of admission 172 lb  Patient has an outpatient CHF workup performed by PCP on 03/01  She was prescribed Lasix 20 mg daily; patient was on no outpatient diuretic prior  On arrival to ED, patient was 87% on room air  Chest x-ray was evident for pulmonary edema  She note dyspnea at rest and ALBERTO  No CP, cough, fever, chills, n/v, abdominal pain or changes in BM  Echo performed 01/2017 demonstrates EF of 53-84%, grade 1 diastolic function, and moderate aortic valve stenosis  Case discussed with daughter and son at bedside  Pt is ambulatory and lives independently  Problem List:   Principal Problem:    Acute congestive heart failure (HCC)  Active Problems:    Diabetes mellitus type 2, uncontrolled (HCC)    Glaucoma    Hyperlipidemia    Depression with anxiety    Atrial fibrillation ([address] )    Aortic valve disorder    History of cardioembolic cerebrovascular accident (CVA)    Assessment and Plan:     1  Acute CHF exacerbation:  Probable diastolic etiology  BNP 3,090; CXR with pulmonary vascular congestion  Echo in 2017 with EF of 60-65% and grade 1 diastolic dysfunction  20 mg of IV Lasix provide in the ED  Will provide additional 20 mg of IV Lasix  Scheduled Lasix 40 mg b i d  with diffuse rales on exam and peripheral edema  Anticipate taper diuretic therapy tomorrow as patient is Lasix naive    Continue amlodipine and metoprolol  Consult Cardiology; no outpatient cardiology  Request echo  2   Respiratory failure with hypoxia:  Pulse ox 87% on room air  O2 to maintain saturation greater than 92%  Titrate O2 as patient is diuresed  3   Paroxysmal atrial fibrillation:  Continue Xarelto  Metoprolol therapy  4   History of CVA:  No acute neurological deficits  Continue statin and Xarelto  5   Hypertension:  Continue amlodipine, metoprolol, and Lasix  6   DM type 2 without insulin dependence with hyperglycemia: Non fasting   Continue glipizide; hold metformin  SSI ac and qhs      7   Glaucoma:  Continue eyedrops    8  Depression:  Continue Lexapro  Patient denies SI or HI     9   Hyperlipidemia:  Continue statin    VTE/DVT prophylaxis with Xarelto and SCD  Code Status: Level 1 - Full Code  Anticipated Length of Stay: Patient will be admitted on an Inpatient basis with an anticipated length of stay of  > 2 midnights  REVIEW OF SYSTEMS:     General:   No Fever or chills; No significant weight loss or gain  EENT:   No ear pain, facial swelling; No sneezing, sore throat  Skin:   No rashes, color changes  Respiratory:     SOB, ALBERTO   Cardiovascular:     No chest pain, palpitations  Gastrointestinal:    No nausea, vomiting, diarrhea; No abdominal pain  Musculoskeletal:     No arthralgias, myalgias, swelling  Neurologic:   No dizziness, numbness, weakness  No speech difficulties  Psych:   No agitation, suicidal ideations      Otherwise, All other twelve-point review of systems normal      HISTORICAL INFORMATION:   Past Medical History:   Diagnosis Date    Cholelithiasis     Depression with anxiety     Diabetes mellitus [address] [address]     niddm    Dysfunction of eustachian tube     Glaucoma     Hematuria     Hypercholesterolemia     Hypertension     Hyponatremia     Ischemic stroke of frontal lobe (HCC)     Right     Obstructive sleep apnea     OP (osteoporosis)     PAF (paroxysmal atrial fibrillation) (HCC)     Urinary frequency     Vertigo     Viral gastroenteritis     Viral gastroenteritis     Viral infection      Past Surgical History:   Procedure Laterality Date    APPENDECTOMY      GLAUCOMA SURGERY  01/03/2016    TUBAL LIGATION       Social History   Marital Status:     Substance Use History:   History   Alcohol Use No     History   Smoking Status    Never Smoker   Smokeless Tobacco    Never Used     History   Drug Use No     Family History: non-contributory    MEDICATIONS/ ALLERGIES:   Current Facility-Administered Medications   Medication Dose Route Frequency Provider Last Rate Last Dose    acetaminophen (TYLENOL) tablet 650 mg  650 mg Oral Q4H PRN Sanjana Lancaster PA-C        amLODIPine (NORVASC) tablet 5 mg  5 mg Oral Daily Sanjana Lancaster PA-C        artificial tear (LUBRIFRESH P M ) ophthalmic ointment   Both Eyes HS PRN Sanjana Lancaster PA-C        bimatoprost (LUMIGAN) 0 01 % ophthalmic solution 1 drop  1 drop Both Eyes HS Sanjana Lancaster PA-C        calcium carbonate (TUMS) chewable tablet 1,000 mg  1,000 mg Oral Daily PRN Sanjana Lancaster PA-C        dorzolamide (TRUSOPT) ophthalmic solution 1 drop  1 drop Both Eyes TID Sanjana Lancaster PA-C        dorzolamide-timolol (COSOPT) 22 3-6 8 MG/ML ophthalmic solution 1 drop  1 drop Both Eyes BID Sanjana Lancaster PA-C        escitalopram (LEXAPRO) tablet 5 mg  5 mg Oral Daily Sanjana Lancaster PA-C        furosemide (LASIX) injection 20 mg  20 [facility] Intravenous [facility]        furosemide (LASIX) injection 40 mg  40 mg Intravenous BID (diuretic) Sanjana Lancaster PA-C        glipiZIDE (GLUCOTROL) tablet 10 mg  10 mg Oral BID AC Sanjana Lancaster PA-C        insulin lispro (HumaLOG) 100 units/mL subcutaneous injection 1-5 Units  1-5 Units Subcutaneous TID AC Sanjana Lancaster PA-C        insulin lispro (HumaLOG) 100 units/mL subcutaneous injection 1-5 Units  1-5 Units Subcutaneous HS [facility]        metoprolol succinate (TOPROL-XL) 24 hr tablet 25 mg  25 mg Oral HS Sanjana Lancaster PA-C        ondansetron (ZOFRAN) injection 4 mg  4 mg Intravenous Q6H PRN Snajana Lancaster PA-C        pravastatin (PRAVACHOL) tablet 80 mg  80 mg Oral Daily With PrifloatALLA        [START ON 3/3/2018] rivaroxaban (XARELTO) tablet 20 mg  20 mg Oral Daily With Breakfast Sanjana Lancaster PA-C           Allergies   Allergen Reactions    Lipitor [Atorvastatin] GI Intolerance     Nausea stomach ache    Hydrochlorothiazide GI Intolerance    Metoprolol Other (See Comments)    Simvastatin     Aspirin GI Intolerance       OBJECTIVE:  Vitals: Blood pressure 142/73, pulse 65, temperature 98 °F (36 7 °C), temperature source Tympanic, resp  rate 18, height 4' 9" (1 448 m), weight 76 8 kg (169 lb 5 oz), SpO2 98 %  No intake or output data in the 24 hours ending 03/02/18 1010    Invasive Devices     Peripheral Intravenous Line            Peripheral IV 03/02/18 Left Antecubital less than 1 day                Physical Exam   Constitutional: She is oriented to person, place, and time  She appears well-developed and well-nourished  HENT:   Head: Normocephalic  Mouth/Throat: Oropharynx is clear and moist    Eyes: Conjunctivae and EOM are normal  Pupils are equal, round, and reactive to light  Neck: Normal range of motion  Neck supple  Cardiovascular: Normal rate and regular rhythm  Pulmonary/Chest: She has rales (diffuse throughout )  Without respiratory distress     Abdominal: Soft  Bowel sounds are normal  She exhibits no distension  There is no tenderness  There is no rebound  Musculoskeletal: Normal range of motion  She exhibits edema (edema)  Neurological: She is alert and oriented to person, place, and time  Skin: Skin is warm and dry  No rash noted  No erythema  No pallor  Psychiatric: She has a normal mood and affect   Her behavior is normal  Thought content normal        LABS: I have personally reviewed the following results    Results from last 7 days  Lab Units 03/02/18  0608   WBC Thousand/uL 8 94   HEMOGLOBIN g/dL 14 0   HEMATOCRIT % 41 8   PLATELETS Thousands/uL 278   NEUTROS PCT % 75   LYMPHS PCT % 16   MONOS PCT % 7   EOS PCT % 2       Results from last 7 days  Lab Units 03/02/18  0608   SODIUM mmol/L 136   POTASSIUM mmol/L 4 3   CHLORIDE mmol/L 99*   CO2 mmol/L 27   BUN mg/dL 14   CREATININE mg/dL 0 80   CALCIUM mg/dL 9 2   TOTAL PROTEIN g/dL 7 9   BILIRUBIN TOTAL mg/dL 0 71   ALK PHOS U/L 101   ALT U/L 21   AST U/L 9   GLUCOSE RANDOM mg/dL 336*   TROPONIN I ng/mL <0 02     IMAGING: I have personally reviewed the following imaging in PACS  Xr Chest Pa & Lateral    Result Date: 3/2/2018  Narrative: CHEST INDICATION:  Shortness of breath and cough  R06 02: Shortness of breath COMPARISON:  Chest radiographs January 12, 2018 EXAM PERFORMED/VIEWS:  XR CHEST PA & LATERAL Images: 2 FINDINGS: The heart is enlarged  Atherosclerotic changes in the aorta  Lung volumes diminished  Small bilateral pleural effusions  Buffy and pulmonary vessels are prominent  Degenerative changes thoracic spine and bilateral shoulders  Impression: Diminished inspiration  Mild vascular congestion with small bilateral pleural effusions  Workstation performed: [id number]     X-ray Chest 2 Views    Result Date: 3/2/2018  Narrative: CHEST INDICATION:  2 day history of shortness of breath  History of heart murmur  chest pain COMPARISON:  None EXAM PERFORMED/VIEWS:  XR CHEST PA & LATERAL FINDINGS: Heart is mildly enlarged  Atherosclerosis thoracic arch  Moderate diffuse interstitial prominence  Trace bilateral pleural effusions  Osseous structures appear within normal limits for patient age  Impression: Diffuse interstitial prominence likely interstitial edema  Workstation performed: [id number]     Xr Spine Lumbar 2 Or 3 Views Injury    Result Date: 3/2/2018  Narrative: LUMBAR SPINE INDICATION:  Fall and lower back pain  COMPARISON: None VIEWS:  XR SPINE LUMBAR 2 OR 3 VIEWS INJURY FINDINGS: Alignment is unremarkable  There is no evidence of acute fracture or destructive osseous lesion  Mild multilevel degenerative disc disease is present most severe at L3-L4  Vertebral heights are normal  The pedicles appear intact  Atherosclerotic vascular calcifications are noted  Visualized soft tissues otherwise appear unremarkable  Impression: No acute osseous abnormality  Degenerative changes as described  Workstation performed: [id number]     EKG, Pathology, and Other Studies: I have personally reviewed these reports   EK bpm, no acute ST elevation/depression     Allscripts Records Reviewed: Yes ;   Care Everywhere Records Reviewed:  Yes     Total Time for Visit, including Counseling / Coordination of Care: 68 MIN  Greater than 50% of this total time spent on direct patient counseling and coordination of care  NOTE: This record was composed with voice recognition software  Occasional wrong word or similar sounding substitutions result do to limitations of voice recognition software  Use context where substitutions of wrong wording have occurred

## 2018-03-02 NOTE — CASE MANAGEMENT
Thank you,  [address]  [facility] in the [facility] by Wyatt Armas for 2017  Network Utilization Review Department  Phone: [phone]; Fax [phone]  ATTENTION: The Network Utilization Review Department is now centralized for our 7 Facilities  Make a note that we have a new phone and fax numbers for our Department  Please call with any questions or concerns to [phone] and carefully follow the prompts so that you are directed to the right person  All voicemails are confidential  Fax any determinations, approvals, denials, and requests for initial or continue stay review clinical to [phone]  Due to HIGH CALL volume, it would be easier if you could please send faxed requests to expedite your requests and in part, help us provide discharge notifications faster  Initial Clinical Review    Admission: Date/Time/Statement: 3/2/18 @ 0646     Orders Placed This Encounter   Procedures    Inpatient Admission (expected length of stay for this patient is greater than two midnights)     Standing Status:   Standing     Number of Occurrences:   1     Order Specific Question:   Admitting Physician     Answer:   Kiersten Lucio     Order Specific Question:   Level of Care     Answer:   Med Surg [16]     Order Specific Question:   Estimated length of stay     Answer:   More than 2 Midnights     Order Specific Question:   Certification     Answer:   I certify that inpatient services are medically necessary for this patient for a duration of greater than two midnights  See H&P and MD Progress Notes for additional information about the patient's course of treatment           ED: Date/Time/Mode of Arrival:   ED Arrival Information     Expected Arrival Acuity Means of Arrival Escorted By Service Admission Type    - 3/2/2018 05:52 Emergent Wheelchair Family Member General Medicine Emergency    Arrival Complaint    SOB          Chief Complaint:   Chief Complaint   Patient presents with    Shortness of Breath     pt reports recent diagnosis of CHF, taking lasix, daughter reports "shes just getting winded", pt reports she cant catch her breath, o2 stat 86% in triage       History of Illness: Sondra Cabral is a 80 y o  female with a PMH of CHF, DM type 2, HTN, paroxysmal atrial fibrillation, CVA who presents ED with complaint of shortness of breath x2 days with worsening symptoms since yesterday evening  Patient's baseline weight 166 lb; weight at time of admission 172 lb  Patient has an outpatient CHF workup performed by PCP on 03/01  She was prescribed Lasix 20 mg daily; patient was on no outpatient diuretic prior  On arrival to ED, patient was 87% on room air  Chest x-ray was evident for pulmonary edema  She note dyspnea at rest and ALBERTO  No CP, cough, fever, chills, n/v, abdominal pain or changes in BM       Echo performed 01/2017 demonstrates EF of 23-91%, grade 1 diastolic function, and moderate aortic valve stenosis      Case discussed with daughter and son at bedside  Pt is ambulatory and lives independently       ED Vital Signs:   ED Triage Vitals   Temperature Pulse Respirations Blood Pressure SpO2   03/02/18 0605 03/02/18 0559 03/02/18 0559 03/02/18 0559 03/02/18 0559   97 7 °F (36 5 °C) 88 (!) 23 152/78 (!) 86 %      Temp Source Heart Rate Source Patient Position - Orthostatic VS BP Location FiO2 (%)   03/02/18 0605 03/02/18 0559 03/02/18 0800 03/02/18 0559 --   Oral Monitor Lying Right arm       Pain Score       03/02/18 0656       No Pain        Wt Readings from Last 1 Encounters:   03/02/18 76 8 kg (169 lb 5 oz)       Vital Signs (abnormal):   03/02/18 0800  98 °F (36 7 °C)  65  18  142/73  98 %  None (Room air)  Lying   SpO2: 3 liters at 03/02/18 0800   03/02/18 0658  --  --  --  --  --  Nasal cannula  --   03/02/18 0656  --  69  20  155/78  96 %  Nasal cannula  --   03/02/18 0605  97 7 °F (36 5 °C)  --  --  --  95 %  Nasal cannula  --   03/02/18 0559  --  88   23 152/78   86 %  None (Room air)  --         Abnormal Labs/Diagnostic Test Results:   Lab Units 03/02/18  0608   WBC Thousand/uL 8 94   HEMOGLOBIN g/dL 14 0   HEMATOCRIT % 41 8   PLATELETS Thousands/uL 278   NEUTROS PCT % 75   LYMPHS PCT % 16   MONOS PCT % 7   EOS PCT % 2         Results from last 7 days  Lab Units 03/02/18  0608   SODIUM mmol/L 136   POTASSIUM mmol/L 4 3   CHLORIDE mmol/L 99*   CO2 mmol/L 27   BUN mg/dL 14   CREATININE mg/dL 0 80   CALCIUM mg/dL 9 2   TOTAL PROTEIN g/dL 7 9   BILIRUBIN TOTAL mg/dL 0 71   ALK PHOS U/L 101   ALT U/L 21   AST U/L 9   GLUCOSE RANDOM mg/dL 336*   TROPONIN I ng/mL <0 02      IMAGING: I have personally reviewed the following imaging in PACS  Xr Chest Pa & Lateral     Result Date: 3/2/2018  Narrative: CHEST INDICATION:  Shortness of breath and cough  R06 02: Shortness of breath COMPARISON:  Chest radiographs January 12, 2018 EXAM PERFORMED/VIEWS:  XR CHEST PA & LATERAL Images: 2 FINDINGS: The heart is enlarged  Atherosclerotic changes in the aorta  Lung volumes diminished  Small bilateral pleural effusions  Buffy and pulmonary vessels are prominent  Degenerative changes thoracic spine and bilateral shoulders       Impression: Diminished inspiration  Mild vascular congestion with small bilateral pleural effusions  Workstation performed: [id number]      X-ray Chest 2 Views     Result Date: 3/2/2018  Narrative: CHEST INDICATION:  2 day history of shortness of breath  History of heart murmur  chest pain COMPARISON:  None EXAM PERFORMED/VIEWS:  XR CHEST PA & LATERAL FINDINGS: Heart is mildly enlarged  Atherosclerosis thoracic arch  Moderate diffuse interstitial prominence  Trace bilateral pleural effusions  Osseous structures appear within normal limits for patient age       Impression: Diffuse interstitial prominence likely interstitial edema   Workstation performed: [id number]      Xr Spine Lumbar 2 Or 3 Views Injury     Result Date: 3/2/2018  Narrative: LUMBAR SPINE INDICATION:  Fall and lower back pain  COMPARISON: None VIEWS:  XR SPINE LUMBAR 2 OR 3 VIEWS INJURY FINDINGS: Alignment is unremarkable  There is no evidence of acute fracture or destructive osseous lesion  Mild multilevel degenerative disc disease is present most severe at L3-L4  Vertebral heights are normal  The pedicles appear intact  Atherosclerotic vascular calcifications are noted  Visualized soft tissues otherwise appear unremarkable       Impression: No acute osseous abnormality  Degenerative changes as described  Workstation performed: [id number]      EKG, Pathology, and Other Studies: I have personally reviewed these reports   EK bpm, no acute ST elevation/depression          ED Treatment:   Medication Administration from 2018 0552 to 2018 7137       Date/Time Order Dose Route Action Action by Comments     2018 0653 furosemide (LASIX) injection 20 mg 20 mg Intravenous Given Stanislaw Leone RN           Past Medical/Surgical History:    Active Ambulatory Problems     Diagnosis Date Noted    Diabetes mellitus type 2, uncontrolled ([facility] )     Glaucoma     Hyperlipidemia     Hypertension     Hyponatremia 2017    Depression with anxiety 2017    Atrial fibrillation (HCC) 2017    Acute congestive heart failure ([facility] ) 2017    Slurred speech 2018    Lethargy 2018    Ataxia 2018    Neurological deficit, transient 2018    Type 2 diabetes mellitus with hyperglycemia ([facility] ) 2018    CVA (cerebral vascular accident) ([facility] ) 2017    Hearing loss 2012    Insomnia 2017    Left knee pain 2017    PERI (obstructive sleep apnea) 2017    Osteoarthritis 2012    Osteoporosis 2017    Aortic valve disorder 2012    Periodic limb movement sleep disorder 2017    Sleep talking 2017    Tinnitus 2014    Tricuspid valve disorders, non-rheumatic 2012    Unsteady gait 01/13/2017     Resolved Ambulatory Problems     Diagnosis Date Noted    Facial numbness 01/06/2017    Hypokalemia 01/16/2017    Vomiting 01/16/2017    Dehydration 01/19/2017     Past Medical History:   Diagnosis Date    Cholelithiasis     Depression with anxiety     Diabetes mellitus [facility]     Dysfunction of eustachian tube     Glaucoma     Hematuria     Hypercholesterolemia     Hypertension     Hyponatremia     Ischemic stroke of frontal lobe (HCC)     Obstructive sleep apnea     OP (osteoporosis)     PAF (paroxysmal atrial fibrillation) (HCC)     Urinary frequency     Vertigo     Viral gastroenteritis     Viral gastroenteritis     Viral infection        Admitting Diagnosis: SOB (shortness of breath) [R06 02]  CHF exacerbation (HCC) [I50 9]    Age/Sex: 80 y o  female    Assessment/Plan: Principal Problem:    Acute congestive heart failure (HCC)  Active Problems:    Diabetes mellitus type 2, uncontrolled (HCC)    Glaucoma    Hyperlipidemia    Depression with anxiety    Atrial fibrillation (HCC)    Aortic valve disorder    History of cardioembolic cerebrovascular accident (CVA)     Assessment and Plan:      1  Acute CHF exacerbation:  Probable diastolic etiology  BNP 3,090; CXR with pulmonary vascular congestion  Echo in 2017 with EF of 60-65% and grade 1 diastolic dysfunction  20 mg of IV Lasix provide in the ED  Will provide additional 20 mg of IV Lasix  Scheduled Lasix 40 mg b i d  with diffuse rales on exam and peripheral edema  Anticipate taper diuretic therapy tomorrow as patient is Lasix naive  Continue amlodipine and metoprolol  Consult Cardiology; no outpatient cardiology  Request echo       2  Respiratory failure with hypoxia:  Pulse ox 87% on room air  O2 to maintain saturation greater than 92%  Titrate O2 as patient is diuresed      3   Paroxysmal atrial fibrillation:  Continue Xarelto  Metoprolol therapy      4   History of CVA:  No acute neurological deficits    Continue statin and Xarelto      5  Hypertension:  Continue amlodipine, metoprolol, and Lasix      6   DM type 2 without insulin dependence with hyperglycemia: Non fasting   Continue glipizide; hold metformin  SSI ac and qhs       7   Glaucoma:  Continue eyedrops     8  Depression:  Continue Lexapro  Patient denies SI or HI      9  Hyperlipidemia:  Continue statin     VTE/DVT prophylaxis with Xarelto and SCD  Code Status: Level 1 - Full Code  Anticipated Length of Stay: Patient will be admitted on an Inpatient basis with an anticipated length of stay of  > 2 midnights       Attestation signed by Bruce Funez DO at 3/2/2018 12:57 PM        Physician Statement  I performed history and exam of patient  I discussed with the Resident    I agree with the resident's documented findings and plan of care      Acute respiratory distress       appears related to acute diastolic heart failure, will initiate IV Lasix request an echocardiogram and cardiology consult most likely related to pulmonary edema     Diabetes                                  poor control will change to Lantus 15 units daily with sliding scale and ADA diet     Glaucoma                                continue eyedrops for pressure control     AFib                                         continue metoprolol for rate control and Xarelto for anticoagulation     Hypertension                           monitor closely with IV Lasix     Depression                              continue Lexapro     Dyslipidemia                            continue statin for LDL control     Discussed with daughter         Admission Orders:  [email or web address]  TELE  OOB  PT  CONSULT CARDIOLOGY  SEQ COMP GRICELDA CE  CARDIAC DIET  DAILY WEIGHTS    Scheduled Meds:   Current Facility-Administered Medications:  acetaminophen 650 mg Oral Q4H PRN Sanjana Lancaster PA-C   amLODIPine 5 mg Oral Daily SanjanaJULIAN Monson-JOSH   artificial tear  Both Eyes HS PRN Sanjanano Lancaster PA-C   bimatoprost 1 drop Both Eyes HS Stephanie Colin PA-C   calcium carbonate 1,000 mg Oral Daily PRN Stephanie Colin PA-C   dorzolamide-timolol 1 drop Left Eye BID Beni Hernandez DO   escitalopram 5 mg Oral Daily Sanjana Lancaster PA-C   furosemide 40 mg Intravenous BID (diuretic) Sanjana Lancaster PA-C   insulin glargine 15 Units Subcutaneous HS Beni Hernandez,    insulin lispro 1-5 Units Subcutaneous TID AC aSnjana Lancaster PA-C   insulin lispro 1-5 Units Subcutaneous HS Sanjana Lancaster PA-C   metoprolol succinate 25 mg Oral HS Sanjana Lancaster PA-C   ondansetron 4 mg Intravenous Q6H PRN Sanjana Lancaster PA-C   pravastatin 80 mg Oral Daily With [employer] ALLA Lancaster   [START ON 3/3/2018] rivaroxaban 20 mg Oral Daily With Breakfast Sanjana Lancaster PA-C     Continuous Infusions:    PRN Meds:   acetaminophen    artificial tear    calcium carbonate    ondansetron

## 2018-03-03 LAB
ANION GAP SERPL CALCULATED.3IONS-SCNC: 6 MMOL/L (ref 4–13)
BUN SERPL-MCNC: 18 MG/DL (ref 5–25)
CALCIUM SERPL-MCNC: 8.9 MG/DL (ref 8.3–10.1)
CHLORIDE SERPL-SCNC: 97 MMOL/L (ref 100–108)
CO2 SERPL-SCNC: 34 MMOL/L (ref 21–32)
CREAT SERPL-MCNC: 0.8 MG/DL (ref 0.6–1.3)
ERYTHROCYTE [DISTWIDTH] IN BLOOD BY AUTOMATED COUNT: 13.3 % (ref 11.6–15.1)
GFR SERPL CREATININE-BSD FRML MDRD: 69 ML/MIN/1.73SQ M
GLUCOSE SERPL-MCNC: 140 MG/DL (ref 65–140)
GLUCOSE SERPL-MCNC: 191 MG/DL (ref 65–140)
GLUCOSE SERPL-MCNC: 228 MG/DL (ref 65–140)
GLUCOSE SERPL-MCNC: 272 MG/DL (ref 65–140)
GLUCOSE SERPL-MCNC: 297 MG/DL (ref 65–140)
HCT VFR BLD AUTO: 36.4 % (ref 34.8–46.1)
HGB BLD-MCNC: 12.3 G/DL (ref 11.5–15.4)
MCH RBC QN AUTO: 30.9 PG (ref 26.8–34.3)
MCHC RBC AUTO-ENTMCNC: 33.8 G/DL (ref 31.4–37.4)
MCV RBC AUTO: 92 FL (ref 82–98)
PLATELET # BLD AUTO: 250 THOUSANDS/UL (ref 149–390)
PMV BLD AUTO: 11.6 FL (ref 8.9–12.7)
POTASSIUM SERPL-SCNC: 3.4 MMOL/L (ref 3.5–5.3)
RBC # BLD AUTO: 3.98 MILLION/UL (ref 3.81–5.12)
SODIUM SERPL-SCNC: 137 MMOL/L (ref 136–145)
WBC # BLD AUTO: 5.32 THOUSAND/UL (ref 4.31–10.16)

## 2018-03-03 PROCEDURE — 82948 REAGENT STRIP/BLOOD GLUCOSE: CPT

## 2018-03-03 PROCEDURE — 80048 BASIC METABOLIC PNL TOTAL CA: CPT | Performed by: INTERNAL MEDICINE

## 2018-03-03 PROCEDURE — 85027 COMPLETE CBC AUTOMATED: CPT | Performed by: PHYSICIAN ASSISTANT

## 2018-03-03 PROCEDURE — 99232 SBSQ HOSP IP/OBS MODERATE 35: CPT | Performed by: INTERNAL MEDICINE

## 2018-03-03 PROCEDURE — 93010 ELECTROCARDIOGRAM REPORT: CPT | Performed by: INTERNAL MEDICINE

## 2018-03-03 RX ORDER — POTASSIUM CHLORIDE 20 MEQ/1
20 TABLET, EXTENDED RELEASE ORAL 2 TIMES DAILY
Status: DISCONTINUED | OUTPATIENT
Start: 2018-03-03 | End: 2018-03-04

## 2018-03-03 RX ORDER — FUROSEMIDE 10 MG/ML
20 INJECTION INTRAMUSCULAR; INTRAVENOUS
Status: DISCONTINUED | OUTPATIENT
Start: 2018-03-03 | End: 2018-03-04

## 2018-03-03 RX ORDER — POTASSIUM CHLORIDE 20 MEQ/1
20 TABLET, EXTENDED RELEASE ORAL DAILY
Status: DISCONTINUED | OUTPATIENT
Start: 2018-03-03 | End: 2018-03-03

## 2018-03-03 RX ORDER — POTASSIUM CHLORIDE 20 MEQ/1
20 TABLET, EXTENDED RELEASE ORAL 2 TIMES DAILY
Status: DISCONTINUED | OUTPATIENT
Start: 2018-03-04 | End: 2018-03-03

## 2018-03-03 RX ADMIN — INSULIN LISPRO 1 UNITS: 100 INJECTION, SOLUTION INTRAVENOUS; SUBCUTANEOUS at 08:05

## 2018-03-03 RX ADMIN — POTASSIUM CHLORIDE 20 MEQ: 1500 TABLET, EXTENDED RELEASE ORAL at 15:07

## 2018-03-03 RX ADMIN — INSULIN LISPRO 2 UNITS: 100 INJECTION, SOLUTION INTRAVENOUS; SUBCUTANEOUS at 17:26

## 2018-03-03 RX ADMIN — FUROSEMIDE 20 MG: 10 INJECTION, SOLUTION INTRAMUSCULAR; INTRAVENOUS at 17:25

## 2018-03-03 RX ADMIN — LISINOPRIL 5 MG: 5 TABLET ORAL at 08:05

## 2018-03-03 RX ADMIN — INSULIN LISPRO 3 UNITS: 100 INJECTION, SOLUTION INTRAVENOUS; SUBCUTANEOUS at 12:19

## 2018-03-03 RX ADMIN — PRAVASTATIN SODIUM 80 MG: 80 TABLET ORAL at 17:26

## 2018-03-03 RX ADMIN — INSULIN GLARGINE 15 UNITS: 100 INJECTION, SOLUTION SUBCUTANEOUS at 21:44

## 2018-03-03 RX ADMIN — FUROSEMIDE 40 MG: 10 INJECTION, SOLUTION INTRAMUSCULAR; INTRAVENOUS at 08:05

## 2018-03-03 RX ADMIN — BIMATOPROST 1 DROP: 0.1 SOLUTION/ DROPS OPHTHALMIC at 21:45

## 2018-03-03 RX ADMIN — POTASSIUM CHLORIDE 20 MEQ: 1500 TABLET, EXTENDED RELEASE ORAL at 21:44

## 2018-03-03 RX ADMIN — DORZOLAMIDE HYDROCHLORIDE AND TIMOLOL MALEATE 1 DROP: 20; 5 SOLUTION/ DROPS OPHTHALMIC at 08:05

## 2018-03-03 RX ADMIN — ESCITALOPRAM 5 MG: 5 TABLET, FILM COATED ORAL at 08:05

## 2018-03-03 RX ADMIN — METOPROLOL SUCCINATE 25 MG: 25 TABLET, EXTENDED RELEASE ORAL at 21:44

## 2018-03-03 RX ADMIN — INSULIN LISPRO 3 UNITS: 100 INJECTION, SOLUTION INTRAVENOUS; SUBCUTANEOUS at 21:44

## 2018-03-03 RX ADMIN — RIVAROXABAN 20 MG: 20 TABLET, FILM COATED ORAL at 08:05

## 2018-03-03 RX ADMIN — DORZOLAMIDE HYDROCHLORIDE AND TIMOLOL MALEATE 1 DROP: 20; 5 SOLUTION/ DROPS OPHTHALMIC at 17:26

## 2018-03-03 NOTE — PROGRESS NOTES
Progress Note - Alyssa Encinas 80 y o  female MRN: [id number]    Unit/Bed#: E4 -01 Encounter: [id number]    Assessment/Plan:    Acute diastolic heart failure  improving with IV diuresis, continue metoprolol and lisinopril follow intake/output    Acute respiratory distress  related to heart failure and much improved with IV diuresis    Diabetes    improving control with Lantus continue sliding scale and ADA diet    Paroxysmal AFib   continue beta-blocker for rate control and Xarelto    Hypertension    currently well controlled    Dyslipidemia    continue statin for LDL control    Hypokalemia    related to Lasix will provide 40 milliequivalents supplement and monitor    Subjective:   Feels better less shortness of breath denies chest pain nausea vomiting diarrhea no fevers chills appetite is okay    Objective:     Vitals: Blood pressure 111/67, pulse 60, temperature 97 8 °F (36 6 °C), temperature source Temporal, resp  rate 18, height 4' 9" (1 448 m), weight 75 kg (165 lb 5 5 oz), SpO2 95 %  ,Body mass index is 35 78 kg/m²          Results from last 7 days  Lab Units 03/03/18  0553   WBC Thousand/uL 5 32   HEMOGLOBIN g/dL 12 3   HEMATOCRIT % 36 4   PLATELETS Thousands/uL 250       Results from last 7 days  Lab Units 03/03/18  0553 03/02/18  0608   SODIUM mmol/L 137 136   POTASSIUM mmol/L 3 4* 4 3   CHLORIDE mmol/L 97* 99*   CO2 mmol/L 34* 27   BUN mg/dL 18 14   CREATININE mg/dL 0 80 0 80   CALCIUM mg/dL 8 9 9 2   TOTAL PROTEIN g/dL  --  7 9   BILIRUBIN TOTAL mg/dL  --  0 71   ALK PHOS U/L  --  101   ALT U/L  --  21   AST U/L  --  9   GLUCOSE RANDOM mg/dL 140 336*       Scheduled Meds:    Current Facility-Administered Medications:  acetaminophen 650 mg Oral Q4H PRN Sanjana Lancaster PA-C   amLODIPine 5 mg Oral Daily Daniel Mckeon PA-C   artificial tear  Both Eyes HS PRN Sanjana Lancaster PA-C   bimatoprost 1 drop Both Eyes HS Sanjana Lancaster PA-C   calcium carbonate 1,000 mg Oral Daily PRN Tianna Wolfe PA-C dorzolamide-timolol 1 drop Left Eye BID Kashif Caro DO   escitalopram 5 mg Oral Daily Sanjana Lancaster PA-C   furosemide 40 mg Intravenous BID (diuretic) Sanjana Lancaster PA-C   insulin glargine 15 Units Subcutaneous HS Kashif Caro DO   insulin lispro 1-5 Units Subcutaneous TID AC Sanjana Lancaster PA-C   insulin lispro 1-5 Units Subcutaneous HS Sanjana Lancaster PA-C   lisinopril 5 mg Oral Daily Sallyanne FlankALLA   metoprolol succinate 25 mg Oral HS Sallyanne Flank, ALLA   ondansetron 4 mg Intravenous Q6H PRN Sanjana Lancaster PA-C   potassium chloride 20 mEq Oral Daily Kashif Caro DO   pravastatin 80 mg Oral Daily With [employer] ALLA Lancaster   rivaroxaban 20 mg Oral Daily With Breakfast Sanjana Lancaster PA-C       Continuous Infusions:     Physical exam:  General appearance:  Alert no distress interaction appropriate   Head/Eyes:  Nonicteric PERRL EOMI  Neck:  Supple  Lungs: CTA bilateral no wheezing rhonchi or rales  Heart: normal S1 S2 regular  Abdomen: Soft nontender with bowel sounds  Extremities: no edema  Skin: no rash    Invasive Devices     Peripheral Intravenous Line            Peripheral IV 03/02/18 Left Antecubital 1 day                  VTE Pharmacologic Prophylaxis:  Xarelto   VTE Mechanical Prophylaxis:  SCDs                     Counseling / Coordination of Care  Total floor / unit time spent today  30   minutes  Greater than 50% of total time was spent with the patient and / or family counseling and / or coordination of care    A description of the counseling / coordination of care:  Discussed with daughter

## 2018-03-04 VITALS
BODY MASS INDEX: 35.05 KG/M2 | SYSTOLIC BLOOD PRESSURE: 145 MMHG | HEIGHT: 57 IN | RESPIRATION RATE: 18 BRPM | HEART RATE: 57 BPM | OXYGEN SATURATION: 93 % | WEIGHT: 162.48 LBS | TEMPERATURE: 98.3 F | DIASTOLIC BLOOD PRESSURE: 64 MMHG

## 2018-03-04 LAB
ANION GAP SERPL CALCULATED.3IONS-SCNC: 9 MMOL/L (ref 4–13)
ATRIAL RATE: 79 BPM
BUN SERPL-MCNC: 19 MG/DL (ref 5–25)
CALCIUM SERPL-MCNC: 8.8 MG/DL (ref 8.3–10.1)
CHLORIDE SERPL-SCNC: 100 MMOL/L (ref 100–108)
CO2 SERPL-SCNC: 30 MMOL/L (ref 21–32)
CREAT SERPL-MCNC: 0.73 MG/DL (ref 0.6–1.3)
GFR SERPL CREATININE-BSD FRML MDRD: 77 ML/MIN/1.73SQ M
GLUCOSE SERPL-MCNC: 147 MG/DL (ref 65–140)
GLUCOSE SERPL-MCNC: 158 MG/DL (ref 65–140)
GLUCOSE SERPL-MCNC: 341 MG/DL (ref 65–140)
MAGNESIUM SERPL-MCNC: 1.7 MG/DL (ref 1.6–2.6)
P AXIS: 50 DEGREES
POTASSIUM SERPL-SCNC: 3.8 MMOL/L (ref 3.5–5.3)
PR INTERVAL: 148 MS
QRS AXIS: 30 DEGREES
QRSD INTERVAL: 76 MS
QT INTERVAL: 360 MS
QTC INTERVAL: 412 MS
SODIUM SERPL-SCNC: 139 MMOL/L (ref 136–145)
T WAVE AXIS: 54 DEGREES
VENTRICULAR RATE: 79 BPM

## 2018-03-04 PROCEDURE — 99232 SBSQ HOSP IP/OBS MODERATE 35: CPT | Performed by: INTERNAL MEDICINE

## 2018-03-04 PROCEDURE — 80048 BASIC METABOLIC PNL TOTAL CA: CPT | Performed by: INTERNAL MEDICINE

## 2018-03-04 PROCEDURE — 82948 REAGENT STRIP/BLOOD GLUCOSE: CPT

## 2018-03-04 PROCEDURE — 83735 ASSAY OF MAGNESIUM: CPT | Performed by: INTERNAL MEDICINE

## 2018-03-04 RX ORDER — FUROSEMIDE 20 MG/1
20 TABLET ORAL DAILY
Status: DISCONTINUED | OUTPATIENT
Start: 2018-03-04 | End: 2018-03-04 | Stop reason: HOSPADM

## 2018-03-04 RX ORDER — LISINOPRIL 5 MG/1
5 TABLET ORAL DAILY
Qty: 30 TABLET | Refills: 0 | Status: SHIPPED | OUTPATIENT
Start: 2018-03-05 | End: 2018-03-14 | Stop reason: SDUPTHER

## 2018-03-04 RX ORDER — POTASSIUM CHLORIDE 750 MG/1
10 TABLET, EXTENDED RELEASE ORAL DAILY
Qty: 30 TABLET | Refills: 0 | Status: SHIPPED | OUTPATIENT
Start: 2018-03-05 | End: 2018-03-26 | Stop reason: SDUPTHER

## 2018-03-04 RX ORDER — POTASSIUM CHLORIDE 750 MG/1
10 TABLET, EXTENDED RELEASE ORAL DAILY
Status: DISCONTINUED | OUTPATIENT
Start: 2018-03-05 | End: 2018-03-04 | Stop reason: HOSPADM

## 2018-03-04 RX ADMIN — ESCITALOPRAM 5 MG: 5 TABLET, FILM COATED ORAL at 08:03

## 2018-03-04 RX ADMIN — RIVAROXABAN 20 MG: 20 TABLET, FILM COATED ORAL at 08:03

## 2018-03-04 RX ADMIN — DORZOLAMIDE HYDROCHLORIDE AND TIMOLOL MALEATE 1 DROP: 20; 5 SOLUTION/ DROPS OPHTHALMIC at 08:03

## 2018-03-04 RX ADMIN — LISINOPRIL 5 MG: 5 TABLET ORAL at 08:03

## 2018-03-04 RX ADMIN — INSULIN LISPRO 4 UNITS: 100 INJECTION, SOLUTION INTRAVENOUS; SUBCUTANEOUS at 11:15

## 2018-03-04 RX ADMIN — POTASSIUM CHLORIDE 20 MEQ: 1500 TABLET, EXTENDED RELEASE ORAL at 08:03

## 2018-03-04 RX ADMIN — INSULIN LISPRO 1 UNITS: 100 INJECTION, SOLUTION INTRAVENOUS; SUBCUTANEOUS at 08:03

## 2018-03-04 RX ADMIN — AMLODIPINE BESYLATE 5 MG: 5 TABLET ORAL at 08:03

## 2018-03-04 RX ADMIN — FUROSEMIDE 20 MG: 10 INJECTION, SOLUTION INTRAMUSCULAR; INTRAVENOUS at 08:03

## 2018-03-04 NOTE — PROGRESS NOTES
Progress Note - Roya Bryan 80 y o  female MRN: [id number]    Unit/Bed#: E4 -01 Encounter: [id number]    Assessment/Plan:    Acute diastolic heart failure  now stable transition to p o  diuretic with metoprolol and lisinopril    Acute respiratory distress  now stable on room air related to heart failure    Diabetes    transition back to oral hypoglycemic agents discussed use of insulin patient is very resistant    Paroxysmal AFib   rate control with beta-blocker Xarelto for anticoagulation    Hypertension    well controlled with current medications    Dyslipidemia    continue statin for LDL control    Subjective:   Feels much better less short of breath denies chest pain nausea vomiting diarrhea no fevers chills appetite is okay    Objective:     Vitals: Blood pressure 145/64, pulse 57, temperature 98 3 °F (36 8 °C), temperature source Tympanic, resp  rate 18, height 4' 9" (1 448 m), weight 73 7 kg (162 lb 7 7 oz), SpO2 93 %  ,Body mass index is 35 16 kg/m²  Results from last 7 days  Lab Units 03/03/18  0553   WBC Thousand/uL 5 32   HEMOGLOBIN g/dL 12 3   HEMATOCRIT % 36 4   PLATELETS Thousands/uL 250       Results from last 7 days  Lab Units 03/04/18  0559  03/02/18  0608   SODIUM mmol/L 139  < > 136   POTASSIUM mmol/L 3 8  < > 4 3   CHLORIDE mmol/L 100  < > 99*   CO2 mmol/L 30  < > 27   BUN mg/dL 19  < > 14   CREATININE mg/dL 0 73  < > 0 80   CALCIUM mg/dL 8 8  < > 9 2   TOTAL PROTEIN g/dL  --   --  7 9   BILIRUBIN TOTAL mg/dL  --   --  0 71   ALK PHOS U/L  --   --  101   ALT U/L  --   --  21   AST U/L  --   --  9   GLUCOSE RANDOM mg/dL 147*  < > 336*   < > = values in this interval not displayed      Scheduled Meds:    Current Facility-Administered Medications:  acetaminophen 650 mg Oral Q4H PRN Sanjana Lancaster PA-C   amLODIPine 5 mg Oral Daily Nikki Guy PA-C   artificial tear  Both Eyes HS PRN Sanjana Lancaster PA-C   bimatoprost 1 drop Both Eyes HS Sanjana Lancaster PA-C   calcium carbonate 1,000 mg Oral Daily [facility]   dorzolamide-timolol 1 drop Left Eye BID [facility], DO   escitalopram 5 mg Oral Daily [facility]   furosemide 20 mg Intravenous BID (diuretic) Jefm South, DO   insulin glargine 15 Units Subcutaneous HS [facility], DO   insulin lispro 1-5 Units Subcutaneous TID AC Sanjana Lancaster PA-C   insulin lispro 1-5 Units Subcutaneous HS Sanjana Lancaster PA-C   lisinopril 5 mg Oral Daily Shyam Jovel PA-C   metoprolol succinate 25 mg Oral HS Marybelle SitALLA river   ondansetron 4 mg Intravenous Q6H PRN Sanjana Lancaster PA-C   potassium chloride 20 mEq Oral BID [facility], DO   pravastatin 80 mg Oral Daily With [employer] ALLA Lancaster   rivaroxaban 20 mg Oral Daily With Breakfast Sanjana Lancaster PA-C       Continuous Infusions:     Physical exam:  General appearance:  Alert no distress interaction appropriate   Head/Eyes:  Nonicteric PERRL EOMI  Neck:  Supple  Lungs: CTA bilateral no wheezing rhonchi or rales  Heart: normal S1 S2 regular  Abdomen: Soft nontender with bowel sounds  Extremities: no edema  Skin: no rash    Invasive Devices     Peripheral Intravenous Line            Peripheral IV 03/02/18 Left Antecubital 2 days                  VTE Pharmacologic Prophylaxis:  Xarelto   VTE Mechanical Prophylaxis:  SCDs                     Counseling / Coordination of Care  Total floor / unit time spent today  30  minutes  Greater than 50% of total time was spent with the patient and / or family counseling and / or coordination of care    A description of the counseling / coordination of care:  Discussed with Cardiology

## 2018-03-04 NOTE — DISCHARGE SUMMARY
Discharge Summary - Medical Onsteve Duggan 80 y o  female MRN: [id number]    [place] [address] MED SURG Room / Bed: [address] [place] [address] 451/E4 -* Encounter: [id number]    BRIEF OVERVIEW    Admitting Provider: Maeve Davila DO  Discharge Provider: Nader Velasquez DO  Admission Date: 3/2/2018     Discharge Date: No discharge date for patient encounter    Primary Care Physician at Discharge: Yosi Weldon [phone]    Primary Discharge Diagnosis  Acute diastolic heart failure    Other Problems Addressed  Patient Active Problem List    Diagnosis Date Noted    History of cardioembolic cerebrovascular accident (CVA) 03/02/2018    Slurred speech 01/12/2018    Lethargy 01/12/2018    Ataxia 01/12/2018    Neurological deficit, transient 01/12/2018    Type 2 diabetes mellitus with hyperglycemia ([address] ) 01/12/2018    Left knee pain 12/04/2017    PERI (obstructive sleep apnea) 09/29/2017    Periodic limb movement sleep disorder 09/29/2017    Atrial fibrillation ([facility] ) 09/11/2017    Acute congestive heart failure ([address] ) 09/11/2017    Sleep talking 08/25/2017    Osteoporosis 05/04/2017    Insomnia 01/25/2017    Hyponatremia 01/16/2017    Depression with anxiety 01/16/2017    CVA (cerebral vascular accident) ([address] ) 01/13/2017    Unsteady gait 01/13/2017    Diabetes mellitus type 2, uncontrolled ([address] )     Glaucoma     Hyperlipidemia     Hypertension     Tinnitus 04/04/2014    Hearing loss 12/12/2012    Osteoarthritis 08/07/2012    Aortic valve disorder 08/07/2012    Tricuspid valve disorders, non-rheumatic 08/07/2012       Consulting Providers   Cardiology Dr Folres Sendregulo  Therapeutic Operative Procedures Performed  ECHO EF 60 percent grade 2 diastolic dysfunction severe aortic stenosis    Discharge Disposition: home    Allergies  Allergies   Allergen Reactions    Lipitor [Atorvastatin] GI Intolerance     Nausea stomach ache    Hydrochlorothiazide GI Intolerance    Simvastatin     Aspirin GI Intolerance     Diet restrictions:  diabetic diet   Activity restrictions:  none   Discharge Condition:  Austin Jimenez in 1 week  Follow up with consulting providers  Cardiology Dr Obey Encinas in 1 week       [address] [facility]    Presenting Problem/History of Present Illness  Acute congestive heart failure [facility])  Hospital Course  80-year-old female presents with shortness of breath    Acute respiratory distress  this was related to acute diastolic heart failure and resolved with IV diuretics    Acute diastolic heart failure  echocardiogram revealed grade 2 diastolic heart failure, she was diuresed effectively with IV Lasix, continuing beta-blocker and lisinopril, stable on room air and compensated prior to discharge  She will continue with Lasix 20 milligrams daily and follow up with cardiologist within a week  She is to position change slowly and report lightheadedness to Cardiology if it develops    Severe aortic stenosis   this will be closely monitored by Cardiology    Diabetes    patient's blood glucoses were elevated during her admission she was initiated on Lantus, however was very resistant to continue insulin and will be referred back to oral hypoglycemics prior to discharge  Discharge  Statement   I spent 40 minutes discharging the patient  This time was spent on the day of discharge  I had direct contact with the patient on the day of discharge  Additional documentation is required if more than 30 minutes were spent on discharge  Discussed with Cardiology    Discharge instructions/Information to patient and family:   See after visit summary for information provided to patient and family

## 2018-03-04 NOTE — DISCHARGE INSTRUCTIONS
Heart Failure   WHAT YOU NEED TO KNOW:   Heart failure (HF) is a condition that does not allow your heart to fill or pump properly  Not enough oxygen in your blood gets to your organs and tissues  HF can occur in the right side, the left side, or both lower chambers of your heart  HF is often caused by damage or injury to your heart  The damage may be caused by heart attack, other heart conditions, or high blood pressure  HF is a long-term condition that tends to get worse over time  It is important to manage your health to improve your quality of life  HF can be worsened by heavy alcohol use, smoking, diabetes that is not controlled, or obesity  DISCHARGE INSTRUCTIONS:   Call 911 if:   · You have any of the following signs of a heart attack:      ¨ Squeezing, pressure, or pain in your chest that lasts longer than 5 minutes or returns    ¨ Discomfort or pain in your back, neck, jaw, stomach, or arm     ¨ Trouble breathing    ¨ Nausea or vomiting    ¨ Lightheadedness or a sudden cold sweat, especially with chest pain or trouble breathing    Seek care immediately if:   · You gain 3 or more pounds (1 4 kg) in a day, or more than your healthcare provider says you should  · Your heartbeat is fast, slow, or uneven all the time  Contact your healthcare provider if:   · You have symptoms of worsening HF:      ¨ Shortness of breath at rest, at night, or that is getting worse in any way     ¨ Weight gain of 5 or more pounds (2 2 kg) in a week     ¨ More swelling in your legs or ankles     ¨ Abdominal pain or swelling     ¨ More coughing     ¨ Loss of appetite     ¨ Feeling tired all the time    · You feel hopeless or depressed, or you have lost interest in things you used to enjoy  · You often feel worried or afraid  · You have questions or concerns about your condition or care  Medicines: You may  need any of the following:  · Medicines  may be given to help regulate your heart rhythm   You may also need medicines to lower your blood pressure, and to get rid of extra fluids  · Take your medicine as directed  Contact your healthcare provider if you think your medicine is not helping or if you have side effects  Tell him or her if you are allergic to any medicine  Keep a list of the medicines, vitamins, and herbs you take  Include the amounts, and when and why you take them  Bring the list or the pill bottles to follow-up visits  Carry your medicine list with you in case of an emergency  Follow up with your healthcare provider or cardiologist as directed: You may need to return for other tests  You may need home health care  A healthcare provider will monitor your vital signs, weight, and make sure your medicines are working  Write down your questions so you remember to ask them during your visits  Go to cardiac rehab as directed:  Cardiac rehab is a program run by specialists who will help you safely strengthen your heart  The program includes exercise, relaxation, stress management, and heart-healthy nutrition  Healthcare providers will also make sure your medicines are helping to reduce your symptoms  Manage your HF:  · Do not smoke  Nicotine and other chemicals in cigarettes and cigars can cause lung damage and make HF difficult to manage  Ask your healthcare provider for information if you currently smoke and need help to quit  E-cigarettes or smokeless tobacco still contain nicotine  Talk to your healthcare provider before you use these products  · Do not drink alcohol or take illegal drugs  Alcohol and drugs can worsen your symptoms quickly  · Weigh yourself every morning  Use the same scale, in the same spot  Do this after you use the bathroom, but before you eat or drink anything  Wear the same type of clothing  Do not wear shoes  Record your weight each day so you will notice any sudden weight gain  Swelling and weight gain are signs of fluid retention   If you are overweight, ask how to lose weight safely  · Check your blood pressure and heart rate every day  Ask for more information about how to measure your blood pressure and heart rate correctly  Ask what these numbers should be for you  · Manage any chronic health conditions you have  These include high blood pressure, diabetes, obesity, high cholesterol, metabolic syndrome, and COPD  You will have fewer symptoms if you manage these health conditions  Follow your healthcare provider's recommendations and follow up with him or her regularly  · Eat heart-healthy foods and limit sodium (salt)  An easy way to do this is to eat more fresh fruits and vegetables and fewer canned and processed foods  Replace butter and margarine with heart-healthy oils such as olive oil and canola oil  Other heart-healthy foods include walnuts, whole-grain breads, low-fat dairy products, beans, and lean meats  Fatty fish such as salmon and tuna are also heart healthy  Ask how much salt you can eat each day  Do not use salt substitutes  · Drink liquids as directed  You may need to limit the amount of liquids you drink if you retain fluid  Ask how much liquid to drink each day and which liquids are best for you  · Stay active  If you are not active, your symptoms are likely to worsen quickly  Walking, bicycling, and other types of physical activity help maintain your strength and improve your mood  Physical activity also helps you manage your weight  Work with your healthcare provider to create an exercise plan that is right for you  · Get vaccines as directed  Get a flu shot every year  You may also need the pneumonia vaccine  The flu and pneumonia can be severe for a person who has HF  Vaccines protect you from these infections  Join a support group:  Living with HF can be difficult  It may be helpful to talk with others who have HF  You may learn how to better manage your condition or get emotional support   For more information:   · [address]  [place] , [place]   Phone: [address]  Web Address: https://[employer]/  org   © 2017 [employer] Information is for End User's use only and may not be sold, redistributed or otherwise used for commercial purposes  All illustrations and images included in CareNotes® are the copyrighted property of [facility] , [employer]  or [employer] Armas  The above information is an  only  It is not intended as medical advice for individual conditions or treatments  Talk to your doctor, nurse or pharmacist before following any medical regimen to see if it is safe and effective for you    Position change slowly

## 2018-03-04 NOTE — PHYSICAL THERAPY NOTE
Physical Therapy Screen    Patient Name: Ivelisse Alberts    BDHIB'E Date: 3/4/2018     Problem List  Patient Active Problem List   Diagnosis    Diabetes mellitus type 2, uncontrolled ([facility] )    Glaucoma    Hyperlipidemia    Hypertension    Hyponatremia    Depression with anxiety    Atrial fibrillation (HCC)    Acute congestive heart failure ([facility] )    Slurred speech    Lethargy    Ataxia    Neurological deficit, transient    Type 2 diabetes mellitus with hyperglycemia ([facility] )    [facility]    Hearing loss    Insomnia    Left knee pain    PERI (obstructive sleep apnea)    Osteoarthritis    Osteoporosis    Aortic valve disorder    Periodic limb movement sleep disorder    Sleep talking    Tinnitus    Tricuspid valve disorders, non-rheumatic    Unsteady gait    History of cardioembolic cerebrovascular accident (CVA)        Past Medical History  Past Medical History:   Diagnosis Date    Cholelithiasis     Depression with anxiety     Diabetes mellitus ([facility] )     niddm    Dysfunction of eustachian tube     Glaucoma     Hematuria     Hypercholesterolemia     Hypertension     Hyponatremia     Ischemic stroke of frontal lobe (HCC)     Right     Obstructive sleep apnea     OP (osteoporosis)     PAF (paroxysmal atrial fibrillation) (HCC)     Urinary frequency     Vertigo     Viral gastroenteritis     Viral gastroenteritis     Viral infection         Past Surgical History  Past Surgical History:   Procedure Laterality Date    APPENDECTOMY      GLAUCOMA SURGERY  01/03/2016    TUBAL LIGATION          SUBJECT:   Has been walking by herself  Is indep at home no device  Declined eval  States she may discharge today  OBJECTIVE:  Confirmed with nursing that pt is indep amb without device and is steady  ASSESSMENT:  No skilled PT needs   Pt reports she is at her baseline, no ALBERTO    PLAN:    d/c PT  RECOMMENDATION:  Home independently     Mirna Schulz, PT

## 2018-03-05 ENCOUNTER — OFFICE VISIT (OUTPATIENT)
Dept: FAMILY MEDICINE CLINIC | Facility: CLINIC | Age: 83
End: 2018-03-05
Payer: COMMERCIAL

## 2018-03-05 ENCOUNTER — TRANSITIONAL CARE MANAGEMENT (OUTPATIENT)
Dept: FAMILY MEDICINE CLINIC | Facility: CLINIC | Age: 83
End: 2018-03-05

## 2018-03-05 VITALS
OXYGEN SATURATION: 95 % | SYSTOLIC BLOOD PRESSURE: 128 MMHG | WEIGHT: 166 LBS | HEIGHT: 57 IN | RESPIRATION RATE: 16 BRPM | HEART RATE: 83 BPM | TEMPERATURE: 97.6 F | BODY MASS INDEX: 35.81 KG/M2 | DIASTOLIC BLOOD PRESSURE: 82 MMHG

## 2018-03-05 DIAGNOSIS — Z09 HOSPITAL DISCHARGE FOLLOW-UP: Primary | ICD-10-CM

## 2018-03-05 DIAGNOSIS — E11.65 TYPE 2 DIABETES MELLITUS WITH HYPERGLYCEMIA, WITHOUT LONG-TERM CURRENT USE OF INSULIN (HCC): ICD-10-CM

## 2018-03-05 DIAGNOSIS — F41.8 DEPRESSION WITH ANXIETY: ICD-10-CM

## 2018-03-05 DIAGNOSIS — I10 ESSENTIAL HYPERTENSION: ICD-10-CM

## 2018-03-05 DIAGNOSIS — I48.0 PAROXYSMAL ATRIAL FIBRILLATION (HCC): ICD-10-CM

## 2018-03-05 DIAGNOSIS — I50.31 ACUTE DIASTOLIC CONGESTIVE HEART FAILURE (HCC): ICD-10-CM

## 2018-03-05 DIAGNOSIS — E78.00 PURE HYPERCHOLESTEROLEMIA: ICD-10-CM

## 2018-03-05 PROCEDURE — 99496 TRANSJ CARE MGMT HIGH F2F 7D: CPT | Performed by: FAMILY MEDICINE

## 2018-03-05 NOTE — PROGRESS NOTES
Chief Complaint   Patient presents with    Transition of Care Management     03/02/18-03/04/18 CHF     Assessment/Plan:   Reviewed hospital records  Continue current meds  [facility] cardiology as scheduled  RTO as scheduled with labs  Diagnoses and all orders for this visit:    Hospital discharge follow-up    Acute diastolic congestive heart failure ([address] )  Comments:  Continue lasix and potassium  Essential hypertension  Comments:  continue lisinopril and metoprolol  Type 2 diabetes mellitus with hyperglycemia, without long-term current use of insulin (HCC)  Comments:  Continue glipizide and metformin  Paroxysmal atrial fibrillation (HCC)  Comments:  continue xarelto  Pure hypercholesterolemia  Comments:  continue simvastatin  Depression with anxiety  Comments:  continue escitalopram           Subjective:     Patient ID: Dada Lund is a 80 y o  female  HPI  Pt is here with daughter  Was in hospital 3/2-3/4/2018 for SOB/acute diastolic heart failure  Echo showed grade 2 diastolic heart failure  Got IV lasix which helped  Discharged home  Pt feels much better now  Denies fever, SOB, cP, n/v/abd pain  DM---She got back to metformin 500mg Qd  Also on glipizide 10mg bid  Blood sugar at home 134 per pt  HTN---She is on lisinopril 5mg Qd, metoprolol 50mg Qd  Afib---she is on xarelto 20mg daily  CHF---She is on lasix 20mg and potassium daily  Hyperlipidemia---She is on simvastatin 80mg qhs  Depression---on escitalopram 5mg Qd  Mood is stable  Review of Systems   Constitutional: Negative for appetite change, chills and fever  HENT: Negative for congestion, ear pain, sinus pain and sore throat  Eyes: Negative for discharge and itching  Respiratory: Negative for apnea, cough, chest tightness, shortness of breath and wheezing  Cardiovascular: Negative for chest pain, palpitations and leg swelling     Gastrointestinal: Negative for abdominal pain, anal bleeding, constipation, diarrhea, nausea and vomiting  Endocrine: Negative for cold intolerance, heat intolerance and polyuria  Genitourinary: Negative for difficulty urinating and dysuria  Musculoskeletal: Negative for arthralgias, back pain and myalgias  Skin: Negative for rash  Neurological: Negative for dizziness and headaches  Psychiatric/Behavioral: Negative for agitation  Objective:     Physical Exam   Constitutional: She appears well-developed  No distress  HENT:   Head: Normocephalic  Right Ear: External ear normal    Left Ear: External ear normal    Nose: Nose normal    Mouth/Throat: Oropharynx is clear and moist    Eyes: Conjunctivae are normal  Pupils are equal, round, and reactive to light  Right eye exhibits no discharge  Left eye exhibits no discharge  Neck: Normal range of motion  No thyromegaly present  Cardiovascular: Normal rate, regular rhythm and normal heart sounds  Exam reveals no gallop and no friction rub  No murmur heard  Pulmonary/Chest: Effort normal and breath sounds normal  No respiratory distress  She has no wheezes  She has no rales  She exhibits no tenderness  Abdominal: Soft  Bowel sounds are normal  She exhibits no distension and no mass  There is no tenderness  There is no rebound and no guarding  Musculoskeletal: Normal range of motion  She exhibits no edema, tenderness or deformity  Lymphadenopathy:     She has no cervical adenopathy  Neurological: She is alert  Psychiatric: She has a normal mood and affect  Vitals:    03/05/18 1848   BP: 128/82   BP Location: Left arm   Patient Position: Sitting   Cuff Size: Adult   Pulse: 83   Resp: 16   Temp: 97 6 °F (36 4 °C)   TempSrc: Tympanic   SpO2: 95%   Weight: 75 3 kg (166 lb)   Height: 4' 9" (1 448 m)       Transitional Care Management Review:  Dionisio Vega is a 80 y o  female here for TCM follow up       During the TCM phone call patient stated:    Date and time hospital follow up call was made:  3/5/2018  5:25 PM  Hospital care reviewed:  Records reviewed  Patient was [facility]ital[facility] at:  [facility]  Date of admission:  3/2/18  Date of discharge:  3/4/18  Diagnosis:  CHF  Were the patients medicaitons reviewed and updated:  Yes  Current symptoms:  None  Post hospital issues:  None  Should patient be enrolled in anticoag monitoring?:  No  Scheduled for follow up?:  Yes  Did you obtain your prescribed medications:  Yes  Is transportation to your appointments needed:  No  I have advised the patient to call PCP with any new or worsening symptoms (please type in name along with any credentials):  Ayleen Oakley  Living Arrangements:  Children  Support System:  Children, Family  The type of support provided:  Physical, Emotional  Do you have social support:  Yes, as much as I need  Are you recieving outpatient services:  No  Are you recieving home care services:  No  Are you using any community resources:  No  Current waiver service:  No  Have you fallen in the last 12 months:  No  Interperter language line required?:  No  Counseling:  Family             Benjy Vieyra MD

## 2018-03-05 NOTE — CASE MANAGEMENT
Notification of Discharge  This is a Notification of Discharge from our facility [address]  Please be advised that this patient has been discharge from our facility  Below you will find the admission and discharge date and time including the patients disposition  PRESENTATION DATE: 3/2/2018  5:56 AM  IP ADMISSION DATE: 3/2/18 0646  DISCHARGE DATE: 3/4/2018  2:22 PM  DISPOSITION: [facility] by Wyatt Armas for 2017  Network Utilization Review Department  Phone: [phone]; Fax [phone]  ATTENTION: The Network Utilization Review Department is now centralized for our 7 Facilities  Make a note that we have a new phone and fax numbers for our Department  Please call with any questions or concerns to [phone] and carefully follow the prompts so that you are directed to the right person  All voicemails are confidential  Fax any determinations, approvals, denials, and requests for initial or continue stay review clinical to [phone]  Due to HIGH CALL volume, it would be easier if you could please send faxed requests to expedite your requests and in part, help us provide discharge notifications faster

## 2018-03-09 DIAGNOSIS — E78.2 MIXED HYPERLIPIDEMIA: Primary | ICD-10-CM

## 2018-03-10 RX ORDER — SIMVASTATIN 80 MG
TABLET ORAL
Qty: 90 TABLET | Refills: 3 | Status: SHIPPED | OUTPATIENT
Start: 2018-03-10 | End: 2018-06-21

## 2018-03-14 ENCOUNTER — OFFICE VISIT (OUTPATIENT)
Dept: CARDIOLOGY CLINIC | Facility: CLINIC | Age: 83
End: 2018-03-14
Payer: COMMERCIAL

## 2018-03-14 VITALS
BODY MASS INDEX: 34.38 KG/M2 | DIASTOLIC BLOOD PRESSURE: 60 MMHG | SYSTOLIC BLOOD PRESSURE: 150 MMHG | WEIGHT: 163.8 LBS | HEIGHT: 58 IN

## 2018-03-14 DIAGNOSIS — I35.9 AORTIC VALVE DISORDER: ICD-10-CM

## 2018-03-14 DIAGNOSIS — I48.0 PAROXYSMAL ATRIAL FIBRILLATION (HCC): ICD-10-CM

## 2018-03-14 DIAGNOSIS — I50.31 ACUTE DIASTOLIC CONGESTIVE HEART FAILURE (HCC): ICD-10-CM

## 2018-03-14 DIAGNOSIS — I10 ESSENTIAL HYPERTENSION: ICD-10-CM

## 2018-03-14 DIAGNOSIS — I50.32 CHRONIC DIASTOLIC CONGESTIVE HEART FAILURE (HCC): Primary | ICD-10-CM

## 2018-03-14 PROCEDURE — 99214 OFFICE O/P EST MOD 30 MIN: CPT | Performed by: NURSE PRACTITIONER

## 2018-03-14 RX ORDER — LISINOPRIL 5 MG/1
5 TABLET ORAL 2 TIMES DAILY
Qty: 30 TABLET | Refills: 0 | Status: SHIPPED | OUTPATIENT
Start: 2018-03-14 | End: 2018-03-26 | Stop reason: SDUPTHER

## 2018-03-14 NOTE — PROGRESS NOTES
Heart Failure Office Visit   Pablo Patterson 80 y o  female MRN: [id number]    [facility]  Ms  Didier Lucas is an  80year old female with a known past medical history of DM2,  anxiety/depression, previous CVA and TIA, hypertension, dyslipidemia, Mod AS by TTE 1/2017, atrial fibrillation on Xarelto and chronic diastolic heart failure  Ms Rm Jordan was recently [place] [facility] [place]04[place]8 with Acute on chronic diastolic heart failure  She presented with a one week history of dyspnea  Baseline weight 161 lb she presented to her primary care physician and weight was 172 pounds  She was instructed to present to the ER for further evaluation  ER oxygen saturation 86%  CXR showed mild vascular congestion  Pro NT BNP 3090  She was diuresed with IV Lasix  TTE done 1/85/32 showed LV systolic function normal ejection fraction 60 percent and grade 2 diastolic dysfunction, mild-to-moderate mitral valve regurgitation, aortic valve trileaflet severe stenosis TEN 0 6 centimeter squared mild-to-moderate TR  She was transitioned to Lasix 20 mg daily a discharge  Lab studies stable at discharge  Today Myra Sacks presents our office for recent hospitalization follow-up visit  She is accompanied by her son-in-law  Myra Sacks denies dyspnea with minimal moderate exertion, chest pain palpitations lightheadedness or dizziness  She admits to 2 pillow orthopnea  Her weight today at home was 161 6 pounds  She is abiding by a 2 gram sodium diet  I have provided in-depth education in regards to heart failure management aortic stenosis and diastolic dysfunction          Patient Active Problem List   Diagnosis    Diabetes mellitus type [facility], uncontrolled ([facility])    Glaucoma    Hyperlipidemia    Hypertension    Hyponatremia    Depression with anxiety    Atrial fibrillation (HCC)    Acute congestive heart failure (HCC)    Slurred speech    Lethargy    Ataxia    Neurological deficit, transient    Type 2 diabetes mellitus with hyperglycemia ([facility] )    CVA (cerebral vascular accident) ([facility] )    Hearing loss    Insomnia    Left knee pain    PERI (obstructive sleep apnea)    Osteoarthritis    Osteoporosis    Aortic valve disorder    Periodic limb movement sleep disorder    Sleep talking    Tinnitus    Tricuspid valve disorders, non-rheumatic    Unsteady gait    History of cardioembolic cerebrovascular accident (CVA)       Review of Systems   Constitution: Positive for malaise/fatigue  HENT: Negative  Eyes: Negative  Cardiovascular: Negative  Respiratory: Negative  Endocrine: Negative  Skin: Negative  Musculoskeletal: Positive for arthritis  Gastrointestinal: Negative  Genitourinary: Negative  Neurological: Negative  Psychiatric/Behavioral: Negative  Objective:       Vitals: HR 53 BPM, BP /70  Vitals:    03/14/18 0954   BP: 150/60   BP Location: Left arm   Patient Position: Sitting   Cuff Size: Adult   Weight: 74 3 kg (163 lb 12 8 oz)   Height: 4' 9 5" (1 461 m)     Body mass index is 34 83 kg/m²      Wt Readings from Last 3 Encounters:   03/14/18 74 3 kg (163 lb 12 8 oz)   03/05/18 75 3 kg (166 lb)   03/04/18 73 7 kg (162 lb 7 7 oz)         Physical Exam:  GEN: Mattie Ham appears well, alert and oriented x 3, pleasant and cooperative   HEENT: pupils equal, round, and reactive to light; extraocular muscles intact  NECK: supple, no carotid bruits   HEART: regular rhythm, normal S1 and S2, 4/6 CJ, JVP is flat   LUNGS: clear to auscultation bilaterally; no wheezes, rales, or rhonchi   ABDOMEN: normal bowel sounds, soft, no tenderness, no distention  EXTREMITIES: peripheral pulses normal; no clubbing, cyanosis, or edema  NEURO: no focal findings   SKIN: normal without suspicious lesions on exposed skin      Current Outpatient Prescriptions:     Bimatoprost (LUMIGAN OP), Apply to eye daily at bedtime, Disp: , Rfl:     COMBIGAN 0 2-0 5 %, instill 1 drop into both eyes twice a day, Disp: , Rfl: 0    DORZOLAMIDE HCL OP, Apply to eye 3 (three) times a day, Disp: , Rfl:     escitalopram (LEXAPRO) 5 mg tablet, Take 1 tablet by mouth daily for 30 days, Disp: , Rfl: 0    furosemide (LASIX) 20 mg tablet, Take 1 tablet (20 mg total) by mouth daily for 30 days, Disp: 30 tablet, Rfl: 5    glipiZIDE (GLUCOTROL) 10 mg tablet, Take 1 tablet (10 mg total) by mouth 2 (two) times a day before meals for 30 days Pt takes 10 mg two times a day, Disp: 60 tablet, Rfl: 5    Hypromellose (ARTIFICIAL TEARS OP), Apply to eye, Disp: , Rfl:     lisinopril (ZESTRIL) 5 mg tablet, Take 1 tablet (5 mg total) by mouth daily, Disp: 30 tablet, Rfl: 0    metFORMIN (GLUCOPHAGE) 500 mg tablet, Take 1 tablet (500 mg total) by mouth daily with breakfast for 90 days (Patient taking differently: Take 1,000 mg by mouth daily with breakfast  ), Disp: 90 tablet, Rfl: 1    metoprolol succinate (TOPROL-XL) 50 mg 24 hr tablet, Take 1 tablet by mouth daily at bedtime (Patient taking differently: Take 25 mg by mouth daily at bedtime  ), Disp: , Rfl:     ONE TOUCH ULTRA TEST test strip, TEST twice a day, Disp: 200 each, Rfl: 3    ONETOUCH DELICA LANCETS 16L MISC, CHECK BLOOD SUGAR TWICE DAILY, Disp: 200 each, Rfl: 3    potassium chloride (K-DUR,KLOR-CON) 10 mEq tablet, Take 1 tablet (10 mEq total) by mouth daily, Disp: 30 tablet, Rfl: 0    rivaroxaban (XARELTO) 20 mg tablet, Take 1 tablet by mouth daily with dinner (Patient taking differently: Take 20 mg by mouth daily with breakfast  ), Disp: 30 tablet, Rfl: 0    simvastatin (ZOCOR) 80 mg tablet, take 1 tablet by mouth once daily, Disp: 90 tablet, Rfl: 3          Assessment/Plan:   1  Chronic diastolic heart failure NYHA class III stage C- Eu volemic, weight at home 161 6 pounds, HR controlled, /70 increase Lisinopril to 5mg BID, Maintain a 2 gram daily sodium diet and 1500 ml daily fluid restriction  Check daily weights    If you gained 3 pounds in one day, 5 pounds in one week, or experience worsening shortness of breath or increasing lower leg swelling  Please call the heart failure office at [phone]  Please bring a  list of your current medications and daily weights to the office visit  BMP in one week, follow up with Dr Shahrzad Willis in one month   2 HTN- accelerated /70 increase Lisinopril to 5mg twice a day  3  Severe AS- Consult Dr Juan Leyva for TAVR evaluation  4   Paroxysmal atrial fibrillation- in NSR continue on BB and [address]  3/14/2018  10:07 AM

## 2018-03-14 NOTE — PATIENT INSTRUCTIONS
Maintain a 2 gram daily sodium diet and 1500 ml daily fluid restriction  Check daily weights  If you gained 3 pounds in one day, 5 pounds in one week, or experience worsening shortness of breath or increasing lower leg swelling  Please call the heart failure office at [phone]    Please bring a  list of your current medications and daily weights to the office visit

## 2018-03-21 ENCOUNTER — OFFICE VISIT (OUTPATIENT)
Dept: CARDIAC SURGERY | Facility: CLINIC | Age: 83
End: 2018-03-21
Payer: COMMERCIAL

## 2018-03-21 VITALS
HEART RATE: 60 BPM | SYSTOLIC BLOOD PRESSURE: 114 MMHG | HEIGHT: 57 IN | TEMPERATURE: 97.2 F | RESPIRATION RATE: 14 BRPM | OXYGEN SATURATION: 96 % | BODY MASS INDEX: 35.38 KG/M2 | WEIGHT: 164 LBS | DIASTOLIC BLOOD PRESSURE: 58 MMHG

## 2018-03-21 DIAGNOSIS — T50.995A ALLERGIC REACTION TO DYE, INITIAL ENCOUNTER: ICD-10-CM

## 2018-03-21 DIAGNOSIS — I35.0 AORTIC STENOSIS, SEVERE: Primary | ICD-10-CM

## 2018-03-21 PROCEDURE — 99205 OFFICE O/P NEW HI 60 MIN: CPT | Performed by: THORACIC SURGERY (CARDIOTHORACIC VASCULAR SURGERY)

## 2018-03-21 RX ORDER — METHYLPREDNISOLONE 32 MG/1
TABLET ORAL
Qty: 2 TABLET | Refills: 0 | Status: SHIPPED | OUTPATIENT
Start: 2018-03-21 | End: 2018-04-12

## 2018-03-21 RX ORDER — DIPHENHYDRAMINE HCL 50 MG
CAPSULE ORAL
Qty: 1 CAPSULE | Refills: 0 | Status: SHIPPED | OUTPATIENT
Start: 2018-03-21 | End: 2018-04-06 | Stop reason: HOSPADM

## 2018-03-21 NOTE — PROGRESS NOTES
Consultation - Cardiothoracic Surgery   Kiersten Abraham 80 y o  female MRN: [id number]    Physician Requesting Consult: Dr Liane Decker    Reason for Consult / Principal Problem: Severe aortic stenosis    History of Present Illness: Kiersten Abraham is a 80y o  year old female who presents for initial outpatient surgical consultation for severe symptomatic aortic stenosis  She was recently admitted to the hospital for an episode of acute on chronic diastolic heart failure  She presented with a 1 week history os dyspnea with a 9 lb weight gain with LE edema  Pro NT BNP was elevated to 3090  She responded well to diuresis  Echocardiogram demonstrated normal LV function with mild-mod MR , severe AS with an TEN 0 6 cm2 , mean & peak gradients 31 & 58 mm Hg, and mild-mod TR   Kelly Zazueta is accompanied by her son and daughter, with whom she resides  They live in a single story ranch style home  Kelly Zazueta states she is able to perform her ADL's and simple chores during the day  She states her weight and edema have been stable since d/c from the hospital  She denies ALBERTO or chest pain but states does experience lightheadedness and she has to pace herself with activities  She denies presyncope or syncope, PND or orthopnea  She is a lifelong non-smoker  She does not drink alcohol  She is edentulous with dentures  She is [place] and wears hearing aids       Past Medical History:  Past Medical History:   Diagnosis Date    Cholelithiasis     Depression with anxiety     Diabetes mellitus ([facility] [address] )     niddm    Dysfunction of eustachian tube     Glaucoma     Hematuria     Hypercholesterolemia     Hypertension     Hyponatremia     Ischemic stroke of frontal lobe (HCC)     Right     Obstructive sleep apnea     OP (osteoporosis)     PAF (paroxysmal atrial fibrillation) (HCC)     Urinary frequency     Vertigo     Viral gastroenteritis     Viral gastroenteritis     Viral infection          Past Surgical History:   Past Surgical History: Procedure Laterality Date    APPENDECTOMY      GLAUCOMA SURGERY  01/03/2016    TUBAL LIGATION           Family History:  Family History   Problem Relation Age of Onset    No Known Problems Mother     Diabetes Father     Stroke Father     Coronary artery disease Sister     No Known Problems Brother     No Known Problems Son     Breast cancer Daughter     No Known Problems Maternal Grandmother     No Known Problems Maternal Grandfather     No Known Problems Paternal Grandmother     No Known Problems Paternal Grandfather     No Known Problems Cousin     Rheum arthritis Neg Hx     Osteoarthritis Neg Hx     Asthma Neg Hx     Heart failure Neg Hx     Hyperlipidemia Neg Hx     Hypertension Neg Hx     Migraines Neg Hx     Rashes / Skin problems Neg Hx     Seizures Neg Hx     Thyroid disease Neg Hx          Social History:    History   Alcohol Use No     History   Drug Use No     History   Smoking Status    Never Smoker   Smokeless Tobacco    Never Used         Home Medications:   Prior to Admission medications    Medication Sig Start Date End Date Taking?  Authorizing Provider   Acetaminophen (TYLENOL) 325 MG CAPS Take by mouth as needed   Yes Historical Provider, MD   Bimatoprost (LUMIGAN OP) Apply to eye daily at bedtime   Yes Historical Provider, MD   COMBIGAN 0 2-0 5 % instill 1 drop into both eyes twice a day 2/8/18  Yes Historical Provider, MD   DORZOLAMIDE HCL OP Apply to eye 3 (three) times a day   Yes Historical Provider, MD   furosemide (LASIX) 20 mg tablet Take 1 tablet (20 mg total) by mouth daily for 30 days 3/1/18 3/31/18 Yes Georgia Estrada MD   glipiZIDE (GLUCOTROL) 10 mg tablet Take 1 tablet (10 mg total) by mouth 2 (two) times a day before meals for 30 days Pt takes 10 mg two times a day 3/1/18 3/31/18 Yes Georgia Estrada MD   Hypromellose (ARTIFICIAL TEARS OP) Apply to eye   Yes Historical Provider, MD   lisinopril (ZESTRIL) 5 mg tablet Take 1 tablet (5 mg total) by mouth 2 (two) times a day 3/14/18  Yes ROBBI Villalba   metFORMIN (GLUCOPHAGE) 500 mg tablet Take 1 tablet (500 mg total) by mouth daily with breakfast for 90 days  Patient taking differently: Take 1,000 mg by mouth daily with breakfast   3/1/18 5/30/18 Yes Kaz Mcknight MD   metoprolol succinate (TOPROL-XL) 50 mg 24 hr tablet Take 1 tablet by mouth daily at bedtime  Patient taking differently: Take 25 mg by mouth daily at bedtime   1/24/18  Yes Kaz Mcknight MD   potassium chloride (K-DUR,KLOR-CON) 10 mEq tablet Take 1 tablet (10 mEq total) by mouth daily 3/5/18  Yes Citlalli Monique DO   rivaroxaban (XARELTO) 20 mg tablet Take 1 tablet by mouth daily with dinner  Patient taking differently: Take 20 mg by mouth daily with breakfast   9/14/17  Yes Connie Hussein MD   simvastatin (ZOCOR) 80 mg tablet take 1 tablet by mouth once daily 3/10/18  Yes Kaz Mcknight MD   escitalopram (LEXAPRO) 5 mg tablet Take 1 tablet by mouth daily for 30 days 1/24/18 3/14/18  Kaz Mcknight MD   ONE TOUCH ULTRA TEST test strip TEST twice a day 2/19/18   Kaz Mcknight MD   [facility] [address] [facility] 2/19/18   Kaz Mcknight MD       Allergies:   Allergies   Allergen Reactions    Lipitor [Atorvastatin] GI Intolerance     Nausea stomach ache    Hydrochlorothiazide GI Intolerance    Aspirin GI Intolerance       Review of Systems:  Review of Systems - History obtained from chart review and the patient  General ROS: negative  Psychological ROS: negative  Hematological and Lymphatic ROS: negative for - bleeding problems, blood clots, bruising or jaundice  Respiratory ROS: no cough, shortness of breath, or wheezing  Cardiovascular ROS: no chest pain or dyspnea on exertion  Gastrointestinal ROS: positive for - swallowing difficulty/pain  negative for - abdominal pain, blood in stools, change in bowel habits, constipation, diarrhea, heartburn, hematemesis, melena or nausea/vomiting  Musculoskeletal ROS: negative  Neurological ROS: no TIA or stroke symptoms    Vital Signs:     Vitals:    03/21/18 0900 03/21/18 0923   BP: 110/58 114/58   BP Location: Left arm Right arm   Cuff Size: Adult    Pulse: 60    Resp: 14    Temp: (!) 97 2 °F (36 2 °C)    TempSrc: Oral    SpO2: 96%    Weight: 74 4 kg (164 lb)    Height: 4' 9" (1 448 m)        Physical Exam:    General: well developed  No acute distress  HEENT/NECK:  PERRLA   + HJR   Cardiac:Regular rate and rhythm, 3/6 harsh systolic murmur, RUSB   Carotid arteries: 1+, delayed upstrokes, no bruits  Pulmonary:  Breath sounds clear bilaterally  Abdomen:  Non-tender, Non-distended  Positive bowel sounds  Lower extremities: Extremities warm/dry  Radial/PT/DP pulses 1+ bilaterally  1+ edema B/L  Neuro: Alert and oriented X 3  Sensation is grossly intact  No focal deficits  Skin: Warm/Dry, without rashes or lesions  Lab Results:                 Lab Results   Component Value Date    HGBA1C 8 1 (H) 01/13/2018     Lab Results   Component Value Date    CKTOTAL 65 03/28/2014    TROPONINI <0 02 03/02/2018       Imaging Studies:     Echocardiogram:   LEFT VENTRICLE: Size was normal  Systolic function was normal  Ejection fraction was estimated to be 60 %  There were no regional wall motion abnormalities  Wall thickness was normal  No evidence of apical thrombus  DOPPLER: Features were  consistent with a pseudonormal left ventricular filling pattern, with concomitant abnormal relaxation and increased filling pressure (grade 2 diastolic dysfunction)      RIGHT VENTRICLE: The size was normal  Systolic function was normal  Wall thickness was normal      LEFT ATRIUM: The atrium was mildly dilated      RIGHT ATRIUM: Size was normal      MITRAL VALVE: Valve structure was normal  There was normal leaflet separation  DOPPLER: The transmitral velocity was within the normal range  There was no evidence for stenosis  There was mild to moderate regurgitation      AORTIC VALVE: The valve was trileaflet   Leaflets exhibited normal thickness, moderate to marked calcification, and markedly reduced cuspal separation  DOPPLER: Transaortic velocity was within the normal range  There was severe stenosis  Mean gradient is 31 mmHg  Dimensionless index is 0 2  Aortic valve by continuity is approximately 0 6 cm2  There was no significant regurgitation      TRICUSPID VALVE: The valve structure was normal  There was normal leaflet separation  DOPPLER: The transtricuspid velocity was within the normal range  There was no evidence for stenosis  There was mild to moderate regurgitation  Estimated  peak PA pressure was 40 mmHg      PULMONIC VALVE: Not well visualized  DOPPLER: The transpulmonic velocity was within the normal range  There was no significant regurgitation      PERICARDIUM: There was no pericardial effusion  The pericardium was normal in appearance      AORTA: The root exhibited normal size        I have personally reviewed pertinent reports        TAVR evaluation Assessment:     Bethel Doty 122: III    5 Meter Walk: 10 sec, 10 sec, 10 sec    STS risk score (preliminary): 5 7%    KCCQ-12 completed    Assessment:  Patient Active Problem List    Diagnosis Date Noted    History of cardioembolic cerebrovascular accident (CVA) 03/02/2018    Slurred speech 01/12/2018    Lethargy 01/12/2018    Ataxia 01/12/2018    Neurological deficit, transient 01/12/2018    Type 2 diabetes mellitus with hyperglycemia ([facility] [address] ) 01/12/2018    Left knee pain 12/04/2017    PERI (obstructive sleep apnea) 09/29/2017    Periodic limb movement sleep disorder 09/29/2017    Atrial fibrillation ([address] ) 09/11/2017    Acute congestive heart failure ([address] ) 09/11/2017    Sleep talking 08/25/2017    Osteoporosis 05/04/2017    Insomnia 01/25/2017    Hyponatremia 01/16/2017    Depression with anxiety 01/16/2017    CVA (cerebral vascular accident) ([address] ) 01/13/2017    Unsteady gait 01/13/2017    Diabetes mellitus type 2, uncontrolled ([address] )     Glaucoma     Hyperlipidemia     Hypertension     Tinnitus 04/04/2014    Hearing loss 12/12/2012    Osteoarthritis 08/07/2012    Aortic stenosis, severe 08/07/2012    Tricuspid valve disorders, non-rheumatic 08/07/2012     Severe aortic stenosis; Ongoing TAVR workup    Plan:    Sondra Cabral has severe symptomatic aortic stenosis  Based on her STS risk assessment, she will undergo the following testing for transcatheter aortic valve replacement: Gated CTA of the chest/abdomen/pelvis, 3D KATHLEEN, left and right cardiac catheterization, pulmonary function tests with ABG, and carotid artery ultrasound  Once these studies have been completed, Sondra Cabral will follow up in our office to review the results and to be evaluated by a second surgeon to confirm the suitability of proceeding with transcatheter aortic valve replacment  Sondra Cabral and her family were comfortable with our recommendations, and their questions were answered to their satisfaction  We will continue to evaluate the patient daily with further recommendations as work up is completed  Thank you for allowing us to participate in the care of this patient       SIGNATURE: ROBBI Razo  DATE: March 21, 2018  TIME: 10:27 AM

## 2018-03-21 NOTE — LETTER
March 21, 2018     [place], [address]  [address] [place] [address]    Patient: Rebecca Gore   YOB: 1935   Date of Visit: 3/21/2018       Dear Dr Markie Tomas:    Thank you for referring Esme Darden to me for evaluation  Below are my notes for this consultation  If you have questions, please do not hesitate to call me  I look forward to following your patient along with you  Sincerely,        Destin Claros,         CC: MD Mukesh Soto January, ROBBI  3/21/2018 10:46 AM  Attested  Consultation - Cardiothoracic Surgery   Rebecca Gore 80 y o  female MRN: [id number]    Physician Requesting Consult: Dr Richy Umaña    Reason for Consult / Principal Problem: Severe aortic stenosis    History of Present Illness: Rebecca Gore is a 80y o  year old female who presents for initial outpatient surgical consultation for severe symptomatic aortic stenosis  She was recently admitted to the hospital for an episode of acute on chronic diastolic heart failure  She presented with a 1 week history os dyspnea with a 9 lb weight gain with LE edema  Pro NT BNP was elevated to 3090  She responded well to diuresis  Echocardiogram demonstrated normal LV function with mild-mod MR , severe AS with an TEN 0 6 cm2 , mean & peak gradients 31 & 58 mm Hg, and mild-mod TR   Amairani Son is accompanied by her son and daughter, with whom she resides  They live in a single story ranch style home  Amairani Son states she is able to perform her ADL's and simple chores during the day  She states her weight and edema have been stable since d/c from the hospital  She denies ALBERTO or chest pain but states does experience lightheadedness and she has to pace herself with activities  She denies presyncope or syncope, PND or orthopnea  She is a lifelong non-smoker  She does not drink alcohol  She is edentulous with dentures  She is [place] and wears hearing aids       Past Medical History:  Past Medical History: Diagnosis Date    Cholelithiasis     Depression with anxiety     Diabetes mellitus ([facility] [address] )     niddm    Dysfunction of eustachian tube     Glaucoma     Hematuria     Hypercholesterolemia     Hypertension     Hyponatremia     Ischemic stroke of frontal lobe (HCC)     Right     Obstructive sleep apnea     OP (osteoporosis)     PAF (paroxysmal atrial fibrillation) (HCC)     Urinary frequency     Vertigo     Viral gastroenteritis     Viral gastroenteritis     Viral infection          Past Surgical History:   Past Surgical History:   Procedure Laterality Date    APPENDECTOMY      GLAUCOMA SURGERY  01/03/2016    TUBAL LIGATION           Family History:  Family History   Problem Relation Age of Onset    No Known Problems Mother     Diabetes Father     Stroke Father     Coronary artery disease Sister     No Known Problems Brother     No Known Problems Son     Breast cancer Daughter     No Known Problems Maternal Grandmother     No Known Problems Maternal Grandfather     No Known Problems Paternal Grandmother     No Known Problems Paternal Grandfather     No Known Problems Cousin     Rheum arthritis Neg Hx     Osteoarthritis Neg Hx     Asthma Neg Hx     Heart failure Neg Hx     Hyperlipidemia Neg Hx     Hypertension Neg Hx     Migraines Neg Hx     Rashes / Skin problems Neg Hx     Seizures Neg Hx     Thyroid disease Neg Hx          Social History:    History   Alcohol Use No     History   Drug Use No     History   Smoking Status    Never Smoker   Smokeless Tobacco    Never Used         Home Medications:   Prior to Admission medications    Medication Sig Start Date End Date Taking?  Authorizing Provider   Acetaminophen (TYLENOL) 325 MG CAPS Take by mouth as needed   Yes Historical Provider, MD   Bimatoprost (LUMIGAN OP) Apply to eye daily at bedtime   Yes Historical Provider, MD   COMBIGAN 0 2-0 5 % instill 1 drop into both eyes twice a day 2/8/18  Yes Historical Provider, MD DORZOLAMIDE HCL OP Apply to eye 3 (three) times a day   Yes Historical Provider, MD   furosemide (LASIX) 20 mg tablet Take 1 tablet (20 mg total) by mouth daily for 30 days 3/1/18 3/31/18 Yes Branden Perez MD   glipiZIDE (GLUCOTROL) 10 mg tablet Take 1 tablet (10 mg total) by mouth 2 (two) times a day before meals for 30 days Pt takes 10 mg two times a day 3/1/18 3/31/18 Yes Branden Perez MD   Hypromellose (ARTIFICIAL TEARS OP) Apply to eye   Yes Historical Provider, MD   lisinopril (ZESTRIL) 5 mg tablet Take 1 tablet (5 mg total) by mouth 2 (two) times a day 3/14/18  Yes ROBBI España   metFORMIN (GLUCOPHAGE) 500 mg tablet Take 1 tablet (500 mg total) by mouth daily with breakfast for 90 days  Patient taking differently: Take 1,000 mg by mouth daily with breakfast   3/1/18 5/30/18 Yes Branden Perez MD   metoprolol succinate (TOPROL-XL) 50 mg 24 hr tablet Take 1 tablet by mouth daily at bedtime  Patient taking differently: Take 25 mg by mouth daily at bedtime   1/24/18  Yes Branden Perez MD   potassium chloride (K-DUR,KLOR-CON) 10 mEq tablet Take 1 tablet (10 mEq total) by mouth daily 3/5/18  Yes Chuck Nolen DO   rivaroxaban (XARELTO) 20 mg tablet Take 1 tablet by mouth daily with dinner  Patient taking differently: Take 20 mg by mouth daily with breakfast   9/14/17  Yes Gena Bailey MD   simvastatin (ZOCOR) 80 mg tablet take 1 tablet by mouth once daily 3/10/18  Yes Branden Perez MD   escitalopram (LEXAPRO) 5 mg tablet Take 1 tablet by mouth daily for 30 days 1/24/18 3/14/18  Branden Perez MD   ONE TOUCH ULTRA TEST test strip TEST twice a day 2/19/18   MD Lucia Lockhart[address]  2/19/18   Branden Perez MD       Allergies:   Allergies   Allergen Reactions    Lipitor [Atorvastatin] GI Intolerance     Nausea stomach ache    Hydrochlorothiazide GI Intolerance    Aspirin GI Intolerance       Review of Systems:  Review of Systems - History obtained from chart review and the patient  General ROS: negative  Psychological ROS: negative  Hematological and Lymphatic ROS: negative for - bleeding problems, blood clots, bruising or jaundice  Respiratory ROS: no cough, shortness of breath, or wheezing  Cardiovascular ROS: no chest pain or dyspnea on exertion  Gastrointestinal ROS: positive for - swallowing difficulty/pain  negative for - abdominal pain, blood in stools, change in bowel habits, constipation, diarrhea, heartburn, hematemesis, melena or nausea/vomiting  Musculoskeletal ROS: negative  Neurological ROS: no TIA or stroke symptoms    Vital Signs:     Vitals:    03/21/18 0900 03/21/18 0923   BP: 110/58 114/58   BP Location: Left arm Right arm   Cuff Size: Adult    Pulse: 60    Resp: 14    Temp: (!) 97 2 °F (36 2 °C)    TempSrc: Oral    SpO2: 96%    Weight: 74 4 kg (164 lb)    Height: 4' 9" (1 448 m)        Physical Exam:    General: well developed  No acute distress  HEENT/NECK:  PERRLA   + HJR   Cardiac:Regular rate and rhythm, 3/6 harsh systolic murmur, RUSB   Carotid arteries: 1+, delayed upstrokes, no bruits  Pulmonary:  Breath sounds clear bilaterally  Abdomen:  Non-tender, Non-distended  Positive bowel sounds  Lower extremities: Extremities warm/dry  Radial/PT/DP pulses 1+ bilaterally  1+ edema B/L  Neuro: Alert and oriented X 3  Sensation is grossly intact  No focal deficits  Skin: Warm/Dry, without rashes or lesions  Lab Results:                 Lab Results   Component Value Date    HGBA1C 8 1 (H) 01/13/2018     Lab Results   Component Value Date    CKTOTAL 65 03/28/2014    TROPONINI <0 02 03/02/2018       Imaging Studies:     Echocardiogram:   LEFT VENTRICLE: Size was normal  Systolic function was normal  Ejection fraction was estimated to be 60 %  There were no regional wall motion abnormalities  Wall thickness was normal  No evidence of apical thrombus   DOPPLER: Features were  consistent with a pseudonormal left ventricular filling pattern, with concomitant abnormal relaxation and increased filling pressure (grade 2 diastolic dysfunction)      RIGHT VENTRICLE: The size was normal  Systolic function was normal  Wall thickness was normal      LEFT ATRIUM: The atrium was mildly dilated      RIGHT ATRIUM: Size was normal      MITRAL VALVE: Valve structure was normal  There was normal leaflet separation  DOPPLER: The transmitral velocity was within the normal range  There was no evidence for stenosis  There was mild to moderate regurgitation      AORTIC VALVE: The valve was trileaflet  Leaflets exhibited normal thickness, moderate to marked calcification, and markedly reduced cuspal separation  DOPPLER: Transaortic velocity was within the normal range  There was severe stenosis  Mean gradient is 31 mmHg  Dimensionless index is 0 2  Aortic valve by continuity is approximately 0 6 cm2  There was no significant regurgitation      TRICUSPID VALVE: The valve structure was normal  There was normal leaflet separation  DOPPLER: The transtricuspid velocity was within the normal range  There was no evidence for stenosis  There was mild to moderate regurgitation  Estimated  peak PA pressure was 40 mmHg      PULMONIC VALVE: Not well visualized  DOPPLER: The transpulmonic velocity was within the normal range  There was no significant regurgitation      PERICARDIUM: There was no pericardial effusion  The pericardium was normal in appearance      AORTA: The root exhibited normal size        I have personally reviewed pertinent reports        TAVR evaluation Assessment:     Donita Harrison: III    5 Meter Walk: 10 sec, 10 sec, 10 sec    STS risk score (preliminary): 5 7%    KCCQ-12 completed    Assessment:  Patient Active Problem List    Diagnosis Date Noted    History of cardioembolic cerebrovascular accident (CVA) 03/02/2018    Slurred speech 01/12/2018    Lethargy 01/12/2018    Ataxia 01/12/2018    Neurological deficit, transient 01/12/2018    Type 2 diabetes mellitus with hyperglycemia ([facility] ) 01/12/2018    Left knee pain 12/04/2017    PERI (obstructive sleep apnea) 09/29/2017    Periodic limb movement sleep disorder 09/29/2017    Atrial fibrillation (HCC) 09/11/2017    Acute congestive heart failure ([place] [address] ) 09/11/2017    Sleep talking 08/25/2017    Osteoporosis 05/04/2017    Insomnia 01/25/2017    Hyponatremia 01/16/2017    Depression with anxiety 01/16/2017    CVA (cerebral vascular accident) ([place] [address] ) 01/13/2017    Unsteady gait 01/13/2017    Diabetes mellitus type 2, uncontrolled ([place] [address] )     Glaucoma     Hyperlipidemia     Hypertension     Tinnitus 04/04/2014    Hearing loss 12/12/2012    Osteoarthritis 08/07/2012    Aortic stenosis, severe 08/07/2012    Tricuspid valve disorders, non-rheumatic 08/07/2012     Severe aortic stenosis; Ongoing TAVR workup    Plan:    Ivelisse Alberts has severe symptomatic aortic stenosis  Based on her STS risk assessment, she will undergo the following testing for transcatheter aortic valve replacement: Gated CTA of the chest/abdomen/pelvis, 3D KATHLEEN, left and right cardiac catheterization, pulmonary function tests with ABG, and carotid artery ultrasound  Once these studies have been completed, Ivelisse Alberts will follow up in our office to review the results and to be evaluated by a second surgeon to confirm the suitability of proceeding with transcatheter aortic valve replacment  Ivelisse Alberts and her family were comfortable with our recommendations, and their questions were answered to their satisfaction  We will continue to evaluate the patient daily with further recommendations as work up is completed  Thank you for allowing us to participate in the care of this patient       ROBBI Mortensen  DATE: March 21, 2018  TIME: 10:27 AM  Attestation signed by Bree Watkins DO at 3/21/2018 10:51 AM:  The patient was seen and examined, and I agree with the midlevel's history, physical exam, assessment and plan with the following additions:    Ms America Moran was seen accompanied by her son  She has aortic stenosis and has had episode of acute on chronic diastolic heart failure  She is intermediate to high risk for open surgical aortic valve replacement  I discussed with her treatment for her aortic stenosis using TAVR  The TAVR procedure was discussed in detail with her and her family  The risks, benefits, and alternatives to TAVR were discussed and the patient wishes to proceed with TAVR  She will complete her preoperative testing prior to scheduling her surgery

## 2018-03-24 ENCOUNTER — OFFICE VISIT (OUTPATIENT)
Dept: FAMILY MEDICINE CLINIC | Facility: CLINIC | Age: 83
End: 2018-03-24
Payer: COMMERCIAL

## 2018-03-24 VITALS
RESPIRATION RATE: 16 BRPM | HEART RATE: 60 BPM | HEIGHT: 57 IN | TEMPERATURE: 97.9 F | DIASTOLIC BLOOD PRESSURE: 60 MMHG | BODY MASS INDEX: 35.42 KG/M2 | SYSTOLIC BLOOD PRESSURE: 118 MMHG | WEIGHT: 164.2 LBS

## 2018-03-24 DIAGNOSIS — I48.0 PAROXYSMAL ATRIAL FIBRILLATION (HCC): ICD-10-CM

## 2018-03-24 DIAGNOSIS — I10 ESSENTIAL HYPERTENSION: ICD-10-CM

## 2018-03-24 DIAGNOSIS — E11.3559: ICD-10-CM

## 2018-03-24 DIAGNOSIS — J06.9 VIRAL UPPER RESPIRATORY TRACT INFECTION: Primary | ICD-10-CM

## 2018-03-24 DIAGNOSIS — I35.0 AORTIC STENOSIS, SEVERE: ICD-10-CM

## 2018-03-24 DIAGNOSIS — E11.65: ICD-10-CM

## 2018-03-24 LAB
BUN SERPL-MCNC: 26 MG/DL (ref 7–25)
BUN/CREAT SERPL: 31 (CALC) (ref 6–22)
CALCIUM SERPL-MCNC: 9.2 MG/DL (ref 8.6–10.4)
CHLORIDE SERPL-SCNC: 97 MMOL/L (ref 98–110)
CO2 SERPL-SCNC: 26 MMOL/L (ref 20–31)
CREAT SERPL-MCNC: 0.85 MG/DL (ref 0.6–0.88)
GLUCOSE SERPL-MCNC: 272 MG/DL (ref 65–99)
POTASSIUM SERPL-SCNC: 5 MMOL/L (ref 3.5–5.3)
SL AMB EGFR AFRICAN AMERICAN: 73 ML/MIN/1.73M2
SL AMB EGFR NON AFRICAN AMERICAN: 63 ML/MIN/1.73M2
SODIUM SERPL-SCNC: 131 MMOL/L (ref 135–146)

## 2018-03-24 PROCEDURE — 99213 OFFICE O/P EST LOW 20 MIN: CPT | Performed by: FAMILY MEDICINE

## 2018-03-24 NOTE — PROGRESS NOTES
Assessment/Plan:    Discussed with patient and her son who was present during the visit  She has no signs or symptoms of congestive heart failure and her heart rate today seems normal with a history of paroxysmal atrial fib  I believe this is a upper respiratory infection that is mild and uncomplicated and should be treated only with over-the-counter  Medications for symptomatic relief  She should only use something such as diabetic Robitussin DM or Delsym and avoid anything that has decongestants in it  I advised him to call if she has a change in her symptoms are worsening  She should be okay for her planned procedures that are upcoming  No problem-specific Assessment & Plan notes found for this encounter  There are no diagnoses linked to this encounter  Subjective:      Patient ID: Matty Duggan is a 80 y o  female  She began about 2 days ago with a feeling in her throat which progressed to hoarseness yesterday  She has a mild cough that feels like a tickle in her throat from postnasal drip  She denies any nasal congestion  She denies any tightness in her chest or wheezing or trouble breathing  She denies any fever chills or general malaise  She has not been exposed to anybody with strep throat or influenza  She was hospitalized about a month ago for   An acute urinary tract infection and congestive heart failure  She is not experiencing any symptoms of congestive heart failure at this time including shortness of breath wheezing tightness in her chest swelling of her ankles  She is currently being evaluated for possible aortic valve replacement  The following portions of the patient's history were reviewed and updated as appropriate: allergies, current medications, past family history, past medical history, past surgical history and problem list     Review of Systems   Constitutional:        See HPI   HENT:        See HPI   Respiratory: Positive for cough  Negative for chest tightness, shortness of breath and wheezing  Cardiovascular: Negative for chest pain, palpitations and leg swelling  Gastrointestinal: Negative for diarrhea, nausea and vomiting  Genitourinary:        No urinary symptoms   Neurological: Negative for dizziness  Objective:      /60   Pulse 60   Temp 97 9 °F (36 6 °C) (Tympanic)   Resp 16   Ht 4' 9" (1 448 m)   Wt 74 5 kg (164 lb 3 2 oz)   BMI 35 53 kg/m²          Physical Exam   Constitutional: She is oriented to person, place, and time  She appears well-developed and well-nourished  She appears distressed  HENT:   Head: Normocephalic  Right Ear: External ear normal    Left Ear: External ear normal    Nose: Nose normal    Mouth/Throat: Oropharynx is clear and moist  No oropharyngeal exudate  Tonsils and adenoids absent   Eyes: Right eye exhibits no discharge  Left eye exhibits no discharge  Neck: Neck supple  No JVD present  No thyromegaly present  Cardiovascular: Normal rate and regular rhythm  Murmur (systolic murmur loudest at right upper chest with radiation to left and into her neck) heard  Pulmonary/Chest: Effort normal and breath sounds normal  No respiratory distress  She has no wheezes  She has no rales  Musculoskeletal: She exhibits no edema  Lymphadenopathy:     She has no cervical adenopathy  Neurological: She is alert and oriented to person, place, and time  Psychiatric: She has a normal mood and affect  Her behavior is normal  Thought content normal    Nursing note and vitals reviewed

## 2018-03-26 DIAGNOSIS — I50.31 ACUTE DIASTOLIC CONGESTIVE HEART FAILURE (HCC): ICD-10-CM

## 2018-03-26 RX ORDER — LISINOPRIL 5 MG/1
5 TABLET ORAL 2 TIMES DAILY
Qty: 60 TABLET | Refills: 5 | Status: SHIPPED | OUTPATIENT
Start: 2018-03-26 | End: 2018-04-06 | Stop reason: HOSPADM

## 2018-03-26 RX ORDER — POTASSIUM CHLORIDE 750 MG/1
10 TABLET, EXTENDED RELEASE ORAL DAILY
Qty: 30 TABLET | Refills: 5 | Status: SHIPPED | OUTPATIENT
Start: 2018-03-26 | End: 2018-12-06

## 2018-04-04 ENCOUNTER — ANESTHESIA EVENT (OUTPATIENT)
Dept: NON INVASIVE DIAGNOSTICS | Facility: HOSPITAL | Age: 83
End: 2018-04-04

## 2018-04-05 ENCOUNTER — HOSPITAL ENCOUNTER (OUTPATIENT)
Dept: RADIOLOGY | Facility: HOSPITAL | Age: 83
Discharge: HOME | End: 2018-04-05
Payer: COMMERCIAL

## 2018-04-05 ENCOUNTER — TRANSCRIBE ORDERS (OUTPATIENT)
Dept: RADIOLOGY | Facility: HOSPITAL | Age: 83
End: 2018-04-05

## 2018-04-05 ENCOUNTER — HOSPITAL ENCOUNTER (OUTPATIENT)
Dept: NON INVASIVE DIAGNOSTICS | Facility: HOSPITAL | Age: 83
Discharge: HOME | End: 2018-04-05
Payer: COMMERCIAL

## 2018-04-05 ENCOUNTER — ANESTHESIA (OUTPATIENT)
Dept: NON INVASIVE DIAGNOSTICS | Facility: HOSPITAL | Age: 83
End: 2018-04-05

## 2018-04-05 ENCOUNTER — HOSPITAL ENCOUNTER (OUTPATIENT)
Dept: PULMONOLOGY | Facility: HOSPITAL | Age: 83
Discharge: HOME | End: 2018-04-05
Payer: COMMERCIAL

## 2018-04-05 ENCOUNTER — HOSPITAL ENCOUNTER (OUTPATIENT)
Facility: HOSPITAL | Age: 83
Discharge: HOME | End: 2018-04-06
Attending: INTERNAL MEDICINE | Admitting: INTERNAL MEDICINE
Payer: COMMERCIAL

## 2018-04-05 DIAGNOSIS — I35.0 AORTIC STENOSIS, SEVERE: ICD-10-CM

## 2018-04-05 DIAGNOSIS — Z01.818 PRE-OP EXAM: ICD-10-CM

## 2018-04-05 DIAGNOSIS — Z98.890 S/P CARDIAC CATH: ICD-10-CM

## 2018-04-05 DIAGNOSIS — I10 ESSENTIAL HYPERTENSION: Primary | Chronic | ICD-10-CM

## 2018-04-05 PROBLEM — I50.32 CHRONIC DIASTOLIC HEART FAILURE (HCC): Chronic | Status: ACTIVE | Noted: 2018-04-05

## 2018-04-05 LAB
ANION GAP SERPL CALCULATED.3IONS-SCNC: 8 MMOL/L (ref 4–13)
ARTERIAL PATENCY WRIST A: ABNORMAL
BASE EXCESS BLDA CALC-SCNC: 2 MMOL/L (ref -2–3)
BUN SERPL-MCNC: 20 MG/DL (ref 5–25)
CA-I BLD-SCNC: 1.25 MMOL/L (ref 1.12–1.32)
CALCIUM SERPL-MCNC: 9.1 MG/DL (ref 8.3–10.1)
CHLORIDE SERPL-SCNC: 98 MMOL/L (ref 100–108)
CO2 SERPL-SCNC: 27 MMOL/L (ref 21–32)
CREAT SERPL-MCNC: 0.81 MG/DL (ref 0.6–1.3)
ERYTHROCYTE [DISTWIDTH] IN BLOOD BY AUTOMATED COUNT: 12.7 % (ref 11.6–15.1)
FIO2 GAS DIL.REBREATH: 21 L
GFR SERPL CREATININE-BSD FRML MDRD: 67 ML/MIN/1.73SQ M
GLUCOSE P FAST SERPL-MCNC: 307 MG/DL (ref 65–99)
GLUCOSE SERPL-MCNC: 213 MG/DL (ref 65–140)
GLUCOSE SERPL-MCNC: 274 MG/DL (ref 65–140)
GLUCOSE SERPL-MCNC: 307 MG/DL (ref 65–140)
GLUCOSE SERPL-MCNC: 337 MG/DL (ref 65–140)
HCO3 BLDA-SCNC: 26.6 MMOL/L (ref 22–28)
HCT VFR BLD AUTO: 41.2 % (ref 34.8–46.1)
HCT VFR BLD CALC: 45 % (ref 34.8–46.1)
HGB BLD-MCNC: 14 G/DL (ref 11.5–15.4)
HGB BLDA-MCNC: 15.3 G/DL (ref 11.5–15.4)
MCH RBC QN AUTO: 30.7 PG (ref 26.8–34.3)
MCHC RBC AUTO-ENTMCNC: 34 G/DL (ref 31.4–37.4)
MCV RBC AUTO: 90 FL (ref 82–98)
PCO2 BLD: 28 MMOL/L (ref 21–32)
PCO2 BLD: 43.2 MM HG (ref 36–44)
PH BLD: 7.4 [PH] (ref 7.35–7.45)
PLATELET # BLD AUTO: 295 THOUSANDS/UL (ref 149–390)
PMV BLD AUTO: 10.5 FL (ref 8.9–12.7)
PO2 BLD: 74 MM HG (ref 75–129)
POTASSIUM BLD-SCNC: 4.5 MMOL/L (ref 3.5–5.3)
POTASSIUM SERPL-SCNC: 4 MMOL/L (ref 3.5–5.3)
RBC # BLD AUTO: 4.56 MILLION/UL (ref 3.81–5.12)
SAMPLE SITE: ABNORMAL
SAO2 % BLD FROM PO2: 95 % (ref 95–98)
SODIUM BLD-SCNC: 132 MMOL/L (ref 136–145)
SODIUM SERPL-SCNC: 133 MMOL/L (ref 136–145)
SPECIMEN SOURCE: ABNORMAL
WBC # BLD AUTO: 14.75 THOUSAND/UL (ref 4.31–10.16)

## 2018-04-05 PROCEDURE — 94726 PLETHYSMOGRAPHY LUNG VOLUMES: CPT | Performed by: INTERNAL MEDICINE

## 2018-04-05 PROCEDURE — 74174 CTA ABD&PLVS W/CONTRAST: CPT

## 2018-04-05 PROCEDURE — 85027 COMPLETE CBC AUTOMATED: CPT | Performed by: NURSE PRACTITIONER

## 2018-04-05 PROCEDURE — 94060 EVALUATION OF WHEEZING: CPT | Performed by: INTERNAL MEDICINE

## 2018-04-05 PROCEDURE — 84132 ASSAY OF SERUM POTASSIUM: CPT

## 2018-04-05 PROCEDURE — 93320 DOPPLER ECHO COMPLETE: CPT | Performed by: INTERNAL MEDICINE

## 2018-04-05 PROCEDURE — 82947 ASSAY GLUCOSE BLOOD QUANT: CPT

## 2018-04-05 PROCEDURE — 85014 HEMATOCRIT: CPT

## 2018-04-05 PROCEDURE — 84295 ASSAY OF SERUM SODIUM: CPT

## 2018-04-05 PROCEDURE — 93312 ECHO TRANSESOPHAGEAL: CPT

## 2018-04-05 PROCEDURE — 80048 BASIC METABOLIC PNL TOTAL CA: CPT | Performed by: NURSE PRACTITIONER

## 2018-04-05 PROCEDURE — 93880 EXTRACRANIAL BILAT STUDY: CPT

## 2018-04-05 PROCEDURE — 93325 DOPPLER ECHO COLOR FLOW MAPG: CPT | Performed by: INTERNAL MEDICINE

## 2018-04-05 PROCEDURE — 94729 DIFFUSING CAPACITY: CPT | Performed by: INTERNAL MEDICINE

## 2018-04-05 PROCEDURE — 76377 3D RENDER W/INTRP POSTPROCES: CPT

## 2018-04-05 PROCEDURE — 82948 REAGENT STRIP/BLOOD GLUCOSE: CPT

## 2018-04-05 PROCEDURE — 75572 CT HRT W/3D IMAGE: CPT

## 2018-04-05 PROCEDURE — 94726 PLETHYSMOGRAPHY LUNG VOLUMES: CPT

## 2018-04-05 PROCEDURE — 93880 EXTRACRANIAL BILAT STUDY: CPT | Performed by: SURGERY

## 2018-04-05 PROCEDURE — 82330 ASSAY OF CALCIUM: CPT

## 2018-04-05 PROCEDURE — 94060 EVALUATION OF WHEEZING: CPT

## 2018-04-05 PROCEDURE — 94760 N-INVAS EAR/PLS OXIMETRY 1: CPT

## 2018-04-05 PROCEDURE — 82803 BLOOD GASES ANY COMBINATION: CPT

## 2018-04-05 PROCEDURE — 94729 DIFFUSING CAPACITY: CPT

## 2018-04-05 PROCEDURE — 93312 ECHO TRANSESOPHAGEAL: CPT | Performed by: INTERNAL MEDICINE

## 2018-04-05 RX ORDER — ESCITALOPRAM OXALATE 5 MG/1
5 TABLET ORAL DAILY
Status: DISCONTINUED | OUTPATIENT
Start: 2018-04-05 | End: 2018-04-06 | Stop reason: HOSPADM

## 2018-04-05 RX ORDER — METOPROLOL SUCCINATE 50 MG/1
50 TABLET, EXTENDED RELEASE ORAL
Status: DISCONTINUED | OUTPATIENT
Start: 2018-04-05 | End: 2018-04-06

## 2018-04-05 RX ORDER — PROPOFOL 10 MG/ML
INJECTION, EMULSION INTRAVENOUS CONTINUOUS PRN
Status: DISCONTINUED | OUTPATIENT
Start: 2018-04-05 | End: 2018-04-05 | Stop reason: SURG

## 2018-04-05 RX ORDER — PROPOFOL 10 MG/ML
INJECTION, EMULSION INTRAVENOUS AS NEEDED
Status: DISCONTINUED | OUTPATIENT
Start: 2018-04-05 | End: 2018-04-05 | Stop reason: SURG

## 2018-04-05 RX ORDER — LIDOCAINE HYDROCHLORIDE 10 MG/ML
INJECTION, SOLUTION EPIDURAL; INFILTRATION; INTRACAUDAL; PERINEURAL AS NEEDED
Status: DISCONTINUED | OUTPATIENT
Start: 2018-04-05 | End: 2018-04-05 | Stop reason: SURG

## 2018-04-05 RX ORDER — DORZOLAMIDE HYDROCHLORIDE AND TIMOLOL MALEATE 20; 5 MG/ML; MG/ML
1 SOLUTION/ DROPS OPHTHALMIC 2 TIMES DAILY
Status: DISCONTINUED | OUTPATIENT
Start: 2018-04-05 | End: 2018-04-05

## 2018-04-05 RX ORDER — SODIUM CHLORIDE 9 MG/ML
125 INJECTION, SOLUTION INTRAVENOUS CONTINUOUS
Status: DISCONTINUED | OUTPATIENT
Start: 2018-04-05 | End: 2018-04-05

## 2018-04-05 RX ORDER — SODIUM CHLORIDE 9 MG/ML
125 INJECTION, SOLUTION INTRAVENOUS CONTINUOUS
Status: CANCELLED | OUTPATIENT
Start: 2018-04-05

## 2018-04-05 RX ORDER — ASPIRIN 81 MG/1
324 TABLET, CHEWABLE ORAL ONCE
Status: CANCELLED | OUTPATIENT
Start: 2018-04-05 | End: 2018-04-05

## 2018-04-05 RX ORDER — SODIUM CHLORIDE 9 MG/ML
100 INJECTION, SOLUTION INTRAVENOUS CONTINUOUS
Status: DISCONTINUED | OUTPATIENT
Start: 2018-04-06 | End: 2018-04-06

## 2018-04-05 RX ORDER — SODIUM CHLORIDE 9 MG/ML
INJECTION, SOLUTION INTRAVENOUS CONTINUOUS PRN
Status: DISCONTINUED | OUTPATIENT
Start: 2018-04-05 | End: 2018-04-05 | Stop reason: SURG

## 2018-04-05 RX ORDER — LISINOPRIL 5 MG/1
5 TABLET ORAL 2 TIMES DAILY
Status: DISCONTINUED | OUTPATIENT
Start: 2018-04-05 | End: 2018-04-06

## 2018-04-05 RX ORDER — GLYCOPYRROLATE 0.2 MG/ML
INJECTION INTRAMUSCULAR; INTRAVENOUS AS NEEDED
Status: DISCONTINUED | OUTPATIENT
Start: 2018-04-05 | End: 2018-04-05 | Stop reason: SURG

## 2018-04-05 RX ORDER — ALBUTEROL SULFATE 2.5 MG/3ML
SOLUTION RESPIRATORY (INHALATION)
Status: COMPLETED
Start: 2018-04-05 | End: 2018-04-05

## 2018-04-05 RX ORDER — ALBUTEROL SULFATE 2.5 MG/3ML
2.5 SOLUTION RESPIRATORY (INHALATION) ONCE
Status: DISCONTINUED | OUTPATIENT
Start: 2018-04-05 | End: 2018-04-09 | Stop reason: HOSPADM

## 2018-04-05 RX ORDER — FUROSEMIDE 20 MG/1
20 TABLET ORAL ONCE
Status: COMPLETED | OUTPATIENT
Start: 2018-04-05 | End: 2018-04-05

## 2018-04-05 RX ORDER — ACETAMINOPHEN 325 MG/1
650 TABLET ORAL EVERY 4 HOURS PRN
Status: DISCONTINUED | OUTPATIENT
Start: 2018-04-05 | End: 2018-04-06 | Stop reason: HOSPADM

## 2018-04-05 RX ORDER — DORZOLAMIDE HCL 20 MG/ML
1 SOLUTION/ DROPS OPHTHALMIC 3 TIMES DAILY
Status: DISCONTINUED | OUTPATIENT
Start: 2018-04-05 | End: 2018-04-06 | Stop reason: HOSPADM

## 2018-04-05 RX ORDER — FUROSEMIDE 20 MG/1
20 TABLET ORAL DAILY
Status: DISCONTINUED | OUTPATIENT
Start: 2018-04-05 | End: 2018-04-05

## 2018-04-05 RX ADMIN — SODIUM CHLORIDE: 0.9 INJECTION, SOLUTION INTRAVENOUS at 12:53

## 2018-04-05 RX ADMIN — LIDOCAINE HYDROCHLORIDE 40 MG: 10 INJECTION, SOLUTION EPIDURAL; INFILTRATION; INTRACAUDAL; PERINEURAL at 13:20

## 2018-04-05 RX ADMIN — IODIXANOL 120 ML: 320 INJECTION, SOLUTION INTRAVASCULAR at 10:46

## 2018-04-05 RX ADMIN — PROPOFOL 70 MCG/KG/MIN: 10 INJECTION, EMULSION INTRAVENOUS at 13:22

## 2018-04-05 RX ADMIN — FUROSEMIDE 20 MG: 20 TABLET ORAL at 15:34

## 2018-04-05 RX ADMIN — GLYCOPYRROLATE 0.2 MG: 0.2 INJECTION, SOLUTION INTRAMUSCULAR; INTRAVENOUS at 13:24

## 2018-04-05 RX ADMIN — PROPOFOL 50 MG: 10 INJECTION, EMULSION INTRAVENOUS at 13:21

## 2018-04-05 RX ADMIN — PHENYLEPHRINE HYDROCHLORIDE 15 MCG/MIN: 10 INJECTION INTRAVENOUS at 13:29

## 2018-04-05 RX ADMIN — INSULIN LISPRO 3 UNITS: 100 INJECTION, SOLUTION INTRAVENOUS; SUBCUTANEOUS at 19:14

## 2018-04-05 RX ADMIN — DORZOLAMIDE HCL 1 DROP: 20 SOLUTION/ DROPS OPHTHALMIC at 19:11

## 2018-04-05 RX ADMIN — LISINOPRIL 5 MG: 5 TABLET ORAL at 19:18

## 2018-04-05 RX ADMIN — DORZOLAMIDE HCL 1 DROP: 20 SOLUTION/ DROPS OPHTHALMIC at 21:55

## 2018-04-05 RX ADMIN — ESCITALOPRAM 5 MG: 5 TABLET, FILM COATED ORAL at 19:19

## 2018-04-05 RX ADMIN — ALBUTEROL SULFATE 2.5 MG: 2.5 SOLUTION RESPIRATORY (INHALATION) at 07:51

## 2018-04-05 RX ADMIN — BIMATOPROST 1 DROP: 0.1 SOLUTION/ DROPS OPHTHALMIC at 21:57

## 2018-04-05 RX ADMIN — METOPROLOL SUCCINATE 50 MG: 50 TABLET, EXTENDED RELEASE ORAL at 21:55

## 2018-04-05 RX ADMIN — PROPOFOL 20 MG: 10 INJECTION, EMULSION INTRAVENOUS at 13:24

## 2018-04-05 NOTE — CASE MANAGEMENT
Thank you,  [facility] in the [employer] by [employer] for 2017  Network Utilization Review Department  Phone: [phone]; Fax [phone]  ATTENTION: The Network Utilization Review Department is now centralized for our 7 Facilities  Make a note that we have a new phone and fax numbers for our Department  Please call with any questions or concerns to [phone] and carefully follow the prompts so that you are directed to the right person  All voicemails are confidential  Fax any determinations, approvals, denials, and requests for initial or continue stay review clinical to [phone]  Due to HIGH CALL volume, it would be easier if you could please send faxed requests to expedite your requests and in part, help us provide discharge notifications faster     ======================================================================    She does not need auth for her cath on 4/6/18 at [place]  She has [facility] and it is not required      [facility]    =====================================================================    04/05/18 1423  Outpatient No Charge Bed Once     Transfer Service: Cardiology       Question: Admitting Physician Answer: Yovanny Cantu Work Up:  Pft W/ Abg  Cta Chest Abdomen Pelvis W Wo Contrast Tavr  3d Everardo W/ Tavr  Vas Carotid Complete Study  CARDIAC CATH     BP (!) 176/68 (BP Location: Right arm) Comment: Dr Grace Cedeno made aware  Pulse (!) 46 Comment: Dr Grace Cedeno made aware  Temp 97 9 °F (36 6 °C) (Oral)   Resp 18   Ht 4' 9" (1 448 m)   Wt 73 5 kg (162 lb 2 oz)   SpO2 97%   BMI 35 08 kg/m²       Scheduled Meds:  Current Facility-Administered Medications:  acetaminophen 650 mg Oral Q4H PRN   bimatoprost 1 drop Both Eyes HS   dorzolamide 1 drop Both Eyes TID   dorzolamide-timolol 1 drop Both Eyes BID   escitalopram 5 mg Oral Daily   furosemide 20 mg Oral Once   insulin lispro 1-5 Units Subcutaneous TID AC   lisinopril 5 mg Oral BID   metoprolol succinate 50 mg Oral HS

## 2018-04-05 NOTE — ANESTHESIA POSTPROCEDURE EVALUATION
Post-Op Assessment Note      CV Status:  Stable    Mental Status:  Alert and awake    Hydration Status:  Euvolemic    PONV Controlled:  Controlled    Airway Patency:  Patent    Post Op Vitals Reviewed: Yes          Staff: CRNA           BP   130/57 (87)   Temp      Pulse  84   Resp   14   SpO2   99% on FM

## 2018-04-05 NOTE — H&P
Please refer to office letter for H&P  Patient was seen and evaluated and there are no changes in the patient's H&P  Patient is here for TAVR workup  Patient has been placed in No charge O/P bed status  Cardiac catheterization is scheduled for tomorrow, 4/6/18

## 2018-04-05 NOTE — ANESTHESIA PREPROCEDURE EVALUATION
Review of Systems/Medical History  Patient summary reviewed    No history of anesthetic complications     Cardiovascular  Hyperlipidemia, Hypertension , Valvular heart disease , aortic valve stenosis, CHF ,    Pulmonary  Sleep apnea ,        GI/Hepatic            Endo/Other  Diabetes ,   Obesity (BMI 35)    GYN       Hematology   Musculoskeletal    Arthritis     Neurology    CVA ,    Psychology           Physical Exam    Airway    Mallampati score: I  TM Distance: >3 FB  Neck ROM: full     Dental   lower dentures and upper dentures,     Cardiovascular      Pulmonary      Other Findings        Anesthesia Plan  ASA Score- 4     Anesthesia Type- IV sedation with anesthesia with ASA Monitors  Additional Monitors:   Airway Plan:         Plan Factors- Instructed to abstain from smoking on day of procedure: nonsmoker    Patient smoked on day of surgery: nonsmoker       Induction- intravenous  Postoperative Plan-     Informed Consent- Anesthetic plan and risks discussed with patient  I personally reviewed this patient with the CRNA  Discussed and agreed on the Anesthesia Plan with the CRNA  Francisca Priest

## 2018-04-06 ENCOUNTER — TELEPHONE (OUTPATIENT)
Dept: FAMILY MEDICINE CLINIC | Facility: CLINIC | Age: 83
End: 2018-04-06

## 2018-04-06 ENCOUNTER — TRANSITIONAL CARE MANAGEMENT (OUTPATIENT)
Dept: FAMILY MEDICINE CLINIC | Facility: CLINIC | Age: 83
End: 2018-04-06

## 2018-04-06 VITALS
BODY MASS INDEX: 34.98 KG/M2 | OXYGEN SATURATION: 96 % | WEIGHT: 162.13 LBS | HEIGHT: 57 IN | RESPIRATION RATE: 19 BRPM | DIASTOLIC BLOOD PRESSURE: 55 MMHG | TEMPERATURE: 97.8 F | HEART RATE: 54 BPM | SYSTOLIC BLOOD PRESSURE: 123 MMHG

## 2018-04-06 LAB
ANION GAP SERPL CALCULATED.3IONS-SCNC: 6 MMOL/L (ref 4–13)
ATRIAL RATE: 45 BPM
BUN SERPL-MCNC: 20 MG/DL (ref 5–25)
CALCIUM SERPL-MCNC: 9 MG/DL (ref 8.3–10.1)
CHLORIDE SERPL-SCNC: 99 MMOL/L (ref 100–108)
CHOLEST SERPL-MCNC: 125 MG/DL (ref 50–200)
CO2 SERPL-SCNC: 29 MMOL/L (ref 21–32)
CREAT SERPL-MCNC: 0.71 MG/DL (ref 0.6–1.3)
ERYTHROCYTE [DISTWIDTH] IN BLOOD BY AUTOMATED COUNT: 13 % (ref 11.6–15.1)
GFR SERPL CREATININE-BSD FRML MDRD: 79 ML/MIN/1.73SQ M
GLUCOSE SERPL-MCNC: 144 MG/DL (ref 65–140)
GLUCOSE SERPL-MCNC: 165 MG/DL (ref 65–140)
GLUCOSE SERPL-MCNC: 193 MG/DL (ref 65–140)
GLUCOSE SERPL-MCNC: 195 MG/DL (ref 65–140)
HCT VFR BLD AUTO: 37.8 % (ref 34.8–46.1)
HDLC SERPL-MCNC: 55 MG/DL (ref 40–60)
HGB BLD-MCNC: 12.9 G/DL (ref 11.5–15.4)
INR PPP: 1.34 (ref 0.86–1.16)
LDLC SERPL CALC-MCNC: 51 MG/DL (ref 0–100)
MCH RBC QN AUTO: 30.5 PG (ref 26.8–34.3)
MCHC RBC AUTO-ENTMCNC: 34.1 G/DL (ref 31.4–37.4)
MCV RBC AUTO: 89 FL (ref 82–98)
NONHDLC SERPL-MCNC: 70 MG/DL
P AXIS: 31 DEGREES
PLATELET # BLD AUTO: 279 THOUSANDS/UL (ref 149–390)
PMV BLD AUTO: 10.8 FL (ref 8.9–12.7)
POTASSIUM SERPL-SCNC: 3.8 MMOL/L (ref 3.5–5.3)
PR INTERVAL: 170 MS
PROTHROMBIN TIME: 16.7 SECONDS (ref 12.1–14.4)
QRS AXIS: -1 DEGREES
QRSD INTERVAL: 84 MS
QT INTERVAL: 486 MS
QTC INTERVAL: 420 MS
RBC # BLD AUTO: 4.23 MILLION/UL (ref 3.81–5.12)
SODIUM SERPL-SCNC: 134 MMOL/L (ref 136–145)
T WAVE AXIS: 33 DEGREES
TRIGL SERPL-MCNC: 94 MG/DL
VENTRICULAR RATE: 45 BPM
WBC # BLD AUTO: 10.42 THOUSAND/UL (ref 4.31–10.16)

## 2018-04-06 PROCEDURE — C1887 CATHETER, GUIDING: HCPCS | Performed by: NURSE PRACTITIONER

## 2018-04-06 PROCEDURE — 93454 CORONARY ARTERY ANGIO S&I: CPT | Performed by: NURSE PRACTITIONER

## 2018-04-06 PROCEDURE — 85610 PROTHROMBIN TIME: CPT | Performed by: NURSE PRACTITIONER

## 2018-04-06 PROCEDURE — 80048 BASIC METABOLIC PNL TOTAL CA: CPT | Performed by: NURSE PRACTITIONER

## 2018-04-06 PROCEDURE — 99153 MOD SED SAME PHYS/QHP EA: CPT | Performed by: NURSE PRACTITIONER

## 2018-04-06 PROCEDURE — 99152 MOD SED SAME PHYS/QHP 5/>YRS: CPT | Performed by: NURSE PRACTITIONER

## 2018-04-06 PROCEDURE — C1894 INTRO/SHEATH, NON-LASER: HCPCS | Performed by: NURSE PRACTITIONER

## 2018-04-06 PROCEDURE — 93005 ELECTROCARDIOGRAM TRACING: CPT | Performed by: NURSE PRACTITIONER

## 2018-04-06 PROCEDURE — 82948 REAGENT STRIP/BLOOD GLUCOSE: CPT

## 2018-04-06 PROCEDURE — C1769 GUIDE WIRE: HCPCS | Performed by: NURSE PRACTITIONER

## 2018-04-06 PROCEDURE — 80061 LIPID PANEL: CPT | Performed by: NURSE PRACTITIONER

## 2018-04-06 PROCEDURE — 85027 COMPLETE CBC AUTOMATED: CPT | Performed by: NURSE PRACTITIONER

## 2018-04-06 RX ORDER — ASPIRIN 81 MG/1
324 TABLET, CHEWABLE ORAL ONCE
Status: COMPLETED | OUTPATIENT
Start: 2018-04-06 | End: 2018-04-06

## 2018-04-06 RX ORDER — NITROGLYCERIN 20 MG/100ML
INJECTION INTRAVENOUS CODE/TRAUMA/SEDATION MEDICATION
Status: COMPLETED | OUTPATIENT
Start: 2018-04-06 | End: 2018-04-06

## 2018-04-06 RX ORDER — LISINOPRIL 10 MG/1
10 TABLET ORAL 2 TIMES DAILY
Status: DISCONTINUED | OUTPATIENT
Start: 2018-04-06 | End: 2018-04-06 | Stop reason: HOSPADM

## 2018-04-06 RX ORDER — MORPHINE SULFATE 2 MG/ML
1 INJECTION, SOLUTION INTRAMUSCULAR; INTRAVENOUS ONCE
Status: DISCONTINUED | OUTPATIENT
Start: 2018-04-06 | End: 2018-04-06

## 2018-04-06 RX ORDER — LISINOPRIL 10 MG/1
10 TABLET ORAL 2 TIMES DAILY
Qty: 30 TABLET | Refills: 3 | Status: SHIPPED | OUTPATIENT
Start: 2018-04-06 | End: 2018-05-14 | Stop reason: SDUPTHER

## 2018-04-06 RX ORDER — MIDAZOLAM HYDROCHLORIDE 1 MG/ML
INJECTION INTRAMUSCULAR; INTRAVENOUS CODE/TRAUMA/SEDATION MEDICATION
Status: COMPLETED | OUTPATIENT
Start: 2018-04-06 | End: 2018-04-06

## 2018-04-06 RX ORDER — VERAPAMIL HYDROCHLORIDE 2.5 MG/ML
INJECTION, SOLUTION INTRAVENOUS CODE/TRAUMA/SEDATION MEDICATION
Status: COMPLETED | OUTPATIENT
Start: 2018-04-06 | End: 2018-04-06

## 2018-04-06 RX ORDER — PRASUGREL 10 MG/1
TABLET, FILM COATED ORAL CODE/TRAUMA/SEDATION MEDICATION
Status: COMPLETED | OUTPATIENT
Start: 2018-04-06 | End: 2018-04-06

## 2018-04-06 RX ORDER — SODIUM CHLORIDE 9 MG/ML
200 INJECTION, SOLUTION INTRAVENOUS CONTINUOUS
Status: DISPENSED | OUTPATIENT
Start: 2018-04-06 | End: 2018-04-06

## 2018-04-06 RX ORDER — FENTANYL CITRATE 50 UG/ML
INJECTION, SOLUTION INTRAMUSCULAR; INTRAVENOUS CODE/TRAUMA/SEDATION MEDICATION
Status: COMPLETED | OUTPATIENT
Start: 2018-04-06 | End: 2018-04-06

## 2018-04-06 RX ORDER — HEPARIN SODIUM 1000 [USP'U]/ML
INJECTION, SOLUTION INTRAVENOUS; SUBCUTANEOUS CODE/TRAUMA/SEDATION MEDICATION
Status: COMPLETED | OUTPATIENT
Start: 2018-04-06 | End: 2018-04-06

## 2018-04-06 RX ORDER — LIDOCAINE HYDROCHLORIDE 10 MG/ML
INJECTION, SOLUTION INFILTRATION; PERINEURAL CODE/TRAUMA/SEDATION MEDICATION
Status: COMPLETED | OUTPATIENT
Start: 2018-04-06 | End: 2018-04-06

## 2018-04-06 RX ADMIN — LISINOPRIL 5 MG: 5 TABLET ORAL at 09:20

## 2018-04-06 RX ADMIN — INSULIN LISPRO 1 UNITS: 100 INJECTION, SOLUTION INTRAVENOUS; SUBCUTANEOUS at 17:12

## 2018-04-06 RX ADMIN — INSULIN LISPRO 1 UNITS: 100 INJECTION, SOLUTION INTRAVENOUS; SUBCUTANEOUS at 06:38

## 2018-04-06 RX ADMIN — DORZOLAMIDE HCL 1 DROP: 20 SOLUTION/ DROPS OPHTHALMIC at 09:11

## 2018-04-06 RX ADMIN — PRASUGREL HYDROCHLORIDE 60 MG: 10 TABLET, FILM COATED ORAL at 12:55

## 2018-04-06 RX ADMIN — ESCITALOPRAM 5 MG: 5 TABLET, FILM COATED ORAL at 09:19

## 2018-04-06 RX ADMIN — FENTANYL CITRATE 50 MCG: 50 INJECTION, SOLUTION INTRAMUSCULAR; INTRAVENOUS at 12:57

## 2018-04-06 RX ADMIN — HEPARIN SODIUM 4000 UNITS: 1000 INJECTION INTRAVENOUS; SUBCUTANEOUS at 13:04

## 2018-04-06 RX ADMIN — IOHEXOL 120 ML: 350 INJECTION, SOLUTION INTRAVENOUS at 13:38

## 2018-04-06 RX ADMIN — LIDOCAINE HYDROCHLORIDE 1 ML: 10 INJECTION, SOLUTION INFILTRATION; PERINEURAL at 12:58

## 2018-04-06 RX ADMIN — DORZOLAMIDE HCL 1 DROP: 20 SOLUTION/ DROPS OPHTHALMIC at 20:21

## 2018-04-06 RX ADMIN — MIDAZOLAM 1 MG: 1 INJECTION INTRAMUSCULAR; INTRAVENOUS at 12:58

## 2018-04-06 RX ADMIN — VERAPAMIL HYDROCHLORIDE 2.5 MG: 2.5 INJECTION INTRAVENOUS at 13:04

## 2018-04-06 RX ADMIN — DORZOLAMIDE HCL 1 DROP: 20 SOLUTION/ DROPS OPHTHALMIC at 17:06

## 2018-04-06 RX ADMIN — SODIUM CHLORIDE 100 ML/HR: 0.9 INJECTION, SOLUTION INTRAVENOUS at 06:38

## 2018-04-06 RX ADMIN — MIDAZOLAM 1 MG: 1 INJECTION INTRAMUSCULAR; INTRAVENOUS at 13:03

## 2018-04-06 RX ADMIN — NITROGLYCERIN 200 MCG: 20 INJECTION INTRAVENOUS at 13:04

## 2018-04-06 RX ADMIN — LISINOPRIL 10 MG: 10 TABLET ORAL at 17:06

## 2018-04-06 RX ADMIN — ASPIRIN 81 MG 324 MG: 81 TABLET ORAL at 05:26

## 2018-04-06 RX ADMIN — FENTANYL CITRATE 50 MCG: 50 INJECTION, SOLUTION INTRAMUSCULAR; INTRAVENOUS at 13:03

## 2018-04-06 RX ADMIN — VERAPAMIL HYDROCHLORIDE 2.5 MG: 2.5 INJECTION INTRAVENOUS at 13:26

## 2018-04-06 NOTE — TELEPHONE ENCOUNTER
Called patient's daughter and she stated they are in the hospital for testing prior to a surgery  They noticed her heart rate dropped to 30 while testing so they gave her medication to increase the heart rate  Monitoring her until tonight and will decide to discharge or not  Will catch up on Monday when I come back into the office to finish TCM call

## 2018-04-06 NOTE — DISCHARGE INSTRUCTIONS
1  Please see the post cardiac catheterization dishcarge instructions  No heavy lifting, greater than 10 lbs  or strenuous  activity for 48 hrs  2 Remove band aid tomorrow  Shower and wash area- wrist gently with soap and water- beginning tomorrow  Rinse and pat dry  Apply new water seal band aid  Repeat this process for 5 days  No powders, creams lotions or antibiotic ointments  for 5 days  No tub baths, hot tubs or swimming for 5 days  3  Please call our office ([phone]) if you have any fever, redness, swelling, discharge from your wrist access site  4 No driving for 1 day  5  Please follow with cardiac surgery for aortic valve stenosis  After Heart Catheterization   AMBULATORY CARE:   Call 911 for any of the following:   · You have any of the following signs of a heart attack:      ¨ Squeezing, pressure, or pain in your chest that lasts longer than 5 minutes or returns    ¨ Discomfort or pain in your back, neck, jaw, stomach, or arm     ¨ Trouble breathing    ¨ Nausea or vomiting    ¨ Lightheadedness or a sudden cold sweat, especially with chest pain or trouble breathing    · You have any of the following signs of a stroke:      ¨ Numbness or drooping on one side of your face     ¨ Weakness in an arm or leg    ¨ Confusion or difficulty speaking    ¨ Dizziness, a severe headache, or vision loss    · You feel lightheaded, short of breath, and have chest pain  · You cough up blood  · You have trouble breathing  · You cannot stop the bleeding from your wound even after you hold firm pressure for 10 minutes  Seek care immediately if:   · Blood soaks through your bandage  · Your stitches come apart  · Your arm or leg feels numb, cool, or looks pale  · Your wound gets swollen quickly  Contact your healthcare provider if:   · You have a fever or chills  · Your wound is red, swollen, or draining pus      · Your wound looks more bruised or you have new bruising on the side of your leg or arm  · You have nausea or are vomiting  · Your skin is itchy, swollen, or you have a rash  · You have questions or concerns about your condition or care  Medicines: You may need any of the following:  · Blood thinners    help prevent blood clots  Examples of blood thinners include heparin and warfarin  Clots can cause strokes, heart attacks, and death  The following are general safety guidelines to follow while you are taking a blood thinner:    ¨ Watch for bleeding and bruising while you take blood thinners  Watch for bleeding from your gums or nose  Watch for blood in your urine and bowel movements  Use a soft washcloth on your skin, and a soft toothbrush to brush your teeth  This can keep your skin and gums from bleeding  If you shave, use an electric shaver  Do not play contact sports  ¨ Tell your dentist and other healthcare providers that you take anticoagulants  Wear a bracelet or necklace that says you take this medicine  ¨ Do not start or stop any medicines unless your healthcare provider tells you to  Many medicines cannot be used with blood thinners  ¨ Tell your healthcare provider right away if you forget to take the medicine, or if you take too much  ¨ Warfarin  is a blood thinner that you may need to take  The following are things you should be aware of if you take warfarin  § Foods and medicines can affect the amount of warfarin in your blood  Do not make major changes to your diet while you take warfarin  Warfarin works best when you eat about the same amount of vitamin K every day  Vitamin K is found in green leafy vegetables and certain other foods  Ask for more information about what to eat when you are taking warfarin  § You will need to see your healthcare provider for follow-up visits when you are on warfarin  You will need regular blood tests  These tests are used to decide how much medicine you need      · Acetaminophen  helps decrease pain and fever  This medicine is available without a doctor's order  Ask how much medicine is safe to take, and how often to take it  Acetaminophen can cause liver damage if not taken correctly  · Take your medicine as directed  Contact your healthcare provider if you think your medicine is not helping or if you have side effects  Tell him or her if you are allergic to any medicine  Keep a list of the medicines, vitamins, and herbs you take  Include the amounts, and when and why you take them  Bring the list or the pill bottles to follow-up visits  Carry your medicine list with you in case of an emergency  Bathing: You may be able to shower the day after your procedure  Remove your pressure bandage before you shower  Do not take baths or go in hot tubs or pools  Carefully wash the wound with soap and water  Pat the area dry  Care for your wound as directed:  Change your bandage when it gets wet or dirty  A small bandage can be placed on your wound after you remove the pressure bandage  Do not put powders, lotions, or creams on your wound  They may cause your wound to get infected  Monitor your wound every day for signs of infection, such as redness, swelling, or pus  Mild bruising is normal and expected  If bleeding from your wound occurs:  Apply firm, steady pressure to stop the bleeding  Apply pressure with a clean gauze or towel for 5 to 10 minutes  Call 911 if bleeding becomes heavy or does not stop  Activity:  Do not lift anything heavier than 5 pounds until directed by your healthcare provider  Heavy lifting can put stress on your wound and cause bleeding  Do not push or pull with the arm that was used for the procedure  Do not do vigorous activity for at least 48 hours  Vigorous activity may cause bleeding from your wound  Rest and do quiet activities  Short walks to the bathroom and around the house are okay  Limit your stair climbing to prevent bleeding   Ask your healthcare provider when you can return to your normal activities  Do not strain when you have a bowel movement:  Your wound may bleed if you strain to have a bowel movement  Keep your legs flat on the floor and your hips at a 90° angle  Talk to your healthcare provider if you are constipated  You may need medicine to make it easier for you to have a bowel movement and to prevent straining  Drink liquids as directed:  Liquids will help flush the contrast liquid from your body  Ask how much liquid to drink each day and which liquids are best for you  Driving:  Ask your healthcare provider when it is okay for you to drive  He may tell you to wait 48 hours before you drive to decrease your risk for bleeding  Returning to work: You may not be able to return to work for at least 2 days after your procedure if your job involves heavy lifting  Ask your healthcare provider when it is okay for you to return to work  © 2017 [employer]  Information is for End User's use only and may not be sold, redistributed or otherwise used for commercial purposes  All illustrations and images included in CareNotes® are the copyrighted property of A [employer] M [employer]  or [employer]  The above information is an  only  It is not intended as medical advice for individual conditions or treatments  Talk to your doctor, nurse or pharmacist before following any medical regimen to see if it is safe and effective for you  Aortic Stenosis   WHAT YOU NEED TO KNOW:   What is aortic stenosis? Aortic stenosis is a condition where the aortic valve in your heart is narrowed  The aortic valve is between the left ventricle and the aorta  The left ventricle is the lower left chamber of your heart  The aorta is a blood vessel that pumps blood to your head and body  The aortic valve opens and closes to direct blood flow through your heart  When the aortic valve is narrowed, blood flow may decrease   Your tissues and organs will not have enough oxygen and nutrients to function properly  What causes aortic stenosis? · Calcium buildup:  As you age, calcium can build up on the aortic valve walls  The calcium stiffens and thickens the valve  · Congenital heart defect:  Some people are born with a damaged aortic valve that leads to narrowing and blockage  · Rheumatic fever: This is fever and inflammation of your joints  It can develop after you have a strep throat infection  Rheumatic fever can cause inflammation and damage to your aortic valve  The walls of your aortic valve may narrow, and may even join together  What are the signs and symptoms of aortic stenosis? · Chest pain or tightness    · Fast, jumpy, or fluttery heartbeat    · Shortness of breath during activity or when you lie down    · Severe tiredness    · Dizziness or feeling faint  How is aortic stenosis diagnosed? Your healthcare provider will ask about your signs and symptoms and listen to your heart  He will ask if you have had strep throat or rheumatic fever in the past  You may need any of the following tests:  · Blood tests: You may need blood taken to give caregivers information about how your body is working  The blood may be taken from your hand, arm, or IV  · Chest x-ray: This is used to check the size of your heart and look for fluid around your heart and lungs  · EKG: This test records the electrical activity of your heart  It is used to check for abnormal heart rhythm caused by aortic stenosis  · An echocardiogram  is a type of ultrasound  Sound waves are used to show the structure and function of your heart  · A stress test  may show the changes that take place in your heart while it is under stress  Stress may be placed on your heart with exercise or medicine  Ask for more information about this test     · Cardiac catheterization: This procedure is done to find and treat heart blockages   A thin, bendable tube is inserted into your arm, neck, or groin and moved into your heart  An x-ray may be used to guide the tube to the right place  Dye may be put into your vein so the pictures show up better on a monitor  Tell the healthcare provider if you have ever had an allergic reaction to contrast dye  How is aortic stenosis treated? · Medicines: You may have the following medicines to improve your symptoms or prevent problems caused by aortic stenosis:    ¨ Cholesterol medicine: This medicine will help decrease the amount of cholesterol in your blood  ¨ Antibiotics: This medicine will help fight or prevent an infection  You may need it if you had rheumatic fever in the past  You may need to take the medicine every day, or once a month  · Procedures:      ¨ Valvotomy: This helps widen your aortic valve and allow blood to flow through easier  A catheter (long thin tube) with a balloon on the tip is inserted through a small incision in your arm or groin  The catheter is guided through a blood vessel and into your left atrium near your aortic valve  When the balloon is inflated, it stretches the valve opening  ¨ Valvuloplasty:  Healthcare providers make an incision in your chest to repair and widen your aortic valve  The valve walls are  or calcium buildup is removed  This helps improve the blood flow through your heart  ¨ Replacement:  Healthcare Providers make an incision in your chest to replace your damaged aortic valve  Part or all of your aortic valve is removed, and a new valve is secured in place  The new valve may be from a donor (another person or animal), or may be a manmade valve  What are the risks of aortic stenosis? Aortic stenosis may cause endocarditis  Endocarditis is an infection of the inner lining of the heart  Aortic stenosis can also cause congestive heart failure (CHF)  This is when the heart cannot pump enough blood for the body   This may cause irregular heartbeats and can lead to cardiac arrest (the heart stops beating)  This can be life-threatening  How can I manage my symptoms? · Eat a variety of healthy foods:  Healthy foods include fruits, vegetables, whole-grain breads, low-fat dairy products, beans, lean meats, and fish  Ask if you need to be on a special diet  · Exercise: This will improve your heart health  Ask your healthcare provider about the best exercise plan for you  · Maintain a healthy weight:  Ask your healthcare provider how much you should weigh  Ask him to help you create a weight loss plan if you are overweight  When should I contact my healthcare provider? · You are bleeding from your nose or gums  · The veins in your neck look swollen or are bulging  · You have a fever  · You have blood in your urine or bowel movements  · You have questions or concerns about your condition or care  When should I seek immediate care or call 911? · Your arm or leg feels warm, tender, and painful  It may look swollen and red  · Your heart is beating faster than normal for you, and you feel fluttering in your chest     · You suddenly feel lightheaded and short of breath  · You have chest pain that feels like squeezing, pressure, fullness, or pain  · You have chest pain that lasts for more than a few minutes or returns  · You are nauseated and have trouble breathing  · You have a severe headache, cold sweats, and feel lightheaded or dizzy  · You have weakness or numbness on one side of your arm, leg, or face  · You are confused and cannot speak clearly  CARE AGREEMENT:   You have the right to help plan your care  Learn about your health condition and how it may be treated  Discuss treatment options with your caregivers to decide what care you want to receive  You always have the right to refuse treatment  The above information is an  only  It is not intended as medical advice for individual conditions or treatments   Talk to your doctor, nurse or pharmacist before following any medical regimen to see if it is safe and effective for you  © 2017 [employer] Information is for End User's use only and may not be sold, redistributed or otherwise used for commercial purposes  All illustrations and images included in CareNotes® are the copyrighted property of A D A M , Inc  or [employer] Arm[employer]

## 2018-04-06 NOTE — SOCIAL WORK
High risk readmission assessment made, CM identified need for fololow up discharge appt, called D/c appt hotline, made referral   CM received notification af appt made with Dr Jaison Pereira for Wednesday April 11,2018 at 2:45 PM

## 2018-04-07 NOTE — PROGRESS NOTES
Pt returned from cardiac cath at 1400, HR in the upper 30's and low 40's, Pt was asymptomatic, Dr Clyde Rodríguez was notified, MD in unit and assessed pt  MD adjusted meds  Pt ordered fish and baked potato for supper, and had difficulty swallowing the food because pt had no teeth and food was too dry, pt had a coughing episode and vomited her food  Pt's lungs clear, denied SOB, SPO2 96% Ra and afebrile  Dr Dasha Morales with cardiology notified, received orders to have pt order soft foods to see if Pt could tolerate  Pt ordered a grilled cheese and soup and tolerated it well  Pt was tolerated ambulating in the hallway and voided without issues before D/C

## 2018-04-09 NOTE — TELEPHONE ENCOUNTER
She was only at the hospital for testing prior to heart surgery, that isn't scheduled yet, and she was cleared  No TCM needed

## 2018-04-09 NOTE — TELEPHONE ENCOUNTER
Spoke to patient's daughter, and she needs a new case opened through [address], as her brother accidentally cancelled them coming due to miscommunication  They will need to draw blood for her also when they come  Also, I looked through past ER stays and the patch that gave her a bad reaction was Fentanyl patch, not Lidocaine which she currently has been using, so I will start the prior auth for that

## 2018-04-12 ENCOUNTER — APPOINTMENT (OUTPATIENT)
Dept: LAB | Facility: HOSPITAL | Age: 83
End: 2018-04-12
Payer: COMMERCIAL

## 2018-04-12 ENCOUNTER — OFFICE VISIT (OUTPATIENT)
Dept: CARDIAC SURGERY | Facility: CLINIC | Age: 83
End: 2018-04-12
Payer: COMMERCIAL

## 2018-04-12 ENCOUNTER — TRANSCRIBE ORDERS (OUTPATIENT)
Dept: LAB | Facility: HOSPITAL | Age: 83
End: 2018-04-12

## 2018-04-12 VITALS
SYSTOLIC BLOOD PRESSURE: 188 MMHG | DIASTOLIC BLOOD PRESSURE: 82 MMHG | TEMPERATURE: 98.2 F | WEIGHT: 163 LBS | HEIGHT: 57 IN | HEART RATE: 62 BPM | RESPIRATION RATE: 12 BRPM | OXYGEN SATURATION: 98 % | BODY MASS INDEX: 35.17 KG/M2

## 2018-04-12 DIAGNOSIS — I35.0 NONRHEUMATIC AORTIC VALVE STENOSIS: Chronic | ICD-10-CM

## 2018-04-12 DIAGNOSIS — I50.32 CHRONIC DIASTOLIC CONGESTIVE HEART FAILURE (HCC): ICD-10-CM

## 2018-04-12 DIAGNOSIS — I50.32 CHRONIC DIASTOLIC HEART FAILURE (HCC): Primary | ICD-10-CM

## 2018-04-12 DIAGNOSIS — I35.0 NONRHEUMATIC AORTIC VALVE STENOSIS: Primary | ICD-10-CM

## 2018-04-12 LAB
ABO GROUP BLD: NORMAL
ANION GAP SERPL CALCULATED.3IONS-SCNC: 4 MMOL/L (ref 4–13)
BACTERIA UR QL AUTO: ABNORMAL /HPF
BILIRUB UR QL STRIP: NEGATIVE
BLD GP AB SCN SERPL QL: NEGATIVE
BUN SERPL-MCNC: 20 MG/DL (ref 5–25)
CALCIUM SERPL-MCNC: 9.4 MG/DL
CHLORIDE SERPL-SCNC: 98 MMOL/L (ref 100–108)
CLARITY UR: CLEAR
CO2 SERPL-SCNC: 28 MMOL/L (ref 21–32)
COLOR UR: YELLOW
CREAT SERPL-MCNC: 0.73 MG/DL (ref 0.6–1.3)
EST. AVERAGE GLUCOSE BLD GHB EST-MCNC: 206 MG/DL
GFR SERPL CREATININE-BSD FRML MDRD: 76 ML/MIN/1.73SQ M
GLUCOSE SERPL-MCNC: 87 MG/DL (ref 65–140)
GLUCOSE UR STRIP-MCNC: ABNORMAL MG/DL
HBA1C MFR BLD: 8.8 % (ref 4.2–6.3)
HGB UR QL STRIP.AUTO: NEGATIVE
HYALINE CASTS #/AREA URNS LPF: ABNORMAL /LPF
KETONES UR STRIP-MCNC: NEGATIVE MG/DL
LEUKOCYTE ESTERASE UR QL STRIP: ABNORMAL
NITRITE UR QL STRIP: NEGATIVE
NON-SQ EPI CELLS URNS QL MICRO: ABNORMAL /HPF
PH UR STRIP.AUTO: 7 [PH] (ref 4.5–8)
POTASSIUM SERPL-SCNC: 4.1 MMOL/L (ref 3.5–5.3)
PROT UR STRIP-MCNC: NEGATIVE MG/DL
RBC #/AREA URNS AUTO: ABNORMAL /HPF
RH BLD: POSITIVE
SODIUM SERPL-SCNC: 130 MMOL/L (ref 136–145)
SP GR UR STRIP.AUTO: 1.01 (ref 1–1.03)
SPECIMEN EXPIRATION DATE: NORMAL
UROBILINOGEN UR QL STRIP.AUTO: 1 E.U./DL
WBC #/AREA URNS AUTO: ABNORMAL /HPF

## 2018-04-12 PROCEDURE — 86901 BLOOD TYPING SEROLOGIC RH(D): CPT

## 2018-04-12 PROCEDURE — 86900 BLOOD TYPING SEROLOGIC ABO: CPT

## 2018-04-12 PROCEDURE — 36415 COLL VENOUS BLD VENIPUNCTURE: CPT

## 2018-04-12 PROCEDURE — 81001 URINALYSIS AUTO W/SCOPE: CPT | Performed by: THORACIC SURGERY (CARDIOTHORACIC VASCULAR SURGERY)

## 2018-04-12 PROCEDURE — 83036 HEMOGLOBIN GLYCOSYLATED A1C: CPT

## 2018-04-12 PROCEDURE — 86850 RBC ANTIBODY SCREEN: CPT

## 2018-04-12 PROCEDURE — 80048 BASIC METABOLIC PNL TOTAL CA: CPT

## 2018-04-12 PROCEDURE — 87081 CULTURE SCREEN ONLY: CPT

## 2018-04-12 PROCEDURE — 99215 OFFICE O/P EST HI 40 MIN: CPT | Performed by: NURSE PRACTITIONER

## 2018-04-12 RX ORDER — FUROSEMIDE 20 MG/1
20 TABLET ORAL DAILY
COMMUNITY
End: 2018-08-24 | Stop reason: ALTCHOICE

## 2018-04-12 RX ORDER — CHLORHEXIDINE GLUCONATE 0.12 MG/ML
15 RINSE ORAL ONCE
Status: CANCELLED | OUTPATIENT
Start: 2018-04-12 | End: 2018-04-12

## 2018-04-12 RX ORDER — GLIPIZIDE 10 MG/1
10 TABLET, FILM COATED, EXTENDED RELEASE ORAL 2 TIMES DAILY
Refills: 0 | COMMUNITY
Start: 2018-03-30 | End: 2018-09-03 | Stop reason: SDUPTHER

## 2018-04-12 NOTE — LETTER
April 12, 2018     Martin Davies, [address]  [address] [place] [id number]    Patient: Rosie Enriquez   YOB: 1935   Date of Visit: 4/12/2018       Dear Dr Martha Mg:    Thank you for referring Rocio Desai to me for evaluation  Below are my notes for this consultation  If you have questions, please do not hesitate to call me  I look forward to following your patient along with you  Sincerely,        Marco Antonio Drew MD        CC: No Recipients  Sharon Colvin, [address]  4/12/2018  2:27 PM  Attested  History & Physical  - Cardiothoracic Surgery   Rosie Enriquez 80 y o  female MRN: [id number]    Physician Requesting Consult: Dr Bryan Selby    Reason for Consult / Principal Problem: Aortic stenosis, Non-Rheumatic    History of Present Illness: Rosie Enriquez is a 80y o  year old female who was previously evaluated in our office by ROMANA Crawford  for transcatheter aortic valve replacement  During this initial consultation, arrangements were made for the following studies to be completed: Gated CTA of the chest/abdomen/pelvis, 3D KATHLEEN, left and right cardiac catheterization, dental clearance, pulmonary function tests with ABG, and carotid artery ultrasound  Rosie Enriquez now presents to review the results of these tests and obtain a second surgeon to confirm the suitability of proceeding with transcatheter aortic valve replacment  She was recently admitted to the hospital for an episode of acute on chronic diastolic heart failure  She presented with a 1 week history os dyspnea with a 9 lb weight gain with LE edema  Pro NT BNP was elevated to 3090  She responded well to diuresis  Echocardiogram demonstrated normal LV function with mild-mod MR , severe AS with an TEN 0 6 cm2 , mean & peak gradients 31 & 58 mm Hg, and mild-mod TR    After that hospital admission she was referred to our practice for surgical consultation    Vidal Morales is accompanied by her son and daughter, with whom she resides  They live in a single story ranch style home  Rosaura Donaldson states she is able to perform her ADL's and simple chores during the day  She states her weight and edema have been stable since d/c from the hospital  She denies ALBERTO or chest pain but states does experience lightheadedness and she has to pace herself with activities  She denies presyncope or syncope, PND or orthopnea       She is a lifelong non-smoker  She does not drink alcohol  She is edentulous with dentures   She is Crow and wears hearing aids         Past Medical History:  Past Medical History:   Diagnosis Date    Cholelithiasis     Depression with anxiety     Diabetes mellitus ([facility] [address] )     niddm    Dysfunction of eustachian tube     Glaucoma     Hematuria     Hypercholesterolemia     Hypertension     Hyponatremia     Ischemic stroke of frontal lobe (HCC)     Right     Obstructive sleep apnea     OP (osteoporosis)     PAF (paroxysmal atrial fibrillation) (HCC)     Urinary frequency     Vertigo     Viral gastroenteritis     Viral gastroenteritis     Viral infection          Past Surgical History:   Past Surgical History:   Procedure Laterality Date    APPENDECTOMY      GLAUCOMA SURGERY  01/03/2016    TUBAL LIGATION           Family History:  Family History   Problem Relation Age of Onset    No Known Problems Mother     Diabetes Father     Stroke Father     Coronary artery disease Sister     No Known Problems Brother     No Known Problems Son     Breast cancer Daughter     No Known Problems Maternal Grandmother     No Known Problems Maternal Grandfather     No Known Problems Paternal Grandmother     No Known Problems Paternal Grandfather     No Known Problems Cousin     Rheum arthritis Neg Hx     Osteoarthritis Neg Hx     Asthma Neg Hx     Heart failure Neg Hx     Hyperlipidemia Neg Hx     Hypertension Neg Hx     Migraines Neg Hx     Rashes / Skin problems Neg Hx     Seizures Neg Hx     Thyroid disease Neg Hx          Social History:    History   Alcohol Use No     History   Drug Use No     History   Smoking Status    Never Smoker   Smokeless Tobacco    Never Used       Home Medications:   Prior to Admission medications    Medication Sig Start Date End Date Taking?  Authorizing Provider   Acetaminophen (TYLENOL) 325 MG CAPS Take by mouth as needed    Historical Provider, MD   Bimatoprost (LUMIGAN OP) Apply to eye daily at bedtime    Historical Provider, MD   COMBIGAN 0 2-0 5 % instill 1 drop into both eyes twice a day 2/8/18   Historical Provider, MD   DORZOLAMIDE HCL OP Apply to eye 3 (three) times a day    Historical Provider, MD   escitalopram (LEXAPRO) 5 mg tablet Take 1 tablet by mouth daily for 30 days 1/24/18 4/5/18  Jonathan Magana MD   furosemide (LASIX) 20 mg tablet Take 1 tablet (20 mg total) by mouth daily for 30 days 3/1/18 4/5/18  Jonathan Magana MD   glipiZIDE (GLUCOTROL) 10 mg tablet Take 1 tablet (10 mg total) by mouth 2 (two) times a day before meals for 30 days Pt takes 10 mg two times a day 3/1/18 4/5/18  Jonathan Magana MD   Hypromellose (ARTIFICIAL TEARS OP) Apply to eye    Historical Provider, MD   lisinopril (ZESTRIL) 10 mg tablet Take 1 tablet (10 mg total) by mouth 2 (two) times a day 4/6/18   ROBBI Campos   metFORMIN (GLUCOPHAGE) 500 mg tablet Take 1 tablet (500 mg total) by mouth daily with breakfast for 90 days  Patient taking differently: Take 1,000 mg by mouth daily with breakfast   3/1/18 5/30/18  Jonathan Magana MD   methylPREDNISolone (MEDROL) 32 MG tablet Take 1 tablet (32 mg) by mouth 12 hours before test and 1 tablet (32 mg) 2 hours before test 3/21/18   ROBBI Iqbal   ONE TOUCH ULTRA TEST test strip TEST twice a day 2/19/18   MD Adrianna Oconnor Chroman LANCETS 29U [address] [facility]  2/19/18   Jonathan Magana MD   potassium chloride (K-DUR,KLOR-CON) 10 mEq tablet Take 1 tablet (10 mEq total) by mouth daily for 30 days 3/26/18 4/25/18  Jonathan Magana MD rivaroxaban (XARELTO) 20 mg tablet Take 1 tablet by mouth daily with dinner  Patient taking differently: Take 20 mg by mouth daily with breakfast   9/14/17   Geovanna Sawyer MD   simvastatin (ZOCOR) 80 mg tablet take 1 tablet by mouth once daily 3/10/18   Alyce Langford MD       Allergies: Allergies   Allergen Reactions    Lipitor [Atorvastatin] GI Intolerance     Nausea stomach ache    Hydrochlorothiazide GI Intolerance    Aspirin GI Intolerance       Review of Systems:  Review of Systems - History obtained from chart review and the patient  General ROS: negative  Psychological ROS: positive for - anxiety and worried  Hematological and Lymphatic ROS: negative for - bleeding problems, blood clots or bruising  Respiratory ROS: no cough, shortness of breath, or wheezing  Cardiovascular ROS: positive for - lightheadedness; negative for chest pain, PND or orthopnea  Gastrointestinal ROS: no abdominal pain, change in bowel habits, or black or bloody stools  Musculoskeletal ROS: negative  Neurological: lightheadedness, no syncope  No TIA or CVA symptoms    Vital Signs:     Vitals:    04/12/18 1300 04/12/18 1357   BP: (!) 192/86 (!) 188/82   BP Location: Left arm Right arm   Cuff Size: Adult    Pulse: 62    Resp: 12    Temp: 98 2 °F (36 8 °C)    TempSrc: Oral    SpO2: 98%    Weight: 73 9 kg (163 lb)    Height: 4' 9" (1 448 m)        Physical Exam:    General: well developed  no acute distress  HEENT/NECK:  PERRLA  No jugular venous distention  Cardiac:Irregular rhythm, 3/6 harsh systolic murmur RUSB  Carotids: 1+ pulses, delayed upstrokes, no bruits   Pulmonary:  Breath sounds clear bilaterally  Abdomen:  Non-tender, Non-distended  Positive bowel sounds  Lower extremities: Extremities warm/dry  PT/DP pulses 2+ bilaterally  No edema B/L  Neuro: Alert and oriented X 3  Sensation is grossly intact  No focal deficits  Musculoskeletal: MAEE  Skin: Warm/Dry, without rashes or lesions      Lab Results: Results from last 7 days  Lab Units 04/06/18 0443 04/05/18 1910   WBC Thousand/uL 10 42* 14 75*   HEMOGLOBIN g/dL 12 9 14 0   HEMATOCRIT % 37 8 41 2   PLATELETS Thousands/uL 279 295       Results from last 7 days  Lab Units 04/06/18 0443 04/05/18 1910   SODIUM mmol/L 134* 133*   POTASSIUM mmol/L 3 8 4 0   CHLORIDE mmol/L 99* 98*   CO2 mmol/L 29 27   BUN mg/dL 20 20   CREATININE mg/dL 0 71 0 81   GLUCOSE RANDOM mg/dL 195* 307*   CALCIUM mg/dL 9 0 9 1       Results from last 7 days  Lab Units 04/06/18 0443   INR  1 34*     Lab Results   Component Value Date    HGBA1C 8 1 (H) 01/13/2018     Lab Results   Component Value Date    CKTOTAL 65 03/28/2014    TROPONINI <0 02 03/02/2018       Imaging Studies:     Gated CTA of the chest/abdomen/pelvis:   IMPRESSION:     1   TAVR measurements:    Annulus: diameter 26 0 x 19 3 mm      area: 397 0 sq mm    Annulus to LCA: 10 9 mm    Annulus to RCA: 11 6 mm    Minimal diameter right iliofemoral segment: 4 1 mm    Minimal diameter left iliofemoral segment: 4 4 mm  2  Diffuse aortoiliac calcification  Moderate proximal bilateral common iliac artery stenosis are seen approaching 50% in severity  Lumen narrows to 4 mm at the origin of both common iliac arteries  No significant external iliac or common femoral   stenosis  3   At least moderate ostial stenosis of the right renal artery   4  Central low density within the uterus which could represent endometrial fluid or endometrial thickening  Recommend pelvic ultrasound for further evaluation  5   Cholelithiasis     3D KATHLEEN:   SUMMARY     LEFT VENTRICLE:  Systolic function was normal  Ejection fraction was estimated to be 60 %  There were no regional wall motion abnormalities  Wall thickness was mildly increased  There was mild concentric hypertrophy    Doppler parameters were consistent with abnormal left ventricular relaxation (grade 1 diastolic dysfunction)      LEFT ATRIUM:  The atrium was mildly dilated      ATRIAL SEPTUM:  No defect or patent foramen ovale was identified      MITRAL VALVE:  There was mild to moderate annular calcification  There was mild regurgitation      AORTIC VALVE:  The valve was trileaflet  Leaflets exhibited moderately to markedly increased thickness, moderate to marked calcification, and markedly reduced cuspal separation  Transaortic velocity was increased due to valvular stenosis  LVOT and annular dimentions: Annular area 4 54 cm2; annular perimeter 7 88 cm; transverse diameter 2 35 cm; anteroposterior diameter 2 60 cm; LVOT 1 9 cm; aortic root 3 53 cm,  sinotubular junction 2 82 cm; ascending aorta 3 05 cm  There was severe stenosis  Valve mean gradient was 27 mmHg  Estimated aortic valve area (by VTI) was 0 65 cm squared  Estimated aortic valve area (by Vmax) was 0 79 cm squared  Estimated aortic valve area (by Vmean) was 0 68 cm squared      AORTA:  There was mild, localized atheroma in the proximal descending aorta        Left and right cardiac catheterization:   CORONARY VESSELS:   --  The coronary circulation is right dominant  --  Ostial left main: There was a 30 % stenosis  --  LAD: The vessel was medium sized  Angiography showed the vessel to wrap around the cardiac apex and moderate atherosclerosis  --  1st diagonal: There was a 40 % stenosis  --  Circumflex: The vessel was medium sized  There were two major obtuse marginals  --  1st obtuse marginal: There was a 60 % stenosis  It appears amenable to percutaneous intervention  --  RCA: The vessel was medium sized and moderately calcified  Angiography showed moderate atherosclerosis  --  Mid RCA: There was a 50 % stenosis  Pulmonary function tests:   FeV1 1 54 L/ 119% predicted  DLCO 84%    Arterial Blood gas:   pH  7 4 pO2 74 pCO2 43    Carotid artery ultrasound:   Impression  RIGHT:  There is <50% stenosis noted in the internal carotid artery  Plaque is  heterogenous/calcified and irregular    Vertebral artery flow is antegrade  There is no significant subclavian artery  disease  LEFT:  There is <50% stenosis noted in the internal carotid artery  Plaque is  heterogenous/calcified and irregular  Vertebral artery flow is antegrade  There is no significant subclavian artery  disease  I have personally reviewed pertinent reports  TAVR evaluation Assessment:     5 Meter Walk Test:      Attempt 1: 10 sec   Attempt 2: 10 sec   Attempt 3: 10 sec    STS Risk Score: 5 7%      Bethel Doty 122: III    KCCQ-12 completed    Assessment:  Patient Active Problem List    Diagnosis Date Noted    Chronic diastolic heart failure ([facility] [address] ) 04/05/2018    Viral upper respiratory tract infection 03/24/2018    History of cardioembolic cerebrovascular accident (CVA) 03/02/2018    Slurred speech 01/12/2018    Lethargy 01/12/2018    Ataxia 01/12/2018    Neurological deficit, transient 01/12/2018    Type 2 diabetes mellitus with hyperglycemia ([facility] [address] ) 01/12/2018    Left knee pain 12/04/2017    PERI (obstructive sleep apnea) 09/29/2017    Periodic limb movement sleep disorder 09/29/2017    Paroxysmal atrial fibrillation ([facility] [address] ) 09/11/2017    Acute congestive heart failure ([address] ) 09/11/2017    Sleep talking 08/25/2017    Osteoporosis 05/04/2017    Insomnia 01/25/2017    Hyponatremia 01/16/2017    Depression with anxiety 01/16/2017    CVA (cerebral vascular accident) ([facility] [address] ) 01/13/2017    Unsteady gait 01/13/2017    Diabetes mellitus type 2, uncontrolled ([facility] [address] )     Glaucoma     Hyperlipidemia     Hypertension     Tinnitus 04/04/2014    Hearing loss 12/12/2012    Osteoarthritis 08/07/2012    Nonrheumatic aortic valve stenosis 08/07/2012    Tricuspid valve disorders, non-rheumatic 08/07/2012     Severe aortic stenosis; Ongoing TAVR workup    Plan:    César Self has severe symptomatic aortic stenosis  Based on her STS risk assessment, she has undergone testing for transcatheter aortic valve replacement    The results of these studies have been interpreted by two independent cardiac surgeons who have determined the patient to be Intermediate risk for open aortic valve replacement  The risks, benefits, and alternatives to TAVR were discussed in detail with the patient today  They understand and wish to proceed with transfemoral transcatheter aortic valve replacement with the possibility of transapical approach     Informed consent was obtained and a date for the intervention has been set  John Guajardo was comfortable with our recommendations, and her questions were answered to her satisfaction  The following preoperative instructions were provided at the conclusion of their consultation:     1  You will receive a phone call from the hospital between 2:00 PM and 8:00 PM the day prior to surgery to confirm arrival time and location  For surgery on Mondays, you will receive a call on Friday  2  Do not drink or eat anything after midnight the night before surgery  That includes no water, candy, gum, lozenges, Lifesavers, etc  We recommend you not eat any salty or fatty snack food, consume alcohol or smoke the night before surgery  3  Continue taking aspirin but only 81 mg daily  4  If you take Coumadin and/or Plavix, discontinue it 5 days before surgery  5  If you are diabetic, do not take any of your diabetic pills the morning of surgery  If you take Lantus insulin, you may take it at your regularly scheduled time the day before surgery  Do not take any other insulins the morning of surgery  6  The 2 nights before surgery, take a shower each night using the special antiseptic soap or soap sponges you received from the office or hospital  [facility] your hair with regular shampoo and rinse completely before using the antiseptic sponges  Use the sponge to wash from your neck down, with special attention to the armpits and groin area  Do not use any other soap or cleanser on your skin   Do not use lotions, powder, deodorant or perfume of any kind on your skin after you shower  Use clean bed linens and wear clean pajamas after your shower  7  You will be prescribed Mupirocin nasal ointment  Apply to both nostrils twice a day for 5 days prior to surgery  8  Do not take a shower the morning of her surgery; you'll be given a special" bath" at the hospital   9  Notify the CT surgery office if you develop a cold, sore throat, cough, fever or other health issues before your surgery  10  Other medication changes included the following: Stop Glipizide, Metformin and Lisinopril 3 days before surgery  Stop Xarelto 5 days before surgery     SIGNATURE: ROBBI Worthy  DATE: April 12, 2018  TIME: 2:22 PM  Attestation signed by Elsy Sanchez MD at 4/12/2018  2:58 PM:  The patient is an 51-year-old man with symptomatic severe aortic stenosis, I explained the diagnosis to the patient and the treatment options  I recommend TAVR since she is a high risk for surgery  I explained the procedure, benefits, risks and possible complications  The patient understands and agrees to proceed with surgery

## 2018-04-12 NOTE — PROGRESS NOTES
History & Physical  - Cardiothoracic Surgery   Bridget Ellis 80 y o  female MRN: [id number]    Physician Requesting Consult: Dr Norberto Epperson    Reason for Consult / Principal Problem: Aortic stenosis, Non-Rheumatic    History of Present Illness: Bridget Ellis is a 80y o  year old female who was previously evaluated in our office by ROMANA Bianchi  for transcatheter aortic valve replacement  During this initial consultation, arrangements were made for the following studies to be completed: Gated CTA of the chest/abdomen/pelvis, 3D KATHLEEN, left and right cardiac catheterization, dental clearance, pulmonary function tests with ABG, and carotid artery ultrasound  Bridget Ellis now presents to review the results of these tests and obtain a second surgeon to confirm the suitability of proceeding with transcatheter aortic valve replacment  She was recently admitted to the hospital for an episode of acute on chronic diastolic heart failure  She presented with a 1 week history os dyspnea with a 9 lb weight gain with LE edema  Pro NT BNP was elevated to 3090  She responded well to diuresis  Echocardiogram demonstrated normal LV function with mild-mod MR , severe AS with an TEN 0 6 cm2 , mean & peak gradients 31 & 58 mm Hg, and mild-mod TR   After that hospital admission she was referred to our practice for surgical consultation    Christian Jennings is accompanied by her son and daughter, with whom she resides  They live in a single story ranch style home  Chepe Doherty states she is able to perform her ADL's and simple chores during the day  She states her weight and edema have been stable since d/c from the hospital  She denies ALBERTO or chest pain but states does experience lightheadedness and she has to pace herself with activities  She denies presyncope or syncope, PND or orthopnea       She is a lifelong non-smoker  She does not drink alcohol  She is edentulous with dentures   She is Resighini and wears hearing aids         Past Medical History:  Past Medical History:   Diagnosis Date    Cholelithiasis     Depression with anxiety     Diabetes mellitus ([facility] [address] )     niddm    Dysfunction of eustachian tube     Glaucoma     Hematuria     Hypercholesterolemia     Hypertension     Hyponatremia     Ischemic stroke of frontal lobe (HCC)     Right     Obstructive sleep apnea     OP (osteoporosis)     PAF (paroxysmal atrial fibrillation) (HCC)     Urinary frequency     Vertigo     Viral gastroenteritis     Viral gastroenteritis     Viral infection          Past Surgical History:   Past Surgical History:   Procedure Laterality Date    APPENDECTOMY      GLAUCOMA SURGERY  01/03/2016    TUBAL LIGATION           Family History:  Family History   Problem Relation Age of Onset    No Known Problems Mother     Diabetes Father     Stroke Father     Coronary artery disease Sister     No Known Problems Brother     No Known Problems Son     Breast cancer Daughter     No Known Problems Maternal Grandmother     No Known Problems Maternal Grandfather     No Known Problems Paternal Grandmother     No Known Problems Paternal Grandfather     No Known Problems Cousin     Rheum arthritis Neg Hx     Osteoarthritis Neg Hx     Asthma Neg Hx     Heart failure Neg Hx     Hyperlipidemia Neg Hx     Hypertension Neg Hx     Migraines Neg Hx     Rashes / Skin problems Neg Hx     Seizures Neg Hx     Thyroid disease Neg Hx          Social History:    History   Alcohol Use No     History   Drug Use No     History   Smoking Status    Never Smoker   Smokeless Tobacco    Never Used       Home Medications:   Prior to Admission medications    Medication Sig Start Date End Date Taking?  Authorizing Provider   Acetaminophen (TYLENOL) 325 MG CAPS Take by mouth as needed    Historical Provider, MD   Bimatoprost (LUMIGAN OP) Apply to eye daily at bedtime    Historical Provider, MD   COMBIGAN 0 2-0 5 % instill 1 drop into both eyes twice a day 2/8/18 Historical Provider, MD   DORZOLAMIDE HCL OP Apply to eye 3 (three) times a day    Historical Provider, MD   escitalopram (LEXAPRO) 5 mg tablet Take 1 tablet by mouth daily for 30 days 1/24/18 4/5/18  Lowell Parsons MD   furosemide (LASIX) 20 mg tablet Take 1 tablet (20 mg total) by mouth daily for 30 days 3/1/18 4/5/18  Lowell Parsons MD   glipiZIDE (GLUCOTROL) 10 mg tablet Take 1 tablet (10 mg total) by mouth 2 (two) times a day before meals for 30 days Pt takes 10 mg two times a day 3/1/18 4/5/18  Lowell Parsons MD   Hypromellose (ARTIFICIAL TEARS OP) Apply to eye    Historical Provider, MD   lisinopril (ZESTRIL) 10 mg tablet Take 1 tablet (10 mg total) by mouth 2 (two) times a day 4/6/18   ROBBI Bryant   metFORMIN (GLUCOPHAGE) 500 mg tablet Take 1 tablet (500 mg total) by mouth daily with breakfast for 90 days  Patient taking differently: Take 1,000 mg by mouth daily with breakfast   3/1/18 5/30/18  Lowell Parsons MD   methylPREDNISolone (MEDROL) 32 MG tablet Take 1 tablet (32 mg) by mouth 12 hours before test and 1 tablet (32 mg) 2 hours before test 3/21/18   ROBBI Bennett   ONE TOUCH ULTRA TEST test strip TEST twice a day 2/19/18   [facility] [address] [address] [place] [address]  2/19/18   Lowell Parsons MD   potassium chloride (K-DUR,KLOR-CON) 10 mEq tablet Take 1 tablet (10 mEq total) by mouth daily for 30 days 3/26/18 4/25/18  Lowell Parsons MD   rivaroxaban (XARELTO) 20 mg tablet Take 1 tablet by mouth daily with dinner  Patient taking differently: Take 20 mg by mouth daily with breakfast   9/14/17   Jennifer Gaming MD   simvastatin (ZOCOR) 80 mg tablet take 1 tablet by mouth once daily 3/10/18   Lowell Parsons MD       Allergies:   Allergies   Allergen Reactions    Lipitor [Atorvastatin] GI Intolerance     Nausea stomach ache    Hydrochlorothiazide GI Intolerance    Aspirin GI Intolerance       Review of Systems:  Review of Systems - History obtained from chart review and the patient  General ROS: negative  Psychological ROS: positive for - anxiety and worried  Hematological and Lymphatic ROS: negative for - bleeding problems, blood clots or bruising  Respiratory ROS: no cough, shortness of breath, or wheezing  Cardiovascular ROS: positive for - lightheadedness; negative for chest pain, PND or orthopnea  Gastrointestinal ROS: no abdominal pain, change in bowel habits, or black or bloody stools  Musculoskeletal ROS: negative  Neurological: lightheadedness, no syncope  No TIA or CVA symptoms    Vital Signs:     Vitals:    04/12/18 1300 04/12/18 1357   BP: (!) 192/86 (!) 188/82   BP Location: Left arm Right arm   Cuff Size: Adult    Pulse: 62    Resp: 12    Temp: 98 2 °F (36 8 °C)    TempSrc: Oral    SpO2: 98%    Weight: 73 9 kg (163 lb)    Height: 4' 9" (1 448 m)        Physical Exam:    General: well developed  no acute distress  HEENT/NECK:  PERRLA  No jugular venous distention  Cardiac:Irregular rhythm, 3/6 harsh systolic murmur RUSB  Carotids: 1+ pulses, delayed upstrokes, no bruits   Pulmonary:  Breath sounds clear bilaterally  Abdomen:  Non-tender, Non-distended  Positive bowel sounds  Lower extremities: Extremities warm/dry  PT/DP pulses 2+ bilaterally  No edema B/L  Neuro: Alert and oriented X 3  Sensation is grossly intact  No focal deficits  Musculoskeletal: MAEE  Skin: Warm/Dry, without rashes or lesions      Lab Results:       Results from last 7 days  Lab Units 04/06/18 0443 04/05/18 1910   WBC Thousand/uL 10 42* 14 75*   HEMOGLOBIN g/dL 12 9 14 0   HEMATOCRIT % 37 8 41 2   PLATELETS Thousands/uL 279 295       Results from last 7 days  Lab Units 04/06/18 0443 04/05/18 1910   SODIUM mmol/L 134* 133*   POTASSIUM mmol/L 3 8 4 0   CHLORIDE mmol/L 99* 98*   CO2 mmol/L 29 27   BUN mg/dL 20 20   CREATININE mg/dL 0 71 0 81   GLUCOSE RANDOM mg/dL 195* 307*   CALCIUM mg/dL 9 0 9 1       Results from last 7 days  Lab Units 04/06/18 0443   INR  1 34*     Lab Results Component Value Date    HGBA1C 8 1 (H) 01/13/2018     Lab Results   Component Value Date    CKTOTAL 65 03/28/2014    TROPONINI <0 02 03/02/2018       Imaging Studies:     Gated CTA of the chest/abdomen/pelvis:   IMPRESSION:     1   TAVR measurements:    Annulus: diameter 26 0 x 19 3 mm      area: 397 0 sq mm    Annulus to LCA: 10 9 mm    Annulus to RCA: 11 6 mm    Minimal diameter right iliofemoral segment: 4 1 mm    Minimal diameter left iliofemoral segment: 4 4 mm  2  Diffuse aortoiliac calcification  Moderate proximal bilateral common iliac artery stenosis are seen approaching 50% in severity  Lumen narrows to 4 mm at the origin of both common iliac arteries  No significant external iliac or common femoral   stenosis  3   At least moderate ostial stenosis of the right renal artery   4  Central low density within the uterus which could represent endometrial fluid or endometrial thickening  Recommend pelvic ultrasound for further evaluation  5   Cholelithiasis     3D KATHLEEN:   SUMMARY     LEFT VENTRICLE:  Systolic function was normal  Ejection fraction was estimated to be 60 %  There were no regional wall motion abnormalities  Wall thickness was mildly increased  There was mild concentric hypertrophy  Doppler parameters were consistent with abnormal left ventricular relaxation (grade 1 diastolic dysfunction)      LEFT ATRIUM:  The atrium was mildly dilated      ATRIAL SEPTUM:  No defect or patent foramen ovale was identified      MITRAL VALVE:  There was mild to moderate annular calcification  There was mild regurgitation      AORTIC VALVE:  The valve was trileaflet  Leaflets exhibited moderately to markedly increased thickness, moderate to marked calcification, and markedly reduced cuspal separation  Transaortic velocity was increased due to valvular stenosis  LVOT and annular dimentions:  Annular area 4 54 cm2; annular perimeter 7 88 cm; transverse diameter 2 35 cm; anteroposterior diameter 2 60 cm; LVOT 1 9 cm; aortic root 3 53 cm,  sinotubular junction 2 82 cm; ascending aorta 3 05 cm  There was severe stenosis  Valve mean gradient was 27 mmHg  Estimated aortic valve area (by VTI) was 0 65 cm squared  Estimated aortic valve area (by Vmax) was 0 79 cm squared  Estimated aortic valve area (by Vmean) was 0 68 cm squared      AORTA:  There was mild, localized atheroma in the proximal descending aorta        Left and right cardiac catheterization:   CORONARY VESSELS:   --  The coronary circulation is right dominant  --  Ostial left main: There was a 30 % stenosis  --  LAD: The vessel was medium sized  Angiography showed the vessel to wrap around the cardiac apex and moderate atherosclerosis  --  1st diagonal: There was a 40 % stenosis  --  Circumflex: The vessel was medium sized  There were two major obtuse marginals  --  1st obtuse marginal: There was a 60 % stenosis  It appears amenable to percutaneous intervention  --  RCA: The vessel was medium sized and moderately calcified  Angiography showed moderate atherosclerosis  --  Mid RCA: There was a 50 % stenosis  Pulmonary function tests:   FeV1 1 54 L/ 119% predicted  DLCO 84%    Arterial Blood gas:   pH  7 4 pO2 74 pCO2 43    Carotid artery ultrasound:   Impression  RIGHT:  There is <50% stenosis noted in the internal carotid artery  Plaque is  heterogenous/calcified and irregular  Vertebral artery flow is antegrade  There is no significant subclavian artery  disease  LEFT:  There is <50% stenosis noted in the internal carotid artery  Plaque is  heterogenous/calcified and irregular  Vertebral artery flow is antegrade  There is no significant subclavian artery  disease  I have personally reviewed pertinent reports        TAVR evaluation Assessment:     5 Meter Walk Test:      Attempt 1: 10 sec   Attempt 2: 10 sec   Attempt 3: 10 sec    STS Risk Score: 5 7%      NYHC: III    KCCQ-12 completed    Assessment:  Patient Active Problem List Diagnosis Date Noted    [facility] 04/05/2018    Viral upper respiratory tract infection 03/24/2018    History of cardioembolic cerebrovascular accident (CVA) 03/02/2018    Slurred speech 01/12/2018    Lethargy 01/12/2018    Ataxia 01/12/2018    Neurological deficit, transient 01/12/2018    Type 2 diabetes mellitus with hyperglycemia ([facility] ) 01/12/2018    Left knee pain 12/04/2017    PERI (obstructive sleep apnea) 09/29/2017    Periodic limb movement sleep disorder 09/29/2017    Paroxysmal atrial fibrillation ([facility] ) 09/11/2017    Acute congestive heart failure ([place] [address] ) 09/11/2017    Sleep talking 08/25/2017    Osteoporosis 05/04/2017    Insomnia 01/25/2017    Hyponatremia 01/16/2017    Depression with anxiety 01/16/2017    CVA [facility]cerebral vascular accident) ([facility] ) 01/13/2017    Unsteady gait 01/13/2017    Diabetes mellitus type 2, uncontrolled ([place] [address] )     Glaucoma     Hyperlipidemia     Hypertension     Tinnitus 04/04/2014    Hearing loss 12/12/2012    Osteoarthritis 08/07/2012    Nonrheumatic aortic valve stenosis 08/07/2012    Tricuspid valve disorders, non-rheumatic 08/07/2012     Severe aortic stenosis; Ongoing TAVR workup    Plan:    Farrah Sampson has severe symptomatic aortic stenosis  Based on her STS risk assessment, she has undergone testing for transcatheter aortic valve replacement  The results of these studies have been interpreted by two independent cardiac surgeons who have determined the patient to be Intermediate risk for open aortic valve replacement  The risks, benefits, and alternatives to TAVR were discussed in detail with the patient today  They understand and wish to proceed with transfemoral transcatheter aortic valve replacement with the possibility of transapical approach     Informed consent was obtained and a date for the intervention has been set      Farrah Sampson was comfortable with our recommendations, and her questions were answered to her satisfaction  The following preoperative instructions were provided at the conclusion of their consultation:     1  You will receive a phone call from the hospital between 2:00 PM and 8:00 PM the day prior to surgery to confirm arrival time and location  For surgery on Mondays, you will receive a call on Friday  2  Do not drink or eat anything after midnight the night before surgery  That includes no water, candy, gum, lozenges, Lifesavers, etc  We recommend you not eat any salty or fatty snack food, consume alcohol or smoke the night before surgery  3  Continue taking aspirin but only 81 mg daily  4  If you take Coumadin and/or Plavix, discontinue it 5 days before surgery  5  If you are diabetic, do not take any of your diabetic pills the morning of surgery  If you take Lantus insulin, you may take it at your regularly scheduled time the day before surgery  Do not take any other insulins the morning of surgery  6  The 2 nights before surgery, take a shower each night using the special antiseptic soap or soap sponges you received from the office or hospital Charlsie Merlin your hair with regular shampoo and rinse completely before using the antiseptic sponges  Use the sponge to wash from your neck down, with special attention to the armpits and groin area  Do not use any other soap or cleanser on your skin  Do not use lotions, powder, deodorant or perfume of any kind on your skin after you shower  Use clean bed linens and wear clean pajamas after your shower  7  You will be prescribed Mupirocin nasal ointment  Apply to both nostrils twice a day for 5 days prior to surgery  8  Do not take a shower the morning of her surgery; you'll be given a special" bath" at the hospital   9  Notify the CT surgery office if you develop a cold, sore throat, cough, fever or other health issues before your surgery    10  Other medication changes included the following: Stop Glipizide, Metformin and Lisinopril 3 days before surgery   Stop Xarelto 5 days before surgery     SIGNATURE: ROBBI Paz  DATE: April 12, 2018  TIME: 2:22 PM

## 2018-04-12 NOTE — H&P
History & Physical  - Cardiothoracic Surgery   Rosie Enriquez 80 y o  female MRN: [id number]    Physician Requesting Consult: Dr Bryan Selby    Reason for Consult / Principal Problem: Aortic stenosis, Non-Rheumatic    History of Present Illness: Rosie Enriquez is a 80y o  year old female who was previously evaluated in our office by ROMANA Crawford  for transcatheter aortic valve replacement  During this initial consultation, arrangements were made for the following studies to be completed: Gated CTA of the chest/abdomen/pelvis, 3D KATHLEEN, left and right cardiac catheterization, dental clearance, pulmonary function tests with ABG, and carotid artery ultrasound  Rosie Enriquez now presents to review the results of these tests and obtain a second surgeon to confirm the suitability of proceeding with transcatheter aortic valve replacment  She was recently admitted to the hospital for an episode of acute on chronic diastolic heart failure  She presented with a 1 week history os dyspnea with a 9 lb weight gain with LE edema  Pro NT BNP was elevated to 3090  She responded well to diuresis  Echocardiogram demonstrated normal LV function with mild-mod MR , severe AS with an TEN 0 6 cm2 , mean & peak gradients 31 & 58 mm Hg, and mild-mod TR   After that hospital admission she was referred to our practice for surgical consultation    Vidal Morales is accompanied by her son and daughter, with whom she resides  They live in a single story ranch style home  Margaret Sutherland states she is able to perform her ADL's and simple chores during the day  She states her weight and edema have been stable since d/c from the hospital  She denies ALBERTO or chest pain but states does experience lightheadedness and she has to pace herself with activities  She denies presyncope or syncope, PND or orthopnea       She is a lifelong non-smoker  She does not drink alcohol  She is edentulous with dentures   She is [place] and wears hearing aids         Past Medical History:  Past Medical History:   Diagnosis Date    Cholelithiasis     Depression with anxiety     Diabetes mellitus ([facility] [address] )     niddm    Dysfunction of eustachian tube     Glaucoma     Hematuria     Hypercholesterolemia     Hypertension     Hyponatremia     Ischemic stroke of frontal lobe (HCC)     Right     Obstructive sleep apnea     OP (osteoporosis)     PAF (paroxysmal atrial fibrillation) (HCC)     Urinary frequency     Vertigo     Viral gastroenteritis     Viral gastroenteritis     Viral infection          Past Surgical History:   Past Surgical History:   Procedure Laterality Date    APPENDECTOMY      GLAUCOMA SURGERY  01/03/2016    TUBAL LIGATION           Family History:  Family History   Problem Relation Age of Onset    No Known Problems Mother     Diabetes Father     Stroke Father     Coronary artery disease Sister     No Known Problems Brother     No Known Problems Son     Breast cancer Daughter     No Known Problems Maternal Grandmother     No Known Problems Maternal Grandfather     No Known Problems Paternal Grandmother     No Known Problems Paternal Grandfather     No Known Problems Cousin     Rheum arthritis Neg Hx     Osteoarthritis Neg Hx     Asthma Neg Hx     Heart failure Neg Hx     Hyperlipidemia Neg Hx     Hypertension Neg Hx     Migraines Neg Hx     Rashes / Skin problems Neg Hx     Seizures Neg Hx     Thyroid disease Neg Hx          Social History:    History   Alcohol Use No     History   Drug Use No     History   Smoking Status    Never Smoker   Smokeless Tobacco    Never Used       Home Medications:   Prior to Admission medications    Medication Sig Start Date End Date Taking?  Authorizing Provider   Acetaminophen (TYLENOL) 325 MG CAPS Take by mouth as needed    Historical Provider, MD   Bimatoprost (LUMIGAN OP) Apply to eye daily at bedtime    Historical Provider, MD   COMBIGAN 0 2-0 5 % instill 1 drop into both eyes twice a day 2/8/18 Historical Provider, MD   DORZOLAMIDE HCL OP Apply to eye 3 (three) times a day    Historical Provider, MD   escitalopram (LEXAPRO) 5 mg tablet Take 1 tablet by mouth daily for 30 days 1/24/18 4/5/18  Lowell Parsons MD   furosemide (LASIX) 20 mg tablet Take 1 tablet (20 mg total) by mouth daily for 30 days 3/1/18 4/5/18  Lowell Parsons MD   glipiZIDE (GLUCOTROL) 10 mg tablet Take 1 tablet (10 mg total) by mouth 2 (two) times a day before meals for 30 days Pt takes 10 mg two times a day 3/1/18 4/5/18  Lowell Parsons MD   Hypromellose (ARTIFICIAL TEARS OP) Apply to eye    Historical Provider, MD   lisinopril (ZESTRIL) 10 mg tablet Take 1 tablet (10 mg total) by mouth 2 (two) times a day 4/6/18   ROBBI Bryant   metFORMIN (GLUCOPHAGE) 500 mg tablet Take 1 tablet (500 mg total) by mouth daily with breakfast for 90 days  Patient taking differently: Take 1,000 mg by mouth daily with breakfast   3/1/18 5/30/18  Lowell Parsons MD   methylPREDNISolone (MEDROL) 32 MG tablet Take 1 tablet (32 mg) by mouth 12 hours before test and 1 tablet (32 mg) 2 hours before test 3/21/18   ROBBI Bennett   ONE TOUCH ULTRA TEST test strip TEST twice a day 2/19/18   Lowell Parsons MD   [id number]  2/19/18   Lowell Parsons MD   potassium chloride (K-DUR,KLOR-CON) 10 mEq tablet Take 1 tablet (10 mEq total) by mouth daily for 30 days 3/26/18 4/25/18  Lowell Parsons MD   rivaroxaban (XARELTO) 20 mg tablet Take 1 tablet by mouth daily with dinner  Patient taking differently: Take 20 mg by mouth daily with breakfast   9/14/17   Jennifer Gaming MD   simvastatin (ZOCOR) 80 mg tablet take 1 tablet by mouth once daily 3/10/18   Lowell Parsons MD       Allergies:   Allergies   Allergen Reactions    Lipitor [Atorvastatin] GI Intolerance     Nausea stomach ache    Hydrochlorothiazide GI Intolerance    Aspirin GI Intolerance       Review of Systems:  Review of Systems - History obtained from chart review and the patient  General ROS: negative  Psychological ROS: positive for - anxiety and worried  Hematological and Lymphatic ROS: negative for - bleeding problems, blood clots or bruising  Respiratory ROS: no cough, shortness of breath, or wheezing  Cardiovascular ROS: positive for - lightheadedness; negative for chest pain, PND or orthopnea  Gastrointestinal ROS: no abdominal pain, change in bowel habits, or black or bloody stools  Musculoskeletal ROS: negative  Neurological: lightheadedness, no syncope  No TIA or CVA symptoms    Vital Signs:     Vitals:    04/12/18 1300 04/12/18 1357   BP: (!) 192/86 (!) 188/82   BP Location: Left arm Right arm   Cuff Size: Adult    Pulse: 62    Resp: 12    Temp: 98 2 °F (36 8 °C)    TempSrc: Oral    SpO2: 98%    Weight: 73 9 kg (163 lb)    Height: 4' 9" (1 448 m)        Physical Exam:    General: well developed  no acute distress  HEENT/NECK:  PERRLA  No jugular venous distention  Cardiac:Irregular rhythm, 3/6 harsh systolic murmur RUSB  Carotids: 1+ pulses, delayed upstrokes, no bruits   Pulmonary:  Breath sounds clear bilaterally  Abdomen:  Non-tender, Non-distended  Positive bowel sounds  Lower extremities: Extremities warm/dry  PT/DP pulses 2+ bilaterally  No edema B/L  Neuro: Alert and oriented X 3  Sensation is grossly intact  No focal deficits  Musculoskeletal: MAEE  Skin: Warm/Dry, without rashes or lesions      Lab Results:       Results from last 7 days  Lab Units 04/06/18 0443 04/05/18 1910   WBC Thousand/uL 10 42* 14 75*   HEMOGLOBIN g/dL 12 9 14 0   HEMATOCRIT % 37 8 41 2   PLATELETS Thousands/uL 279 295       Results from last 7 days  Lab Units 04/06/18 0443 04/05/18 1910   SODIUM mmol/L 134* 133*   POTASSIUM mmol/L 3 8 4 0   CHLORIDE mmol/L 99* 98*   CO2 mmol/L 29 27   BUN mg/dL 20 20   CREATININE mg/dL 0 71 0 81   GLUCOSE RANDOM mg/dL 195* 307*   CALCIUM mg/dL 9 0 9 1       Results from last 7 days  Lab Units 04/06/18 0443   INR  1 34*     Lab Results Component Value Date    HGBA1C 8 1 (H) 01/13/2018     Lab Results   Component Value Date    CKTOTAL 65 03/28/2014    TROPONINI <0 02 03/02/2018       Imaging Studies:     Gated CTA of the chest/abdomen/pelvis:   IMPRESSION:     1   TAVR measurements:    Annulus: diameter 26 0 x 19 3 mm      area: 397 0 sq mm    Annulus to LCA: 10 9 mm    Annulus to RCA: 11 6 mm    Minimal diameter right iliofemoral segment: 4 1 mm    Minimal diameter left iliofemoral segment: 4 4 mm  2  Diffuse aortoiliac calcification  Moderate proximal bilateral common iliac artery stenosis are seen approaching 50% in severity  Lumen narrows to 4 mm at the origin of both common iliac arteries  No significant external iliac or common femoral   stenosis  3   At least moderate ostial stenosis of the right renal artery   4  Central low density within the uterus which could represent endometrial fluid or endometrial thickening  Recommend pelvic ultrasound for further evaluation  5   Cholelithiasis     3D KATHLEEN:   SUMMARY     LEFT VENTRICLE:  Systolic function was normal  Ejection fraction was estimated to be 60 %  There were no regional wall motion abnormalities  Wall thickness was mildly increased  There was mild concentric hypertrophy  Doppler parameters were consistent with abnormal left ventricular relaxation (grade 1 diastolic dysfunction)      LEFT ATRIUM:  The atrium was mildly dilated      ATRIAL SEPTUM:  No defect or patent foramen ovale was identified      MITRAL VALVE:  There was mild to moderate annular calcification  There was mild regurgitation      AORTIC VALVE:  The valve was trileaflet  Leaflets exhibited moderately to markedly increased thickness, moderate to marked calcification, and markedly reduced cuspal separation  Transaortic velocity was increased due to valvular stenosis  LVOT and annular dimentions:  Annular area 4 54 cm2; annular perimeter 7 88 cm; transverse diameter 2 35 cm; anteroposterior diameter 2 60 cm; LVOT 1 9 cm; aortic root 3 53 cm,  sinotubular junction 2 82 cm; ascending aorta 3 05 cm  There was severe stenosis  Valve mean gradient was 27 mmHg  Estimated aortic valve area (by VTI) was 0 65 cm squared  Estimated aortic valve area (by Vmax) was 0 79 cm squared  Estimated aortic valve area (by Vmean) was 0 68 cm squared      AORTA:  There was mild, localized atheroma in the proximal descending aorta        Left and right cardiac catheterization:   CORONARY VESSELS:   --  The coronary circulation is right dominant  --  Ostial left main: There was a 30 % stenosis  --  LAD: The vessel was medium sized  Angiography showed the vessel to wrap around the cardiac apex and moderate atherosclerosis  --  1st diagonal: There was a 40 % stenosis  --  Circumflex: The vessel was medium sized  There were two major obtuse marginals  --  1st obtuse marginal: There was a 60 % stenosis  It appears amenable to percutaneous intervention  --  RCA: The vessel was medium sized and moderately calcified  Angiography showed moderate atherosclerosis  --  Mid RCA: There was a 50 % stenosis  Pulmonary function tests:   FeV1 1 54 L/ 119% predicted  DLCO 84%    Arterial Blood gas:   pH  7 4 pO2 74 pCO2 43    Carotid artery ultrasound:   Impression  RIGHT:  There is <50% stenosis noted in the internal carotid artery  Plaque is  heterogenous/calcified and irregular  Vertebral artery flow is antegrade  There is no significant subclavian artery  disease  LEFT:  There is <50% stenosis noted in the internal carotid artery  Plaque is  heterogenous/calcified and irregular  Vertebral artery flow is antegrade  There is no significant subclavian artery  disease  I have personally reviewed pertinent reports        TAVR evaluation Assessment:     5 Meter Walk Test:      Attempt 1: 10 sec   Attempt 2: 10 sec   Attempt 3: 10 sec    STS Risk Score: 5 7%      NYHC: III    KCCQ-12 completed    Assessment:  Patient Active Problem List Diagnosis Date Noted    Chronic diastolic heart failure ([address] [facility] ) 04/05/2018    Viral upper respiratory tract infection 03/24/2018    History of cardioembolic cerebrovascular accident (CVA) 03/02/2018    Slurred speech 01/12/2018    Lethargy 01/12/2018    Ataxia 01/12/2018    Neurological deficit, transient 01/12/2018    Type 2 diabetes mellitus with hyperglycemia ([facility] [facility] ) 01/12/2018    Left knee pain 12/04/2017    PERI (obstructive sleep apnea) 09/29/2017    Periodic limb movement sleep disorder 09/29/2017    Paroxysmal atrial fibrillation ([address] ) 09/11/2017    Acute congestive heart failure ([place] [address] ) 09/11/2017    Sleep talking 08/25/2017    Osteoporosis 05/04/2017    Insomnia 01/25/2017    Hyponatremia 01/16/2017    Depression with anxiety 01/16/2017    CVA (cerebral vascular accident) ([address] ) 01/13/2017    Unsteady gait 01/13/2017    Diabetes mellitus type 2, uncontrolled ([place] [address] )     Glaucoma     Hyperlipidemia     Hypertension     Tinnitus 04/04/2014    Hearing loss 12/12/2012    Osteoarthritis 08/07/2012    Nonrheumatic aortic valve stenosis 08/07/2012    Tricuspid valve disorders, non-rheumatic 08/07/2012     Severe aortic stenosis; Ongoing TAVR workup    Plan:    Chelita Paula has severe symptomatic aortic stenosis  Based on her STS risk assessment, she has undergone testing for transcatheter aortic valve replacement  The results of these studies have been interpreted by two independent cardiac surgeons who have determined the patient to be Intermediate risk for open aortic valve replacement  The risks, benefits, and alternatives to TAVR were discussed in detail with the patient today  They understand and wish to proceed with transfemoral transcatheter aortic valve replacement with the possibility of transapical approach     Informed consent was obtained and a date for the intervention has been set      Chelita Paula was comfortable with our recommendations, and her questions were answered to her satisfaction  The following preoperative instructions were provided at the conclusion of their consultation:     1  You will receive a phone call from the hospital between 2:00 PM and 8:00 PM the day prior to surgery to confirm arrival time and location  For surgery on Mondays, you will receive a call on Friday  2  Do not drink or eat anything after midnight the night before surgery  That includes no water, candy, gum, lozenges, Lifesavers, etc  We recommend you not eat any salty or fatty snack food, consume alcohol or smoke the night before surgery  3  Continue taking aspirin but only 81 mg daily  4  If you take Coumadin and/or Plavix, discontinue it 5 days before surgery  5  If you are diabetic, do not take any of your diabetic pills the morning of surgery  If you take Lantus insulin, you may take it at your regularly scheduled time the day before surgery  Do not take any other insulins the morning of surgery  6  The 2 nights before surgery, take a shower each night using the special antiseptic soap or soap sponges you received from the office or hospital  Angella Polka your hair with regular shampoo and rinse completely before using the antiseptic sponges  Use the sponge to wash from your neck down, with special attention to the armpits and groin area  Do not use any other soap or cleanser on your skin  Do not use lotions, powder, deodorant or perfume of any kind on your skin after you shower  Use clean bed linens and wear clean pajamas after your shower  7  You will be prescribed Mupirocin nasal ointment  Apply to both nostrils twice a day for 5 days prior to surgery  8  Do not take a shower the morning of her surgery; you'll be given a special" bath" at the hospital   9  Notify the CT surgery office if you develop a cold, sore throat, cough, fever or other health issues before your surgery    10  Other medication changes included the following: Stop Glipizide, Metformin and Lisinopril 3 days before surgery   Stop Xarelto 5 days before surgery     SIGNATURE: ROBBI Early  DATE: April 12, 2018  TIME: 2:22 PM

## 2018-04-13 LAB — MRSA NOSE QL CULT: NORMAL

## 2018-04-13 NOTE — PRE-PROCEDURE INSTRUCTIONS
No outpatient prescriptions have been marked as taking for the 4/17/18 encounter [facility] Encounter)  Spoke to pt  Confirmed she received all medication and showering instructions from office  No further questions at this time

## 2018-04-16 ENCOUNTER — ANESTHESIA EVENT (OUTPATIENT)
Dept: PERIOP | Facility: HOSPITAL | Age: 83
DRG: 267 | End: 2018-04-16
Payer: MEDICARE

## 2018-04-17 ENCOUNTER — APPOINTMENT (OUTPATIENT)
Dept: NON INVASIVE DIAGNOSTICS | Facility: HOSPITAL | Age: 83
DRG: 267 | End: 2018-04-17
Payer: MEDICARE

## 2018-04-17 ENCOUNTER — HOSPITAL ENCOUNTER (INPATIENT)
Facility: HOSPITAL | Age: 83
LOS: 2 days | Discharge: HOME HEALTH | DRG: 267 | End: 2018-04-19
Attending: THORACIC SURGERY (CARDIOTHORACIC VASCULAR SURGERY) | Admitting: THORACIC SURGERY (CARDIOTHORACIC VASCULAR SURGERY)
Payer: MEDICARE

## 2018-04-17 ENCOUNTER — APPOINTMENT (OUTPATIENT)
Dept: NON INVASIVE DIAGNOSTICS | Facility: HOSPITAL | Age: 83
DRG: 267 | End: 2018-04-17
Attending: THORACIC SURGERY (CARDIOTHORACIC VASCULAR SURGERY)
Payer: MEDICARE

## 2018-04-17 ENCOUNTER — APPOINTMENT (INPATIENT)
Dept: RADIOLOGY | Facility: HOSPITAL | Age: 83
DRG: 267 | End: 2018-04-17
Payer: MEDICARE

## 2018-04-17 ENCOUNTER — ANESTHESIA (OUTPATIENT)
Dept: PERIOP | Facility: HOSPITAL | Age: 83
DRG: 267 | End: 2018-04-17
Payer: MEDICARE

## 2018-04-17 DIAGNOSIS — R33.9 URINARY RETENTION: ICD-10-CM

## 2018-04-17 DIAGNOSIS — I35.0 AORTIC STENOSIS, SEVERE: ICD-10-CM

## 2018-04-17 DIAGNOSIS — I35.0 SEVERE AORTIC STENOSIS: Primary | ICD-10-CM

## 2018-04-17 DIAGNOSIS — Z95.2 S/P TAVR (TRANSCATHETER AORTIC VALVE REPLACEMENT): Primary | ICD-10-CM

## 2018-04-17 PROBLEM — R06.89 RESPIRATORY INSUFFICIENCY: Status: ACTIVE | Noted: 2018-04-17

## 2018-04-17 LAB
ANION GAP SERPL CALCULATED.3IONS-SCNC: 9 MMOL/L (ref 4–13)
BASE EXCESS BLDA CALC-SCNC: -3 MMOL/L (ref -2–3)
BASE EXCESS BLDA CALC-SCNC: -5.2 MMOL/L
BASE EXCESS BLDA CALC-SCNC: -7 MMOL/L (ref -2–3)
BASE EXCESS BLDA CALC-SCNC: 0 MMOL/L (ref -2–3)
BUN SERPL-MCNC: 14 MG/DL (ref 5–25)
CA-I BLD-SCNC: 1.11 MMOL/L (ref 1.12–1.32)
CA-I BLD-SCNC: 1.12 MMOL/L (ref 1.12–1.32)
CA-I BLD-SCNC: 1.15 MMOL/L (ref 1.12–1.32)
CALCIUM SERPL-MCNC: 7.9 MG/DL (ref 8.3–10.1)
CHLORIDE SERPL-SCNC: 107 MMOL/L (ref 100–108)
CO2 SERPL-SCNC: 23 MMOL/L (ref 21–32)
CREAT SERPL-MCNC: 0.75 MG/DL (ref 0.6–1.3)
GFR SERPL CREATININE-BSD FRML MDRD: 74 ML/MIN/1.73SQ M
GLUCOSE SERPL-MCNC: 160 MG/DL (ref 65–140)
GLUCOSE SERPL-MCNC: 161 MG/DL (ref 65–140)
GLUCOSE SERPL-MCNC: 188 MG/DL (ref 65–140)
GLUCOSE SERPL-MCNC: 235 MG/DL (ref 65–140)
GLUCOSE SERPL-MCNC: 237 MG/DL (ref 65–140)
GLUCOSE SERPL-MCNC: 262 MG/DL (ref 65–140)
GLUCOSE SERPL-MCNC: 263 MG/DL (ref 65–140)
GLUCOSE SERPL-MCNC: 268 MG/DL (ref 65–140)
HCO3 BLDA-SCNC: 18.6 MMOL/L (ref 22–28)
HCO3 BLDA-SCNC: 20.8 MMOL/L (ref 22–28)
HCO3 BLDA-SCNC: 21.3 MMOL/L (ref 22–28)
HCO3 BLDA-SCNC: 25 MMOL/L (ref 22–28)
HCT VFR BLD AUTO: 35.4 % (ref 34.8–46.1)
HCT VFR BLD AUTO: 36.5 % (ref 34.8–46.1)
HCT VFR BLD CALC: 31 % (ref 34.8–46.1)
HCT VFR BLD CALC: 35 % (ref 34.8–46.1)
HCT VFR BLD CALC: 37 % (ref 34.8–46.1)
HGB BLD-MCNC: 11.9 G/DL (ref 11.5–15.4)
HGB BLD-MCNC: 12.2 G/DL (ref 11.5–15.4)
HGB BLDA-MCNC: 10.5 G/DL (ref 11.5–15.4)
HGB BLDA-MCNC: 11.9 G/DL (ref 11.5–15.4)
HGB BLDA-MCNC: 12.6 G/DL (ref 11.5–15.4)
KCT BLD-ACNC: 132 SEC (ref 89–137)
KCT BLD-ACNC: 143 SEC (ref 89–137)
KCT BLD-ACNC: 491 SEC (ref 89–137)
O2 CT BLDA-SCNC: 18.5 ML/DL (ref 16–23)
OXYHGB MFR BLDA: 97.2 % (ref 94–97)
PCO2 BLD: 20 MMOL/L (ref 21–32)
PCO2 BLD: 22 MMOL/L (ref 21–32)
PCO2 BLD: 26 MMOL/L (ref 21–32)
PCO2 BLD: 34.7 MM HG (ref 36–44)
PCO2 BLD: 36.2 MM HG (ref 36–44)
PCO2 BLD: 39.7 MM HG (ref 36–44)
PCO2 BLDA: 41.9 MM HG (ref 36–44)
PH BLD: 7.32 [PH] (ref 7.35–7.45)
PH BLD: 7.4 [PH] (ref 7.35–7.45)
PH BLD: 7.41 [PH] (ref 7.35–7.45)
PH BLDA: 7.31 [PH] (ref 7.35–7.45)
PLATELET # BLD AUTO: 224 THOUSANDS/UL (ref 149–390)
PMV BLD AUTO: 10.7 FL (ref 8.9–12.7)
PO2 BLD: 213 MM HG (ref 75–129)
PO2 BLD: 68 MM HG (ref 75–129)
PO2 BLD: 80 MM HG (ref 75–129)
PO2 BLDA: 126.6 MM HG (ref 75–129)
POTASSIUM BLD-SCNC: 2.5 MMOL/L (ref 3.5–5.3)
POTASSIUM BLD-SCNC: 3.7 MMOL/L (ref 3.5–5.3)
POTASSIUM BLD-SCNC: 3.8 MMOL/L (ref 3.5–5.3)
POTASSIUM SERPL-SCNC: 3.9 MMOL/L (ref 3.5–5.3)
POTASSIUM SERPL-SCNC: 4.2 MMOL/L (ref 3.5–5.3)
POTASSIUM SERPL-SCNC: 4.4 MMOL/L (ref 3.5–5.3)
PS CM H2O: 5
PS VENT FIO2: 50
PS VENT PEEP: 5
SAO2 % BLD FROM PO2: 100 % (ref 95–98)
SAO2 % BLD FROM PO2: 92 % (ref 95–98)
SAO2 % BLD FROM PO2: 96 % (ref 95–98)
SODIUM BLD-SCNC: 136 MMOL/L (ref 136–145)
SODIUM BLD-SCNC: 137 MMOL/L (ref 136–145)
SODIUM BLD-SCNC: 137 MMOL/L (ref 136–145)
SODIUM SERPL-SCNC: 139 MMOL/L (ref 136–145)
SPECIMEN SOURCE: ABNORMAL
SPECIMEN SOURCE: NORMAL
VENT - PS: ABNORMAL

## 2018-04-17 PROCEDURE — C1769 GUIDE WIRE: HCPCS | Performed by: THORACIC SURGERY (CARDIOTHORACIC VASCULAR SURGERY)

## 2018-04-17 PROCEDURE — 33361 REPLACE AORTIC VALVE PERQ: CPT | Performed by: INTERNAL MEDICINE

## 2018-04-17 PROCEDURE — 71045 X-RAY EXAM CHEST 1 VIEW: CPT

## 2018-04-17 PROCEDURE — 82947 ASSAY GLUCOSE BLOOD QUANT: CPT

## 2018-04-17 PROCEDURE — 93005 ELECTROCARDIOGRAM TRACING: CPT | Performed by: PHYSICIAN ASSISTANT

## 2018-04-17 PROCEDURE — 99222 1ST HOSP IP/OBS MODERATE 55: CPT | Performed by: INTERNAL MEDICINE

## 2018-04-17 PROCEDURE — 82805 BLOOD GASES W/O2 SATURATION: CPT | Performed by: THORACIC SURGERY (CARDIOTHORACIC VASCULAR SURGERY)

## 2018-04-17 PROCEDURE — 94002 VENT MGMT INPAT INIT DAY: CPT

## 2018-04-17 PROCEDURE — C1760 CLOSURE DEV, VASC: HCPCS | Performed by: THORACIC SURGERY (CARDIOTHORACIC VASCULAR SURGERY)

## 2018-04-17 PROCEDURE — 85049 AUTOMATED PLATELET COUNT: CPT | Performed by: PHYSICIAN ASSISTANT

## 2018-04-17 PROCEDURE — 85014 HEMATOCRIT: CPT

## 2018-04-17 PROCEDURE — C1894 INTRO/SHEATH, NON-LASER: HCPCS | Performed by: THORACIC SURGERY (CARDIOTHORACIC VASCULAR SURGERY)

## 2018-04-17 PROCEDURE — 85347 COAGULATION TIME ACTIVATED: CPT

## 2018-04-17 PROCEDURE — 02RF38Z REPLACEMENT OF AORTIC VALVE WITH ZOOPLASTIC TISSUE, PERCUTANEOUS APPROACH: ICD-10-PCS | Performed by: THORACIC SURGERY (CARDIOTHORACIC VASCULAR SURGERY)

## 2018-04-17 PROCEDURE — 86920 COMPATIBILITY TEST SPIN: CPT

## 2018-04-17 PROCEDURE — 82330 ASSAY OF CALCIUM: CPT

## 2018-04-17 PROCEDURE — 82948 REAGENT STRIP/BLOOD GLUCOSE: CPT

## 2018-04-17 PROCEDURE — 84132 ASSAY OF SERUM POTASSIUM: CPT | Performed by: PHYSICIAN ASSISTANT

## 2018-04-17 PROCEDURE — 76377 3D RENDER W/INTRP POSTPROCES: CPT

## 2018-04-17 PROCEDURE — 84295 ASSAY OF SERUM SODIUM: CPT

## 2018-04-17 PROCEDURE — 84132 ASSAY OF SERUM POTASSIUM: CPT

## 2018-04-17 PROCEDURE — 93312 ECHO TRANSESOPHAGEAL: CPT

## 2018-04-17 PROCEDURE — 80048 BASIC METABOLIC PNL TOTAL CA: CPT | Performed by: PHYSICIAN ASSISTANT

## 2018-04-17 PROCEDURE — 02H633Z INSERTION OF INFUSION DEVICE INTO RIGHT ATRIUM, PERCUTANEOUS APPROACH: ICD-10-PCS | Performed by: THORACIC SURGERY (CARDIOTHORACIC VASCULAR SURGERY)

## 2018-04-17 PROCEDURE — 82803 BLOOD GASES ANY COMBINATION: CPT

## 2018-04-17 PROCEDURE — 85018 HEMOGLOBIN: CPT | Performed by: PHYSICIAN ASSISTANT

## 2018-04-17 PROCEDURE — C1751 CATH, INF, PER/CENT/MIDLINE: HCPCS | Performed by: THORACIC SURGERY (CARDIOTHORACIC VASCULAR SURGERY)

## 2018-04-17 PROCEDURE — 33361 REPLACE AORTIC VALVE PERQ: CPT | Performed by: THORACIC SURGERY (CARDIOTHORACIC VASCULAR SURGERY)

## 2018-04-17 PROCEDURE — 94760 N-INVAS EAR/PLS OXIMETRY 1: CPT

## 2018-04-17 PROCEDURE — 85014 HEMATOCRIT: CPT | Performed by: PHYSICIAN ASSISTANT

## 2018-04-17 DEVICE — TAVR SAPIEN 3 CMNDR DLV SYS 23MM: Type: IMPLANTABLE DEVICE | Site: HEART | Status: FUNCTIONAL

## 2018-04-17 DEVICE — PERCLOSE PROGLIDE™ SUTURE-MEDIATED CLOSURE SYSTEM
Type: IMPLANTABLE DEVICE | Site: GROIN | Status: FUNCTIONAL
Brand: PERCLOSE PROGLIDE™

## 2018-04-17 RX ORDER — BISACODYL 10 MG
10 SUPPOSITORY, RECTAL RECTAL DAILY PRN
Status: DISCONTINUED | OUTPATIENT
Start: 2018-04-17 | End: 2018-04-19 | Stop reason: HOSPADM

## 2018-04-17 RX ORDER — ACETAMINOPHEN 650 MG/1
650 SUPPOSITORY RECTAL EVERY 4 HOURS PRN
Status: DISCONTINUED | OUTPATIENT
Start: 2018-04-17 | End: 2018-04-18

## 2018-04-17 RX ORDER — HEPARIN SODIUM 5000 [USP'U]/ML
5000 INJECTION, SOLUTION INTRAVENOUS; SUBCUTANEOUS EVERY 8 HOURS SCHEDULED
Status: DISCONTINUED | OUTPATIENT
Start: 2018-04-18 | End: 2018-04-17

## 2018-04-17 RX ORDER — ROCURONIUM BROMIDE 10 MG/ML
INJECTION, SOLUTION INTRAVENOUS AS NEEDED
Status: DISCONTINUED | OUTPATIENT
Start: 2018-04-17 | End: 2018-04-17 | Stop reason: SURG

## 2018-04-17 RX ORDER — FENTANYL CITRATE 50 UG/ML
INJECTION, SOLUTION INTRAMUSCULAR; INTRAVENOUS AS NEEDED
Status: DISCONTINUED | OUTPATIENT
Start: 2018-04-17 | End: 2018-04-17 | Stop reason: SURG

## 2018-04-17 RX ORDER — ACETAMINOPHEN 325 MG/1
650 TABLET ORAL EVERY 4 HOURS PRN
Status: DISCONTINUED | OUTPATIENT
Start: 2018-04-17 | End: 2018-04-19 | Stop reason: HOSPADM

## 2018-04-17 RX ORDER — POLYETHYLENE GLYCOL 3350 17 G/17G
17 POWDER, FOR SOLUTION ORAL DAILY
Status: DISCONTINUED | OUTPATIENT
Start: 2018-04-18 | End: 2018-04-19 | Stop reason: HOSPADM

## 2018-04-17 RX ORDER — MAGNESIUM HYDROXIDE 1200 MG/15ML
LIQUID ORAL AS NEEDED
Status: DISCONTINUED | OUTPATIENT
Start: 2018-04-17 | End: 2018-04-17 | Stop reason: HOSPADM

## 2018-04-17 RX ORDER — PROPOFOL 10 MG/ML
INJECTION, EMULSION INTRAVENOUS AS NEEDED
Status: DISCONTINUED | OUTPATIENT
Start: 2018-04-17 | End: 2018-04-17 | Stop reason: SURG

## 2018-04-17 RX ORDER — MINERAL OIL AND PETROLATUM 150; 830 MG/G; MG/G
OINTMENT OPHTHALMIC
Status: DISCONTINUED | OUTPATIENT
Start: 2018-04-17 | End: 2018-04-19 | Stop reason: HOSPADM

## 2018-04-17 RX ORDER — CALCIUM CHLORIDE 100 MG/ML
1 INJECTION INTRAVENOUS; INTRAVENTRICULAR ONCE
Status: DISCONTINUED | OUTPATIENT
Start: 2018-04-17 | End: 2018-04-18

## 2018-04-17 RX ORDER — SODIUM CHLORIDE, SODIUM LACTATE, POTASSIUM CHLORIDE, CALCIUM CHLORIDE 600; 310; 30; 20 MG/100ML; MG/100ML; MG/100ML; MG/100ML
INJECTION, SOLUTION INTRAVENOUS CONTINUOUS PRN
Status: DISCONTINUED | OUTPATIENT
Start: 2018-04-17 | End: 2018-04-17 | Stop reason: SURG

## 2018-04-17 RX ORDER — FENTANYL CITRATE 50 UG/ML
50 INJECTION, SOLUTION INTRAMUSCULAR; INTRAVENOUS
Status: DISCONTINUED | OUTPATIENT
Start: 2018-04-17 | End: 2018-04-18

## 2018-04-17 RX ORDER — HEPARIN SODIUM 1000 [USP'U]/ML
INJECTION, SOLUTION INTRAVENOUS; SUBCUTANEOUS AS NEEDED
Status: DISCONTINUED | OUTPATIENT
Start: 2018-04-17 | End: 2018-04-17 | Stop reason: SURG

## 2018-04-17 RX ORDER — GLYCOPYRROLATE 0.2 MG/ML
INJECTION INTRAMUSCULAR; INTRAVENOUS AS NEEDED
Status: DISCONTINUED | OUTPATIENT
Start: 2018-04-17 | End: 2018-04-17 | Stop reason: SURG

## 2018-04-17 RX ORDER — POTASSIUM CHLORIDE 14.9 MG/ML
20 INJECTION INTRAVENOUS
Status: DISCONTINUED | OUTPATIENT
Start: 2018-04-17 | End: 2018-04-18

## 2018-04-17 RX ORDER — FENTANYL CITRATE 50 UG/ML
50 INJECTION, SOLUTION INTRAMUSCULAR; INTRAVENOUS ONCE
Status: DISCONTINUED | OUTPATIENT
Start: 2018-04-17 | End: 2018-04-18

## 2018-04-17 RX ORDER — PANTOPRAZOLE SODIUM 40 MG/1
40 TABLET, DELAYED RELEASE ORAL DAILY
Status: DISCONTINUED | OUTPATIENT
Start: 2018-04-17 | End: 2018-04-19 | Stop reason: HOSPADM

## 2018-04-17 RX ORDER — BRIMONIDINE TARTRATE, TIMOLOL MALEATE 2; 5 MG/ML; MG/ML
1 SOLUTION/ DROPS OPHTHALMIC 2 TIMES DAILY
Status: DISCONTINUED | OUTPATIENT
Start: 2018-04-17 | End: 2018-04-19 | Stop reason: HOSPADM

## 2018-04-17 RX ORDER — SODIUM CHLORIDE, SODIUM GLUCONATE, SODIUM ACETATE, POTASSIUM CHLORIDE, MAGNESIUM CHLORIDE, SODIUM PHOSPHATE, DIBASIC, AND POTASSIUM PHOSPHATE .53; .5; .37; .037; .03; .012; .00082 G/100ML; G/100ML; G/100ML; G/100ML; G/100ML; G/100ML; G/100ML
50 INJECTION, SOLUTION INTRAVENOUS CONTINUOUS
Status: DISCONTINUED | OUTPATIENT
Start: 2018-04-17 | End: 2018-04-18

## 2018-04-17 RX ORDER — PRAVASTATIN SODIUM 80 MG/1
80 TABLET ORAL
Status: DISCONTINUED | OUTPATIENT
Start: 2018-04-17 | End: 2018-04-19 | Stop reason: HOSPADM

## 2018-04-17 RX ORDER — POTASSIUM CHLORIDE 14.9 MG/ML
20 INJECTION INTRAVENOUS ONCE AS NEEDED
Status: DISCONTINUED | OUTPATIENT
Start: 2018-04-17 | End: 2018-04-18

## 2018-04-17 RX ORDER — ONDANSETRON 2 MG/ML
4 INJECTION INTRAMUSCULAR; INTRAVENOUS EVERY 6 HOURS PRN
Status: DISCONTINUED | OUTPATIENT
Start: 2018-04-17 | End: 2018-04-19 | Stop reason: HOSPADM

## 2018-04-17 RX ORDER — CHLORHEXIDINE GLUCONATE 0.12 MG/ML
15 RINSE ORAL 2 TIMES DAILY
Status: DISCONTINUED | OUTPATIENT
Start: 2018-04-17 | End: 2018-04-18

## 2018-04-17 RX ORDER — DORZOLAMIDE HCL 20 MG/ML
1 SOLUTION/ DROPS OPHTHALMIC 3 TIMES DAILY
Status: DISCONTINUED | OUTPATIENT
Start: 2018-04-17 | End: 2018-04-18

## 2018-04-17 RX ORDER — PROTAMINE SULFATE 10 MG/ML
INJECTION, SOLUTION INTRAVENOUS AS NEEDED
Status: DISCONTINUED | OUTPATIENT
Start: 2018-04-17 | End: 2018-04-17 | Stop reason: SURG

## 2018-04-17 RX ORDER — POTASSIUM CHLORIDE 14.9 MG/ML
INJECTION INTRAVENOUS CONTINUOUS PRN
Status: DISCONTINUED | OUTPATIENT
Start: 2018-04-17 | End: 2018-04-17 | Stop reason: SURG

## 2018-04-17 RX ORDER — CHLORHEXIDINE GLUCONATE 0.12 MG/ML
15 RINSE ORAL ONCE
Status: COMPLETED | OUTPATIENT
Start: 2018-04-17 | End: 2018-04-17

## 2018-04-17 RX ADMIN — HEPARIN SODIUM 15000 UNITS: 1000 INJECTION INTRAVENOUS; SUBCUTANEOUS at 10:25

## 2018-04-17 RX ADMIN — PROTAMINE SULFATE 100 MG: 10 INJECTION, SOLUTION INTRAVENOUS at 10:42

## 2018-04-17 RX ADMIN — NEOSTIGMINE METHYLSULFATE 3 MG: 1 INJECTION, SOLUTION INTRAMUSCULAR; INTRAVENOUS; SUBCUTANEOUS at 10:45

## 2018-04-17 RX ADMIN — PROPOFOL 150 MG: 10 INJECTION, EMULSION INTRAVENOUS at 09:35

## 2018-04-17 RX ADMIN — PRAVASTATIN SODIUM 80 MG: 80 TABLET ORAL at 17:13

## 2018-04-17 RX ADMIN — DORZOLAMIDE HYDROCHLORIDE 1 DROP: 20 SOLUTION/ DROPS OPHTHALMIC at 17:13

## 2018-04-17 RX ADMIN — ROCURONIUM BROMIDE 60 MG: 10 INJECTION INTRAVENOUS at 09:35

## 2018-04-17 RX ADMIN — GLYCOPYRROLATE 0.6 MG: 0.2 INJECTION, SOLUTION INTRAMUSCULAR; INTRAVENOUS at 10:45

## 2018-04-17 RX ADMIN — CEFAZOLIN SODIUM 2000 MG: 2 SOLUTION INTRAVENOUS at 17:14

## 2018-04-17 RX ADMIN — IOHEXOL 46 ML: 350 INJECTION, SOLUTION INTRAVENOUS at 11:00

## 2018-04-17 RX ADMIN — DORZOLAMIDE HYDROCHLORIDE 1 DROP: 20 SOLUTION/ DROPS OPHTHALMIC at 22:52

## 2018-04-17 RX ADMIN — POTASSIUM CHLORIDE: 200 INJECTION, SOLUTION INTRAVENOUS at 10:28

## 2018-04-17 RX ADMIN — MUPIROCIN 1 APPLICATION: 20 OINTMENT TOPICAL at 22:50

## 2018-04-17 RX ADMIN — CHLORHEXIDINE GLUCONATE 15 ML: 1.2 RINSE ORAL at 17:13

## 2018-04-17 RX ADMIN — SODIUM CHLORIDE 10 MG/HR: 0.9 INJECTION, SOLUTION INTRAVENOUS at 13:54

## 2018-04-17 RX ADMIN — FENTANYL CITRATE 50 MCG: 50 INJECTION, SOLUTION INTRAMUSCULAR; INTRAVENOUS at 09:57

## 2018-04-17 RX ADMIN — SODIUM CHLORIDE 7 MG/HR: 0.9 INJECTION, SOLUTION INTRAVENOUS at 17:14

## 2018-04-17 RX ADMIN — SODIUM CHLORIDE, SODIUM LACTATE, POTASSIUM CHLORIDE, AND CALCIUM CHLORIDE: .6; .31; .03; .02 INJECTION, SOLUTION INTRAVENOUS at 09:10

## 2018-04-17 RX ADMIN — BIMATOPROST 1 DROP: 0.1 SOLUTION/ DROPS OPHTHALMIC at 22:51

## 2018-04-17 RX ADMIN — INSULIN HUMAN 10 UNITS: 100 INJECTION, SOLUTION PARENTERAL at 10:11

## 2018-04-17 RX ADMIN — MUPIROCIN 1 APPLICATION: 20 OINTMENT TOPICAL at 08:08

## 2018-04-17 RX ADMIN — CHLORHEXIDINE GLUCONATE 15 ML: 1.2 RINSE ORAL at 08:09

## 2018-04-17 RX ADMIN — CEFAZOLIN SODIUM 2000 MG: 2 SOLUTION INTRAVENOUS at 09:50

## 2018-04-17 RX ADMIN — Medication 2 UNITS/HR: at 10:11

## 2018-04-17 RX ADMIN — METOPROLOL TARTRATE 12.5 MG: 25 TABLET ORAL at 08:08

## 2018-04-17 NOTE — ANESTHESIA PROCEDURE NOTES
Arterial Line Insertion  Date/Time: 4/17/2018 9:31 AM  Performed by: Reinaldo Kaur by: Miriam Ventura   Consent: Written consent obtained  Risks and benefits: risks, benefits and alternatives were discussed  Consent given by: patient  Patient understanding: patient states understanding of the procedure being performed  Patient consent: the patient's understanding of the procedure matches consent given  Procedure consent: procedure consent matches procedure scheduled  Required items: required blood products, implants, devices, and special equipment available  Patient identity confirmed: arm band  Time out: Immediately prior to procedure a "time out" was called to verify the correct patient, procedure, equipment, support staff and site/side marked as required  Preparation: Patient was prepped and draped in the usual sterile fashion    Indications: multiple ABGs and hemodynamic monitoring  Orientation:  LeftLocation: radial artery  Anesthesia: local infiltration    Sedation:  Patient sedated: no  Needle gauge: 20  Number of attempts: 1  Post-procedure: dressing applied  Post-procedure CNS: normal  Patient tolerance: Patient tolerated the procedure well with no immediate complications  Comments: Procedure start 0928  Procedure end

## 2018-04-17 NOTE — CONSULTS
129 Amita Schreiber 80 y o  female MRN: [id number]  Unit/Bed#: [facility] 415-01 Encounter: [id number]      Physician Requesting Consult: Kandace Prader, DO    Reason for Consult / Principal Problem: Nonrheumatic aortic valve stenosis    HPI: John Guajardo is a 80 y o  female with a recent hospitalization in March for acute on chronic diastolic heart failure during which a repeat echocardiogram showed worsening of her aortic stenosis with an EF of 64%, grade 2 diastolic dysfunction and aortic valve area of 0 6 centimeter squared  The patient presented today for elective LF TAVR by Dr Rey Coad  Per Anesthesia, patient's peak BP was labile during case requiring intermittent phenylephrine and Cardene  Patient arrived slightly hypotensive requiring IV push of phenylephrine in addition to initiation of phenylephrine infusion  History obtained from chart review due to patient being intubated and sedated       PMH:   Past Medical History:   Diagnosis Date    Ambulatory dysfunction     CHF (congestive heart failure) ([facility])     pEFHF    Cholelithiasis     Coronary artery disease     Depression with anxiety     Diabetes mellitus ([address] )     niddm    Dysfunction of eustachian tube     Glaucoma     Hematuria     Hypercholesterolemia     Hypertension     Hyponatremia     Ischemic stroke of frontal lobe ([facility])     Right     Obstructive sleep apnea     OP (osteoporosis)     PAF (paroxysmal atrial fibrillation) ([facility])     Urinary frequency     Vertigo     Viral gastroenteritis     Viral gastroenteritis     Viral infection        PSH:   Past Surgical History:   Procedure Laterality Date    APPENDECTOMY      GLAUCOMA SURGERY  01/03/2016    TUBAL LIGATION         Family History:   Family History   Problem Relation Age of Onset    No Known Problems Mother     Diabetes Father     Stroke Father     Coronary artery disease Sister     No Known Problems Brother     No Known Problems Son     Breast cancer Daughter     No Known Problems Maternal Grandmother     No Known Problems Maternal Grandfather     No Known Problems Paternal Grandmother     No Known Problems Paternal Grandfather     No Known Problems Cousin     Rheum arthritis Neg Hx     Osteoarthritis Neg Hx     Asthma Neg Hx     Heart failure Neg Hx     Hyperlipidemia Neg Hx     Hypertension Neg Hx     Migraines Neg Hx     Rashes / Skin problems Neg Hx     Seizures Neg Hx     Thyroid disease Neg Hx        Social History:   History   Smoking Status    Never Smoker   Smokeless Tobacco    Never Used      History   Alcohol Use No      Marital Status:     ROS: ROS unable to be obtained secondary to intubation and sedation  Allergies: Allergies   Allergen Reactions    Lipitor [Atorvastatin] GI Intolerance     Nausea stomach ache    Hydrochlorothiazide GI Intolerance    Aspirin GI Intolerance       Home Medications:   Prior to Admission medications    Medication Sig Start Date End Date Taking? Authorizing Provider   Acetaminophen (TYLENOL) 325 MG CAPS Take by mouth as needed   Yes Historical Provider, MD   Bimatoprost (LUMIGAN OP) Apply to eye daily at bedtime   Yes Historical Provider, MD   COMBIGAN 0 2-0 5 % instill 1 drop into both eyes twice a day 2/8/18  Yes Historical Provider, MD   DORZOLAMIDE HCL OP Apply to eye 3 (three) times a day   Yes Historical Provider, MD   Hypromellose (ARTIFICIAL TEARS OP) Apply to eye   Yes Historical Provider, MD   mupirocin (BACTROBAN) 2 % ointment Apply 1 application topically 2 (two) times a day for 5 days Apply to each nostril twice daily for five days before your operation   4/12/18 4/17/18 Yes ROBBI Pineda   ONE TOUCH ULTRA TEST test strip TEST twice a day 2/19/18  Yes Eris Rutledge MD   [facility] LANCETS 81G MISC CHECK BLOOD SUGAR TWICE DAILY 2/19/18  Yes Eris Rutledge MD   potassium chloride (K-DUR,KLOR-CON) 10 mEq tablet Take 1 tablet (10 mEq total) by mouth daily for 30 days 3/26/18 4/25/18 Yes Kike Florian MD   simvastatin (ZOCOR) 80 mg tablet take 1 tablet by mouth once daily 3/10/18  Yes Kike Florian MD   furosemide (LASIX) 20 mg tablet Take 20 mg by mouth daily    Historical Provider, MD   glipiZIDE (GLUCOTROL XL) 10 mg 24 hr tablet Take 10 mg by mouth 2 (two) times a day 3/30/18   Historical Provider, MD   lisinopril (ZESTRIL) 10 mg tablet Take 1 tablet (10 mg total) by mouth 2 (two) times a day 4/6/18   ROBBI Arango   metFORMIN (GLUCOPHAGE) 500 mg tablet Take 1 tablet (500 mg total) by mouth daily with breakfast for 90 days  Patient taking differently: Take 1,000 mg by mouth daily with breakfast   3/1/18 5/30/18  Kike Florian MD   rivaroxaban (XARELTO) 20 mg tablet Take 1 tablet by mouth daily with dinner  Patient taking differently: Take 20 mg by mouth daily with breakfast   9/14/17   Jud Rivas MD       Inpatient Medications:  Scheduled Meds:    Current Facility-Administered Medications:  acetaminophen 650 mg Rectal Q4H PRN Carl Paul PA-C    acetaminophen 650 mg Oral Q4H PRN Carl Paul PA-C    artificial tear  Both Eyes HS PRN Carl Paul PA-C    bimatoprost 1 drop Both Eyes HS Carl Paul PA-C    bisacodyl 10 mg Rectal Daily PRN Carl Paul PA-C    brimonidine-timolol 1 drop Both Eyes BID Carl Paul PA-C    calcium chloride 1 g Intravenous Once Shailesh Zambrano PA-C    cefazolin 2,000 mg Intravenous Q8H Carl Paul PA-C    chlorhexidine 15 mL Swish & Spit BID Carl Paul PA-C    dorzolamide 1 drop Both Eyes TID Carl Paul PA-C    fentanyl citrate (PF) 50 mcg Intravenous Once Shailesh Zambrano PA-C    fentanyl citrate (PF) 50 mcg Intravenous Q1H PRN Shailesh Zambrano PA-C    [START ON 4/18/2018] heparin (porcine) 5,000 Units Subcutaneous Q8H [facility] Carl Paul PA-C    insulin lispro 1-5 Units Subcutaneous TID AC Carl Paul PA-C    insulin lispro 1-5 Units Subcutaneous HS Lita Carroll PA-C    multi-electrolyte 50 mL/hr Intravenous Continuous Lita Carroll PA-C Last Rate: 50 mL/hr (04/17/18 1115)   mupirocin 1 application Nasal M64R [facility] Carl Paul PA-C    niCARdipine 2 5-15 mg/hr Intravenous Titrated Lita Carroll PA-C Last Rate: 9 mg/hr (04/17/18 1115)   ondansetron 4 mg Intravenous Q6H PRN Carl Paul PA-C    pantoprazole 40 mg Oral Daily Carl Paul PA-C    [START ON 4/18/2018] polyethylene glycol 17 g Oral Daily Carl Paul PA-C    potassium chloride 20 mEq Intravenous Once PRN Carl Paul PA-C    potassium chloride 20 mEq Intravenous Q1H PRN Carl Paul PA-C    potassium chloride 20 mEq Intravenous Q30 Min PRN Carl Paul PA-C    pravastatin 80 mg Oral Daily With ALLA Henson      Continuous Infusions:    multi-electrolyte 50 mL/hr Last Rate: 50 mL/hr (04/17/18 1115)   niCARdipine 2 5-15 mg/hr Last Rate: 9 mg/hr (04/17/18 1115)     PRN Meds:    acetaminophen 650 mg Q4H PRN   acetaminophen 650 mg Q4H PRN   artificial tear  HS PRN   bisacodyl 10 mg Daily PRN   fentanyl citrate (PF) 50 mcg Q1H PRN   ondansetron 4 mg Q6H PRN   potassium chloride 20 mEq Once PRN   potassium chloride 20 mEq Q1H PRN   potassium chloride 20 mEq Q30 Min PRN       VTE Pharmacologic Prophylaxis: Heparin  VTE Mechanical Prophylaxis: sequential compression device    Invasive lines and devices: Invasive Devices     Central Venous Catheter Line            CVC Central Lines 04/17/18 Triple less than 1 day          Peripheral Intravenous Line            Peripheral IV 04/17/18 Left Wrist less than 1 day    Peripheral IV 04/17/18 Right Wrist less than 1 day          Arterial Line            Arterial Line 04/17/18 Left Radial less than 1 day          Drain            Urethral Catheter Non-latex; Temperature probe 16 Fr  less than 1 day                Vitals:   Vitals:    04/17/18 1145 04/17/18 1200 04/17/18 1215 04/17/18 1230   BP: Pulse: 60 58 62 70   Resp: 18 16 14 (!) 27   Temp: (!) 97 2 °F (36 2 °C) (!) 97 2 °F (36 2 °C) (!) 97 2 °F (36 2 °C) (!) 97 2 °F (36 2 °C)   TempSrc:       SpO2: 99% 99% 99% 100%   Weight:       Height:         Arterial Line BP: 136/54  Arterial Line MAP (mmHg): 84 mmHg    Temperature: Temp (24hrs), Av 6 °F (36 4 °C), Min:97 2 °F (36 2 °C), Max:99 5 °F (37 5 °C)  Current: Temperature: (!) 97 2 °F (36 2 °C)    Weights: IBW: 40 9 kg  Body mass index is 33 44 kg/m²  Respiratory/Ventilator Settings:  Respiratory    Lab Data (Last 4 hours)       1149            pH, Arterial       (!)7 313           pCO2, Arterial       41 9           pO2, Arterial       126 6           HCO3, Arterial       (!)20 8           Base Excess, Arterial       -5 2                O2/Vent Data        1126   Most Recent         Vent Mode CPAP/PS Spont  CPAP/PS Spont      Resp Rate (BPM) (BPM)   12      Vt (mL) (mL)   375      FIO2 (%) (%)   50      PEEP (cmH2O) (cmH2O)   5      MV   4      FIO2 (%) (%) 50  50      PEEP (cmH2O) (cmH2O) 5  5      Pressure Support (cmH2O) (cmH20) 5  5      MV (Obs) 6 5  6 5                    Physical Exam:    Constitutional: Elderly appearing female  HENT: Atraumatic  Intubated  Neck: Neck supple  Patient is intubated  Cardiovascular:  Regular rate and rhythm, positive systolic ejection murmur  Pulmonary/Chest: Breath sounds CTA bilaterally  No wheezes or rales  Abdominal: Soft  No distension  B/L groins without significant hematoma  Musculoskeletal:  No edema  Neurological: Patient is sedated, opens eyes to voice, follows 2 step commands  Skin: Skin is warm and dry  - bilateral groin sites clean dry intact with no hematoma on palpation  Psychiatric: Unable to assess as patient is sedated and intubated     PV:  DP pulses 2+ bilateral lower extremities    Labs:     Results from last 7 days  Lab Units 18  1114   HEMOGLOBIN g/dL 11 9   HEMATOCRIT % 35 4   PLATELETS Thousands/uL 224       Results from last 7 days  Lab Units 18  1114 18  1503   SODIUM mmol/L 139 130*   POTASSIUM mmol/L 4 4 4 1   CHLORIDE mmol/L 107 98*   CO2 mmol/L 23 28   BUN mg/dL 14 20   CREATININE mg/dL 0 75 0 73   CALCIUM mg/dL 7 9* 9 4   GLUCOSE RANDOM mg/dL 161* 87       AB 31/41/126/20  CXR:  Small tidal volume, poor inspiratory effort, no pneumothorax by my read, ET tube and central line in appropriate position I have personally reviewed pertinent films in PACS  EKG: This was personally reviewed by myself  SR at 63 bpm, right axis, IVCD, TWI V6    Impression:  Principal Problem:    Nonrheumatic aortic valve stenosis  Active Problems:    Chronic diastolic heart failure (HCC)    Type 2 diabetes mellitus with hyperglycemia (HCC)    S/P TAVR (transcatheter aortic valve replacement)    Obesity BMI 33    History of PAF- currently NSR    Respiratory insufficiency, post operative      Plan:    Neuro: D/C sedation  PRN tylenol for pain  CV: MAP goal >65  SBP goal <130  CI>2 2  Post-op medications: Neosynephrine, 40 mcg/min  Wean Brandon  as able  Volume resuscitation as needed  Isolyte 50 mL/hour  Monitor rhythm on telemetry  Intra-op KATHLEEN EF 60%    Lung: Check STAT post-op ABG and CXR  Wean vent to extubate  GI: GI prophylaxis  Bowel regimen  FEN: NPO  Replete K >4 0 and Mag >2 0  : Andino  Monitor UOP with goal >40/hour  Check BMP  Lasix if needed  ID: Prophylactic post-op abx  Maintain normothermia  Trend WBC and temps  Heme: Check STAT post-op H/H and platelets  Monitor incision site and  invasive lines for bleeding  Endo: Insulin gtt ->SSI    Peripheral Vascular: Monitor distal pulses Q 1 hour       Disposition: ICU Care    Counseling / Coordination of Care  0 mins critical care time    SIGNATURE: ROBBI Johnson  DATE: 2018  TIME: 12:40 PM

## 2018-04-17 NOTE — ANESTHESIA PROCEDURE NOTES
Procedure Performed: KATHLEEN Anesthesia  Start Time:  4/17/2018 9:55 AM  End Time:   4/17/2018 10:43 AM      Preanesthesia Checklist    Patient identified, IV assessed, risks and benefits discussed, monitors and equipment assessed, procedure being performed at surgeon's request and anesthesia consent obtained  Procedure    Diagnostic Indications for KATHLEEN:  assessment of surgical repair  Type of KATHLEEN: interventional KATHLEEN with real time guidance of intracardiac procedure  Images Saved: ultrasound permanent image saved  Physician Requesting Echo: Roe Duran  Location performed: OR  Intubated  Bite block not placed  Heart visualized  Insertion of KATHLEEN Probe:  Atraumatic  Probe Type:  Multiplane  Modalities:  3D, color flow mapping, continuous wave Doppler and pulse wave Doppler  Echocardiographic and Doppler Measurements    PREPROCEDURE    LEFT VENTRICLE:  Systolic Function: normal (LVH)  Cavity size: normal      RIGHT VENTRICLE:  Systolic Function: normal   Cavity size normal              AORTIC VALVE:  Leaflets: trileaflet  Leaflet motions restricted  Stenosis: severe  MITRAL VALVE:  Leaflets: normal  Regurgitation: trace  Stenosis: none  TRICUSPID VALVE:  Leaflets: normal  Stenosis: none  Regurgitation: mild  PULMONIC VALVE:  Leaflets: normal  Regurgitation: trace  Stenosis: none  ASCENDING AORTA:  Size:  normal      AORTIC ARCH:  Size:  normal  Grade 2: severe intimal thickening without protruding atheroma  DESCENDING AORTA:  Grade 2: severe intimal thickening without protruding atheroma  RIGHT ATRIUM:  Size:  normal      LEFT ATRIUM:  Size: normal      LEFT ATRIAL APPENDAGE:  Size: normal          ATRIAL SEPTUM:  Intra-atrial septal morphology: normal          VENTRICULAR SEPTUM:  Intra-ventricular septum morphology: normal              OTHER FINDINGS:  Pericardium:  normal  Pleural Effusion:  none  POSTPROCEDURE    LEFT VENTRICLE: Unchanged         RIGHT VENTRICLE: Unchanged   AORTIC VALVE:   Leaflets: bioprosthetic  Stenosis: none  Mean Gradient: 2 mmHg  Regurgitation: none  Valve Size: 23 mm  MITRAL VALVE: Unchanged   TRICUSPID VALVE: Unchanged   PULMONIC VALVE: Unchanged           ATRIA: Unchanged   AORTA: Unchanged   REMOVAL:  Probe Removal: atraumatic  ECHOCARDIOGRAM COMMENTS:  S/p TAVR 23 mm Berenice Current  No perivalvular or transvalvular leaks  Mean gradient 2 mmHG  Rickyryl Rm

## 2018-04-17 NOTE — OP NOTE
OPERATIVE REPORT  PATIENT NAME: Aaron Lynn    :  1935  MRN: [id number]  Pt Location: [facility] OR ROOM 02    SURGERY DATE: 2018    SURGEON: Marlene Street MD    CO-SURGEON: Julia Bal MD    ASSISTANT: Kody Jang DO    ADDITIONAL ASSISTANT: Hollis German PA-C    PREOPERATIVE DIAGNOSIS: Symptomatic severe aortic stenosis  POSTOPERATIVE DIAGNOSIS: Symptomatic severe aortic stenosis  NYHA Class: 3  CCS Class: 1    PROCEDURE:   Transcatheter aortic valve replacement with a 23 mm Vera STEVE 3 bioprosthetic valve via left transfemoral approach  CARDIOPULMONARY BYPASS TIME: 0 minutes  ANESTHESIA Dr Alen Cope, general endotracheal anesthesia with transesophageal echocardiogram guidance  INDICATIONS:  The patient is a 80y o  year-old female with clinical and echocardiographic findings consistent with severe aortic stenosis  The patient was evaluated in our heart valve center and was deemed high risk for a conventional aortic valve replacement therefore we recommended the procedure described previously  FINDINGS:  1  Intraoperative transesophageal echocardiogram revealed severe aortic stenosis  2  Final transesophageal echocardiogram demonstrated prosthetic valve with normal function no perivalvular leak  OPERATIVE TECHNIQUE:    The patient was taken to the operating room and placed supine on the operating table  Following the satisfactory induction of general anesthesia and placement of monitoring lines, the patient was prepped and draped in the usual sterile fashion  A time-out procedure was performed  The right common femoral artery and right common femoral vein were accessed percutaneously using Seldinger technique and fluoroscopy  A pigtail catheter was inserted and advanced to the right coronary cusp, an aortogram was performed to determine proper angle and orientation for valve deployment    Through the right common femoral vein sheath, a balloon-tip temporary pacing catheter was inserted and advanced into the right ventricle and its capture was confirmed  The left common femoral artery was accessed percutaneously using Seldinger technique and fluoroscopy  Two (2) Perclose sutures were deployed in the standard fashion  The patient was systemically heparinized  The left common femoral artery was then accessed, a Load DynamiX extra-stiff wire was positioned in the ascending aorta over an exchange catheter  The sheath for the delivery system was inserted through the left common femoral artery and advanced into the aorta  A 6 Syriac Julius right 4 coronary catheter was advanced through the delivery sheath to the aortic valve  The aortic valve was crossed with a 0 035 straight-tip wire, the right coronary catheter was advanced into the left ventricular cavity, this was then exchanged for a curved tip extra stiff wire  The balloon valvuloplasty catheter was inserted through the delivery sheath and positioned in the aortic annulus  During an episode of rapid pacing, balloon valvuloplasty was performed  A 23 mm STEVE 3 valve delivery system was advanced through the delivery sheath into the aorta, the delivery system was configured for deployment  The valve on the delivery system was then advanced and the aortic valve was crossed  At this point, the catheter was desheathed in the standard fashion  The valve was positioned appropriately using a combination of fluoroscopy and transesophageal echocardiogram guidance  During an episode of rapid pacing, balloon deployment of the valve was performed  Following deployment, the position of the valve was confirmed by fluoroscopy and echocardiography and its position appeared appropriate with no perivalvular leak  The valve delivery system was subsequently removed followed by removal of the sheath while the Perclose sutures were secured and direct pressure was held   Protamine was administered with normalization of the ACT  Pressure was released, without evidence of active bleeding  The right common femoral artery sheath was removed followed by deployment of a closure device  The right common femoral vein sheath was removed and pressure was held  COMPLICATIONS: none    PACKS/TUBES/DRAINS: none  EBL: 50 cc  TRANSFUSION: none  SPECIMENS: None      As the attending surgeon, I was present and scrubbed for all critical portions of this procedure  There was no qualified surgical resident available  Sponge, needle and instrument counts were reported as correct by the nursing staff       SIGNATURE: Lit Cabrera MD  DATE: April 17, 2018  TIME: 10:59 AM

## 2018-04-17 NOTE — ANESTHESIA POSTPROCEDURE EVALUATION
Post-Op Assessment Note      CV Status:  Stable    Mental Status:  Awake and unresponsive    Hydration Status:  Euvolemic    PONV Controlled:  Controlled    Airway Patency:  Patent  Airway: intubated    Post Op Vitals Reviewed: Yes          Staff: CRNA       Comments: patient transferred on monitored bed to CV-ICU; report to ICU PA, RN; VSS          /57 (04/17/18 1119)    Temp 98 2 °F (36 8 °C) (04/17/18 1119)    Pulse 57 (04/17/18 1119)   Resp 12 (04/17/18 1119)    SpO2 96 % (04/17/18 1119)

## 2018-04-17 NOTE — H&P (VIEW-ONLY)
History & Physical  - Cardiothoracic Surgery   Del Zepeda 80 y o  female MRN: [id number]    Physician Requesting Consult: Dr Ethan Darden    Reason for Consult / Principal Problem: Aortic stenosis, Non-Rheumatic    History of Present Illness: Del Zepeda is a 80y o  year old female who was previously evaluated in our office by ROMANA Conn  for transcatheter aortic valve replacement  During this initial consultation, arrangements were made for the following studies to be completed: Gated CTA of the chest/abdomen/pelvis, 3D KATHLEEN, left and right cardiac catheterization, dental clearance, pulmonary function tests with ABG, and carotid artery ultrasound  Del Zepeda now presents to review the results of these tests and obtain a second surgeon to confirm the suitability of proceeding with transcatheter aortic valve replacment  She was recently admitted to the hospital for an episode of acute on chronic diastolic heart failure  She presented with a 1 week history os dyspnea with a 9 lb weight gain with LE edema  Pro NT BNP was elevated to 3090  She responded well to diuresis  Echocardiogram demonstrated normal LV function with mild-mod MR , severe AS with an TEN 0 6 cm2 , mean & peak gradients 31 & 58 mm Hg, and mild-mod TR   After that hospital admission she was referred to our practice for surgical consultation    Toya Easton is accompanied by her son and daughter, with whom she resides  They live in a single story ranch style home  [facility] states she is able to perform her ADL's and simple chores during the day  She states her weight and edema have been stable since d/c from the hospital  She denies ALBERTO or chest pain but states does experience lightheadedness and she has to pace herself with activities  She denies presyncope or syncope, PND or orthopnea       She is a lifelong non-smoker  She does not drink alcohol  She is edentulous with dentures   She is [place] and wears hearing aids         Past Medical History:  Past Medical History:   Diagnosis Date    Cholelithiasis     Depression with anxiety     Diabetes mellitus ([facility] [address] )     niddm    Dysfunction of eustachian tube     Glaucoma     Hematuria     Hypercholesterolemia     Hypertension     Hyponatremia     Ischemic stroke of frontal lobe (HCC)     Right     Obstructive sleep apnea     OP (osteoporosis)     PAF (paroxysmal atrial fibrillation) (HCC)     Urinary frequency     Vertigo     Viral gastroenteritis     Viral gastroenteritis     Viral infection          Past Surgical History:   Past Surgical History:   Procedure Laterality Date    APPENDECTOMY      GLAUCOMA SURGERY  01/03/2016    TUBAL LIGATION           Family History:  Family History   Problem Relation Age of Onset    No Known Problems Mother     Diabetes Father     Stroke Father     Coronary artery disease Sister     No Known Problems Brother     No Known Problems Son     Breast cancer Daughter     No Known Problems Maternal Grandmother     No Known Problems Maternal Grandfather     No Known Problems Paternal Grandmother     No Known Problems Paternal Grandfather     No Known Problems Cousin     Rheum arthritis Neg Hx     Osteoarthritis Neg Hx     Asthma Neg Hx     Heart failure Neg Hx     Hyperlipidemia Neg Hx     Hypertension Neg Hx     Migraines Neg Hx     Rashes / Skin problems Neg Hx     Seizures Neg Hx     Thyroid disease Neg Hx          Social History:    History   Alcohol Use No     History   Drug Use No     History   Smoking Status    Never Smoker   Smokeless Tobacco    Never Used       Home Medications:   Prior to Admission medications    Medication Sig Start Date End Date Taking?  Authorizing Provider   Acetaminophen (TYLENOL) 325 MG CAPS Take by mouth as needed    Historical Provider, MD   Bimatoprost (LUMIGAN OP) Apply to eye daily at bedtime    Historical Provider, MD   COMBIGAN 0 2-0 5 % instill 1 drop into both eyes twice a day 2/8/18 Historical Provider, MD   DORZOLAMIDE HCL OP Apply to eye 3 (three) times a day    Historical Provider, MD   escitalopram (LEXAPRO) 5 mg tablet Take 1 tablet by mouth daily for 30 days 1/24/18 4/5/18  Susan Coyle MD   furosemide (LASIX) 20 mg tablet Take 1 tablet (20 mg total) by mouth daily for 30 days 3/1/18 4/5/18  Susan Coyle MD   glipiZIDE (GLUCOTROL) 10 mg tablet Take 1 tablet (10 mg total) by mouth 2 (two) times a day before meals for 30 days Pt takes 10 mg two times a day 3/1/18 4/5/18  Susan Coyle MD   Hypromellose (ARTIFICIAL TEARS OP) Apply to eye    Historical Provider, MD   lisinopril (ZESTRIL) 10 mg tablet Take 1 tablet (10 mg total) by mouth 2 (two) times a day 4/6/18   ROBBI Negro   metFORMIN (GLUCOPHAGE) 500 mg tablet Take 1 tablet (500 mg total) by mouth daily with breakfast for 90 days  Patient taking differently: Take 1,000 mg by mouth daily with breakfast   3/1/18 5/30/18  Susan Coyle MD   methylPREDNISolone (MEDROL) 32 MG tablet Take 1 tablet (32 mg) by mouth 12 hours before test and 1 tablet (32 mg) 2 hours before test 3/21/18   ROBBI Cruz   ONE TOUCH ULTRA TEST test strip TEST twice a day 2/19/18   Susan Coyle MD   [facility] LANCETS [id number] [facility]  2/19/18   Susan Coyle MD   potassium chloride (K-DUR,KLOR-CON) 10 mEq tablet Take 1 tablet (10 mEq total) by mouth daily for 30 days 3/26/18 4/25/18  Susan Coyle MD   rivaroxaban (XARELTO) 20 mg tablet Take 1 tablet by mouth daily with dinner  Patient taking differently: Take 20 mg by mouth daily with breakfast   9/14/17   Tanya Li MD   simvastatin (ZOCOR) 80 mg tablet take 1 tablet by mouth once daily 3/10/18   Susan Coyle MD       Allergies:   Allergies   Allergen Reactions    Lipitor [Atorvastatin] GI Intolerance     Nausea stomach ache    Hydrochlorothiazide GI Intolerance    Aspirin GI Intolerance       Review of Systems:  Review of Systems - History obtained from chart review and the patient  General ROS: negative  Psychological ROS: positive for - anxiety and worried  Hematological and Lymphatic ROS: negative for - bleeding problems, blood clots or bruising  Respiratory ROS: no cough, shortness of breath, or wheezing  Cardiovascular ROS: positive for - lightheadedness; negative for chest pain, PND or orthopnea  Gastrointestinal ROS: no abdominal pain, change in bowel habits, or black or bloody stools  Musculoskeletal ROS: negative  Neurological: lightheadedness, no syncope  No TIA or CVA symptoms    Vital Signs:     Vitals:    04/12/18 1300 04/12/18 1357   BP: (!) 192/86 (!) 188/82   BP Location: Left arm Right arm   Cuff Size: Adult    Pulse: 62    Resp: 12    Temp: 98 2 °F (36 8 °C)    TempSrc: Oral    SpO2: 98%    Weight: 73 9 kg (163 lb)    Height: 4' 9" (1 448 m)        Physical Exam:    General: well developed  no acute distress  HEENT/NECK:  PERRLA  No jugular venous distention  Cardiac:Irregular rhythm, 3/6 harsh systolic murmur RUSB  Carotids: 1+ pulses, delayed upstrokes, no bruits   Pulmonary:  Breath sounds clear bilaterally  Abdomen:  Non-tender, Non-distended  Positive bowel sounds  Lower extremities: Extremities warm/dry  PT/DP pulses 2+ bilaterally  No edema B/L  Neuro: Alert and oriented X 3  Sensation is grossly intact  No focal deficits  Musculoskeletal: MAEE  Skin: Warm/Dry, without rashes or lesions      Lab Results:       Results from last 7 days  Lab Units 04/06/18 0443 04/05/18 1910   WBC Thousand/uL 10 42* 14 75*   HEMOGLOBIN g/dL 12 9 14 0   HEMATOCRIT % 37 8 41 2   PLATELETS Thousands/uL 279 295       Results from last 7 days  Lab Units 04/06/18 0443 04/05/18 1910   SODIUM mmol/L 134* 133*   POTASSIUM mmol/L 3 8 4 0   CHLORIDE mmol/L 99* 98*   CO2 mmol/L 29 27   BUN mg/dL 20 20   CREATININE mg/dL 0 71 0 81   GLUCOSE RANDOM mg/dL 195* 307*   CALCIUM mg/dL 9 0 9 1       Results from last 7 days  Lab Units 04/06/18 0443   INR  1 34*     Lab Results Component Value Date    HGBA1C 8 1 (H) 01/13/2018     Lab Results   Component Value Date    CKTOTAL 65 03/28/2014    TROPONINI <0 02 03/02/2018       Imaging Studies:     Gated CTA of the chest/abdomen/pelvis:   IMPRESSION:     1   TAVR measurements:    Annulus: diameter 26 0 x 19 3 mm      area: 397 0 sq mm    Annulus to LCA: 10 9 mm    Annulus to RCA: 11 6 mm    Minimal diameter right iliofemoral segment: 4 1 mm    Minimal diameter left iliofemoral segment: 4 4 mm  2  Diffuse aortoiliac calcification  Moderate proximal bilateral common iliac artery stenosis are seen approaching 50% in severity  Lumen narrows to 4 mm at the origin of both common iliac arteries  No significant external iliac or common femoral   stenosis  3   At least moderate ostial stenosis of the right renal artery   4  Central low density within the uterus which could represent endometrial fluid or endometrial thickening  Recommend pelvic ultrasound for further evaluation  5   Cholelithiasis     3D KATHLEEN:   SUMMARY     LEFT VENTRICLE:  Systolic function was normal  Ejection fraction was estimated to be 60 %  There were no regional wall motion abnormalities  Wall thickness was mildly increased  There was mild concentric hypertrophy  Doppler parameters were consistent with abnormal left ventricular relaxation (grade 1 diastolic dysfunction)      LEFT ATRIUM:  The atrium was mildly dilated      ATRIAL SEPTUM:  No defect or patent foramen ovale was identified      MITRAL VALVE:  There was mild to moderate annular calcification  There was mild regurgitation      AORTIC VALVE:  The valve was trileaflet  Leaflets exhibited moderately to markedly increased thickness, moderate to marked calcification, and markedly reduced cuspal separation  Transaortic velocity was increased due to valvular stenosis  LVOT and annular dimentions:  Annular area 4 54 cm2; annular perimeter 7 88 cm; transverse diameter 2 35 cm; anteroposterior diameter 2 60 cm; LVOT 1 9 cm; aortic root 3 53 cm,  sinotubular junction 2 82 cm; ascending aorta 3 05 cm  There was severe stenosis  Valve mean gradient was 27 mmHg  Estimated aortic valve area (by VTI) was 0 65 cm squared  Estimated aortic valve area (by Vmax) was 0 79 cm squared  Estimated aortic valve area (by Vmean) was 0 68 cm squared      AORTA:  There was mild, localized atheroma in the proximal descending aorta        Left and right cardiac catheterization:   CORONARY VESSELS:   --  The coronary circulation is right dominant  --  Ostial left main: There was a 30 % stenosis  --  LAD: The vessel was medium sized  Angiography showed the vessel to wrap around the cardiac apex and moderate atherosclerosis  --  1st diagonal: There was a 40 % stenosis  --  Circumflex: The vessel was medium sized  There were two major obtuse marginals  --  1st obtuse marginal: There was a 60 % stenosis  It appears amenable to percutaneous intervention  --  RCA: The vessel was medium sized and moderately calcified  Angiography showed moderate atherosclerosis  --  Mid RCA: There was a 50 % stenosis  Pulmonary function tests:   FeV1 1 54 L/ 119% predicted  DLCO 84%    Arterial Blood gas:   pH  7 4 pO2 74 pCO2 43    Carotid artery ultrasound:   Impression  RIGHT:  There is <50% stenosis noted in the internal carotid artery  Plaque is  heterogenous/calcified and irregular  Vertebral artery flow is antegrade  There is no significant subclavian artery  disease  LEFT:  There is <50% stenosis noted in the internal carotid artery  Plaque is  heterogenous/calcified and irregular  Vertebral artery flow is antegrade  There is no significant subclavian artery  disease  I have personally reviewed pertinent reports        TAVR evaluation Assessment:     5 Meter Walk Test:      Attempt 1: 10 sec   Attempt 2: 10 sec   Attempt 3: 10 sec    STS Risk Score: 5 7%      NYHC: III    KCCQ-12 completed    Assessment:  Patient Active Problem List Diagnosis Date Noted    [facility] 04/05/2018    Viral upper respiratory tract infection 03/24/2018    History of cardioembolic cerebrovascular accident (CVA) 03/02/2018    Slurred speech 01/12/2018    Lethargy 01/12/2018    Ataxia 01/12/2018    Neurological deficit, transient 01/12/2018    Type 2 diabetes mellitus with hyperglycemia ([facility] ) 01/12/2018    Left knee pain 12/04/2017    PERI (obstructive sleep apnea) 09/29/2017    Periodic limb movement sleep disorder 09/29/2017    Paroxysmal atrial fibrillation ([facility] ) 09/11/2017    Acute congestive heart failure ([place] [address] ) 09/11/2017    Sleep talking 08/25/2017    Osteoporosis 05/04/2017    Insomnia 01/25/2017    Hyponatremia 01/16/2017    Depression with anxiety 01/16/2017    CVA [facility]cerebral vascular accident) ([facility] ) 01/13/2017    Unsteady gait 01/13/2017    Diabetes mellitus type 2, uncontrolled ([place] [address] )     Glaucoma     Hyperlipidemia     Hypertension     Tinnitus 04/04/2014    Hearing loss 12/12/2012    Osteoarthritis 08/07/2012    Nonrheumatic aortic valve stenosis 08/07/2012    Tricuspid valve disorders, non-rheumatic 08/07/2012     Severe aortic stenosis; Ongoing TAVR workup    Plan:    Ryan Daly has severe symptomatic aortic stenosis  Based on her STS risk assessment, she has undergone testing for transcatheter aortic valve replacement  The results of these studies have been interpreted by two independent cardiac surgeons who have determined the patient to be Intermediate risk for open aortic valve replacement  The risks, benefits, and alternatives to TAVR were discussed in detail with the patient today  They understand and wish to proceed with transfemoral transcatheter aortic valve replacement with the possibility of transapical approach     Informed consent was obtained and a date for the intervention has been set      Ryan Daly was comfortable with our recommendations, and her questions were answered to her satisfaction  The following preoperative instructions were provided at the conclusion of their consultation:     1  You will receive a phone call from the hospital between 2:00 PM and 8:00 PM the day prior to surgery to confirm arrival time and location  For surgery on Mondays, you will receive a call on Friday  2  Do not drink or eat anything after midnight the night before surgery  That includes no water, candy, gum, lozenges, Lifesavers, etc  We recommend you not eat any salty or fatty snack food, consume alcohol or smoke the night before surgery  3  Continue taking aspirin but only 81 mg daily  4  If you take Coumadin and/or Plavix, discontinue it 5 days before surgery  5  If you are diabetic, do not take any of your diabetic pills the morning of surgery  If you take Lantus insulin, you may take it at your regularly scheduled time the day before surgery  Do not take any other insulins the morning of surgery  6  The 2 nights before surgery, take a shower each night using the special antiseptic soap or soap sponges you received from the office or hospital  Andrewmothy Dock your hair with regular shampoo and rinse completely before using the antiseptic sponges  Use the sponge to wash from your neck down, with special attention to the armpits and groin area  Do not use any other soap or cleanser on your skin  Do not use lotions, powder, deodorant or perfume of any kind on your skin after you shower  Use clean bed linens and wear clean pajamas after your shower  7  You will be prescribed Mupirocin nasal ointment  Apply to both nostrils twice a day for 5 days prior to surgery  8  Do not take a shower the morning of her surgery; you'll be given a special" bath" at the hospital   9  Notify the CT surgery office if you develop a cold, sore throat, cough, fever or other health issues before your surgery    10  Other medication changes included the following: Stop Glipizide, Metformin and Lisinopril 3 days before surgery   Stop Xarelto 5 days before surgery     SIGNATURE: ROBBI Toure  DATE: April 12, 2018  TIME: 2:22 PM

## 2018-04-17 NOTE — ANESTHESIA PROCEDURE NOTES
Central Line Insertion  Performed by: Rosemary Ho by: Sherly Mcghee   Date/Time: 4/17/2018 9:48 AM  Catheter Type:  triple lumen  Indications: vascular access and central pressure monitoring  Location details: right internal jugular  Patient position: Trendelenburg    Sedation:  Patient sedated: yes  Assessment: blood return through all ports and free fluid flow  Preparation: skin prepped with ChloraPrep  Skin prep agent dried: skin prep agent completely dried prior to procedure  Sterile barriers: all five maximum sterile barriers used - cap, mask, sterile gown, sterile gloves, and large sterile sheet  Hand hygiene: hand hygiene performed prior to central venous catheter insertion  Ultrasound guidance: no  sterile gel and probe cover not used in ultrasound-guided central venous catheter insertionConsent: Written consent obtained  Risks and benefits: risks, benefits and alternatives were discussed  Consent given by: patient  Patient understanding: patient states understanding of the procedure being performed  Patient consent: the patient's understanding of the procedure matches consent given  Procedure consent: procedure consent matches procedure scheduled  Required items: required blood products, implants, devices, and special equipment available  Patient identity confirmed: arm band  Time out: Immediately prior to procedure a "time out" was called to verify the correct patient, procedure, equipment, support staff and site/side marked as required    Pre-procedure: landmarks identified  Number of attempts: 1  Successful placement: yes  Post-procedure: chlorhexidine patch applied,  dressing applied and line sutured  Patient tolerance: Patient tolerated the procedure well with no immediate complications  Comments: Procedure start 2840  Procedure end 9390

## 2018-04-17 NOTE — ANESTHESIA PREPROCEDURE EVALUATION
Review of Systems/Medical History          Cardiovascular  Exercise tolerance: poor,  Hyperlipidemia, Hypertension , Valvular heart disease , aortic valve stenosis, CHF ,    Pulmonary  Sleep apnea ,        GI/Hepatic            Endo/Other  Diabetes ,   Obesity    GYN       Hematology   Musculoskeletal    Arthritis     Neurology    CVA ,    Psychology           Physical Exam    Airway    Mallampati score: II         Dental   No notable dental hx     Cardiovascular      Pulmonary      Other Findings        Anesthesia Plan  ASA Score- 4     Anesthesia Type- general with ASA Monitors  Additional Monitors: central venous line and arterial line  Airway Plan: ETT  Comment: I, Dr Bryan Bocanegra, the attending physician, have personally seen and evaluated the patient prior to anesthetic care  I have reviewed the pre-anesthetic record, and other medical records if appropriate to the anesthetic care  If a CRNA is involved in the case, I have reviewed the CRNA assessment, if present, and agree  The patient is in a suitable condition to proceed with my formulated anesthetic plan        Plan Factors-    Induction- intravenous  Postoperative Plan-     Informed Consent- Anesthetic plan and risks discussed with patient  I personally reviewed this patient with the CRNA  Discussed and agreed on the Anesthesia Plan with the CRNA  Donny Butcher

## 2018-04-18 ENCOUNTER — APPOINTMENT (INPATIENT)
Dept: RADIOLOGY | Facility: HOSPITAL | Age: 83
DRG: 267 | End: 2018-04-18
Payer: MEDICARE

## 2018-04-18 ENCOUNTER — TRANSITIONAL CARE MANAGEMENT (OUTPATIENT)
Dept: FAMILY MEDICINE CLINIC | Facility: CLINIC | Age: 83
End: 2018-04-18

## 2018-04-18 ENCOUNTER — APPOINTMENT (INPATIENT)
Dept: NON INVASIVE DIAGNOSTICS | Facility: HOSPITAL | Age: 83
DRG: 267 | End: 2018-04-18
Payer: MEDICARE

## 2018-04-18 LAB
ANION GAP SERPL CALCULATED.3IONS-SCNC: 7 MMOL/L (ref 4–13)
ATRIAL RATE: 63 BPM
ATRIAL RATE: 66 BPM
BUN SERPL-MCNC: 10 MG/DL (ref 5–25)
CALCIUM SERPL-MCNC: 8.5 MG/DL (ref 8.3–10.1)
CHLORIDE SERPL-SCNC: 103 MMOL/L (ref 100–108)
CO2 SERPL-SCNC: 28 MMOL/L (ref 21–32)
CREAT SERPL-MCNC: 0.55 MG/DL (ref 0.6–1.3)
ERYTHROCYTE [DISTWIDTH] IN BLOOD BY AUTOMATED COUNT: 13.2 % (ref 11.6–15.1)
GFR SERPL CREATININE-BSD FRML MDRD: 87 ML/MIN/1.73SQ M
GLUCOSE SERPL-MCNC: 182 MG/DL (ref 65–140)
GLUCOSE SERPL-MCNC: 191 MG/DL (ref 65–140)
GLUCOSE SERPL-MCNC: 235 MG/DL (ref 65–140)
GLUCOSE SERPL-MCNC: 282 MG/DL (ref 65–140)
GLUCOSE SERPL-MCNC: 289 MG/DL (ref 65–140)
HCT VFR BLD AUTO: 33.7 % (ref 34.8–46.1)
HGB BLD-MCNC: 11.6 G/DL (ref 11.5–15.4)
MAGNESIUM SERPL-MCNC: 1.7 MG/DL (ref 1.6–2.6)
MCH RBC QN AUTO: 30.5 PG (ref 26.8–34.3)
MCHC RBC AUTO-ENTMCNC: 34.4 G/DL (ref 31.4–37.4)
MCV RBC AUTO: 89 FL (ref 82–98)
P AXIS: 21 DEGREES
P AXIS: 33 DEGREES
PLATELET # BLD AUTO: 208 THOUSANDS/UL (ref 149–390)
PMV BLD AUTO: 10.9 FL (ref 8.9–12.7)
POTASSIUM SERPL-SCNC: 3.9 MMOL/L (ref 3.5–5.3)
POTASSIUM SERPL-SCNC: 3.9 MMOL/L (ref 3.5–5.3)
PR INTERVAL: 154 MS
PR INTERVAL: 163 MS
QRS AXIS: 140 DEGREES
QRS AXIS: 61 DEGREES
QRSD INTERVAL: 133 MS
QRSD INTERVAL: 79 MS
QT INTERVAL: 404 MS
QT INTERVAL: 475 MS
QTC INTERVAL: 424 MS
QTC INTERVAL: 487 MS
RBC # BLD AUTO: 3.8 MILLION/UL (ref 3.81–5.12)
SODIUM SERPL-SCNC: 138 MMOL/L (ref 136–145)
T WAVE AXIS: 20 DEGREES
T WAVE AXIS: 8 DEGREES
VENTRICULAR RATE: 63 BPM
VENTRICULAR RATE: 66 BPM
WBC # BLD AUTO: 10.39 THOUSAND/UL (ref 4.31–10.16)

## 2018-04-18 PROCEDURE — 82948 REAGENT STRIP/BLOOD GLUCOSE: CPT

## 2018-04-18 PROCEDURE — G8987 SELF CARE CURRENT STATUS: HCPCS

## 2018-04-18 PROCEDURE — G8979 MOBILITY GOAL STATUS: HCPCS

## 2018-04-18 PROCEDURE — G8978 MOBILITY CURRENT STATUS: HCPCS

## 2018-04-18 PROCEDURE — 97163 PT EVAL HIGH COMPLEX 45 MIN: CPT

## 2018-04-18 PROCEDURE — 84132 ASSAY OF SERUM POTASSIUM: CPT | Performed by: THORACIC SURGERY (CARDIOTHORACIC VASCULAR SURGERY)

## 2018-04-18 PROCEDURE — 71045 X-RAY EXAM CHEST 1 VIEW: CPT

## 2018-04-18 PROCEDURE — 93010 ELECTROCARDIOGRAM REPORT: CPT | Performed by: INTERNAL MEDICINE

## 2018-04-18 PROCEDURE — 97110 THERAPEUTIC EXERCISES: CPT

## 2018-04-18 PROCEDURE — 97530 THERAPEUTIC ACTIVITIES: CPT

## 2018-04-18 PROCEDURE — 80048 BASIC METABOLIC PNL TOTAL CA: CPT | Performed by: PHYSICIAN ASSISTANT

## 2018-04-18 PROCEDURE — 97535 SELF CARE MNGMENT TRAINING: CPT

## 2018-04-18 PROCEDURE — 99233 SBSQ HOSP IP/OBS HIGH 50: CPT | Performed by: INTERNAL MEDICINE

## 2018-04-18 PROCEDURE — 97167 OT EVAL HIGH COMPLEX 60 MIN: CPT

## 2018-04-18 PROCEDURE — 99231 SBSQ HOSP IP/OBS SF/LOW 25: CPT | Performed by: THORACIC SURGERY (CARDIOTHORACIC VASCULAR SURGERY)

## 2018-04-18 PROCEDURE — 93306 TTE W/DOPPLER COMPLETE: CPT

## 2018-04-18 PROCEDURE — 93005 ELECTROCARDIOGRAM TRACING: CPT | Performed by: PHYSICIAN ASSISTANT

## 2018-04-18 PROCEDURE — 85027 COMPLETE CBC AUTOMATED: CPT | Performed by: PHYSICIAN ASSISTANT

## 2018-04-18 PROCEDURE — G8988 SELF CARE GOAL STATUS: HCPCS

## 2018-04-18 PROCEDURE — 83735 ASSAY OF MAGNESIUM: CPT | Performed by: PHYSICIAN ASSISTANT

## 2018-04-18 RX ORDER — DORZOLAMIDE HCL 20 MG/ML
1 SOLUTION/ DROPS OPHTHALMIC 3 TIMES DAILY
Status: DISCONTINUED | OUTPATIENT
Start: 2018-04-18 | End: 2018-04-19 | Stop reason: HOSPADM

## 2018-04-18 RX ORDER — ESCITALOPRAM OXALATE 10 MG/1
5 TABLET ORAL DAILY
Status: DISCONTINUED | OUTPATIENT
Start: 2018-04-18 | End: 2018-04-19 | Stop reason: HOSPADM

## 2018-04-18 RX ORDER — GLIPIZIDE 5 MG/1
10 TABLET, FILM COATED, EXTENDED RELEASE ORAL 2 TIMES DAILY WITH MEALS
Status: DISCONTINUED | OUTPATIENT
Start: 2018-04-18 | End: 2018-04-19 | Stop reason: HOSPADM

## 2018-04-18 RX ORDER — ESCITALOPRAM OXALATE 5 MG/1
5 TABLET ORAL DAILY
COMMUNITY
End: 2018-08-24 | Stop reason: ALTCHOICE

## 2018-04-18 RX ORDER — POTASSIUM CHLORIDE 750 MG/1
10 TABLET, EXTENDED RELEASE ORAL DAILY
Status: DISCONTINUED | OUTPATIENT
Start: 2018-04-18 | End: 2018-04-19 | Stop reason: HOSPADM

## 2018-04-18 RX ORDER — FUROSEMIDE 20 MG/1
20 TABLET ORAL DAILY
Status: DISCONTINUED | OUTPATIENT
Start: 2018-04-18 | End: 2018-04-19 | Stop reason: HOSPADM

## 2018-04-18 RX ORDER — POTASSIUM CHLORIDE 750 MG/1
10 TABLET, EXTENDED RELEASE ORAL DAILY
Status: DISCONTINUED | OUTPATIENT
Start: 2018-04-18 | End: 2018-04-18

## 2018-04-18 RX ORDER — FUROSEMIDE 20 MG/1
20 TABLET ORAL DAILY
Status: DISCONTINUED | OUTPATIENT
Start: 2018-04-18 | End: 2018-04-18

## 2018-04-18 RX ORDER — LISINOPRIL 10 MG/1
10 TABLET ORAL DAILY
Status: DISCONTINUED | OUTPATIENT
Start: 2018-04-18 | End: 2018-04-19

## 2018-04-18 RX ADMIN — RIVAROXABAN 20 MG: 20 TABLET, FILM COATED ORAL at 09:55

## 2018-04-18 RX ADMIN — PRAVASTATIN SODIUM 80 MG: 80 TABLET ORAL at 16:44

## 2018-04-18 RX ADMIN — ESCITALOPRAM OXALATE 5 MG: 10 TABLET ORAL at 10:10

## 2018-04-18 RX ADMIN — CEFAZOLIN SODIUM 2000 MG: 2 SOLUTION INTRAVENOUS at 03:45

## 2018-04-18 RX ADMIN — MUPIROCIN 1 APPLICATION: 20 OINTMENT TOPICAL at 21:27

## 2018-04-18 RX ADMIN — INSULIN LISPRO 3 UNITS: 100 INJECTION, SOLUTION INTRAVENOUS; SUBCUTANEOUS at 16:52

## 2018-04-18 RX ADMIN — PANTOPRAZOLE SODIUM 40 MG: 40 TABLET, DELAYED RELEASE ORAL at 09:56

## 2018-04-18 RX ADMIN — DORZOLAMIDE HCL 1 DROP: 20 SOLUTION/ DROPS OPHTHALMIC at 21:29

## 2018-04-18 RX ADMIN — SODIUM CHLORIDE 3 MG/HR: 0.9 INJECTION, SOLUTION INTRAVENOUS at 03:46

## 2018-04-18 RX ADMIN — ACETAMINOPHEN 650 MG: 325 TABLET, FILM COATED ORAL at 15:15

## 2018-04-18 RX ADMIN — FUROSEMIDE 20 MG: 20 TABLET ORAL at 09:56

## 2018-04-18 RX ADMIN — POLYETHYLENE GLYCOL 3350 17 G: 17 POWDER, FOR SOLUTION ORAL at 09:56

## 2018-04-18 RX ADMIN — GLIPIZIDE 10 MG: 5 TABLET, EXTENDED RELEASE ORAL at 12:46

## 2018-04-18 RX ADMIN — POTASSIUM CHLORIDE 10 MEQ: 750 TABLET, EXTENDED RELEASE ORAL at 09:56

## 2018-04-18 RX ADMIN — MUPIROCIN 1 APPLICATION: 20 OINTMENT TOPICAL at 09:57

## 2018-04-18 RX ADMIN — METOPROLOL TARTRATE 12.5 MG: 25 TABLET ORAL at 09:55

## 2018-04-18 RX ADMIN — METOPROLOL TARTRATE 12.5 MG: 25 TABLET ORAL at 21:27

## 2018-04-18 RX ADMIN — INSULIN LISPRO 1 UNITS: 100 INJECTION, SOLUTION INTRAVENOUS; SUBCUTANEOUS at 21:32

## 2018-04-18 RX ADMIN — BIMATOPROST 1 DROP: 0.1 SOLUTION/ DROPS OPHTHALMIC at 21:27

## 2018-04-18 RX ADMIN — DORZOLAMIDE HCL 1 DROP: 20 SOLUTION/ DROPS OPHTHALMIC at 15:17

## 2018-04-18 RX ADMIN — CEFAZOLIN SODIUM 2000 MG: 2 SOLUTION INTRAVENOUS at 10:10

## 2018-04-18 RX ADMIN — BRIMONIDINE TARTRATE, TIMOLOL MALEATE 1 DROP: 2; 5 SOLUTION/ DROPS OPHTHALMIC at 17:01

## 2018-04-18 RX ADMIN — BRIMONIDINE TARTRATE, TIMOLOL MALEATE 1 DROP: 2; 5 SOLUTION/ DROPS OPHTHALMIC at 09:57

## 2018-04-18 RX ADMIN — LISINOPRIL 10 MG: 10 TABLET ORAL at 09:56

## 2018-04-18 RX ADMIN — GLIPIZIDE 10 MG: 5 TABLET, EXTENDED RELEASE ORAL at 16:43

## 2018-04-18 RX ADMIN — METFORMIN HYDROCHLORIDE 1000 MG: 500 TABLET, FILM COATED ORAL at 12:46

## 2018-04-18 NOTE — OCCUPATIONAL THERAPY NOTE
Occupational Therapy Treatment Note      [facility]    4/18/2018    Patient Active Problem List   Diagnosis    Diabetes mellitus type 2, uncontrolled ([address] )    Glaucoma    Hyperlipidemia    Hypertension    Hyponatremia    Depression with anxiety    Paroxysmal atrial fibrillation (HCC)    Acute congestive heart failure ([address] )    Slurred speech    Lethargy    Ataxia    Neurological deficit, transient    Type 2 diabetes mellitus with hyperglycemia ([address] )    CVA [facility]cerebral vascular accident) [facility]    Hearing loss    Insomnia    Left knee pain    PERI (obstructive sleep apnea)    Osteoarthritis    Osteoporosis    Periodic limb movement sleep disorder    Sleep talking    Tinnitus    Tricuspid valve disorders, non-rheumatic    Unsteady gait    History of cardioembolic cerebrovascular accident (CVA)    Viral upper respiratory tract infection    Chronic diastolic heart failure (HCC)    S/P TAVR (transcatheter aortic valve replacement)    Respiratory insufficiency, post operative       Past Medical History:   Diagnosis Date    Ambulatory dysfunction     CH[facility]congestive heart failure) ([facility])     pEFHF    Cholelithiasis     Coronary artery disease     Depression with anxiety     Diabetes mellitus ([address] )     niddm    Dysfunction of eustachian tube     Glaucoma     Hematuria     Hypercholesterolemia     Hypertension     Hyponatremia     Ischemic stroke of frontal lobe (HCC)     Right     Obstructive sleep apnea     OP (osteoporosis)     PAF (paroxysmal atrial fibrillation) ([facility])     Urinary frequency     Vertigo     Viral gastroenteritis     Viral gastroenteritis     Viral infection        Past Surgical History:   Procedure Laterality Date    APPENDECTOMY      GLAUCOMA SURGERY  01/03/2016    MN REPLACE AORTIC VALVE OPENFEMORAL ARTERY APPROACH N/A 4/17/2018    Procedure: REPLACEMENT AORTIC VALVE TRANSCATHETER (TAVR) TRANSFEMORAL W/ 23 MM AGUILAR STEVE S3 VALVE;  Surgeon: Dang Contreras DO;  [facility]: [facility];  Service: Cardiac Surgery    TUBAL LIGATION          04/18/18 1420   Restrictions/Precautions   Weight Bearing Precautions Per Order No   Other Precautions Cardiac/sternal;Multiple lines;Telemetry; Fall Risk;Pain   Lifestyle   Autonomy Pt reports being I w/ ADLS, needs assist for IADLS, and is Mod I w/ transfers and functional mobility PTA   Reciprocal Relationships Pt lives w/ daughter and son-in-law- they work during the day   Service to Others Pt is retired   Intrinsic Gratification Pt reports enjoying watching game shows   Pain Assessment   Pain Assessment No/denies pain   Pain Score No Pain   ADL   LB Dressing Assistance 3  Moderate Assistance   LB Dressing Deficit Setup;Steadying; Requires assistive device for steadying;Supervision/safety; Increased time to complete; Thread RLE into pants; Thread LLE into pants;Pull up over hips   LB Dressing Comments pt completed LB dressing while seated/standing at chair  pt able to thread bilateral LE into pant legs  Pt required assist to pull up pants over waist and assist while standing for safety and balance  Pt had RW for support as well   Toileting Assistance  3  Moderate Assistance   Toileting Deficit Setup;Steadying;Supervison/safety; Increased time to complete; Bedside commode;Perineal hygiene   Toileting Comments Pt completed toileting on bedside commode  Pt required assist to cleanse buttocks after bowel movement and assist while standing to maintain balance and stablity  RW used for support as well   Transfers   Sit to Stand 4  Minimal assistance   Additional items Assist x 1; Increased time required;Verbal cues;Armrests   Stand to Sit 4  Minimal assistance   Additional items Assist x 1; Increased time required;Verbal cues   Stand pivot 4  Minimal assistance   Additional items Increased time required;Verbal cues; Assist x 2   Toilet transfer 4  Minimal assistance   Additional items Assist x 2; Increased time required;Verbal cues;Commode   Additional Comments Pt performed sit-stand from chair x2 trials w Min A x1 for safety and balance and mild force production into standing  Pt then performed SPT from chair to commode w/ Min A x2 and RW used for support in standing  VC to initiate steps to pivot  Pt then performed stand-sit to commode and SPT from commode to chair w/ Min A x2  Toilet Transfers   Toilet Transfer From Other (Comment)  (recliner)   Toilet Transfer Type To and from   Toilet Transfer to Standard bedside commode   Toilet Transfer Technique Ambulating   Toilet Transfers Minimal assistance  (x2)   Cognition   Overall Cognitive Status WFL   Arousal/Participation Alert; Responsive; Cooperative   Attention Within functional limits   Orientation Level Oriented X4   Memory Within functional limits   Following Commands Follows one step commands without difficulty   Comments Pt is pleasant and cooperative   Additional Activities   Additional Activities Other (Comment)  (Cardiac education)   Additional Activities Comments Pt provided w/ recovering after cardiac surgery education pkt  pt's daughter, son and friend all present in room to hear education  educated pt regarding; sternal precautions, cardiac precautions, lifiting restrictions, safe activity engagement, energy conservation, lifestyle modifications, stress management and cardiac rehabilitation programs  Pt's questions were addressed after discussion of the packet  Provided Pt with contact information for OT department if questions arrise  Pt continues to benefit from inpatient skilled OT services  Will continue to follow and address previously stated goals     Activity Tolerance   Activity Tolerance Patient tolerated treatment well;Patient limited by fatigue   Medical Staff Made Aware PCA and RN   Assessment   Assessment Patient participated in Skilled OT session 4/18/18 with interventions consisting of ADL re training with the use of correct body mechnaics, Energy Conservation techniques, safety awareness and fall prevention techniques,  therapeutic activities to: increase activity tolerance, increase dynamic sit/ stand balance during functional activity  and increase OOB/ sitting tolerance   Patient agreeable to OT treatment session, upon arrival patient was found seated OOB to Recliner  In comparison to previous session, patient with improvements in LB dressing, transfers, toileting, and functional mobility   Patient requiring verbal cues for safety, verbal cues for correct technique and frequent rest periods  Patient continues to be functioning below baseline level, occupational performance remains limited secondary to factors listed above and increased risk for falls and injury  From OT standpoint, recommendation at time of d/c would be Short Term Rehab when medically stable however spoke w/ daughter who reported they most likely want pt to return home and the daughter will stay home to care for her  If pt and pt's family refuse STR then recommend pt have home Ot, but feel pt would benefit from STR  Patient to benefit from continued Occupational Therapy treatment while in the hospital to address deficits as defined above and maximize level of functional independence with ADLs and functional mobility      Plan   Treatment Interventions ADL retraining;Functional transfer training   Goal Expiration Date 04/28/18   Treatment Day 1   OT Frequency 3-5x/wk   Recommendation   OT Discharge Recommendation Short Term Rehab   OT - OK to Discharge Yes  (when medically stable)   Barthel Index   Feeding 10   Bathing 0   Grooming Score 5   Dressing Score 5   Bladder Score 10   Bowels Score 10   Toilet Use Score 5   Transfers (Bed/Chair) Score 10   Mobility (Level Surface) Score 0   Stairs Score 0   Barthel Index Score 55   Modified [place] Scale   Modified Brittany Scale 4       Maureen Montana MOT, OTR/L

## 2018-04-18 NOTE — CASE MANAGEMENT
Thank you,  [address] [facility] in the [facility] by Wyatt Armas for 2017  Network Utilization Review Department  Phone: [phone]; Fax [phone]  ATTENTION: The Network Utilization Review Department is now centralized for our 7 Facilities  Make a note that we have a new phone and fax numbers for our Department  Please call with any questions or concerns to [phone] and carefully follow the prompts so that you are directed to the right person  All voicemails are confidential  Fax any determinations, approvals, denials, and requests for initial or continue stay review clinical to [phone]  Due to HIGH CALL volume, it would be easier if you could please send faxed requests to expedite your requests and in part, help us provide discharge notifications faster     ======================================================================    Initial Clinical Review    Age/Sex: 80 y o  female    Surgery Date: 04/17/2018    Procedure: Transcatheter aortic valve replacement with a 23 mm Vera STEVE 3 bioprosthetic valve via left transfemoral approach  Anesthesia: Dr Marilu Marcelo, general endotracheal anesthesia with transesophageal echocardiogram guidance  Admission Orders: Date/Time/Statement: 4/17/18 @ 1038   Orders Placed This Encounter   Procedures    Inpatient Admission     Standing Status:   Standing     Number of Occurrences:   1     Order Specific Question:   Admitting Physician     Answer:   Bertram Rosales     Order Specific Question:   Level of Care     Answer:   Critical Care [15]     Order Specific Question:   Estimated length of stay     Answer:   More than 2 Midnights     Order Specific Question:   Certification     Answer:   I certify that inpatient services are medically necessary for this patient for a duration of greater than two midnights   See H&P and MD Progress Notes for additional information about the patient's course of treatment  Vital Signs: /84 (BP Location: Right arm)   Pulse 60   Temp 100 °F (37 8 °C) (Probe)   Resp 15   Ht 4' 10" (1 473 m)   Wt 69 2 kg (152 lb 8 9 oz)   SpO2 93%   BMI 31 88 kg/m²     Diet:        Diet Orders            Start     Ordered    04/17/18 1400  Diet Cardiac; Cardiac TLC 2 3 GM NA; Fluid Restriction 1800 ML  Diet effective 1400     Question Answer Comment   Diet Type Cardiac    Cardiac Cardiac TLC 2 3 GM NA    Other Restriction(s): Fluid Restriction 1800 ML    RD to adjust diet per protocol? Yes        04/17/18 1110      A  Line Left Radial / Triple CVC Line / Introducer    Mobility: Up with assistance / Up as tolerated / POD#1 OOB to chair and for all meals    DVT Prophylaxis: Fercho SCDs    Pain Control:   Pain Medications             Acetaminophen (TYLENOL) 325 MG CAPS Take by mouth as needed    escitalopram (LEXAPRO) 5 mg tablet Take 5 mg by mouth daily        Scheduled Meds:  Current Facility-Administered Medications:  acetaminophen 650 mg Oral Q4H PRN Carl Paul PA-C   artificial tear  Both Eyes HS PRN Carl Paul PA-C   bimatoprost 1 drop Both Eyes HS Carl Paul PA-C   bisacodyl 10 mg Rectal Daily PRN Carl Palu PA-C   brimonidine-timolol 1 drop Both Eyes BID Carl Paul PA-C   dorzolamide 1 drop Left Eye TID Aaliyah Richardson PA-C   escitalopram 5 mg Oral Daily Aaliyah Richardson PA-C   furosemide 20 mg Oral Daily Aaliyah Richardson PA-C   glipiZIDE 10 mg Oral BID With Meals Aaliyah Richardson PA-C   insulin lispro 1-5 Units Subcutaneous TID AC Carl Paul PA-C   insulin lispro 1-5 Units Subcutaneous HS Carl Paul PA-C   lisinopril 10 mg Oral Daily Aaliyah Richardson PA-C   metFORMIN 1,000 mg Oral Daily With Breakfast Aaliyah Richardson PA-C   metoprolol tartrate 12 5 mg Oral Q12H [address] Aaliyah Richardson PA-C   mupirocin 1 application Nasal [address] Carl Paul PA-C   ondansetron 4 mg Intravenous Q6H PRN Shad Reagin, ALLA   pantoprazole 40 mg Oral Daily Carl Paul PA-C   polyethylene glycol 17 g Oral Daily Carl Paul PA-C   potassium chloride 10 mEq Oral Daily Aaliyah Richardson PA-C   pravastatin 80 mg Oral Daily With KeySpanALLA   rivaroxaban 20 mg Oral Daily With Breakfast Kalen Song PA-C

## 2018-04-18 NOTE — PLAN OF CARE
Problem: PHYSICAL THERAPY ADULT  Goal: Performs mobility at highest level of function for planned discharge setting  See evaluation for individualized goals  Treatment/Interventions: Functional transfer training, LE strengthening/ROM, Elevations, Therapeutic exercise, Endurance training, Equipment eval/education, Bed mobility, Gait training, Spoke to nursing, Spoke to case management, OT  Equipment Recommended: Lashanda Metz       See flowsheet documentation for full assessment, interventions and recommendations  Outcome: Progressing  Prognosis: Good  Problem List: Decreased strength, Decreased endurance, Impaired balance, Decreased mobility, Pain    Assessment: Additional follow up consecutive session performed to initiate LE therex in order to max strength and to facilitate progression w/ functional mobility skills and overall level of (I)  Pt was able to complete the program in an AAROM/AROM mode; remained in NAD w/ rest periods provided as needed; currently, cont to recommend rehab upon D/C but will cont to monitor progress and make appropriate changes to D/C recommendations as needed; family (dtr) however indicates pt will return home w/ their support (in which case home PT follow up would be recommended); will follow  Recommendation: Post acute IP rehab          See flowsheet documentation for full assessment

## 2018-04-18 NOTE — PLAN OF CARE
Problem: OCCUPATIONAL THERAPY ADULT  Goal: Performs self-care activities at highest level of function for planned discharge setting  See evaluation for individualized goals  Treatment Interventions: ADL retraining, Functional transfer training, UE strengthening/ROM, Endurance training, Patient/family training, Equipment evaluation/education, Compensatory technique education, Continued evaluation, Cardiac education, Energy conservation, Activityengagement          See flowsheet documentation for full assessment, interventions and recommendations  Limitation: Decreased ADL status, Decreased UE strength, Decreased endurance, Decreased self-care trans, Decreased high-level ADLs, Decreased UE ROM, Decreased Safe judgement during ADL  Prognosis: Fair  Assessment: Pt is a 81 y/o female seen for OT eval s/p adm to [facility] for CT surg eval  She was recently admitted to the hospital for an episode of acute on chronic diastolic heart failure  Pt is now s/p TAVR  Comorbidities include a h/o cholelithiasis, depression w/ anxiety, DM , glaucoma , hematuria, HTN, hyponatremia, ischemic stroke of frontal lobe, OP , PAF, vertigo, and viral gastroenteritis  Pt with active OT orders and up as tolerated  Pt lives with daughter and son-in law in 1 story home w/ 1 GANESH   Pt was I w/  ADLS, has assist for IADLS, has not been driving, & required occasional use of DME PTA including a cane, pt also has a wheelchair but doesn't use  Pt is currently demonstrating the following occupational deficits: Min A UB ADLS, Mod A LB ADLS, Mod A transfers and Min A functional mobility w/ RW   Pt with deficits and limitations in all baseline areas of occupation 2* cardiac precautions, fatigue, nausea, decreased endurance, decreased activity tolerance, decreased functional mobility, decreased balance, decreased strength, generalized weakness, unsupportive home environment during the day, and decreased I w/ ADLS compared to previous level of functioning    The following Occupational Performance Areas to address include: eating, grooming, bathing/shower, toilet hygiene, dressing, medication management, socialization, health maintenance, functional mobility, community mobility, clothing management and social participation  Pt scored overall 40/100 on the Barthel Index  Based on the aforementioned OT evaluation, functional performance deficits, and assessments, pt has been identified as a high complexity evaluation  Recommend pt d/c to STR when medically stable    Pt to continue to benefit from acute immediate OT services to address the following goals 3-5x/wk to  w/in 7-10 days:     OT Discharge Recommendation: Short Term Rehab  OT - OK to Discharge: Yes (when medically stable)      Comments: Maureen RICH, OTR/L

## 2018-04-18 NOTE — PHYSICAL THERAPY NOTE
PHYSICAL THERAPY NOTE          Patient Name: Marie Javed  XBWIG'E Date: 4/18/2018  Time In: 16:15  Time Out: 16:30  Total Time: 15 min      S:  Pt is in the chair; agreeable to perform LE therex    O:  (B) LE therex performed in the chair as following: (B) ankle pumps 2 x 10 reps, AROM; (B) LAQ 2 x 10 reps, AROM; (B) hip abd/add 2 x 10 reps, AAROM; (B) SCDs re-activated; call bell w/in reach    A:  Additional follow up consecutive session performed to initiate LE therex in order to max strength and to facilitate progression w/ functional mobility skills and overall level of (I)  Pt was able to complete the program in an AAROM/AROM mode; remained in NAD w/ rest periods provided as needed; currently, cont to recommend rehab upon D/C but will cont to monitor progress and make appropriate changes to D/C recommendations as needed; family (dtr) however indicates pt will return home w/ their support (in which case home PT follow up would be recommended); will follow        P:  Cont to follow daily --> BID PRN (M-F) for LE therex, functional mobility training, endurance training and pt education per Bernabe Katz, PT

## 2018-04-18 NOTE — PROGRESS NOTES
Progress Note - Cardiothoracic Surgery   Nikki Tsai 80 y o  female MRN: [id number]  Unit/Bed#: [facility] 415-01 Encounter: [id number]      POD # 1 s/p TAVR    Pt seen/examined  Interval history and data reviewed with ICU team   Pt doing well  No specific complaints          Medications:   Scheduled Meds:  Current Facility-Administered Medications:  acetaminophen 650 mg Rectal Q4H PRN Carl Paul PA-C    acetaminophen 650 mg Oral Q4H PRN Carl Paul PA-C    artificial tear  Both Eyes HS PRN Carl Paul PA-C    bimatoprost 1 drop Both Eyes HS Carl Paul PA-C    bisacodyl 10 mg Rectal Daily PRN Carl Paul PA-C    brimonidine-timolol 1 drop Both Eyes BID Carl Paul PA-C    calcium chloride 1 g Intravenous Once Gustavo Sawyer PA-C    cefazolin 2,000 mg Intravenous Q8H Gustavo Sawyer PA-C Last Rate: Stopped (04/18/18 0415)   chlorhexidine 15 mL Swish & Spit BID Carl Paul PA-C    dorzolamide 1 drop Both Eyes TID Carl Paul PA-C    fentanyl citrate (PF) 50 mcg Intravenous Once Gustavo Sawyer PA-C    fentanyl citrate (PF) 50 mcg Intravenous Q1H PRN Carl Paul PA-C    insulin lispro 1-5 Units Subcutaneous TID AC Carl Paul PA-C    insulin lispro 1-5 Units Subcutaneous HS Carl Paul PA-C    multi-electrolyte 50 mL/hr Intravenous Continuous Gustavo Sawyer PA-C Last Rate: 50 mL/hr (04/17/18 1115)   mupirocin 1 application Nasal N29[address] Carl Paul PA-C    niCARdipine 2 5-15 mg/hr Intravenous Titrated Gustavo Sawyer PA-C Last Rate: 4 mg/hr (04/18/18 0510)   ondansetron 4 mg Intravenous Q6H PRN aCrl Paul PA-C    pantoprazole 40 mg Oral Daily Carl Paul PA-C    polyethylene glycol 17 g Oral Daily Carl Paul PA-C    potassium chloride 20 mEq Intravenous Once PRN Carl Paul PA-C    potassium chloride 20 mEq Intravenous Q1H PRN Carl Paul PA-C    potassium chloride 20 mEq Intravenous Q30 Min PRN Carl Paul PA-C    pravastatin 80 mg Oral Daily With KeySpanALLA    rivaroxaban 20 mg Oral Daily With Breakfast Adilia Cisneros PA-C      Continuous Infusions:  multi-electrolyte 50 mL/hr Last Rate: 50 mL/hr (04/17/18 1115)   niCARdipine 2 5-15 mg/hr Last Rate: 4 mg/hr (04/18/18 0510)       Vitals: Blood pressure (!) 111/47, pulse 66, temperature 100 °F (37 8 °C), resp  rate 16, height 4' 10" (1 473 m), weight 69 2 kg (152 lb 8 9 oz), SpO2 94 %  ,Body mass index is 31 88 kg/m²  I/O last 24 hours: In: 3491 2 [I V :3291 2; IV Piggyback:200]  Out: 1520 [Urine:1520]  Invasive Devices     Central Venous Catheter Line            CVC Central Lines 04/17/18 Triple less than 1 day          Peripheral Intravenous Line            Peripheral IV 04/17/18 Left Wrist less than 1 day    Peripheral IV 04/17/18 Right Wrist less than 1 day          Arterial Line            Arterial Line 04/17/18 Left Radial less than 1 day          Drain            Urethral Catheter Non-latex; Temperature probe 16 Fr  less than 1 day                Physical Exam:   AAOx3  NAD  RRR  CTA B/L  Abd soft  Ext's no edema    Lab, Imaging and other studies:     Results from last 7 days  Lab Units 04/18/18  0353 04/17/18  1926 04/17/18  1114   WBC Thousand/uL 10 39*  --   --    HEMOGLOBIN g/dL 11 6 12 2 11 9   HEMATOCRIT % 33 7* 36 5 35 4   PLATELETS Thousands/uL 208  --  224       Results from last 7 days  Lab Units 04/18/18  0353 04/18/18  0015 04/17/18  1926  04/17/18  1114  04/12/18  1503   SODIUM mmol/L 138  --   --   --  139  --  130*   POTASSIUM mmol/L 3 9 3 9 3 9  < > 4 4  --  4 1   CHLORIDE mmol/L 103  --   --   --  107  --  98*   CO2 mmol/L 28  --   --   --  23  --  28   BUN mg/dL 10  --   --   --  14  --  20   CREATININE mg/dL 0 55*  --   --   --  0 75  --  0 73   GLUCOSE RANDOM mg/dL 182*  --   --   --  161*  --  87   GLUCOSE, ISTAT   --   --   --   --   --   < >  --    CALCIUM mg/dL 8 5  --   -- --  7 9*  --  9 4   < > = values in this interval not displayed  Recent Labs      04/17/18   1149   PHART  7 313*   [id number]  20 8*   PO2ART  126 6   [id number]  41 9   BEART  -5 2           Plan:    ESSENCE Wilson/Lanette/Aidee/Thu  Transfer to floor  Ambulate  Incentive spirometry  Diuresis  PO ASA/Statin/B blocker  Check Echo          SIGNATURE: Lex Escobedo MD  DATE: April 18, 2018  TIME: 7:50 AM

## 2018-04-18 NOTE — PHYSICAL THERAPY NOTE
Physical Therapy Evaluation     Patient's Name: Rosie Enriquez    Admitting Diagnosis  Nonrheumatic aortic valve stenosis [I35 0]    Problem List  Patient Active Problem List   Diagnosis    Diabetes mellitus type 2, uncontrolled ([facility] )    Glaucoma    Hyperlipidemia    Hypertension    Hyponatremia    Depression with anxiety    Paroxysmal atrial fibrillation (HCC)    Acute congestive [facility] failure ([facility] )    Slurred speech    Lethargy    Ataxia    Neurological deficit, transient    Type 2 diabetes mellitus with hyperglycemia ([facility] )    CVA (cerebral vascular accident) ([facility] )    Hearing loss    Insomnia    Left knee pain    PERI (obstructive sleep apnea)    Osteoarthritis    Osteoporosis    Periodic limb movement sleep disorder    Sleep talking    Tinnitus    Tricuspid valve disorders, non-rheumatic    Unsteady gait    History of cardioembolic cerebrovascular accident (CVA)    Viral upper respiratory tract infection    Chronic diastolic heart failure (HCC)    S/P TAVR (transcatheter aortic valve replacement)    Respiratory insufficiency, post operative       Past Medical History  Past Medical History:   Diagnosis Date    Ambulatory dysfunction     CHF (congestive heart failure) ([facility])     pEFHF    Cholelithiasis     Coronary artery disease     Depression with anxiety     Diabetes mellitus ([facility] )     niddm    Dysfunction of eustachian tube     Glaucoma     Hematuria     Hypercholesterolemia     Hypertension     Hyponatremia     Ischemic stroke of frontal lobe ([facility])     Right     Obstructive sleep apnea     OP (osteoporosis)     PAF (paroxysmal atrial fibrillation) ([facility])     Urinary frequency     Vertigo     Viral gastroenteritis     Viral gastroenteritis     Viral infection        Past Surgical History  Past Surgical History:   Procedure Laterality Date    APPENDECTOMY      GLAUCOMA SURGERY  01/03/2016    ME REPLACE AORTIC VALVE OPENFEMORAL ARTERY APPROACH N/A 4/17/2018 Procedure: REPLACEMENT AORTIC VALVE TRANSCATHETER (TAVR) TRANSFEMORAL W/ 23 MM AGUILAR STEVE S3 VALVE;  Surgeon: Mariela Bolaños DO;  [facility]: [facility];  Service: Cardiac Surgery    TUBAL LIGATION            04/18/18 1121   Note Type   Note type Eval only   Pain Assessment   Pain Assessment FLACC   Pain Type Acute pain   Pain Location Incision; Neck  (IV line site; nsg aware)   Pain Orientation Right   Pain Descriptors Aching;Discomfort   Pain Frequency Constant/continuous   Pain Onset Ongoing   Clinical Progression Not changed   Effect of Pain on Daily Activities maintains her neck flexed to (R) side   Patient's Stated Pain Goal No pain   Hospital Pain Intervention(s) Repositioned; Ambulation/increased activity; Distraction; Emotional support   Response to Interventions no change   Pain Rating: FLACC (Rest) - Face 1   Pain Rating: FLACC (Rest) - Legs 0   Pain Rating: FLACC (Rest) - Activity 0   Pain Rating: FLACC (Rest) - Cry 1   Pain Rating: FLACC (Rest) - Consolability 0   Score: FLACC (Rest) 2   Pain Rating: FLACC (Activity) - Face 1   Pain Rating: FLACC (Activity) - Legs 0   Pain Rating: FLACC (Activity) - Activity 0   Pain Rating: FLACC (Activity) - Cry 1   Pain Rating: FLACC (Activity) - Consolability 0   Score: FLACC (Activity) 2   Home Living   Type of Home House   Home Layout One level  (1 GANESH; reports she holds on to her son in law to get in)   [employer]; Wheelchair-manual   Prior Function   Level of [place] Independent with ADLs and functional mobility  (amb w/ occasional use of SPC)   Lives With Family;Daughter   Receives Help From Family   Restrictions/Precautions   Braces or Orthoses (denies)   Other Precautions Cardiac/sternal;Multiple lines;Telemetry; Fall Risk;Pain   General   Additional Pertinent History cleared for assessment (spoke to nsg)   Cognition   Overall Cognitive Status [facility]   Arousal/Participation Alert   Attention Attends with cues to redirect   Orientation Level Oriented to person;Oriented to place;Oriented to situation   Memory Within functional limits   Following Commands Follows one step commands without difficulty   Comments Pt is reclined in the chair; agreeable to mobilize; initially reports lightheadedness upon sitting up in the chair --> symptoms gradually subsided; nsg informed   RUE Assessment   RUE Assessment WFL  (AROM)   LUE Assessment   LUE Assessment WFL  (AROM)   RLE Assessment   RLE Assessment WFL  (AROM)   Strength RLE   RLE Overall Strength 3+/5   LLE Assessment   LLE Assessment WFL  (AROM)   Strength LLE   LLE Overall Strength 3+/5   Transfers   Sit to Stand 3  Moderate assistance   Additional items Assist x 1;Verbal cues  (stand by (A) of 2nd for lines)   Stand to Sit 3  Moderate assistance   Additional items Assist x 1;Verbal cues  (stand by (A) of 2nd for lines)   Additional Comments Pt was reclined in the chair w/ LE elevated at the end of session; pillows placed for back support; SCDs back on and activated; call bell placed w/in reach   Ambulation/Elevation   Gait pattern Excessively slow; Short stride; Inconsistent aubrey   Gait Assistance 4  Minimal assist   Additional items Assist x 1;Verbal cues; Tactile cues  (stand by (A) of 2nd for lines)   Assistive Device Rolling walker   Distance 30 ft; limited by weakness and onset of nausea; pt had an episode of vomiting post amb trial; nsg informed   Balance   Static Sitting Fair   Static Standing Fair -   Ambulatory Poor +   Activity Tolerance   Activity Tolerance Patient limited by fatigue;Treatment limited secondary to medical complications (Comment)   Nurse Made Aware spoke to Zach Brewster RN   Assessment   Prognosis Good   Problem List Decreased strength;Decreased endurance; Impaired balance;Decreased mobility;Pain   Assessment Pt is 80 y o  female admitted with Dx of Nonrheumatic aortic valve stenosis and underwent transcatheter aortic valve replacement via left transfemoral approach on 4/17/2018   Pt 's comorbidities affecting POC include: anxiety and depression, DM, glaucoma, HTN, (R) CVA, PERI, OP, PAF, and vertigo and personal factors of: advanced age and GANESH  Pt's clinical presentation is currently unstable/unpredictable which is evident in ongoing telem monitoring while in a critical care unit w/ a-line in place and DASH required for amb , abn lab values being monitored/trending, and episodes of lightheadedness w/ onset of nausea and vomiting post mobilization  Pt presents w/ general weakness, incl decreased overall LE strength, decreased functional endurance and activity tolerance, impaired balance and gait deviations w/ use of rw at this time and fall risk  Will cont to follow pt in PT for progressive mobilization to address above functional deficits and to max level of (I), endurance, and safety  Currently recommend rehab upon D/C when medically cleared  Will cont to follow until then  Goals   Patient Goals none reported   STG Expiration Date 04/28/18   Short Term Goal #1 7-10 days  Pt will amb 2 x 150 ft w/ rw <--> SPC, mod (I)  Pt will negotiate 1 step w/ appropriate assistive device, (S)x1  Pt will achieve (I) level w/ bed mob  Pt will perform transfers w/ mod (I)  Plan   Treatment/Interventions Functional transfer training;LE strengthening/ROM; Elevations; Therapeutic exercise; Endurance training;Equipment eval/education; Bed mobility;Gait training;Spoke to nursing;Spoke to case management;OT   PT Frequency 5x/wk  (BID PRN)   Recommendation   Recommendation Post acute IP rehab   Equipment Recommended Walker   Modified Appling Scale   Modified Appling Scale 4   Barthel Index   Feeding 10   Bathing 0   Grooming Score 5   Dressing Score 5   Bladder Score 0   Bowels Score 10   Toilet Use Score 5   Transfers (Bed/Chair) Score 5   Mobility (Level Surface) Score 0   Stairs Score 0   Barthel Index Score 40     Jesus Escamilla, PT

## 2018-04-18 NOTE — PLAN OF CARE
Problem: OCCUPATIONAL THERAPY ADULT  Goal: Performs self-care activities at highest level of function for planned discharge setting  See evaluation for individualized goals  Treatment Interventions: ADL retraining, Functional transfer training, UE strengthening/ROM, Endurance training, Patient/family training, Equipment evaluation/education, Compensatory technique education, Continued evaluation, Cardiac education, Energy conservation, Activityengagement          See flowsheet documentation for full assessment, interventions and recommendations  Outcome: Progressing  Limitation: Decreased ADL status, Decreased UE strength, Decreased endurance, Decreased self-care trans, Decreased high-level ADLs, Decreased UE ROM, Decreased Safe judgement during ADL  Prognosis: Fair  Assessment: Patient participated in Skilled OT session 4/18/18 with interventions consisting of ADL re training with the use of correct body mechnaics, Energy Conservation techniques, safety awareness and fall prevention techniques,  therapeutic activities to: increase activity tolerance, increase dynamic sit/ stand balance during functional activity  and increase OOB/ sitting tolerance   Patient agreeable to OT treatment session, upon arrival patient was found seated OOB to Recliner  In comparison to previous session, patient with improvements in LB dressing, transfers, toileting, and functional mobility   Patient requiring verbal cues for safety, verbal cues for correct technique and frequent rest periods  Patient continues to be functioning below baseline level, occupational performance remains limited secondary to factors listed above and increased risk for falls and injury  From OT standpoint, recommendation at time of d/c would be Short Term Rehab when medically stable however spoke w/ daughter who reported they most likely want pt to return home and the daughter will stay home to care for her   If pt and pt's family refuse STR then recommend pt have home Ot, but feel pt would benefit from STR  Patient to benefit from continued Occupational Therapy treatment while in the hospital to address deficits as defined above and maximize level of functional independence with ADLs and functional mobility        OT Discharge Recommendation: Short Term Rehab  OT - OK to Discharge: Yes (when medically stable)      Comments: Maureen Montana MOT, OTR/L

## 2018-04-18 NOTE — PROGRESS NOTES
Progress Note - Critical Care  Penne Lundborg 80 y o  female MRN: [id number]  Unit/Bed#: [facility] 415-01 Encounter: [id number]    Attending Physician: Daron Summers DO    24 Hour Events: POD # 1 s/p TF TAVR  Transitioned from wesly to cardene  Cardene weaned to 4 overnight        Medications:   Scheduled Meds:  Current Facility-Administered Medications:  acetaminophen 650 mg Rectal Q4H PRN Carl Paul PA-C    acetaminophen 650 mg Oral Q4H PRN Carl Paul PA-C    artificial tear  Both Eyes HS PRN Carl Paul PA-C    bimatoprost 1 drop Both Eyes HS Carl Paul PA-C    bisacodyl 10 mg Rectal Daily PRN Carl Paul PA-C    brimonidine-timolol 1 drop Both Eyes BID Carl Paul PA-C    calcium chloride 1 g Intravenous Once Peter Moore PA-C    cefazolin 2,000 mg Intravenous Q8H Peter Moore PA-C Last Rate: Stopped (04/18/18 0415)   chlorhexidine 15 mL Swish & Spit BID Carl Paul PA-C    dorzolamide 1 drop Both Eyes TID Carl Paul PA-C    fentanyl citrate (PF) 50 mcg Intravenous Once Peter Moore PA-C    fentanyl citrate (PF) 50 mcg Intravenous Q1H PRN Carl Paul PA-C    insulin lispro 1-5 Units Subcutaneous TID AC Carl Paul PA-C    insulin lispro 1-5 Units Subcutaneous HS Carl Paul PA-C    multi-electrolyte 50 mL/hr Intravenous Continuous Peter Moore PA-C Last Rate: 50 mL/hr (04/17/18 1115)   mupirocin 1 application Nasal A97C [facility] Carl Paul PA-C    niCARdipine 2 5-15 mg/hr Intravenous Titrated Peter Moore PA-C Last Rate: 4 mg/hr (04/18/18 0510)   ondansetron 4 mg Intravenous Q6H PRN Carl Paul PA-C    pantoprazole 40 mg Oral Daily Carl Paul PA-C    polyethylene glycol 17 g Oral Daily Carl Paul PA-C    potassium chloride 20 mEq Intravenous Once PRN Carl Paul PA-C    potassium chloride 20 mEq Intravenous Q1H PRN Carl Paul PA-C    potassium chloride 20 mEq Intravenous Q30 Min PRN Carl Paul PA-C    pravastatin 80 mg Oral Daily With KeySALLA carmen    rivaroxaban 20 mg Oral Daily With Breakfast Jomar Casey PA-C        VTE Pharmacologic Prophylaxis: xarelto  VTE Mechanical Prophylaxis: sequential compression device    Continuous Infusions:  multi-electrolyte 50 mL/hr Last Rate: 50 mL/hr (18 1115)   niCARdipine 2 5-15 mg/hr Last Rate: 4 mg/hr (18 0510)     PRN Meds:    acetaminophen 650 mg Q4H PRN   acetaminophen 650 mg Q4H PRN   artificial tear  HS PRN   bisacodyl 10 mg Daily PRN   fentanyl citrate (PF) 50 mcg Q1H PRN   ondansetron 4 mg Q6H PRN   potassium chloride 20 mEq Once PRN   potassium chloride 20 mEq Q1H PRN   potassium chloride 20 mEq Q30 Min PRN       Vitals:   Vitals:    18 0100 18 0200 18 0300 18 0400   BP: (!) 113/41 (!) 118/45 131/52 (!) 120/47   Pulse: 66 70 66 66   Resp: 15 18 16 17   Temp: 99 7 °F (37 6 °C) 99 7 °F (37 6 °C) 99 7 °F (37 6 °C) 99 7 °F (37 6 °C)   TempSrc:       SpO2: 95% 96% 95% 95%   Weight:       Height:         Arterial Line BP: 128/38  Arterial Line MAP (mmHg): 66 mmHg    Tele Rhythm: NSR This was personally reviewed by myself  Respiratory:  SpO2: SpO2: 95 %  O2 Flow Rate (L/min): 2 L/min    Temperature: Temp (24hrs), Av 7 °F (37 1 °C), Min:97 2 °F (36 2 °C), Max:99 7 °F (37 6 °C)  Current: Temperature: 99 7 °F (37 6 °C)    Weights:   Weight (last 2 days)     Date/Time   Weight    18 0746  72 6 (160)            IBW: 40 9 kg  Body mass index is 33 44 kg/m²  Hemodynamic Monitoring:  N/A       Intake and Outputs:  Intake/Output Summary (Last 24 hours) at 18 0623  Last data filed at 18 0415   Gross per 24 hour   Intake          3322 88 ml   Output             1445 ml   Net          1877 88 ml     I/O last 24 hours:   In: 3322 9 [I V :3122 9; IV Piggyback:200]  Out: 2599 [LMCYQ:1316]    UOP: /hour     Labs:    Results from last 7 days  Lab Units 04/18/18  0353 04/17/18  1926 04/17/18  1114   WBC Thousand/uL 10 39*  --   --    HEMOGLOBIN g/dL 11 6 12 2 11 9   HEMATOCRIT % 33 7* 36 5 35 4   PLATELETS Thousands/uL 208  --  224       Results from last 7 days  Lab Units 04/18/18  0353 04/18/18  0015 04/17/18  1926  04/17/18  1114 04/17/18  1053  04/12/18  1503   SODIUM mmol/L 138  --   --   --  139  --   --  130*   POTASSIUM mmol/L 3 9 3 9 3 9  < > 4 4  --   --  4 1   CHLORIDE mmol/L 103  --   --   --  107  --   --  98*   CO2 mmol/L 28  --   --   --  23  --   --  28   BUN mg/dL 10  --   --   --  14  --   --  20   CREATININE mg/dL 0 55*  --   --   --  0 75  --   --  0 73   CALCIUM mg/dL 8 5  --   --   --  7 9*  --   --  9 4   GLUCOSE RANDOM mg/dL 182*  --   --   --  161*  --   --  87   GLUCOSE, ISTAT mg/dl  --   --   --   --   --  188*  < >  --    < > = values in this interval not displayed  Results from last 7 days  Lab Units 04/18/18  0353   MAGNESIUM mg/dL 1 7         CXR: Line in appropriate position no PTX visualized  This was personally reviewed by myself  EKG: NSR, T wave flattening throughout This was personally reviewed by myself  Physical Exam:    General Appearance:  NAD  HENT: Atraumatic without obvious abnormality  Eyes: without icterus  Cardiac: Regular rate and rhythm, no murmur, no rub  Pulmonary: clear to auscultation bilaterally  Gastrointestinal: Soft, non distention,  Nontender  : Andino present: yes   Musculoskeletal: Trace edema bilaterally  +1 bilateral DP pulses  Neuro:  Alert and appropriate nonfocal exam     Psych: Mood and affect WNL  Skin: warm and dry   Incisions: Inguinal incision is clean, dry, and intact  Invasive lines and devices:   Invasive Devices     Central Venous Catheter Line            CVC Central Lines 04/17/18 Triple less than 1 day          Peripheral Intravenous Line            Peripheral IV 04/17/18 Left Wrist less than 1 day    Peripheral IV 04/17/18 Right Wrist less than 1 day          Arterial Line            Arterial Line 04/17/18 Left Radial less than 1 day          Drain            Urethral Catheter Non-latex; Temperature probe 16 Fr  less than 1 day                Assessment:  Active Problems:    Hyperlipidemia    Hypertension    Paroxysmal atrial fibrillation (HCC)    Type 2 diabetes mellitus with hyperglycemia (HCC)    History of cardioembolic cerebrovascular accident (CVA)    Chronic diastolic heart failure (HCC)    S/P TAVR (transcatheter aortic valve replacement)    Respiratory insufficiency, post operative      Plan:    Cardiac:   NSR; Hypertensive  Start lopressor 12 5 mg PO BID  Continue statin therapy   No ASA 2/2 allergy, consider antiplatelet   Xarelto  Consult cardiology for postoperative medical management  Follow up transthoracic echocardiogram today  Continue DVT prophylaxis    Pulmonary:    Post-op respiratory insufficiency; Continue incentive spirometry/coughing/deep breathing exercises  Wean supplemental oxygen as tolerated for saturation > 90%    Renal:   Trend BUN and creat  Trend UOP  D/C Andino  Lasix PRN    Neuro:  Neurologically intact; No active issues  Incisional pain well-controlled; Continue prn Tylenol    GI:  Tolerating clear liquid diet   Start Cardiac TLC 2 3 gm sodium diet with 1800 mL fluid restriction  Continue bowel regimen  Continue GI prophylaxis with PPI    Endo:    History of diabetes; Discontinue continuous insulin infusion and resume pre-op regimen with additional sliding scale coverage    Hematology:   Post-operative acute blood loss anemia; Hemoglobin and hematocrit stable; trend PRN    Disposition:  Transfer from ICU to telemetry today  Discharge home tomorrow when postoperative echocardiogram clear    Collaborative bedside rounds performed with cardiac surgery attending and bedside RN      SIGNATURE: Lisa Brandt PA-C  DATE: April 18, 2018  TIME: 6:23 AM

## 2018-04-18 NOTE — PLAN OF CARE
Problem: PHYSICAL THERAPY ADULT  Goal: Performs mobility at highest level of function for planned discharge setting  See evaluation for individualized goals  Treatment/Interventions: Functional transfer training, LE strengthening/ROM, Elevations, Therapeutic exercise, Endurance training, Equipment eval/education, Bed mobility, Gait training, Spoke to nursing, Spoke to case management, OT  Equipment Recommended: Harvey [place]       See flowsheet documentation for full assessment, interventions and recommendations  Prognosis: Good  Problem List: Decreased strength, Decreased endurance, Impaired balance, Decreased mobility, Pain  Assessment: Pt is 80 y o  female admitted with Dx of Nonrheumatic aortic valve stenosis and underwent transcatheter aortic valve replacement via left transfemoral approach on 4/17/2018  Pt 's comorbidities affecting POC include: anxiety and depression, DM, glaucoma, HTN, (R) CVA, PERI, OP, PAF, and vertigo and personal factors of: advanced age and GANESH  Pt's clinical presentation is currently unstable/unpredictable which is evident in ongoing telem monitoring while in a critical care unit w/ a-line in place and DASH required for amb , abn lab values being monitored/trending, and episodes of lightheadedness w/ onset of nausea and vomiting post mobilization  Pt presents w/ general weakness, incl decreased overall LE strength, decreased functional endurance and activity tolerance, impaired balance and gait deviations w/ use of rw at this time and fall risk  Will cont to follow pt in PT for progressive mobilization to address above functional deficits and to max level of (I), endurance, and safety  Currently recommend rehab upon D/C when medically cleared  Will cont to follow until then  Recommendation: Post acute IP rehab          See flowsheet documentation for full assessment

## 2018-04-18 NOTE — SOCIAL WORK
MCG Guide Used for Initial Round: TAVR  Optimal GLOS: 3 days postop  Hospital Day: 1 day  DC Readiness:   Goal Length of Stay: 3 days postoperative  Note: Goal Length of Stay assumes optimal recovery, decision making, and care  Patients may be discharged to a lower level of care (either later than or sooner than the goal) when it is appropriate for their clinical status and care needs  Discharge Readiness  Return to top of Aortic Valve Replacement, Transcatheter RRG - ISC   Discharge readiness is indicated by patient meeting Recovery Milestones, including ALL of the following:   Mechanical ventilation absent   Hemodynamic stability   No evidence of bleeding   Vascular access site without evidence of infection, aneurysm, or growing hematoma   Renal function at baseline or acceptable for next level of care   Chest pain, dyspnea, or anginal equivalent absent   Oxygenation at baseline   Dangerous arrhythmia absent   New neurologic deficit absent   Ambulatory or at baseline   Oral hydration, medications, and diet   Discharge plans and education understood    Identified Barriers:  DM2, resp   Insufficiency postop, CHF, CVA last month    Hyperglycemia and diabetes control   Extended stay beyond goal length of stay for primary condition may be needed until ALL of the following are present:   Resolution of ketoacidosis, hyperosmolar state, or manifestations of severe hyperglycemia or hypoglycemia as indicated by ALL of the following:   Electrolytes and pH normal or at stable baseline   Mental status normal or at stable baseline   Dehydration absent and adequate diet tolerated   No precipitating cause identified that requires ongoing inpatient care  Glucose adequately controlled for next level of care   Glucose control plan in place for next level of care  Respiratory insufficiency   Extended stay beyond goal length of stay for primary condition may be needed until ALL of the following are present:   Evaluation of cause of respiratory insufficiency complete   Suctioning, pulmonary toilet, or other therapy performable at lower level of care   Respiratory insufficiency has resolved or is manageable at lower level of care  Anticoagulation treatment is not needed or can be performed at lower level of care     Medical comorbidities manageable at lower level of care    Discussion Date (Time): 04/18/18 with Dr Viola Negron

## 2018-04-18 NOTE — SOCIAL WORK
CM met w/ pt to obtain info  Pt's daughter was at bedside  Pt reported she resides w/ her daughter and son-in-law only in a 1-story ranch house w/ no steps inside and 3 steps to enter  Pt's daughter Eduardo Dalal ([phone]) is primary contact  Pt reported she is independent at baseline w/ performing her ADLS, but uses a cane to ambulate  Pt reported additional DME in home consisting of unused wheelchair and unused electric scooter  Pt reported she has hx of [facility] services in Jan 2016, but cannot recall name of agency used, nor could daughter recall name of agency used  Pt reported no hx of i/p rehab placements  Pt reported no hx of mental health issues nor D&A issues  Pt reported her PCP is Dr Edith Alexandra through [facility]  Pt reported she uses [facility] located on [address] in [facility] for her Rx needs  Pt reported she is retired and receives [employer] as her only source of income  Pt is enrolled in [employer] for healthcare coverage and Rx benefits  Pt's daughter reported pt has a Living Will which legally pre-designates her as the pt's medical POA appointed for her in emergencies  Daughter confirmed she can provide transportation for pt at time of d/c     CM reviewed d/c planning process including the following: identifying help at home, patient preference for d/c planning needs, Discharge Lounge, [facility] Meds to Bed program, availability of treatment team to discuss questions or concerns patient and/or family may have regarding understanding medications and recognizing signs and symptoms once discharged  CM also encouraged patient to follow up with all recommended appointments after discharge  Patient advised of importance for patient and family to participate in managing patients medical well being  Patient/caregiver received discharge checklist  Content reviewed   Patient/caregiver encouraged to participate in discharge plan of care prior to discharge home  CM will reassess pt for d/c needs once recommendations for her aftercare are made by the tx team  CM to follow

## 2018-04-19 VITALS
SYSTOLIC BLOOD PRESSURE: 165 MMHG | TEMPERATURE: 98.8 F | DIASTOLIC BLOOD PRESSURE: 70 MMHG | BODY MASS INDEX: 34.38 KG/M2 | HEIGHT: 58 IN | HEART RATE: 70 BPM | WEIGHT: 163.8 LBS | OXYGEN SATURATION: 96 % | RESPIRATION RATE: 20 BRPM

## 2018-04-19 LAB
ABO GROUP BLD BPU: NORMAL
ANION GAP SERPL CALCULATED.3IONS-SCNC: 8 MMOL/L (ref 4–13)
BASOPHILS # BLD AUTO: 0.02 THOUSANDS/ΜL (ref 0–0.1)
BASOPHILS NFR BLD AUTO: 0 % (ref 0–1)
BPU ID: NORMAL
BUN SERPL-MCNC: 15 MG/DL (ref 5–25)
CALCIUM SERPL-MCNC: 8.9 MG/DL (ref 8.3–10.1)
CHLORIDE SERPL-SCNC: 99 MMOL/L (ref 100–108)
CO2 SERPL-SCNC: 27 MMOL/L (ref 21–32)
CREAT SERPL-MCNC: 0.76 MG/DL (ref 0.6–1.3)
CROSSMATCH: NORMAL
EOSINOPHIL # BLD AUTO: 0.24 THOUSAND/ΜL (ref 0–0.61)
EOSINOPHIL NFR BLD AUTO: 2 % (ref 0–6)
ERYTHROCYTE [DISTWIDTH] IN BLOOD BY AUTOMATED COUNT: 13.1 % (ref 11.6–15.1)
GFR SERPL CREATININE-BSD FRML MDRD: 73 ML/MIN/1.73SQ M
GLUCOSE SERPL-MCNC: 163 MG/DL (ref 65–140)
GLUCOSE SERPL-MCNC: 166 MG/DL (ref 65–140)
GLUCOSE SERPL-MCNC: 240 MG/DL (ref 65–140)
GLUCOSE SERPL-MCNC: 331 MG/DL (ref 65–140)
HCT VFR BLD AUTO: 34.8 % (ref 34.8–46.1)
HGB BLD-MCNC: 11.9 G/DL (ref 11.5–15.4)
LYMPHOCYTES # BLD AUTO: 1.88 THOUSANDS/ΜL (ref 0.6–4.47)
LYMPHOCYTES NFR BLD AUTO: 17 % (ref 14–44)
MCH RBC QN AUTO: 31 PG (ref 26.8–34.3)
MCHC RBC AUTO-ENTMCNC: 34.2 G/DL (ref 31.4–37.4)
MCV RBC AUTO: 91 FL (ref 82–98)
MONOCYTES # BLD AUTO: 1.14 THOUSAND/ΜL (ref 0.17–1.22)
MONOCYTES NFR BLD AUTO: 10 % (ref 4–12)
NEUTROPHILS # BLD AUTO: 7.77 THOUSANDS/ΜL (ref 1.85–7.62)
NEUTS SEG NFR BLD AUTO: 71 % (ref 43–75)
NRBC BLD AUTO-RTO: 0 /100 WBCS
PLATELET # BLD AUTO: 174 THOUSANDS/UL (ref 149–390)
PMV BLD AUTO: 10.6 FL (ref 8.9–12.7)
POTASSIUM SERPL-SCNC: 3.7 MMOL/L (ref 3.5–5.3)
RBC # BLD AUTO: 3.84 MILLION/UL (ref 3.81–5.12)
SODIUM SERPL-SCNC: 134 MMOL/L (ref 136–145)
UNIT DISPENSE STATUS: NORMAL
UNIT PRODUCT CODE: NORMAL
UNIT RH: NORMAL
WBC # BLD AUTO: 11.08 THOUSAND/UL (ref 4.31–10.16)

## 2018-04-19 PROCEDURE — 93306 TTE W/DOPPLER COMPLETE: CPT | Performed by: INTERNAL MEDICINE

## 2018-04-19 PROCEDURE — 82948 REAGENT STRIP/BLOOD GLUCOSE: CPT

## 2018-04-19 PROCEDURE — 97530 THERAPEUTIC ACTIVITIES: CPT

## 2018-04-19 PROCEDURE — 85025 COMPLETE CBC W/AUTO DIFF WBC: CPT | Performed by: PHYSICIAN ASSISTANT

## 2018-04-19 PROCEDURE — 99238 HOSP IP/OBS DSCHRG MGMT 30/<: CPT | Performed by: THORACIC SURGERY (CARDIOTHORACIC VASCULAR SURGERY)

## 2018-04-19 PROCEDURE — 97116 GAIT TRAINING THERAPY: CPT

## 2018-04-19 PROCEDURE — 99222 1ST HOSP IP/OBS MODERATE 55: CPT | Performed by: NURSE PRACTITIONER

## 2018-04-19 PROCEDURE — 97535 SELF CARE MNGMENT TRAINING: CPT

## 2018-04-19 PROCEDURE — 80048 BASIC METABOLIC PNL TOTAL CA: CPT | Performed by: PHYSICIAN ASSISTANT

## 2018-04-19 RX ORDER — LISINOPRIL 10 MG/1
10 TABLET ORAL 2 TIMES DAILY
Status: DISCONTINUED | OUTPATIENT
Start: 2018-04-19 | End: 2018-04-19 | Stop reason: HOSPADM

## 2018-04-19 RX ORDER — HYDRALAZINE HYDROCHLORIDE 20 MG/ML
5 INJECTION INTRAMUSCULAR; INTRAVENOUS ONCE
Status: COMPLETED | OUTPATIENT
Start: 2018-04-19 | End: 2018-04-19

## 2018-04-19 RX ORDER — POTASSIUM CHLORIDE 20 MEQ/1
20 TABLET, EXTENDED RELEASE ORAL ONCE
Status: COMPLETED | OUTPATIENT
Start: 2018-04-19 | End: 2018-04-19

## 2018-04-19 RX ORDER — OMEPRAZOLE 20 MG/1
20 CAPSULE, DELAYED RELEASE ORAL DAILY
Qty: 30 CAPSULE | Refills: 0 | Status: SHIPPED | OUTPATIENT
Start: 2018-04-19 | End: 2018-05-02 | Stop reason: SINTOL

## 2018-04-19 RX ADMIN — GLIPIZIDE 10 MG: 5 TABLET, EXTENDED RELEASE ORAL at 07:55

## 2018-04-19 RX ADMIN — MUPIROCIN 1 APPLICATION: 20 OINTMENT TOPICAL at 09:49

## 2018-04-19 RX ADMIN — HYDRALAZINE HYDROCHLORIDE 5 MG: 20 INJECTION INTRAMUSCULAR; INTRAVENOUS at 03:55

## 2018-04-19 RX ADMIN — DORZOLAMIDE HCL 1 DROP: 20 SOLUTION/ DROPS OPHTHALMIC at 09:48

## 2018-04-19 RX ADMIN — INSULIN LISPRO 1 UNITS: 100 INJECTION, SOLUTION INTRAVENOUS; SUBCUTANEOUS at 07:00

## 2018-04-19 RX ADMIN — BRIMONIDINE TARTRATE, TIMOLOL MALEATE 1 DROP: 2; 5 SOLUTION/ DROPS OPHTHALMIC at 09:48

## 2018-04-19 RX ADMIN — RIVAROXABAN 20 MG: 20 TABLET, FILM COATED ORAL at 07:55

## 2018-04-19 RX ADMIN — FUROSEMIDE 20 MG: 20 TABLET ORAL at 09:48

## 2018-04-19 RX ADMIN — INSULIN LISPRO 4 UNITS: 100 INJECTION, SOLUTION INTRAVENOUS; SUBCUTANEOUS at 11:43

## 2018-04-19 RX ADMIN — POTASSIUM CHLORIDE 20 MEQ: 1500 TABLET, EXTENDED RELEASE ORAL at 11:17

## 2018-04-19 RX ADMIN — LISINOPRIL 10 MG: 10 TABLET ORAL at 09:49

## 2018-04-19 RX ADMIN — POTASSIUM CHLORIDE 10 MEQ: 750 TABLET, EXTENDED RELEASE ORAL at 09:49

## 2018-04-19 RX ADMIN — ESCITALOPRAM OXALATE 5 MG: 10 TABLET ORAL at 09:48

## 2018-04-19 RX ADMIN — METOPROLOL TARTRATE 12.5 MG: 25 TABLET ORAL at 09:49

## 2018-04-19 RX ADMIN — METFORMIN HYDROCHLORIDE 1000 MG: 500 TABLET, FILM COATED ORAL at 07:55

## 2018-04-19 RX ADMIN — PANTOPRAZOLE SODIUM 40 MG: 40 TABLET, DELAYED RELEASE ORAL at 09:49

## 2018-04-19 NOTE — DISCHARGE INSTRUCTIONS
Transcatheter Aortic Valve Replacement   WHAT YOU NEED TO KNOW:   · A TAVR is a procedure to replace your aortic valve  It is done without removing your old valve  The aortic valve is between the left ventricle and the aorta  The left ventricle is the lower left chamber of your heart  The aorta is a blood vessel that pumps blood to your brain and body  The aortic valve opens and closes to let blood flow from your heart to the rest of your body  · TAVR may be used to replace your aortic valve if open heart surgery is not safe for you  Your valve will be replaced with a tissue valve  The tissue may be taken from an animal, such as a pig or cow  DISCHARGE INSTRUCTIONS:   Call 911 for any of the following:   · You have any of the following signs of a heart attack:      ¨ Squeezing, pressure, or pain in your chest that lasts longer than 5 minutes or returns    ¨ Discomfort or pain in your back, neck, jaw, stomach, or arm     ¨ Trouble breathing    ¨ Nausea or vomiting    ¨ Lightheadedness or a sudden cold sweat, especially with chest pain or trouble breathing    · You have any of the following signs of a stroke:      ¨ Numbness or drooping on one side of your face     ¨ Weakness in an arm or leg    ¨ Confusion or difficulty speaking    ¨ Dizziness, a severe headache, or vision loss    · You feel lightheaded, short of breath, and have chest pain  · You cough up blood  · You have trouble breathing  Seek care immediately if:   · Blood soaks through your bandage  · Your stitches come apart  · Your wound gets swollen quickly  · Your heart is beating faster or slower than usual      · Your arm or leg feels numb, cool, or looks pale  · Your arm or leg feels warm, tender, and painful  It may look swollen and red  · You feel weak or dizzy  Contact your healthcare provider if:   · You have a fever or chills  · Your wound is red, swollen, or draining pus      · Your wound looks more bruised or there is new bruising near your wound  · You have nausea or are vomiting  · Your skin is itchy, swollen, or you have a rash  · You have questions or concerns about your condition or care  Medicines: You may need any of the following:  · Blood thinners    help prevent blood clots  Examples of blood thinners include heparin and warfarin  Clots can cause strokes, heart attacks, and death  The following are general safety guidelines to follow while you are taking a blood thinner:    ¨ Watch for bleeding and bruising while you take blood thinners  Watch for bleeding from your gums or nose  Watch for blood in your urine and bowel movements  Use a soft washcloth on your skin, and a soft toothbrush to brush your teeth  This can keep your skin and gums from bleeding  If you shave, use an electric shaver  Do not play contact sports  ¨ Tell your dentist and other healthcare providers that you take anticoagulants  Wear a bracelet or necklace that says you take this medicine  ¨ Do not start or stop any medicines unless your healthcare provider tells you to  Many medicines cannot be used with blood thinners  ¨ Tell your healthcare provider right away if you forget to take the medicine, or if you take too much  ¨ Warfarin  is a blood thinner that you may need to take  The following are things you should be aware of if you take warfarin  § Foods and medicines can affect the amount of warfarin in your blood  Do not make major changes to your diet while you take warfarin  Warfarin works best when you eat about the same amount of vitamin K every day  Vitamin K is found in green leafy vegetables and certain other foods  Ask for more information about what to eat when you are taking warfarin  § You will need to see your healthcare provider for follow-up visits when you are on warfarin  You will need regular blood tests  These tests are used to decide how much medicine you need      · Antiplatelets , such as aspirin, help prevent blood clots  Take your antiplatelet medicine exactly as directed  These medicines make it more likely for you to bleed or bruise  If you are told to take aspirin, do not take acetaminophen or ibuprofen instead  · Acetaminophen  helps decrease your pain  This medicine is available without a doctor's order  Ask how much medicine is safe to take, and how often to take it  Acetaminophen can cause liver damage if not taken correctly  · Take your medicine as directed  Contact your healthcare provider if you think your medicine is not helping or if you have side effects  Tell him or her if you are allergic to any medicine  Keep a list of the medicines, vitamins, and herbs you take  Include the amounts, and when and why you take them  Bring the list or the pill bottles to follow-up visits  Carry your medicine list with you in case of an emergency  Care for your wound as directed:  Ask your healthcare provider when your wound can get wet  Carefully wash around the wound with soap and water  Do not scrub your wound  You can let soap and water gently run over your wound  Gently pat dry the area and put on new, clean bandages as directed  Check your wound every day for signs of infection such as swelling, pus, or redness  Change your bandages when they get wet or dirty  Do not put lotions or powders on your wound  Do not take a bath or swim until your healthcare provider says it is okay  These activities can increase your risk for a wound infection  Activity:  Do not lift anything heavier than 5 pounds or do vigorous activities such as sports  These actions will put too much stress on your wound and heart  Take short walks around the house several times a day  This will help prevent blood clots  Ask your healthcare provider what other activities are safe for you to do  Also ask when you can return to your normal activities and work or school  Self-care:   · Eat heart healthy foods    You may need to eat foods that are low in salt, fat, or cholesterol  Healthy foods include fruits, vegetables, whole-grain breads, low-fat dairy products, beans, lean meats, and fish  Ask your healthcare provider for more information about a heart healthy diet  · Do not smoke  Nicotine and other chemicals in cigarettes and cigars can cause heart and lung damage  Nicotine can also slow healing  Ask your healthcare provider for information if you currently smoke and need help to quit  E-cigarettes or smokeless tobacco still contain nicotine  Talk to your healthcare provider before you use these products  · Do not drink alcohol  Ask your cardiologist if it is safe for you to drink alcohol  Alcohol can increase your risk for high blood pressure, diabetes, and coronary artery disease  · Maintain a healthy weight  Ask your healthcare provider how much you should weigh  Extra weight can increase the stress on your heart  Ask him to help you create a weight loss plan if you are overweight  · Exercise as directed after you recover  Your healthcare provider can help you create an exercise plan that is right for you  Exercise will help keep your heart healthy  Ask your healthcare provider if you need antibiotics before procedures:  Some procedures may allow bacteria to get into your blood and travel to your heart  This can cause an infection in your heart and prevent you from healing  You may need antibiotics before certain procedures that happen in the next 6 months to prevent infection  This may also include certain dental procedures  Ask your healthcare provider for more information  Follow up with your healthcare provider as directed: You may need to return for tests  These tests will make sure your valve is working correctly  Write down your questions so you remember to ask them during your visits    © 2017 [employer] Information is for End User's use only and may not be sold, redistributed or otherwise used for commercial purposes  All illustrations and images included in CareNotes® are the copyrighted property of A [employer] A M [employer]  or Wyatt [employer]as  The above information is an  only  It is not intended as medical advice for individual conditions or treatments  Talk to your doctor, nurse or pharmacist before following any medical regimen to see if it is safe and effective for you  Meal Planning with Diabetes Exchanges   WHAT YOU NEED TO KNOW:   What do I need to know about diabetes exchanges? Exchanges are servings of food that contain similar amounts of carbohydrate, fat, protein, and calories within a food group  The exchanges can be used to develop a healthy meal plan that helps to keep your blood sugar within the recommended levels  A meal plan with the right amount of carbohydrates is especially important  Your blood sugar naturally rises after you eat carbohydrates  Too many carbohydrates in 1 meal or snack can raise your blood sugar level  Carbohydrates are found in starches, fruit, milk, yogurt, and sweets  How do I create a meal plan with exchanges? A dietitian will work with you to develop a healthy meal plan that is right for you  This meal plan will include the amount of exchanges you can have from each food group throughout the day  Follow your meal plan by keeping track of the amount of exchanges you eat for each meal and snack  Your meal plan will be based on your age, weight, blood sugar levels, medicine, and activity level  What are the starch food group exchanges? Each exchange below contains about 15 grams of carbohydrate , 3 grams of protein, 1 gram of fat, and 80 calories  · 1 ounce of white, whole wheat or rye bread (1 slice)    · 1 ounce of bagel (about ¼ of a bagel)    · 1 6-inch flour or corn tortilla or 1 4-inch pancake (about ¼ inch thick)    · ?  cup of cooked pasta or rice    · ¾ cup of dry, ready-to-eat cereal with no sugar added     · ½ cup of cooked cereal, such as oatmeal    · 3 chaz cracker squares or 8 animal crackers    · 6 saltine-type crackers or     · 3 cups of popcorn or ¾ ounce of pretzels     · Starchy vegetables and cooked legumes:      ¨ ½ cup of corn, green peas, sweet potatoes, or mashed potatoes     ¨ ¼ of a large baked potato     ¨ 1 cup of acorn, butternut squash, or pumpkin     ¨ ½ cup of beans, lentils, or peas (such as bonner, kidney, or black-eyed)    ¨ ? cup of lima beans  What are the fruit group exchanges? Each exchange contains about 15 grams of carbohydrate  and 60 calories  · 1 small (4 ounce) apple, banana orange, or nectarine    · ½ cup of canned or fresh fruit    · ½ cup (4 ounces) of unsweetened fruit juice    · 2 tablespoons of dried fruit  What are the milk group exchanges? Each exchange contains about 12 grams of carbohydrate  and 8 grams of protein  The amount of fat and calories in each serving depends on the type of milk (such as whole, low-fat, or fat-free)  · 1 cup fat-free or low-fat milk    · ¾ cup of plain, nonfat yogurt    · 1 cup fat-free, flavored yogurt with artificial (no calorie) sweetener  What are the non-starchy vegetable group exchanges? Each exchange contains about 5 grams of carbohydrate , 2 grams of protein, and 25 calories  Examples include beets, broccoli, cabbage, carrots, cauliflower, cucumber, mushrooms, tomatoes, and zucchini  · ½ cup of cooked vegetables or 1 cup of raw vegetables     · ½ cup of vegetable juice  What are the meat and meat substitute group exchanges? Each exchange of a lean meat  listed below contains about 7 grams of protein, 0 to 3 grams of fat, and 45 calories  The meat and meat substitutes food group does not contain any carbohydrates  Medium and high-fat meats have more calories    · 1 ounce of chicken or turkey without skin, or 1 ounce of fish (not breaded or fried)     · 1 ounce of lean beef, pork, or lamb     · 1-inch cube or 1 ounce of low-fat cheese · 2 egg whites or ¼ cup of egg substitute     · ½ cup of tofu  What are the sweets, desserts, and other carbohydrate group exchanges? · Sweets and other desserts:  Each exchange has about 15 grams of carbohydrate   ¨ 1 ounce of cynthia food cake or 2-inch square cake (unfrosted)    ¨ 2 small cookies     ¨ ½ cup of sugar-free, fat-free ice cream    ¨ 1 tablespoon of syrup, jam, jelly, table sugar, or honey    · Combination foods:     ¨ 1 cup of an entrée, such as lasagna, spaghetti with meatballs, macaroni and cheese, and chili with beans (each serving counts as 2 carbohydrate exchanges )     ¨ 1 cup of tomato or vegetable beef soup (each serving counts as 1 carbohydrate exchange )  What are the fat group exchanges? Each exchange contains 5 grams of fat and 45 calories  · 1 teaspoon of oil (such as canola, olive, or corn oil)     · 6 almonds or cashews, 10 peanuts, or 4 pecan halves     · 2 tablespoons of avocado     · ½ tablespoon of peanut butter     · 1 teaspoon of regular margarine or 2 teaspoons of low-fat margarine     · 1 teaspoon of regular butter or 1 tablespoon of low-fat butter     · 1 teaspoon of regular mayonnaise or 1 tablespoon of low-fat mayonnaise     · 1 tablespoon of regular salad dressing or 2 tablespoons of low-fat salad dressing  What are free foods? The foods on this list are called free foods because they have very few calories  Free foods usually do not increase your blood sugar if you limit them    · 1 tablespoon of catsup or taco sauce     · ¼ cup of salsa     · 2 tablespoons of sugar-free syrup or 2 teaspoons of light jam or jelly     · 1 tablespoon of fat-free salad dressing     · 4 tablespoons of fat-free margarine or fat-free mayonnaise     · Sugar-free drinks: diet soda, sugar-free drink mixes, or mineral water     · Low-sodium bouillon or fat-free broth     · Mustard     · Seasonings such as spices, herbs, and garlic     · Sugar-free gelatin without added fruit  What other healthy nutrition guidelines should I follow? · Eat more fiber  Choose foods that are good sources of fiber, such as fruits, vegetables, and whole grains  Cereals that contain 5 or more grams of fiber per serving are good sources of fiber  Legumes such as garbanzo, bonner beans, kidney beans, and lentils are also good sources  · Limit fat  Ask your dietitian or healthcare provider how much fat you should eat each day  Choose foods low in fat, saturated fat, trans fat, and cholesterol  Examples include turkey or chicken without the skin, fish, lean cuts of meat, and beans  Low-fat dairy foods, such as low-fat or fat-free milk and low-fat yogurt are also good choices  Omega-3 fatty acids are healthy fats that are found in canola oil, soybean oil and fatty fish  [place], albacore tuna, and sardines are good sources of omega 3 fatty acids  Eat 2 servings of these types of fish each week  Do not eat fried fish  · Limit sugar  Sugar and sweets must be counted toward the carbohydrate exchanges that you can have within your meal plan  Limit sugar and sweets because they are usually also high in calories and fat  Eat smaller portions of sweets by sharing a dessert or asking for a child-size portion at a restaurant  · Limit sodium  (salt) to about 2,300 mg per day  You may need to eat even less sodium if you have certain medical conditions  Foods high in sodium include soy sauce, potato chips, and soup  · Limit alcohol  Ask your healthcare provider if it is safe for you to drink alcohol  If alcohol is safe for you to have, eat a meal when you drink alcohol  If you drink alcohol on an empty stomach, your blood sugar may drop to a low level  Women should limit alcohol to 1 drink per day  Men should limit alcohol to 2 drinks per day  A drink of alcohol is 5 ounces of wine, 12 ounces of beer, or 1½ ounces of liquor  What else can I do to manage my diabetes? · Control your blood sugar level    Test your blood sugar level regularly and keep a record of the results  Ask your healthcare provider when and how often to test your blood sugar  You may need to check your blood sugar level at least 3 times each day  · Talk to your healthcare provider about your weight  Ask if you need to lose weight, and how much you need to lose  If you are overweight, you may need to make other changes to lose weight  Ask your healthcare provider to help you create a weight loss program      · Exercise  can help to control your blood sugar levels and decrease your risk of heart disease  It can also help you lose or maintain your weight  Get at least 30 minutes of exercise, 5 times each week  Do resistance training (using weights) 2 times each week  Do not sit for longer than 90 minutes  Work with your healthcare provider to plan the best exercise program for you  When should I contact my healthcare provider? · You have high blood sugar levels during a certain time of day, or almost all of the time  · You often have low blood sugar levels  · You have questions or concerns about your condition or care  CARE AGREEMENT:   You have the right to help plan your care  Discuss treatment options with your caregivers to decide what care you want to receive  You always have the right to refuse treatment  The above information is an  only  It is not intended as medical advice for individual conditions or treatments  Talk to your doctor, nurse or pharmacist before following any medical regimen to see if it is safe and effective for you  © 2017 [employer]  Information is for End User's use only and may not be sold, redistributed or otherwise used for commercial purposes  All illustrations and images included in CareNotes® are the copyrighted property of [employer]  or [employer]  Urinary Leg Bag   WHAT YOU NEED TO KNOW:   What is a urinary leg bag?   A urinary leg bag holds urine that drains from your catheter  It fits under your clothes and allows you to do your normal daily activities  How do I use a urinary leg bag? · Wash your hands  before and after you touch your catheter, tubing, or drainage bag  Use soap and water  This reduces the risk of infection  · Strap your leg bag to your thigh or calf  Make sure the straps are comfortable  The straps can cause problems with blood flow in your leg if they are too tight  · Clean the tip of the drainage tube with alcohol  before attaching it to your catheter  This helps prevent bacteria from getting into your catheter  · The connecting tube should not pull on your catheter  Skin breakdown can occur if there is constant pulling on the catheter  · Check the tube often to make sure it is not kinked or twisted  Blockage in the tube can cause urine to back up into your bladder  Your urine must flow straight through the tube into your leg bag  · Always keep the leg bag below your bladder  This prevents urine from the bag going back into your bladder, which may cause an infection  · Empty your leg bag when it is ½ full, or every 3 hours  A full bag may break or disconnect from the catheter  · Change to your bedside bag before you go to bed  Your bedside bag can hold more urine  Do not use your leg bag at night because it could become too full or break  · Clean your leg bag after every use  Fill the bag with 2 parts vinegar and 3 parts water  Let it soak for 20 minutes, then rinse and let dry  Follow your healthcare provider's instruction on replacing your leg bag with a new one  CARE AGREEMENT:   You have the right to help plan your care  Learn about your health condition and how it may be treated  Discuss treatment options with your caregivers to decide what care you want to receive  You always have the right to refuse treatment  The above information is an  only   It is not intended as medical advice for individual conditions or treatments  Talk to your doctor, nurse or pharmacist before following any medical regimen to see if it is safe and effective for you  © 2017 [employer] Information is for End User's use only and may not be sold, redistributed or otherwise used for commercial purposes  All illustrations and images included in CareNotes® are the copyrighted property of [employer]  or [employer]  Lisa Cath Care instructions---Maintain lisa catheter to straight drainage  May use leg bag and shower  May flush daily prn using Shen syringe and 120 ml NSS  May use more saline ad leighton to prevent/treat cath obstruction  Remove catheter on 8th day post-hospital discharge by 0800 hrs  (If on weekend, wait until next business day)  Straight cath if no void or less than 200ml in by next AM & repeat process  If patient requires straight cath x2days, re-insert lisa and call for Urologist follow-up  Information above  Lisa can remain in place for up to 4 weeks at a time  Lisa placed at [facility] April 19, 2018

## 2018-04-19 NOTE — SOCIAL WORK
Pt is recommended for short-term skilled rehab placement for her aftercare  CM met w/ pt and her daughter Aries Cavanaugh to discuss recommendation  Pt and daughter are both not agreeable to recommendation  Pt and daughter reported they would be agreeable for [facility] to provide [facility]home care instead of SNF placement  CM held discussion w/ pt and her daughter about the risks of [facility] versus pursuing recommended i/p rehab  After discussion, the pt and daughter persisted w/ refusing SNF rehab for aftercare  CM provided pt and daughter w/ freedom of choice for [facility]  Pt and daughter requested referral to [facility]  CM made Ecin referral to [facility]  CM to follow

## 2018-04-19 NOTE — PHYSICAL THERAPY NOTE
Physical Therapy Progress Note     04/19/18 1121   Pain Assessment   Pain Assessment No/denies pain   Restrictions/Precautions   Weight Bearing Precautions Per Order No   Other Precautions Cardiac/sternal;Fall Risk   General   Chart Reviewed Yes   Cognition   Overall Cognitive Status WFL   Subjective   Subjective Agreeable to PT  I have to [employer] to Stand 5  Supervision   Additional items Assist x 1;Verbal cues; Increased time required;Armrests   Stand to Sit 5  Supervision   Additional items Assist x 1; Increased time required;Armrests; Verbal cues   Toilet transfer 3  Moderate assistance   Additional items Assist x 1;Verbal cues;Standard toilet; Increased time required;Armrests   Ambulation/Elevation   Gait pattern Excessively slow; Short stride; Inconsistent aubrey;Decreased foot clearance   Gait Assistance (CGA)   Additional items Assist x 1;Verbal cues   Assistive Device Rolling walker   Distance 160ft with2 standing rests   Stair Management Assistance 3  Moderate assist   Additional items Assist x 1;Verbal cues; Increased time required   Stair Management Technique Other (Comment); With walker  (HHA)   Number of Stairs 2   Balance   Static Sitting Fair +   Static Standing Fair   Ambulatory Fair -   Activity Tolerance   Activity Tolerance Patient limited by fatigue;Patient tolerated treatment well   Nurse Made Aware Yes   Assessment   Prognosis Good   Problem List Decreased strength;Decreased range of motion;Decreased endurance; Impaired balance;Decreased mobility; Impaired judgement;Decreased safety awareness; Obesity   Assessment Pt  progressed well with ambulation distance  However needed 2 standing brief rests  Pt  needed Mod A x 1 for sit to stand from toilet seat  VCs for hand placement with transfers  Pt  reminded of cardiac precautions  Negotiated 1 curb step twice with RW and Min A  Attempted curb step negotiation with HHA on the R however pt  unable to complete it   Pt  reports usually her daughter or son in law helps with the step and toilet transfer at home  Pt  recommended d/c to rehab to improve strength and overall functional mobility  Decreased endurance and strength noted this session  Goals   Patient Goals None reported   STG Expiration Date 04/28/18   Plan   Treatment/Interventions Functional transfer training;Elevations; Patient/family training;Equipment eval/education;Gait training;Spoke to nursing;Spoke to case management  (PT)   Progress Progressing toward goals   PT Frequency 5x/wk; Other (Comment)  (BID prn)   Recommendation   Recommendation Post acute IP rehab   Equipment Recommended Walker   PT - OK to Discharge Yes         Lu Duenas, PTA

## 2018-04-19 NOTE — CONSULTS
CONSULT    Patient Name: Luciana Almanzar  Patient MRN: [id number]  Date of Service: 4/19/2018   Date / Time Note Created: 4/19/2018 3:23 PM   Referring Provider: Dr Fang Giordano  Provider Creating Note: ROBBI Rosa    PCP: Kameron Romeo  Attending Provider:  Rafa Fall DO    Reason for Consult: Urinary Retention    HPI --Ms Deniz Tavera is an 80-year-old medically complex female admitted for symptomatic severe aortic stenosis postop day 2 transcatheter aortic valve replacement  Patient denies any prior  history or  surgical manipulation  However, patient developed severe urinary retention exceeding 1 L, requiring insertion of Andino catheter  Patient does not endorse any chronic lower urinary tract symptoms or current dysuria, hematuria, flank or groin pain  Urologic consultation is requested for Andino management        Source:chart review and the patient         Patient Active Problem List   Diagnosis    Diabetes mellitus type 2, uncontrolled ([facility] [address] )    Glaucoma    Hyperlipidemia    Hypertension    Hyponatremia    Depression with anxiety    [facility] fibrillation [facility]    Acute congestive heart failure ([facility] [address] )    Slurred speech    Lethargy    Ataxia    Neurological deficit, transient    Type 2 diabetes mellitus with hyperglycemia ([facility])    CVA (cerebral vascular accident) ([facility] [address] )    Hearing loss    Insomnia    Left knee pain    PERI (obstructive sleep apnea)    Osteoarthritis    Osteoporosis    Periodic limb movement sleep disorder    Sleep talking    Tinnitus    Tricuspid valve disorders, non-rheumatic    Unsteady gait    History of cardioembolic cerebrovascular accident (CVA)    Viral upper respiratory tract infection    Chronic diastolic heart failure (HCC)    S/P TAVR (transcatheter aortic valve replacement)    Respiratory insufficiency, post operative         Impressions  Post-Operative Urinary Retention (Multi-factorial) secondary to recent administration of anesthesia, narcotics, recumbency and constipation  Recommendations  1  Maintain lisa catheter  2  Do not remove  Not Nsg managed 3  In interim, increase ambulation, wean narcotics, hydrate and treat constipation  4  Void trial in 5 days with home health   5  Patient will require lisa catheter at discharge  Please refer to after visit summary for trial of void instructions to be followed by home health services  Urologic follow-up with patient would fail void trial as needed            Past Medical History:   Diagnosis Date    Ambulatory dysfunction     CHF (congestive heart failure) ([facility])     pEFHF    Cholelithiasis     Coronary artery disease     Depression with anxiety     Diabetes mellitus ([facility] [address] )     niddm    Dysfunction of eustachian tube     Glaucoma     Hematuria     Hypercholesterolemia     Hypertension     Hyponatremia     Ischemic stroke of frontal lobe (HCC)     Right     Obstructive sleep apnea     OP (osteoporosis)     PAF (paroxysmal atrial fibrillation) ([facility])     Urinary frequency     Vertigo     Viral gastroenteritis     Viral gastroenteritis     Viral infection        Past Surgical History:   Procedure Laterality Date    APPENDECTOMY      GLAUCOMA SURGERY  01/03/2016    CT REPLACE AORTIC VALVE OPENFEMORAL ARTERY APPROACH N/A 4/17/2018    Procedure: REPLACEMENT AORTIC VALVE TRANSCATHETER (TAVR) TRANSFEMORAL W/ 23 MM AGUILAR STEVE S3 VALVE;  Surgeon: Jas Tolliver DO;  [facility]: [facility];  Service: Cardiac Surgery    TUBAL LIGATION         Family History   Problem Relation Age of Onset    No Known Problems Mother     Diabetes Father     Stroke Father     Coronary artery disease Sister     No Known Problems Brother     No Known Problems Son     Breast cancer Daughter     No Known Problems Maternal Grandmother     No Known Problems Maternal Grandfather     No Known Problems Paternal Grandmother     No Known Problems Paternal Grandfather     No Known Problems Cousin     Rheum arthritis Neg Hx     Osteoarthritis Neg Hx     Asthma Neg Hx     Heart failure Neg Hx     Hyperlipidemia Neg Hx     Hypertension Neg Hx     Migraines Neg Hx     Rashes / Skin problems Neg Hx     Seizures Neg Hx     Thyroid disease Neg Hx        Social History     Social History    Marital status:      Spouse name: N/A    Number of children: N/A    Years of education: N/A     Occupational History    Not on file       Social History Main Topics    Smoking status: Never Smoker    Smokeless tobacco: Never Used    Alcohol use No    Drug use: No    Sexual activity: Not Currently     Other Topics Concern    Not on file     Social History Narrative    No narrative on file       Allergies   Allergen Reactions    Lipitor [Atorvastatin] GI Intolerance     Nausea stomach ache    Hydrochlorothiazide GI Intolerance    Aspirin GI Intolerance       Review of Systems  10 point review of systems negative except as noted in HPI     Chart Review   Allergies, current medications, history, problem list    Vital Signs& Physical Exam  General appearance: alert and oriented, in no acute distress and morbidly obese  Head: Normocephalic, without obvious abnormality, atraumatic  Neck: no adenopathy, no carotid bruit, no JVD, supple, symmetrical, trachea midline and thyroid not enlarged, symmetric, no tenderness/mass/nodules  Lungs: clear to auscultation bilaterally  Heart: regular rate and rhythm, S1, S2 normal, no murmur, click, rub or gallop  Abdomen: soft, non-tender; bowel sounds normal; no masses,  no organomegaly  Extremities: extremities normal, warm and well-perfused; no cyanosis, clubbing, or edema  Pulses: 2+ and symmetric  Neurologic: Grossly normal  Andino patent for clear yellow urine     Laboratory Studies  Lab Results   Component Value Date    HGBA1C 8 8 (H) 04/12/2018    HGBA1C 7 8 (H) 12/15/2017     (L) 04/19/2018     12/15/2017    K 3 7 04/19/2018    K 4 8 12/15/2017    CL 99 (L) 04/19/2018     12/15/2017    CO2 27 04/19/2018    CO2 28 12/15/2017    GLUCOSE 163 (H) 04/19/2018    GLUCOSE 188 (H) 04/17/2018    GLUCOSE 95 03/29/2014    CREATININE 0 76 04/19/2018    CREATININE 0 79 12/15/2017    BUN 15 04/19/2018    BUN 26 (H) 03/24/2018    MG 1 7 04/18/2018    MG 1 8 03/28/2014    PHOS 4 0 09/12/2017         Imaging and Other Studies  )Xr Chest Portable    Result Date: 4/18/2018  Narrative: CHEST INDICATION:   Post Open Heart Surgey  COMPARISON:  4/17/2018  EXAM PERFORMED/VIEWS:  XR CHEST PORTABLE FINDINGS:  Right jugular central venous catheter tip overlies the right atrium  Cardiac valve prosthesis is present  Cardiomediastinal silhouette appears unremarkable  The lungs are clear  No pneumothorax or pleural effusion  Osseous structures appear within normal limits for patient age  Impression: No acute cardiopulmonary disease  Workstation performed: [id number]     Vas Carotid Complete Study    Result Date: 4/5/2018  Narrative:  [facility] REPORT CLINICAL: Indications: Patient presents for a general health evaluation secondary to future open heart surgery  Pre TAVR  Patient is asymptomatic at this time  Operative History No prior heart or vascular surgery Risk Factors The patient has history of HTN, Diabetes, severe aortic stenosis, AFIB, ischemic stroke, obstructive sleep apnea, vertigo, and hyperlipidemia  The patient's current BMI is 35 49, Weight (lb) is 164 lb and Height (in) is 57 in  She is a non smoker  Clinical Right Brachial Pressure:  190/76 mmHg, Left Brachial Pressure:  192/76 mmHg  NOTE:  PT had not taken BP meds this morning prior to evaluation secondary to KATHLEEN  FINDINGS:  Right        Impression  PSV  EDV (cm/s)  Direction of Flow  Ratio  Dist  ICA                126          14                      1 66  Mid  ICA                  57          11                      0 76  Prox   ICA    1 - 49%      60          10 0 79  Dist CCA                  60           9                            Mid CCA                   76          10                      1 56  Prox CCA                  49           8                            Ext Carotid               87           0                      1 15  Prox Vert                 41           7  Antegrade                 Subclavian               213           0                             Left         Impression  PSV  EDV (cm/s)  Direction of Flow  Ratio  Dist  ICA                 72          10                      1 12  Mid  ICA                  73          11                      1 14  Prox  ICA    1 - 49%      92          15                      1 42  Dist CCA                  71          13                            Mid CCA                   64          10                      0 78  Prox CCA                  83          12                            Ext Carotid               93           9                      1 45  Prox Vert                 79           9  Antegrade                 Subclavian               214           0                               CONCLUSION: Impression RIGHT: There is <50% stenosis noted in the internal carotid artery  Plaque is heterogenous/calcified and irregular  Vertebral artery flow is antegrade  There is no significant subclavian artery disease  LEFT: There is <50% stenosis noted in the internal carotid artery  Plaque is heterogenous/calcified and irregular  Vertebral artery flow is antegrade  There is no significant subclavian artery disease  Compared to previous study on 01/09/2018, there is no B/L interval change in the disease process  Internal carotid artery stenosis determination by consensus criteria from: Micah Cedillo et al  Carotid Artery Stenosis: Gray-Scale and Doppler US Diagnosis - [employer] of Radiologists in [address] [place] [address], Radiology 2003; [id number]    SIGNATURE: Electronically Signed by: Darcy Dallas MD, RPVI on 2018-04-05 01:01:52 PM    Xr Chest Portable Icu    Result Date: 4/17/2018  Narrative: CHEST INDICATION:   S/P TAVR  COMPARISON:  March 2, 2018 EXAM PERFORMED/VIEWS:  XR CHEST PORTABLE ICU FINDINGS:  Endotracheal tube tip is 3 cm proximal to the dino  Right jugular central venous catheter tip overlies the right atrium  Aortic valve prosthesis has been placed  Cardiomediastinal silhouette appears unremarkable  Subsegmental atelectasis is present at the left lung base  Osseous structures appear within normal limits for patient age  Impression: Subsegmental atelectasis at the left lung base  Workstation performed: [id number]     Cta Chest Abdomen Pelvis W Wo Contrast Tavr    Result Date: 4/6/2018  Narrative: CT ANGIOGRAM OF THE CHEST, ABDOMEN AND PELVIS WITH AND WITHOUT IV CONTRAST INDICATION: Preoperative evaluation for TAVR COMPARISON: September 11, 2017 TECHNIQUE:  CT angiogram examination of the chest, abdomen and pelvis was performed according to TAVR protocol including cardiac gating  Contrast as well as noncontrast images were obtained  120 ml of Visipaque 320 was injected intravenously  3D reconstructions were performed an independent workstation, and are supplied for review  This examination, like all CT scans performed in the [facility], was performed utilizing techniques to minimize radiation dose exposure, including the use of iterative reconstruction and automated exposure control  FINDINGS: VASCULAR STRUCTURES:     Annulus: diameter 26 0 x 19 3 mm     area: 397 0 sq mm   Annulus to LCA: 10 9 mm   Annulus to RCA: 11 6 mm   Minimal diameter right iliofemoral segment: 4 1 mm   Minimal diameter left iliofemoral segment: 4 4 mm The heart is normal in size  There is no pericardial effusion  No evidence of prior valve replacement or coronary bypass  There are mild calcifications of the aortic valve leaflets  Ascending aorta is normal in caliber    There is moderate calcification of the aortic arch and proximal great vessels with no significant stenosis identified  30% proximal left common carotid artery stenosis is seen  Descending thoracic aorta is normal in caliber  Abdominal aorta is normal in caliber  There  is diffuse infrarenal circumferential calcification  No significant celiac or SMA stenosis  Inferior mesenteric arteries patent  There are solitary renal arteries bilaterally with suspected ostial stenosis of the right renal artery     There is significant calcification of the aortic bifurcation  Moderate proximal common iliac stenosis are seen bilaterally approaching 50% in severity with lumen measuring 4 mm at the origin of both common iliac arteries  Internal iliac arteries are patent bilaterally  There is no focal external or common femoral stenosis  OTHER FINDINGS: CHEST: LUNGS:  Tiny calcified nodule right upper lobe  Otherwise unremarkable  Dorathy Infield PLEURA: No pleural effusion  MEDIASTINUM AND NEVILLE:  No mass or significant lymphadenopathy  CHEST WALL AND LOWER NECK: Unremarkable  ABDOMEN LIVER/BILIARY TREE:  Unremarkable  GALLBLADDER:  Cholelithiasis  No pericholecystic inflammatory change  SPLEEN:  Unremarkable  Normal size  PANCREAS:  Unremarkable  ADRENAL GLANDS:  Unremarkable  KIDNEYS/URETERS:  No solid renal mass  No hydronephrosis  No urinary tract calculi  PELVIS REPRODUCTIVE ORGANS:  Central low density within the uterus seen measuring 14 mm in thickness URINARY BLADDER:  Unremarkable  ADDITIONAL ABDOMINAL AND PELVIC STRUCTURES: STOMACH AND BOWEL:  Unremarkable  ABDOMINOPELVIC CAVITY:  No pathologically enlarged mesenteric or retroperitoneal lymph nodes  No ascites or free intraperitoneal air  ABDOMINAL WALL/INGUINAL REGIONS: Unremarkable  OSSEOUS STRUCTURES:  No acute fracture or destructive osseous lesion  Impression: 1    TAVR measurements:   Annulus: diameter 26 0 x 19 3 mm     area: 397 0 sq mm   Annulus to LCA: 10 9 mm   Annulus to RCA: 11 6 mm   Minimal diameter right iliofemoral segment: 4 1 mm   Minimal diameter left iliofemoral segment: 4 4 mm 2  Diffuse aortoiliac calcification  Moderate proximal bilateral common iliac artery stenosis are seen approaching 50% in severity  Lumen narrows to 4 mm at the origin of both common iliac arteries  No significant external iliac or common femoral stenosis  3   At least moderate ostial stenosis of the right renal artery 4  Central low density within the uterus which could represent endometrial fluid or endometrial thickening  Recommend pelvic ultrasound for further evaluation   5   Cholelithiasis Workstation performed: [id number]         Medications   Scheduled Meds:  Current Facility-Administered Medications:  acetaminophen 650 mg Oral Q4H PRN Carl Paul PA-C   artificial tear  Both Eyes HS PRN Carl Paul PA-C   bimatoprost 1 drop Both Eyes HS Carl Paul PA-C   bisacodyl 10 mg Rectal Daily PRN Carl Paul PA-C   brimonidine-timolol 1 drop Both Eyes BID Carl Paul PA-C   dorzolamide 1 drop Left Eye TID Aaliyah Richardson PA-C   escitalopram 5 mg Oral Daily Aaliyah Richardson PA-C   furosemide 20 mg Oral Daily Aaliyah Richardson PA-C   glipiZIDE 10 mg Oral BID With Meals Aaliyah Richardson PA-C   insulin lispro 1-5 Units Subcutaneous TID AC Carl Paul PA-C   insulin lispro 1-5 Units Subcutaneous HS Carl Paul PA-C   lisinopril 10 mg Oral BID Tammy Murphy PA-C   metFORMIN 1,000 mg Oral Daily With Breakfast Aaliyah Richardson PA-C   metoprolol tartrate 12 5 mg Oral Q12H [address] Aaliyah Richardson PA-C   mupirocin 1 application Nasal S18V [address] Carl Paul PA-C   ondansetron 4 mg Intravenous Q6H PRN Carl Paul PA-C   pantoprazole 40 mg Oral Daily Carl Paul PA-C   polyethylene glycol 17 g Oral Daily Carl Paul PA-C   potassium chloride 10 mEq Oral Daily Aaliyah Richardson PA-C   pravastatin 80 mg Oral Daily With ALLA Henson rivaroxaban 20 mg Oral Daily With Breakfast Pennie Boston PA-C     Continuous Infusions:   PRN Meds:   acetaminophen    artificial tear    bisacodyl    ondansetron      Total time spent with patient 25 minutes, >50% spent counseling and/or coordination of care           )ROBBI Cordero

## 2018-04-19 NOTE — PROGRESS NOTES
Progress Note - Cardiothoracic Surgery   Nikki Tsai 80 y o  female MRN: [id number]  Unit/Bed#: [facility] 422-01 Encounter: [id number]  Aortic stenosis, Non-Rheumatic   S/P transfemoral transcatheter aortic valve replacement; POD # 2    24 Hour Events:   Straight cath overnight x 2 due to retention last straight cath for 500 ml  Hypertensive 180/73 overnight given Hydralazine x 1    Medications:   Scheduled Meds:  Current Facility-Administered Medications:  acetaminophen 650 mg Oral Q4H PRN Carl Paul PA-C   artificial tear  Both Eyes HS PRN Carl Paul PA-C   bimatoprost 1 drop Both Eyes HS Carl Paul PA-C   bisacodyl 10 mg Rectal Daily PRN Carl Paul PA-C   brimonidine-timolol 1 drop Both Eyes BID Carl Paul PA-C   dorzolamide 1 drop Left Eye TID Aaliyah Richardson PA-C   escitalopram 5 mg Oral Daily Aaliyah Richardson PA-C   furosemide 20 mg Oral Daily Aaliyah Richardson PA-C   glipiZIDE 10 mg Oral BID With Meals Aaliyah Richardson PA-C   insulin lispro 1-5 Units Subcutaneous TID AC Carl Paul PA-C   insulin lispro 1-5 Units Subcutaneous HS Carl Paul PA-C   lisinopril 10 mg Oral Daily Aaliyah Richardson PA-C   metFORMIN 1,000 mg Oral Daily With Breakfast Aaliyah Richardson PA-C   metoprolol tartrate 12 5 mg Oral Q12H [facility] Aaliyah Richardson PA-C   mupirocin 1 application Nasal B09N [facility] Carl Paul PA-C   ondansetron 4 mg Intravenous Q6H PRN Carl Paul PA-C   pantoprazole 40 mg Oral Daily Carl Paul PA-C   polyethylene glycol 17 g Oral Daily Carl Paul PA-C   potassium chloride 10 mEq Oral Daily Aaliyah Richardson PA-C   pravastatin 80 mg Oral Daily With [facility]   rivaroxaban 20 mg [facility] Breakfast [facility]     Continuous Infusions:   PRN Meds:   acetaminophen    artificial tear    bisacodyl    ondansetron    Vitals:   Vitals:    04/19/18 0330 04/19/18 0507 04/19/18 0600 04/19/18 0707   BP: 170/58 143/74  167/72   BP Location: Right arm Left arm  Right arm   Pulse:    60   Resp:    18   Temp:    98 4 °F (36 9 °C)   TempSrc:    Oral   SpO2:    96%   Weight:   74 3 kg (163 lb 12 8 oz)    Height:           Telemetry: NSR; Heart Rate: 57    Respiratory:   SpO2: SpO2: 96 %; Room Air    Intake/Output:   I/O       04/17 0701 - 04/18 0700 04/18 0701 - 04/19 0700 04/19 0701 - 04/20 0700    P  O   340     I V  (mL/kg) 3291 2 (47 6) 300 (4)     IV Piggyback 200 50     Total Intake(mL/kg) 3491 2 (50 5) 690 (9 3)     Urine (mL/kg/hr) 1520 (0 9) 1700 (1)     Stool  0 (0)     Total Output 1520 1700      Net +1971 2 -1010             Unmeasured Stool Occurrence  1 x         Weights:   Weight (last 2 days)     Date/Time   Weight    04/19/18 0600  74 3 (163 8)    04/18/18 0600  69 2 (152 56)    04/17/18 0746  72 6 (160)            Admit weight: 72 6    Results:     Results from last 7 days  Lab Units 04/19/18  0629 04/18/18  0353 04/17/18  1926 04/17/18  1114   WBC Thousand/uL 11 08* 10 39*  --   --    HEMOGLOBIN g/dL 11 9 11 6 12 2 11 9   HEMATOCRIT % 34 8 33 7* 36 5 35 4   PLATELETS Thousands/uL 174 208  --  224       Results from last 7 days  Lab Units 04/19/18  0629 04/18/18  0353 04/18/18  0015  04/17/18  1114   SODIUM mmol/L 134* 138  --   --  139   POTASSIUM mmol/L 3 7 3 9 3 9  < > 4 4   CHLORIDE mmol/L 99* 103  --   --  107   CO2 mmol/L 27 28  --   --  23   BUN mg/dL 15 10  --   --  14   CREATININE mg/dL 0 76 0 55*  --   --  0 75   GLUCOSE RANDOM mg/dL 163* 182*  --   --  161*   CALCIUM mg/dL 8 9 8 5  --   --  7 9*   < > = values in this interval not displayed        Point of care glucose: 567-518    Studies:  Post op echo done final report pending    Invasive Lines/Tubes:  Invasive Devices     Central Venous Catheter Line            CVC Central Lines 04/17/18 Triple 1 day          Peripheral Intravenous Line            Peripheral IV 04/17/18 Left Wrist 1 day    Peripheral IV 04/17/18 Right Wrist 1 day Physical Exam:    HEENT/NECK:  PERRLA  No jugular venous distention  Cardiac: Regular rate and rhythm  No rubs/murmurs/gallops  Pulmonary:  Breath sounds slightly diminished at the bases bilaterally  Abdomen:  Non-tender, Non-distended  Positive bowel sounds  Incisions: groins c/d/i soft no hematoma  Lower extremities: Extremities warm/dry  Radial/PT/DP pulses 2+ bilaterally  Trace edema B/L  Neuro: Alert and oriented X 3  Sensation is grossly intact  No focal deficits  Skin: Warm/Dry, without rashes or lesions  Assessment:  Patient Active Problem List   Diagnosis    [facility] type [facility], uncontrolled [facility]    Glaucoma    Hyperlipidemia    Hypertension    Hyponatremia    Depression with anxiety    Paroxysmal atrial fibrillation (HCC)    [facility]    Slurred speech    Lethargy    Ataxia    Neurological deficit, transient    Type 2 diabetes mellitus with hyperglycemia [facility]    CVA (cerebral vascular accident) [facility]    Hearing loss    Insomnia    Left knee pain    PERI (obstructive sleep apnea)    Osteoarthritis    Osteoporosis    Periodic limb movement sleep disorder    Sleep talking    Tinnitus    Tricuspid valve disorders, non-rheumatic    Unsteady gait    History of cardioembolic cerebrovascular accident (CVA)    Viral upper respiratory tract infection    Chronic diastolic heart failure (HCC)    S/P TAVR (transcatheter aortic valve replacement)    Respiratory insufficiency, post operative       Aortic stenosis, Non-Rheumatic  S/P transfemoral transcatheter aortic valve replacement; POD # 2    Plan:    1  Cardiac:   NSR; Hypertensive  Lopressor 12 5 mg PO BID   Resume home dosing of lisinopril 10 mg bid   Xarelto for afib  Continue ASA and Statin therapy  Central IV access no longer required; Remove central venous catheter today  Continue DVT prophylaxis    2   Pulmonary:   Good Room air oxygen saturation; Continue incentive spirometry/Coughing/Deep breathing exercises    3  Renal: urinary retention with straight cath x 2 - if no void after 8 hours from prior straight cath will need lisa replaced and urology follow up  Intake/Output net: -1010 mL/24 hours  Continue diuresis   Lasix 20 mg IV QD  Potassium Chloride 20 mEq PO QD  Post op Creatinine stable; Follow up labs prn    4  Neuro:  Neurologically intact; No active issues  Incisional pain well-controlled; Continue prn Percocet    5  GI:  Tolerating TLC 2 3 gm sodium diet  Maintain 1800 mL daily fluid restriction   Continue stool softeners and prn suppository  Continue GI prophylaxis    6  Endo:    No history of diabetes; Glucose well-controlled with sliding scale coverage    7  Hematology:   Post-operative acute blood loss anemia; Hemoglobin and hematocrit stable; trend prn    8   Disposition:  Anticipate discharge to home potentially today with daughter     VTE Pharmacologic Prophylaxis: Sequential compression device Gretta Bryant)   VTE Mechanical Prophylaxis: sequential compression device    Collaborative rounds completed with HALIMA Borges , and Trevin Rodrigues RN    SIGNATURE: Michael Gordon  DATE: April 19, 2018  TIME: 7:54 AM

## 2018-04-19 NOTE — DISCHARGE SUMMARY
Discharge Summary - Cardiothoracic Surgery   Chelsy Valiente 80 y o  female MRN: [id number]  Unit/Bed#: [facility] 422-01 Encounter: [id number]    Admission Date: 4/17/2018     Discharge Date: 04/19/18    Admitting Diagnosis: Nonrheumatic aortic valve stenosis [I35 0]    Primary Discharge Diagnosis:   Aortic stenosis, Non-Rheumatic  S/P transfemoral transcatheter aortic valve replacement;    Secondary Discharge Diagnosis:   CAD, CHF, SR/PAF (on xarelto), HTN, PAD, NIDDM2, HLD, PERI, CVA (right side), obesity, depression, glaucoma, vertigo, s/p appe, s/p tubal ligation    Attending: HALIMA Eckert  Consulting Physician(s):   Cardiology  Medical/Critical Care  Urology    Procedures Performed:   4/17/2017: Transcatheter aortic valve replacement with a 23 mm Vera LIZZIE 3 bioprosthetic valve via left transfemoral approach  Hospital Course:   4/17: TF TAVR # 23 mm Lizzie S3  Transferred to the ICU hemodynamically stable on no drips  No PVL post deployment  Pedal pulses palpable b/l  Groins soft, without bleeding  Extubated  Restart Xarelto tomorrow  4/18: Weaned off wesly & started on cardene for HTN, at 4 this AM, start Lopressor 12 5mg BID & home Lisinopril 10mg QDay  On RA  T wave flattening throughout (present post-op)  Discontinue lisa & a line  Resume home DM meds & SSIC  F/u TTE  Transfer to telem  4/19: Urinary retention continues throughout the day reinserted lisa  Urology consulted - will be discharged with lisa in place  Stable for discharge to home  Condition at Discharge:   good     Discharge Physical Exam:  HEENT/NECK:  PERRLA  No jugular venous distention  Cardiac: Regular rate and rhythm  No rubs/murmurs/gallops  Pulmonary:  Breath sounds slightly diminished at the bases bilaterally  Abdomen:  Non-tender, Non-distended  Positive bowel sounds  Incisions: bilateral groins are c/d/i soft without hematoma  Lower extremities: Extremities warm/dry  Radial/PT/DP pulses 2+ bilaterally  Trace edema B/L  Neuro: Alert and oriented X 3  Sensation is grossly intact  No focal deficits  Skin: Warm/Dry, without rashes or lesions  Discharge Data:    Results from last 7 days  Lab Units 04/19/18  0629 04/18/18  0353 04/17/18  1926 04/17/18  1114   WBC Thousand/uL 11 08* 10 39*  --   --    HEMOGLOBIN g/dL 11 9 11 6 12 2 11 9   HEMATOCRIT % 34 8 33 7* 36 5 35 4   PLATELETS Thousands/uL 174 208  --  224       Results from last 7 days  Lab Units 04/19/18  0629 04/18/18  0353 04/18/18  0015  04/17/18  1114   SODIUM mmol/L 134* 138  --   --  139   POTASSIUM mmol/L 3 7 3 9 3 9  < > 4 4   CHLORIDE mmol/L 99* 103  --   --  107   CO2 mmol/L 27 28  --   --  23   BUN mg/dL 15 10  --   --  14   CREATININE mg/dL 0 76 0 55*  --   --  0 75   GLUCOSE RANDOM mg/dL 163* 182*  --   --  161*   CALCIUM mg/dL 8 9 8 5  --   --  7 9*   < > = values in this interval not displayed  Discharge instructions/Information to patient and family:   See after visit summary for information provided to patient and family  Jesus Pace was educated on restrictions regarding driving and lifting, and techniques of proper incisional care  They were specifically counselled on signs and symptoms of a sternal wound infection, and advised to contact our service immediately should they develop fevers, sweats, chill, redness or drainage at the site of any incisions  Provisions for Follow-Up Care:  See after visit summary for information related to follow-up care and any pertinent home health orders  Disposition:  Home    Planned Readmission:   No    Discharge Medications:  See after visit summary for reconciled discharge medications provided to patient and family  Jesus Pace was provided contact information and scheduled a follow up appointment with HALIMA Reeves    Additionally, they were advised to schedule follow up appointments to be seen by their primary care physician within 7-10 days, and their cardiologist within 2-3 weeks  Contact information was provided  The patient was discharged on her preoperative diuretic dose  No ASA was given at discharge due to GI intolerance  The patient was informed that following their postoperative surgical evaluation, they will be referred to outpatient cardiac rehabilitation  They were counseled that this program is run by specialists who will help them safely strengthen their heart and prevent more heart disease  Cardiac rehabilitation will include exercise, relaxation, stress management, and heart-healthy nutrition  Caregivers will also check to make sure their medication regimen is working  I spent 30 minutes discharging the patient  This time was spent on the day of discharge  I had direct contact with the patient on the day of discharge  Additional documentation is required if more than 30 minutes were spent on discharge       SIGNATURE: [address] [place] [address]  DATE: April 19, 2018  TIME: 2:39 PM

## 2018-04-19 NOTE — SOCIAL WORK
Pt is cleared for d/c  Pt is accepted for services by [facility] for her aftercare  The pt and her daughter Danney Lefort were both informed of d/c  Daughter will transport pt home later this day, pickup time TBD  No IMM required due to pt's LOS being < 3 days  No chart copy required  CM to follow

## 2018-04-19 NOTE — PLAN OF CARE
Problem: OCCUPATIONAL THERAPY ADULT  Goal: Performs self-care activities at highest level of function for planned discharge setting  See evaluation for individualized goals  Treatment Interventions: ADL retraining, Functional transfer training, UE strengthening/ROM, Endurance training, Patient/family training, Equipment evaluation/education, Compensatory technique education, Continued evaluation, Cardiac education, Energy conservation, Activityengagement          See flowsheet documentation for full assessment, interventions and recommendations  Outcome: Progressing  Limitation: Decreased ADL status, Decreased endurance, Decreased self-care trans, Decreased high-level ADLs  Prognosis: Fair  Assessment: Patient participated in Skilled OT session this date with interventions consisting of ADL re training with the use of correct body mechanics  Patient agreeable to OT treatment session, upon arrival patient was found seated OOB to Recliner  In comparison to previous session, patient with improvements in endurance and activity tolerance  Patient requiring verbal cues for safety w/ toilet transfer  Educated pt on modifications for LB dressing to conserve energy and decrease risk for falls  Pt verbalized understanding  Patient continues to be functioning below baseline level, occupational performance remains limited secondary to decreased ADL status, decreased safety and indp w/ transfers, decreased standing balance and increased risk for falls and injury  Pt and family refusing rehab at this time and pt reports dtr will be staying w/ pt for a couple of days upon d/c; however, recommendation at time of d/c would be inpatient rehab considering pt required mod A for toilet transfer, has decreased balance during functional mobility, and decreased ability to carry out ADLs independently   Patient to benefit from continued Occupational Therapy treatment while in the hospital to address deficits as defined above and maximize level of functional independence with ADLs and functional mobility        OT Discharge Recommendation: Short Term Rehab  OT - OK to Discharge: Yes (when medically stable)

## 2018-04-19 NOTE — OCCUPATIONAL THERAPY NOTE
[address] Progress Note     Patient Name: Fina Willis  [id number] Date: 4/19/2018  Problem List  Patient Active Problem List   Diagnosis    [facility], uncontrolled ([facility] )    Glaucoma    Hyperlipidemia    Hypertension    Hyponatremia    Depression with anxiety    Paroxysmal atrial fibrillation (HCC)    [facility]    Slurred speech    Lethargy    Ataxia    Neurological deficit, transient    Type 2 diabetes mellitus with hyperglycemia ([facility] )    CVA (cerebral vascular accident) ([facility] )    Hearing loss    Insomnia    Left knee pain    PERI (obstructive sleep apnea)    Osteoarthritis    Osteoporosis    Periodic limb movement sleep disorder    Sleep talking    Tinnitus    Tricuspid valve disorders, non-rheumatic    Unsteady gait    History of cardioembolic cerebrovascular accident (CVA)    Viral upper respiratory tract infection    Chronic diastolic heart failure (HCC)    S/P TAVR (transcatheter aortic valve replacement)    Respiratory insufficiency, post operative           04/19/18 1355   Note Type   Note type Progress   Restrictions/Precautions   Weight Bearing Precautions Per Order No   Other Precautions Cardiac/sternal;Fall Risk   Pain Assessment   Pain Assessment No/denies pain   Pain Score No Pain   ADL   Where Assessed Chair   LB Dressing Assistance 4  Minimal Assistance   LB Dressing Deficit Pull up over hips;Steadying   Additional Comments Pt sat in chair to don pants  She was able to thread B/L into pants, but required assist for pull up  Pt sat EOB to don/doff socks  Pt required CGA for steadying UB, but was able to bring foot to opposite knee to don B/L socks      Bed Mobility   Sit to Supine 5  Supervision   Additional items Increased time required   Transfers   Sit to Stand 5  Supervision   Additional items Increased time required   Stand to Sit 5  Supervision   Additional items Increased time required   Toilet transfer 3  Moderate assistance   Additional items Assist x 1;Verbal cues   Balance   Static Sitting Fair +   Dynamic Sitting Fair   Static Standing Fair   Dynamic Standing Fair -   Activity Tolerance   Activity Tolerance Patient tolerated treatment well   Nurse Made Aware Okay to see per RN   Cognition   Overall Cognitive Status [facility]   Arousal/Participation Alert; Responsive; Cooperative   Attention Within functional limits   Orientation Level Oriented X4   Memory Within functional limits   Following Commands Follows one step commands without difficulty   Comments Pt pleasant and cooperative   Assessment   Limitation Decreased ADL status; Decreased endurance;Decreased self-care trans;Decreased high-level ADLs   Assessment Patient participated in Skilled OT session this date with interventions consisting of ADL re training with the use of correct body mechanics  Patient agreeable to OT treatment session, upon arrival patient was found seated OOB to Recliner  In comparison to previous session, patient with improvements in endurance and activity tolerance  Patient requiring verbal cues for safety w/ toilet transfer  Educated pt on modifications for LB dressing to conserve energy and decrease risk for falls  Pt verbalized understanding  Patient continues to be functioning below baseline level, occupational performance remains limited secondary to decreased ADL status, decreased safety and indp w/ transfers, decreased standing balance and increased risk for falls and injury  Pt and family refusing rehab at this time and pt reports dtr will be staying w/ pt for a couple of days upon d/c; however, recommendation at time of d/c would be inpatient rehab considering pt required mod A for toilet transfer, has decreased balance during functional mobility, and decreased ability to carry out ADLs independently   Patient to benefit from continued Occupational Therapy treatment while in the hospital to address deficits as defined above and maximize level of functional independence with ADLs and functional mobility  Goals   Patient Goals to rest   Plan   Treatment Interventions ADL retraining;Functional transfer training; Endurance training;Patient/family training; Compensatory technique education; Energy conservation;Equipment evaluation/education   Goal Expiration Date 04/28/18   Treatment Day 2   OT Frequency 3-5x/wk   Recommendation   OT Discharge Recommendation Short Term Rehab   Barthel Index   Feeding 10   Bathing 0   Grooming Score 5   Dressing Score 5   Bladder Score 10   Bowels Score 10   Toilet Use Score 5   Transfers (Bed/Chair) Score 10   Mobility (Level Surface) Score 0   Stairs Score 0   Barthel Index Score 55   Modified Brittany Scale   Modified Brittany Scale 4       Jenifer Eddy, OTR/L

## 2018-04-23 ENCOUNTER — TRANSITIONAL CARE MANAGEMENT (OUTPATIENT)
Dept: FAMILY MEDICINE CLINIC | Facility: CLINIC | Age: 83
End: 2018-04-23

## 2018-04-23 ENCOUNTER — OFFICE VISIT (OUTPATIENT)
Dept: FAMILY MEDICINE CLINIC | Facility: CLINIC | Age: 83
End: 2018-04-23
Payer: COMMERCIAL

## 2018-04-23 VITALS
WEIGHT: 163.4 LBS | RESPIRATION RATE: 12 BRPM | HEART RATE: 64 BPM | TEMPERATURE: 98.3 F | DIASTOLIC BLOOD PRESSURE: 72 MMHG | SYSTOLIC BLOOD PRESSURE: 122 MMHG | BODY MASS INDEX: 34.15 KG/M2

## 2018-04-23 DIAGNOSIS — E11.3559: Chronic | ICD-10-CM

## 2018-04-23 DIAGNOSIS — Z95.2 S/P TAVR (TRANSCATHETER AORTIC VALVE REPLACEMENT): ICD-10-CM

## 2018-04-23 DIAGNOSIS — E87.1 HYPONATREMIA: ICD-10-CM

## 2018-04-23 DIAGNOSIS — D72.829 LEUKOCYTOSIS, UNSPECIFIED TYPE: ICD-10-CM

## 2018-04-23 DIAGNOSIS — I48.0 PAROXYSMAL ATRIAL FIBRILLATION (HCC): ICD-10-CM

## 2018-04-23 DIAGNOSIS — F41.8 DEPRESSION WITH ANXIETY: ICD-10-CM

## 2018-04-23 DIAGNOSIS — Z09 HOSPITAL DISCHARGE FOLLOW-UP: Primary | ICD-10-CM

## 2018-04-23 DIAGNOSIS — E11.65: Chronic | ICD-10-CM

## 2018-04-23 DIAGNOSIS — I10 ESSENTIAL HYPERTENSION: Chronic | ICD-10-CM

## 2018-04-23 DIAGNOSIS — E78.5 HYPERLIPIDEMIA, UNSPECIFIED HYPERLIPIDEMIA TYPE: Chronic | ICD-10-CM

## 2018-04-23 PROBLEM — R53.83 LETHARGY: Status: RESOLVED | Noted: 2018-01-12 | Resolved: 2018-04-23

## 2018-04-23 PROBLEM — I50.9 ACUTE CONGESTIVE HEART FAILURE (HCC): Status: RESOLVED | Noted: 2017-09-11 | Resolved: 2018-04-23

## 2018-04-23 PROBLEM — R47.81 SLURRED SPEECH: Status: RESOLVED | Noted: 2018-01-12 | Resolved: 2018-04-23

## 2018-04-23 PROBLEM — R29.818 NEUROLOGICAL DEFICIT, TRANSIENT: Status: RESOLVED | Noted: 2018-01-12 | Resolved: 2018-04-23

## 2018-04-23 PROBLEM — R06.89 RESPIRATORY INSUFFICIENCY: Status: RESOLVED | Noted: 2018-04-17 | Resolved: 2018-04-23

## 2018-04-23 PROBLEM — J06.9 VIRAL UPPER RESPIRATORY TRACT INFECTION: Status: RESOLVED | Noted: 2018-03-24 | Resolved: 2018-04-23

## 2018-04-23 PROCEDURE — 1111F DSCHRG MED/CURRENT MED MERGE: CPT | Performed by: FAMILY MEDICINE

## 2018-04-23 PROCEDURE — 99496 TRANSJ CARE MGMT HIGH F2F 7D: CPT | Performed by: FAMILY MEDICINE

## 2018-04-23 NOTE — PROGRESS NOTES
Chief Complaint   Patient presents with    Transition of Care Management     Discharged 04/19/18  Assessment/Plan:     No problem-specific Assessment & Plan notes found for this encounter  {Assess/PlanSmartLinks:24710}     Subjective:     Patient ID: Luciana Almanzar is a 80 y o  female  HPI    Review of Systems      Objective:     Physical Exam      Vitals:    04/23/18 1546   BP: 122/72   BP Location: Left arm   Patient Position: Sitting   Cuff Size: Standard   Pulse: 64   Resp: 12   Temp: 98 3 °F (36 8 °C)   TempSrc: Tympanic   Weight: 74 1 kg (163 lb 6 4 oz)       Transitional Care Management Review:  Luciana Almanzar is a 80 y o  female here for TCM follow up       During the TCM phone call patient stated:    Date and time hospital follow up call was made:  4/6/2018  [address] [place] [address] reviewed:  Records reviewed  Patient was hopsitalized at:  [facility]  Date of admission:  4/17/18  Date of discharge:  4/18/18  Diagnosis:  Nonrheumatic aortic valve stenosis  Were the patients medicaitons reviewed and updated:  Yes  Current symptoms:  None  Post hospital issues:  None  Should patient be enrolled in anticoag monitoring?:  No  Scheduled for follow up?:  Yes  Did you obtain your prescribed medications:  Yes  Do you need help managing your perscriptions or medications:  No  Is transportation to your appointments needed:  No  I have advised the patient to call PCP with any new or worsening symptoms (please type in name along with any credentials):  YARA Fong  Living Arrangements:  Children  Support System:  Children, Family  The type of support provided:  Physical, Emotional  Do you have social support:  Yes, as much as I need  Are you recieving outpatient services:  No  Are you recieving home care services:  No  Are you using any community resources:  No  Current waiver service:  No  Have you fallen in the last 12 months:  No  Interperter language line required?:  No  Counseling: Family             [place], [place]

## 2018-04-23 NOTE — PROGRESS NOTES
Chief Complaint   Patient presents with    Transition of Care Management     Discharged 04/19/18  Assessment/Plan:     Reviewed hospital records  Will recheck labs  Continue current meds  FU specialists as scheduled  RTO in 3 months  Diagnoses and all orders for this visit:    Hospital discharge follow-up    S/P TAVR (transcatheter aortic valve replacement)    Hyponatremia  -     Basic metabolic panel; Future    Leukocytosis, unspecified type  -     CBC and differential; Future    Uncontrolled type 2 diabetes mellitus with stable proliferative retinopathy, without long-term current use of insulin, unspecified laterality ([facility] [address] )  Comments:  continue metformin, glipizide  Essential hypertension  Comments:  BP controlled well  Continue lisinopril and metoprolol  Paroxysmal atrial fibrillation (HCC)  Comments:  continue xarelto  Hyperlipidemia, unspecified hyperlipidemia type  Comments:  continue simvastatin  Depression with anxiety  Comments:  continue escitalopram           Subjective:     Patient ID: Courtney Anaya is a 80 y o  female  HPI    Pt is here with daughter  Was in hospital 4/17-4/19/2018 for aortic stenosis s/p TAVR  Pt tolerated procedure well  Pt had urinary retention  Urology consulted and discharged home with lisa  [facility] visited pt 3/week  May try to DC lisa on Thursday 4/26/2018  Feels lightheaded occasionally  Denies fever, SOB, cP, n/v/abd pain  Will FU cardiology and card surgeon in 5/2018  DM---She is on metformin 500mg Qd  Also on glipizide 10mg bid  Blood sugar at home 150  HTN---She is on lisinopril 10mg bid, metoprolol 12 5mg bid  Today /72 much better than before  Afib---she is on xarelto 20mg daily  CHF---She is on lasix 20mg and potassium daily  Hyperlipidemia---She is on simvastatin 80mg qhs  Depression---on escitalopram 5mg Qd   Mood is stable             Review of Systems   Constitutional: Negative for appetite change, chills and fever  HENT: Negative for congestion, ear pain, sinus pain and sore throat  Eyes: Negative for discharge and itching  Respiratory: Negative for apnea, cough, chest tightness, shortness of breath and wheezing  Cardiovascular: Negative for chest pain, palpitations and leg swelling  Gastrointestinal: Negative for abdominal pain, anal bleeding, constipation, diarrhea, nausea and vomiting  Endocrine: Negative for cold intolerance, heat intolerance and polyuria  Genitourinary: Negative for difficulty urinating and dysuria  Musculoskeletal: Negative for arthralgias, back pain and myalgias  Skin: Negative for rash  Neurological: Negative for dizziness and headaches  Psychiatric/Behavioral: Negative for agitation  Objective:     Physical Exam   Constitutional: She appears well-developed  No distress  HENT:   Head: Normocephalic  Right Ear: External ear normal    Left Ear: External ear normal    Nose: Nose normal    Mouth/Throat: Oropharynx is clear and moist    Eyes: Conjunctivae are normal  Pupils are equal, round, and reactive to light  Right eye exhibits no discharge  Left eye exhibits no discharge  Neck: Normal range of motion  No thyromegaly present  Cardiovascular: Normal rate, regular rhythm and normal heart sounds  Pulmonary/Chest: Effort normal and breath sounds normal    Abdominal: Soft  Bowel sounds are normal    Musculoskeletal:   Use cane   Lymphadenopathy:     She has no cervical adenopathy  Neurological: She is alert  Psychiatric: She has a normal mood and affect  Vitals:    04/23/18 1546   BP: 122/72   BP Location: Left arm   Patient Position: Sitting   Cuff Size: Standard   Pulse: 64   Resp: 12   Temp: 98 3 °F (36 8 °C)   TempSrc: Tympanic   Weight: 74 1 kg (163 lb 6 4 oz)       Transitional Care Management Review:  Megan Lopez is a 80 y o  female here for TCM follow up       During the TCM phone call patient stated:    Hospital care reviewed:  Records reviewed  Patient was hopsitalized at:  [facility]  Date of admission:  4/17/18  Date of discharge:  4/19/18  Diagnosis:  Aortic stenosis, Non-Rheumatic   S/P transfemoral transcatheter aortic valve replacement  Were the patients medicaitons reviewed and updated:  Yes  Current symptoms:  None  Post hospital issues:  None  Should patient be enrolled in anticoag monitoring?:  No  Scheduled for follow up?:  Yes  Did you obtain your prescribed medications:  Yes  Do you need help managing your perscriptions or medications:  No  Is transportation to your appointments needed:  No  I have advised the patient to call PCP with any new or worsening symptoms (please type in name along with any credentials):  YARA Jaffe  Living Arrangements:  Children  Support System:  Children, Family  The type of support provided:  Physical, Emotional  Do you have social support:  Yes, as much as I need  Are you recieving outpatient services:  No  Are you recieving home care services:  No  Are you using any community resources:  No  Current waiver service:  No  Have you fallen in the last 12 months:  No  Interperter language line required?:  No  Counseling:  Family             Marcelo Osorio MD

## 2018-04-24 ENCOUNTER — TELEPHONE (OUTPATIENT)
Dept: UROLOGY | Facility: MEDICAL CENTER | Age: 83
End: 2018-04-24

## 2018-04-24 NOTE — OP NOTE
OPERATIVE REPORT  PATIENT NAME: Aaron Lynn    :  1935  MRN: [id number]  Pt Location: [facility] OR ROOM 02    SURGERY DATE: 2018    SURGEON: Dr Sandra Oden    ASSISTANT: Kody Jang DO     CO-SURGEON: Dr Merritt Cardenas DIAGNOSIS Symptomatic severe aortic stenosis  POSTOPERATIVE DIAGNOSIS Symptomatic severe aortic stenosis  NYHA CLASS: 3    CCS CLASS: 1    PROCEDURE Transcatheter aortic valve replacement with a 23 mm Vera STEVE 3 bioprosthetic valve via a percutaneous left transfemoral approach  ANESTHESIA Dr Alen Cope, general endotracheal anesthesia with transesophageal echocardiogram guidance  CARDIOPULMONARY BYPASS TIME 0  PACKS/TUBES/DRAINS None  ESTIMATED BLOOD LOSS: 50 mL    OPERATIVE TECHNIQUE The patient was taken to the operating room and placed supine on the operating table  Following the satisfactory induction of general anesthesia and placement of monitoring lines, the patient was prepped and draped in the usual sterile fashion  A time-out procedure was performed  The right common femoral artery was accessed percutaneously using Seldinger technique and fluoroscopy  The right common femoral vein was accessed in a similar fashion and was cannulated with a 6 Western Radha sheath  The femoral artery was cannulated with a 7 Mongolian sheath  A pigtail catheter was inserted and advanced through the right common femoral artery sheath into the right coronary cusp  Using a series of injections, the angle of deployment was determined  Through the right common femoral vein sheath, a balloon tip temporary pacing catheter was inserted into the right ventricle and its capture was confirmed  The left common femoral artery was accessed percutaneously using Seldinger technique and fluoroscopy  Two (2) Perclose sutures were deployed in the standard fashion The patient was systemically heparinized   The left common femoral artery was then accessed with an extra-stiff wire and the sheath was inserted through the left common femoral artery and advanced into the aorta  The aortic valve was crossed with a wire  The balloon was inserted through the sheath and positioned in the aortic annulus  During an episode of rapid pacing, balloon valvuloplasty was performed  The balloon was subsequently removed  A 23 mm STEVE 3 valve was then advanced through the sheath into the aorta and positioned onto the deployment balloon in the aorta and then advanced around the aortic arch and through the annulus of the aortic valve to the appropriate level  At this point, the catheter was desheathed in the standard fashion  The valve was positioned appropriately using a combination of fluoroscopy and transesophageal echocardiogram guidance  During an episode of rapid pacing, balloon deployment of the 23 mm STEVE 3 valve was performed  Following deployment, the position of the valve was confirmed by fluoroscopy and echocardiography and its position appeared appropriate with no perivalvular leak  The valve delivery system was subsequently removed and the sheath was removed from the left common femoral artery while the Perclose sutures were secured and direct pressure was held  Protamine was administered with normalization of the ACT  Pressure was released from the right groin and there was no active bleeding and no evidence of hematoma development  The common femoral artery sheath was removed from the right following deployment of a closure device  The common femoral vein sheath was removed from the right and pressure was held  Sponge, needle, and instrument counts were reported as correct by the nursing staff  There was no qualified surgical resident available to assist  As the attending surgeon, I was present and scrubbed for all critical portions of this procedure   Final transesophageal echocardiogram demonstrated a well-positioned bioprosthetic transcatheter aortic valve with normal function and no perivalvular leak           SIGNATURE: Jose De La Rosa MD  DATE: April 24, 2018  TIME: 10:46 AM

## 2018-04-25 NOTE — TELEPHONE ENCOUNTER
Tutu from [facility] had questions regarding patient's void trial and orders  Please call her [phone]   Thank you

## 2018-04-25 NOTE — TELEPHONE ENCOUNTER
Orders faxed to remove Andino in am  To replace if unable to void 8-12 hours due to pt's large PVR per Dr Owens Kidney  Pt to keep f/u appt

## 2018-04-26 ENCOUNTER — TRANSCRIBE ORDERS (OUTPATIENT)
Dept: LAB | Facility: CLINIC | Age: 83
End: 2018-04-26

## 2018-04-26 ENCOUNTER — APPOINTMENT (OUTPATIENT)
Dept: LAB | Facility: CLINIC | Age: 83
End: 2018-04-26
Payer: COMMERCIAL

## 2018-04-26 ENCOUNTER — TRANSITIONAL CARE MANAGEMENT (OUTPATIENT)
Dept: FAMILY MEDICINE CLINIC | Facility: CLINIC | Age: 83
End: 2018-04-26

## 2018-04-26 DIAGNOSIS — D72.829 LEUKOCYTOSIS, UNSPECIFIED TYPE: ICD-10-CM

## 2018-04-26 DIAGNOSIS — E87.1 HYPONATREMIA: ICD-10-CM

## 2018-04-26 DIAGNOSIS — E87.1 HYPONATREMIA: Primary | ICD-10-CM

## 2018-04-26 LAB
ANION GAP SERPL CALCULATED.3IONS-SCNC: 7 MMOL/L (ref 4–13)
BASOPHILS # BLD AUTO: 0.02 THOUSANDS/ΜL (ref 0–0.1)
BASOPHILS NFR BLD AUTO: 0 % (ref 0–1)
BUN SERPL-MCNC: 22 MG/DL (ref 5–25)
CALCIUM SERPL-MCNC: 9 MG/DL (ref 8.3–10.1)
CHLORIDE SERPL-SCNC: 94 MMOL/L (ref 100–108)
CO2 SERPL-SCNC: 29 MMOL/L (ref 21–32)
CREAT SERPL-MCNC: 0.8 MG/DL (ref 0.6–1.3)
EOSINOPHIL # BLD AUTO: 0.37 THOUSAND/ΜL (ref 0–0.61)
EOSINOPHIL NFR BLD AUTO: 4 % (ref 0–6)
ERYTHROCYTE [DISTWIDTH] IN BLOOD BY AUTOMATED COUNT: 13 % (ref 11.6–15.1)
GFR SERPL CREATININE-BSD FRML MDRD: 68 ML/MIN/1.73SQ M
GLUCOSE SERPL-MCNC: 263 MG/DL (ref 65–140)
HCT VFR BLD AUTO: 35 % (ref 34.8–46.1)
HGB BLD-MCNC: 11.9 G/DL (ref 11.5–15.4)
LYMPHOCYTES # BLD AUTO: 1.53 THOUSANDS/ΜL (ref 0.6–4.47)
LYMPHOCYTES NFR BLD AUTO: 18 % (ref 14–44)
MCH RBC QN AUTO: 30.4 PG (ref 26.8–34.3)
MCHC RBC AUTO-ENTMCNC: 34 G/DL (ref 31.4–37.4)
MCV RBC AUTO: 90 FL (ref 82–98)
MONOCYTES # BLD AUTO: 0.6 THOUSAND/ΜL (ref 0.17–1.22)
MONOCYTES NFR BLD AUTO: 7 % (ref 4–12)
NEUTROPHILS # BLD AUTO: 5.86 THOUSANDS/ΜL (ref 1.85–7.62)
NEUTS SEG NFR BLD AUTO: 71 % (ref 43–75)
NRBC BLD AUTO-RTO: 0 /100 WBCS
PLATELET # BLD AUTO: 261 THOUSANDS/UL (ref 149–390)
PMV BLD AUTO: 11.4 FL (ref 8.9–12.7)
POTASSIUM SERPL-SCNC: 4.4 MMOL/L (ref 3.5–5.3)
RBC # BLD AUTO: 3.91 MILLION/UL (ref 3.81–5.12)
SODIUM SERPL-SCNC: 130 MMOL/L (ref 136–145)
WBC # BLD AUTO: 8.4 THOUSAND/UL (ref 4.31–10.16)

## 2018-04-26 PROCEDURE — 80048 BASIC METABOLIC PNL TOTAL CA: CPT

## 2018-04-26 PROCEDURE — 36415 COLL VENOUS BLD VENIPUNCTURE: CPT

## 2018-04-26 PROCEDURE — 85025 COMPLETE CBC W/AUTO DIFF WBC: CPT

## 2018-04-27 ENCOUNTER — TELEPHONE (OUTPATIENT)
Dept: CARDIAC SURGERY | Facility: CLINIC | Age: 83
End: 2018-04-27

## 2018-04-27 DIAGNOSIS — I48.91 ATRIAL FIBRILLATION, UNSPECIFIED TYPE (HCC): Primary | ICD-10-CM

## 2018-04-27 DIAGNOSIS — R26.81 UNSTEADY GAIT: ICD-10-CM

## 2018-04-27 NOTE — TELEPHONE ENCOUNTER
POST OP CALL    4/17/18: TAVR  4/19/18: d/c home    Finally today reached someone  Left messages earlier this week  Spoke to daughter who states her mothe ris doing well  She had her lisa catheter removed yesterday and she is voiding without difficulty  Groin incisions healing well, some bruising but no bleeding  Weight stable  No SOB with activity  Eating well  She is a bit unstable with ambulation  [facility] seeing patient and PT consulted but daughter was told they are "running behind" and wont be able to see her until next week  PCP appt 4/23/18  Cardiology appt 5/2/18  CTS appt 5/17/18    1  Daily weight, call for weight gain of 2 lbs or more in 24 hours, worsening SOB or edema  2  Frequent short walks, increase activity as tolerated  3  No strenuous activity; follow sternal precautions : no lifting more than 10 lbs  4  Continue to use Incentive Spirometer frequently throughout the day  5  Cleanse surgical incisions daily with chlorhexidine soap (or other antimicrobial soap) & monitor for signs of infection:  redness, swelling, increased pain, drainage or the incision opens, fever or chills     6  Call CT surgery office with issues or concerns

## 2018-04-30 ENCOUNTER — TELEPHONE (OUTPATIENT)
Dept: FAMILY MEDICINE CLINIC | Facility: CLINIC | Age: 83
End: 2018-04-30

## 2018-04-30 DIAGNOSIS — R26.81 UNSTEADY GAIT: Primary | ICD-10-CM

## 2018-05-01 ENCOUNTER — TELEPHONE (OUTPATIENT)
Dept: FAMILY MEDICINE CLINIC | Facility: CLINIC | Age: 83
End: 2018-05-01

## 2018-05-02 ENCOUNTER — OFFICE VISIT (OUTPATIENT)
Dept: CARDIOLOGY CLINIC | Facility: CLINIC | Age: 83
End: 2018-05-02
Payer: COMMERCIAL

## 2018-05-02 ENCOUNTER — TELEPHONE (OUTPATIENT)
Dept: FAMILY MEDICINE CLINIC | Facility: CLINIC | Age: 83
End: 2018-05-02

## 2018-05-02 VITALS
HEIGHT: 58 IN | BODY MASS INDEX: 34.26 KG/M2 | HEART RATE: 68 BPM | WEIGHT: 163.2 LBS | DIASTOLIC BLOOD PRESSURE: 78 MMHG | SYSTOLIC BLOOD PRESSURE: 130 MMHG

## 2018-05-02 DIAGNOSIS — Z95.2 S/P TAVR (TRANSCATHETER AORTIC VALVE REPLACEMENT): Primary | ICD-10-CM

## 2018-05-02 DIAGNOSIS — I10 ESSENTIAL HYPERTENSION: Chronic | ICD-10-CM

## 2018-05-02 DIAGNOSIS — R26.81 UNSTEADY GAIT: ICD-10-CM

## 2018-05-02 DIAGNOSIS — I48.0 PAROXYSMAL ATRIAL FIBRILLATION (HCC): ICD-10-CM

## 2018-05-02 DIAGNOSIS — I50.32 CHRONIC DIASTOLIC HEART FAILURE (HCC): Chronic | ICD-10-CM

## 2018-05-02 DIAGNOSIS — I50.9 CONGESTIVE HEART FAILURE, UNSPECIFIED HF CHRONICITY, UNSPECIFIED HEART FAILURE TYPE (HCC): Primary | ICD-10-CM

## 2018-05-02 PROCEDURE — 99214 OFFICE O/P EST MOD 30 MIN: CPT | Performed by: INTERNAL MEDICINE

## 2018-05-02 NOTE — PROGRESS NOTES
Heart Failure Office Visit   Alejo Pulse 80 y o  female MRN: [id number]    [facility]    Ms Keyla Laughlin is an  80year old female with a known past medical history of DM2,  anxiety/depression, previous CVA and TIA, hypertension, dyslipidemia, Mod AS by TTE 1/2017, atrial fibrillation on Xarelto and chronic diastolic heart failure  Ms Sola Chavarria was recently admitted to [facility] on 3/02-2/04/8 with Acute on chronic diastolic heart failure  She presented with a one week history of dyspnea  Baseline weight 161 lb she presented to her primary care physician and weight was 172 pounds  She was instructed to present to the ER for further evaluation  ER oxygen saturation 86%  CXR showed mild vascular congestion  Pro NT BNP 3090  She was diuresed with IV Lasix  TTE done 5/89/31 showed LV systolic function normal ejection fraction 60 percent and grade 2 diastolic dysfunction, mild-to-moderate mitral valve regurgitation, aortic valve trileaflet severe stenosis TEN 0 6 centimeter squared mild-to-moderate TR  She was transitioned to Lasix 20 mg daily a discharge  Lab studies stable at discharge  Ms Sola Chavarria was seen by Dr Cathy Tsai on  3/21/18 for TAVR evaluation  On 4/17/18 Ms Sola Chavarria underwent a TAVR with #23 mm Vera STEVE 3 bioprosthetic valve via left femoral approach  Ms Sola Chavarria experienced urinary retention and was discharged with a lisa in place  She will follow up with urology as OP  Today Haley Dean presents our office for recent hospitalization follow-up visit  She is accompanied by her daughter  Haley Dean denies dyspnea with minimal moderate exertion, chest pain palpitations lightheadedness or dizziness  She admits to 2 pillow orthopnea  Her weight in the office is 163 pounds  She no longer has a lisa in place  Ms Sola Chavarria chief complaint is a daily headache, relieved with tylenol  New medication is omeprazole  She is abiding by a 2 gram sodium diet          Patient Active Problem List Diagnosis    [facility], [facility]    Glaucoma    Hyperlipidemia    Hypertension    Hyponatremia    Depression with anxiety    Paroxysmal atrial fibrillation (HCC)    Ataxia    Type 2 diabetes mellitus with hyperglycemia ([facility])    [facility]    Hearing loss    Insomnia    Left knee pain    PERI (obstructive sleep apnea)    Osteoarthritis    Osteoporosis    Periodic limb movement sleep disorder    Sleep talking    Tinnitus    Tricuspid valve disorders, non-rheumatic    Unsteady gait    History of cardioembolic cerebrovascular accident (CVA)    Chronic diastolic heart failure (HCC)    S/P TAVR (transcatheter aortic valve replacement)       Review of Systems   Constitution: Positive for malaise/fatigue  HENT: Negative  Eyes: Negative  Cardiovascular: Negative  Respiratory: Negative  Endocrine: Negative  Skin: Negative  Musculoskeletal: Positive for arthritis  Gastrointestinal: Negative  Genitourinary: Negative  Neurological: Negative  Psychiatric/Behavioral: Negative  Objective:     BP sitting in /60  Vitals:    05/02/18 0915   BP: 130/78   BP Location: Left arm   Patient Position: Sitting   Cuff Size: Adult   Pulse: 68   Weight: 74 kg (163 lb 3 2 oz)   Height: 4' 10" (1 473 m)     Body mass index is 34 11 kg/m²      Wt Readings from Last 3 Encounters:   05/02/18 74 kg (163 lb 3 2 oz)   04/23/18 74 1 kg (163 lb 6 4 oz)   04/19/18 74 3 kg (163 lb 12 8 oz)         Physical Exam:  GEN: Cayla Arguello appears well, alert and oriented x 3, pleasant and cooperative   HEENT: pupils equal, round, and reactive to light; extraocular muscles intact  NECK: supple, no carotid bruits   HEART: regular rhythm, normal S1 and S2, JVP is flat   LUNGS: clear to auscultation bilaterally; no wheezes, rales, or rhonchi   ABDOMEN: normal bowel sounds, soft, no tenderness, no distention  EXTREMITIES: peripheral pulses normal; no clubbing, cyanosis, or edema  NEURO: no focal findings   SKIN: normal without suspicious lesions on exposed skin      Current Outpatient Prescriptions:     Acetaminophen (TYLENOL) 325 MG CAPS, Take by mouth as needed, Disp: , Rfl:     Bimatoprost (LUMIGAN OP), Apply to eye daily at bedtime Both eyes , Disp: , Rfl:     COMBIGAN 0 2-0 5 %, instill 1 drop into both eyes twice a day, Disp: , Rfl: 0    DORZOLAMIDE HCL OP, Apply to eye 3 (three) times a day Left eye only , Disp: , Rfl:     escitalopram (LEXAPRO) 5 mg tablet, Take 5 mg by mouth daily, Disp: , Rfl:     furosemide (LASIX) 20 mg tablet, Take 20 mg by mouth daily, Disp: , Rfl:     glipiZIDE (GLUCOTROL XL) 10 mg 24 hr tablet, Take 10 mg by mouth 2 (two) times a day, Disp: , Rfl: 0    Hypromellose (ARTIFICIAL TEARS OP), Apply to eye, Disp: , Rfl:     lisinopril (ZESTRIL) 10 mg tablet, Take 1 tablet (10 mg total) by mouth 2 (two) times a day, Disp: 30 tablet, Rfl: 3    metFORMIN (GLUCOPHAGE) 500 mg tablet, Take 1 tablet (500 mg total) by mouth daily with breakfast for 90 days (Patient taking differently: Take 1,000 mg by mouth daily with breakfast  ), Disp: 90 tablet, Rfl: 1    metoprolol tartrate (LOPRESSOR) 25 mg tablet, Take 0 5 tablets (12 5 mg total) by mouth every 12 (twelve) hours, Disp: 30 tablet, Rfl: 1    omeprazole (PriLOSEC) 20 mg delayed release capsule, Take 1 capsule (20 mg total) by mouth daily for 30 days Take 30 minutes prior to breakfast, Disp: 30 capsule, Rfl: 0    ONE TOUCH ULTRA TEST test strip, TEST twice a day, Disp: 200 each, Rfl: 3    ONETOUCH DELICA LANCETS 74A MISC, CHECK BLOOD SUGAR TWICE DAILY, Disp: 200 each, Rfl: 3    rivaroxaban (XARELTO) 20 mg tablet, Take 1 tablet (20 mg total) by mouth daily for 30 days, Disp: 30 tablet, Rfl: 5    simvastatin (ZOCOR) 80 mg tablet, take 1 tablet by mouth once daily, Disp: 90 tablet, Rfl: 3    potassium chloride (K-DUR,KLOR-CON) 10 mEq tablet, Take 1 tablet (10 mEq total) by mouth daily for 30 days, Disp: 30 tablet, Rfl: 5          Assessment/Plan:   1  Chronic diastolic heart failure NYHA class III stage C- Eu volemic, weight at home 163 pounds, HR controlled, /60,  continue on Lisinopril 10mg daily, Metoprolol tartrate 12 5mg Q12 hours, Lasix 20mg daily, Maintain a 2 gram daily sodium diet and 1500 ml daily fluid restriction  Check daily weights  If you gained 3 pounds in one day, 5 pounds in one week, or experience worsening shortness of breath or increasing lower leg swelling  Please call the heart failure office at [phone]  Please bring a  list of your current medications and daily weights to the office visit  BMP in one week, follow up with Dr Cassidy Johnson in one month   2 HTN- controlled on current medical regimen as above  3  Severe AS sp TAVR #23 mm Lizzie S3- follow up with Dr Slime May   4  Paroxysmal atrial fibrillation- in NSR continue on BB and Xarelto  5  Headache- stop Omeprazole today, common side effect          ROBBI Arriaga  5/2/2018  9:24 AM

## 2018-05-02 NOTE — PATIENT INSTRUCTIONS
Maintain a 2 gram daily sodium diet and 1500 ml daily fluid restriction  Check daily weights  If you gain 3 pounds in one day, 5 pounds in one week, or experience worsening shortness of breath or increasing lower leg swelling  Please call the heart failure office at [phone]    Please bring a  list of your current medications and daily weights to the office visit

## 2018-05-02 NOTE — TELEPHONE ENCOUNTER
I called [facility] and daughter today  1  Headache---Pt saw cardiology today, plan to wean off omeprazole because it may be the side effect from omeprazole  2  Elevated blood sugar---Pt is on metformin 500mg QD now  She denies nausea, vomit or abdominal pain  Will increase metformin to 500mg bid  Low carb diet  3  Last week Na was low 130  Will recheck BMP  [facility] can draw blood work

## 2018-05-04 ENCOUNTER — APPOINTMENT (OUTPATIENT)
Dept: LAB | Facility: CLINIC | Age: 83
End: 2018-05-04
Payer: COMMERCIAL

## 2018-05-04 DIAGNOSIS — E87.1 HYPONATREMIA: ICD-10-CM

## 2018-05-04 LAB
ANION GAP SERPL CALCULATED.3IONS-SCNC: 8 MMOL/L (ref 4–13)
BUN SERPL-MCNC: 33 MG/DL (ref 5–25)
CALCIUM SERPL-MCNC: 9.5 MG/DL (ref 8.3–10.1)
CHLORIDE SERPL-SCNC: 98 MMOL/L (ref 100–108)
CO2 SERPL-SCNC: 27 MMOL/L (ref 21–32)
CREAT SERPL-MCNC: 0.8 MG/DL (ref 0.6–1.3)
GFR SERPL CREATININE-BSD FRML MDRD: 68 ML/MIN/1.73SQ M
GLUCOSE SERPL-MCNC: 242 MG/DL (ref 65–140)
POTASSIUM SERPL-SCNC: 4.3 MMOL/L (ref 3.5–5.3)
SODIUM SERPL-SCNC: 133 MMOL/L (ref 136–145)

## 2018-05-04 PROCEDURE — 80048 BASIC METABOLIC PNL TOTAL CA: CPT

## 2018-05-04 PROCEDURE — 36415 COLL VENOUS BLD VENIPUNCTURE: CPT

## 2018-05-14 DIAGNOSIS — I10 ESSENTIAL HYPERTENSION: Chronic | ICD-10-CM

## 2018-05-14 RX ORDER — FUROSEMIDE 20 MG/1
20 TABLET ORAL DAILY
Refills: 0 | Status: CANCELLED | OUTPATIENT
Start: 2018-05-14

## 2018-05-14 RX ORDER — LISINOPRIL 10 MG/1
10 TABLET ORAL 2 TIMES DAILY
Qty: 60 TABLET | Refills: 5 | Status: SHIPPED | OUTPATIENT
Start: 2018-05-14 | End: 2018-06-21 | Stop reason: CLARIF

## 2018-05-15 NOTE — TELEPHONE ENCOUNTER
I called back  Daughter states pt urinate a lot  Denies burning sensation  Pt drinks a lot also  Advised pt can try lasix 20mg every other day  Watch weight  Go back to lasix 20mg QD if she gained wt >3lbs/day or SOB  Daughter understood

## 2018-05-16 ENCOUNTER — TELEPHONE (OUTPATIENT)
Dept: FAMILY MEDICINE CLINIC | Facility: CLINIC | Age: 83
End: 2018-05-16

## 2018-05-17 ENCOUNTER — OFFICE VISIT (OUTPATIENT)
Dept: FAMILY MEDICINE CLINIC | Facility: CLINIC | Age: 83
End: 2018-05-17
Payer: COMMERCIAL

## 2018-05-17 ENCOUNTER — APPOINTMENT (OUTPATIENT)
Dept: LAB | Facility: HOSPITAL | Age: 83
End: 2018-05-17
Attending: THORACIC SURGERY (CARDIOTHORACIC VASCULAR SURGERY)
Payer: COMMERCIAL

## 2018-05-17 ENCOUNTER — OFFICE VISIT (OUTPATIENT)
Dept: CARDIAC SURGERY | Facility: CLINIC | Age: 83
End: 2018-05-17
Payer: COMMERCIAL

## 2018-05-17 ENCOUNTER — HOSPITAL ENCOUNTER (OUTPATIENT)
Dept: NON INVASIVE DIAGNOSTICS | Facility: HOSPITAL | Age: 83
Discharge: HOME | End: 2018-05-17
Attending: THORACIC SURGERY (CARDIOTHORACIC VASCULAR SURGERY)
Payer: COMMERCIAL

## 2018-05-17 VITALS
HEART RATE: 68 BPM | WEIGHT: 160.8 LBS | RESPIRATION RATE: 16 BRPM | DIASTOLIC BLOOD PRESSURE: 76 MMHG | TEMPERATURE: 98.7 F | SYSTOLIC BLOOD PRESSURE: 132 MMHG | BODY MASS INDEX: 33.61 KG/M2

## 2018-05-17 VITALS
HEIGHT: 58 IN | BODY MASS INDEX: 33.37 KG/M2 | HEART RATE: 60 BPM | RESPIRATION RATE: 14 BRPM | WEIGHT: 159 LBS | DIASTOLIC BLOOD PRESSURE: 72 MMHG | TEMPERATURE: 97.3 F | SYSTOLIC BLOOD PRESSURE: 126 MMHG | OXYGEN SATURATION: 97 %

## 2018-05-17 DIAGNOSIS — E11.65: Chronic | ICD-10-CM

## 2018-05-17 DIAGNOSIS — N39.0 URINARY TRACT INFECTION WITHOUT HEMATURIA, SITE UNSPECIFIED: Primary | ICD-10-CM

## 2018-05-17 DIAGNOSIS — I35.0 SEVERE AORTIC STENOSIS: ICD-10-CM

## 2018-05-17 DIAGNOSIS — E11.3559: Chronic | ICD-10-CM

## 2018-05-17 DIAGNOSIS — I48.0 PAROXYSMAL ATRIAL FIBRILLATION (HCC): ICD-10-CM

## 2018-05-17 DIAGNOSIS — Z48.89 ENCOUNTER FOR POSTOPERATIVE CARE: ICD-10-CM

## 2018-05-17 DIAGNOSIS — I35.0 NONRHEUMATIC AORTIC VALVE STENOSIS: Primary | Chronic | ICD-10-CM

## 2018-05-17 DIAGNOSIS — Z95.2 S/P TAVR (TRANSCATHETER AORTIC VALVE REPLACEMENT): ICD-10-CM

## 2018-05-17 DIAGNOSIS — R33.9 URINARY RETENTION: ICD-10-CM

## 2018-05-17 DIAGNOSIS — I50.32 CHRONIC DIASTOLIC HEART FAILURE (HCC): Chronic | ICD-10-CM

## 2018-05-17 LAB
ANION GAP SERPL CALCULATED.3IONS-SCNC: 8 MMOL/L (ref 4–13)
ATRIAL RATE: 61 BPM
BUN SERPL-MCNC: 22 MG/DL (ref 5–25)
CALCIUM SERPL-MCNC: 9.5 MG/DL (ref 8.3–10.1)
CHLORIDE SERPL-SCNC: 101 MMOL/L (ref 100–108)
CO2 SERPL-SCNC: 26 MMOL/L (ref 21–32)
CREAT SERPL-MCNC: 0.88 MG/DL (ref 0.6–1.3)
ERYTHROCYTE [DISTWIDTH] IN BLOOD BY AUTOMATED COUNT: 13.3 % (ref 11.6–15.1)
GFR SERPL CREATININE-BSD FRML MDRD: 61 ML/MIN/1.73SQ M
GLUCOSE SERPL-MCNC: 178 MG/DL (ref 65–140)
HCT VFR BLD AUTO: 38.8 % (ref 34.8–46.1)
HGB BLD-MCNC: 13 G/DL (ref 11.5–15.4)
MCH RBC QN AUTO: 30.1 PG (ref 26.8–34.3)
MCHC RBC AUTO-ENTMCNC: 33.5 G/DL (ref 31.4–37.4)
MCV RBC AUTO: 90 FL (ref 82–98)
P AXIS: 30 DEGREES
PLATELET # BLD AUTO: 285 THOUSANDS/UL (ref 149–390)
PMV BLD AUTO: 11.1 FL (ref 8.9–12.7)
POTASSIUM SERPL-SCNC: 4.3 MMOL/L (ref 3.5–5.3)
PR INTERVAL: 158 MS
QRS AXIS: -14 DEGREES
QRSD INTERVAL: 84 MS
QT INTERVAL: 400 MS
QTC INTERVAL: 402 MS
RBC # BLD AUTO: 4.32 MILLION/UL (ref 3.81–5.12)
SL AMB  POCT GLUCOSE, UA: NORMAL
SL AMB LEUKOCYTE ESTERASE,UA: NORMAL
SL AMB POCT BILIRUBIN,UA: NORMAL
SL AMB POCT BLOOD,UA: NORMAL
SL AMB POCT CLARITY,UA: NORMAL
SL AMB POCT COLOR,UA: YELLOW
SL AMB POCT KETONES,UA: NORMAL
SL AMB POCT NITRITE,UA: NORMAL
SL AMB POCT PH,UA: 6
SL AMB POCT SPECIFIC GRAVITY,UA: 1.02
SL AMB POCT URINE PROTEIN: NORMAL
SL AMB POCT UROBILINOGEN: 0.2
SODIUM SERPL-SCNC: 135 MMOL/L (ref 136–145)
T WAVE AXIS: 27 DEGREES
VENTRICULAR RATE: 61 BPM
WBC # BLD AUTO: 7.17 THOUSAND/UL (ref 4.31–10.16)

## 2018-05-17 PROCEDURE — 93306 TTE W/DOPPLER COMPLETE: CPT | Performed by: INTERNAL MEDICINE

## 2018-05-17 PROCEDURE — 85027 COMPLETE CBC AUTOMATED: CPT

## 2018-05-17 PROCEDURE — 87186 SC STD MICRODIL/AGAR DIL: CPT | Performed by: NURSE PRACTITIONER

## 2018-05-17 PROCEDURE — 80048 BASIC METABOLIC PNL TOTAL CA: CPT

## 2018-05-17 PROCEDURE — 36415 COLL VENOUS BLD VENIPUNCTURE: CPT

## 2018-05-17 PROCEDURE — 93306 TTE W/DOPPLER COMPLETE: CPT

## 2018-05-17 PROCEDURE — 87086 URINE CULTURE/COLONY COUNT: CPT | Performed by: NURSE PRACTITIONER

## 2018-05-17 PROCEDURE — 87077 CULTURE AEROBIC IDENTIFY: CPT | Performed by: NURSE PRACTITIONER

## 2018-05-17 PROCEDURE — 81002 URINALYSIS NONAUTO W/O SCOPE: CPT | Performed by: NURSE PRACTITIONER

## 2018-05-17 PROCEDURE — 99213 OFFICE O/P EST LOW 20 MIN: CPT | Performed by: NURSE PRACTITIONER

## 2018-05-17 PROCEDURE — 99214 OFFICE O/P EST MOD 30 MIN: CPT | Performed by: NURSE PRACTITIONER

## 2018-05-17 PROCEDURE — 93005 ELECTROCARDIOGRAM TRACING: CPT

## 2018-05-17 PROCEDURE — 93010 ELECTROCARDIOGRAM REPORT: CPT | Performed by: INTERNAL MEDICINE

## 2018-05-17 RX ORDER — CIPROFLOXACIN 250 MG/1
250 TABLET, FILM COATED ORAL EVERY 12 HOURS SCHEDULED
Qty: 14 TABLET | Refills: 0 | Status: SHIPPED | OUTPATIENT
Start: 2018-05-17 | End: 2018-05-24

## 2018-05-17 NOTE — LETTER
May 17, 2018     Saul Lee, [address]  [address] [place] [address]    Patient: Stephan Santiago   YOB: 1935   Date of Visit: 5/17/2018       Dear Dr Bryan Guzman:    Thank you for referring Olga Brock to me for evaluation  Below are my notes for this consultation  If you have questions, please do not hesitate to call me  I look forward to following your patient along with you  Sincerely,        Martha Patterson MD        CC: No Recipients  ROBBI Early  5/17/2018 12:22 PM  Attested  Procedure: S/P transfemoral transcatheter aortic valve replacement #23 mm Vera STEVE 3 bioprosthesis, performed on 4/17/18  History: Stephan Santiago is a 80y o  year old female who presents to our office today for routine follow up care from transfemoral transcatheter aortic valve replacement  She is accompanied by her son and daughter  She was just seen by her PCP this AM for c/o dysuria and treated with 7 day course of Cipro  She states she is doing her "daily exercises" and walking around the house  She denies SOB  Her weight has remained stable and she has no edema  He groin sites have healed without issue  She has not been very active for fear of falling  Her family is supportive and encouraging her to do more  She states she experiences positional lightheadedness, but denies presyncope or syncope  Review of System:     History obtained from chart review and the patient  General ROS: negative  Psychological ROS: negative  Hematological and Lymphatic ROS: negative for - bleeding problems, blood clots or bruising  Respiratory ROS: no cough, shortness of breath, or wheezing  Cardiovascular ROS: no chest pain or dyspnea on exertion  Gastrointestinal ROS: no abdominal pain, change in bowel habits, or black or bloody stools  Musculoskeletal ROS: negative  Neurological ROS: no TIA or stroke symptoms    Vital Signs: There were no vitals filed for this visit      Home Medications:     Prior to Admission medications    Medication Sig Start Date End Date Taking?  Authorizing Provider   Acetaminophen (TYLENOL) 325 MG CAPS Take by mouth as needed    Historical Provider, MD   Bimatoprost (LUMIGAN OP) Apply to eye daily at bedtime Both eyes     Historical Provider, MD   ciprofloxacin (CIPRO) 250 mg tablet Take 1 tablet (250 mg total) by mouth every 12 (twelve) hours for 7 days 5/17/18 5/24/18  Eudelia Ann Marie, CRNP   COMBIGAN 0 2-0 5 % instill 1 drop into both eyes twice a day 2/8/18   Historical Provider, MD   DORZOLAMIDE HCL OP Apply to eye 3 (three) times a day Left eye only     Historical Provider, MD   escitalopram (LEXAPRO) 5 mg tablet Take 5 mg by mouth daily    Historical Provider, MD   furosemide (LASIX) 20 mg tablet Take 20 mg by mouth daily    Historical Provider, MD   glipiZIDE (GLUCOTROL XL) 10 mg 24 hr tablet Take 10 mg by mouth 2 (two) times a day 3/30/18   Historical Provider, MD   Hypromellose (ARTIFICIAL TEARS OP) Apply to eye    Historical Provider, MD   lisinopril (ZESTRIL) 10 mg tablet Take 1 tablet (10 mg total) by mouth 2 (two) times a day for 30 days 5/14/18 6/13/18  Jeanette Hammonds MD   metFORMIN (GLUCOPHAGE) 500 mg tablet Take 1 tablet (500 mg total) by mouth daily with breakfast for 90 days  Patient taking differently: Take 1,000 mg by mouth daily with breakfast   3/1/18 5/30/18  Jeanette Hammonds MD   metoprolol tartrate (LOPRESSOR) 25 mg tablet Take 0 5 tablets (12 5 mg total) by mouth every 12 (twelve) hours 4/19/18   Sofia Álvarez PA-C   ONE TOUCH ULTRA TEST test strip TEST twice a day 2/19/18   MD Vane Hankins LANCETS [id number]ensley 2/19/18   Jeanette Hammonds MD   potassium chloride (K-DUR,KLOR-CON) 10 mEq tablet Take 1 tablet (10 mEq total) by mouth daily for 30 days 3/26/18 4/25/18  Jeanette Hammonds MD   rivaroxaban (XARELTO) 20 mg tablet Take 1 tablet (20 mg total) by mouth daily for 30 days 4/27/18 5/27/18  Jeanette Hammonds MD simvastatin (ZOCOR) 80 mg tablet take 1 tablet by mouth once daily 3/10/18   Sameer Hough MD       Physical Exam:    General: welll developed, no acute distress  HEENT/NECK:  PERRLA  No jugular venous distention  Cardiac:Regular rate and rhythm, No murmurs rubs or gallops  Pulmonary:Breath sounds clear bilaterally  Abdomen:  Non-tender, Non-distended  Positive bowel sounds  Lower extremities: Extremities warm/dry  Radial/PT/DP pulses 2+ bilaterally  No edema B/L  Incisions: Inguinal puncture site is clean, dry, and intact  Neuro: Alert and oriented X 3  Sensation is grossly intact  No focal deficits  Skin: Warm/Dry, without rashes or lesions  Lab Results:   Lab Results   Component Value Date    WBC 7 17 05/17/2018    HGB 13 0 05/17/2018    HCT 38 8 05/17/2018    MCV 90 05/17/2018     05/17/2018     BMP pending    Imaging Studies:     Transthoracic Echocardiogram:   Aortic valve bioprosthesis well seated with normal function  No stenosis, regurgitation and trace PVL    EKG:   Pending    I have personally reviewed pertinent reports  TAVR evaluation Assessment:     Bethel Doty 122: I    Assessment: Aortic stenosis, Non-Rheumatic  S/P transfemoral transcatheter aortic valve replacement;    Plan:     Jesus Pace is making good progress in her recover following transacpical transcatheter aortic valve replacement  She is at NYHA functional class I  Groin incisions are well healed  Weight and VS are stable  Echo today demonstrates stable findings  I reviewed her medications and instructed her to reduce her Lisinopril to 10 mg daily HS  I have discussed the benefits of participating in cardiac rehabilitation and have encouraged her to to do so  Jesus Pace may resume driving and all normal activities  Jesus Pace has already been evaluated by her primary care physician and the NP in the  Cardiology office for ongoing medical care   We have made an appt to be seen back in the cardiology dept to evaluate medications and her lightheadedness  Arrangements will be made for one year follow-up in our office with repeat echocardiogram, ECG, CBC & BMP  I have advised them to notify their PCP with any new concerns that may arise in the interim and to maintain routine follow up with their cardiologist  Nikki Tsai was comfortable with our recommendations and their questions were answered to their satisfaction  ROBBI Worthy  5/17/18  11:28 AM  Attestation signed by Elsy Sanchez MD at 5/17/2018  3:03 PM:  Patient seen and evaluated with [address] / PA-C  I agree with the above assessment and plan with the following additions

## 2018-05-17 NOTE — PROGRESS NOTES
Procedure: S/P transfemoral transcatheter aortic valve replacement #23 mm Vera STEVE 3 bioprosthesis, performed on 4/17/18  History: Michelle Valentino is a 80y o  year old female who presents to our office today for routine follow up care from transfemoral transcatheter aortic valve replacement  She is accompanied by her son and daughter  She was just seen by her PCP this AM for c/o dysuria and treated with 7 day course of Cipro  She states she is doing her "daily exercises" and walking around the house  She denies SOB  Her weight has remained stable and she has no edema  He groin sites have healed without issue  She has not been very active for fear of falling  Her family is supportive and encouraging her to do more  She states she experiences positional lightheadedness, but denies presyncope or syncope  Review of System:     History obtained from chart review and the patient  General ROS: negative  Psychological ROS: negative  Hematological and Lymphatic ROS: negative for - bleeding problems, blood clots or bruising  Respiratory ROS: no cough, shortness of breath, or wheezing  Cardiovascular ROS: no chest pain or dyspnea on exertion  Gastrointestinal ROS: no abdominal pain, change in bowel habits, or black or bloody stools  Musculoskeletal ROS: negative  Neurological ROS: no TIA or stroke symptoms    Vital Signs: There were no vitals filed for this visit  Home Medications:     Prior to Admission medications    Medication Sig Start Date End Date Taking?  Authorizing Provider   Acetaminophen (TYLENOL) 325 MG CAPS Take by mouth as needed    Historical Provider, MD   Bimatoprost (LUMIGAN OP) Apply to eye daily at bedtime Both eyes     Historical Provider, MD   ciprofloxacin (CIPRO) 250 mg tablet Take 1 tablet (250 mg total) by mouth every 12 (twelve) hours for 7 days 5/17/18 5/24/18  ROBBI Kruger   COMBIGAN 0 2-0 5 % instill 1 drop into both eyes twice a day 2/8/18   Historical Provider, MD   DORZOLAMIDE HCL OP Apply to eye 3 (three) times a day Left eye only     Historical Provider, MD   escitalopram (LEXAPRO) 5 mg tablet Take 5 mg by mouth daily    Historical Provider, MD   furosemide (LASIX) 20 mg tablet Take 20 mg by mouth daily    Historical Provider, MD   glipiZIDE (GLUCOTROL XL) 10 mg 24 hr tablet Take 10 mg by mouth 2 (two) times a day 3/30/18   Historical Provider, MD   Hypromellose (ARTIFICIAL TEARS OP) Apply to eye    Historical Provider, MD   lisinopril (ZESTRIL) 10 mg tablet Take 1 tablet (10 mg total) by mouth 2 (two) times a day for 30 days 5/14/18 6/13/18  Kike Florian MD   metFORMIN (GLUCOPHAGE) 500 mg tablet Take 1 tablet (500 mg total) by mouth daily with breakfast for 90 days  Patient taking differently: Take 1,000 mg by mouth daily with breakfast   3/1/18 5/30/18  Kike Florian MD   metoprolol tartrate (LOPRESSOR) 25 mg tablet Take 0 5 tablets (12 5 mg total) by mouth every 12 (twelve) hours 4/19/18   Phil Dewitt PA-C   ONE TOUCH ULTRA TEST test strip TEST twice a day 2/19/18   Kike Florian MD   [address] [id number] [facility] 2/19/18   Kike Florian MD   potassium chloride (K-DUR,KLOR-CON) 10 mEq tablet Take 1 tablet (10 mEq total) by mouth daily for 30 days 3/26/18 4/25/18  Kike Florian MD   rivaroxaban (XARELTO) 20 mg tablet Take 1 tablet (20 mg total) by mouth daily for 30 days 4/27/18 5/27/18  Kike Florian MD   simvastatin (ZOCOR) 80 mg tablet take 1 tablet by mouth once daily 3/10/18   Kike Florian MD       Physical Exam:    General: welll developed, no acute distress  HEENT/NECK:  PERRLA  No jugular venous distention  Cardiac:Regular rate and rhythm, No murmurs rubs or gallops  Pulmonary:Breath sounds clear bilaterally  Abdomen:  Non-tender, Non-distended  Positive bowel sounds  Lower extremities: Extremities warm/dry  Radial/PT/DP pulses 2+ bilaterally  No edema B/L  Incisions: Inguinal puncture site is clean, dry, and intact    Neuro: Alert and oriented X 3  Sensation is grossly intact  No focal deficits  Skin: Warm/Dry, without rashes or lesions  Lab Results:   Lab Results   Component Value Date    WBC 7 17 05/17/2018    HGB 13 0 05/17/2018    HCT 38 8 05/17/2018    MCV 90 05/17/2018     05/17/2018     BMP pending    Imaging Studies:     Transthoracic Echocardiogram:   Aortic valve bioprosthesis well seated with normal function  No stenosis, regurgitation and trace PVL    EKG:   Pending    I have personally reviewed pertinent reports  TAVR evaluation Assessment:     Bethel  Dominiqu[address]ashawn [address]: I    Assessment: Aortic stenosis, Non-Rheumatic  S/P transfemoral transcatheter aortic valve replacement;    Plan:     Chelsy Valiente is making good progress in her recover following transacpical transcatheter aortic valve replacement  She is at NYHA functional class I  Groin incisions are well healed  Weight and VS are stable  Echo today demonstrates stable findings  I reviewed her medications and instructed her to reduce her Lisinopril to 10 mg daily HS  I have discussed the benefits of participating in cardiac rehabilitation and have encouraged her to to do so  Chelsy Valiente may resume driving and all normal activities  Chelsy Valiente has already been evaluated by her primary care physician and the NP in the  Cardiology office for ongoing medical care  We have made an appt to be seen back in the cardiology dept to evaluate medications and her lightheadedness  Arrangements will be made for one year follow-up in our office with repeat echocardiogram, ECG, CBC & BMP  I have advised them to notify their PCP with any new concerns that may arise in the interim and to maintain routine follow up with their cardiologist  Chelsy Valiente was comfortable with our recommendations and their questions were answered to their satisfaction      ROBBI Rai  5/17/18  11:28 AM

## 2018-05-17 NOTE — PATIENT INSTRUCTIONS
Start Cipro (antibiotic)- this was sent to your pharmacy   We will call with urine culture results  Restart Lasix (your water pill)  Follow up with Cardiology this afternoon   Continue all current medications   If trouble/ pain with mid-urination continues, let our office know and we can schedule a Urology evaluation

## 2018-05-17 NOTE — PROGRESS NOTES
Chief Complaint   Patient presents with    Urinary Tract Infection     Symptoms frequency and pain with urination for 3 weeks  Assessment/Plan:    1  UTI- urine dip in office showing 2+ Leuks, + nitrites, 2+ protein, 1+ blood, 2+ glucose  We'll send out for culture and start her on Cipro 250mg one tab BID x 7 days  Proper feminine hygiene reviewed today  Her daughter would like to be notified of the urine culture results    2  Urinary retention- likely secondary to above  If this problem persists, she'll need to be evaluated by Urology  I asked her daughter to call and update us in a few days  3  Chronic Diastolic CHF- we had a long conversation about her Lasix  Shelli Cheese does not want to take this but agrees to further discuss this with Cardiology this afternoon  I did explain the importance of this medication  Her family held her Lasix for the past 3 days  Weight has remained stable thus far  Trace edema today, no dyspnea on exertion  I advised that she restart the Lasix 20 mg one tab daily    4  DM type 2- Continue Metformin and Glipizide  Bring BS log to next appointment     5  PAF- stable  Continue Xarelto and Metoprolol      Diagnoses and all orders for this visit:    Urinary tract infection without hematuria, site unspecified  -     ciprofloxacin (CIPRO) 250 mg tablet; Take 1 tablet (250 mg total) by mouth every 12 (twelve) hours for 7 days    Urinary retention  -     POCT urine dip  -     Urine culture    Uncontrolled type 2 diabetes mellitus with stable proliferative retinopathy, without long-term current use of insulin, unspecified laterality (HCC)    Chronic diastolic heart failure (HCC)    Paroxysmal atrial fibrillation (HCC)          Subjective:      Patient ID: Pipe Chery is a 80 y o  female      HPI   Pt presents with her daughter, Mar Stokes, today for an acute visit   C/O increased urinary frequency and urgency for the past 3 weeks  She describes that she will be urinating and then mid stream, she stops urinating and has lower pelvic pain/ pressure and cannot finish urinating   No actual dysuria, hematuria, malodorous or cloudy urine   Denies fevers, chills, flank pain   Bowel movements have been regular, no constipation or diarrhea     She is s/p TAVR in 4/2018   During this hospitalization, she developed urinary retention and was discharged with a Andino Catheter  This was removed by VNA and she never followed up with Urology as she did well after the Andino was removed     CHF- her family has held her Lasix 20mg (one tab daily) for the past 3 days as they felt this was the cause of her urinary issues  They report Nevaeh's weight has been stable around 158-159# (She is approximately 162# in the office this morning)  They did not inform cardiology of this, but she does have an appointment with them this afternoon   She denies any dyspnea on exertion, chest pain, palpitations, edema, dizziness  She uses 2 pillows to sleep at night for her orthopnea     Currently taking Metformin, Glipizide for DM type 2       The following portions of the patient's history were reviewed and updated as appropriate: allergies, current medications, past medical history, past social history and problem list     Review of Systems   Constitutional: Positive for fatigue  Negative for appetite change, chills, diaphoresis and fever  Eyes: Negative for visual disturbance  Respiratory: Negative for cough, chest tightness, shortness of breath and wheezing          +orthopnea    Cardiovascular: Negative for chest pain, palpitations and leg swelling  Gastrointestinal: Negative for abdominal pain, constipation and diarrhea  Genitourinary: Positive for frequency, pelvic pain (mid urination) and urgency  Negative for dysuria, flank pain, hematuria, vaginal discharge and vaginal pain  Musculoskeletal: Positive for arthralgias  Skin: Negative for rash and wound  Neurological: Negative for dizziness and headaches  Hematological: Negative for adenopathy  Objective:      /76 (BP Location: Left arm, Patient Position: Sitting, Cuff Size: Standard)   Pulse 68   Temp 98 7 °F (37 1 °C) (Tympanic)   Resp 16   Wt 72 9 kg (160 lb 12 8 oz)   BMI 33 61 kg/m²          Physical Exam   Constitutional: She is oriented to person, place, and time  She appears well-developed and well-nourished  No distress  HENT:   Head: Normocephalic and atraumatic  Eyes: Conjunctivae are normal  Pupils are equal, round, and reactive to light  Cardiovascular: Normal rate, regular rhythm and normal heart sounds  No murmur heard  Trace B/L LE edema   Pulmonary/Chest: Effort normal and breath sounds normal  No respiratory distress  She has no wheezes  Abdominal: Soft  Bowel sounds are normal  She exhibits no distension  There is no hepatosplenomegaly  There is no tenderness  There is no CVA tenderness  Musculoskeletal: Normal range of motion  Neurological: She is alert and oriented to person, place, and time  Skin: Skin is warm and dry  She is not diaphoretic  Psychiatric: She has a normal mood and affect

## 2018-05-19 LAB — BACTERIA UR CULT: ABNORMAL

## 2018-05-25 DIAGNOSIS — I63.9 CEREBROVASCULAR ACCIDENT (CVA), UNSPECIFIED MECHANISM (HCC): ICD-10-CM

## 2018-05-25 DIAGNOSIS — R26.81 UNSTEADY GAIT: Primary | ICD-10-CM

## 2018-06-06 RX ORDER — NEOMYCIN SULFATE, POLYMYXIN B SULFATE, AND DEXAMETHASONE 3.5; 10000; 1 MG/G; [USP'U]/G; MG/G
OINTMENT OPHTHALMIC
Refills: 0 | COMMUNITY
Start: 2018-05-24 | End: 2019-01-25 | Stop reason: ALTCHOICE

## 2018-06-07 ENCOUNTER — OFFICE VISIT (OUTPATIENT)
Dept: CARDIOLOGY CLINIC | Facility: CLINIC | Age: 83
End: 2018-06-07
Payer: COMMERCIAL

## 2018-06-07 VITALS
WEIGHT: 169 LBS | HEART RATE: 72 BPM | DIASTOLIC BLOOD PRESSURE: 68 MMHG | RESPIRATION RATE: 20 BRPM | HEIGHT: 58 IN | BODY MASS INDEX: 35.48 KG/M2 | SYSTOLIC BLOOD PRESSURE: 130 MMHG

## 2018-06-07 DIAGNOSIS — Z86.73 HISTORY OF CARDIOEMBOLIC CEREBROVASCULAR ACCIDENT (CVA): Primary | ICD-10-CM

## 2018-06-07 PROCEDURE — 99214 OFFICE O/P EST MOD 30 MIN: CPT | Performed by: INTERNAL MEDICINE

## 2018-06-07 NOTE — PROGRESS NOTES
[address] [facility] [place]  [address]  [facility]  [phone]    Cardiology New Patient     Patient:  Alejo Pulse  :  1935  MRN:  [id number]    History of Present Illness:     20-year-old female with past medical history of aortic stenosis status post recent transcatheter aortic valve replacement, coronary disease with a 60% 1st obtuse marginal stenosis as well as the mid RCA 50% stenosis, paroxysmal atrial fibrillation on Xarelto, previous CVA/TIA, diastolic heart failure, diabetes, hypertension, dyslipidemia, obstructive sleep apnea not on CPAP, anxiety/depression presents for cardiology follow-up  She notes her breathing is better since the transcatheter aortic valve replacement-she notes no chest pain  She notes no palpitations or syncope  Her main complaint is lightheadedness primarily in the morning which feels like the room is spinning  She also has a chronic headache      Patient Active Problem List   Diagnosis    Diabetes mellitus type 2, uncontrolled ([facility] [address] )    Glaucoma    Hyperlipidemia    Hypertension    Hyponatremia    Depression with anxiety    Paroxysmal atrial fibrillation (HCC)    Ataxia    Type 2 diabetes mellitus with hyperglycemia ([facility])    CVA (cerebral vascular accident) ([facility] [address] )    Hearing loss    Insomnia    Left knee pain    PERI (obstructive sleep apnea)    Osteoarthritis    Osteoporosis    Periodic limb movement sleep disorder    Sleep talking    Tinnitus    Tricuspid valve disorders, non-rheumatic    Unsteady gait    History of cardioembolic cerebrovascular accident (CVA)    Chronic diastolic heart failure (HCC)    S/P TAVR (transcatheter aortic valve replacement)    Encounter for postoperative care       Past Surgical History  Past Surgical History:   Procedure Laterality Date    APPENDECTOMY      GLAUCOMA SURGERY  2016    NE REPLACE AORTIC VALVE OPENFEMORAL ARTERY APPROACH N/A 2018    Procedure: REPLACEMENT AORTIC VALVE TRANSCATHETER (TAVR) TRANSFEMORAL W/ 23 MM AGUILAR STEVE S3 VALVE;  Surgeon: Lynda Spears DO;  [facility];  Service: Cardiac Surgery    TUBAL LIGATION         Social History   Social History     Social History    Marital status:      Spouse name: N/A    Number of children: N/A    Years of education: N/A     Occupational History    Not on file  Social History Main Topics    Smoking status: Never Smoker    Smokeless tobacco: Never Used    Alcohol use No    Drug use: No    Sexual activity: Not Currently     Other Topics Concern    Not on file     Social History Narrative    No narrative on file        Allergies   Allergen Reactions    Lipitor [Atorvastatin] GI Intolerance     Nausea stomach ache    Hydrochlorothiazide GI Intolerance    Aspirin GI Intolerance       Family History   Family History   Problem Relation Age of Onset    No Known Problems Mother     Diabetes Father     Stroke Father     Coronary artery disease Sister     No Known Problems Brother     No Known Problems Son     Breast cancer Daughter     No Known Problems Maternal Grandmother     No Known Problems Maternal Grandfather     No Known Problems Paternal Grandmother     No Known Problems Paternal Grandfather     No Known Problems Cousin     Rheum arthritis Neg Hx     Osteoarthritis Neg Hx     Asthma Neg Hx     Heart failure Neg Hx     Hyperlipidemia Neg Hx     Hypertension Neg Hx     Migraines Neg Hx     Rashes / Skin problems Neg Hx     Seizures Neg Hx     Thyroid disease Neg Hx        Review of Systems:  Review of Systems   Constitutional: Negative for chills, fatigue and fever  HENT: Negative for hearing loss, nosebleeds and tinnitus  Eyes: Negative for pain  Respiratory: Negative for cough, chest tightness and shortness of breath  Cardiovascular: Negative for chest pain, palpitations and leg swelling     Gastrointestinal: Negative for abdominal pain, nausea and vomiting  Endocrine: Negative for cold intolerance and heat intolerance  Genitourinary: Negative for difficulty urinating, hematuria and vaginal bleeding  Musculoskeletal: Negative for arthralgias  Skin: Negative for rash  Allergic/Immunologic: Negative for environmental allergies  Neurological: Positive for dizziness and headaches  Negative for syncope, weakness and light-headedness  Psychiatric/Behavioral: Negative for decreased concentration and sleep disturbance  The patient is not nervous/anxious  Current Outpatient Prescriptions:     Acetaminophen (TYLENOL) 325 MG CAPS, Take by mouth as needed, Disp: , Rfl:     Bimatoprost (LUMIGAN OP), Apply to eye daily at bedtime Both eyes , Disp: , Rfl:     COMBIGAN 0 2-0 5 %, instill 1 drop into both eyes twice a day, Disp: , Rfl: 0    DORZOLAMIDE HCL OP, Apply to eye 3 (three) times a day Left eye only , Disp: , Rfl:     escitalopram (LEXAPRO) 5 mg tablet, Take 5 mg by mouth daily, Disp: , Rfl:     glipiZIDE (GLUCOTROL XL) 10 mg 24 hr tablet, Take 10 mg by mouth 2 (two) times a day, Disp: , Rfl: 0    Hypromellose (ARTIFICIAL TEARS OP), Apply to eye, Disp: , Rfl:     lisinopril (ZESTRIL) 10 mg tablet, Take 1 tablet (10 mg total) by mouth 2 (two) times a day for 30 days, Disp: 60 tablet, Rfl: 5    metFORMIN (GLUCOPHAGE) 500 mg tablet, Take 1 tablet (500 mg total) by mouth daily with breakfast for 90 days, Disp: 90 tablet, Rfl: 1    metoprolol tartrate (LOPRESSOR) 25 mg tablet, Take 0 5 tablets (12 5 mg total) by mouth every 12 (twelve) hours, Disp: 30 tablet, Rfl: 1    neomycin-polymyxin-dexamethasone (MAXITROL) 0 35%-10,000 units/g-0 1%, APPLY TO SKIN IN BOTH EYES TWICE DAILY  , Disp: , Rfl: 0    ONE TOUCH ULTRA TEST test strip, TEST twice a day, Disp: 200 each, Rfl: 3    ONETOUCH DELICA LANCETS 97C MISC, CHECK BLOOD SUGAR TWICE DAILY, Disp: 200 each, Rfl: 3    simvastatin (ZOCOR) 80 mg tablet, take 1 tablet by mouth once daily, Disp: 90 tablet, Rfl: 3    furosemide (LASIX) 20 mg tablet, Take 20 mg by mouth daily, Disp: , Rfl:     Misc  Devices (ROLLATOR) MISC, by Does not apply route daily, Disp: 1 each, Rfl: 0    potassium chloride (K-DUR,KLOR-CON) 10 mEq tablet, Take 1 tablet (10 mEq total) by mouth daily for 30 days, Disp: 30 tablet, Rfl: 5    rivaroxaban (XARELTO) 20 mg tablet, Take 1 tablet (20 mg total) by mouth daily for 30 days, Disp: 30 tablet, Rfl: 5     Physical Exam:    Vitals:    06/07/18 1428   BP: 130/68   Pulse: 72   Resp: 20   Weight: 76 7 kg (169 lb)   Height: 4' 10" (1 473 m)       Physical Exam   Constitutional: She is oriented to person, place, and time  She appears well-developed and well-nourished  HENT:   Head: Normocephalic  Right Ear: External ear normal    Left Ear: External ear normal    Mouth/Throat: Oropharynx is clear and moist    Eyes: Pupils are equal, round, and reactive to light  Neck: No JVD present  Carotid bruit is not present  Cardiovascular: Normal rate, regular rhythm and intact distal pulses  Exam reveals no gallop and no friction rub  No murmur heard  Pulmonary/Chest: Effort normal and breath sounds normal  No tachypnea  No respiratory distress  She has no wheezes  She has no rales  She exhibits no tenderness  Abdominal: Soft  She exhibits no distension  There is no tenderness  There is no rebound and no guarding  Musculoskeletal: She exhibits no edema  Neurological: She is alert and oriented to person, place, and time  Skin: Skin is warm and dry  Psychiatric: She has a normal mood and affect  Her behavior is normal  Judgment and thought content normal    Nursing note and vitals reviewed  Labs:not applicable    Assessment/Plan:    1  Status post transcatheter aortic valve replacement for aortic stenosis-she is doing well from this standpoint and her symptoms have improved  2   Paroxysmal atrial fibrillation-she continues on Xarelto      3   Coronary disease-she continues on the statin-I understand she will have blood tests through her primary doctor's office in the near future  Her goal LDL should be less than 70 mg/dL  4   Status post pacemaker    5  Diastolic heart failure-she is currently off any diuretic-given that her aortic stenosis was corrected with the aortic valve replacement she may very well not need the diuretic  I have discontinue the potassium as she is off the diuretic  We will see her back in 6 months for follow-up  Thank you so much, please do not hesitate to contact me if there any questions or concerns            Pedro Summers MD  6/7/2018  2:48 PM

## 2018-06-07 NOTE — LETTER
2018     Jeanette Hammonds, [address]    Patient: Nikki Tsai   YOB: 1935   Date of Visit: 2018       Dear Dr Echo James: Thank you for referring Bright Anand to me for evaluation  Below are my notes for this consultation  If you have questions, please do not hesitate to call me  I look forward to following your patient along with you  Sincerely,        Chadd Rivera MD        CC: No Recipients  Chadd Rivera MD  2018  3:11 PM  Sign at close encounter  [address] [facility] [place]  [address]  [address] [facility]  [phone]    Cardiology New Patient     Patient:  Nikki Tsai  :  1935  MRN:  [id number]    History of Present Illness:     59-year-old female with past medical history of aortic stenosis status post recent transcatheter aortic valve replacement, coronary disease with a 60% 1st obtuse marginal stenosis as well as the mid RCA 50% stenosis, paroxysmal atrial fibrillation on Xarelto, previous CVA/TIA, diastolic heart failure, diabetes, hypertension, dyslipidemia, anxiety/depression presents for cardiology follow-up  She notes her breathing is better since the transcatheter aortic valve replacement-she notes no chest pain  She notes no palpitations or syncope  Her main complaint is lightheadedness primarily in the morning which feels like the room is spinning  She also has a chronic headache      Patient Active Problem List   Diagnosis    Diabetes mellitus type 2, uncontrolled ([address] )    Glaucoma    Hyperlipidemia    Hypertension    Hyponatremia    Depression with anxiety    Paroxysmal atrial fibrillation (HCC)    Ataxia    Type 2 diabetes mellitus with hyperglycemia (HCC)    CVA (cerebral vascular accident) ([address] )    Hearing loss    Insomnia    Left knee pain    PERI (obstructive sleep apnea)    Osteoarthritis    Osteoporosis    Periodic limb movement sleep disorder    Sleep talking    Tinnitus    Tricuspid valve disorders, non-rheumatic    Unsteady gait    History of cardioembolic cerebrovascular accident (CVA)    Chronic diastolic heart failure (HCC)    S/P TAVR (transcatheter aortic valve replacement)    Encounter for postoperative care       Past Surgical History  Past Surgical History:   Procedure Laterality Date    APPENDECTOMY      GLAUCOMA SURGERY  01/03/2016    [place] REPLACE AORTIC VALVE OPENFEMORAL ARTERY APPROACH N/A 4/17/2018    Procedure: REPLACEMENT AORTIC VALVE TRANSCATHETER (TAVR) TRANSFEMORAL W/ 23 MM AGUILAR STEVE S3 VALVE;  Surgeon: Suzanne Erwin DO;  [facility];  Service: Cardiac Surgery    TUBAL LIGATION         Social History   Social History     Social History    Marital status:      Spouse name: N/A    Number of children: N/A    Years of education: N/A     Occupational History    Not on file       Social History Main Topics    Smoking status: Never Smoker    Smokeless tobacco: Never Used    Alcohol use No    Drug use: No    Sexual activity: Not Currently     Other Topics Concern    Not on file     Social History Narrative    No narrative on file        Allergies   Allergen Reactions    Lipitor [Atorvastatin] GI Intolerance     Nausea stomach ache    Hydrochlorothiazide GI Intolerance    Aspirin GI Intolerance       Family History   Family History   Problem Relation Age of Onset    No Known Problems Mother     Diabetes Father     Stroke Father     Coronary artery disease Sister     No Known Problems Brother     No Known Problems Son     Breast cancer Daughter     No Known Problems Maternal Grandmother     No Known Problems Maternal Grandfather     No Known Problems Paternal Grandmother     No Known Problems Paternal Grandfather     No Known Problems Cousin     Rheum arthritis Neg Hx     Osteoarthritis Neg Hx     Asthma Neg Hx     Heart failure Neg Hx     Hyperlipidemia Neg Hx     Hypertension Neg Hx     Migraines Neg Hx     Rashes / Skin problems Neg Hx     Seizures Neg Hx     Thyroid disease Neg Hx        Review of Systems:  Review of Systems   Constitutional: Negative for chills, fatigue and fever  HENT: Negative for hearing loss, nosebleeds and tinnitus  Eyes: Negative for pain  Respiratory: Negative for cough, chest tightness and shortness of breath  Cardiovascular: Negative for chest pain, palpitations and leg swelling  Gastrointestinal: Negative for abdominal pain, nausea and vomiting  Endocrine: Negative for cold intolerance and heat intolerance  Genitourinary: Negative for difficulty urinating, hematuria and vaginal bleeding  Musculoskeletal: Negative for arthralgias  Skin: Negative for rash  Allergic/Immunologic: Negative for environmental allergies  Neurological: Positive for dizziness and headaches  Negative for syncope, weakness and light-headedness  Psychiatric/Behavioral: Negative for decreased concentration and sleep disturbance  The patient is not nervous/anxious            Current Outpatient Prescriptions:     Acetaminophen (TYLENOL) 325 MG CAPS, Take by mouth as needed, Disp: , Rfl:     Bimatoprost (LUMIGAN OP), Apply to eye daily at bedtime Both eyes , Disp: , Rfl:     COMBIGAN 0 2-0 5 %, instill 1 drop into both eyes twice a day, Disp: , Rfl: 0    DORZOLAMIDE HCL OP, Apply to eye 3 (three) times a day Left eye only , Disp: , Rfl:     escitalopram (LEXAPRO) 5 mg tablet, Take 5 mg by mouth daily, Disp: , Rfl:     glipiZIDE (GLUCOTROL XL) 10 mg 24 hr tablet, Take 10 mg by mouth 2 (two) times a day, Disp: , Rfl: 0    Hypromellose (ARTIFICIAL TEARS OP), Apply to eye, Disp: , Rfl:     lisinopril (ZESTRIL) 10 mg tablet, Take 1 tablet (10 mg total) by mouth 2 (two) times a day for 30 days, Disp: 60 tablet, Rfl: 5    metFORMIN (GLUCOPHAGE) 500 mg tablet, Take 1 tablet (500 mg total) by mouth daily with breakfast for 90 days, Disp: 90 tablet, Rfl: 1    metoprolol tartrate (LOPRESSOR) 25 mg tablet, Take 0 5 tablets (12 5 mg total) by mouth every 12 (twelve) hours, Disp: 30 tablet, Rfl: 1    neomycin-polymyxin-dexamethasone (MAXITROL) 0 35%-10,000 units/g-0 1%, APPLY TO SKIN IN BOTH EYES TWICE DAILY  , Disp: , Rfl: 0    ONE TOUCH ULTRA TEST test strip, TEST twice a day, Disp: 200 each, Rfl: 3    ONETOUCH DELICA LANCETS 54D MISC, CHECK BLOOD SUGAR TWICE DAILY, Disp: 200 each, Rfl: 3    simvastatin (ZOCOR) 80 mg tablet, take 1 tablet by mouth once daily, Disp: 90 tablet, Rfl: 3    furosemide (LASIX) 20 mg tablet, Take 20 mg by mouth daily, Disp: , Rfl:     Misc  Devices (ROLLATOR) MISC, by Does not apply route daily, Disp: 1 each, Rfl: 0    potassium chloride (K-DUR,KLOR-CON) 10 mEq tablet, Take 1 tablet (10 mEq total) by mouth daily for 30 days, Disp: 30 tablet, Rfl: 5    rivaroxaban (XARELTO) 20 mg tablet, Take 1 tablet (20 mg total) by mouth daily for 30 days, Disp: 30 tablet, Rfl: 5     Physical Exam:    Vitals:    06/07/18 1428   BP: 130/68   Pulse: 72   Resp: 20   Weight: 76 7 kg (169 lb)   Height: 4' 10" (1 473 m)       Physical Exam   Constitutional: She is oriented to person, place, and time  She appears well-developed and well-nourished  HENT:   Head: Normocephalic  Right Ear: External ear normal    Left Ear: External ear normal    Mouth/Throat: Oropharynx is clear and moist    Eyes: Pupils are equal, round, and reactive to light  Neck: No JVD present  Carotid bruit is not present  Cardiovascular: Normal rate, regular rhythm and intact distal pulses  Exam reveals no gallop and no friction rub  No murmur heard  Pulmonary/Chest: Effort normal and breath sounds normal  No tachypnea  No respiratory distress  She has no wheezes  She has no rales  She exhibits no tenderness  Abdominal: Soft  She exhibits no distension  There is no tenderness  There is no rebound and no guarding  Musculoskeletal: She exhibits no edema     Neurological: She is alert and oriented to person, place, and time  Skin: Skin is warm and dry  Psychiatric: She has a normal mood and affect  Her behavior is normal  Judgment and thought content normal    Nursing note and vitals reviewed  Labs:not applicable    Assessment/Plan:    1  Status post transcatheter aortic valve replacement for aortic stenosis-she is doing well from this standpoint and her symptoms have improved  2   Paroxysmal atrial fibrillation-she continues on Xarelto  3   Coronary disease-she continues on the statin-I understand she will have blood tests through her primary doctor's office in the near future  Her goal LDL should be less than 70 mg/dL  4   Status post pacemaker    5  Diastolic heart failure-she is currently off any diuretic-given that her aortic stenosis was corrected with the aortic valve replacement she may very well not need the diuretic  I have discontinue the potassium as she is off the diuretic  We will see her back in 6 months for follow-up  Thank you so much, please do not hesitate to contact me if there any questions or concerns            Alycia Chandra MD  6/7/2018  2:48 PM

## 2018-06-12 ENCOUNTER — TELEPHONE (OUTPATIENT)
Dept: FAMILY MEDICINE CLINIC | Facility: CLINIC | Age: 83
End: 2018-06-12

## 2018-06-12 DIAGNOSIS — E11.65 TYPE 2 DIABETES MELLITUS WITH HYPERGLYCEMIA, WITHOUT LONG-TERM CURRENT USE OF INSULIN (HCC): Primary | ICD-10-CM

## 2018-06-12 DIAGNOSIS — E78.5 HYPERLIPIDEMIA, UNSPECIFIED HYPERLIPIDEMIA TYPE: ICD-10-CM

## 2018-06-12 DIAGNOSIS — E66.9 CLASS 2 OBESITY WITH BODY MASS INDEX (BMI) OF 35.0 TO 35.9 IN ADULT, UNSPECIFIED OBESITY TYPE, UNSPECIFIED WHETHER SERIOUS COMORBIDITY PRESENT: ICD-10-CM

## 2018-06-15 ENCOUNTER — TELEPHONE (OUTPATIENT)
Dept: FAMILY MEDICINE CLINIC | Facility: CLINIC | Age: 83
End: 2018-06-15

## 2018-06-16 ENCOUNTER — TELEPHONE (OUTPATIENT)
Dept: FAMILY MEDICINE CLINIC | Facility: CLINIC | Age: 83
End: 2018-06-16

## 2018-06-18 ENCOUNTER — TELEPHONE (OUTPATIENT)
Dept: FAMILY MEDICINE CLINIC | Facility: CLINIC | Age: 83
End: 2018-06-18

## 2018-06-18 DIAGNOSIS — R51.9 NONINTRACTABLE HEADACHE, UNSPECIFIED CHRONICITY PATTERN, UNSPECIFIED HEADACHE TYPE: Primary | ICD-10-CM

## 2018-06-21 ENCOUNTER — TELEPHONE (OUTPATIENT)
Dept: FAMILY MEDICINE CLINIC | Facility: CLINIC | Age: 83
End: 2018-06-21

## 2018-06-21 ENCOUNTER — OFFICE VISIT (OUTPATIENT)
Dept: FAMILY MEDICINE CLINIC | Facility: CLINIC | Age: 83
End: 2018-06-21
Payer: COMMERCIAL

## 2018-06-21 VITALS
TEMPERATURE: 98.9 F | BODY MASS INDEX: 34.53 KG/M2 | HEART RATE: 68 BPM | SYSTOLIC BLOOD PRESSURE: 160 MMHG | WEIGHT: 165.2 LBS | DIASTOLIC BLOOD PRESSURE: 82 MMHG | RESPIRATION RATE: 16 BRPM

## 2018-06-21 DIAGNOSIS — R26.81 UNSTEADY GAIT: ICD-10-CM

## 2018-06-21 DIAGNOSIS — E78.2 MIXED HYPERLIPIDEMIA: ICD-10-CM

## 2018-06-21 DIAGNOSIS — R42 DIZZINESS: ICD-10-CM

## 2018-06-21 DIAGNOSIS — I10 ESSENTIAL HYPERTENSION: Primary | Chronic | ICD-10-CM

## 2018-06-21 PROCEDURE — 99214 OFFICE O/P EST MOD 30 MIN: CPT | Performed by: FAMILY MEDICINE

## 2018-06-21 RX ORDER — AMLODIPINE BESYLATE 5 MG/1
5 TABLET ORAL DAILY
Qty: 30 TABLET | Refills: 5 | Status: SHIPPED | OUTPATIENT
Start: 2018-06-21 | End: 2018-11-29 | Stop reason: SDUPTHER

## 2018-06-21 RX ORDER — SIMVASTATIN 80 MG
80 TABLET ORAL DAILY
Qty: 90 TABLET | Refills: 0
Start: 2018-06-21 | End: 2018-12-06

## 2018-06-21 NOTE — TELEPHONE ENCOUNTER
Heather Bailey still feeling dizzy, lightheaded, questioning medication lisinopril (has very dry mouth)

## 2018-06-21 NOTE — PROGRESS NOTES
Assessment/Plan:    Blood pressure not controlled well today  Stop lisinopril  Give amlodipine 5mg QD  SE educated pt  DASH diet  Check BP at home 2/day and call office if BP always >140/90  Pt is on simvastatin also, educated pt about possible side effects of myalgia, especially with amlodipine  Pt understood  I talked with daughter on the phone today also  RTO as scheduled next month  Diagnoses and all orders for this visit:    Essential hypertension  -     amLODIPine (NORVASC) 5 mg tablet; Take 1 tablet (5 mg total) by mouth daily for 30 days    Mixed hyperlipidemia  -     simvastatin (ZOCOR) 80 mg tablet; Take 1 tablet (80 mg total) by mouth daily    Dizziness    Unsteady gait          Subjective:      Patient ID: Bridget Ellis is a 80 y o  female  HPI     Pt is here with her son  Pt lives with her daughter now  C/o dizzy/lightheaded and headache since aortic stenosis s/p TAVR in 4/2018    Come and go, but it happens everyday  Sometimes she feels unsteady  Use walker always  Denies fever, SOB, chest pain, n/v/abd pain  HTN---She is on lisinopril 10mg bid, metoprolol 12 5mg bid  Today /82  Pt states she has dry cough for a while  Afib---she is on xarelto 20mg daily  CHF---She stopped lasix 20mg and potassium, wt is stable  Denies SOB or chest pain  Hyperlipidemia---She is on simvastatin 80mg qhs  Denies side effects  The following portions of the patient's history were reviewed and updated as appropriate: allergies, current medications, past family history, past medical history, past social history, past surgical history and problem list     Review of Systems   Constitutional: Negative for appetite change, chills and fever  HENT: Negative for congestion, ear pain, sinus pain and sore throat  Eyes: Negative for discharge and itching  Respiratory: Negative for apnea, cough, chest tightness, shortness of breath and wheezing      Cardiovascular: Negative for chest pain, palpitations and leg swelling  Gastrointestinal: Negative for abdominal pain, anal bleeding, constipation, diarrhea, nausea and vomiting  Endocrine: Negative for cold intolerance, heat intolerance and polyuria  Genitourinary: Negative for difficulty urinating and dysuria  Musculoskeletal: Negative for arthralgias, back pain and myalgias  Skin: Negative for rash  Neurological: Negative for dizziness and headaches  Psychiatric/Behavioral: Negative for agitation  Objective:      /82 (BP Location: Left arm, Patient Position: Sitting, Cuff Size: Standard)   Pulse 68   Temp 98 9 °F (37 2 °C) (Tympanic)   Resp 16   Wt 74 9 kg (165 lb 3 2 oz)   BMI 34 53 kg/m²          Physical Exam   Constitutional: She appears well-developed  No distress  HENT:   Head: Normocephalic  Right Ear: External ear normal    Left Ear: External ear normal    Nose: Nose normal    Mouth/Throat: Oropharynx is clear and moist    Eyes: Conjunctivae are normal  Pupils are equal, round, and reactive to light  Right eye exhibits no discharge  Left eye exhibits no discharge  Neck: Normal range of motion  No thyromegaly present  Cardiovascular: Normal rate, regular rhythm and normal heart sounds  Pulmonary/Chest: Effort normal and breath sounds normal    Abdominal: Soft  Bowel sounds are normal    Musculoskeletal: Normal range of motion  Lymphadenopathy:     She has no cervical adenopathy  Neurological: She is alert  No cranial nerve deficit  Psychiatric: She has a normal mood and affect

## 2018-06-25 ENCOUNTER — TELEPHONE (OUTPATIENT)
Dept: FAMILY MEDICINE CLINIC | Facility: CLINIC | Age: 83
End: 2018-06-25

## 2018-06-25 NOTE — TELEPHONE ENCOUNTER
I called pt back  Cough is better but still feels dizzy sometimes  Bp on Saturday 132/72, 142/75  BP on Sunday 142/79, 155/81  BP on Monday 138/80, 158/79  Keep same amlodipine and metoprolol  I asked pt to continue monitor BP at home 2/day and call me in 3 days  Pt agreed

## 2018-06-28 ENCOUNTER — TELEPHONE (OUTPATIENT)
Dept: FAMILY MEDICINE CLINIC | Facility: CLINIC | Age: 83
End: 2018-06-28

## 2018-06-28 NOTE — TELEPHONE ENCOUNTER
I called pt back  Pt states she still feels headache and dizzy everyday  Pt states her BP was good at home 116-122/54-74  Now she is on amlodipine 5mg QD and metoprolol 12 5mg bid for hypertension  In her chart, metoprolol caused her headache/dizzy before  I asked pt to hold metoprolol for several days to see if her symptoms went away  Also, check BP 2/day at home  If BP always >140/90, will increase amlodipine to 10mg QD  Pt agreed

## 2018-07-03 LAB
BASOPHILS # BLD AUTO: 58 CELLS/UL (ref 0–200)
BASOPHILS NFR BLD AUTO: 0.8 %
EOSINOPHIL # BLD AUTO: 168 CELLS/UL (ref 15–500)
EOSINOPHIL NFR BLD AUTO: 2.3 %
ERYTHROCYTE [DISTWIDTH] IN BLOOD BY AUTOMATED COUNT: 13.1 % (ref 11–15)
HCT VFR BLD AUTO: 42.1 % (ref 35–45)
HGB BLD-MCNC: 13.8 G/DL (ref 11.7–15.5)
LYMPHOCYTES # BLD AUTO: 2183 CELLS/UL (ref 850–3900)
LYMPHOCYTES NFR BLD AUTO: 29.9 %
MCH RBC QN AUTO: 30.5 PG (ref 27–33)
MCHC RBC AUTO-ENTMCNC: 32.8 G/DL (ref 32–36)
MCV RBC AUTO: 92.9 FL (ref 80–100)
MONOCYTES # BLD AUTO: 628 CELLS/UL (ref 200–950)
MONOCYTES NFR BLD AUTO: 8.6 %
NEUTROPHILS # BLD AUTO: 4263 CELLS/UL (ref 1500–7800)
NEUTROPHILS NFR BLD AUTO: 58.4 %
PLATELET # BLD AUTO: 242 THOUSAND/UL (ref 140–400)
PMV BLD REES-ECKER: 11.1 FL (ref 7.5–12.5)
RBC # BLD AUTO: 4.53 MILLION/UL (ref 3.8–5.1)
WBC # BLD AUTO: 7.3 THOUSAND/UL (ref 3.8–10.8)

## 2018-07-05 ENCOUNTER — TELEPHONE (OUTPATIENT)
Dept: FAMILY MEDICINE CLINIC | Facility: CLINIC | Age: 83
End: 2018-07-05

## 2018-07-05 NOTE — TELEPHONE ENCOUNTER
Patient is continuing to take the amLODIPine 5 mg, her cough is better and doesn't have as many headaches but still has dizziness  Should she continue to take the medicine? Patient can be contacted at [phone]

## 2018-07-06 NOTE — TELEPHONE ENCOUNTER
Pt states since she stopped metoprolol, she does not have headache  Pt states she is on amlodipine 5mg Qd, her BP was 115-130/73-83 in last several days  She felt a little lightheaded/dizzy, last for several seconds and went away  I advised pt to keep hydrated  Continue check BP at home 2/day  Call office next week  If I need to see her, bring BP machine to office  Pt understood

## 2018-07-12 RX ORDER — CYCLOSPORINE 0.5 MG/ML
1 EMULSION OPHTHALMIC DAILY
Refills: 0 | COMMUNITY
Start: 2018-07-03

## 2018-07-16 DIAGNOSIS — E11.3559: ICD-10-CM

## 2018-07-16 DIAGNOSIS — E11.65: ICD-10-CM

## 2018-07-23 ENCOUNTER — OFFICE VISIT (OUTPATIENT)
Dept: FAMILY MEDICINE CLINIC | Facility: CLINIC | Age: 83
End: 2018-07-23
Payer: COMMERCIAL

## 2018-07-23 ENCOUNTER — TELEPHONE (OUTPATIENT)
Dept: NEUROLOGY | Facility: CLINIC | Age: 83
End: 2018-07-23

## 2018-07-23 VITALS
TEMPERATURE: 97.7 F | DIASTOLIC BLOOD PRESSURE: 64 MMHG | RESPIRATION RATE: 16 BRPM | OXYGEN SATURATION: 95 % | SYSTOLIC BLOOD PRESSURE: 118 MMHG | HEIGHT: 58 IN | WEIGHT: 164.6 LBS | BODY MASS INDEX: 34.55 KG/M2 | HEART RATE: 64 BPM

## 2018-07-23 DIAGNOSIS — I48.0 PAROXYSMAL ATRIAL FIBRILLATION (HCC): ICD-10-CM

## 2018-07-23 DIAGNOSIS — E11.3559: Primary | Chronic | ICD-10-CM

## 2018-07-23 DIAGNOSIS — I10 ESSENTIAL HYPERTENSION: Chronic | ICD-10-CM

## 2018-07-23 DIAGNOSIS — E11.65: Primary | Chronic | ICD-10-CM

## 2018-07-23 DIAGNOSIS — E78.5 HYPERLIPIDEMIA, UNSPECIFIED HYPERLIPIDEMIA TYPE: Chronic | ICD-10-CM

## 2018-07-23 PROCEDURE — 3074F SYST BP LT 130 MM HG: CPT | Performed by: FAMILY MEDICINE

## 2018-07-23 PROCEDURE — 3078F DIAST BP <80 MM HG: CPT | Performed by: FAMILY MEDICINE

## 2018-07-23 PROCEDURE — 99214 OFFICE O/P EST MOD 30 MIN: CPT | Performed by: FAMILY MEDICINE

## 2018-07-23 NOTE — PROGRESS NOTES
Chief Complaint   Patient presents with    Follow-up     3 month follow up     Assessment/Plan:    Will check labs  POLST---pt would like to have CPR only  Would like comfortable life and CPR if it can prolong her comfortable life  Signed paper today  RTO in 3 months  Diagnoses and all orders for this visit:    Uncontrolled type 2 diabetes mellitus with stable proliferative retinopathy, without long-term current use of insulin, unspecified laterality (HCC)  -     Hemoglobin A1C; Future  -     metFORMIN (GLUCOPHAGE) 500 mg tablet; Take 1 tablet (500 mg total) by mouth 2 (two) times a day with meals for 90 days    Essential hypertension  -     Comprehensive metabolic panel; Future    Paroxysmal atrial fibrillation (HCC)  -     TSH, 3rd generation with Free T4 reflex; Future    Hyperlipidemia, unspecified hyperlipidemia type  -     Lipid Panel with Direct LDL reflex; Future          Subjective:      Patient ID: Michelle Valentino is a 80 y o  female  HPI  Pt is here with her daughter and great granddaughter  DM---She is on metformin 500mg bid and glipizide 10mg bid  Blood sugar at home 150-200  Denies hypoglycemia  Occasionally hands numbness or tingling  FU opthalmology Dr Becky Armstrong and Dr Ryan Conde regularly Q 3 months  Next appt will be next months  Fu podiatry Dr Sharmin Roth? Q 6 month  HTN---BP at home 110-120/60-70 per pt  Stopped lisinopril because it caused her coughing  Stopped metoprolol because it gave her severe headache  Still has headache and lightheaded sometimes, but better than before  Tylenol helped  Will see neurology next Monday  Afib---she is on xarelto 20mg daily  CHF---She is off lasix 20mg and potassium daily  Wt is around 160-165 lbs at home per pt  Denies SOB, chest pain  FU cardiology  Hyperlipidemia---She is on simvastatin 80mg qhs  Depression---She is off escitalopram 5mg Qd  Mood is stable per pt  Lives with family  Denies recent falls           The following portions of the patient's history were reviewed and updated as appropriate: allergies, current medications, past family history, past medical history, past social history, past surgical history and problem list     Review of Systems   Constitutional: Negative for appetite change, chills and fever  HENT: Negative for congestion, ear pain, sinus pain and sore throat  Eyes: Negative for discharge and itching  Respiratory: Negative for apnea, cough, chest tightness, shortness of breath and wheezing  Cardiovascular: Negative for chest pain, palpitations and leg swelling  Gastrointestinal: Negative for abdominal pain, anal bleeding, constipation, diarrhea, nausea and vomiting  Endocrine: Negative for cold intolerance, heat intolerance and polyuria  Genitourinary: Negative for difficulty urinating and dysuria  Musculoskeletal: Negative for arthralgias, back pain and myalgias  Skin: Negative for rash  Neurological: Negative for dizziness and headaches  Psychiatric/Behavioral: Negative for agitation  Objective:      /64 (BP Location: Left arm, Patient Position: Sitting, Cuff Size: Adult)   Pulse 64   Temp 97 7 °F (36 5 °C) (Tympanic)   Resp 16   Ht 4' 10" (1 473 m)   Wt 74 7 kg (164 lb 9 6 oz)   SpO2 95%   BMI 34 40 kg/m²          Physical Exam   Constitutional: She appears well-developed  No distress  HENT:   Head: Normocephalic  Right Ear: External ear normal    Left Ear: External ear normal    Nose: Nose normal    Mouth/Throat: Oropharynx is clear and moist    Eyes: Conjunctivae are normal  Pupils are equal, round, and reactive to light  Right eye exhibits no discharge  Left eye exhibits no discharge  Neck: Normal range of motion  No thyromegaly present  Cardiovascular: Normal rate, regular rhythm and normal heart sounds  Exam reveals no gallop and no friction rub  No murmur heard    Pulmonary/Chest: Effort normal and breath sounds normal  No respiratory distress  She has no wheezes  She has no rales  She exhibits no tenderness  Abdominal: Soft  Bowel sounds are normal    Musculoskeletal: Normal range of motion  Lymphadenopathy:     She has no cervical adenopathy  Neurological: She is alert  Psychiatric: She has a normal mood and affect

## 2018-07-25 LAB
ALBUMIN SERPL-MCNC: 4.5 G/DL (ref 3.6–5.1)
ALBUMIN/GLOB SERPL: 1.6 (CALC) (ref 1–2.5)
ALP SERPL-CCNC: 97 U/L (ref 33–130)
ALT SERPL-CCNC: 16 U/L (ref 6–29)
AST SERPL-CCNC: 9 U/L (ref 10–35)
BILIRUB SERPL-MCNC: 0.6 MG/DL (ref 0.2–1.2)
BUN SERPL-MCNC: 15 MG/DL (ref 7–25)
BUN/CREAT SERPL: ABNORMAL (CALC) (ref 6–22)
CALCIUM SERPL-MCNC: 9.8 MG/DL (ref 8.6–10.4)
CHLORIDE SERPL-SCNC: 100 MMOL/L (ref 98–110)
CHOLEST SERPL-MCNC: 130 MG/DL
CHOLEST/HDLC SERPL: 2.4 (CALC)
CO2 SERPL-SCNC: 27 MMOL/L (ref 20–31)
CREAT SERPL-MCNC: 0.73 MG/DL (ref 0.6–0.88)
GLOBULIN SER CALC-MCNC: 2.9 G/DL (CALC) (ref 1.9–3.7)
GLUCOSE SERPL-MCNC: 190 MG/DL (ref 65–99)
HBA1C MFR BLD: 8.1 % OF TOTAL HGB
HDLC SERPL-MCNC: 54 MG/DL
LDLC SERPL CALC-MCNC: 58 MG/DL (CALC)
NONHDLC SERPL-MCNC: 76 MG/DL (CALC)
POTASSIUM SERPL-SCNC: 4.6 MMOL/L (ref 3.5–5.3)
PROT SERPL-MCNC: 7.4 G/DL (ref 6.1–8.1)
SL AMB EGFR AFRICAN AMERICAN: 88 ML/MIN/1.73M2
SL AMB EGFR NON AFRICAN AMERICAN: 76 ML/MIN/1.73M2
SODIUM SERPL-SCNC: 135 MMOL/L (ref 135–146)
TRIGL SERPL-MCNC: 92 MG/DL
TSH SERPL-ACNC: 3.92 MIU/L (ref 0.4–4.5)

## 2018-07-26 ENCOUNTER — TELEPHONE (OUTPATIENT)
Dept: FAMILY MEDICINE CLINIC | Facility: CLINIC | Age: 83
End: 2018-07-26

## 2018-07-26 NOTE — TELEPHONE ENCOUNTER
Patient had bloodwork done on Monday and is asking for the results-patient can be contacted at [phone]

## 2018-07-30 ENCOUNTER — OFFICE VISIT (OUTPATIENT)
Dept: NEUROLOGY | Facility: CLINIC | Age: 83
End: 2018-07-30
Payer: COMMERCIAL

## 2018-07-30 VITALS
HEIGHT: 58 IN | WEIGHT: 165.6 LBS | SYSTOLIC BLOOD PRESSURE: 142 MMHG | DIASTOLIC BLOOD PRESSURE: 76 MMHG | RESPIRATION RATE: 16 BRPM | BODY MASS INDEX: 34.76 KG/M2 | HEART RATE: 65 BPM

## 2018-07-30 DIAGNOSIS — R42 DIZZINESS AND GIDDINESS: ICD-10-CM

## 2018-07-30 DIAGNOSIS — R79.89 LOW VITAMIN D LEVEL: ICD-10-CM

## 2018-07-30 DIAGNOSIS — I63.521 CEREBROVASCULAR ACCIDENT (CVA) DUE TO OCCLUSION OF RIGHT ANTERIOR CEREBRAL ARTERY (HCC): Primary | ICD-10-CM

## 2018-07-30 DIAGNOSIS — R26.81 UNSTEADY GAIT: ICD-10-CM

## 2018-07-30 DIAGNOSIS — R51.9 NONINTRACTABLE HEADACHE, UNSPECIFIED CHRONICITY PATTERN, UNSPECIFIED HEADACHE TYPE: ICD-10-CM

## 2018-07-30 PROBLEM — Z86.73 HISTORY OF CARDIOEMBOLIC CEREBROVASCULAR ACCIDENT (CVA): Status: RESOLVED | Noted: 2018-03-02 | Resolved: 2018-07-30

## 2018-07-30 PROCEDURE — 99204 OFFICE O/P NEW MOD 45 MIN: CPT | Performed by: PSYCHIATRY & NEUROLOGY

## 2018-07-30 NOTE — PATIENT INSTRUCTIONS
For her dizziness and gait disturbance:  -   Will refer patient physical therapy   -  Need to also get B12, MRI of the cervical spine    For headaches we will check for vitamin D and B12 and also getting MRI of the cervical spine  She has a chronic daily headache which never gets more than 6/10 per patient  Started after her cardiac surgery  I gave the patient 3 months worth the diary  She is to start documenting her headaches and dizziness so we have a better assessment as to how often she gets dizziness and her headaches  Also severe are her headaches  She thinks that the worse it gets up to is 4/10

## 2018-07-30 NOTE — PROGRESS NOTES
Patient ID: Ryan Daly is a 80 y o  female  Assessment/Plan:    No problem-specific Assessment & Plan notes found for this encounter  {Assess/PlanSmartLinks:11286}       Subjective:    HPI    {[facility] HPI texts:76677}    {Common ambulatory SmartLinks:39193}         Objective: There were no vitals taken for this visit  Physical Exam    Neurological Exam      ROS:    Review of Systems   Constitutional: Negative  HENT: Negative  Eyes: Negative  Respiratory: Negative  Cardiovascular: Negative  Gastrointestinal: Negative  Endocrine: Negative  Genitourinary: Negative  Musculoskeletal: Negative  Skin: Negative  Allergic/Immunologic: Negative  Neurological: Positive for dizziness, light-headedness, numbness and headaches  Tingling in arms (Both)   Hematological: Negative  Psychiatric/Behavioral: Negative

## 2018-07-30 NOTE — PROGRESS NOTES
Patient ID: Luciana Almanzar is a 80 y o  female  Assessment/Plan:   Problem List Items Addressed This Visit        Cardiovascular and Mediastinum    CVA (cerebral vascular accident) ([facility] [address] ) - Primary    Relevant Orders    CTA neck w wo contrast       Other    Unsteady gait    Relevant Orders    MRI cervical spine wo contrast    CTA neck w wo contrast    Vitamin B12    Ambulatory referral to Physical Therapy    Dizziness and giddiness    Relevant Orders    MRI cervical spine wo contrast    CTA neck w wo contrast    Ambulatory referral to Physical Therapy      Other Visit Diagnoses     Nonintractable headache, unspecified chronicity pattern, unspecified headache type        Relevant Orders    Vitamin B12    Low vitamin D level        Relevant Orders    Vitamin D 25 hydroxy             For her dizziness and gait disturbance:  -   Will refer patient physical therapy   -  Need to also get B12, MRI of the cervical spine     For headaches we will check for vitamin D and B12 and also getting MRI of the cervical spine  She has a chronic daily headache which never gets more than 6/10 per patient  Started after her cardiac surgery  I gave the patient 3 months worth the diary  She is to start documenting her headaches and dizziness so we have a better assessment as to how often she gets dizziness and her headaches  Also severe are her headaches  She thinks that the worse it gets up to is 4/10  Subjective:  HPI  We had the pleasure of evaluating Luciana Almanzar in neurological consultation today  As you know,  she is a 80 y o  right handed female  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  She is here today for evaluation of her headaches and dizziness with her daughter       PMH:   history of aortic stenosis status post recent transcatheter aortic valve replacement, coronary disease with a 60% 1st obtuse marginal stenosis as well as the mid RCA 50% stenosis, paroxysmal atrial fibrillation on Xarelto, previous CVA/TIA, diastolic heart failure, diabetes, hypertension, dyslipidemia, obstructive sleep apnea not on CPAP, anxiety/depression  CVA: Jan of 2017  She had slurred speech and few days after that her daughter noted her speech continue to get worse  Any family history of aneurysms  No  Family history of migraine headaches -   none        Accident or injury prior to onset of headaches -  Yes when she was younger but nothing new  Dizziness:  Occurs: daily   Last: for a while ? Onset: with suddenly movements  Describes: her dizziness as light headed sensation and feels like she is going to fall over  She was falling in the past but after her cardiac surgery she is not falling  Associated sx: none      Headaches:   What is your current pain level -2-3/10  Headaches started at what age - yes in the past and they got worse in April 2018  Prior to the surgery they were once every other month  How often do the headaches occur - daily but family is not sure how often it tends to get bad  What time of the day do the headaches start - afternoon  How long do the headaches last - severe headache have improved and when they do get worse they do not last long  Are you ever headache free - none  Where are they located - frontal  What is the intensity of pain - 6/10  Aura and how long does it last - none  Describe your usual headache - nagging and aching  Associated symptoms:   - Photophobia  - Blurred vision  - runny or stuffed-up nose  - Tinnitus, light-headed or dizzy, stiff or sore neck   - prefer to be alone and in a dark room, unable to work  Headache are worse if the patient: bending over can make headache and dizziness worse  What medications do you take or have you taken for your headaches?    PREVENTIVE: none  ABORTIVE: tylenol  Have you used CBD or THC for your headaches and how often? no  Non-Medical/Alternative Treatments used in the past for headaches: PT      MRI head: 1/12/2018  IMPRESSION:   1  No evidence of acute infarct, hemorrhage or mass    2  Chronic right frontal infarct  Periventricular and subcortical white matter lesions, consistent with microangiopathic disease      MRI head 1/7/2017  IMPRESSION:  - Focus of FLAIR and T2 signal abnormality involving cortex and subcortical right frontal lobe with a thin rim of precontrast T1 hyperintensity  Subacute infarct should be considered  A more recent venous infarct could have a similar appearance  Short-term interval follow-up gadolinium-enhanced MRI recommended in one month  - Moderate chronic microvascular disease  MRA and MRV 1/7/2017-   IMPRESSION:   No focal intracranial stenosis or aneurysm  The following portions of the patient's history were reviewed and updated as appropriate: allergies, current medications, past family history, past medical history, past social history, past surgical history and problem list     Objective:  Blood pressure 142/76, pulse 65, resp  rate 16, height 4' 9 5" (1 461 m), weight 75 1 kg (165 lb 9 6 oz)  Physical Exam   Constitutional: She appears well-developed  Eyes: EOM are normal  Pupils are equal, round, and reactive to light  Neck:     Decreased range of motion noted   Cardiovascular: Normal rate  Pulmonary/Chest: Effort normal    Abdominal: Soft  Musculoskeletal: Normal range of motion  Neurological: She has normal strength and normal reflexes  Coordination normal    Skin: Skin is warm  Psychiatric: She has a normal mood and affect  Her speech is normal        Neurological Exam    Mental Status  The patient is alert  Her speech is normal  Her language is fluent with no aphasia  She has normal attention span and concentration       Cranial Nerves    CN II: The patient's visual acuity and visual fields are normal   CN III, IV, VI: The patient's pupils are equally round and reactive to light and ocular movements are normal   CN V: The patient has normal facial sensation  CN VII:  The patient has symmetric facial movement  CN VIII:  The patient's hearing is normal   CN IX, X: The patient has symmetric palate movement and normal gag reflex  CN XI: The patient's shoulder shrug strength is normal   CN XII: The patient's tongue is midline without atrophy or fasciculations  Motor   Her strength is 5/5 throughout all four extremities  Sensory  The patient's sensation is normal in all four extremities  Loss of vibration and proprioception  In her toes     Reflexes  Deep tendon reflexes are 2+ and symmetric in all four extremities with downgoing toes bilaterally  Gait and Coordination   She has normal coordination bilaterally  Past-pointing with bilateral upper extremity   found to have Spastic gait  Unsteady on her feet walks very slowly     ROS:  Review of Systems   Constitutional: Negative  HENT: Negative  Eyes: Negative  Respiratory: Negative  Cardiovascular: Negative  Gastrointestinal: Negative  Endocrine: Negative  Genitourinary: Negative  Musculoskeletal: Negative  Skin: Negative  Allergic/Immunologic: Negative  Neurological: Positive for dizziness, light-headedness, numbness and headaches  Tingling in arms (Both)   Hematological: Negative      Psychiatric/Behavioral: Negative

## 2018-08-06 DIAGNOSIS — E55.9 VITAMIN D DEFICIENCY: Primary | ICD-10-CM

## 2018-08-06 LAB
25(OH)D3 SERPL-MCNC: 18 NG/ML (ref 30–100)
VIT B12 SERPL-MCNC: 233 PG/ML (ref 200–1100)

## 2018-08-06 RX ORDER — ERGOCALCIFEROL 1.25 MG/1
50000 CAPSULE ORAL WEEKLY
Qty: 6 CAPSULE | Refills: 0 | Status: SHIPPED | OUTPATIENT
Start: 2018-08-06 | End: 2018-09-07 | Stop reason: SDUPTHER

## 2018-08-07 ENCOUNTER — TELEPHONE (OUTPATIENT)
Dept: NEUROLOGY | Facility: CLINIC | Age: 83
End: 2018-08-07

## 2018-08-07 LAB
LEFT EYE DIABETIC RETINOPATHY: NORMAL
RIGHT EYE DIABETIC RETINOPATHY: NORMAL

## 2018-08-07 NOTE — TELEPHONE ENCOUNTER
----- Message from Roberto Silverio MD sent at 8/6/2018  9:00 AM EDT -----  Please call the patient regarding her abnormal result  Will have her take vitamin D 2000 iu once a day and Vitamin D 50,000 iu once a week for 6 weeks   x

## 2018-08-09 ENCOUNTER — TELEPHONE (OUTPATIENT)
Dept: NEUROLOGY | Facility: CLINIC | Age: 83
End: 2018-08-09

## 2018-08-09 NOTE — TELEPHONE ENCOUNTER
----- Message from Conner Milligan MD sent at 8/8/2018  7:40 PM EDT -----  Please call the patient regarding her abnormal result  Please have patient take vitamin D 3 50,000 international units once a week for 8 weeks and along with this take vitamin-D 3 1000 international units on daily basis  She needs to also take vitamin B12 1000 mcg on daily basis  Also note to give patient B12 injection when seen    Thank you

## 2018-08-10 DIAGNOSIS — R93.5 ABNORMAL CT SCAN, PELVIS: Primary | ICD-10-CM

## 2018-08-13 ENCOUNTER — HOSPITAL ENCOUNTER (OUTPATIENT)
Dept: MRI IMAGING | Facility: HOSPITAL | Age: 83
Discharge: HOME | End: 2018-08-13
Attending: PSYCHIATRY & NEUROLOGY
Payer: COMMERCIAL

## 2018-08-13 ENCOUNTER — HOSPITAL ENCOUNTER (OUTPATIENT)
Dept: CT IMAGING | Facility: HOSPITAL | Age: 83
Discharge: HOME | End: 2018-08-13
Attending: PSYCHIATRY & NEUROLOGY
Payer: COMMERCIAL

## 2018-08-13 ENCOUNTER — TELEPHONE (OUTPATIENT)
Dept: FAMILY MEDICINE CLINIC | Facility: CLINIC | Age: 83
End: 2018-08-13

## 2018-08-13 ENCOUNTER — HOSPITAL ENCOUNTER (OUTPATIENT)
Dept: ULTRASOUND IMAGING | Facility: HOSPITAL | Age: 83
Discharge: HOME | End: 2018-08-13
Payer: COMMERCIAL

## 2018-08-13 DIAGNOSIS — R42 DIZZINESS AND GIDDINESS: ICD-10-CM

## 2018-08-13 DIAGNOSIS — R93.5 ABNORMAL CT SCAN, PELVIS: ICD-10-CM

## 2018-08-13 DIAGNOSIS — R26.81 UNSTEADY GAIT: ICD-10-CM

## 2018-08-13 DIAGNOSIS — I63.521 CEREBROVASCULAR ACCIDENT (CVA) DUE TO OCCLUSION OF RIGHT ANTERIOR CEREBRAL ARTERY (HCC): ICD-10-CM

## 2018-08-13 PROCEDURE — 76856 US EXAM PELVIC COMPLETE: CPT

## 2018-08-13 PROCEDURE — 76830 TRANSVAGINAL US NON-OB: CPT

## 2018-08-13 PROCEDURE — 70498 CT ANGIOGRAPHY NECK: CPT

## 2018-08-13 PROCEDURE — 72141 MRI NECK SPINE W/O DYE: CPT

## 2018-08-13 RX ADMIN — IOHEXOL 85 ML: 350 INJECTION, SOLUTION INTRAVENOUS at 18:14

## 2018-08-13 NOTE — TELEPHONE ENCOUNTER
----- Message from Shama Amos MD sent at 8/10/2018  5:05 PM EDT -----  I got radiology remind letter that 4/5/2018 CT showed endometrial thickening, suggest Pelvic US  I put in order in Epic  Please let pt know

## 2018-08-14 ENCOUNTER — TELEPHONE (OUTPATIENT)
Dept: FAMILY MEDICINE CLINIC | Facility: CLINIC | Age: 83
End: 2018-08-14

## 2018-08-15 ENCOUNTER — TELEPHONE (OUTPATIENT)
Dept: NEUROLOGY | Facility: CLINIC | Age: 83
End: 2018-08-15

## 2018-08-15 DIAGNOSIS — R93.89 THICKENED ENDOMETRIUM: Primary | ICD-10-CM

## 2018-08-15 NOTE — PROGRESS NOTES
Gave patient's daughter, Dustin Maxwell, the results and instructions  Provided the name, phone number, and address to the referred physician

## 2018-08-16 NOTE — TELEPHONE ENCOUNTER
Amita Hong called back regarding below  Made her aware that we are awaiting response as Dr Nils Ríos is seeing pts today  Verbalized understanding

## 2018-08-20 ENCOUNTER — TELEPHONE (OUTPATIENT)
Dept: OBGYN CLINIC | Facility: CLINIC | Age: 83
End: 2018-08-20

## 2018-08-20 NOTE — TELEPHONE ENCOUNTER
Daughter called, concerned about her mother knowing about what may be done at this visit-poss EB, due to increasing her mothers anxiety and blood pressure  She stated to me that she was going to tell her that she was being evaluated for her urinary problems  I did tell her we would be discussing with her mother what would be done prior to doing the testing

## 2018-08-20 NOTE — TELEPHONE ENCOUNTER
Pt's dtr Aristides Martinez called again requesting to talk to a doctor to review pt's test results (MRI cervical spine and CTA neck)  Fyi:   Pt was upset that nobody called her back this morning  She stated, "if she is too busy and can't take time to talk to me about my mom's test results then I will find another doctor"  She states that she will be calling back first thing tmrw       [phone]

## 2018-08-20 NOTE — TELEPHONE ENCOUNTER
Pt's dtr Fina Issa called re: below  She is requesting a call back w/ detailed explanation      Thank you        [phone]

## 2018-08-21 ENCOUNTER — TELEPHONE (OUTPATIENT)
Dept: FAMILY MEDICINE CLINIC | Facility: CLINIC | Age: 83
End: 2018-08-21

## 2018-08-21 NOTE — TELEPHONE ENCOUNTER
I called and spoke to pt daughter and reviewed the imaging with her  She states her mother does not want to do PT  She is declining a sooner appt at this time, and states they will follow up in November  I did encourage pt daughter to call our office back sooner if she would like to schedule a sooner appt, or if there are any new or worsening symptoms  She was agreeable to this plan  Just a FYI  You may close this encounter

## 2018-08-21 NOTE — TELEPHONE ENCOUNTER
Pt's dtr called again asking to review pt's MRI and CTA results  Made her aware that Dr Korin Macdonald is seeing pts  Offered/decline appt to review results

## 2018-08-21 NOTE — TELEPHONE ENCOUNTER
Please call the daughter back and let her now there are no changes noted on the images that need any new treatment  She was sent to PT and they will work on the neck spasm noted and her balance  I would rather they come in to see my team so that we can go over our treatment plan again and review images  Odilon de anda

## 2018-08-24 ENCOUNTER — OFFICE VISIT (OUTPATIENT)
Dept: OBGYN CLINIC | Facility: CLINIC | Age: 83
End: 2018-08-24
Payer: COMMERCIAL

## 2018-08-24 VITALS — WEIGHT: 166.2 LBS | SYSTOLIC BLOOD PRESSURE: 132 MMHG | BODY MASS INDEX: 35.34 KG/M2 | DIASTOLIC BLOOD PRESSURE: 68 MMHG

## 2018-08-24 DIAGNOSIS — Z95.2 S/P TAVR (TRANSCATHETER AORTIC VALVE REPLACEMENT): ICD-10-CM

## 2018-08-24 DIAGNOSIS — R93.89 ENDOMETRIAL THICKENING ON ULTRASOUND: Primary | ICD-10-CM

## 2018-08-24 PROBLEM — Z48.89 ENCOUNTER FOR POSTOPERATIVE CARE: Status: RESOLVED | Noted: 2018-05-17 | Resolved: 2018-08-24

## 2018-08-24 PROCEDURE — 99202 OFFICE O/P NEW SF 15 MIN: CPT | Performed by: PHYSICIAN ASSISTANT

## 2018-08-24 RX ORDER — OMEPRAZOLE 20 MG/1
CAPSULE, DELAYED RELEASE ORAL
Qty: 30 CAPSULE | Refills: 0 | Status: SHIPPED | OUTPATIENT
Start: 2018-08-24 | End: 2018-08-27 | Stop reason: CLARIF

## 2018-08-24 NOTE — PROGRESS NOTES
Theresa Rose  1935    S:  80 y o  female here for a problem visit with her daughter and granddaughter  She was sent by her PCP for evaluation of an abnormal pelvic ultrasound  Her daughter reports that back in April, Juan Joe had a CT scan of the abdomen and pelvis  They were contacted last week by her PCP's office telling her that she should have a pelvic ultrasound and to see GYN for evaluation  Juan Joe reports menopause at some point in her 46s  She has had absolutely no vaginal bleeding since that time  She has not been sexually active in years because she is  and has no partner  Past Medical History:   Diagnosis Date    Ambulatory dysfunction     CH[facility]congestive heart failure) ([facility])     pEFHF    Cholelithiasis     Coronary artery disease     Depression with anxiety     Diabetes mellitus ([facility])     niddm    Glaucoma     Hypertension     Hyponatremia     Ischemic stroke of frontal lobe ([facility])     Right     Obstructive sleep apnea     PAF (paroxysmal atrial fibrillation) ([facility])     Vertigo      Family History   Problem Relation Age of Onset    No Known Problems Mother     Diabetes Father     Stroke Father     Coronary artery disease Sister     No Known Problems Brother     No Known Problems Son     Breast cancer Daughter     No Known Problems Maternal Grandmother     No Known Problems Maternal Grandfather     No Known Problems Paternal Grandmother     No Known Problems Paternal Grandfather     No Known Problems Cousin     Rheum arthritis Neg Hx     Osteoarthritis Neg Hx     Asthma Neg Hx     Heart failure Neg Hx     Hyperlipidemia Neg Hx     Hypertension Neg Hx     Migraines Neg Hx     Rashes / Skin problems Neg Hx     Seizures Neg Hx     Thyroid disease Neg Hx      Social History     Social History    Marital status:       Spouse name: N/A    Number of children: N/A    Years of education: N/A     Social History Main Topics    Smoking status: Never Smoker    Smokeless tobacco: Never Used    Alcohol use No    Drug use: No    Sexual activity: Not Currently     Other Topics Concern    None     Social History Narrative    None       O:  /68   Wt 75 4 kg (166 lb 3 2 oz)   BMI 35 34 kg/m²   She appears well and is in no distress    We reviewed her pelvic ultrasound showing a 12mm endometrium and no pelvic masses    A/P:  Endometrial thickening on pelvic ultrasound in the absence of vaginal bleeding  We had an extensive discussion about the option for endometrial biopsy to prove no malignancy is present  We also discussed the fact that if malignancy is identified, recommendation would be for hysterectomy  She says she is absolutely not interested in treatment should malignancy be found; she is a poor surgical candidate in any event  She has had no vaginal bleeding and I am in agreement that we can observe at this time; she is aware that I cannot prove she has no malignancy but in the absence of bleeding I am comfortable with observation at this time  They will call me with any vaginal bleeding

## 2018-08-27 ENCOUNTER — TELEPHONE (OUTPATIENT)
Dept: FAMILY MEDICINE CLINIC | Facility: CLINIC | Age: 83
End: 2018-08-27

## 2018-08-27 NOTE — TELEPHONE ENCOUNTER
Patient's daughter phoned regarding the antacid, Prilosec that was ordered on 8/24/2018 - to the daughter's knowledge her mother isn't having any issues that would indicate a need for the medicine-patient's daughter would like a return call at [phone]

## 2018-08-27 NOTE — TELEPHONE ENCOUNTER
I called daughter and she states pt does not take omeprazole and has no reflux symptoms  Stop omeprazole

## 2018-09-03 DIAGNOSIS — E11.65 TYPE 2 DIABETES MELLITUS WITH HYPERGLYCEMIA, WITHOUT LONG-TERM CURRENT USE OF INSULIN (HCC): Primary | ICD-10-CM

## 2018-09-04 RX ORDER — GLIPIZIDE 10 MG/1
TABLET, FILM COATED, EXTENDED RELEASE ORAL
Qty: 60 TABLET | Refills: 5 | Status: SHIPPED | OUTPATIENT
Start: 2018-09-04 | End: 2019-03-11 | Stop reason: SDUPTHER

## 2018-09-07 DIAGNOSIS — E55.9 VITAMIN D DEFICIENCY: ICD-10-CM

## 2018-09-10 RX ORDER — ERGOCALCIFEROL 1.25 MG/1
CAPSULE ORAL
Qty: 6 CAPSULE | Refills: 0 | Status: SHIPPED | OUTPATIENT
Start: 2018-09-10 | End: 2019-01-11 | Stop reason: ALTCHOICE

## 2018-09-19 DIAGNOSIS — E13.9 DIABETES 1.5, MANAGED AS TYPE 2 (HCC): ICD-10-CM

## 2018-10-16 ENCOUNTER — TELEPHONE (OUTPATIENT)
Dept: NEUROLOGY | Facility: CLINIC | Age: 83
End: 2018-10-16

## 2018-10-22 LAB
ALBUMIN SERPL-MCNC: 4.1 G/DL (ref 3.6–5.1)
ALBUMIN/GLOB SERPL: 1.5 (CALC) (ref 1–2.5)
ALP SERPL-CCNC: 80 U/L (ref 33–130)
ALT SERPL-CCNC: 21 U/L (ref 6–29)
AST SERPL-CCNC: 8 U/L (ref 10–35)
BILIRUB SERPL-MCNC: 0.6 MG/DL (ref 0.2–1.2)
BUN SERPL-MCNC: 22 MG/DL (ref 7–25)
BUN/CREAT SERPL: ABNORMAL (CALC) (ref 6–22)
CALCIUM SERPL-MCNC: 9.5 MG/DL (ref 8.6–10.4)
CHLORIDE SERPL-SCNC: 102 MMOL/L (ref 98–110)
CHOLEST SERPL-MCNC: 131 MG/DL
CHOLEST/HDLC SERPL: 3 (CALC)
CO2 SERPL-SCNC: 26 MMOL/L (ref 20–32)
CREAT SERPL-MCNC: 0.86 MG/DL (ref 0.6–0.88)
GLOBULIN SER CALC-MCNC: 2.8 G/DL (CALC) (ref 1.9–3.7)
GLUCOSE SERPL-MCNC: 252 MG/DL (ref 65–99)
HBA1C MFR BLD: 8.5 % OF TOTAL HGB
HDLC SERPL-MCNC: 43 MG/DL
LDLC SERPL CALC-MCNC: 64 MG/DL (CALC)
NONHDLC SERPL-MCNC: 88 MG/DL (CALC)
POTASSIUM SERPL-SCNC: 5.3 MMOL/L (ref 3.5–5.3)
PROT SERPL-MCNC: 6.9 G/DL (ref 6.1–8.1)
SL AMB EGFR AFRICAN AMERICAN: 72 ML/MIN/1.73M2
SL AMB EGFR NON AFRICAN AMERICAN: 62 ML/MIN/1.73M2
SODIUM SERPL-SCNC: 136 MMOL/L (ref 135–146)
TRIGL SERPL-MCNC: 164 MG/DL
TSH SERPL-ACNC: 3.6 MIU/L (ref 0.4–4.5)

## 2018-10-25 RX ORDER — RIVAROXABAN 20 MG/1
TABLET, FILM COATED ORAL
Refills: 0 | COMMUNITY
Start: 2018-08-28 | End: 2018-11-09

## 2018-10-25 RX ORDER — BIMATOPROST 0.01 %
DROPS OPHTHALMIC (EYE)
Refills: 0 | COMMUNITY
Start: 2018-10-16 | End: 2019-01-11 | Stop reason: SDUPTHER

## 2018-10-28 DIAGNOSIS — IMO0002: ICD-10-CM

## 2018-10-29 NOTE — PROGRESS NOTES
Patient ID: Bessy Conner is a 80 y o  female  Assessment/Plan:   Problem List Items Addressed This Visit        Other    Dizziness and giddiness - Primary    Relevant Orders    Ambulatory referral to Physical Therapy         I suspect patient most likely has peripheral dizziness  I will send her to physical therapy for evaluation and treatment  She also has a ENT specialist that she follows with for hearing loss whom she should see for evaluation as well  Patient should continue taking vitamin D 5000 international units on daily basis  Subjective:  HPI  We had the pleasure of evaluating Bessy Conner in neurological consultation today  As you know,  she is a 80 y o  right handed female  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  She is here today for evaluation of her headaches and dizziness with her daughter       PMH:   History of aortic stenosis status post recent transcatheter aortic valve replacement, coronary disease with a 60% 1st obtuse marginal stenosis as well as the mid RCA 50% stenosis, paroxysmal atrial fibrillation on Xarelto, previous CVA/TIA, diastolic heart failure, diabetes, hypertension, dyslipidemia, obstructive sleep apnea not on CPAP, anxiety/depression      CVA: Jan of 2017  She had slurred speech and few days after that her daughter noted her speech continue to get worse       Hearing loss: she does see ENT and sees them once a year  She does have hearing aid in place  Dizziness:  Occurs: daily   Last: occurs throughout the day and lasts for a while  Onset: with suddenly movements  Describes: her dizziness as light headed sensation and feels like she is going to fall over  She was falling in the past but after her cardiac surgery she is not falling  Associated sx: none     Headaches:   What medications do you take or have you taken for your headaches?    PREVENTIVE: none  ABORTIVE: tylenol    Have you used CBD or THC for your headaches and how often? no  Non-Medical/Alternative Treatments used in the past for headaches: PT     Aura and how long does it last - none     Started after the defibrillator was placed on April 19th 2018  How often do the headaches occur -  They did get better but had one today  What time of the day do the headaches start - 11 am today and now it is better  It lasted for half an hour  How long do the headaches last - severe headache have improved and when they do get worse they do not last long  Are you ever headache free - none  Where are they located - frontal  What is the intensity of pain - 6/10  Describe your usual headache - nagging and aching  Associated symptoms:   · Photophobia  · Blurred vision  · runny or stuffed-up nose  · Tinnitus, light-headed or dizzy, stiff or sore neck   · prefer to be alone and in a dark room, unable to work       MRI head: 1/12/2018  IMPRESSION:   1  No evidence of acute infarct, hemorrhage or mass    2  Chronic right frontal infarct  Periventricular and subcortical white matter lesions, consistent with microangiopathic disease      MRI head 1/7/2017  IMPRESSION:  - Focus of FLAIR and T2 signal abnormality involving cortex and subcortical right frontal lobe with a thin rim of precontrast T1 hyperintensity   Subacute infarct should be considered   A more recent venous infarct could have a similar appearance     Short-term interval follow-up gadolinium-enhanced MRI recommended in one month    - Moderate chronic microvascular disease      MRA and MRV 1/7/2017-   IMPRESSION:   No focal intracranial stenosis or aneurysm        The following portions of the patient's history were reviewed and updated as appropriate: allergies, current medications, past family history, past medical history, past social history, past surgical history and problem list      Objective:  Blood pressure 128/70, pulse 80, height 4' 9" (1 448 m), weight 74 1 kg (163 lb 6 4 oz)     Physical Exam   Constitutional: She appears well-developed  Eyes: Pupils are equal, round, and reactive to light  EOM are normal    Neck: Normal range of motion  Neck supple  Cardiovascular: Normal rate  Pulmonary/Chest: Effort normal    Abdominal: Soft  Musculoskeletal: Normal range of motion  Neurological: She has normal strength and normal reflexes  Coordination normal    Skin: Skin is warm  Psychiatric: She has a normal mood and affect  Her speech is normal        Neurological Exam  Mental Status   Speech is normal  Language is fluent with no aphasia  Attention and concentration are normal     Cranial Nerves  CN II: Visual fields full to confrontation  CN III, IV, VI: Extraocular movements intact bilaterally  Pupils equal round and reactive to light bilaterally  CN V: Facial sensation is normal   CN VII: Full and symmetric facial movement  CN VIII: Hearing is normal   CN IX, X: Palate elevates symmetrically  Normal gag reflex  CN XI: Shoulder shrug strength is normal   CN XII: Tongue midline without atrophy or fasciculations  Motor   Strength is 5/5 throughout all four extremities  Sensory  Sensation is intact to light touch, pinprick, vibration and proprioception in all four extremities  Reflexes  Deep tendon reflexes are 2+ and symmetric in all four extremities with downgoing toes bilaterally  Coordination  Finger-to-nose, rapid alternating movements and heel-to-shin normal bilaterally without dysmetria  Gait  Normal casual, toe, heel and tandem gait  ROS:  Review of Systems  Constitutional: Negative  HENT: Positive for tinnitus  Eyes: Positive for visual disturbance (blurred vision )  Respiratory: Negative  Cardiovascular: Negative  Gastrointestinal: Negative  Endocrine: Negative  Genitourinary: Negative  Musculoskeletal: Positive for back pain and neck pain  Skin: Negative  Allergic/Immunologic: Negative      Neurological: Positive for dizziness  Hematological: Negative      Psychiatric/Behavioral: Negative

## 2018-11-01 ENCOUNTER — OFFICE VISIT (OUTPATIENT)
Dept: NEUROLOGY | Facility: CLINIC | Age: 83
End: 2018-11-01
Payer: COMMERCIAL

## 2018-11-01 VITALS
HEIGHT: 57 IN | DIASTOLIC BLOOD PRESSURE: 70 MMHG | SYSTOLIC BLOOD PRESSURE: 128 MMHG | BODY MASS INDEX: 35.25 KG/M2 | WEIGHT: 163.4 LBS | HEART RATE: 80 BPM

## 2018-11-01 DIAGNOSIS — R42 DIZZINESS AND GIDDINESS: Primary | ICD-10-CM

## 2018-11-01 PROCEDURE — 99213 OFFICE O/P EST LOW 20 MIN: CPT | Performed by: PSYCHIATRY & NEUROLOGY

## 2018-11-01 RX ORDER — AMLODIPINE BESYLATE 5 MG/1
5 TABLET ORAL DAILY
COMMUNITY
End: 2018-11-09 | Stop reason: SDUPTHER

## 2018-11-01 NOTE — PROGRESS NOTES
Patient ID: Zheng Padgett is a 80 y o  female  Assessment/Plan:    No problem-specific Assessment & Plan notes found for this encounter  {Assess/PlanSmartLinks:73925}       Subjective:    HPI    {[facility] HPI texts:15173}    {Common ambulatory SmartLinks:15796}         Objective:    Blood pressure 128/70, pulse 80, height 4' 9" (1 448 m), weight 74 1 kg (163 lb 6 4 oz)  Physical Exam    Neurological Exam      ROS:    Review of Systems   Constitutional: Negative  HENT: Positive for tinnitus  Eyes: Positive for visual disturbance (blurred vision )  Respiratory: Negative  Cardiovascular: Negative  Gastrointestinal: Negative  Endocrine: Negative  Genitourinary: Negative  Musculoskeletal: Positive for back pain and neck pain  Skin: Negative  Allergic/Immunologic: Negative  Neurological: Positive for dizziness  Hematological: Negative  Psychiatric/Behavioral: Negative

## 2018-11-01 NOTE — PATIENT INSTRUCTIONS
I suspect patient most likely has peripheral dizziness  I will send her to physical therapy for evaluation and treatment  She also has a ENT specialist that she follows with for hearing loss whom she should see for evaluation as well  Patient should continue taking vitamin D 5000 international units on daily basis

## 2018-11-07 DIAGNOSIS — Z95.2 S/P TAVR (TRANSCATHETER AORTIC VALVE REPLACEMENT): ICD-10-CM

## 2018-11-07 RX ORDER — OMEPRAZOLE 20 MG/1
CAPSULE, DELAYED RELEASE ORAL
Qty: 30 CAPSULE | Refills: 1 | Status: SHIPPED | OUTPATIENT
Start: 2018-11-07 | End: 2018-12-03 | Stop reason: ALTCHOICE

## 2018-11-09 ENCOUNTER — OFFICE VISIT (OUTPATIENT)
Dept: FAMILY MEDICINE CLINIC | Facility: CLINIC | Age: 83
End: 2018-11-09
Payer: COMMERCIAL

## 2018-11-09 ENCOUNTER — TELEPHONE (OUTPATIENT)
Dept: FAMILY MEDICINE CLINIC | Facility: CLINIC | Age: 83
End: 2018-11-09

## 2018-11-09 VITALS
HEART RATE: 92 BPM | BODY MASS INDEX: 36.68 KG/M2 | HEIGHT: 57 IN | RESPIRATION RATE: 20 BRPM | DIASTOLIC BLOOD PRESSURE: 80 MMHG | SYSTOLIC BLOOD PRESSURE: 150 MMHG | WEIGHT: 170 LBS | OXYGEN SATURATION: 95 % | TEMPERATURE: 98.6 F

## 2018-11-09 DIAGNOSIS — I48.91 ATRIAL FIBRILLATION, UNSPECIFIED TYPE (HCC): ICD-10-CM

## 2018-11-09 DIAGNOSIS — F41.8 DEPRESSION WITH ANXIETY: ICD-10-CM

## 2018-11-09 DIAGNOSIS — E11.65 UNCONTROLLED TYPE 2 DIABETES MELLITUS WITH HYPERGLYCEMIA (HCC): Chronic | ICD-10-CM

## 2018-11-09 DIAGNOSIS — I10 ESSENTIAL HYPERTENSION: Chronic | ICD-10-CM

## 2018-11-09 DIAGNOSIS — J40 BRONCHITIS: Primary | ICD-10-CM

## 2018-11-09 PROCEDURE — 1036F TOBACCO NON-USER: CPT | Performed by: FAMILY MEDICINE

## 2018-11-09 PROCEDURE — 3008F BODY MASS INDEX DOCD: CPT | Performed by: FAMILY MEDICINE

## 2018-11-09 PROCEDURE — 99214 OFFICE O/P EST MOD 30 MIN: CPT | Performed by: FAMILY MEDICINE

## 2018-11-09 PROCEDURE — 1160F RVW MEDS BY RX/DR IN RCRD: CPT | Performed by: FAMILY MEDICINE

## 2018-11-09 RX ORDER — AZITHROMYCIN 250 MG/1
TABLET, FILM COATED ORAL
Qty: 6 TABLET | Refills: 0 | Status: SHIPPED | OUTPATIENT
Start: 2018-11-09 | End: 2018-11-13

## 2018-11-09 RX ORDER — ALBUTEROL SULFATE 90 UG/1
2 AEROSOL, METERED RESPIRATORY (INHALATION) EVERY 6 HOURS PRN
Qty: 8.5 G | Refills: 0 | Status: SHIPPED | OUTPATIENT
Start: 2018-11-09 | End: 2019-07-01 | Stop reason: SDUPTHER

## 2018-11-09 NOTE — TELEPHONE ENCOUNTER
Called pharmacy, they have those medications ready for   I left message for patient, letting her know  The Xarelto was too soon to be refilled

## 2018-11-09 NOTE — TELEPHONE ENCOUNTER
Patient was in to see Dr Molina [place] today was prescribed Albuterol sulfate 90 mcg and Azithromycin 250 mg  Went to [facility] and medication was not sent  Please advise  Can be reached at [phone] any questions

## 2018-11-09 NOTE — PROGRESS NOTES
Chief Complaint   Patient presents with    Fatigue     Symptoms started with headache and nausea last night  Today symptoms wheezing, SOB, and fatigue  Per son in law patient BP this morning at 7 AM 90/76 pulse 102 and at 10:30 /98 pulse 101 at rest       Assessment/Plan:    A lot of fluids and rest  Tylenol or motrin for fever or pain  Give zpack  Side effects educated patient  Give proair inhaler  Use Q 4-6 hours as needed for wheezing  BP elevated in office today  Advised pt to take amlodipine when she gets home  DASH diet  Check BP at home 2/day and call office if BP always >140/90  Anxiety---Pt refused medications or therapist now  DM---uncontrolled  Low carb diet  Continue metformin and glipizide now  Call office if symptoms no improving or worse  Diagnoses and all orders for this visit:    Bronchitis  -     azithromycin (ZITHROMAX) 250 mg tablet; Take 2 tabs on day 1, then take 1 tab daily from day 2-day 5   -     albuterol (PROAIR HFA) 90 mcg/act inhaler; Inhale 2 puffs every 6 (six) hours as needed for wheezing    Essential hypertension    Atrial fibrillation, unspecified type (HCC)  -     rivaroxaban (XARELTO) 20 mg tablet; Take 1 tablet (20 mg total) by mouth daily for 30 days    Depression with anxiety    Uncontrolled type 2 diabetes mellitus with hyperglycemia (HCC)          Subjective:      Patient ID: Dionisio Vega is a 80 y o  female  HPI    Pt is here with her daughter  C/o SOB and wheezing since last night  Had dry cough for 1 week  Felt chills  Felt nausea  Denies chest pain, vomiting or abdominal pain  Refused flu shot  Denies hx of asthma or COPD  DM---10/2018 hgA1C 8 5 She is on metformin 500mg bid and glipizide 10mg bid  Blood sugar at home 200 recently  HTN---Pt usually takes amlodipine 5mg qhs  BP at home 110-120/60-70 per pt  Stopped lisinopril because it caused her coughing     Stopped metoprolol because it gave her severe headache  Still has headache and lightheaded sometimes, but better than before  Tylenol helped  Saw neurology recently  Recommend physical therapy and she will start next week  Afib---she is on xarelto 20mg QD  Need refills  Anxiety---daughter states pt worries about everything  Pt refused to take any more medications now  Pt refused any more tests now also  The following portions of the patient's history were reviewed and updated as appropriate: allergies, current medications, past family history, past medical history, past social history, past surgical history and problem list     Review of Systems   Constitutional: Negative for appetite change, chills and fever  HENT: Negative for congestion, ear pain, sinus pain and sore throat  Eyes: Negative for discharge and itching  Respiratory: Positive for cough, shortness of breath and wheezing  Negative for apnea and chest tightness  Cardiovascular: Negative for chest pain, palpitations and leg swelling  Gastrointestinal: Negative for abdominal pain, anal bleeding, constipation, diarrhea, nausea and vomiting  Endocrine: Negative for cold intolerance, heat intolerance and polyuria  Genitourinary: Negative for difficulty urinating and dysuria  Musculoskeletal: Negative for arthralgias, back pain and myalgias  Skin: Negative for rash  Neurological: Negative for dizziness and headaches  Psychiatric/Behavioral: Negative for agitation  Objective:      /80 (BP Location: Left arm, Patient Position: Sitting, Cuff Size: Standard)   Pulse 92   Temp 98 6 °F (37 °C) (Tympanic)   Resp 20   Ht 4' 9" (1 448 m)   Wt 77 1 kg (170 lb)   SpO2 95%   BMI 36 79 kg/m²          Physical Exam   Constitutional: She appears well-developed  No distress  HENT:   Head: Normocephalic     Right Ear: External ear normal    Left Ear: External ear normal    Nose: Nose normal    Mouth/Throat: Oropharynx is clear and moist    Eyes: Pupils are equal, round, and reactive to light  Conjunctivae are normal  Right eye exhibits no discharge  Left eye exhibits no discharge  Neck: Normal range of motion  No thyromegaly present  Cardiovascular: Normal rate, regular rhythm and normal heart sounds  Exam reveals no gallop and no friction rub  No murmur heard  Pulmonary/Chest: Effort normal and breath sounds normal  No respiratory distress  She has no wheezes  She has no rales  She exhibits no tenderness  Abdominal: Soft  Bowel sounds are normal    Musculoskeletal: Normal range of motion  Lymphadenopathy:     She has no cervical adenopathy  Neurological: She is alert  Psychiatric: She has a normal mood and affect

## 2018-11-13 ENCOUNTER — EVALUATION (OUTPATIENT)
Dept: PHYSICAL THERAPY | Facility: REHABILITATION | Age: 83
End: 2018-11-13
Payer: COMMERCIAL

## 2018-11-13 DIAGNOSIS — R26.81 UNSTEADY GAIT: ICD-10-CM

## 2018-11-13 DIAGNOSIS — R42 DIZZINESS AND GIDDINESS: Primary | ICD-10-CM

## 2018-11-13 PROCEDURE — 97162 PT EVAL MOD COMPLEX 30 MIN: CPT

## 2018-11-13 PROCEDURE — G8979 MOBILITY GOAL STATUS: HCPCS

## 2018-11-13 PROCEDURE — G8978 MOBILITY CURRENT STATUS: HCPCS

## 2018-11-13 PROCEDURE — 97112 NEUROMUSCULAR REEDUCATION: CPT

## 2018-11-13 NOTE — PROGRESS NOTES
Date: 2018  Name Rebecca Gore  : 1935  MRN: [id number]  ZYLQ:[phone] (home)   Mobile: [phone] (mobile)  Insurance Information: Payor: BLUE CROSS  REP / Plan: Jennifer Luna  REP / Product Type: Medicare HMO /   Referring Provider: Frances Reed MD    Subjective    HPI: Rebecca Gore is a 80 y o  female referred to outpatient physical therapy for   1  Dizziness and giddiness    2  Unsteady gait      Patient reports has been dizzy since surgical procedure in 2018   "I have not been doing much since then  I don't want to fall "    Falls:  Daughter reports almost falls  She reaches for furniture  Home Environment: lives with daughter and son in law who left with [employer]  DME: Georjean Guerita and cane    Pain     Current level 5/10 frontal    Patient Goal : to walk without a walker, get rid of the dizziness and the neck pain    Dysequilibrium: Yes  Lightheadedness: Yes  5/10  Vertigo: No  Rocking or Swaying: Yes 4/10         Oscillopsia: No  Diplopia: No  Motion sickness: No  Floating, Swimming, Disconnected: No    Exacerbation Factors:  Bending over: Yes  Turning Head: Yes  Rolling in bed: Yes  Walking: Yes  Looking up: No  Supine to/from sitting: Yes  Optokinetic movement: No  Walking in busy environment: Yes     Concurrent Complaints:  Tinnitus:no  Aural Fullness: Yes  Known hearing loss:No  Nausea, Vomiting: No  Altered Vision: No  Poor Concentration: No  Memory Loss: Yes  Peripheral Neuropathy:Yes    Objective    Finger to nose: normal  Dysdiadochokinesia: normal    Resting Nystagmus: no  Gaze holding nystagmus Yes   Left 3 beat  Smooth Pursuit: normal  Vertical Saccades: normal  Horizontal Saccades: normal  Convergence: normal  Cover/Uncover Test: normal    R Derick-Hallpike: positive  R Head Thrust: positive  L Head Thrust: negative    OUTCOME MEASURES:  MCSTIB:    - EO firm:   30    - EC firm:   30      - EO foam: 10       - EC foam:  5                       75/120 Functional Gait Assessment  2Gait level surface  2Change in gait speed  1Gait with horizontal head turns  1Gait with vertical head turns  1Gait and pivot turn  2Step over obstacle  0Gait with narrow base of support  0Gait with eyes closed  1Ambulating backwards  1Steps  9/30 Total score    5x sit to stand: unable to complete the test due to increased dizziness with change in position    MUSCLE STRENGTH  BUE  4/5  BLE proximal 4-/5          distal 4+/5    Assessment/Plan    Assessment:  Patient presents to outpatient physical therapy with dizziness, lightheadedness and increased risk of falls  These symptoms developed after her stent surgery and client reports is not very active since then due to fear of falls  She presented with three beat nystagmus towards the Right side which responded well to Epley maneuver  Patient also presents with generalized weakness, decreased balance and increased risk of falls as evidenced by functional outcome measures  These impairments are currently limiting the patient's ability to function independently and return to community activities  Patient will benefit from skilled outpatient physical therapy to address the above impairments in order to improve patient's QOL  STG's:     - Patient improves EC with no errors on foam task of MCSTIB for improved static balance within 4 weeks    - Patient is independent with initial home exercise program for improvements at home within 2 weeks    - Patient will transfer supine to sit with dizziness 2/10   - Pateint will be able to perform 5 x sit to stand test:  14 seconds      LTG's:   - Patient improves 20% on all tasks of the MCSTIB for improved static balance within 4 weeks   - Patient improves score by 10 points on FGA for improved dynamic balance within 8 weeks   - Patientwill be able to get in and out of bed without dizziness   - Patient will be able to ambulate 10 minutes consecutively with least restrictive device, if needed  Plan:  Patient will benefit from skilled outpatient physical therapy 3x/wk for 6wk  Therapeutic exercises to include general strengthening, gait training and  neuromuscular re-education to include vestibular and high level balance activities  Precautions: Falls    Daily Treatment Diary     Manual                                                                                   Exercise Diary  11/13            5x sit to stand 1x  Asssit                                                                                                                                                                                                                            Epley man   Education/perform 15            FGA components 10 min                             Modalities

## 2018-11-15 ENCOUNTER — OFFICE VISIT (OUTPATIENT)
Dept: PHYSICAL THERAPY | Facility: REHABILITATION | Age: 83
End: 2018-11-15
Payer: COMMERCIAL

## 2018-11-15 DIAGNOSIS — R26.81 UNSTEADY GAIT: ICD-10-CM

## 2018-11-15 DIAGNOSIS — R42 DIZZINESS AND GIDDINESS: Primary | ICD-10-CM

## 2018-11-15 PROCEDURE — 97112 NEUROMUSCULAR REEDUCATION: CPT

## 2018-11-15 PROCEDURE — 97110 THERAPEUTIC EXERCISES: CPT

## 2018-11-15 NOTE — PROGRESS NOTES
Daily Note     Today's date: 11/15/2018  Patient name: Louie Gibson  : 1935  MRN: [id number]  Referring provider: Ladonna Hadley MD  Dx: No diagnosis found  Subjective: Pt reports she's tired, "just dizzy" at a 5 or 6 dizziness  End of session pt reports her dizziness is "less than when I first arrived"  Objective: See treatment diary below    Exercise Diary  11/13 11/15        5x sit to stand 1x  Asssit         LE strengthening  STS x4 (room spinning)  1/4 squats  Heel raises x20  Step ups fwd/lateral x10 B        Static Balance  Firm and foam:  FTEO/EC x1 min each  A/pSway x1 min          Dynamic Balance                                                                                          Epley man  Education/perform 15         FGA components 10 min                       Assessment: Tolerated treatment fair  Fast onset of feeling the room spinning w/ changes in position, however decreased severity of symptoms w/ partial squats and step ups  During balance exercises noted increased sway w/ EC and on foam  At this time pt is most limited by fatigue and dizziness, requiring repeated rest breaks  Patient demonstrated fatigue post treatment and would benefit from continued PT    Plan: Continue per plan of care  Begin dynamic balance exercises, and as pt demonstrates correct form w/ exercises in clinic, provide HEP for strengthening

## 2018-11-20 ENCOUNTER — OFFICE VISIT (OUTPATIENT)
Dept: PHYSICAL THERAPY | Facility: REHABILITATION | Age: 83
End: 2018-11-20
Payer: COMMERCIAL

## 2018-11-20 DIAGNOSIS — R26.81 UNSTEADY GAIT: ICD-10-CM

## 2018-11-20 DIAGNOSIS — R42 DIZZINESS AND GIDDINESS: Primary | ICD-10-CM

## 2018-11-20 PROCEDURE — 97110 THERAPEUTIC EXERCISES: CPT

## 2018-11-20 NOTE — PROGRESS NOTES
Daily Note     Today's date: 2018  Patient name: Dada Lund  : 1935  MRN: [id number]  Referring provider: Chetan Arnold MD  Dx:   Encounter Diagnosis     ICD-10-CM    1  Dizziness and giddiness R42    2  Unsteady gait R26 81        Start Time: 1545  Stop Time: 1625  Total time in clinic (min): 40 minutes    Subjective: Pt reports she feels a little lightheaded, 8/10  Objective: See treatment diary below    Exercise Diary  11/13 11/15 11/20     5x sit to stand 1x  Asssit       LE strengthening  STS x4 (room spinning)  1/4 squats  Heel raises x20  Step ups fwd/lateral x10 B STS 4x3  (reports lightheaded at 3rd set)    Heel raises x20  Step up/lateral x20    Lateral stepping w/ OTB 20ft x2    HEP x15 min     Static Balance  Firm and foam:  FTEO/EC x1 min each  A/pSway x1 min        Dynamic Balance   Tandem walking 20ftx2  Lateral stepping 20ft x2  Backward walking 20ft x2                                                                     Epley man  Education/perform 15       FGA components 10 min                   Assessment: Tolerated treatment fair  Increased repetitions of STS before increase of symptoms  Educated HEP to pt and provided paper copy, pt demonstrated correct form w/ each exercise  Patient demonstrated fatigue post treatment and would benefit from continued PT    Plan: Continue per plan of care  Review HEP next visit and begin dynamic balance exercises

## 2018-11-29 RX ORDER — AMLODIPINE BESYLATE 5 MG/1
TABLET ORAL
Refills: 0 | COMMUNITY
Start: 2018-11-16 | End: 2018-12-06

## 2018-12-01 DIAGNOSIS — Z95.2 S/P TAVR (TRANSCATHETER AORTIC VALVE REPLACEMENT): ICD-10-CM

## 2018-12-03 ENCOUNTER — TELEPHONE (OUTPATIENT)
Dept: FAMILY MEDICINE CLINIC | Facility: CLINIC | Age: 83
End: 2018-12-03

## 2018-12-03 NOTE — TELEPHONE ENCOUNTER
Daughter wants to verify meds  States received refills on Metoprolol and Omeprazole but has not taken these in months  They thought these were discontinued

## 2018-12-04 ENCOUNTER — TELEPHONE (OUTPATIENT)
Dept: FAMILY MEDICINE CLINIC | Facility: CLINIC | Age: 83
End: 2018-12-04

## 2018-12-04 ENCOUNTER — OFFICE VISIT (OUTPATIENT)
Dept: PHYSICAL THERAPY | Facility: REHABILITATION | Age: 83
End: 2018-12-04
Payer: COMMERCIAL

## 2018-12-04 DIAGNOSIS — R26.81 UNSTEADY GAIT: ICD-10-CM

## 2018-12-04 DIAGNOSIS — R42 DIZZINESS AND GIDDINESS: Primary | ICD-10-CM

## 2018-12-04 PROCEDURE — 97112 NEUROMUSCULAR REEDUCATION: CPT

## 2018-12-04 PROCEDURE — 97110 THERAPEUTIC EXERCISES: CPT

## 2018-12-04 RX ORDER — OMEPRAZOLE 20 MG/1
CAPSULE, DELAYED RELEASE ORAL
Qty: 30 CAPSULE | Refills: 1 | OUTPATIENT
Start: 2018-12-04

## 2018-12-04 NOTE — TELEPHONE ENCOUNTER
Spoke with Mayte Salcido, the daughter, Pt aware Dr Valdez Plants suggested to not take both medications discussed 12-03-18  Mayte Salcido said, ok

## 2018-12-04 NOTE — TELEPHONE ENCOUNTER
We stopped metoprolol before because it cause her headache  We do not recommend pt to take omeprazole for long term, so stop both these medications

## 2018-12-04 NOTE — PROGRESS NOTES
Daily Note     Today's date: 2018  Patient name: Teresa Ragsdale  : 1935  MRN: [id number]  Referring provider: Lydia Wells MD  Dx:   Encounter Diagnosis     ICD-10-CM    1  Dizziness and giddiness R42    2  Unsteady gait R26 81        Start Time: 1350  Stop Time: 1430  Total time in clinic (min): 40 minutes    Subjective: Pt reports she has not been compliant w/ HEP as she has been in [place]  She reports dizziness has been "on and off" today  Objective: See treatment diary below    Exercise Diary  11/15 11/20 12    5x sit to stand       LE strengthening STS x4 (room spinning)  1/4 squats  Heel raises x20  Step ups fwd/lateral x10 B STS 4x3  (reports lightheaded at 3rd set)    Heel raises x20  Step up/lateral x20    Lateral stepping w/ OTB 20ft x2    HEP x15 min Sit to stand 10x2 (no symptom increase)    Heel raises x30  Step up/lateral x20    Static Balance Firm and foam:  FTEO/EC x1 min each  A/pSway x1 min    Tandem w/ light touch on rail x2 min  AP sway x1 min    Dynamic Balance  Tandem walking 20ftx2  Lateral stepping 20ft x2  Backward walking 20ft x2 Tandem walking 20ft x4  Lateral stepping 20ft x4  Backwards walking 20ft x2      Ambulation   X 7 min w/ 2 min rest (O2 96%, BPM 86 at end)                                                     Epley man  Education/perform       FGA components                  Assessment: Tolerated treatment well  Ambulation performance currently limited by fatigue as pt requires seated rest secondary to pt report that her legs start to feel tired  Pt denied onset of symptoms throughout treatment  She expresses fear of falling w/ balance exercises, completed balance exercises by wall rail to build confidence  Significant decrease in balance performance noted w/ EC  Patient demonstrated fatigue post treatment and would benefit from continued PT    Plan: Continue per plan of care  Increase time/distance w/ ambulation, add balance exercises to pt HEP

## 2018-12-05 ENCOUNTER — OFFICE VISIT (OUTPATIENT)
Dept: PHYSICAL THERAPY | Facility: REHABILITATION | Age: 83
End: 2018-12-05
Payer: COMMERCIAL

## 2018-12-05 DIAGNOSIS — R42 DIZZINESS AND GIDDINESS: Primary | ICD-10-CM

## 2018-12-05 DIAGNOSIS — R26.81 UNSTEADY GAIT: ICD-10-CM

## 2018-12-05 PROCEDURE — 97112 NEUROMUSCULAR REEDUCATION: CPT

## 2018-12-05 NOTE — PROGRESS NOTES
Daily Note     Today's date: 2018  Patient name: Davon Quijano  : 1935  MRN: [id number]  Referring provider: Nitin Singh MD  Dx:   Encounter Diagnosis     ICD-10-CM    1  Dizziness and giddiness R42    2  Unsteady gait R26 81        Start Time: 1615  Stop Time: 1655  Total time in clinic (min): 40 minutes    Subjective: Pt reports she is very tired today as she did not sleep well last night  Her lightheadedness has been on and off throughout the day, she currently denies feeling lightheaded  She used her inhaler before the appointment, she reports she uses this for bronchitis  Objective: See treatment diary below    Exercise Diary     5x sit to stand      LE strengthening STS 4x3  (reports lightheaded at 3rd set)    Heel raises x20  Step up/lateral x20    Lateral stepping w/ OTB 20ft x2    HEP x15 min Sit to stand 10x2 (no symptom increase)    Heel raises x30  Step up/lateral x20 Sit to stand 10x3 (no symptom increase)   Static Balance  Tandem w/ light touch on rail x2 min  AP sway x1 min Balance HEP: Educated and reviewed x20 min       Dynamic Balance Tandem walking 20ftx2  Lateral stepping 20ft x2  Backward walking 20ft x2 Tandem walking 20ft x4  Lateral stepping 20ft x4  Backwards walking 20ft x2   Tandem walking 20ft x4 (light rail touch)  Toe taps x10   Ambulation  X 7 min w/ 2 min rest (O2 96%, BPM 86 at end) x5 min  3 min seated rest  x4 min                                             Epley man  Education/perform      FGA components                  Assessment: Tolerated treatment well  She continues to be limited by fatigue, requiring a seated rest during ambulation, however she has increased her walking time overall today  She denied any onset of lightheaded ness throughout the session  Most balance deficits noted w/ EC and w/ decreased base of support  Educated balance HEP to pt and provided printed copy, pt demonstrated correct form w/ HEP   Patient demonstrated fatigue post treatment and would benefit from continued PT    Plan: Continue per plan of care  Continue to monitor compliance to HEP and continue balance exercises w/ focus on SLS and EC

## 2018-12-06 ENCOUNTER — OFFICE VISIT (OUTPATIENT)
Dept: CARDIOLOGY CLINIC | Facility: CLINIC | Age: 83
End: 2018-12-06
Payer: COMMERCIAL

## 2018-12-06 ENCOUNTER — OFFICE VISIT (OUTPATIENT)
Dept: PHYSICAL THERAPY | Facility: REHABILITATION | Age: 83
End: 2018-12-06
Payer: COMMERCIAL

## 2018-12-06 VITALS
HEART RATE: 68 BPM | HEIGHT: 57 IN | DIASTOLIC BLOOD PRESSURE: 70 MMHG | BODY MASS INDEX: 36.8 KG/M2 | SYSTOLIC BLOOD PRESSURE: 130 MMHG | WEIGHT: 170.6 LBS

## 2018-12-06 DIAGNOSIS — I48.91 ATRIAL FIBRILLATION, UNSPECIFIED TYPE (HCC): Primary | ICD-10-CM

## 2018-12-06 DIAGNOSIS — R42 DIZZINESS AND GIDDINESS: Primary | ICD-10-CM

## 2018-12-06 DIAGNOSIS — R26.81 UNSTEADY GAIT: ICD-10-CM

## 2018-12-06 PROCEDURE — G8979 MOBILITY GOAL STATUS: HCPCS | Performed by: PHYSICAL THERAPIST

## 2018-12-06 PROCEDURE — 97150 GROUP THERAPEUTIC PROCEDURES: CPT

## 2018-12-06 PROCEDURE — 97112 NEUROMUSCULAR REEDUCATION: CPT

## 2018-12-06 PROCEDURE — G8978 MOBILITY CURRENT STATUS: HCPCS | Performed by: PHYSICAL THERAPIST

## 2018-12-06 PROCEDURE — 99214 OFFICE O/P EST MOD 30 MIN: CPT | Performed by: INTERNAL MEDICINE

## 2018-12-06 PROCEDURE — 97110 THERAPEUTIC EXERCISES: CPT

## 2018-12-06 PROCEDURE — 93000 ELECTROCARDIOGRAM COMPLETE: CPT | Performed by: INTERNAL MEDICINE

## 2018-12-06 RX ORDER — ROSUVASTATIN CALCIUM 10 MG/1
10 TABLET, COATED ORAL
Qty: 30 TABLET | Refills: 5 | Status: SHIPPED | OUTPATIENT
Start: 2018-12-06 | End: 2019-06-01 | Stop reason: SDUPTHER

## 2018-12-06 RX ORDER — DILTIAZEM HYDROCHLORIDE 180 MG/1
180 CAPSULE, COATED, EXTENDED RELEASE ORAL DAILY
Qty: 30 CAPSULE | Refills: 5 | Status: SHIPPED | OUTPATIENT
Start: 2018-12-06 | End: 2019-02-14

## 2018-12-06 NOTE — PROGRESS NOTES
Daily Note     Today's date: 2018  Patient name: Marci Crowe  : 1935  MRN: [id number]  Referring provider: Mary Huynh MD  Dx:   Encounter Diagnosis     ICD-10-CM    1  Dizziness and giddiness R42    2  Unsteady gait R26 81        Start Time: 1400  Stop Time: 1438  Total time in clinic (min): 38 minutes     Group [id number]    Subjective: Pt reports legs feel tired and achy today  She denies lightheadedness or dizziness  Objective: See treatment diary below    Exercise Diary     5x sit to stand       LE strengthening STS 4x3  (reports lightheaded at 3rd set)    Heel raises x20  Step up/lateral x20    Lateral stepping w/ OTB 20ft x2    HEP x15 min Sit to stand 10x2 (no symptom increase)    Heel raises x30  Step up/lateral x20 Sit to stand 10x3 (no symptom increase) Sit to stand 10x3   Static Balance  Tandem w/ light touch on rail x2 min  AP sway x1 min Balance HEP: Educated and reviewed x20 min     Firm and foam:  FTEOHTs (V/X) x1 min ea  Walking w/ HTs (V/H/diagonal) 20x4ft each       Dynamic Balance Tandem walking 20ftx2  Lateral stepping 20ft x2  Backward walking 20ft x2 Tandem walking 20ft x4  Lateral stepping 20ft x4  Backwards walking 20ft x2   Tandem walking 20ft x4 (light rail touch)  Toe taps x10 Sit to stand w/ 360* turn x5 ea direaction   Ambulation  X 7 min w/ 2 min rest (O2 96%, BPM 86 at end) x5 min  3 min seated rest  x4 min Bike x8 min, Lv1                                                    Epley man  Education/perform       FGA components                    Assessment: Tolerated treatment well  Participation limited by fatigue, continued education to complete HEP, which she reported she has not been compliant with  Increased balance deficits noted w/ HT activities, but asymptomatic throughout session  Patient demonstrated fatigue post treatment and would benefit from continued PT    Plan: Continue per plan of care    Continue balance exercises w/ HT activities

## 2018-12-06 NOTE — LETTER
2018     Rob Rios, [address]    Patient: Alyssa Encinas   YOB: 1935   Date of Visit: 2018       Dear Dr Mario Saini: Thank you for referring César Kincaid to me for evaluation  Below are my notes for this consultation  If you have questions, please do not hesitate to call me  I look forward to following your patient along with you  Sincerely,        Ruperto Quick MD        CC: No Recipients  Ruperto Quick MD  2018  4:07 PM  Sign at close encounter  [address] [facility] [place]  [address], [address] [place] [id number]    Cardiology Follow up    Patient:  Alyssa Encinas  :  1935  MRN:  [id number]    History of Present Illness:        15-year-old female with past medical history of aortic stenosis status post recent transcatheter aortic valve replacement, coronary disease with a 60% 1st obtuse marginal stenosis as well as the mid RCA 50% stenosis, paroxysmal atrial fibrillation on Xarelto, previous CVA/TIA, diastolic heart failure, diabetes, hypertension, dyslipidemia, obstructive sleep apnea not on CPAP, anxiety/depression presents for cardiology follow-up      She notes she has been feeling tired for about the last month and also notes some wheezing with exertion over the last month as well  She does get some dizziness when she is walking at times  She has not had any chest pain and has not had any palpitations or syncope        Patient Active Problem List   Diagnosis    Diabetes mellitus type 2, uncontrolled ([facility] )    Glaucoma    Hyperlipidemia    Hypertension    Hyponatremia    Depression with anxiety    Paroxysmal atrial fibrillation (HCC)    Ataxia    Type 2 diabetes mellitus with hyperglycemia (HCC)    CVA (cerebral vascular accident) ([facility] )    Hearing loss    Insomnia    Left knee pain    PERI (obstructive sleep apnea)    Osteoarthritis    Osteoporosis    Periodic limb movement sleep disorder    Sleep talking    Tinnitus    Tricuspid valve disorders, non-rheumatic    Unsteady gait    Chronic diastolic heart failure (HCC)    S/P TAVR (transcatheter aortic valve replacement)    Dizziness and giddiness    Thickened endometrium       Past Surgical History  Past Surgical History:   Procedure Laterality Date    APPENDECTOMY      GLAUCOMA SURGERY  01/03/2016    NH REPLACE AORTIC VALVE OPENFEMORAL ARTERY APPROACH N/A 4/17/2018    Procedure: REPLACEMENT AORTIC VALVE TRANSCATHETER (TAVR) TRANSFEMORAL W/ 23 MM AGUILAR STEVE S3 VALVE;  Surgeon: Tika Glass DO;  [facility];  Service: Cardiac Surgery    TUBAL LIGATION         Social History   Social History     Social History    Marital status:      Spouse name: N/A    Number of children: N/A    Years of education: N/A     Occupational History    Not on file       Social History Main Topics    Smoking status: Never Smoker    Smokeless tobacco: Never Used    Alcohol use No    Drug use: No    Sexual activity: Not Currently     Other Topics Concern    Not on file     Social History Narrative    No narrative on file        Allergies   Allergen Reactions    Lipitor [Atorvastatin] GI Intolerance     Nausea stomach ache    Hydrochlorothiazide GI Intolerance    Lisinopril Cough    Metoprolol Headache    Aspirin GI Intolerance       Family History   Family History   Problem Relation Age of Onset    No Known Problems Mother     Diabetes Father     Stroke Father     Coronary artery disease Sister     No Known Problems Brother     No Known Problems Son     Breast cancer Daughter     No Known Problems Maternal Grandmother     No Known Problems Maternal Grandfather     No Known Problems Paternal Grandmother     No Known Problems Paternal Grandfather     No Known Problems Cousin     Rheum arthritis Neg Hx     Osteoarthritis Neg Hx     Asthma Neg Hx     Heart failure Neg Hx     Hyperlipidemia Neg Hx     Hypertension Neg Hx     Migraines Neg Hx     Rashes / Skin problems Neg Hx     Seizures Neg Hx     Thyroid disease Neg Hx        Review of Systems:  Review of Systems   Constitutional: Positive for fatigue  Negative for chills and fever  HENT: Negative for hearing loss, nosebleeds and tinnitus  Eyes: Negative for pain  Respiratory: Negative for cough, chest tightness and shortness of breath  Cardiovascular: Negative for chest pain, palpitations and leg swelling  Gastrointestinal: Negative for abdominal pain, nausea and vomiting  Endocrine: Negative for cold intolerance and heat intolerance  Genitourinary: Negative for difficulty urinating, hematuria and vaginal bleeding  Musculoskeletal: Negative for arthralgias  Skin: Negative for rash  Allergic/Immunologic: Negative for environmental allergies  Neurological: Positive for dizziness  Negative for syncope, weakness and light-headedness  Psychiatric/Behavioral: Negative for decreased concentration and sleep disturbance  The patient is nervous/anxious  Current Outpatient Prescriptions:     Acetaminophen (TYLENOL) 325 MG CAPS, Take by mouth as needed, Disp: , Rfl:     albuterol (PROAIR HFA) 90 mcg/act inhaler, Inhale 2 puffs every 6 (six) hours as needed for wheezing, Disp: 8 5 g, Rfl: 0    amLODIPine (NORVASC) 5 mg tablet, , Disp: , Rfl: 0    Bimatoprost (LUMIGAN OP), Apply to eye daily at bedtime Both eyes , Disp: , Rfl:     COMBIGAN 0 2-0 5 %, instill 1 drop into both eyes twice a day, Disp: , Rfl: 0    Cyanocobalamin (VITAMIN B 12 PO), Take by mouth, Disp: , Rfl:     ergocalciferol (VITAMIN D2) 50,000 units, take 1 capsule by mouth every week, Disp: 6 capsule, Rfl: 0    glipiZIDE (GLUCOTROL XL) 10 mg 24 hr tablet, TAKE 2 TABS DAILY  , Disp: 60 tablet, Rfl: 5    Hypromellose (ARTIFICIAL TEARS OP), Apply to eye, Disp: , Rfl:     LUMIGAN 0 01 % ophthalmic drops, , Disp: , Rfl: 0    metFORMIN (GLUCOPHAGE) 500 mg tablet, take 1 tablet by mouth twice a day with food, Disp: 180 tablet, Rfl: 1    neomycin-polymyxin-dexamethasone (MAXITROL) 0 35%-10,000 units/g-0 1%, APPLY TO SKIN IN BOTH EYES TWICE DAILY  , Disp: , Rfl: 0    ONE TOUCH ULTRA TEST test strip, TEST twice a day, Disp: 200 each, Rfl: 3    ONETOUCH DELICA LANCETS 37B MISC, CHECK BLOOD SUGAR TWICE DAILY, Disp: 200 each, Rfl: 3    RESTASIS 0 05 % ophthalmic emulsion, , Disp: , Rfl: 0    rivaroxaban (XARELTO) 20 mg tablet, Take 1 tablet (20 mg total) by mouth daily for 30 days, Disp: 30 tablet, Rfl: 5    simvastatin (ZOCOR) 80 mg tablet, Take 1 tablet (80 mg total) by mouth daily, Disp: 90 tablet, Rfl: 0    DORZOLAMIDE HCL OP, Apply to eye 3 (three) times a day Left eye only , Disp: , Rfl:     potassium chloride (K-DUR,KLOR-CON) 10 mEq tablet, Take 1 tablet (10 mEq total) by mouth daily for 30 days, Disp: 30 tablet, Rfl: 5     Physical Exam:    Vitals:    12/06/18 1508   BP: 130/70   BP Location: Right arm   Patient Position: Sitting   Cuff Size: Adult   Pulse: 68   Weight: 77 4 kg (170 lb 9 6 oz)   Height: 4' 9" (1 448 m)       Physical Exam   Constitutional: She is oriented to person, place, and time  She appears well-developed and well-nourished  HENT:   Head: Normocephalic  Right Ear: External ear normal    Left Ear: External ear normal    Mouth/Throat: Oropharynx is clear and moist    Eyes: Pupils are equal, round, and reactive to light  Neck: No JVD present  Carotid bruit is not present  Cardiovascular: Normal rate, regular rhythm and intact distal pulses  Exam reveals no gallop and no friction rub  No murmur heard  Pulmonary/Chest: Effort normal and breath sounds normal  No tachypnea  No respiratory distress  She has no wheezes  She has no rales  She exhibits no tenderness  Abdominal: Soft  She exhibits no distension  There is no tenderness  There is no rebound and no guarding  Musculoskeletal: She exhibits no edema     Neurological: She is alert and oriented to person, place, and time  Skin: Skin is warm and dry  Psychiatric: She has a normal mood and affect  Her behavior is normal  Judgment and thought content normal    Nursing note and vitals reviewed  Labs:not applicable    Assessment/Plan:    1  Atrial fibrillation that is paroxysmal-she is currently in atrial fibrillation - I think it is possible that for about the last month she has been in atrial fibrillation that could be contributing to her fatigue as well as shortness of breath during that time  I have asked her to consider transesophageal echocardiogram/cardioversion to restore sinus rhythm-we can also use amiodarone at that time  She would like to think about this  I would like to add Cardizem 180 mg daily to help control her rate while she is in atrial fibrillation  We will discontinue her amlodipine while she is on the Cardizem  2   Coronary disease-I would like to switch her simvastatin to Crestor as this will have less chance of interaction with the Cardizem  3   Prior CVA-this is noted  4   Hypertension-as above  I would like to see her back in about 2 months-she will call me after she thinks about the transesophageal echocardiogram and cardioversion  Thank you so much, please do not hesitate to contact me with any questions or concerns  Ruperto Quick MD  12/6/2018   3:35 PM

## 2018-12-06 NOTE — PROGRESS NOTES
[facility] [place]  [address]  [facility], [address] [facility]  [phone]    Cardiology Follow up    Patient:  Marci Crowe  :  1935  MRN:  [id number]    History of Present Illness:        25-year-old female with past medical history of aortic stenosis status post recent transcatheter aortic valve replacement, coronary disease with a 60% 1st obtuse marginal stenosis as well as the mid RCA 50% stenosis, paroxysmal atrial fibrillation on Xarelto, previous CVA/TIA, diastolic heart failure, diabetes, hypertension, dyslipidemia, obstructive sleep apnea not on CPAP, anxiety/depression presents for cardiology follow-up      She notes she has been feeling tired for about the last month and also notes some wheezing with exertion over the last month as well  She does get some dizziness when she is walking at times  She has not had any chest pain and has not had any palpitations or syncope        Patient Active Problem List   Diagnosis    Diabetes mellitus type 2, uncontrolled ([facility] [facility] )    Glaucoma    Hyperlipidemia    Hypertension    Hyponatremia    Depression with anxiety    Paroxysmal atrial fibrillation (HCC)    Ataxia    Type 2 diabetes mellitus with hyperglycemia ([facility])    CVA (cerebral vascular accident) ([facility] )    Hearing loss    Insomnia    Left knee pain    PERI (obstructive sleep apnea)    Osteoarthritis    Osteoporosis    Periodic limb movement sleep disorder    Sleep talking    Tinnitus    Tricuspid valve disorders, non-rheumatic    Unsteady gait    Chronic diastolic heart failure (HCC)    S/P TAVR (transcatheter aortic valve replacement)    Dizziness and giddiness    Thickened endometrium       Past Surgical History  Past Surgical History:   Procedure Laterality Date    APPENDECTOMY      GLAUCOMA SURGERY  2016    NH REPLACE AORTIC VALVE OPENFEMORAL ARTERY APPROACH N/A 2018    Procedure: REPLACEMENT AORTIC VALVE TRANSCATHETER (TAVR) TRANSFEMORAL W/ 23 MM AGUILAR STEVE S3 VALVE;  Surgeon: Henry Wilson DO;  [facility];  Service: Cardiac Surgery    TUBAL LIGATION         Social History   Social History     Social History    Marital status:      Spouse name: N/A    Number of children: N/A    Years of education: N/A     Occupational History    Not on file  Social History Main Topics    Smoking status: Never Smoker    Smokeless tobacco: Never Used    Alcohol use No    Drug use: No    Sexual activity: Not Currently     Other Topics Concern    Not on file     Social History Narrative    No narrative on file        Allergies   Allergen Reactions    Lipitor [Atorvastatin] GI Intolerance     Nausea stomach ache    Hydrochlorothiazide GI Intolerance    Lisinopril Cough    Metoprolol Headache    Aspirin GI Intolerance       Family History   Family History   Problem Relation Age of Onset    No Known Problems Mother     Diabetes Father     Stroke Father     Coronary artery disease Sister     No Known Problems Brother     No Known Problems Son     Breast cancer Daughter     No Known Problems Maternal Grandmother     No Known Problems Maternal Grandfather     No Known Problems Paternal Grandmother     No Known Problems Paternal Grandfather     No Known Problems Cousin     Rheum arthritis Neg Hx     Osteoarthritis Neg Hx     Asthma Neg Hx     Heart failure Neg Hx     Hyperlipidemia Neg Hx     Hypertension Neg Hx     Migraines Neg Hx     Rashes / Skin problems Neg Hx     Seizures Neg Hx     Thyroid disease Neg Hx        Review of Systems:  Review of Systems   Constitutional: Positive for fatigue  Negative for chills and fever  HENT: Negative for hearing loss, nosebleeds and tinnitus  Eyes: Negative for pain  Respiratory: Negative for cough, chest tightness and shortness of breath  Cardiovascular: Negative for chest pain, palpitations and leg swelling     Gastrointestinal: Negative for abdominal pain, nausea and vomiting  Endocrine: Negative for cold intolerance and heat intolerance  Genitourinary: Negative for difficulty urinating, hematuria and vaginal bleeding  Musculoskeletal: Negative for arthralgias  Skin: Negative for rash  Allergic/Immunologic: Negative for environmental allergies  Neurological: Positive for dizziness  Negative for syncope, weakness and light-headedness  Psychiatric/Behavioral: Negative for decreased concentration and sleep disturbance  The patient is nervous/anxious  Current Outpatient Prescriptions:     Acetaminophen (TYLENOL) 325 MG CAPS, Take by mouth as needed, Disp: , Rfl:     albuterol (PROAIR HFA) 90 mcg/act inhaler, Inhale 2 puffs every 6 (six) hours as needed for wheezing, Disp: 8 5 g, Rfl: 0    amLODIPine (NORVASC) 5 mg tablet, , Disp: , Rfl: 0    Bimatoprost (LUMIGAN OP), Apply to eye daily at bedtime Both eyes , Disp: , Rfl:     COMBIGAN 0 2-0 5 %, instill 1 drop into both eyes twice a day, Disp: , Rfl: 0    Cyanocobalamin (VITAMIN B 12 PO), Take by mouth, Disp: , Rfl:     ergocalciferol (VITAMIN D2) 50,000 units, take 1 capsule by mouth every week, Disp: 6 capsule, Rfl: 0    glipiZIDE (GLUCOTROL XL) 10 mg 24 hr tablet, TAKE 2 TABS DAILY  , Disp: 60 tablet, Rfl: 5    Hypromellose (ARTIFICIAL TEARS OP), Apply to eye, Disp: , Rfl:     LUMIGAN 0 01 % ophthalmic drops, , Disp: , Rfl: 0    metFORMIN (GLUCOPHAGE) 500 mg tablet, take 1 tablet by mouth twice a day with food, Disp: 180 tablet, Rfl: 1    neomycin-polymyxin-dexamethasone (MAXITROL) 0 35%-10,000 units/g-0 1%, APPLY TO SKIN IN BOTH EYES TWICE DAILY  , Disp: , Rfl: 0    ONE TOUCH ULTRA TEST test strip, TEST twice a day, Disp: 200 each, Rfl: 3    ONETOUCH DELICA LANCETS 63E MISC, CHECK BLOOD SUGAR TWICE DAILY, Disp: 200 each, Rfl: 3    RESTASIS 0 05 % ophthalmic emulsion, , Disp: , Rfl: 0    rivaroxaban (XARELTO) 20 mg tablet, Take 1 tablet (20 mg total) by mouth daily for 30 days, Disp: 30 tablet, Rfl: 5    simvastatin (ZOCOR) 80 mg tablet, Take 1 tablet (80 mg total) by mouth daily, Disp: 90 tablet, Rfl: 0    DORZOLAMIDE HCL OP, Apply to eye 3 (three) times a day Left eye only , Disp: , Rfl:     potassium chloride (K-DUR,KLOR-CON) 10 mEq tablet, Take 1 tablet (10 mEq total) by mouth daily for 30 days, Disp: 30 tablet, Rfl: 5     Physical Exam:    Vitals:    12/06/18 1508   BP: 130/70   BP Location: Right arm   Patient Position: Sitting   Cuff Size: Adult   Pulse: 68   Weight: 77 4 kg (170 lb 9 6 oz)   Height: 4' 9" (1 448 m)       Physical Exam   Constitutional: She is oriented to person, place, and time  She appears well-developed and well-nourished  HENT:   Head: Normocephalic  Right Ear: External ear normal    Left Ear: External ear normal    Mouth/Throat: Oropharynx is clear and moist    Eyes: Pupils are equal, round, and reactive to light  Neck: No JVD present  Carotid bruit is not present  Cardiovascular: Normal rate, regular rhythm and intact distal pulses  Exam reveals no gallop and no friction rub  No murmur heard  Pulmonary/Chest: Effort normal and breath sounds normal  No tachypnea  No respiratory distress  She has no wheezes  She has no rales  She exhibits no tenderness  Abdominal: Soft  She exhibits no distension  There is no tenderness  There is no rebound and no guarding  Musculoskeletal: She exhibits no edema  Neurological: She is alert and oriented to person, place, and time  Skin: Skin is warm and dry  Psychiatric: She has a normal mood and affect  Her behavior is normal  Judgment and thought content normal    Nursing note and vitals reviewed  Labs:not applicable    Assessment/Plan:    1  Atrial fibrillation that is paroxysmal-she is currently in atrial fibrillation - I think it is possible that for about the last month she has been in atrial fibrillation that could be contributing to her fatigue as well as shortness of breath during that time      I have asked her to consider transesophageal echocardiogram/cardioversion to restore sinus rhythm-we can also use amiodarone at that time  She would like to think about this  I would like to add Cardizem 180 mg daily to help control her rate while she is in atrial fibrillation  We will discontinue her amlodipine while she is on the Cardizem  2   Coronary disease-I would like to switch her simvastatin to Crestor as this will have less chance of interaction with the Cardizem  3   Prior CVA-this is noted  4   Hypertension-as above  I would like to see her back in about 2 months-she will call me after she thinks about the transesophageal echocardiogram and cardioversion  Thank you so much, please do not hesitate to contact me with any questions or concerns  Sola Christensen MD  12/6/2018   3:35 PM

## 2018-12-07 ENCOUNTER — OFFICE VISIT (OUTPATIENT)
Dept: FAMILY MEDICINE CLINIC | Facility: CLINIC | Age: 83
End: 2018-12-07
Payer: COMMERCIAL

## 2018-12-07 VITALS
BODY MASS INDEX: 36.63 KG/M2 | SYSTOLIC BLOOD PRESSURE: 130 MMHG | RESPIRATION RATE: 20 BRPM | HEIGHT: 57 IN | DIASTOLIC BLOOD PRESSURE: 72 MMHG | TEMPERATURE: 98 F | HEART RATE: 64 BPM | WEIGHT: 169.8 LBS

## 2018-12-07 DIAGNOSIS — I10 ESSENTIAL HYPERTENSION: Primary | Chronic | ICD-10-CM

## 2018-12-07 DIAGNOSIS — Z01.818 PREOP EXAMINATION: ICD-10-CM

## 2018-12-07 DIAGNOSIS — E78.5 HYPERLIPIDEMIA, UNSPECIFIED HYPERLIPIDEMIA TYPE: ICD-10-CM

## 2018-12-07 DIAGNOSIS — IMO0002: ICD-10-CM

## 2018-12-07 DIAGNOSIS — I48.0 PAROXYSMAL ATRIAL FIBRILLATION (HCC): ICD-10-CM

## 2018-12-07 DIAGNOSIS — E66.01 MORBID OBESITY (HCC): ICD-10-CM

## 2018-12-07 PROCEDURE — 3078F DIAST BP <80 MM HG: CPT | Performed by: FAMILY MEDICINE

## 2018-12-07 PROCEDURE — 3075F SYST BP GE 130 - 139MM HG: CPT | Performed by: FAMILY MEDICINE

## 2018-12-07 PROCEDURE — 3008F BODY MASS INDEX DOCD: CPT | Performed by: FAMILY MEDICINE

## 2018-12-07 PROCEDURE — 99214 OFFICE O/P EST MOD 30 MIN: CPT | Performed by: FAMILY MEDICINE

## 2018-12-07 PROCEDURE — 1160F RVW MEDS BY RX/DR IN RCRD: CPT | Performed by: FAMILY MEDICINE

## 2018-12-07 NOTE — PROGRESS NOTES
Chief Complaint   Patient presents with    Pre-op Exam     Pre op clearance for left eye surgery scheduled on 12/18 with Dr Sharath Chirinos   Follow-up     3 month follow up  Health Maintenance   Topic Date Due    DTaP,Tdap,and Td Vaccines (1 - Tdap) 03/23/1956    Pneumococcal PPSV23/PCV13 65+ Years / Low and Medium Risk (1 of 2 - PCV13) 03/23/2000    INFLUENZA VACCINE  07/01/2018    URINE MICROALBUMIN  08/18/2018    Medicare Annual Wellness Visit (AWV)  08/25/2018    Diabetic Foot Exam  12/12/2018    PT PLAN OF CARE  12/13/2018    HEMOGLOBIN A1C  01/20/2019    Urinary Incontinence Screening  07/23/2019    DM Eye Exam  08/07/2019    Fall Risk  11/13/2019    DXA SCAN  05/02/2022     Assessment/Plan:  Reviewed lab in 10/2018  hgA1C 8 5 elevated  Lipid 131/164/43/64 slightly elevated  CMP ok  TSH normal    DM---uncontrolled  Low carb diet  Increase metformin to 1000mg bid  SE educated pt  Continue glipizide  HTN---controlled  Continue diltiazem per cardiology  Hyperlipidemia---ok  Low fat diet  Continue crestor per cardiology  Afib---continue xarelto  FU cardiology  May hold xarelto 3 days prior to eye surgery  Educated pt about increased risk of stroke  Pt understood  Will fax clearance paper  Obesity---lose weight  Advised pt to eat smaller amount of food  Advised pt to exercise regularly 150min/week  Refused flu shot  Refused PCV13 and pneumovax  RTO in 3 months  Diagnoses and all orders for this visit:    Essential hypertension  -     Comprehensive metabolic panel; Future  -     CBC and differential; Future    Preop examination    Uncontrolled type 2 diabetes mellitus with stable proliferative retinopathy, without long-term current use of insulin (HCC)  -     Discontinue: metFORMIN (GLUCOPHAGE) 500 mg tablet; Take 2 tablets (1,000 mg total) by mouth 2 (two) times a day with meals  -     Comprehensive metabolic panel;  Future  -     Hemoglobin A1C; Future  -     CBC and differential; Future    Hyperlipidemia, unspecified hyperlipidemia type  -     Lipid Panel with Direct LDL reflex; Future    Paroxysmal atrial fibrillation (HCC)    Morbid obesity (HCC)    BMI 36 0-36 9,adult          Subjective:      Patient ID: Zheng Padgett is a 80 y o  female  HPI    Pt is here with daughter  Plan to do eye surgery on 12/18/2018  Surgical Risk Assessment:   Prior Anesthesia: Patient had prior anesthesia, no prior adverse reaction to edidural anesthesia, no prior adverse reaction to spinal anesthesia and no prior adverse reaction to general anesthesia  Pertinent Past Medical History: diabetes, no CAD, no chronic liver disease, no coagulation delay, no pulmonary embolism, no DVT, does not use insulin, no thyroid disease, no seizure disorder, had CVA in 2016, no asthma and no COPD  Exercise Capacity: Not able to walk four blocks without symptoms and Not able to walk two flights of stairs without symptoms  Lifestyle Factors: denies alcohol use, denies tobacco use and denies illegal drug use  Symptoms: no frequent nosebleeds, no chest pain, no cough, no dyspnea, no edema, no palpitations and no wheezing  Living Situation: home is secure and supportive  DM---10/2018 HgA1C 8 5 a little worse  She is on metformin 500mg bid and glipizide 10mg bid  Blood sugar at home 150-200  Denies hypoglycemia  Occasionally hands numbness or tingling  FU opthalmology Dr Vik Kincaid and Dr Mary Perez regularly Q 3 months  Next appt will be next months  Fu podiatry Dr Janell Small? Q 6 month       HTN---BP at home 110-120/60-70 per pt  Stopped lisinopril because it caused her coughing  Stopped metoprolol because it gave her severe headache  Still has headache and lightheaded sometimes, saw neurology  Suggest PT which helped some per pt        Afib---she is on xarelto 20mg daily  Sometimes feels tired  Saw cardiology yesterday  CHF---She is off lasix 20mg and potassium daily  Wt is stable per pt   Denies SOB, chest pain  FU cardiology       Hyperlipidemia---She is on crestor per cardiology       Depression---Mood is stable per pt       Lives with family  Denies recent falls  The following portions of the patient's history were reviewed and updated as appropriate: allergies, current medications, past family history, past medical history, past social history, past surgical history and problem list     Review of Systems   Constitutional: Negative for appetite change, chills and fever  HENT: Negative for congestion, ear pain, sinus pain and sore throat  Eyes: Negative for discharge and itching  Respiratory: Negative for apnea, cough, chest tightness, shortness of breath and wheezing  Cardiovascular: Negative for chest pain, palpitations and leg swelling  Gastrointestinal: Negative for abdominal pain, anal bleeding, constipation, diarrhea, nausea and vomiting  Endocrine: Negative for cold intolerance, heat intolerance and polyuria  Genitourinary: Negative for difficulty urinating and dysuria  Musculoskeletal: Negative for arthralgias, back pain and myalgias  Skin: Negative for rash  Neurological: Negative for dizziness and headaches  Psychiatric/Behavioral: Negative for agitation  Objective:      /72 (BP Location: Left arm, Patient Position: Sitting, Cuff Size: Standard)   Pulse 64   Temp 98 °F (36 7 °C) (Tympanic)   Resp 20   Ht 4' 9 25" (1 454 m)   Wt 77 kg (169 lb 12 8 oz)   BMI 36 42 kg/m²          Physical Exam   Constitutional: She appears well-developed  No distress  HENT:   Head: Normocephalic  Right Ear: External ear normal    Left Ear: External ear normal    Nose: Nose normal    Mouth/Throat: Oropharynx is clear and moist    Eyes: Pupils are equal, round, and reactive to light  Conjunctivae are normal  Right eye exhibits no discharge  Left eye exhibits no discharge  Neck: Normal range of motion  No thyromegaly present     Cardiovascular: Normal rate, regular rhythm and normal heart sounds  Exam reveals no gallop and no friction rub  No murmur heard  Pulmonary/Chest: Effort normal and breath sounds normal  No respiratory distress  She has no wheezes  She has no rales  She exhibits no tenderness  Abdominal: Soft  Bowel sounds are normal    Musculoskeletal: Normal range of motion  Lymphadenopathy:     She has no cervical adenopathy  Neurological: She is alert  Psychiatric: She has a normal mood and affect

## 2018-12-10 ENCOUNTER — APPOINTMENT (OUTPATIENT)
Dept: PHYSICAL THERAPY | Facility: REHABILITATION | Age: 83
End: 2018-12-10
Payer: COMMERCIAL

## 2018-12-11 ENCOUNTER — TELEPHONE (OUTPATIENT)
Dept: FAMILY MEDICINE CLINIC | Facility: CLINIC | Age: 83
End: 2018-12-11

## 2018-12-11 ENCOUNTER — APPOINTMENT (OUTPATIENT)
Dept: PHYSICAL THERAPY | Facility: REHABILITATION | Age: 83
End: 2018-12-11
Payer: COMMERCIAL

## 2018-12-11 DIAGNOSIS — E11.65 TYPE 2 DIABETES MELLITUS WITH HYPERGLYCEMIA, WITHOUT LONG-TERM CURRENT USE OF INSULIN (HCC): Primary | ICD-10-CM

## 2018-12-11 DIAGNOSIS — IMO0002: ICD-10-CM

## 2018-12-11 NOTE — TELEPHONE ENCOUNTER
Pt cannot tolerate metformin 1000mg, please ask pt to go back to metformin 500mg bid  I will add tradjenta 5mg QD also

## 2018-12-11 NOTE — TELEPHONE ENCOUNTER
Patient seen last Friday, Metformin was increased to     1000 mg in am and 1000 mg in pm   Ernie Carey states she only gave her the pm dose and patient is complaining of feeling nausea   Please advise

## 2018-12-13 ENCOUNTER — APPOINTMENT (OUTPATIENT)
Dept: PHYSICAL THERAPY | Facility: REHABILITATION | Age: 83
End: 2018-12-13
Payer: COMMERCIAL

## 2018-12-13 DIAGNOSIS — I48.19 PERSISTENT ATRIAL FIBRILLATION (HCC): Primary | ICD-10-CM

## 2018-12-15 DIAGNOSIS — I10 ESSENTIAL HYPERTENSION: Chronic | ICD-10-CM

## 2018-12-17 ENCOUNTER — APPOINTMENT (OUTPATIENT)
Dept: PHYSICAL THERAPY | Facility: REHABILITATION | Age: 83
End: 2018-12-17
Payer: COMMERCIAL

## 2018-12-17 RX ORDER — AMLODIPINE BESYLATE 5 MG/1
TABLET ORAL
Qty: 30 TABLET | Refills: 5 | OUTPATIENT
Start: 2018-12-17

## 2018-12-18 ENCOUNTER — APPOINTMENT (OUTPATIENT)
Dept: PHYSICAL THERAPY | Facility: REHABILITATION | Age: 83
End: 2018-12-18
Payer: COMMERCIAL

## 2018-12-20 ENCOUNTER — APPOINTMENT (OUTPATIENT)
Dept: PHYSICAL THERAPY | Facility: REHABILITATION | Age: 83
End: 2018-12-20
Payer: COMMERCIAL

## 2019-01-10 PROBLEM — E66.01 MORBID OBESITY (HCC): Status: ACTIVE | Noted: 2019-01-10

## 2019-01-10 NOTE — PROGRESS NOTES
BMI Counseling: Body mass index is 36 42 kg/m²  Discussed the patient's BMI with her  The BMI is above average  BMI counseling and education was provided to the patient  Nutrition recommendations include reducing portion sizes, decreasing overall calorie intake and 3-5 servings of fruits/vegetables daily  Exercise recommendations include moderate aerobic physical activity for 150 minutes/week

## 2019-01-11 ENCOUNTER — HOSPITAL ENCOUNTER (OUTPATIENT)
Dept: NON INVASIVE DIAGNOSTICS | Facility: HOSPITAL | Age: 84
Discharge: HOME | End: 2019-01-11
Attending: INTERNAL MEDICINE
Payer: COMMERCIAL

## 2019-01-11 ENCOUNTER — DOCUMENTATION (OUTPATIENT)
Dept: CARDIOLOGY CLINIC | Facility: CLINIC | Age: 84
End: 2019-01-11

## 2019-01-11 ENCOUNTER — ANESTHESIA (OUTPATIENT)
Dept: NON INVASIVE DIAGNOSTICS | Facility: HOSPITAL | Age: 84
End: 2019-01-11

## 2019-01-11 ENCOUNTER — ANESTHESIA EVENT (OUTPATIENT)
Dept: NON INVASIVE DIAGNOSTICS | Facility: HOSPITAL | Age: 84
End: 2019-01-11

## 2019-01-11 ENCOUNTER — TELEPHONE (OUTPATIENT)
Dept: CARDIOLOGY CLINIC | Facility: CLINIC | Age: 84
End: 2019-01-11

## 2019-01-11 VITALS
BODY MASS INDEX: 34.22 KG/M2 | TEMPERATURE: 97.5 F | HEART RATE: 77 BPM | SYSTOLIC BLOOD PRESSURE: 166 MMHG | OXYGEN SATURATION: 94 % | HEIGHT: 58 IN | DIASTOLIC BLOOD PRESSURE: 74 MMHG | WEIGHT: 163 LBS | RESPIRATION RATE: 20 BRPM

## 2019-01-11 DIAGNOSIS — I48.91 A-FIB (HCC): ICD-10-CM

## 2019-01-11 DIAGNOSIS — I48.0 PAROXYSMAL ATRIAL FIBRILLATION (HCC): Primary | ICD-10-CM

## 2019-01-11 LAB
ANION GAP SERPL CALCULATED.3IONS-SCNC: 11 MMOL/L (ref 4–13)
BASOPHILS # BLD AUTO: 0.05 THOUSANDS/ΜL (ref 0–0.1)
BASOPHILS NFR BLD AUTO: 1 % (ref 0–1)
BUN SERPL-MCNC: 23 MG/DL (ref 5–25)
CALCIUM SERPL-MCNC: 9.4 MG/DL (ref 8.3–10.1)
CHLORIDE SERPL-SCNC: 102 MMOL/L (ref 100–108)
CO2 SERPL-SCNC: 24 MMOL/L (ref 21–32)
CREAT SERPL-MCNC: 0.77 MG/DL (ref 0.6–1.3)
EOSINOPHIL # BLD AUTO: 0.16 THOUSAND/ΜL (ref 0–0.61)
EOSINOPHIL NFR BLD AUTO: 2 % (ref 0–6)
ERYTHROCYTE [DISTWIDTH] IN BLOOD BY AUTOMATED COUNT: 12.8 % (ref 11.6–15.1)
GFR SERPL CREATININE-BSD FRML MDRD: 72 ML/MIN/1.73SQ M
GLUCOSE P FAST SERPL-MCNC: 224 MG/DL (ref 65–99)
GLUCOSE SERPL-MCNC: 224 MG/DL (ref 65–140)
HCT VFR BLD AUTO: 47.2 % (ref 34.8–46.1)
HGB BLD-MCNC: 15.3 G/DL (ref 11.5–15.4)
IMM GRANULOCYTES # BLD AUTO: 0.02 THOUSAND/UL (ref 0–0.2)
IMM GRANULOCYTES NFR BLD AUTO: 0 % (ref 0–2)
LYMPHOCYTES # BLD AUTO: 2.02 THOUSANDS/ΜL (ref 0.6–4.47)
LYMPHOCYTES NFR BLD AUTO: 30 % (ref 14–44)
MAGNESIUM SERPL-MCNC: 3.2 MG/DL (ref 1.6–2.6)
MCH RBC QN AUTO: 29.9 PG (ref 26.8–34.3)
MCHC RBC AUTO-ENTMCNC: 32.4 G/DL (ref 31.4–37.4)
MCV RBC AUTO: 92 FL (ref 82–98)
MONOCYTES # BLD AUTO: 0.61 THOUSAND/ΜL (ref 0.17–1.22)
MONOCYTES NFR BLD AUTO: 9 % (ref 4–12)
NEUTROPHILS # BLD AUTO: 3.87 THOUSANDS/ΜL (ref 1.85–7.62)
NEUTS SEG NFR BLD AUTO: 58 % (ref 43–75)
NRBC BLD AUTO-RTO: 0 /100 WBCS
PLATELET # BLD AUTO: 222 THOUSANDS/UL (ref 149–390)
PMV BLD AUTO: 10.6 FL (ref 8.9–12.7)
POTASSIUM SERPL-SCNC: 4.5 MMOL/L (ref 3.5–5.3)
RBC # BLD AUTO: 5.12 MILLION/UL (ref 3.81–5.12)
SODIUM SERPL-SCNC: 137 MMOL/L (ref 136–145)
WBC # BLD AUTO: 6.73 THOUSAND/UL (ref 4.31–10.16)

## 2019-01-11 PROCEDURE — 83735 ASSAY OF MAGNESIUM: CPT | Performed by: PHYSICIAN ASSISTANT

## 2019-01-11 PROCEDURE — 93005 ELECTROCARDIOGRAM TRACING: CPT

## 2019-01-11 PROCEDURE — 85025 COMPLETE CBC W/AUTO DIFF WBC: CPT | Performed by: PHYSICIAN ASSISTANT

## 2019-01-11 PROCEDURE — 93321 DOPPLER ECHO F-UP/LMTD STD: CPT | Performed by: INTERNAL MEDICINE

## 2019-01-11 PROCEDURE — 92960 CARDIOVERSION ELECTRIC EXT: CPT

## 2019-01-11 PROCEDURE — 80048 BASIC METABOLIC PNL TOTAL CA: CPT | Performed by: PHYSICIAN ASSISTANT

## 2019-01-11 PROCEDURE — 93312 ECHO TRANSESOPHAGEAL: CPT | Performed by: INTERNAL MEDICINE

## 2019-01-11 PROCEDURE — 93312 ECHO TRANSESOPHAGEAL: CPT

## 2019-01-11 PROCEDURE — 93325 DOPPLER ECHO COLOR FLOW MAPG: CPT | Performed by: INTERNAL MEDICINE

## 2019-01-11 RX ORDER — PROPOFOL 10 MG/ML
INJECTION, EMULSION INTRAVENOUS AS NEEDED
Status: DISCONTINUED | OUTPATIENT
Start: 2019-01-11 | End: 2019-01-11 | Stop reason: SURG

## 2019-01-11 RX ORDER — AMIODARONE HYDROCHLORIDE 200 MG/1
TABLET ORAL
Start: 2019-01-11 | End: 2019-01-21 | Stop reason: SDUPTHER

## 2019-01-11 RX ORDER — ONDANSETRON 2 MG/ML
4 INJECTION INTRAMUSCULAR; INTRAVENOUS EVERY 6 HOURS PRN
Status: DISCONTINUED | OUTPATIENT
Start: 2019-01-11 | End: 2019-01-11 | Stop reason: HOSPADM

## 2019-01-11 RX ORDER — SODIUM CHLORIDE 9 MG/ML
125 INJECTION, SOLUTION INTRAVENOUS CONTINUOUS
Status: DISCONTINUED | OUTPATIENT
Start: 2019-01-11 | End: 2019-01-12 | Stop reason: HOSPADM

## 2019-01-11 RX ADMIN — PROPOFOL 100 MG: 10 INJECTION, EMULSION INTRAVENOUS at 10:16

## 2019-01-11 RX ADMIN — SODIUM CHLORIDE: 0.9 INJECTION, SOLUTION INTRAVENOUS at 10:10

## 2019-01-11 RX ADMIN — PROPOFOL 20 MG: 10 INJECTION, EMULSION INTRAVENOUS at 10:32

## 2019-01-11 RX ADMIN — LIDOCAINE HYDROCHLORIDE 100 MG: 20 INJECTION, SOLUTION INTRAVENOUS at 10:16

## 2019-01-11 RX ADMIN — PROPOFOL 50 MG: 10 INJECTION, EMULSION INTRAVENOUS at 10:31

## 2019-01-11 RX ADMIN — PROPOFOL 50 MG: 10 INJECTION, EMULSION INTRAVENOUS at 10:21

## 2019-01-11 RX ADMIN — PROPOFOL 50 MG: 10 INJECTION, EMULSION INTRAVENOUS at 10:25

## 2019-01-11 NOTE — TELEPHONE ENCOUNTER
Pt's daughter called, pt has procedure done today, awaiting for Rx per Dr Richy Umaña but nothing was sent to pharmacy, Please call daughter when this is sent in  Thanks

## 2019-01-11 NOTE — ANESTHESIA POSTPROCEDURE EVALUATION
Post-Op Assessment Note      CV Status:  Stable    Mental Status:  Alert and awake    Hydration Status:  Euvolemic    PONV Controlled:  Controlled    Airway Patency:  Patent    Post Op Vitals Reviewed: Yes          Staff: Anesthesiologist           BP      Temp 97 5 °F (36 4 °C) (01/11/19 1116)    Pulse 74 (01/11/19 1116)   Resp 21 (01/11/19 1116)    SpO2 93 % (01/11/19 1116)

## 2019-01-11 NOTE — ANESTHESIA PREPROCEDURE EVALUATION
Review of Systems/Medical History  Patient summary reviewed  Chart reviewed      Cardiovascular  EKG reviewed, Hyperlipidemia, CAD , CHF ,    Pulmonary       GI/Hepatic       Negative  ROS        Endo/Other  No diabetes ,      GYN       Hematology   Musculoskeletal    Arthritis     Neurology    CVA ,    Psychology   Negative psychology ROS Anxiety,              Physical Exam    Airway    Mallampati score: II  TM Distance: >3 FB  Neck ROM: limited     Dental       Cardiovascular  Rhythm: irregular,     Pulmonary  Breath sounds clear to auscultation,     Other Findings        Anesthesia Plan  ASA Score- 3     Anesthesia Type- general with ASA Monitors  Additional Monitors:   Airway Plan:         Plan Factors-    Induction- intravenous  Postoperative Plan-     Informed Consent- Anesthetic plan and risks discussed with patient

## 2019-01-11 NOTE — ANESTHESIA PREPROCEDURE EVALUATION
Review of Systems/Medical History          Cardiovascular  Exercise tolerance (METS): poor,  Hyperlipidemia, Hypertension , Valvular heart disease , aortic valve stenosis, Valve replacement aortic valve  replacement, CAD , CHF ,   Comment: S/p    TAVR  5/18    LEFT VENTRICLE:  Systolic function was normal  Ejection fraction was estimated to be 65 %  There were no regional wall motion abnormalities      LEFT ATRIUM:  The atrium was mildly dilated      MITRAL VALVE:  There was mild regurgitation      AORTIC VALVE:  A bioprosthesis (#23 Vera Lizzie 3 TAVR) was present  The prosthesis was well-seated without stenosis or regurgitation  Peak and mean velocities (gradients) were 1 8 (13) and 1 2 (7) m/sec (mmHg), respectively  The calculated aortic  valve area was 1 5 cm2 using continuity equation      HISTORY: PRIOR HISTORY: #23 Vera Lizzie TAVR, aortic stenosis, diabetes mellitus type 2, hypertension, hyperlipidemia, paroxysmal A-fib, cerebral vascular accident, tricuspid valve disorder, chronic diastolic heart failure    occ dizziness- no LOC, no falling or syncope   Denies CP or SOB at rest ,  Pulmonary  Sleep apnea ,        GI/Hepatic  Negative GI/hepatic ROS          Negative  ROS        Endo/Other  Diabetes ,   Obesity    GYN       Hematology    Coagulation disorder currently taking oral anticoagulants,    Musculoskeletal    Arthritis     Neurology    CVA ,    Psychology   Anxiety,              Physical Exam    Airway    Mallampati score: II  TM Distance: <3 FB  Neck ROM: full     Dental   No notable dental hx upper dentures,     Cardiovascular  Rhythm: irregular, Rate: normal,     Pulmonary  Pulmonary exam normal     Other Findings        Anesthesia Plan  ASA Score- 4     Anesthesia Type- general and IV sedation with anesthesia with ASA Monitors  Additional Monitors:   Airway Plan:         Plan Factors-    Induction- intravenous      Postoperative Plan-     Informed Consent- Anesthetic plan and risks discussed with patient  I personally reviewed this patient with the CRNA  Discussed and agreed on the Anesthesia Plan with the CRNA  Akosua Jackson

## 2019-01-11 NOTE — PROCEDURES
Procedure note:  KATHLEEN Preliminary Report    Impression:      LV:  Normal size and systolic function  Left ventricular ejection fraction ~ 60%  LA:  Dilated  SHINE:  Normal size and function  No spontaneous echocontrast ("smoke"), or thrombus  RA:  Normal size  Interatrial septum:  Appears normal with no PFO or ASD   RV:  Normal size and systolic function  MV:  Normal structure  No mitral stenosis and mild regurgitation  AV:  Bioprosthetic valve noted  No aortic stenosis and mild regurgitation  TV:  Normal structure  No tricuspid stenosis or significant regurgitation  PV:  Grossly normal but poorly visualized  No gross pulmonic stenosis or regurgitation  AO:  Normal appearing root, descending thoracic aorta, and aortic arch  Atherosclerosis noted    Informed consent obtained from patient prior to procedure after all indications, risks, and benefits of KATHLEEN thoroughly discussed  Propofol was utilized for sedation and administered intravenously by Anesthesia  Blood pressure, EKG, pulse oximetry, respirations, and level of consciousness were monitored throughout the procedure  Pt tolerated procedure well with nurse anesthetist performing sedation and monitoring  After adequate sedation ensured and left atrial appendage noted to be negative for thrombus, patient was successfully cardioverted with 100 joule synchronized shock with restoration of sinus rhythm  No complications noted

## 2019-01-13 LAB
ATRIAL RATE: 73 BPM
P AXIS: 31 DEGREES
PR INTERVAL: 200 MS
QRS AXIS: -13 DEGREES
QRS AXIS: -7 DEGREES
QRSD INTERVAL: 84 MS
QRSD INTERVAL: 84 MS
QT INTERVAL: 336 MS
QT INTERVAL: 394 MS
QTC INTERVAL: 434 MS
QTC INTERVAL: 435 MS
T WAVE AXIS: 159 DEGREES
T WAVE AXIS: 40 DEGREES
VENTRICULAR RATE: 101 BPM
VENTRICULAR RATE: 73 BPM

## 2019-01-13 PROCEDURE — 93010 ELECTROCARDIOGRAM REPORT: CPT | Performed by: INTERNAL MEDICINE

## 2019-01-14 DIAGNOSIS — I48.19 PERSISTENT ATRIAL FIBRILLATION (HCC): ICD-10-CM

## 2019-01-15 RX ORDER — AMIODARONE HYDROCHLORIDE 200 MG/1
200 TABLET ORAL DAILY
Qty: 90 TABLET | Refills: 2 | Status: SHIPPED | OUTPATIENT
Start: 2019-01-15 | End: 2020-08-08 | Stop reason: SDUPTHER

## 2019-01-16 ENCOUNTER — CLINICAL SUPPORT (OUTPATIENT)
Dept: CARDIOLOGY CLINIC | Facility: CLINIC | Age: 84
End: 2019-01-16
Payer: COMMERCIAL

## 2019-01-16 DIAGNOSIS — I48.91 ATRIAL FIBRILLATION, UNSPECIFIED TYPE (HCC): Primary | ICD-10-CM

## 2019-01-16 PROCEDURE — 93000 ELECTROCARDIOGRAM COMPLETE: CPT

## 2019-01-21 ENCOUNTER — OFFICE VISIT (OUTPATIENT)
Dept: FAMILY MEDICINE CLINIC | Facility: CLINIC | Age: 84
End: 2019-01-21
Payer: COMMERCIAL

## 2019-01-21 VITALS
WEIGHT: 163.6 LBS | SYSTOLIC BLOOD PRESSURE: 144 MMHG | TEMPERATURE: 97.3 F | HEART RATE: 68 BPM | DIASTOLIC BLOOD PRESSURE: 90 MMHG | BODY MASS INDEX: 34.34 KG/M2 | OXYGEN SATURATION: 97 % | RESPIRATION RATE: 16 BRPM | HEIGHT: 58 IN

## 2019-01-21 DIAGNOSIS — I48.0 PAROXYSMAL ATRIAL FIBRILLATION (HCC): ICD-10-CM

## 2019-01-21 DIAGNOSIS — I10 HYPERTENSION, UNCONTROLLED: Primary | ICD-10-CM

## 2019-01-21 DIAGNOSIS — Z95.2 S/P TAVR (TRANSCATHETER AORTIC VALVE REPLACEMENT): ICD-10-CM

## 2019-01-21 PROCEDURE — 1160F RVW MEDS BY RX/DR IN RCRD: CPT | Performed by: FAMILY MEDICINE

## 2019-01-21 PROCEDURE — 99214 OFFICE O/P EST MOD 30 MIN: CPT | Performed by: FAMILY MEDICINE

## 2019-01-21 RX ORDER — LOSARTAN POTASSIUM 25 MG/1
25 TABLET ORAL DAILY
Qty: 30 TABLET | Refills: 5 | Status: SHIPPED | OUTPATIENT
Start: 2019-01-21 | End: 2019-01-25 | Stop reason: DRUGHIGH

## 2019-01-21 RX ORDER — PREDNISOLONE ACETATE 10 MG/ML
SUSPENSION/ DROPS OPHTHALMIC
Refills: 0 | COMMUNITY
Start: 2019-01-09 | End: 2019-04-22

## 2019-01-21 RX ORDER — BIOTIN 1 MG
5000 TABLET ORAL DAILY
COMMUNITY

## 2019-01-21 RX ORDER — GATIFLOXACIN 5 MG/ML
SOLUTION/ DROPS OPHTHALMIC
COMMUNITY
Start: 2019-01-05 | End: 2019-04-22

## 2019-01-21 RX ORDER — TIMOLOL MALEATE 5 MG/ML
1 SOLUTION/ DROPS OPHTHALMIC DAILY
Refills: 0 | COMMUNITY
Start: 2018-12-10

## 2019-01-21 NOTE — PROGRESS NOTES
Chief Complaint   Patient presents with    Hypertension     Elevated /106 this morning, complained of headaches and dizziness off and on  Assessment/Plan:    BP elevated in office today  Pt denies symptoms like headache, vision change, SOB or chest pain  DASH diet  Check BP at home 2/day and call office if BP always >140/90  Add losartan 25mg QD  Continue diltiazem 180mg QD  Advised pt to call cardiology office also  Go to ER if severe headache, SOB or chest pain etc  Pt understood  Diagnoses and all orders for this visit:    Hypertension, uncontrolled  -     losartan (COZAAR) 25 mg tablet; Take 1 tablet (25 mg total) by mouth daily    Other orders  -     Cholecalciferol (VITAMIN D3) 1000 units CAPS; Take by mouth  -     gatifloxacin (ZYMAXID) 0 5 %;   -     prednisoLONE acetate (PRED FORTE) 1 % ophthalmic suspension;   -     timolol (TIMOPTIC) 0 5 % ophthalmic solution; PLACE 1 DROP IN THE RIGH EYE EVERY MORNING          Subjective:      Patient ID: Dangelo Corrales is a 80 y o  female  HPI  Pt is here with her daughter  Pt had Afib and s/p TAVR  Pt had cardioversion for Afib on 1/11/2019  She is on xarelto 20mg QD  Pt states since then her BP was 150-160/  BP was 160/106 this morning per pt  Occasionally feels headache  Denies vision change  Denies SOB or chest pain  Denies numbness/tingling or weakness  Feels dizzy all the time which is not new  She saw neurology/physical therapy for it  Pt saw cardiology on 1/16/2019 EKG showed normal sinus rhythm  Pt is on diltiazem 180mg daily for her HTN now  She cannot tolerate lisinopril because it caused her coughing  Stopped metoprolol because it gave her severe headache  She cannot take HCTZ because it made her nausea  Denies fever, Sob, chest pain, n/v/abd pain now           The following portions of the patient's history were reviewed and updated as appropriate: allergies, current medications, past family history, past medical history, past social history, past surgical history and problem list     Review of Systems   Constitutional: Negative for appetite change, chills and fever  HENT: Negative for congestion, ear pain, sinus pain and sore throat  Eyes: Negative for discharge and itching  Respiratory: Negative for apnea, cough, chest tightness, shortness of breath and wheezing  Cardiovascular: Negative for chest pain, palpitations and leg swelling  Gastrointestinal: Negative for abdominal pain, anal bleeding, constipation, diarrhea, nausea and vomiting  Endocrine: Negative for cold intolerance, heat intolerance and polyuria  Genitourinary: Negative for difficulty urinating and dysuria  Musculoskeletal: Negative for arthralgias, back pain and myalgias  Skin: Negative for rash  Neurological: Negative for dizziness and headaches  Psychiatric/Behavioral: Negative for agitation  Objective:      /90 (BP Location: Left arm, Patient Position: Sitting, Cuff Size: Standard)   Pulse 68   Temp (!) 97 3 °F (36 3 °C) (Oral)   Resp 16   Ht 4' 9 5" (1 461 m)   Wt 74 2 kg (163 lb 9 6 oz)   SpO2 97%   BMI 34 79 kg/m²          Physical Exam   Constitutional: She appears well-developed  No distress  HENT:   Head: Normocephalic  Right Ear: External ear normal    Left Ear: External ear normal    Nose: Nose normal    Mouth/Throat: Oropharynx is clear and moist    Eyes: Pupils are equal, round, and reactive to light  Conjunctivae are normal  Right eye exhibits no discharge  Left eye exhibits no discharge  Neck: Normal range of motion  No thyromegaly present  Cardiovascular: Normal rate, regular rhythm and normal heart sounds  Exam reveals no gallop and no friction rub  No murmur heard  Pulmonary/Chest: Effort normal and breath sounds normal  No respiratory distress  She has no wheezes  She has no rales  She exhibits no tenderness  Abdominal: Soft   Bowel sounds are normal  Musculoskeletal: Normal range of motion  Lymphadenopathy:     She has no cervical adenopathy  Neurological: She is alert  No cranial nerve deficit  Psychiatric: She has a normal mood and affect

## 2019-01-25 ENCOUNTER — HOSPITAL ENCOUNTER (EMERGENCY)
Facility: HOSPITAL | Age: 84
Discharge: HOME | End: 2019-01-25
Attending: EMERGENCY MEDICINE | Admitting: EMERGENCY MEDICINE
Payer: COMMERCIAL

## 2019-01-25 ENCOUNTER — APPOINTMENT (EMERGENCY)
Dept: CT IMAGING | Facility: HOSPITAL | Age: 84
End: 2019-01-25
Payer: COMMERCIAL

## 2019-01-25 ENCOUNTER — TELEPHONE (OUTPATIENT)
Dept: FAMILY MEDICINE CLINIC | Facility: CLINIC | Age: 84
End: 2019-01-25

## 2019-01-25 VITALS
TEMPERATURE: 98.2 F | DIASTOLIC BLOOD PRESSURE: 74 MMHG | SYSTOLIC BLOOD PRESSURE: 181 MMHG | BODY MASS INDEX: 34.45 KG/M2 | OXYGEN SATURATION: 96 % | WEIGHT: 162 LBS | RESPIRATION RATE: 16 BRPM | HEART RATE: 70 BPM

## 2019-01-25 DIAGNOSIS — I10 ESSENTIAL HYPERTENSION: Primary | ICD-10-CM

## 2019-01-25 DIAGNOSIS — I10 HYPERTENSION: Primary | ICD-10-CM

## 2019-01-25 LAB
ANION GAP SERPL CALCULATED.3IONS-SCNC: 9 MMOL/L (ref 4–13)
BASOPHILS # BLD AUTO: 0.06 THOUSANDS/ΜL (ref 0–0.1)
BASOPHILS NFR BLD AUTO: 1 % (ref 0–1)
BUN SERPL-MCNC: 24 MG/DL (ref 5–25)
CALCIUM SERPL-MCNC: 9.7 MG/DL (ref 8.3–10.1)
CHLORIDE SERPL-SCNC: 99 MMOL/L (ref 100–108)
CO2 SERPL-SCNC: 28 MMOL/L (ref 21–32)
CREAT SERPL-MCNC: 0.97 MG/DL (ref 0.6–1.3)
EOSINOPHIL # BLD AUTO: 0.08 THOUSAND/ΜL (ref 0–0.61)
EOSINOPHIL NFR BLD AUTO: 1 % (ref 0–6)
ERYTHROCYTE [DISTWIDTH] IN BLOOD BY AUTOMATED COUNT: 12.9 % (ref 11.6–15.1)
GFR SERPL CREATININE-BSD FRML MDRD: 54 ML/MIN/1.73SQ M
GLUCOSE SERPL-MCNC: 257 MG/DL (ref 65–140)
HCT VFR BLD AUTO: 47.9 % (ref 34.8–46.1)
HGB BLD-MCNC: 15.8 G/DL (ref 11.5–15.4)
IMM GRANULOCYTES # BLD AUTO: 0.02 THOUSAND/UL (ref 0–0.2)
IMM GRANULOCYTES NFR BLD AUTO: 0 % (ref 0–2)
LYMPHOCYTES # BLD AUTO: 1.73 THOUSANDS/ΜL (ref 0.6–4.47)
LYMPHOCYTES NFR BLD AUTO: 23 % (ref 14–44)
MCH RBC QN AUTO: 30.3 PG (ref 26.8–34.3)
MCHC RBC AUTO-ENTMCNC: 33 G/DL (ref 31.4–37.4)
MCV RBC AUTO: 92 FL (ref 82–98)
MONOCYTES # BLD AUTO: 0.4 THOUSAND/ΜL (ref 0.17–1.22)
MONOCYTES NFR BLD AUTO: 5 % (ref 4–12)
NEUTROPHILS # BLD AUTO: 5.29 THOUSANDS/ΜL (ref 1.85–7.62)
NEUTS SEG NFR BLD AUTO: 70 % (ref 43–75)
NRBC BLD AUTO-RTO: 0 /100 WBCS
PLATELET # BLD AUTO: 239 THOUSANDS/UL (ref 149–390)
PMV BLD AUTO: 10.7 FL (ref 8.9–12.7)
POTASSIUM SERPL-SCNC: 4.5 MMOL/L (ref 3.5–5.3)
RBC # BLD AUTO: 5.21 MILLION/UL (ref 3.81–5.12)
SODIUM SERPL-SCNC: 136 MMOL/L (ref 136–145)
WBC # BLD AUTO: 7.58 THOUSAND/UL (ref 4.31–10.16)

## 2019-01-25 PROCEDURE — 80048 BASIC METABOLIC PNL TOTAL CA: CPT | Performed by: EMERGENCY MEDICINE

## 2019-01-25 PROCEDURE — 36415 COLL VENOUS BLD VENIPUNCTURE: CPT | Performed by: EMERGENCY MEDICINE

## 2019-01-25 PROCEDURE — 70450 CT HEAD/BRAIN W/O DYE: CPT

## 2019-01-25 PROCEDURE — 96374 THER/PROPH/DIAG INJ IV PUSH: CPT

## 2019-01-25 PROCEDURE — 99284 EMERGENCY DEPT VISIT MOD MDM: CPT

## 2019-01-25 PROCEDURE — 85025 COMPLETE CBC W/AUTO DIFF WBC: CPT | Performed by: EMERGENCY MEDICINE

## 2019-01-25 RX ORDER — HYDRALAZINE HYDROCHLORIDE 20 MG/ML
10 INJECTION INTRAMUSCULAR; INTRAVENOUS ONCE
Status: COMPLETED | OUTPATIENT
Start: 2019-01-25 | End: 2019-01-25

## 2019-01-25 RX ORDER — HYDRALAZINE HYDROCHLORIDE 50 MG/1
50 TABLET, FILM COATED ORAL 3 TIMES DAILY
Qty: 90 TABLET | Refills: 0 | Status: SHIPPED | OUTPATIENT
Start: 2019-01-25 | End: 2019-02-14

## 2019-01-25 RX ADMIN — HYDRALAZINE HYDROCHLORIDE 10 MG: 20 INJECTION INTRAMUSCULAR; INTRAVENOUS at 10:16

## 2019-01-25 NOTE — DISCHARGE INSTRUCTIONS

## 2019-01-25 NOTE — ED PROVIDER NOTES
History  Chief Complaint   Patient presents with    High Blood Pressure     Pt c/o worsening HTN for the past 2 weeks since undergoing cardioversion  Pt states feeling dizzy, having HA and having blurry vision  Pt denies CP or SOB  Pt contacted PCP and reffered to ER  40-year-old female with a history of hypertension presents to the emergency department with hypertension and dizziness  The dizziness apparently is not new and has been ongoing for about a year  She has been going to physical therapy and has seen a specialist and no cause has been found  Today she checked her blood pressure and it was significantly elevated beyond her normal   She states she has been taking her medications  She states she has a mild frontal headache  No nausea or vomiting, chest pain or shortness of breath  No focal weakness  She saw her family doctor on January 21st and losartan 25 milligrams was added to her regimen  No other changes were made in her medications  History provided by:  Patient and relative   used: No    Hypertension   Severity:  Unable to specify  Onset quality: Elevated this morning    Timing:  Constant  Progression:  Unchanged  Chronicity:  Chronic  Context: medication change    Context: normal sodium, not caffeine, not drug abuse, not herbal remedies, not noncompliance, not OTC medications used and not stress    Relieved by:  None tried  Worsened by:  Nothing  Ineffective treatments:  Ca channel blockers  Associated symptoms: blurred vision, dizziness and headaches    Associated symptoms: no abdominal pain, no anxiety, no chest pain, no confusion, no ear pain, no epistaxis, no fatigue, no fever, no hematuria, no hypokalemia, no loss of consciousness, no nausea, no neck pain, no palpitations, no peripheral edema, no shortness of breath, no syncope, no tinnitus, not vomiting and no weakness    Dizziness:     Severity:  Unable to specify    Timing:  Constant    Progression: Waxing and waning  Headaches:     Severity:  Mild    Onset quality:  Gradual    Duration:  3 hours    Timing:  Constant    Progression:  Unchanged    Chronicity:  New  Risk factors: cardiac disease    Risk factors: no kidney disease, no prior aneurysm, no prior stroke and no tobacco use        Prior to Admission Medications   Prescriptions Last Dose Informant Patient Reported? Taking? Acetaminophen (TYLENOL) 325 MG CAPS  Child Yes Yes   Sig: Take by mouth as needed   Bimatoprost (LUMIGAN OP)  Child Yes Yes   Sig: Apply to eye daily at bedtime Both eyes    Cholecalciferol (VITAMIN D3) 1000 units CAPS  Child Yes Yes   Sig: Take by mouth   Cyanocobalamin (VITAMIN B 12 PO)  Child Yes Yes   Sig: Take by mouth   DORZOLAMIDE HCL OP  Child Yes No   Sig: Apply to eye 3 (three) times a day Left eye only    Hypromellose (ARTIFICIAL TEARS OP)  Child Yes Yes   Sig: Apply to eye   LOSARTAN POTASSIUM PO   Yes Yes   Sig: Take 25 mg by mouth   Linagliptin 5 MG TABS  Child No No   Sig: Take 5 mg by mouth daily   ONE TOUCH ULTRA TEST test strip  Child No No   Sig: TEST twice a day   ONETOUCH DELICA LANCETS 23M MISC  Child No No   Sig: CHECK BLOOD SUGAR TWICE DAILY   RESTASIS 0 05 % ophthalmic emulsion  Child Yes Yes   albuterol (PROAIR HFA) 90 mcg/act inhaler  Child No Yes   Sig: Inhale 2 puffs every 6 (six) hours as needed for wheezing   amiodarone 200 mg tablet  Child No Yes   Sig: Take 1 tablet (200 mg total) by mouth daily   diltiazem (CARDIZEM CD) 180 mg 24 hr capsule  Child No Yes   Sig: Take 1 capsule (180 mg total) by mouth daily   gatifloxacin (ZYMAXID) 0 5 %  Child Yes Yes   glipiZIDE (GLUCOTROL XL) 10 mg 24 hr tablet  Child No Yes   Sig: TAKE 2 TABS DAILY  metFORMIN (GLUCOPHAGE) 500 mg tablet  Child No Yes   Sig: Take 1 tablet (500 mg total) by mouth 2 (two) times a day with meals for 90 days Cannot tolerate 1000mg because of GI upset     prednisoLONE acetate (PRED FORTE) 1 % ophthalmic suspension  Child Yes Yes rivaroxaban (XARELTO) 20 mg tablet  Child No Yes   Sig: Take 1 tablet (20 mg total) by mouth daily for 30 days   rosuvastatin (CRESTOR) 10 MG tablet  Child No Yes   Sig: Take 1 tablet (10 mg total) by mouth daily at bedtime   timolol (TIMOPTIC) 0 5 % ophthalmic solution  Child Yes Yes   Sig: PLACE 1 DROP IN THE RIGH EYE EVERY MORNING      Facility-Administered Medications: None       Past Medical History:   Diagnosis Date    Ambulatory dysfunction     CHF [facility]congestive heart failure) ([facility])     pEFHF    Cholelithiasis     Coronary artery disease     Depression with anxiety     Diabetes mellitus ([facility] [address] )     niddm    Glaucoma     Hypertension     Hyponatremia     Ischemic stroke of frontal lobe ([facility])     Right     Obstructive sleep apnea     PAF (paroxysmal atrial fibrillation) ([facility])     Vertigo        Past Surgical History:   Procedure Laterality Date    APPENDECTOMY      GLAUCOMA SURGERY  01/03/2016    SD REPLACE AORTIC VALVE OPENFEMORAL ARTERY APPROACH N/A 4/17/2018    Procedure: REPLACEMENT AORTIC VALVE TRANSCATHETER (TAVR) TRANSFEMORAL W/ 23 MM AGUILAR STEVE S3 VALVE;  Surgeon: Char Alvares DO;  [facility];  Service: Cardiac Surgery    TUBAL LIGATION         Family History   Problem Relation Age of Onset    No Known Problems Mother     Diabetes Father     Stroke Father     Coronary artery disease Sister     No Known Problems Brother     No Known Problems Son     Breast cancer Daughter     No Known Problems Maternal Grandmother     No Known Problems Maternal Grandfather     No Known Problems Paternal Grandmother     No Known Problems Paternal Grandfather     No Known Problems Cousin     Rheum arthritis Neg Hx     Osteoarthritis Neg Hx     Asthma Neg Hx     Heart failure Neg Hx     Hyperlipidemia Neg Hx     Hypertension Neg Hx     Migraines Neg Hx     Rashes / Skin problems Neg Hx     Seizures Neg Hx     Thyroid disease Neg Hx      I have reviewed and agree with the history as documented  Social History   Substance Use Topics    Smoking status: Never Smoker    Smokeless tobacco: Never Used    Alcohol use No        Review of Systems   Constitutional: Negative  Negative for chills, diaphoresis, fatigue and fever  HENT: Negative  Negative for congestion, ear pain, nosebleeds, rhinorrhea, sore throat and tinnitus  Eyes: Positive for blurred vision  Negative for discharge, redness and itching  Respiratory: Negative  Negative for apnea, cough, chest tightness, shortness of breath and wheezing  Cardiovascular: Negative for chest pain, palpitations, leg swelling and syncope  Gastrointestinal: Negative  Negative for abdominal pain, nausea and vomiting  Endocrine: Negative  Genitourinary: Negative  Negative for flank pain, frequency, hematuria and urgency  Musculoskeletal: Negative  Negative for back pain and neck pain  Skin: Negative  Allergic/Immunologic: Negative  Neurological: Positive for dizziness and headaches  Negative for tremors, seizures, loss of consciousness, syncope, facial asymmetry, speech difficulty, weakness, light-headedness and numbness  Hematological: Negative  Psychiatric/Behavioral: Negative for confusion  The patient is not nervous/anxious  All other systems reviewed and are negative  Physical Exam  Physical Exam   Constitutional: She is oriented to person, place, and time  She appears well-developed and well-nourished  Non-toxic appearance  She does not have a sickly appearance  She does not appear ill  No distress  HENT:   Head: Normocephalic and atraumatic  Right Ear: External ear normal    Left Ear: External ear normal    Mouth/Throat: Oropharynx is clear and moist    Eyes: Pupils are equal, round, and reactive to light  Conjunctivae and EOM are normal  Right eye exhibits no discharge  Left eye exhibits no discharge  No scleral icterus  Neck: Normal range of motion  Neck supple  No JVD present  Cardiovascular: Normal rate, regular rhythm and normal heart sounds  Exam reveals no gallop and no friction rub  No murmur heard  Pulmonary/Chest: Effort normal and breath sounds normal  No respiratory distress  She has no wheezes  She has no rales  She exhibits no tenderness  Abdominal: Soft  Bowel sounds are normal  She exhibits no distension and no mass  There is no tenderness  No hernia  Musculoskeletal: Normal range of motion  She exhibits no edema, tenderness or deformity  Lymphadenopathy:     She has no cervical adenopathy  Neurological: She is alert and oriented to person, place, and time  She has normal reflexes  She displays normal reflexes  No cranial nerve deficit  She exhibits normal muscle tone  Coordination normal    Skin: Skin is warm and dry  No rash noted  She is not diaphoretic  No erythema  No pallor  Psychiatric: She has a normal mood and affect  Nursing note and vitals reviewed        Vital Signs  ED Triage Vitals   Temperature Pulse Respirations Blood Pressure SpO2   01/25/19 0953 01/25/19 0950 01/25/19 0950 01/25/19 0950 01/25/19 0950   98 2 °F (36 8 °C) 81 16 (!) 247/107 97 %      Temp Source Heart Rate Source Patient Position - Orthostatic VS BP Location FiO2 (%)   01/25/19 0953 01/25/19 0950 01/25/19 0950 01/25/19 0950 --   Oral Monitor Sitting Right arm       Pain Score       01/25/19 0950       No Pain           Vitals:    01/25/19 1016 01/25/19 1045 01/25/19 1100 01/25/19 1138   BP: (!) 232/93 (!) 182/76 (!) 179/79 (!) 181/74   Pulse:  70  70   Patient Position - Orthostatic VS:  Lying Lying Lying       Visual Acuity      ED Medications  Medications   hydrALAZINE (APRESOLINE) injection 10 mg (10 mg Intravenous Given 1/25/19 1016)       Diagnostic Studies  Results Reviewed     Procedure Component Value Units Date/Time    Basic metabolic panel [[id number]]  (Abnormal) Collected:  01/25/19 1013    Lab Status:  Final result Specimen:  Blood from Arm, Left Updated:  01/25/19 1038     Sodium 136 mmol/L      Potassium 4 5 mmol/L      Chloride 99 (L) mmol/L      CO2 28 mmol/L      ANION GAP 9 mmol/L      BUN 24 mg/dL      Creatinine 0 97 mg/dL      Glucose 257 (H) mg/dL      Calcium 9 7 mg/dL      eGFR 54 ml/min/1 73sq m     Narrative:         National Kidney Disease Education Program recommendations are as follows:  GFR calculation is accurate only with a steady state creatinine  Chronic Kidney disease less than 60 ml/min/1 73 sq  meters  Kidney failure less than 15 ml/min/1 73 sq  meters  CBC and differential [[id number]]  (Abnormal) Collected:  01/25/19 1013    Lab Status:  Final result Specimen:  Blood from Arm, Left Updated:  01/25/19 1027     WBC 7 58 Thousand/uL      RBC 5 21 (H) Million/uL      Hemoglobin 15 8 (H) g/dL      Hematocrit 47 9 (H) %      MCV 92 fL      MCH 30 3 pg      MCHC 33 0 g/dL      RDW 12 9 %      MPV 10 7 fL      Platelets 093 Thousands/uL      nRBC 0 /100 WBCs      Neutrophils Relative 70 %      Immat GRANS % 0 %      Lymphocytes Relative 23 %      Monocytes Relative 5 %      Eosinophils Relative 1 %      Basophils Relative 1 %      Neutrophils Absolute 5 29 Thousands/µL      Immature Grans Absolute 0 02 Thousand/uL      Lymphocytes Absolute 1 73 Thousands/µL      Monocytes Absolute 0 40 Thousand/µL      Eosinophils Absolute 0 08 Thousand/µL      Basophils Absolute 0 06 Thousands/µL                  CT head without contrast   Final Result by Giselle Zamorano MD (01/25 1127)      No acute intracranial abnormality  Workstation performed: [id number]                    Procedures  Procedures       Phone Contacts  ED Phone Contact    ED Course                               MDM  Number of Diagnoses or Management Options  Diagnosis management comments: 60-year-old female presents with hypertension beyond her normal readings at home this morning  She also complains of dizziness, however this is a chronic problem  She has had a mild frontal headache  No focal weakness, chest pain or shortness of breath  She did recently have losartan added to her medication 4 days ago  On exam she appears well in no distress  Her exam here is normal   Her blood pressure here is significantly elevated  Will try hydralazine and monitor  Will do basic labs  Will also CT head given the headache and dizziness to rule out the possibility of acute bleed, however I feel this has a low incidence given her normal exam   Will re-evaluate after blood pressure medication has been administered  Amount and/or Complexity of Data Reviewed  Clinical lab tests: ordered and reviewed  Tests in the radiology section of CPT®: ordered and reviewed  Review and summarize past medical records: yes      CritCare Time    Disposition  Final diagnoses:   Hypertension     Time reflects when diagnosis was documented in both MDM as applicable and the Disposition within this note     Time User Action Codes Description Comment    1/25/2019 12:06 PM Josselin, [address] Hypertension       ED Disposition     ED Disposition Condition Comment    Discharge  Slava Morejon discharge to home/self care  Condition at discharge: Good        Follow-up Information     Follow up With Specialties Details Why Contact Vi Damian MD Family Medicine Schedule an appointment as soon as possible for a visit in 3 days Or return to the emergency department with any new or concerning symptoms [address] [facility]  [phone]            Patient's Medications   Discharge Prescriptions    No medications on file     No discharge procedures on file      ED Provider  Electronically Signed by           Sharon Kennedy DO  01/25/19 8608

## 2019-01-25 NOTE — TELEPHONE ENCOUNTER
I called daughter back  Pt was in ER today because of HTN  CT head negative  Pt got IV hydralazine which helped  Daughter states they will be out of town next week for 1 week  Would like new medications  I sent hydralazine 50mg tabs to her phamacy  Advised pt to try 25mg tid first, if BP not controlled, always >140/90, increase to 50mg tid  Daughter understood  I would like to see pt when they come back  Daughter agreed

## 2019-01-25 NOTE — TELEPHONE ENCOUNTER
Patient's daughter phoned to state patient was seen at [facility] today and was given an injection of hydrALAZINE  ED MD stated that patient may need a different medication than losartan (COZAAR)  Patient's daughter can be contacted at [phone]

## 2019-02-05 DIAGNOSIS — I35.0 SEVERE AORTIC STENOSIS: Primary | ICD-10-CM

## 2019-02-14 ENCOUNTER — OFFICE VISIT (OUTPATIENT)
Dept: CARDIOLOGY CLINIC | Facility: CLINIC | Age: 84
End: 2019-02-14
Payer: COMMERCIAL

## 2019-02-14 VITALS — DIASTOLIC BLOOD PRESSURE: 82 MMHG | HEART RATE: 64 BPM | SYSTOLIC BLOOD PRESSURE: 180 MMHG

## 2019-02-14 DIAGNOSIS — Z91.89 AT RISK FOR AMIODARONE TOXICITY WITH LONG TERM USE: Primary | ICD-10-CM

## 2019-02-14 DIAGNOSIS — Z79.899 AT RISK FOR AMIODARONE TOXICITY WITH LONG TERM USE: Primary | ICD-10-CM

## 2019-02-14 PROCEDURE — 99214 OFFICE O/P EST MOD 30 MIN: CPT | Performed by: INTERNAL MEDICINE

## 2019-02-14 RX ORDER — NIFEDIPINE 60 MG/1
60 TABLET, FILM COATED, EXTENDED RELEASE ORAL DAILY
Qty: 30 TABLET | Refills: 5 | Status: SHIPPED | OUTPATIENT
Start: 2019-02-14 | End: 2019-04-08

## 2019-02-14 NOTE — PROGRESS NOTES
[address] [facility] [place]  [address]  [facility] [facility]  [phone]    Cardiology Follow up    Patient:  Sherrell Ocampo  :  1935  MRN:  [id number]    History of Present Illness:     42-year-old female with past medical history of aortic stenosis status post recent transcatheter aortic valve replacement, coronary disease with a 60% 1st obtuse marginal stenosis as well as the mid RCA 50% stenosis, persistent atrial fibrillation status post cardioversion, previous CVA/TIA, less than 50% carotid stenosis bilaterally from 1376, diastolic heart failure, diabetes, hypertension, dyslipidemia, obstructive sleep apnea not on CPAP, anxiety/depression presents for cardiology follow-up      She is largely feeling well  She feels more energy and sleeping less since the cardioversion  She has not had any chest pain, shortness of breath, palpitations, or syncope  She does get an unbalanced feeling which she describes as dizziness when she walks        Patient Active Problem List   Diagnosis    Diabetes mellitus type 2, uncontrolled ([facility] [address] )    Glaucoma    Hyperlipidemia    Hypertension    Hyponatremia    Depression with anxiety    Paroxysmal atrial fibrillation (HCC)    Ataxia    Type 2 diabetes mellitus with hyperglycemia (HCC)    CVA (cerebral vascular accident) ([address] )    Hearing loss    Insomnia    Left knee pain    PERI (obstructive sleep apnea)    Osteoarthritis    Osteoporosis    Periodic limb movement sleep disorder    Sleep talking    Tinnitus    Tricuspid valve disorders, non-rheumatic    Unsteady gait    Chronic diastolic heart failure (HCC)    S/P TAVR (transcatheter aortic valve replacement)    Dizziness and giddiness    Thickened endometrium    Morbid obesity ([address] )       Past Surgical History  Past Surgical History:   Procedure Laterality Date    APPENDECTOMY      GLAUCOMA SURGERY  2016    NH REPLACE AORTIC VALVE OPENFEMORAL ARTERY APPROACH N/A 2018 Procedure: REPLACEMENT AORTIC VALVE TRANSCATHETER (TAVR) TRANSFEMORAL W/ 23 MM AGUILAR STEVE S3 VALVE;  Surgeon: Destin Claros DO;  [facility];  Service: Cardiac Surgery    TUBAL LIGATION         Social History   Social History     Socioeconomic History    Marital status:       Spouse name: Not on file    Number of children: Not on file    Years of education: Not on file    Highest education level: Not on file   Occupational History    Not on file   Social Needs    Financial resource strain: Not on file    Food insecurity:     Worry: Not on file     Inability: Not on file    Transportation needs:     Medical: Not on file     Non-medical: Not on file   Tobacco Use    Smoking status: Never Smoker    Smokeless tobacco: Never Used   Substance and Sexual Activity    Alcohol use: No    Drug use: No    Sexual activity: Not Currently   Lifestyle    Physical activity:     Days per week: Not on file     Minutes per session: Not on file    Stress: Not on file   Relationships    Social connections:     Talks on phone: Not on file     Gets together: Not on file     Attends Anabaptist service: Not on file     Active member of club or organization: Not on file     Attends meetings of clubs or organizations: Not on file     Relationship status: Not on file    Intimate partner violence:     Fear of current or ex partner: Not on file     Emotionally abused: Not on file     Physically abused: Not on file     Forced sexual activity: Not on file   Other Topics Concern    Not on file   Social History Narrative    Not on file        Allergies   Allergen Reactions    Lipitor [Atorvastatin] GI Intolerance     Nausea stomach ache    Hydrochlorothiazide GI Intolerance    Lisinopril Cough    Metoprolol Headache    Aspirin GI Intolerance       Family History   Family History   Problem Relation Age of Onset    No Known Problems Mother     Diabetes Father     Stroke Father     Coronary artery disease Sister     No Known Problems Brother     No Known Problems Son     Breast cancer Daughter     No Known Problems Maternal Grandmother     No Known Problems Maternal Grandfather     No Known Problems Paternal Grandmother     No Known Problems Paternal Grandfather     No Known Problems Cousin     Rheum arthritis Neg Hx     Osteoarthritis Neg Hx     Asthma Neg Hx     Heart failure Neg Hx     Hyperlipidemia Neg Hx     Hypertension Neg Hx     Migraines Neg Hx     Rashes / Skin problems Neg Hx     Seizures Neg Hx     Thyroid disease Neg Hx        Review of Systems:  Review of Systems      Current Outpatient Medications:     Acetaminophen (TYLENOL) 325 MG CAPS, Take by mouth as needed, Disp: , Rfl:     albuterol (PROAIR HFA) 90 mcg/act inhaler, Inhale 2 puffs every 6 (six) hours as needed for wheezing, Disp: 8 5 g, Rfl: 0    amiodarone 200 mg tablet, Take 1 tablet (200 mg total) by mouth daily, Disp: 90 tablet, Rfl: 2    Bimatoprost (LUMIGAN OP), Apply to eye daily at bedtime Both eyes , Disp: , Rfl:     Cholecalciferol (VITAMIN D3) 1000 units CAPS, Take by mouth, Disp: , Rfl:     Cyanocobalamin (VITAMIN B 12 PO), Take by mouth, Disp: , Rfl:     DORZOLAMIDE HCL OP, Apply to eye 3 (three) times a day Left eye only , Disp: , Rfl:     gatifloxacin (ZYMAXID) 0 5 %, , Disp: , Rfl:     glipiZIDE (GLUCOTROL XL) 10 mg 24 hr tablet, TAKE 2 TABS DAILY  , Disp: 60 tablet, Rfl: 5    Hypromellose (ARTIFICIAL TEARS OP), Apply to eye, Disp: , Rfl:     Linagliptin 5 MG TABS, Take 5 mg by mouth daily, Disp: 30 tablet, Rfl: 5    LOSARTAN POTASSIUM PO, Take 25 mg by mouth, Disp: , Rfl:     metFORMIN (GLUCOPHAGE) 500 mg tablet, Take 1 tablet (500 mg total) by mouth 2 (two) times a day with meals for 90 days Cannot tolerate 1000mg because of GI upset , Disp: 180 tablet, Rfl: 1    prednisoLONE acetate (PRED FORTE) 1 % ophthalmic suspension, , Disp: , Rfl: 0    RESTASIS 0 05 % ophthalmic emulsion, , Disp: , Rfl: 0    rosuvastatin (CRESTOR) 10 MG tablet, Take 1 tablet (10 mg total) by mouth daily at bedtime, Disp: 30 tablet, Rfl: 5    timolol (TIMOPTIC) 0 5 % ophthalmic solution, PLACE 1 DROP IN THE RIGH EYE EVERY MORNING, Disp: , Rfl: 0    diltiazem (CARDIZEM CD) 180 mg 24 hr capsule, Take 1 capsule (180 mg total) by mouth daily (Patient not taking: Reported on 2/14/2019), Disp: 30 capsule, Rfl: 5    hydrALAZINE (APRESOLINE) 50 mg tablet, Take 1 tablet (50 mg total) by mouth 3 (three) times a day (Patient not taking: Reported on 2/14/2019), Disp: 90 tablet, Rfl: 0    ONE TOUCH ULTRA TEST test strip, TEST twice a day, Disp: 200 each, Rfl: 3    ONETOUCH DELICA LANCETS 76X MISC, CHECK BLOOD SUGAR TWICE DAILY, Disp: 200 each, Rfl: 3    rivaroxaban (XARELTO) 20 mg tablet, Take 1 tablet (20 mg total) by mouth daily for 30 days, Disp: 30 tablet, Rfl: 5     Physical Exam:    There were no vitals filed for this visit  Physical Exam    Labs:not applicable    Assessment/Plan:    1  Persistent atrial fibrillation status post cardioversion  She continues on the amiodarone and Xarelto  She will need follow-up with ophthalmology and pulmonology given that she is on the amiodarone  2  Hypertension-this is not controlled on the hydralazine 25 mg 3 times per day  I would like to add nifedipine  It looks like she is no longer taking the losartan-potentially because she had some difficulty with it but it appears that it may have because it was perceived that this was not working  We can consider another angiotensin receptor blocker in the future  3  Coronary disease-I would like fasting lipids on the Crestor  4  Prior CVA  This is noted  5  Bilateral less than 50% carotid disease  This is noted  Thank you so much, please do not hesitate to contact me with any questions or concerns            Serg Lopez MD  2/14/2019  11:47 AM

## 2019-03-08 ENCOUNTER — HOSPITAL ENCOUNTER (OUTPATIENT)
Dept: NON INVASIVE DIAGNOSTICS | Facility: CLINIC | Age: 84
Discharge: HOME | End: 2019-03-08
Payer: COMMERCIAL

## 2019-03-08 ENCOUNTER — APPOINTMENT (OUTPATIENT)
Dept: LAB | Facility: HOSPITAL | Age: 84
End: 2019-03-08
Attending: THORACIC SURGERY (CARDIOTHORACIC VASCULAR SURGERY)
Payer: COMMERCIAL

## 2019-03-08 DIAGNOSIS — I35.0 SEVERE AORTIC STENOSIS: ICD-10-CM

## 2019-03-08 LAB
ANION GAP SERPL CALCULATED.3IONS-SCNC: 9 MMOL/L (ref 4–13)
BUN SERPL-MCNC: 21 MG/DL (ref 5–25)
CALCIUM SERPL-MCNC: 9.4 MG/DL (ref 8.3–10.1)
CHLORIDE SERPL-SCNC: 101 MMOL/L (ref 100–108)
CO2 SERPL-SCNC: 27 MMOL/L (ref 21–32)
CREAT SERPL-MCNC: 0.9 MG/DL (ref 0.6–1.3)
ERYTHROCYTE [DISTWIDTH] IN BLOOD BY AUTOMATED COUNT: 14.8 % (ref 11.6–15.1)
GFR SERPL CREATININE-BSD FRML MDRD: 59 ML/MIN/1.73SQ M
GLUCOSE SERPL-MCNC: 112 MG/DL (ref 65–140)
HCT VFR BLD AUTO: 43.8 % (ref 34.8–46.1)
HGB BLD-MCNC: 14.1 G/DL (ref 11.5–15.4)
MCH RBC QN AUTO: 29.9 PG (ref 26.8–34.3)
MCHC RBC AUTO-ENTMCNC: 32.2 G/DL (ref 31.4–37.4)
MCV RBC AUTO: 93 FL (ref 82–98)
PLATELET # BLD AUTO: 259 THOUSANDS/UL (ref 149–390)
PMV BLD AUTO: 11.1 FL (ref 8.9–12.7)
POTASSIUM SERPL-SCNC: 4.4 MMOL/L (ref 3.5–5.3)
RBC # BLD AUTO: 4.72 MILLION/UL (ref 3.81–5.12)
SODIUM SERPL-SCNC: 137 MMOL/L (ref 136–145)
WBC # BLD AUTO: 6.9 THOUSAND/UL (ref 4.31–10.16)

## 2019-03-08 PROCEDURE — 36415 COLL VENOUS BLD VENIPUNCTURE: CPT

## 2019-03-08 PROCEDURE — 85027 COMPLETE CBC AUTOMATED: CPT

## 2019-03-08 PROCEDURE — 93306 TTE W/DOPPLER COMPLETE: CPT

## 2019-03-08 PROCEDURE — 80048 BASIC METABOLIC PNL TOTAL CA: CPT

## 2019-03-10 DIAGNOSIS — E13.9 DIABETES 1.5, MANAGED AS TYPE 2 (HCC): ICD-10-CM

## 2019-03-10 RX ORDER — LANCETS 33 GAUGE
EACH MISCELLANEOUS
Qty: 200 EACH | Refills: 3 | Status: SHIPPED | OUTPATIENT
Start: 2019-03-10 | End: 2020-02-28

## 2019-03-11 DIAGNOSIS — E11.65 TYPE 2 DIABETES MELLITUS WITH HYPERGLYCEMIA, WITHOUT LONG-TERM CURRENT USE OF INSULIN (HCC): ICD-10-CM

## 2019-03-11 RX ORDER — GLIPIZIDE 10 MG/1
TABLET, FILM COATED, EXTENDED RELEASE ORAL
Qty: 60 TABLET | Refills: 5 | Status: SHIPPED | OUTPATIENT
Start: 2019-03-11 | End: 2019-04-22

## 2019-03-12 LAB
ALBUMIN SERPL-MCNC: 4.4 G/DL (ref 3.6–5.1)
ALBUMIN/GLOB SERPL: 1.5 (CALC) (ref 1–2.5)
ALP SERPL-CCNC: 63 U/L (ref 33–130)
ALT SERPL-CCNC: 12 U/L (ref 6–29)
AST SERPL-CCNC: 8 U/L (ref 10–35)
BASOPHILS # BLD AUTO: 50 CELLS/UL (ref 0–200)
BASOPHILS NFR BLD AUTO: 0.7 %
BILIRUB SERPL-MCNC: 0.6 MG/DL (ref 0.2–1.2)
BUN SERPL-MCNC: 20 MG/DL (ref 7–25)
BUN/CREAT SERPL: ABNORMAL (CALC) (ref 6–22)
CALCIUM SERPL-MCNC: 9.4 MG/DL (ref 8.6–10.4)
CHLORIDE SERPL-SCNC: 103 MMOL/L (ref 98–110)
CHOLEST SERPL-MCNC: 143 MG/DL
CHOLEST/HDLC SERPL: 2.6 (CALC)
CO2 SERPL-SCNC: 27 MMOL/L (ref 20–32)
CREAT SERPL-MCNC: 0.88 MG/DL (ref 0.6–0.88)
EOSINOPHIL # BLD AUTO: 122 CELLS/UL (ref 15–500)
EOSINOPHIL NFR BLD AUTO: 1.7 %
ERYTHROCYTE [DISTWIDTH] IN BLOOD BY AUTOMATED COUNT: 14.8 % (ref 11–15)
GLOBULIN SER CALC-MCNC: 2.9 G/DL (CALC) (ref 1.9–3.7)
GLUCOSE SERPL-MCNC: 110 MG/DL (ref 65–99)
HBA1C MFR BLD: 6.9 % OF TOTAL HGB
HCT VFR BLD AUTO: 41.9 % (ref 35–45)
HDLC SERPL-MCNC: 56 MG/DL
HGB BLD-MCNC: 13.7 G/DL (ref 11.7–15.5)
LDLC SERPL CALC-MCNC: 67 MG/DL (CALC)
LEFT EYE DIABETIC RETINOPATHY: NORMAL
LYMPHOCYTES # BLD AUTO: 1930 CELLS/UL (ref 850–3900)
LYMPHOCYTES NFR BLD AUTO: 26.8 %
MCH RBC QN AUTO: 30.2 PG (ref 27–33)
MCHC RBC AUTO-ENTMCNC: 32.7 G/DL (ref 32–36)
MCV RBC AUTO: 92.5 FL (ref 80–100)
MONOCYTES # BLD AUTO: 677 CELLS/UL (ref 200–950)
MONOCYTES NFR BLD AUTO: 9.4 %
NEUTROPHILS # BLD AUTO: 4421 CELLS/UL (ref 1500–7800)
NEUTROPHILS NFR BLD AUTO: 61.4 %
NONHDLC SERPL-MCNC: 87 MG/DL (CALC)
PLATELET # BLD AUTO: 247 THOUSAND/UL (ref 140–400)
PMV BLD REES-ECKER: 11 FL (ref 7.5–12.5)
POTASSIUM SERPL-SCNC: 4.4 MMOL/L (ref 3.5–5.3)
PROT SERPL-MCNC: 7.3 G/DL (ref 6.1–8.1)
RBC # BLD AUTO: 4.53 MILLION/UL (ref 3.8–5.1)
RIGHT EYE DIABETIC RETINOPATHY: NORMAL
SL AMB EGFR AFRICAN AMERICAN: 70 ML/MIN/1.73M2
SL AMB EGFR NON AFRICAN AMERICAN: 61 ML/MIN/1.73M2
SODIUM SERPL-SCNC: 137 MMOL/L (ref 135–146)
TRIGL SERPL-MCNC: 121 MG/DL
WBC # BLD AUTO: 7.2 THOUSAND/UL (ref 3.8–10.8)

## 2019-03-14 PROCEDURE — 93306 TTE W/DOPPLER COMPLETE: CPT | Performed by: INTERNAL MEDICINE

## 2019-03-18 ENCOUNTER — OFFICE VISIT (OUTPATIENT)
Dept: FAMILY MEDICINE CLINIC | Facility: CLINIC | Age: 84
End: 2019-03-18
Payer: COMMERCIAL

## 2019-03-18 VITALS
DIASTOLIC BLOOD PRESSURE: 74 MMHG | HEART RATE: 68 BPM | SYSTOLIC BLOOD PRESSURE: 128 MMHG | BODY MASS INDEX: 35.85 KG/M2 | TEMPERATURE: 97.8 F | OXYGEN SATURATION: 96 % | HEIGHT: 58 IN | RESPIRATION RATE: 16 BRPM | WEIGHT: 170.8 LBS

## 2019-03-18 DIAGNOSIS — Z95.2 S/P TAVR (TRANSCATHETER AORTIC VALVE REPLACEMENT): ICD-10-CM

## 2019-03-18 DIAGNOSIS — E78.5 HYPERLIPIDEMIA, UNSPECIFIED HYPERLIPIDEMIA TYPE: Chronic | ICD-10-CM

## 2019-03-18 DIAGNOSIS — E66.9 OBESITY (BMI 30-39.9): ICD-10-CM

## 2019-03-18 DIAGNOSIS — I10 ESSENTIAL HYPERTENSION: Chronic | ICD-10-CM

## 2019-03-18 DIAGNOSIS — E11.65 TYPE 2 DIABETES MELLITUS WITH HYPERGLYCEMIA, WITHOUT LONG-TERM CURRENT USE OF INSULIN (HCC): Primary | ICD-10-CM

## 2019-03-18 PROCEDURE — 3725F SCREEN DEPRESSION PERFORMED: CPT | Performed by: FAMILY MEDICINE

## 2019-03-18 PROCEDURE — 99214 OFFICE O/P EST MOD 30 MIN: CPT | Performed by: FAMILY MEDICINE

## 2019-03-18 NOTE — PROGRESS NOTES
Chief Complaint   Patient presents with    Follow-up     3 month follow up  Health Maintenance   Topic Date Due    HEPATITIS B VACCINES (1 of 3 - Risk 3-dose series) 03/23/1954    DTaP,Tdap,and Td Vaccines (1 - Tdap) 03/23/1956    Pneumococcal PPSV23/PCV13 65+ Years / Low and Medium Risk (1 of 2 - PCV13) 03/23/2000    Depression Screening PHQ  02/06/2018    INFLUENZA VACCINE  07/01/2018    URINE MICROALBUMIN  08/18/2018    Medicare Annual Wellness Visit (AWV)  08/25/2018    Diabetic Foot Exam  12/12/2018    PT PLAN OF CARE  12/13/2018    HEMOGLOBIN A1C  06/11/2019    Urinary Incontinence Screening  07/23/2019    DM Eye Exam  08/07/2019    Fall Risk  11/13/2019    BMI: Followup Plan  01/10/2020    BMI: Adult  01/25/2020    DXA SCAN  05/02/2022     Assessment/Plan:  Reviewed lab in 3/2019  CBC ok  CMP ok  HgA1C 6 9 improved  Lipid 143/121/56/67 good    DM---controlled  Low carb diet  Continue metformin, glipizide and tradjenta  HTN---controlled  Continue nifedipine per cardiology  Hyperlipidemia---controlled  Low fat diet  Continue crestor per cardiology  Afib---continue xarelto  FU cardiology  Obesity---lose weight  Advised pt to eat smaller amount of food  Advised pt to exercise regularly       Refused flu shot  Refused PCV13 and pneumovax  RTO in 4 months  Diagnoses and all orders for this visit:    Type 2 diabetes mellitus with hyperglycemia, without long-term current use of insulin (HCC)  -     Comprehensive metabolic panel; Future  -     Hemoglobin A1C; Future  -     Microalbumin / creatinine urine ratio; Future    Essential hypertension  -     Comprehensive metabolic panel; Future    Hyperlipidemia, unspecified hyperlipidemia type    S/P TAVR (transcatheter aortic valve replacement)    Obesity (BMI 30-39 9)  -     TSH, 3rd generation with Free T4 reflex; Future          Subjective:      Patient ID: Verónica Pineda is a 80 y o  female      HPI  Pt is here with her daughter and 2 great granddaughter  DM---3/2019 HgA1C 6 9 better  She is on metformin 500mg bid and glipizide 10mg bid and tradjenta 5mg QD  Blood sugar at home   Denies hypoglycemia  Occasionally hands numbness or tingling  FU opthalmology Dr Lashonda Camp and Dr Kourtney Winter regularly Q 3 months  Fu podiatry Dr Poli Pinedo [facility] Q 6 month       HTN---BP at home 110-120/60-70 per pt  Stopped lisinopril because it caused her coughing  Stopped metoprolol because it gave her severe headache  Still has headache and lightheaded sometimes, saw neurology  Suggest PT which helped some per pt        Afib---she is on xarelto 20mg daily  Sometimes feels tired  Saw cardiology       Hyperlipidemia---She is on crestor per cardiology       Depression---Mood is stable per pt  Obesity---BMI 36 32 today       Lives with family  Denies recent falls  The following portions of the patient's history were reviewed and updated as appropriate: allergies, current medications, past family history, past medical history, past social history, past surgical history and problem list     Review of Systems   Constitutional: Negative for appetite change, chills and fever  HENT: Negative for congestion, ear pain, sinus pain and sore throat  Eyes: Negative for discharge and itching  Respiratory: Negative for apnea, cough, chest tightness, shortness of breath and wheezing  Cardiovascular: Negative for chest pain, palpitations and leg swelling  Gastrointestinal: Negative for abdominal pain, anal bleeding, constipation, diarrhea, nausea and vomiting  Endocrine: Negative for cold intolerance, heat intolerance and polyuria  Genitourinary: Negative for difficulty urinating and dysuria  Musculoskeletal: Negative for arthralgias, back pain and myalgias  Skin: Negative for rash  Neurological: Negative for dizziness and headaches  Psychiatric/Behavioral: Negative for agitation           Objective:      /74 (BP Location: Left arm, Patient Position: Sitting, Cuff Size: Standard)   Pulse 68   Temp 97 8 °F (36 6 °C) (Tympanic)   Resp 16   Ht 4' 9 5" (1 461 m)   Wt 77 5 kg (170 lb 12 8 oz)   SpO2 96%   BMI 36 32 kg/m²          Physical Exam   Constitutional: She appears well-developed  No distress  HENT:   Head: Normocephalic  Eyes: Pupils are equal, round, and reactive to light  Conjunctivae are normal  Right eye exhibits no discharge  Left eye exhibits no discharge  Neck: Normal range of motion  No thyromegaly present  Cardiovascular: Normal rate, regular rhythm and normal heart sounds  Pulmonary/Chest: Effort normal and breath sounds normal  No respiratory distress  She has no wheezes  She has no rales  She exhibits no tenderness  Abdominal: Soft  Bowel sounds are normal    Musculoskeletal: She exhibits no edema  Lymphadenopathy:     She has no cervical adenopathy  Neurological: She is alert  Psychiatric: She has a normal mood and affect  BMI Counseling: Body mass index is 36 32 kg/m²  Discussed the patient's BMI with her  The BMI is above average  BMI counseling and education was provided to the patient  Nutrition recommendations include reducing portion sizes, decreasing overall calorie intake and 3-5 servings of fruits/vegetables daily  Exercise recommendations include exercising 3-5 times per week

## 2019-03-20 ENCOUNTER — TELEPHONE (OUTPATIENT)
Dept: FAMILY MEDICINE CLINIC | Facility: CLINIC | Age: 84
End: 2019-03-20

## 2019-03-20 NOTE — TELEPHONE ENCOUNTER
Patient's daughter called to say that patient was in to see Dr Doug Krishnamurthy was given a new med called Nifedipine ER 60 mg  Since Royce Mckinney took the pill she was nauseated threw up a few times and had some dizzy spells she had fallen  Daughter took her off the medication and she is feeling better  Wants to know next step   Can be reached at [phone]

## 2019-03-20 NOTE — TELEPHONE ENCOUNTER
I called daughter and she states since pt tried nifedipine ER 60mg, pt felt dizzy and nausea  Pt fell today  Denies LOC  Nothing hurt per pt  She did not hit her head  BP was good 129/76 today per pt  Pt did not take nifedipine today  I advised pt to call cardiology today because cardiology gave her nifedipine

## 2019-03-23 DIAGNOSIS — I48.91 ATRIAL FIBRILLATION, UNSPECIFIED TYPE (HCC): ICD-10-CM

## 2019-03-23 DIAGNOSIS — I10 ESSENTIAL HYPERTENSION: ICD-10-CM

## 2019-03-23 RX ORDER — HYDRALAZINE HYDROCHLORIDE 50 MG/1
TABLET, FILM COATED ORAL
Qty: 90 TABLET | Refills: 0 | Status: SHIPPED | OUTPATIENT
Start: 2019-03-23 | End: 2019-03-25 | Stop reason: SDUPTHER

## 2019-03-23 NOTE — TELEPHONE ENCOUNTER
Patient is requesting refills of the following medications to [address] [place] [address] Way:  1  Hydralazine 50 mg, take 1/2 pill 3x a day, 90-day supply  2  rivaroxaban (XARELTO) 20 mg tablet, take 1 tablet daily, 30-day supply  Patient can be contacted at [phone] with any questions

## 2019-03-25 ENCOUNTER — TELEPHONE (OUTPATIENT)
Dept: CARDIOLOGY CLINIC | Facility: CLINIC | Age: 84
End: 2019-03-25

## 2019-03-25 ENCOUNTER — TELEPHONE (OUTPATIENT)
Dept: CARDIAC SURGERY | Facility: CLINIC | Age: 84
End: 2019-03-25

## 2019-03-25 RX ORDER — HYDRALAZINE HYDROCHLORIDE 50 MG/1
50 TABLET, FILM COATED ORAL 3 TIMES DAILY
Qty: 90 TABLET | Refills: 3 | Status: SHIPPED | OUTPATIENT
Start: 2019-03-25 | End: 2019-06-07 | Stop reason: SDUPTHER

## 2019-03-25 NOTE — TELEPHONE ENCOUNTER
I called patient for one year S/P TAVR follow-up  I was directed to speak with both patient's daughter in law and son  They state patient occasionally gets tired and short of breath based on her activity I am giving her a [facility], I reviewed patient's medications, recent lab results and echo result with son and daughter in law  Informed them to continue to follow with the patient's PCP and cardiologists  They stated patient has been c/o headaches for almost a year and have addressed with PCP and Cardiologist  I encouraged them to schedule appointment with PCP to discuss options for headache relief

## 2019-03-25 NOTE — TELEPHONE ENCOUNTER
pts dtr called about you starting eloy on nifedipine  She said after eloy started nifedipine she started feeling sick and vomiting  She said she stopped the nifedipine and the pt felt better  Her blood pressure is still going up and down and its 144/76 today   I asked her if she wants a call back from you to change to another bp medicine and she said no this was just an [place] for you

## 2019-04-08 ENCOUNTER — OFFICE VISIT (OUTPATIENT)
Dept: CARDIOLOGY CLINIC | Facility: CLINIC | Age: 84
End: 2019-04-08
Payer: COMMERCIAL

## 2019-04-08 VITALS
BODY MASS INDEX: 36.19 KG/M2 | DIASTOLIC BLOOD PRESSURE: 70 MMHG | WEIGHT: 172.4 LBS | RESPIRATION RATE: 16 BRPM | HEIGHT: 58 IN | HEART RATE: 60 BPM | SYSTOLIC BLOOD PRESSURE: 128 MMHG

## 2019-04-08 DIAGNOSIS — R42 DIZZINESS: ICD-10-CM

## 2019-04-08 DIAGNOSIS — I10 ESSENTIAL HYPERTENSION: Primary | ICD-10-CM

## 2019-04-08 PROCEDURE — 99214 OFFICE O/P EST MOD 30 MIN: CPT | Performed by: INTERNAL MEDICINE

## 2019-04-08 RX ORDER — AMLODIPINE BESYLATE 5 MG/1
5 TABLET ORAL DAILY
Qty: 30 TABLET | Refills: 5 | Status: SHIPPED | OUTPATIENT
Start: 2019-04-08 | End: 2019-05-21

## 2019-04-15 ENCOUNTER — TELEPHONE (OUTPATIENT)
Dept: CARDIOLOGY CLINIC | Facility: CLINIC | Age: 84
End: 2019-04-15

## 2019-04-15 ENCOUNTER — CLINICAL SUPPORT (OUTPATIENT)
Dept: CARDIOLOGY CLINIC | Facility: CLINIC | Age: 84
End: 2019-04-15
Payer: COMMERCIAL

## 2019-04-15 DIAGNOSIS — I10 HYPERTENSION, UNSPECIFIED TYPE: Primary | ICD-10-CM

## 2019-04-15 PROCEDURE — 99211 OFF/OP EST MAY X REQ PHY/QHP: CPT

## 2019-04-22 ENCOUNTER — HOSPITAL ENCOUNTER (EMERGENCY)
Facility: HOSPITAL | Age: 84
Discharge: HOME | End: 2019-04-22
Attending: EMERGENCY MEDICINE
Payer: COMMERCIAL

## 2019-04-22 ENCOUNTER — APPOINTMENT (EMERGENCY)
Dept: RADIOLOGY | Facility: HOSPITAL | Age: 84
End: 2019-04-22
Payer: COMMERCIAL

## 2019-04-22 VITALS
SYSTOLIC BLOOD PRESSURE: 167 MMHG | DIASTOLIC BLOOD PRESSURE: 71 MMHG | TEMPERATURE: 97.8 F | OXYGEN SATURATION: 93 % | HEART RATE: 68 BPM | RESPIRATION RATE: 18 BRPM

## 2019-04-22 DIAGNOSIS — R35.0 URINARY FREQUENCY: ICD-10-CM

## 2019-04-22 DIAGNOSIS — J06.9 URI (UPPER RESPIRATORY INFECTION): Primary | ICD-10-CM

## 2019-04-22 DIAGNOSIS — M54.9 BACK PAIN: ICD-10-CM

## 2019-04-22 LAB
ANION GAP SERPL CALCULATED.3IONS-SCNC: 10 MMOL/L (ref 4–13)
BACTERIA UR QL AUTO: ABNORMAL /HPF
BASOPHILS # BLD AUTO: 0.03 THOUSANDS/ΜL (ref 0–0.1)
BASOPHILS NFR BLD AUTO: 0 % (ref 0–1)
BILIRUB UR QL STRIP: NEGATIVE
BUN SERPL-MCNC: 21 MG/DL (ref 5–25)
CALCIUM SERPL-MCNC: 9.6 MG/DL (ref 8.3–10.1)
CHLORIDE SERPL-SCNC: 97 MMOL/L (ref 100–108)
CLARITY UR: CLEAR
CLARITY, POC: CLEAR
CO2 SERPL-SCNC: 26 MMOL/L (ref 21–32)
COLOR UR: YELLOW
COLOR, POC: YELLOW
CREAT SERPL-MCNC: 0.93 MG/DL (ref 0.6–1.3)
EOSINOPHIL # BLD AUTO: 0.01 THOUSAND/ΜL (ref 0–0.61)
EOSINOPHIL NFR BLD AUTO: 0 % (ref 0–6)
ERYTHROCYTE [DISTWIDTH] IN BLOOD BY AUTOMATED COUNT: 14.3 % (ref 11.6–15.1)
GFR SERPL CREATININE-BSD FRML MDRD: 57 ML/MIN/1.73SQ M
GLUCOSE SERPL-MCNC: 266 MG/DL (ref 65–140)
GLUCOSE UR STRIP-MCNC: ABNORMAL MG/DL
HCT VFR BLD AUTO: 41.5 % (ref 34.8–46.1)
HGB BLD-MCNC: 13.6 G/DL (ref 11.5–15.4)
HGB UR QL STRIP.AUTO: ABNORMAL
IMM GRANULOCYTES # BLD AUTO: 0.08 THOUSAND/UL (ref 0–0.2)
IMM GRANULOCYTES NFR BLD AUTO: 1 % (ref 0–2)
KETONES UR STRIP-MCNC: NEGATIVE MG/DL
LEUKOCYTE ESTERASE UR QL STRIP: NEGATIVE
LYMPHOCYTES # BLD AUTO: 1.61 THOUSANDS/ΜL (ref 0.6–4.47)
LYMPHOCYTES NFR BLD AUTO: 14 % (ref 14–44)
MCH RBC QN AUTO: 30.8 PG (ref 26.8–34.3)
MCHC RBC AUTO-ENTMCNC: 32.8 G/DL (ref 31.4–37.4)
MCV RBC AUTO: 94 FL (ref 82–98)
MONOCYTES # BLD AUTO: 0.53 THOUSAND/ΜL (ref 0.17–1.22)
MONOCYTES NFR BLD AUTO: 5 % (ref 4–12)
NEUTROPHILS # BLD AUTO: 9.57 THOUSANDS/ΜL (ref 1.85–7.62)
NEUTS SEG NFR BLD AUTO: 80 % (ref 43–75)
NITRITE UR QL STRIP: NEGATIVE
NON-SQ EPI CELLS URNS QL MICRO: ABNORMAL /HPF
NRBC BLD AUTO-RTO: 0 /100 WBCS
PH UR STRIP.AUTO: 7 [PH] (ref 4.5–8)
PLATELET # BLD AUTO: 282 THOUSANDS/UL (ref 149–390)
PMV BLD AUTO: 10.9 FL (ref 8.9–12.7)
POTASSIUM SERPL-SCNC: 4.9 MMOL/L (ref 3.5–5.3)
PROT UR STRIP-MCNC: NEGATIVE MG/DL
RBC # BLD AUTO: 4.41 MILLION/UL (ref 3.81–5.12)
RBC #/AREA URNS AUTO: ABNORMAL /HPF
SODIUM SERPL-SCNC: 133 MMOL/L (ref 136–145)
SP GR UR STRIP.AUTO: 1.01 (ref 1–1.03)
UROBILINOGEN UR QL STRIP.AUTO: 0.2 E.U./DL
WBC # BLD AUTO: 11.83 THOUSAND/UL (ref 4.31–10.16)
WBC #/AREA URNS AUTO: ABNORMAL /HPF

## 2019-04-22 PROCEDURE — 36415 COLL VENOUS BLD VENIPUNCTURE: CPT | Performed by: EMERGENCY MEDICINE

## 2019-04-22 PROCEDURE — 80048 BASIC METABOLIC PNL TOTAL CA: CPT | Performed by: EMERGENCY MEDICINE

## 2019-04-22 PROCEDURE — 85025 COMPLETE CBC W/AUTO DIFF WBC: CPT | Performed by: EMERGENCY MEDICINE

## 2019-04-22 PROCEDURE — 99284 EMERGENCY DEPT VISIT MOD MDM: CPT

## 2019-04-22 PROCEDURE — 81001 URINALYSIS AUTO W/SCOPE: CPT

## 2019-04-22 PROCEDURE — 99283 EMERGENCY DEPT VISIT LOW MDM: CPT | Performed by: EMERGENCY MEDICINE

## 2019-04-22 PROCEDURE — 71046 X-RAY EXAM CHEST 2 VIEWS: CPT

## 2019-04-22 RX ORDER — TRAMADOL HYDROCHLORIDE 50 MG/1
50 TABLET ORAL EVERY 6 HOURS PRN
Qty: 10 TABLET | Refills: 0 | Status: SHIPPED | OUTPATIENT
Start: 2019-04-22 | End: 2019-05-02

## 2019-04-22 RX ORDER — GLIPIZIDE 10 MG/1
10 TABLET ORAL
COMMUNITY
End: 2019-05-22 | Stop reason: SDUPTHER

## 2019-04-30 ENCOUNTER — OFFICE VISIT (OUTPATIENT)
Dept: NEUROLOGY | Facility: CLINIC | Age: 84
End: 2019-04-30
Payer: COMMERCIAL

## 2019-04-30 VITALS
RESPIRATION RATE: 16 BRPM | BODY MASS INDEX: 35.58 KG/M2 | HEART RATE: 62 BPM | WEIGHT: 169.5 LBS | SYSTOLIC BLOOD PRESSURE: 128 MMHG | DIASTOLIC BLOOD PRESSURE: 62 MMHG | HEIGHT: 58 IN

## 2019-04-30 DIAGNOSIS — Z86.73 HISTORY OF STROKE: ICD-10-CM

## 2019-04-30 DIAGNOSIS — R42 SEVERE DIZZINESS: Primary | ICD-10-CM

## 2019-04-30 PROCEDURE — 99214 OFFICE O/P EST MOD 30 MIN: CPT | Performed by: NURSE PRACTITIONER

## 2019-04-30 RX ORDER — MECLIZINE HCL 12.5 MG/1
12.5 TABLET ORAL EVERY 12 HOURS PRN
Qty: 15 TABLET | Refills: 2 | Status: SHIPPED | OUTPATIENT
Start: 2019-04-30 | End: 2019-06-05 | Stop reason: SDUPTHER

## 2019-05-12 ENCOUNTER — HOSPITAL ENCOUNTER (OUTPATIENT)
Dept: MRI IMAGING | Facility: HOSPITAL | Age: 84
Discharge: HOME | DRG: 065 | End: 2019-05-12
Payer: COMMERCIAL

## 2019-05-12 ENCOUNTER — HOSPITAL ENCOUNTER (INPATIENT)
Facility: HOSPITAL | Age: 84
LOS: 2 days | Discharge: HOME | DRG: 065 | End: 2019-05-14
Attending: EMERGENCY MEDICINE | Admitting: INTERNAL MEDICINE
Payer: COMMERCIAL

## 2019-05-12 ENCOUNTER — APPOINTMENT (EMERGENCY)
Dept: CT IMAGING | Facility: HOSPITAL | Age: 84
DRG: 065 | End: 2019-05-12
Payer: COMMERCIAL

## 2019-05-12 ENCOUNTER — APPOINTMENT (EMERGENCY)
Dept: RADIOLOGY | Facility: HOSPITAL | Age: 84
DRG: 065 | End: 2019-05-12
Payer: COMMERCIAL

## 2019-05-12 DIAGNOSIS — R42 DIZZINESS: ICD-10-CM

## 2019-05-12 DIAGNOSIS — R27.0 ATAXIA: ICD-10-CM

## 2019-05-12 DIAGNOSIS — R42 SEVERE DIZZINESS: ICD-10-CM

## 2019-05-12 DIAGNOSIS — I69.30 CHRONIC CEREBROVASCULAR ACCIDENT (CVA): ICD-10-CM

## 2019-05-12 DIAGNOSIS — I48.0 PAROXYSMAL ATRIAL FIBRILLATION (HCC): ICD-10-CM

## 2019-05-12 DIAGNOSIS — I63.9 ACUTE CVA (CEREBROVASCULAR ACCIDENT) (HCC): Primary | ICD-10-CM

## 2019-05-12 DIAGNOSIS — I63.9 CVA (CEREBRAL VASCULAR ACCIDENT) (HCC): ICD-10-CM

## 2019-05-12 DIAGNOSIS — Z86.73 HISTORY OF STROKE: ICD-10-CM

## 2019-05-12 PROBLEM — K22.89 ESOPHAGEAL THICKENING: Status: ACTIVE | Noted: 2019-05-12

## 2019-05-12 PROBLEM — R51.9 HEADACHE: Status: ACTIVE | Noted: 2019-05-12

## 2019-05-12 LAB
ABO GROUP BLD: NORMAL
ANION GAP SERPL CALCULATED.3IONS-SCNC: 9 MMOL/L (ref 4–13)
APTT PPP: 37 SECONDS (ref 26–38)
BLD GP AB SCN SERPL QL: NEGATIVE
BUN SERPL-MCNC: 21 MG/DL (ref 5–25)
CALCIUM SERPL-MCNC: 10.1 MG/DL (ref 8.3–10.1)
CHLORIDE SERPL-SCNC: 98 MMOL/L (ref 100–108)
CHOLEST SERPL-MCNC: 164 MG/DL (ref 50–200)
CO2 SERPL-SCNC: 27 MMOL/L (ref 21–32)
CREAT SERPL-MCNC: 0.85 MG/DL (ref 0.6–1.3)
ERYTHROCYTE [DISTWIDTH] IN BLOOD BY AUTOMATED COUNT: 13.7 % (ref 11.6–15.1)
EST. AVERAGE GLUCOSE BLD GHB EST-MCNC: 128 MG/DL
GFR SERPL CREATININE-BSD FRML MDRD: 63 ML/MIN/1.73SQ M
GLUCOSE SERPL-MCNC: 184 MG/DL (ref 65–140)
GLUCOSE SERPL-MCNC: 185 MG/DL (ref 65–140)
GLUCOSE SERPL-MCNC: 289 MG/DL (ref 65–140)
HBA1C MFR BLD: 6.1 % (ref 4.2–6.3)
HCT VFR BLD AUTO: 44.4 % (ref 34.8–46.1)
HDLC SERPL-MCNC: 58 MG/DL (ref 40–60)
HGB BLD-MCNC: 14.3 G/DL (ref 11.5–15.4)
INR PPP: 1.23 (ref 0.86–1.17)
LDLC SERPL CALC-MCNC: 78 MG/DL (ref 0–100)
MCH RBC QN AUTO: 30.4 PG (ref 26.8–34.3)
MCHC RBC AUTO-ENTMCNC: 32.2 G/DL (ref 31.4–37.4)
MCV RBC AUTO: 95 FL (ref 82–98)
PLATELET # BLD AUTO: 241 THOUSANDS/UL (ref 149–390)
PMV BLD AUTO: 10.3 FL (ref 8.9–12.7)
POTASSIUM SERPL-SCNC: 4.1 MMOL/L (ref 3.5–5.3)
PROTHROMBIN TIME: 15.6 SECONDS (ref 11.8–14.2)
RBC # BLD AUTO: 4.7 MILLION/UL (ref 3.81–5.12)
RH BLD: POSITIVE
SODIUM SERPL-SCNC: 134 MMOL/L (ref 136–145)
SPECIMEN EXPIRATION DATE: NORMAL
TRIGL SERPL-MCNC: 140 MG/DL
WBC # BLD AUTO: 7.31 THOUSAND/UL (ref 4.31–10.16)

## 2019-05-12 PROCEDURE — 85027 COMPLETE CBC AUTOMATED: CPT | Performed by: EMERGENCY MEDICINE

## 2019-05-12 PROCEDURE — 93005 ELECTROCARDIOGRAM TRACING: CPT

## 2019-05-12 PROCEDURE — 86850 RBC ANTIBODY SCREEN: CPT | Performed by: EMERGENCY MEDICINE

## 2019-05-12 PROCEDURE — 80061 LIPID PANEL: CPT | Performed by: EMERGENCY MEDICINE

## 2019-05-12 PROCEDURE — 99223 1ST HOSP IP/OBS HIGH 75: CPT | Performed by: PHYSICIAN ASSISTANT

## 2019-05-12 PROCEDURE — 82948 REAGENT STRIP/BLOOD GLUCOSE: CPT

## 2019-05-12 PROCEDURE — 85730 THROMBOPLASTIN TIME PARTIAL: CPT | Performed by: EMERGENCY MEDICINE

## 2019-05-12 PROCEDURE — 99285 EMERGENCY DEPT VISIT HI MDM: CPT | Performed by: EMERGENCY MEDICINE

## 2019-05-12 PROCEDURE — 70496 CT ANGIOGRAPHY HEAD: CPT

## 2019-05-12 PROCEDURE — 86901 BLOOD TYPING SEROLOGIC RH(D): CPT | Performed by: EMERGENCY MEDICINE

## 2019-05-12 PROCEDURE — 70553 MRI BRAIN STEM W/O & W/DYE: CPT

## 2019-05-12 PROCEDURE — 99285 EMERGENCY DEPT VISIT HI MDM: CPT

## 2019-05-12 PROCEDURE — 86900 BLOOD TYPING SEROLOGIC ABO: CPT | Performed by: EMERGENCY MEDICINE

## 2019-05-12 PROCEDURE — 85610 PROTHROMBIN TIME: CPT | Performed by: EMERGENCY MEDICINE

## 2019-05-12 PROCEDURE — 36415 COLL VENOUS BLD VENIPUNCTURE: CPT | Performed by: EMERGENCY MEDICINE

## 2019-05-12 PROCEDURE — 80048 BASIC METABOLIC PNL TOTAL CA: CPT | Performed by: EMERGENCY MEDICINE

## 2019-05-12 PROCEDURE — 70498 CT ANGIOGRAPHY NECK: CPT

## 2019-05-12 PROCEDURE — 84443 ASSAY THYROID STIM HORMONE: CPT | Performed by: PHYSICIAN ASSISTANT

## 2019-05-12 PROCEDURE — 71046 X-RAY EXAM CHEST 2 VIEWS: CPT

## 2019-05-12 PROCEDURE — A9585 GADOBUTROL INJECTION: HCPCS | Performed by: NURSE PRACTITIONER

## 2019-05-12 PROCEDURE — 83036 HEMOGLOBIN GLYCOSYLATED A1C: CPT | Performed by: EMERGENCY MEDICINE

## 2019-05-12 RX ORDER — TIMOLOL MALEATE 5 MG/ML
1 SOLUTION/ DROPS OPHTHALMIC DAILY
Status: DISCONTINUED | OUTPATIENT
Start: 2019-05-13 | End: 2019-05-14 | Stop reason: HOSPADM

## 2019-05-12 RX ORDER — ALBUTEROL SULFATE 90 UG/1
2 AEROSOL, METERED RESPIRATORY (INHALATION) EVERY 6 HOURS PRN
Status: DISCONTINUED | OUTPATIENT
Start: 2019-05-12 | End: 2019-05-14 | Stop reason: HOSPADM

## 2019-05-12 RX ORDER — MECLIZINE HCL 12.5 MG/1
12.5 TABLET ORAL EVERY 12 HOURS PRN
Status: DISCONTINUED | OUTPATIENT
Start: 2019-05-12 | End: 2019-05-14 | Stop reason: HOSPADM

## 2019-05-12 RX ORDER — ACETAMINOPHEN 325 MG/1
325 TABLET ORAL EVERY 6 HOURS PRN
Status: DISCONTINUED | OUTPATIENT
Start: 2019-05-12 | End: 2019-05-14 | Stop reason: HOSPADM

## 2019-05-12 RX ORDER — POLYVINYL ALCOHOL 14 MG/ML
1 SOLUTION/ DROPS OPHTHALMIC
Status: DISCONTINUED | OUTPATIENT
Start: 2019-05-12 | End: 2019-05-14 | Stop reason: HOSPADM

## 2019-05-12 RX ORDER — AMIODARONE HYDROCHLORIDE 200 MG/1
200 TABLET ORAL DAILY
Status: DISCONTINUED | OUTPATIENT
Start: 2019-05-13 | End: 2019-05-14 | Stop reason: HOSPADM

## 2019-05-12 RX ORDER — MELATONIN
5000 DAILY
Status: DISCONTINUED | OUTPATIENT
Start: 2019-05-13 | End: 2019-05-14 | Stop reason: HOSPADM

## 2019-05-12 RX ORDER — PRAVASTATIN SODIUM 80 MG/1
80 TABLET ORAL
Status: DISCONTINUED | OUTPATIENT
Start: 2019-05-12 | End: 2019-05-13

## 2019-05-12 RX ADMIN — PRAVASTATIN SODIUM 80 MG: 80 TABLET ORAL at 21:56

## 2019-05-12 RX ADMIN — INSULIN LISPRO 2 UNITS: 100 INJECTION, SOLUTION INTRAVENOUS; SUBCUTANEOUS at 21:57

## 2019-05-12 RX ADMIN — GADOBUTROL 7 ML: 604.72 INJECTION INTRAVENOUS at 16:29

## 2019-05-12 RX ADMIN — IOHEXOL 85 ML: 350 INJECTION, SOLUTION INTRAVENOUS at 18:15

## 2019-05-12 RX ADMIN — BIMATOPROST 1 DROP: 0.1 SOLUTION/ DROPS OPHTHALMIC at 21:57

## 2019-05-13 LAB
ATRIAL RATE: 63 BPM
CRP SERPL QL: <3 MG/L
ERYTHROCYTE [SEDIMENTATION RATE] IN BLOOD: 20 MM/HOUR (ref 0–20)
GLUCOSE SERPL-MCNC: 168 MG/DL (ref 65–140)
GLUCOSE SERPL-MCNC: 251 MG/DL (ref 65–140)
GLUCOSE SERPL-MCNC: 89 MG/DL (ref 65–140)
GLUCOSE SERPL-MCNC: 98 MG/DL (ref 65–140)
HCYS SERPL-SCNC: 9.9 UMOL/L (ref 3.7–11.2)
P AXIS: 44 DEGREES
PR INTERVAL: 180 MS
QRS AXIS: -18 DEGREES
QRSD INTERVAL: 84 MS
QT INTERVAL: 412 MS
QTC INTERVAL: 421 MS
T WAVE AXIS: 59 DEGREES
TSH SERPL DL<=0.05 MIU/L-ACNC: 6.33 UIU/ML (ref 0.36–3.74)
VENTRICULAR RATE: 63 BPM
VIT B12 SERPL-MCNC: 409 PG/ML (ref 100–900)

## 2019-05-13 PROCEDURE — 99232 SBSQ HOSP IP/OBS MODERATE 35: CPT | Performed by: FAMILY MEDICINE

## 2019-05-13 PROCEDURE — G8988 SELF CARE GOAL STATUS: HCPCS

## 2019-05-13 PROCEDURE — 85652 RBC SED RATE AUTOMATED: CPT | Performed by: PHYSICIAN ASSISTANT

## 2019-05-13 PROCEDURE — 86304 IMMUNOASSAY TUMOR CA 125: CPT | Performed by: PHYSICIAN ASSISTANT

## 2019-05-13 PROCEDURE — 93010 ELECTROCARDIOGRAM REPORT: CPT | Performed by: INTERNAL MEDICINE

## 2019-05-13 PROCEDURE — G8987 SELF CARE CURRENT STATUS: HCPCS

## 2019-05-13 PROCEDURE — 82948 REAGENT STRIP/BLOOD GLUCOSE: CPT

## 2019-05-13 PROCEDURE — G8979 MOBILITY GOAL STATUS: HCPCS

## 2019-05-13 PROCEDURE — G8978 MOBILITY CURRENT STATUS: HCPCS

## 2019-05-13 PROCEDURE — 82607 VITAMIN B-12: CPT | Performed by: PHYSICIAN ASSISTANT

## 2019-05-13 PROCEDURE — 97163 PT EVAL HIGH COMPLEX 45 MIN: CPT

## 2019-05-13 PROCEDURE — 97166 OT EVAL MOD COMPLEX 45 MIN: CPT

## 2019-05-13 PROCEDURE — 99222 1ST HOSP IP/OBS MODERATE 55: CPT | Performed by: PSYCHIATRY & NEUROLOGY

## 2019-05-13 PROCEDURE — 83090 ASSAY OF HOMOCYSTEINE: CPT | Performed by: PHYSICIAN ASSISTANT

## 2019-05-13 PROCEDURE — 86140 C-REACTIVE PROTEIN: CPT | Performed by: PHYSICIAN ASSISTANT

## 2019-05-13 RX ORDER — DABIGATRAN ETEXILATE 150 MG/1
150 CAPSULE, COATED PELLETS ORAL EVERY 12 HOURS SCHEDULED
Status: DISCONTINUED | OUTPATIENT
Start: 2019-05-13 | End: 2019-05-14 | Stop reason: HOSPADM

## 2019-05-13 RX ORDER — PRAVASTATIN SODIUM 40 MG
40 TABLET ORAL
Status: DISCONTINUED | OUTPATIENT
Start: 2019-05-13 | End: 2019-05-14 | Stop reason: HOSPADM

## 2019-05-13 RX ORDER — CYCLOSPORINE 0.5 MG/ML
1 EMULSION OPHTHALMIC 2 TIMES DAILY
Status: DISCONTINUED | OUTPATIENT
Start: 2019-05-13 | End: 2019-05-14 | Stop reason: HOSPADM

## 2019-05-13 RX ADMIN — TIMOLOL MALEATE 1 DROP: 5 SOLUTION/ DROPS OPHTHALMIC at 08:09

## 2019-05-13 RX ADMIN — BIMATOPROST 1 DROP: 0.1 SOLUTION/ DROPS OPHTHALMIC at 21:42

## 2019-05-13 RX ADMIN — PRAVASTATIN SODIUM 40 MG: 40 TABLET ORAL at 16:28

## 2019-05-13 RX ADMIN — CYCLOSPORINE 1 DROP: 0.5 EMULSION OPHTHALMIC at 21:42

## 2019-05-13 RX ADMIN — DABIGATRAN ETEXILATE MESYLATE 150 MG: 150 CAPSULE ORAL at 21:43

## 2019-05-13 RX ADMIN — AMIODARONE HYDROCHLORIDE 200 MG: 200 TABLET ORAL at 08:09

## 2019-05-13 RX ADMIN — RIVAROXABAN 20 MG: 20 TABLET, FILM COATED ORAL at 08:09

## 2019-05-13 RX ADMIN — VITAMIN D, TAB 1000IU (100/BT) 5000 UNITS: 25 TAB at 08:09

## 2019-05-13 RX ADMIN — INSULIN LISPRO 2 UNITS: 100 INJECTION, SOLUTION INTRAVENOUS; SUBCUTANEOUS at 13:09

## 2019-05-13 RX ADMIN — INSULIN LISPRO 1 UNITS: 100 INJECTION, SOLUTION INTRAVENOUS; SUBCUTANEOUS at 21:44

## 2019-05-14 ENCOUNTER — APPOINTMENT (INPATIENT)
Dept: NON INVASIVE DIAGNOSTICS | Facility: HOSPITAL | Age: 84
DRG: 065 | End: 2019-05-14
Payer: COMMERCIAL

## 2019-05-14 VITALS
HEART RATE: 58 BPM | TEMPERATURE: 97.9 F | OXYGEN SATURATION: 95 % | DIASTOLIC BLOOD PRESSURE: 81 MMHG | BODY MASS INDEX: 35.96 KG/M2 | SYSTOLIC BLOOD PRESSURE: 152 MMHG | RESPIRATION RATE: 18 BRPM | HEIGHT: 57 IN | WEIGHT: 166.67 LBS

## 2019-05-14 PROBLEM — R79.89 ELEVATED TSH: Status: ACTIVE | Noted: 2019-05-14

## 2019-05-14 PROBLEM — E87.1 HYPONATREMIA: Status: RESOLVED | Noted: 2017-01-16 | Resolved: 2019-05-14

## 2019-05-14 LAB
ANION GAP SERPL CALCULATED.3IONS-SCNC: 10 MMOL/L (ref 4–13)
BASOPHILS # BLD AUTO: 0.03 THOUSANDS/ΜL (ref 0–0.1)
BASOPHILS NFR BLD AUTO: 1 % (ref 0–1)
BUN SERPL-MCNC: 25 MG/DL (ref 5–25)
CALCIUM SERPL-MCNC: 9.5 MG/DL (ref 8.3–10.1)
CANCER AG125 SERPL-ACNC: 8.8 U/ML (ref 0–30)
CHLORIDE SERPL-SCNC: 102 MMOL/L (ref 100–108)
CO2 SERPL-SCNC: 26 MMOL/L (ref 21–32)
CREAT SERPL-MCNC: 0.93 MG/DL (ref 0.6–1.3)
EOSINOPHIL # BLD AUTO: 0.16 THOUSAND/ΜL (ref 0–0.61)
EOSINOPHIL NFR BLD AUTO: 3 % (ref 0–6)
ERYTHROCYTE [DISTWIDTH] IN BLOOD BY AUTOMATED COUNT: 13.9 % (ref 11.6–15.1)
GFR SERPL CREATININE-BSD FRML MDRD: 57 ML/MIN/1.73SQ M
GLUCOSE SERPL-MCNC: 145 MG/DL (ref 65–140)
GLUCOSE SERPL-MCNC: 147 MG/DL (ref 65–140)
GLUCOSE SERPL-MCNC: 167 MG/DL (ref 65–140)
GLUCOSE SERPL-MCNC: 208 MG/DL (ref 65–140)
HCT VFR BLD AUTO: 35.9 % (ref 34.8–46.1)
HGB BLD-MCNC: 11.9 G/DL (ref 11.5–15.4)
IMM GRANULOCYTES # BLD AUTO: 0.02 THOUSAND/UL (ref 0–0.2)
IMM GRANULOCYTES NFR BLD AUTO: 0 % (ref 0–2)
LYMPHOCYTES # BLD AUTO: 1.76 THOUSANDS/ΜL (ref 0.6–4.47)
LYMPHOCYTES NFR BLD AUTO: 29 % (ref 14–44)
MCH RBC QN AUTO: 31.5 PG (ref 26.8–34.3)
MCHC RBC AUTO-ENTMCNC: 33.1 G/DL (ref 31.4–37.4)
MCV RBC AUTO: 95 FL (ref 82–98)
MONOCYTES # BLD AUTO: 0.7 THOUSAND/ΜL (ref 0.17–1.22)
MONOCYTES NFR BLD AUTO: 12 % (ref 4–12)
NEUTROPHILS # BLD AUTO: 3.32 THOUSANDS/ΜL (ref 1.85–7.62)
NEUTS SEG NFR BLD AUTO: 55 % (ref 43–75)
NRBC BLD AUTO-RTO: 0 /100 WBCS
PLATELET # BLD AUTO: 197 THOUSANDS/UL (ref 149–390)
PMV BLD AUTO: 10.5 FL (ref 8.9–12.7)
POTASSIUM SERPL-SCNC: 4 MMOL/L (ref 3.5–5.3)
RBC # BLD AUTO: 3.78 MILLION/UL (ref 3.81–5.12)
SODIUM SERPL-SCNC: 138 MMOL/L (ref 136–145)
WBC # BLD AUTO: 5.99 THOUSAND/UL (ref 4.31–10.16)

## 2019-05-14 PROCEDURE — 82948 REAGENT STRIP/BLOOD GLUCOSE: CPT

## 2019-05-14 PROCEDURE — 93799 UNLISTED CV SVC/PROCEDURE: CPT

## 2019-05-14 PROCEDURE — 99239 HOSP IP/OBS DSCHRG MGMT >30: CPT | Performed by: FAMILY MEDICINE

## 2019-05-14 PROCEDURE — 85025 COMPLETE CBC W/AUTO DIFF WBC: CPT | Performed by: FAMILY MEDICINE

## 2019-05-14 PROCEDURE — 93308 TTE F-UP OR LMTD: CPT

## 2019-05-14 PROCEDURE — 93308 TTE F-UP OR LMTD: CPT | Performed by: INTERNAL MEDICINE

## 2019-05-14 PROCEDURE — 80048 BASIC METABOLIC PNL TOTAL CA: CPT | Performed by: FAMILY MEDICINE

## 2019-05-14 PROCEDURE — 99232 SBSQ HOSP IP/OBS MODERATE 35: CPT | Performed by: PSYCHIATRY & NEUROLOGY

## 2019-05-14 RX ORDER — DABIGATRAN ETEXILATE 150 MG/1
150 CAPSULE, COATED PELLETS ORAL EVERY 12 HOURS SCHEDULED
Qty: 60 CAPSULE | Refills: 0 | Status: SHIPPED | OUTPATIENT
Start: 2019-05-14 | End: 2019-06-08 | Stop reason: SDUPTHER

## 2019-05-14 RX ADMIN — AMIODARONE HYDROCHLORIDE 200 MG: 200 TABLET ORAL at 08:57

## 2019-05-14 RX ADMIN — PRAVASTATIN SODIUM 40 MG: 40 TABLET ORAL at 17:49

## 2019-05-14 RX ADMIN — INSULIN LISPRO 1 UNITS: 100 INJECTION, SOLUTION INTRAVENOUS; SUBCUTANEOUS at 17:49

## 2019-05-14 RX ADMIN — INSULIN LISPRO 1 UNITS: 100 INJECTION, SOLUTION INTRAVENOUS; SUBCUTANEOUS at 12:02

## 2019-05-14 RX ADMIN — TIMOLOL MALEATE 1 DROP: 5 SOLUTION/ DROPS OPHTHALMIC at 08:57

## 2019-05-14 RX ADMIN — CYCLOSPORINE 1 DROP: 0.5 EMULSION OPHTHALMIC at 08:57

## 2019-05-14 RX ADMIN — VITAMIN D, TAB 1000IU (100/BT) 5000 UNITS: 25 TAB at 08:57

## 2019-05-14 RX ADMIN — DABIGATRAN ETEXILATE MESYLATE 150 MG: 150 CAPSULE ORAL at 08:56

## 2019-05-14 RX ADMIN — CYCLOSPORINE 1 DROP: 0.5 EMULSION OPHTHALMIC at 17:49

## 2019-05-15 ENCOUNTER — TELEPHONE (OUTPATIENT)
Dept: NEUROLOGY | Facility: CLINIC | Age: 84
End: 2019-05-15

## 2019-05-15 ENCOUNTER — TELEPHONE (OUTPATIENT)
Dept: FAMILY MEDICINE CLINIC | Facility: CLINIC | Age: 84
End: 2019-05-15

## 2019-05-15 ENCOUNTER — TRANSITIONAL CARE MANAGEMENT (OUTPATIENT)
Dept: FAMILY MEDICINE CLINIC | Facility: CLINIC | Age: 84
End: 2019-05-15

## 2019-05-15 DIAGNOSIS — Z71.89 COMPLEX CARE COORDINATION: Primary | ICD-10-CM

## 2019-05-21 ENCOUNTER — OFFICE VISIT (OUTPATIENT)
Dept: CARDIOLOGY CLINIC | Facility: CLINIC | Age: 84
End: 2019-05-21
Payer: COMMERCIAL

## 2019-05-21 ENCOUNTER — TELEPHONE (OUTPATIENT)
Dept: NEUROLOGY | Facility: CLINIC | Age: 84
End: 2019-05-21

## 2019-05-21 VITALS
BODY MASS INDEX: 37.54 KG/M2 | HEIGHT: 57 IN | WEIGHT: 174 LBS | HEART RATE: 64 BPM | SYSTOLIC BLOOD PRESSURE: 180 MMHG | DIASTOLIC BLOOD PRESSURE: 80 MMHG

## 2019-05-21 DIAGNOSIS — I48.91 ATRIAL FIBRILLATION, UNSPECIFIED TYPE (HCC): Primary | ICD-10-CM

## 2019-05-21 PROCEDURE — 1111F DSCHRG MED/CURRENT MED MERGE: CPT | Performed by: INTERNAL MEDICINE

## 2019-05-21 PROCEDURE — 93000 ELECTROCARDIOGRAM COMPLETE: CPT | Performed by: INTERNAL MEDICINE

## 2019-05-21 PROCEDURE — 99214 OFFICE O/P EST MOD 30 MIN: CPT | Performed by: INTERNAL MEDICINE

## 2019-05-21 RX ORDER — CHLORTHALIDONE 25 MG/1
12.5 TABLET ORAL DAILY
Qty: 15 TABLET | Refills: 30 | Status: SHIPPED | OUTPATIENT
Start: 2019-05-21 | End: 2019-07-10 | Stop reason: HOSPADM

## 2019-05-21 RX ORDER — POTASSIUM CHLORIDE 750 MG/1
10 TABLET, EXTENDED RELEASE ORAL DAILY
Qty: 30 TABLET | Refills: 5 | Status: SHIPPED | OUTPATIENT
Start: 2019-05-21 | End: 2019-08-05 | Stop reason: SDUPTHER

## 2019-05-22 ENCOUNTER — PATIENT OUTREACH (OUTPATIENT)
Dept: FAMILY MEDICINE CLINIC | Facility: CLINIC | Age: 84
End: 2019-05-22

## 2019-05-22 ENCOUNTER — OFFICE VISIT (OUTPATIENT)
Dept: FAMILY MEDICINE CLINIC | Facility: CLINIC | Age: 84
End: 2019-05-22
Payer: COMMERCIAL

## 2019-05-22 VITALS
DIASTOLIC BLOOD PRESSURE: 78 MMHG | SYSTOLIC BLOOD PRESSURE: 122 MMHG | TEMPERATURE: 98.2 F | HEART RATE: 66 BPM | RESPIRATION RATE: 16 BRPM | HEIGHT: 57 IN | WEIGHT: 170.6 LBS | BODY MASS INDEX: 36.8 KG/M2

## 2019-05-22 DIAGNOSIS — R42 DIZZY: ICD-10-CM

## 2019-05-22 DIAGNOSIS — R93.89 ABNORMAL CT SCAN: ICD-10-CM

## 2019-05-22 DIAGNOSIS — E11.65 TYPE 2 DIABETES MELLITUS WITH HYPERGLYCEMIA, WITHOUT LONG-TERM CURRENT USE OF INSULIN (HCC): ICD-10-CM

## 2019-05-22 DIAGNOSIS — Z09 HOSPITAL DISCHARGE FOLLOW-UP: Primary | ICD-10-CM

## 2019-05-22 DIAGNOSIS — R79.89 ELEVATED TSH: ICD-10-CM

## 2019-05-22 DIAGNOSIS — I63.521 CEREBROVASCULAR ACCIDENT (CVA) DUE TO OCCLUSION OF RIGHT ANTERIOR CEREBRAL ARTERY (HCC): ICD-10-CM

## 2019-05-22 PROCEDURE — 99495 TRANSJ CARE MGMT MOD F2F 14D: CPT | Performed by: FAMILY MEDICINE

## 2019-05-22 PROCEDURE — 1111F DSCHRG MED/CURRENT MED MERGE: CPT | Performed by: FAMILY MEDICINE

## 2019-05-22 RX ORDER — GLIPIZIDE 10 MG/1
10 TABLET ORAL DAILY
Qty: 30 TABLET | Refills: 0
Start: 2019-05-22 | End: 2019-07-10 | Stop reason: HOSPADM

## 2019-05-29 ENCOUNTER — PATIENT OUTREACH (OUTPATIENT)
Dept: FAMILY MEDICINE CLINIC | Facility: CLINIC | Age: 84
End: 2019-05-29

## 2019-05-29 ENCOUNTER — TELEPHONE (OUTPATIENT)
Dept: FAMILY MEDICINE CLINIC | Facility: CLINIC | Age: 84
End: 2019-05-29

## 2019-06-01 DIAGNOSIS — E11.65 TYPE 2 DIABETES MELLITUS WITH HYPERGLYCEMIA, WITHOUT LONG-TERM CURRENT USE OF INSULIN (HCC): ICD-10-CM

## 2019-06-01 DIAGNOSIS — I48.91 ATRIAL FIBRILLATION, UNSPECIFIED TYPE (HCC): ICD-10-CM

## 2019-06-01 RX ORDER — LINAGLIPTIN 5 MG/1
TABLET, FILM COATED ORAL
Qty: 30 TABLET | Refills: 5 | Status: SHIPPED | OUTPATIENT
Start: 2019-06-01 | End: 2019-07-10 | Stop reason: HOSPADM

## 2019-06-03 RX ORDER — ROSUVASTATIN CALCIUM 10 MG/1
TABLET, COATED ORAL
Qty: 30 TABLET | Refills: 5 | Status: SHIPPED | OUTPATIENT
Start: 2019-06-03 | End: 2019-08-23 | Stop reason: SDUPTHER

## 2019-06-05 ENCOUNTER — OFFICE VISIT (OUTPATIENT)
Dept: NEUROLOGY | Facility: CLINIC | Age: 84
End: 2019-06-05
Payer: COMMERCIAL

## 2019-06-05 ENCOUNTER — APPOINTMENT (OUTPATIENT)
Dept: LAB | Facility: CLINIC | Age: 84
End: 2019-06-05
Payer: COMMERCIAL

## 2019-06-05 VITALS
BODY MASS INDEX: 37.11 KG/M2 | SYSTOLIC BLOOD PRESSURE: 142 MMHG | WEIGHT: 172 LBS | HEART RATE: 58 BPM | DIASTOLIC BLOOD PRESSURE: 68 MMHG | HEIGHT: 57 IN

## 2019-06-05 DIAGNOSIS — I48.91 ATRIAL FIBRILLATION, UNSPECIFIED TYPE (HCC): ICD-10-CM

## 2019-06-05 DIAGNOSIS — Z86.73 HISTORY OF STROKE: Primary | ICD-10-CM

## 2019-06-05 DIAGNOSIS — E11.65 TYPE 2 DIABETES MELLITUS WITH HYPERGLYCEMIA, WITHOUT LONG-TERM CURRENT USE OF INSULIN (HCC): ICD-10-CM

## 2019-06-05 DIAGNOSIS — R79.89 ELEVATED TSH: ICD-10-CM

## 2019-06-05 DIAGNOSIS — R42 SEVERE DIZZINESS: ICD-10-CM

## 2019-06-05 DIAGNOSIS — E66.9 OBESITY (BMI 30-39.9): ICD-10-CM

## 2019-06-05 DIAGNOSIS — E87.1 HYPONATREMIA: Primary | ICD-10-CM

## 2019-06-05 DIAGNOSIS — I10 ESSENTIAL HYPERTENSION: Chronic | ICD-10-CM

## 2019-06-05 LAB
ALBUMIN SERPL BCP-MCNC: 4.1 G/DL (ref 3.5–5)
ALP SERPL-CCNC: 76 U/L (ref 46–116)
ALT SERPL W P-5'-P-CCNC: 27 U/L (ref 12–78)
ANION GAP SERPL CALCULATED.3IONS-SCNC: 4 MMOL/L (ref 4–13)
AST SERPL W P-5'-P-CCNC: 6 U/L (ref 5–45)
BILIRUB SERPL-MCNC: 0.4 MG/DL (ref 0.2–1)
BUN SERPL-MCNC: 21 MG/DL (ref 5–25)
CALCIUM SERPL-MCNC: 9.1 MG/DL (ref 8.3–10.1)
CHLORIDE SERPL-SCNC: 98 MMOL/L (ref 100–108)
CO2 SERPL-SCNC: 27 MMOL/L (ref 21–32)
CREAT SERPL-MCNC: 0.92 MG/DL (ref 0.6–1.3)
CREAT UR-MCNC: 38.9 MG/DL
EST. AVERAGE GLUCOSE BLD GHB EST-MCNC: 134 MG/DL
GFR SERPL CREATININE-BSD FRML MDRD: 57 ML/MIN/1.73SQ M
GLUCOSE SERPL-MCNC: 75 MG/DL (ref 65–140)
HBA1C MFR BLD: 6.3 % (ref 4.2–6.3)
MICROALBUMIN UR-MCNC: 21.5 MG/L (ref 0–20)
MICROALBUMIN/CREAT 24H UR: 55 MG/G CREATININE (ref 0–30)
POTASSIUM SERPL-SCNC: 4 MMOL/L (ref 3.5–5.3)
PROT SERPL-MCNC: 7.7 G/DL (ref 6.4–8.2)
SODIUM SERPL-SCNC: 129 MMOL/L (ref 136–145)
T4 FREE SERPL-MCNC: 1.18 NG/DL (ref 0.76–1.46)
TSH SERPL DL<=0.05 MIU/L-ACNC: 5.74 UIU/ML (ref 0.36–3.74)

## 2019-06-05 PROCEDURE — 82570 ASSAY OF URINE CREATININE: CPT

## 2019-06-05 PROCEDURE — 36415 COLL VENOUS BLD VENIPUNCTURE: CPT

## 2019-06-05 PROCEDURE — 84443 ASSAY THYROID STIM HORMONE: CPT

## 2019-06-05 PROCEDURE — 84439 ASSAY OF FREE THYROXINE: CPT

## 2019-06-05 PROCEDURE — 80053 COMPREHEN METABOLIC PANEL: CPT

## 2019-06-05 PROCEDURE — 83036 HEMOGLOBIN GLYCOSYLATED A1C: CPT

## 2019-06-05 PROCEDURE — 99214 OFFICE O/P EST MOD 30 MIN: CPT | Performed by: NURSE PRACTITIONER

## 2019-06-05 PROCEDURE — 82043 UR ALBUMIN QUANTITATIVE: CPT

## 2019-06-05 RX ORDER — MECLIZINE HCL 12.5 MG/1
12.5 TABLET ORAL EVERY 12 HOURS PRN
Qty: 15 TABLET | Refills: 2 | Status: SHIPPED | OUTPATIENT
Start: 2019-06-05 | End: 2019-08-05 | Stop reason: SDUPTHER

## 2019-06-07 ENCOUNTER — TELEPHONE (OUTPATIENT)
Dept: CARDIOLOGY CLINIC | Facility: CLINIC | Age: 84
End: 2019-06-07

## 2019-06-07 DIAGNOSIS — I10 ESSENTIAL HYPERTENSION: ICD-10-CM

## 2019-06-07 RX ORDER — HYDRALAZINE HYDROCHLORIDE 50 MG/1
50 TABLET, FILM COATED ORAL 3 TIMES DAILY
Qty: 90 TABLET | Refills: 3 | Status: SHIPPED | OUTPATIENT
Start: 2019-06-07 | End: 2019-07-23 | Stop reason: SDUPTHER

## 2019-06-08 DIAGNOSIS — I48.0 PAROXYSMAL ATRIAL FIBRILLATION (HCC): ICD-10-CM

## 2019-06-08 DIAGNOSIS — I63.9 CVA (CEREBRAL VASCULAR ACCIDENT) (HCC): ICD-10-CM

## 2019-06-10 RX ORDER — DABIGATRAN ETEXILATE 150 MG/1
150 CAPSULE, COATED PELLETS ORAL EVERY 12 HOURS SCHEDULED
Qty: 180 CAPSULE | Refills: 1 | Status: SHIPPED | OUTPATIENT
Start: 2019-06-10 | End: 2019-12-04 | Stop reason: SDUPTHER

## 2019-06-20 ENCOUNTER — OFFICE VISIT (OUTPATIENT)
Dept: CARDIOLOGY CLINIC | Facility: CLINIC | Age: 84
End: 2019-06-20
Payer: COMMERCIAL

## 2019-06-20 VITALS
RESPIRATION RATE: 16 BRPM | WEIGHT: 178.2 LBS | HEIGHT: 58 IN | HEART RATE: 64 BPM | DIASTOLIC BLOOD PRESSURE: 74 MMHG | SYSTOLIC BLOOD PRESSURE: 132 MMHG | BODY MASS INDEX: 37.41 KG/M2

## 2019-06-20 DIAGNOSIS — I10 HYPERTENSION, UNSPECIFIED TYPE: Primary | ICD-10-CM

## 2019-06-20 PROCEDURE — 99214 OFFICE O/P EST MOD 30 MIN: CPT | Performed by: INTERNAL MEDICINE

## 2019-06-20 RX ORDER — LOSARTAN POTASSIUM AND HYDROCHLOROTHIAZIDE 12.5; 5 MG/1; MG/1
1 TABLET ORAL DAILY
Qty: 30 TABLET | Refills: 5 | Status: SHIPPED | OUTPATIENT
Start: 2019-06-20 | End: 2019-07-10 | Stop reason: HOSPADM

## 2019-06-30 ENCOUNTER — HOSPITAL ENCOUNTER (EMERGENCY)
Facility: HOSPITAL | Age: 84
Discharge: HOME | End: 2019-06-30
Attending: EMERGENCY MEDICINE | Admitting: EMERGENCY MEDICINE
Payer: COMMERCIAL

## 2019-06-30 VITALS
OXYGEN SATURATION: 97 % | TEMPERATURE: 97.8 F | DIASTOLIC BLOOD PRESSURE: 90 MMHG | WEIGHT: 174.16 LBS | BODY MASS INDEX: 37.04 KG/M2 | RESPIRATION RATE: 16 BRPM | SYSTOLIC BLOOD PRESSURE: 216 MMHG | HEART RATE: 80 BPM

## 2019-06-30 DIAGNOSIS — N93.8 DYSFUNCTIONAL UTERINE BLEEDING: Primary | ICD-10-CM

## 2019-06-30 LAB
ANION GAP SERPL CALCULATED.3IONS-SCNC: 13 MMOL/L (ref 4–13)
APTT PPP: 101 SECONDS (ref 23–37)
BACTERIA UR QL AUTO: ABNORMAL /HPF
BASOPHILS # BLD AUTO: 0.04 THOUSANDS/ΜL (ref 0–0.1)
BASOPHILS NFR BLD AUTO: 1 % (ref 0–1)
BILIRUB UR QL STRIP: NEGATIVE
BUN SERPL-MCNC: 23 MG/DL (ref 5–25)
CALCIUM SERPL-MCNC: 9.9 MG/DL (ref 8.3–10.1)
CHLORIDE SERPL-SCNC: 96 MMOL/L (ref 100–108)
CLARITY UR: ABNORMAL
CO2 SERPL-SCNC: 24 MMOL/L (ref 21–32)
COLOR UR: ABNORMAL
CREAT SERPL-MCNC: 0.98 MG/DL (ref 0.6–1.3)
EOSINOPHIL # BLD AUTO: 0.01 THOUSAND/ΜL (ref 0–0.61)
EOSINOPHIL NFR BLD AUTO: 0 % (ref 0–6)
ERYTHROCYTE [DISTWIDTH] IN BLOOD BY AUTOMATED COUNT: 13.2 % (ref 11.6–15.1)
GFR SERPL CREATININE-BSD FRML MDRD: 53 ML/MIN/1.73SQ M
GLUCOSE SERPL-MCNC: 217 MG/DL (ref 65–140)
GLUCOSE UR STRIP-MCNC: ABNORMAL MG/DL
HCT VFR BLD AUTO: 40 % (ref 34.8–46.1)
HGB BLD-MCNC: 13.1 G/DL (ref 11.5–15.4)
HGB UR QL STRIP.AUTO: ABNORMAL
IMM GRANULOCYTES # BLD AUTO: 0.02 THOUSAND/UL (ref 0–0.2)
IMM GRANULOCYTES NFR BLD AUTO: 0 % (ref 0–2)
INR PPP: 2.62 (ref 0.84–1.19)
KETONES UR STRIP-MCNC: NEGATIVE MG/DL
LEUKOCYTE ESTERASE UR QL STRIP: NEGATIVE
LYMPHOCYTES # BLD AUTO: 2.09 THOUSANDS/ΜL (ref 0.6–4.47)
LYMPHOCYTES NFR BLD AUTO: 28 % (ref 14–44)
MCH RBC QN AUTO: 29.9 PG (ref 26.8–34.3)
MCHC RBC AUTO-ENTMCNC: 32.8 G/DL (ref 31.4–37.4)
MCV RBC AUTO: 91 FL (ref 82–98)
MONOCYTES # BLD AUTO: 0.53 THOUSAND/ΜL (ref 0.17–1.22)
MONOCYTES NFR BLD AUTO: 7 % (ref 4–12)
NEUTROPHILS # BLD AUTO: 4.67 THOUSANDS/ΜL (ref 1.85–7.62)
NEUTS SEG NFR BLD AUTO: 64 % (ref 43–75)
NITRITE UR QL STRIP: NEGATIVE
NON-SQ EPI CELLS URNS QL MICRO: ABNORMAL /HPF
NRBC BLD AUTO-RTO: 0 /100 WBCS
PH UR STRIP.AUTO: 6 [PH]
PLATELET # BLD AUTO: 281 THOUSANDS/UL (ref 149–390)
PMV BLD AUTO: 9.9 FL (ref 8.9–12.7)
POTASSIUM SERPL-SCNC: 4.6 MMOL/L (ref 3.5–5.3)
PROT UR STRIP-MCNC: ABNORMAL MG/DL
PROTHROMBIN TIME: 28.6 SECONDS (ref 11.6–14.5)
RBC # BLD AUTO: 4.38 MILLION/UL (ref 3.81–5.12)
RBC #/AREA URNS AUTO: ABNORMAL /HPF
SODIUM SERPL-SCNC: 133 MMOL/L (ref 136–145)
SP GR UR STRIP.AUTO: 1.02 (ref 1–1.03)
UROBILINOGEN UR QL STRIP.AUTO: 0.2 E.U./DL
WBC # BLD AUTO: 7.36 THOUSAND/UL (ref 4.31–10.16)
WBC #/AREA URNS AUTO: ABNORMAL /HPF

## 2019-06-30 PROCEDURE — 85730 THROMBOPLASTIN TIME PARTIAL: CPT | Performed by: EMERGENCY MEDICINE

## 2019-06-30 PROCEDURE — 85610 PROTHROMBIN TIME: CPT | Performed by: EMERGENCY MEDICINE

## 2019-06-30 PROCEDURE — 81001 URINALYSIS AUTO W/SCOPE: CPT | Performed by: EMERGENCY MEDICINE

## 2019-06-30 PROCEDURE — 36415 COLL VENOUS BLD VENIPUNCTURE: CPT | Performed by: EMERGENCY MEDICINE

## 2019-06-30 PROCEDURE — 99283 EMERGENCY DEPT VISIT LOW MDM: CPT | Performed by: EMERGENCY MEDICINE

## 2019-06-30 PROCEDURE — 85025 COMPLETE CBC W/AUTO DIFF WBC: CPT | Performed by: EMERGENCY MEDICINE

## 2019-06-30 PROCEDURE — 99284 EMERGENCY DEPT VISIT MOD MDM: CPT

## 2019-06-30 PROCEDURE — 80048 BASIC METABOLIC PNL TOTAL CA: CPT | Performed by: EMERGENCY MEDICINE

## 2019-06-30 NOTE — ED PROVIDER NOTES
History  Chief Complaint   Patient presents with    Vaginal Bleeding     started this am with vaginal bleeding, takes pradaxa  80-year-old female with a history of hypertension, CHF, atrial fibrillation on Pradaxa presents to the emergency department with possible vaginal bleeding  Patient states she went to the bathroom this morning and when she wiped up front she noticed blood on the toilet paper  She did put a pad on and has had to change the pad once already  She has no abdominal pain  History provided by:  Patient and relative   used: No    Vaginal Bleeding   Quality:  Bright red  Onset quality:  Sudden  Timing:  Constant  Chronicity:  New  Number of pads used: One  Context: after urination    Relieved by:  None tried  Worsened by:  Nothing  Ineffective treatments:  None tried  Associated symptoms: no abdominal pain, no back pain, no dizziness, no dysuria, no fatigue, no fever, no nausea and no vaginal discharge    Risk factors: no gynecological surgery        Prior to Admission Medications   Prescriptions Last Dose Informant Patient Reported? Taking? Acetaminophen (TYLENOL) 325 MG CAPS  Child Yes No   Sig: Take by mouth as needed    Bimatoprost (LUMIGAN OP)  Child Yes No   Sig: Administer 1 drop to both eyes daily at bedtime Both eyes   Cholecalciferol (VITAMIN D3) 1000 units CAPS  Child Yes No   Sig: Take 5,000 Units by mouth daily    Cyanocobalamin (VITAMIN B 12 PO)  Child Yes No   Sig: Take 1,000 mcg by mouth daily    ONE TOUCH ULTRA TEST test strip  Child No No   Sig: TEST twice a day   ONETOUCH DELICA LANCETS 73Q MISC  Child No No   Sig: TEST TWICE DAILY     RESTASIS 0 05 % ophthalmic emulsion  Child Yes No   TRADJENTA 5 MG TABS  Child No No   Sig: TAKE 1 DAILY   albuterol (PROAIR HFA) 90 mcg/act inhaler  Child No No   Sig: Inhale 2 puffs every 6 (six) hours as needed for wheezing   amiodarone 200 mg tablet  Child No No   Sig: Take 1 tablet (200 mg total) by mouth daily chlorthalidone 25 mg tablet  Child No No   Sig: Take 0 5 tablets (12 5 mg total) by mouth daily   dabigatran etexilate (PRADAXA) 150 mg capsu  Child No No   Sig: Take 1 capsule (150 mg total) by mouth every 12 (twelve) hours for 90 days   glipiZIDE (GLUCOTROL) 10 mg tablet  Child No No   Sig: Take 1 tablet (10 mg total) by mouth daily   hydrALAZINE (APRESOLINE) 50 mg tablet  Child No No   Sig: Take 1 tablet (50 mg total) by mouth 3 (three) times a day for 90 days   losartan-hydrochlorothiazide (HYZAAR) 50-12 5 mg per tablet   No No   Sig: Take 1 tablet by mouth daily   meclizine (ANTIVERT) 12 5 MG tablet  Child No No   Sig: Take 1 tablet (12 5 mg total) by mouth every 12 (twelve) hours as needed for dizziness   metFORMIN (GLUCOPHAGE) 500 mg tablet  Child No No   Sig: Take 1 tablet (500 mg total) by mouth 2 (two) times a day with meals for 90 days Cannot tolerate 1000mg because of GI upset     potassium chloride (K-DUR,KLOR-CON) 10 mEq tablet  Child No No   Sig: Take 1 tablet (10 mEq total) by mouth daily   rosuvastatin (CRESTOR) 10 MG tablet  Child No No   Sig: take 1 tablet by mouth at bedtime   timolol (TIMOPTIC) 0 5 % ophthalmic solution  Child Yes No   Sig: PLACE 1 DROP IN THE RIGH EYE EVERY MORNING      Facility-Administered Medications: None       Past Medical History:   Diagnosis Date    Ambulatory dysfunction     CHF (congestive heart failure) ([facility])     pEFHF    Cholelithiasis     Coronary artery disease     Depression with anxiety     [facility]     niddm    Glaucoma     Hypertension     Hyponatremia     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     Obstructive sleep apnea     PAF (paroxysmal atrial fibrillation) ([facility])     Vertigo        Past Surgical History:   Procedure Laterality Date    APPENDECTOMY      GLAUCOMA SURGERY  01/03/2016    DE REPLACE AORTIC VALVE OPENFEMORAL ARTERY APPROACH N/A 4/17/2018    Procedure: REPLACEMENT AORTIC VALVE TRANSCATHETER (TAVR) TRANSFEMORAL W/ 23 MM AGUILAR STEVE S3 VALVE;  Surgeon: Berta Wiggins DO;  [facility];  Service: Cardiac Surgery    TUBAL LIGATION         Family History   Problem Relation Age of Onset    No Known Problems Mother     Diabetes Father     Stroke Father     Coronary artery disease Sister     No Known Problems Brother     No Known Problems Son     Breast cancer Daughter     No Known Problems Maternal Grandmother     No Known Problems Maternal Grandfather     No Known Problems Paternal Grandmother     No Known Problems Paternal Grandfather     No Known Problems Cousin     Rheum arthritis Neg Hx     Osteoarthritis Neg Hx     Asthma Neg Hx     Heart failure Neg Hx     Hyperlipidemia Neg Hx     Hypertension Neg Hx     Migraines Neg Hx     Rashes / Skin problems Neg Hx     Seizures Neg Hx     Thyroid disease Neg Hx      I have reviewed and agree with the history as documented  Social History     Tobacco Use    Smoking status: Never Smoker    Smokeless tobacco: Never Used   Substance Use Topics    Alcohol use: Never     Frequency: Never     Binge frequency: Never    Drug use: Never        Review of Systems   Constitutional: Negative  Negative for chills, diaphoresis, fatigue and fever  HENT: Negative  Negative for congestion, rhinorrhea and sore throat  Eyes: Negative  Negative for discharge, redness and itching  Respiratory: Negative  Negative for apnea, cough, chest tightness, shortness of breath and wheezing  Cardiovascular: Negative for chest pain, palpitations and leg swelling  Gastrointestinal: Negative  Negative for abdominal distention, abdominal pain, blood in stool, diarrhea and nausea  Endocrine: Negative  Genitourinary: Positive for vaginal bleeding  Negative for dysuria, flank pain, frequency, urgency and vaginal discharge  Musculoskeletal: Negative  Negative for back pain  Skin: Negative  Allergic/Immunologic: Negative      Neurological: Negative  Negative for dizziness, syncope, weakness, light-headedness, numbness and headaches  Hematological: Negative  All other systems reviewed and are negative  Physical Exam  Physical Exam   Constitutional: She is oriented to person, place, and time  She appears well-developed and well-nourished  Non-toxic appearance  She does not have a sickly appearance  She does not appear ill  No distress  HENT:   Head: Normocephalic and atraumatic  Right Ear: External ear normal    Left Ear: External ear normal    Mouth/Throat: Oropharynx is clear and moist  No oropharyngeal exudate  Eyes: Pupils are equal, round, and reactive to light  Conjunctivae are normal  Right eye exhibits no discharge  Left eye exhibits no discharge  No scleral icterus  Cardiovascular: Normal rate, regular rhythm and normal heart sounds  Exam reveals no gallop and no friction rub  No murmur heard  Pulmonary/Chest: Effort normal and breath sounds normal  No stridor  No respiratory distress  She has no wheezes  She has no rales  She exhibits no tenderness  Abdominal: Soft  Bowel sounds are normal  She exhibits no distension and no mass  There is no tenderness  No hernia  Obese   Genitourinary: There is bleeding (Small amount of bleeding in the vaginal vault) in the vagina  Neurological: She is alert and oriented to person, place, and time  She has normal reflexes  She exhibits normal muscle tone  Skin: Skin is warm and dry  No rash noted  She is not diaphoretic  No erythema  No pallor  Psychiatric: She has a normal mood and affect  Nursing note and vitals reviewed        Vital Signs  ED Triage Vitals   Temperature Pulse Respirations Blood Pressure SpO2   06/30/19 1323 06/30/19 1322 06/30/19 1322 06/30/19 1322 06/30/19 1322   97 8 °F (36 6 °C) 80 16 (!) 216/90 97 %      Temp Source Heart Rate Source Patient Position - Orthostatic VS BP Location FiO2 (%)   06/30/19 1323 -- 06/30/19 1322 06/30/19 1322 --   Temporal Sitting Left arm       Pain Score       06/30/19 1322       No Pain           Vitals:    06/30/19 1322   BP: (!) 216/90   Pulse: 80   Patient Position - Orthostatic VS: Sitting         Visual Acuity      ED Medications  Medications - No data to display    Diagnostic Studies  Results Reviewed     Procedure Component Value Units Date/Time    Protime-INR [[id number]]  (Abnormal) Collected:  06/30/19 1348    Lab Status:  Final result Specimen:  Blood from Arm, Left Updated:  06/30/19 1418     Protime 28 6 seconds      INR 2 62    APTT [[id number]]  (Abnormal) Collected:  06/30/19 1348    Lab Status:  Final result Specimen:  Blood from Arm, Left Updated:  06/30/19 1418      seconds     Basic metabolic panel [[id number]]  (Abnormal) Collected:  06/30/19 1348    Lab Status:  Final result Specimen:  Blood from Arm, Left Updated:  06/30/19 1417     Sodium 133 mmol/L      Potassium 4 6 mmol/L      Chloride 96 mmol/L      CO2 24 mmol/L      ANION GAP 13 mmol/L      BUN 23 mg/dL      Creatinine 0 98 mg/dL      Glucose 217 mg/dL      Calcium 9 9 mg/dL      eGFR 53 ml/min/1 73sq m     Narrative:       [place] guidelines for Chronic Kidney Disease (CKD):     Stage 1 with normal or high GFR (GFR > 90 mL/min/1 73 square meters)    Stage 2 Mild CKD (GFR = 60-89 mL/min/1 73 square meters)    Stage 3A Moderate CKD (GFR = 45-59 mL/min/1 73 square meters)    Stage 3B Moderate CKD (GFR = 30-44 mL/min/1 73 square meters)    Stage 4 Severe CKD (GFR = 15-29 mL/min/1 73 square meters)    Stage 5 End Stage CKD (GFR <15 mL/min/1 73 square meters)  Note: GFR calculation is accurate only with a steady state creatinine    UA w Reflex to Microscopic w Reflex to Culture [[id number]] Collected:  06/30/19 1404    Lab Status:  No result Specimen:  Urine, Clean Catch     CBC and differential [[id number]] Collected:  06/30/19 1348    Lab Status:  Final result Specimen:  Blood from Arm, Left Updated:  06/30/19 1356 WBC 7 36 Thousand/uL      RBC 4 38 Million/uL      Hemoglobin 13 1 g/dL      Hematocrit 40 0 %      MCV 91 fL      MCH 29 9 pg      MCHC 32 8 g/dL      RDW 13 2 %      MPV 9 9 fL      Platelets 807 Thousands/uL      nRBC 0 /100 WBCs      Neutrophils Relative 64 %      Immat GRANS % 0 %      Lymphocytes Relative 28 %      Monocytes Relative 7 %      Eosinophils Relative 0 %      Basophils Relative 1 %      Neutrophils Absolute 4 67 Thousands/µL      Immature Grans Absolute 0 02 Thousand/uL      Lymphocytes Absolute 2 09 Thousands/µL      Monocytes Absolute 0 53 Thousand/µL      Eosinophils Absolute 0 01 Thousand/µL      Basophils Absolute 0 04 Thousands/µL                  No orders to display              Procedures  Procedures       ED Course                               MDM  Number of Diagnoses or Management Options  Diagnosis management comments: 42-year-old female presents with possible vaginal bleeding that started after she both urinated and had a bowel movement this morning  She states when she wiped her front she noticed blood on the toilet paper  She had put a pad on and has had to change it once  No prior history of abnormal vaginal bleeding  No gynecological surgeries  Her daughter states she was told about a year ago that she had something on her left ovary but did not want any further testing done  On exam she appears quite well in no distress  She is taking Pradaxa as prescribed  No abdominal tenderness on exam   Check basic labs, coags, urinalysis  Will do pelvic exam to assess whether not the bleeding is vaginal or from another source         Amount and/or Complexity of Data Reviewed  Clinical lab tests: ordered and reviewed  Independent visualization of images, tracings, or specimens: yes        Disposition  Final diagnoses:   Dysfunctional uterine bleeding     Time reflects when diagnosis was documented in both MDM as applicable and the Disposition within this note     Time User Action Codes Description Comment    6/30/2019  2:19 PM Dhruv Larsen Add [N93 8] Dysfunctional uterine bleeding       ED Disposition     ED Disposition Condition Date/Time Comment    Discharge Good Sun Jun 30, 2019  2:19 PM Chelita Paula discharge to home/self care  Follow-up Information     Follow up With Specialties Details Why Contact Info Additional [facility] Schedule an appointment as soon as possible for a visit in 1 day  [address] [facility] [place] [address], [address]          Patient's Medications   Discharge Prescriptions    No medications on file     No discharge procedures on file      ED Provider  Electronically Signed by           Veronica Reyna DO  06/30/19 3006

## 2019-07-01 ENCOUNTER — OFFICE VISIT (OUTPATIENT)
Dept: OBGYN CLINIC | Facility: CLINIC | Age: 84
End: 2019-07-01
Payer: COMMERCIAL

## 2019-07-01 ENCOUNTER — APPOINTMENT (OUTPATIENT)
Dept: LAB | Facility: CLINIC | Age: 84
End: 2019-07-01
Payer: COMMERCIAL

## 2019-07-01 VITALS — DIASTOLIC BLOOD PRESSURE: 64 MMHG | SYSTOLIC BLOOD PRESSURE: 136 MMHG

## 2019-07-01 DIAGNOSIS — N95.0 PMB (POSTMENOPAUSAL BLEEDING): Primary | ICD-10-CM

## 2019-07-01 DIAGNOSIS — J40 BRONCHITIS: ICD-10-CM

## 2019-07-01 LAB
ANION GAP SERPL CALCULATED.3IONS-SCNC: 7 MMOL/L (ref 4–13)
BUN SERPL-MCNC: 21 MG/DL (ref 5–25)
CALCIUM SERPL-MCNC: 9.3 MG/DL (ref 8.3–10.1)
CHLORIDE SERPL-SCNC: 99 MMOL/L (ref 100–108)
CO2 SERPL-SCNC: 26 MMOL/L (ref 21–32)
CREAT SERPL-MCNC: 1.12 MG/DL (ref 0.6–1.3)
GFR SERPL CREATININE-BSD FRML MDRD: 45 ML/MIN/1.73SQ M
GLUCOSE SERPL-MCNC: 70 MG/DL (ref 65–140)
POTASSIUM SERPL-SCNC: 4.1 MMOL/L (ref 3.5–5.3)
SODIUM SERPL-SCNC: 132 MMOL/L (ref 136–145)

## 2019-07-01 PROCEDURE — 80048 BASIC METABOLIC PNL TOTAL CA: CPT | Performed by: INTERNAL MEDICINE

## 2019-07-01 PROCEDURE — 36415 COLL VENOUS BLD VENIPUNCTURE: CPT | Performed by: INTERNAL MEDICINE

## 2019-07-01 PROCEDURE — 99214 OFFICE O/P EST MOD 30 MIN: CPT | Performed by: OBSTETRICS & GYNECOLOGY

## 2019-07-01 PROCEDURE — 58100 BIOPSY OF UTERUS LINING: CPT | Performed by: OBSTETRICS & GYNECOLOGY

## 2019-07-01 RX ORDER — ALBUTEROL SULFATE 90 UG/1
2 AEROSOL, METERED RESPIRATORY (INHALATION) EVERY 6 HOURS PRN
Qty: 8.5 G | Refills: 1 | Status: SHIPPED | OUTPATIENT
Start: 2019-07-01 | End: 2020-11-12 | Stop reason: SDUPTHER

## 2019-07-01 NOTE — PROGRESS NOTES
Endometrial biopsy  Date/Time: 7/1/2019 2:05 PM  Performed by: Lexy sIbell MD  Authorized by: Lexy Isbell MD     Consent:     Consent obtained:  Verbal    Consent given by:  Guardian    Procedural risks discussed:  Bleeding, infection and failure rate    Patient questions answered: yes      Patient agrees, verbalizes understanding, and wants to proceed: yes    Indication:     Indications: Post-menopausal bleeding      Chronicity of post-menopausal bleeding:  New    Progression of post-menopausal bleeding:  Unchanged  Procedure:     Procedure: endometrial biopsy with Pipelle      A bivalve speculum was placed in the vagina: yes      Cervix cleaned and prepped: yes      The cervix was dilated: no      Uterus sounded: yes      Uterus sound depth (cm):  7    Specimen collected: specimen collected and sent to pathology      Patient tolerated procedure well with no complications: yes    Findings:     Uterus size:  Non-gravid    Cervix: normal      Adnexa: normal

## 2019-07-01 NOTE — PROGRESS NOTES
GYN Problem Note    HPI:  Patient is a 79-year-old  who presented to the emergency room yesterday morning with sudden onset bright red vaginal bleeding  She noticed the bleeding with using the restroom  She denies any pelvic pain or uterine cramping  She has noticed passage of 1 small clot since onset of bleeding  She was seen in the ED and found to have normal vital signs as well as coagulation indices  She was sent home and instructed to follow up with for her gynecologist   Her history is significant for having had a thickened endometrium noted on CT followed by pelvic ultrasound 2018  Her endometrium was noted to be 12 mm  Due to her medical conditions as well as her lack of vaginal bleeding the decision was made to forego any intervention at that time  However in size May of this year 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  Decision was made to convert her to Pradaxa  She denies any pelvic or vaginal trauma  She denies any abnormal vaginal discharge prior to onset of bleeding  She is not sexually active      GYN History:  Postmenopausal since age 55; never took HRT  No history of cervical dysplasia or STD's        OB History:   x 5; 2 children  = breast and bladder cancer    Past Medical History:   Diagnosis Date    Ambulatory dysfunction     CHF (congestive heart failure) ([facility])     pEFHF    Cholelithiasis     Coronary artery disease     Depression with anxiety     Diabetes mellitus (HCC)     niddm    Glaucoma     Hypertension     Hyponatremia     CVA x 2         Obstructive sleep apnea     PAF (paroxysmal atrial fibrillation) ([facility])     Vertigo        Past Surgical History:   Procedure Laterality Date    APPENDECTOMY      GLAUCOMA SURGERY  2016    [facility] REPLACE AORTIC VALVE OPENFEMORAL ARTERY APPROACH N/A 2018    Procedure: REPLACEMENT AORTIC VALVE TRANSCATHETER (TAVR) TRANSFEMORAL W/ 23 MM AGUILAR STEVE S3 VALVE;  Surgeon: Smiley Russo DO [place];  Location: [facility];  Service: Cardiac Surgery    TUBAL LIGATION           Current Outpatient Medications:     Acetaminophen (TYLENOL) 325 MG CAPS, Take by mouth as needed , Disp: , Rfl:     albuterol (PROAIR HFA) 90 mcg/act inhaler, Inhale 2 puffs every 6 (six) hours as needed for wheezing, Disp: 8 5 g, Rfl: 0    amiodarone 200 mg tablet, Take 1 tablet (200 mg total) by mouth daily, Disp: 90 tablet, Rfl: 2    Bimatoprost (LUMIGAN OP), Administer 1 drop to both eyes daily at bedtime Both eyes, Disp: , Rfl:     chlorthalidone 25 mg tablet, Take 0 5 tablets (12 5 mg total) by mouth daily, Disp: 15 tablet, Rfl: 30    Cholecalciferol (VITAMIN D3) 1000 units CAPS, Take 5,000 Units by mouth daily , Disp: , Rfl:     Cyanocobalamin (VITAMIN B 12 PO), Take 1,000 mcg by mouth daily , Disp: , Rfl:     dabigatran etexilate (PRADAXA) 150 mg capsu, Take 1 capsule (150 mg total) by mouth every 12 (twelve) hours for 90 days, Disp: 180 capsule, Rfl: 1    glipiZIDE (GLUCOTROL) 10 mg tablet, Take 1 tablet (10 mg total) by mouth daily, Disp: 30 tablet, Rfl: 0    hydrALAZINE (APRESOLINE) 50 mg tablet, Take 1 tablet (50 mg total) by mouth 3 (three) times a day for 90 days, Disp: 90 tablet, Rfl: 3    losartan-hydrochlorothiazide (HYZAAR) 50-12 5 mg per tablet, Take 1 tablet by mouth daily, Disp: 30 tablet, Rfl: 5    meclizine (ANTIVERT) 12 5 MG tablet, Take 1 tablet (12 5 mg total) by mouth every 12 (twelve) hours as needed for dizziness, Disp: 15 tablet, Rfl: 2    ONE TOUCH ULTRA TEST test strip, TEST twice a day, Disp: 200 each, Rfl: 3    ONETOUCH DELICA LANCETS 56G MISC, TEST TWICE DAILY  , Disp: 200 each, Rfl: 3    potassium chloride (K-DUR,KLOR-CON) 10 mEq tablet, Take 1 tablet (10 mEq total) by mouth daily, Disp: 30 tablet, Rfl: 5    RESTASIS 0 05 % ophthalmic emulsion, , Disp: , Rfl: 0    rosuvastatin (CRESTOR) 10 MG tablet, take 1 tablet by mouth at bedtime, Disp: 30 tablet, Rfl: 5    timolol (TIMOPTIC) 0 5 % ophthalmic solution, PLACE 1 DROP IN THE RIGH EYE EVERY MORNING, Disp: , Rfl: 0    TRADJENTA 5 MG TABS, TAKE 1 DAILY, Disp: 30 tablet, Rfl: 5    metFORMIN (GLUCOPHAGE) 500 mg tablet, Take 1 tablet (500 mg total) by mouth 2 (two) times a day with meals for 90 days Cannot tolerate 1000mg because of GI upset , Disp: 180 tablet, Rfl: 1    Allergies   Allergen Reactions    Lipitor [Atorvastatin] GI Intolerance     Nausea stomach ache    Hydrochlorothiazide GI Intolerance    Lisinopril Cough    Metoprolol Headache    Aspirin GI Intolerance       Family History   Problem Relation Age of Onset    No Known Problems Mother     Diabetes Father     Stroke Father     Coronary artery disease Sister     No Known Problems Brother     B;adder cancer Son     Breast cancer Daughter     No Known Problems Maternal Grandmother     No Known Problems Maternal Grandfather     No Known Problems Paternal Grandmother     No Known Problems Paternal Grandfather     No Known Problems Cousin     Rheum arthritis Neg Hx     Osteoarthritis Neg Hx     Asthma Neg Hx     Heart failure Neg Hx     Hyperlipidemia Neg Hx     Hypertension Neg Hx     Migraines Neg Hx     Rashes / Skin problems Neg Hx     Seizures Neg Hx     Thyroid disease Neg Hx      Review of Systems   Constitution: Negative for chills, fever, malaise/fatigue and weight loss  Gastrointestinal: Negative for bloating, abdominal pain, change in bowel habit and nausea  Genitourinary: Positive for non-menstrual bleeding  Negative for flank pain, hematuria and pelvic pain  Neurological: Positive for dizziness and weakness  Negative for headaches and vertigo  Vitals:    07/01/19 1333   BP: 136/64     Physical Exam   Constitutional: She is oriented to person, place, and time  She appears well-nourished  No distress  Genitourinary: There is no lesion on the right labia  There is no lesion on the left labia  There is bleeding in the vagina  Cervix is parous  Cervix does not exhibit discharge or polyp  Cardiovascular: Normal rate  Pulmonary/Chest: Effort normal    Abdominal: Soft  She exhibits no distension  There is no tenderness  Musculoskeletal: She exhibits edema  Neurological: She is alert and oriented to person, place, and time  Skin: Skin is warm and dry  Psychiatric: She has a normal mood and affect  Vitals reviewed  Impression:  Postmenopausal bleeding  Previously documented thickened endometrium  Anticoagulation    Plan:  I discussed recommendation for endometrial sampling to rule out underlying cancer given new-onset VB  Given underlying health conditions I feel that the best approach is an in-office EMB followed by pelvic sono  If pathology is benign and soft ultrasound shows no new findings then we would presume her bleeding is due to her current and anticoagulation regimen  If her pathology is insufficient then she would require a D&C under anesthesia  We would pursue preop clearance prior to undertaking surgery  If her pathology is consistent with atypia or mackenzie malignancy then referral to [address] would be placed and patient and family can decide whether or not to proceed with further surgery via hysterectomy  Risks versus benefits of all procedures reviewed with patient and her family  They agree to proceed with endometrial biopsy in the office today

## 2019-07-02 ENCOUNTER — HOSPITAL ENCOUNTER (OUTPATIENT)
Dept: ULTRASOUND IMAGING | Facility: HOSPITAL | Age: 84
Discharge: HOME | DRG: 641 | End: 2019-07-02
Attending: OBSTETRICS & GYNECOLOGY
Payer: COMMERCIAL

## 2019-07-02 ENCOUNTER — TELEPHONE (OUTPATIENT)
Dept: CARDIOLOGY CLINIC | Facility: CLINIC | Age: 84
End: 2019-07-02

## 2019-07-02 DIAGNOSIS — N95.0 PMB (POSTMENOPAUSAL BLEEDING): ICD-10-CM

## 2019-07-02 DIAGNOSIS — IMO0002: ICD-10-CM

## 2019-07-02 PROCEDURE — 76856 US EXAM PELVIC COMPLETE: CPT

## 2019-07-02 PROCEDURE — 76830 TRANSVAGINAL US NON-OB: CPT

## 2019-07-02 NOTE — TELEPHONE ENCOUNTER
pts daughter called and would like to speak to you about her mother  Patient had vaginal bleeding over the weekend seen by GYN  had D/C they are thinking may be CA are awaiting biopsy results but they also mentioned that may be from her Pradaxa  She would like to speak to you about decisions she needs to consider  Thank you

## 2019-07-04 ENCOUNTER — APPOINTMENT (INPATIENT)
Dept: RADIOLOGY | Facility: HOSPITAL | Age: 84
DRG: 641 | End: 2019-07-04
Payer: COMMERCIAL

## 2019-07-04 ENCOUNTER — HOSPITAL ENCOUNTER (INPATIENT)
Facility: HOSPITAL | Age: 84
LOS: 6 days | Discharge: HOME | DRG: 641 | End: 2019-07-10
Attending: EMERGENCY MEDICINE | Admitting: INTERNAL MEDICINE
Payer: COMMERCIAL

## 2019-07-04 DIAGNOSIS — E16.2 HYPOGLYCEMIA: Primary | ICD-10-CM

## 2019-07-04 DIAGNOSIS — I10 ESSENTIAL HYPERTENSION: Chronic | ICD-10-CM

## 2019-07-04 DIAGNOSIS — E87.1 HYPONATREMIA: ICD-10-CM

## 2019-07-04 DIAGNOSIS — IMO0002: ICD-10-CM

## 2019-07-04 PROBLEM — Z86.73 HISTORY OF CVA (CEREBROVASCULAR ACCIDENT): Chronic | Status: ACTIVE | Noted: 2019-05-12

## 2019-07-04 PROBLEM — E11.649 TYPE 2 DIABETES MELLITUS WITH HYPOGLYCEMIA WITHOUT COMA (HCC): Status: ACTIVE | Noted: 2019-07-04

## 2019-07-04 PROBLEM — R13.10 DYSPHAGIA: Status: ACTIVE | Noted: 2019-07-04

## 2019-07-04 PROBLEM — G93.41 METABOLIC ENCEPHALOPATHY: Status: ACTIVE | Noted: 2019-07-04

## 2019-07-04 PROBLEM — I16.0 HYPERTENSIVE URGENCY: Status: ACTIVE | Noted: 2019-07-04

## 2019-07-04 PROBLEM — I67.4 HYPERTENSIVE ENCEPHALOPATHY: Status: ACTIVE | Noted: 2019-07-04

## 2019-07-04 PROBLEM — G93.40 ACUTE ENCEPHALOPATHY: Status: ACTIVE | Noted: 2019-07-04

## 2019-07-04 PROBLEM — D72.829 LEUKOCYTOSIS: Status: ACTIVE | Noted: 2019-07-04

## 2019-07-04 LAB
ALBUMIN SERPL BCP-MCNC: 3.6 G/DL (ref 3.5–5)
ALP SERPL-CCNC: 61 U/L (ref 46–116)
ALT SERPL W P-5'-P-CCNC: 30 U/L (ref 12–78)
ANION GAP SERPL CALCULATED.3IONS-SCNC: 11 MMOL/L (ref 4–13)
APTT PPP: 96 SECONDS (ref 23–37)
AST SERPL W P-5'-P-CCNC: 14 U/L (ref 5–45)
BACTERIA UR QL AUTO: ABNORMAL /HPF
BASOPHILS # BLD AUTO: 0.05 THOUSANDS/ΜL (ref 0–0.1)
BASOPHILS NFR BLD AUTO: 0 % (ref 0–1)
BILIRUB DIRECT SERPL-MCNC: 0.08 MG/DL (ref 0–0.2)
BILIRUB SERPL-MCNC: 0.5 MG/DL (ref 0.2–1)
BILIRUB UR QL STRIP: NEGATIVE
BUN SERPL-MCNC: 19 MG/DL (ref 5–25)
CALCIUM SERPL-MCNC: 9 MG/DL (ref 8.3–10.1)
CHLORIDE SERPL-SCNC: 91 MMOL/L (ref 100–108)
CLARITY UR: ABNORMAL
CO2 SERPL-SCNC: 23 MMOL/L (ref 21–32)
COLOR UR: YELLOW
CREAT SERPL-MCNC: 0.96 MG/DL (ref 0.6–1.3)
EOSINOPHIL # BLD AUTO: 0.1 THOUSAND/ΜL (ref 0–0.61)
EOSINOPHIL NFR BLD AUTO: 1 % (ref 0–6)
ERYTHROCYTE [DISTWIDTH] IN BLOOD BY AUTOMATED COUNT: 13.2 % (ref 11.6–15.1)
GFR SERPL CREATININE-BSD FRML MDRD: 54 ML/MIN/1.73SQ M
GLUCOSE SERPL-MCNC: 149 MG/DL (ref 65–140)
GLUCOSE SERPL-MCNC: 179 MG/DL (ref 65–140)
GLUCOSE SERPL-MCNC: 212 MG/DL (ref 65–140)
GLUCOSE SERPL-MCNC: 50 MG/DL (ref 65–140)
GLUCOSE UR STRIP-MCNC: NEGATIVE MG/DL
HCT VFR BLD AUTO: 42.4 % (ref 34.8–46.1)
HGB BLD-MCNC: 13.8 G/DL (ref 11.5–15.4)
HGB UR QL STRIP.AUTO: ABNORMAL
IMM GRANULOCYTES # BLD AUTO: 0.06 THOUSAND/UL (ref 0–0.2)
IMM GRANULOCYTES NFR BLD AUTO: 1 % (ref 0–2)
INR PPP: 2.1 (ref 0.84–1.19)
KETONES UR STRIP-MCNC: NEGATIVE MG/DL
LEUKOCYTE ESTERASE UR QL STRIP: ABNORMAL
LYMPHOCYTES # BLD AUTO: 2.14 THOUSANDS/ΜL (ref 0.6–4.47)
LYMPHOCYTES NFR BLD AUTO: 18 % (ref 14–44)
MCH RBC QN AUTO: 30 PG (ref 26.8–34.3)
MCHC RBC AUTO-ENTMCNC: 32.5 G/DL (ref 31.4–37.4)
MCV RBC AUTO: 92 FL (ref 82–98)
MONOCYTES # BLD AUTO: 0.85 THOUSAND/ΜL (ref 0.17–1.22)
MONOCYTES NFR BLD AUTO: 7 % (ref 4–12)
NEUTROPHILS # BLD AUTO: 8.97 THOUSANDS/ΜL (ref 1.85–7.62)
NEUTS SEG NFR BLD AUTO: 73 % (ref 43–75)
NITRITE UR QL STRIP: NEGATIVE
NON-SQ EPI CELLS URNS QL MICRO: ABNORMAL /HPF
NRBC BLD AUTO-RTO: 0 /100 WBCS
PH UR STRIP.AUTO: 7 [PH] (ref 4.5–8)
PLATELET # BLD AUTO: 301 THOUSANDS/UL (ref 149–390)
PMV BLD AUTO: 9.9 FL (ref 8.9–12.7)
POTASSIUM SERPL-SCNC: 5.2 MMOL/L (ref 3.5–5.3)
PROT SERPL-MCNC: 7.3 G/DL (ref 6.4–8.2)
PROT UR STRIP-MCNC: ABNORMAL MG/DL
PROTHROMBIN TIME: 24 SECONDS (ref 11.6–14.5)
RBC # BLD AUTO: 4.6 MILLION/UL (ref 3.81–5.12)
RBC #/AREA URNS AUTO: ABNORMAL /HPF
SODIUM SERPL-SCNC: 125 MMOL/L (ref 136–145)
SP GR UR STRIP.AUTO: 1.01 (ref 1–1.03)
TSH SERPL DL<=0.05 MIU/L-ACNC: 4.81 UIU/ML (ref 0.36–3.74)
UROBILINOGEN UR QL STRIP.AUTO: 0.2 E.U./DL
WBC # BLD AUTO: 12.17 THOUSAND/UL (ref 4.31–10.16)
WBC #/AREA URNS AUTO: ABNORMAL /HPF

## 2019-07-04 PROCEDURE — 83935 ASSAY OF URINE OSMOLALITY: CPT | Performed by: NURSE PRACTITIONER

## 2019-07-04 PROCEDURE — 85025 COMPLETE CBC W/AUTO DIFF WBC: CPT | Performed by: EMERGENCY MEDICINE

## 2019-07-04 PROCEDURE — 80076 HEPATIC FUNCTION PANEL: CPT | Performed by: EMERGENCY MEDICINE

## 2019-07-04 PROCEDURE — 82948 REAGENT STRIP/BLOOD GLUCOSE: CPT

## 2019-07-04 PROCEDURE — 96361 HYDRATE IV INFUSION ADD-ON: CPT

## 2019-07-04 PROCEDURE — 83930 ASSAY OF BLOOD OSMOLALITY: CPT | Performed by: NURSE PRACTITIONER

## 2019-07-04 PROCEDURE — 96360 HYDRATION IV INFUSION INIT: CPT

## 2019-07-04 PROCEDURE — 99223 1ST HOSP IP/OBS HIGH 75: CPT | Performed by: NURSE PRACTITIONER

## 2019-07-04 PROCEDURE — 36415 COLL VENOUS BLD VENIPUNCTURE: CPT | Performed by: EMERGENCY MEDICINE

## 2019-07-04 PROCEDURE — 85610 PROTHROMBIN TIME: CPT | Performed by: EMERGENCY MEDICINE

## 2019-07-04 PROCEDURE — 84443 ASSAY THYROID STIM HORMONE: CPT | Performed by: EMERGENCY MEDICINE

## 2019-07-04 PROCEDURE — 99285 EMERGENCY DEPT VISIT HI MDM: CPT | Performed by: EMERGENCY MEDICINE

## 2019-07-04 PROCEDURE — 80048 BASIC METABOLIC PNL TOTAL CA: CPT | Performed by: EMERGENCY MEDICINE

## 2019-07-04 PROCEDURE — 84300 ASSAY OF URINE SODIUM: CPT | Performed by: NURSE PRACTITIONER

## 2019-07-04 PROCEDURE — 85730 THROMBOPLASTIN TIME PARTIAL: CPT | Performed by: EMERGENCY MEDICINE

## 2019-07-04 PROCEDURE — 81001 URINALYSIS AUTO W/SCOPE: CPT

## 2019-07-04 PROCEDURE — 71046 X-RAY EXAM CHEST 2 VIEWS: CPT

## 2019-07-04 PROCEDURE — 99285 EMERGENCY DEPT VISIT HI MDM: CPT

## 2019-07-04 RX ORDER — MECLIZINE HCL 12.5 MG/1
12.5 TABLET ORAL EVERY 12 HOURS PRN
Status: DISCONTINUED | OUTPATIENT
Start: 2019-07-04 | End: 2019-07-10 | Stop reason: HOSPADM

## 2019-07-04 RX ORDER — HYDRALAZINE HYDROCHLORIDE 25 MG/1
50 TABLET, FILM COATED ORAL 3 TIMES DAILY
Status: DISCONTINUED | OUTPATIENT
Start: 2019-07-04 | End: 2019-07-05

## 2019-07-04 RX ORDER — ONDANSETRON 2 MG/ML
4 INJECTION INTRAMUSCULAR; INTRAVENOUS EVERY 6 HOURS PRN
Status: DISCONTINUED | OUTPATIENT
Start: 2019-07-04 | End: 2019-07-10 | Stop reason: HOSPADM

## 2019-07-04 RX ORDER — TIMOLOL MALEATE 5 MG/ML
1 SOLUTION/ DROPS OPHTHALMIC DAILY
Status: DISCONTINUED | OUTPATIENT
Start: 2019-07-05 | End: 2019-07-10 | Stop reason: HOSPADM

## 2019-07-04 RX ORDER — AMIODARONE HYDROCHLORIDE 200 MG/1
200 TABLET ORAL DAILY
Status: DISCONTINUED | OUTPATIENT
Start: 2019-07-05 | End: 2019-07-10 | Stop reason: HOSPADM

## 2019-07-04 RX ORDER — DABIGATRAN ETEXILATE 150 MG/1
150 CAPSULE, COATED PELLETS ORAL EVERY 12 HOURS SCHEDULED
Status: DISCONTINUED | OUTPATIENT
Start: 2019-07-04 | End: 2019-07-10 | Stop reason: HOSPADM

## 2019-07-04 RX ORDER — ACETAMINOPHEN 325 MG/1
650 TABLET ORAL EVERY 6 HOURS PRN
Status: DISCONTINUED | OUTPATIENT
Start: 2019-07-04 | End: 2019-07-10 | Stop reason: HOSPADM

## 2019-07-04 RX ORDER — SODIUM CHLORIDE 9 MG/ML
75 INJECTION, SOLUTION INTRAVENOUS CONTINUOUS
Status: DISCONTINUED | OUTPATIENT
Start: 2019-07-04 | End: 2019-07-05

## 2019-07-04 RX ORDER — PRAVASTATIN SODIUM 40 MG
40 TABLET ORAL
Status: DISCONTINUED | OUTPATIENT
Start: 2019-07-05 | End: 2019-07-10 | Stop reason: HOSPADM

## 2019-07-04 RX ORDER — ALBUTEROL SULFATE 90 UG/1
2 AEROSOL, METERED RESPIRATORY (INHALATION) EVERY 6 HOURS PRN
Status: DISCONTINUED | OUTPATIENT
Start: 2019-07-04 | End: 2019-07-10 | Stop reason: HOSPADM

## 2019-07-04 RX ORDER — HYDRALAZINE HYDROCHLORIDE 20 MG/ML
5 INJECTION INTRAMUSCULAR; INTRAVENOUS EVERY 4 HOURS PRN
Status: DISCONTINUED | OUTPATIENT
Start: 2019-07-04 | End: 2019-07-10 | Stop reason: HOSPADM

## 2019-07-04 RX ADMIN — BIMATOPROST 1 DROP: 0.1 SOLUTION/ DROPS OPHTHALMIC at 23:57

## 2019-07-04 RX ADMIN — SODIUM CHLORIDE 500 ML: 0.9 INJECTION, SOLUTION INTRAVENOUS at 19:34

## 2019-07-04 RX ADMIN — SODIUM CHLORIDE 75 ML/HR: 0.9 INJECTION, SOLUTION INTRAVENOUS at 23:51

## 2019-07-04 RX ADMIN — DABIGATRAN ETEXILATE MESYLATE 150 MG: 150 CAPSULE ORAL at 23:57

## 2019-07-04 RX ADMIN — HYDRALAZINE HYDROCHLORIDE 50 MG: 25 TABLET ORAL at 23:57

## 2019-07-04 NOTE — ED NOTES
Pt ambulated with a steady gait to restroom utilizing a walker for assistance        Ira Matute  [birth year] [id number]

## 2019-07-04 NOTE — ED PROVIDER NOTES
History  Chief Complaint   Patient presents with    Dizziness     per family of patient, patient has been moving slower then normal, patient c/o of dizziness; patient was also seen at [facility] [place] for vaginal bleeding on Tuesday    Weakness - Generalized     81 YO female presents with alteration in mentation starting ~1 5 hours PTA  Family helps with Hx  States she seemed to be thinking and responding slowly  Pt states that, at the time, she was having difficulty concentrating and was feeling forgetful  She had some dizziness but states this has been an ongoing issue since her CVA 2 years ago  She tried to get up but had to sit back down  In triage pt was found to have a blood glucose of 50  Pt had some juice to drink and family states she does appear to be mentating close to baseline  They note pt had her DM medications this morning but has had decreased PO intake, having gelatin for lunch  Pt denies CP/SOB/F/C/N/V/D/C, no dysuria, burning on urination or blood in urine  History provided by:  Patient and relative   used: No    Altered Mental Status   Presenting symptoms: confusion    Severity:  Moderate  Most recent episode: Today  Episode history:  Single  Duration: 1 5 hours  Timing:  Constant  Progression:  Partially resolved  Chronicity:  New  Associated symptoms: light-headedness and weakness    Associated symptoms: no abdominal pain, normal movement, no agitation, no fever, no hallucinations, no rash, no seizures, no slurred speech, no visual change and no vomiting    Weakness:     Severity:  Mild    Onset quality:  Unable to specify    Timing:  Constant    Progression:  Partially resolved    Chronicity:  New      Prior to Admission Medications   Prescriptions Last Dose Informant Patient Reported? Taking?    Acetaminophen (TYLENOL) 325 MG CAPS  Child Yes Yes   Sig: Take by mouth as needed    Bimatoprost (LUMIGAN OP)  Child Yes Yes   Sig: Administer 1 drop to both eyes daily at bedtime Both eyes   Cholecalciferol (VITAMIN D3) 1000 units CAPS  Child Yes Yes   Sig: Take 5,000 Units by mouth daily    Cyanocobalamin (VITAMIN B 12 PO)  Child Yes Yes   Sig: Take 1,000 mcg by mouth daily    ONE TOUCH ULTRA TEST test strip  Child No Yes   Sig: TEST twice a day   ONETOUCH DELICA LANCETS 80E MISC  Child No No   Sig: TEST TWICE DAILY  RESTASIS 0 05 % ophthalmic emulsion  Child Yes Yes   TRADJENTA 5 MG TABS  Child No Yes   Sig: TAKE 1 DAILY   albuterol (PROAIR HFA) 90 mcg/act inhaler   No Yes   Sig: Inhale 2 puffs every 6 (six) hours as needed for wheezing   amiodarone 200 mg tablet  Child No Yes   Sig: Take 1 tablet (200 mg total) by mouth daily   chlorthalidone 25 mg tablet  Child No Yes   Sig: Take 0 5 tablets (12 5 mg total) by mouth daily   dabigatran etexilate (PRADAXA) 150 mg capsu  Child No Yes   Sig: Take 1 capsule (150 mg total) by mouth every 12 (twelve) hours for 90 days   glipiZIDE (GLUCOTROL) 10 mg tablet  Child No Yes   Sig: Take 1 tablet (10 mg total) by mouth daily   hydrALAZINE (APRESOLINE) 50 mg tablet  Child No Yes   Sig: Take 1 tablet (50 mg total) by mouth 3 (three) times a day for 90 days   losartan-hydrochlorothiazide (HYZAAR) 50-12 5 mg per tablet  Child No Yes   Sig: Take 1 tablet by mouth daily   meclizine (ANTIVERT) 12 5 MG tablet  Child No Yes   Sig: Take 1 tablet (12 5 mg total) by mouth every 12 (twelve) hours as needed for dizziness   metFORMIN (GLUCOPHAGE) 500 mg tablet  Child No No   Sig: Take 1 tablet (500 mg total) by mouth 2 (two) times a day with meals for 90 days Cannot tolerate 1000mg because of GI upset     metFORMIN (GLUCOPHAGE) 500 mg tablet   No Yes   Sig: take 1 tablet by mouth twice a day with food   potassium chloride (K-DUR,KLOR-CON) 10 mEq tablet  Child No No   Sig: Take 1 tablet (10 mEq total) by mouth daily   rosuvastatin (CRESTOR) 10 MG tablet  Child No Yes   Sig: take 1 tablet by mouth at bedtime   timolol (TIMOPTIC) 0 5 % ophthalmic solution  Child Yes Yes   Sig: PLACE 1 DROP IN THE RIGH EYE EVERY MORNING      Facility-Administered Medications: None       Past Medical History:   Diagnosis Date    Ambulatory dysfunction     CHF (congestive heart failure) (HCC)     pEFHF    Cholelithiasis     Coronary artery disease     Depression with anxiety     Diabetes mellitus (HCC)     niddm    Glaucoma     Hypertension     Hyponatremia     Ischemic stroke of frontal lobe (HCC)     Right     Obstructive sleep apnea     PAF (paroxysmal atrial fibrillation) (HCC)     Vertigo        Past Surgical History:   Procedure Laterality Date    APPENDECTOMY      GLAUCOMA SURGERY  01/03/2016    FL REPLACE AORTIC VALVE OPENFEMORAL ARTERY APPROACH N/A 4/17/2018    Procedure: REPLACEMENT AORTIC VALVE TRANSCATHETER (TAVR) TRANSFEMORAL W/ 23 MM AGUILAR STEVE S3 VALVE;  Surgeon: Tianna Valdivia DO;  [facility]: [facility];  Service: Cardiac Surgery    TUBAL LIGATION         Family History   Problem Relation Age of Onset    No Known Problems Mother     Diabetes Father     Stroke Father     Coronary artery disease Sister     No Known Problems Brother     No Known Problems Son     Breast cancer Daughter     No Known Problems Maternal Grandmother     No Known Problems Maternal Grandfather     No Known Problems Paternal Grandmother     No Known Problems Paternal Grandfather     No Known Problems Cousin     Rheum arthritis Neg Hx     Osteoarthritis Neg Hx     Asthma Neg Hx     Heart failure Neg Hx     Hyperlipidemia Neg Hx     Hypertension Neg Hx     Migraines Neg Hx     Rashes / Skin problems Neg Hx     Seizures Neg Hx     Thyroid disease Neg Hx      I have reviewed and agree with the history as documented      Social History     Tobacco Use    Smoking status: Never Smoker    Smokeless tobacco: Never Used   Substance Use Topics    Alcohol use: Never     Frequency: Never     Binge frequency: Never    Drug use: Never        Review of Systems   Constitutional: Negative for chills, fatigue and fever  HENT: Negative for dental problem  Eyes: Negative for visual disturbance  Respiratory: Negative for shortness of breath  Cardiovascular: Negative for chest pain  Gastrointestinal: Negative for abdominal pain, diarrhea and vomiting  Genitourinary: Negative for dysuria and frequency  Musculoskeletal: Negative for arthralgias  Skin: Negative for rash  Neurological: Positive for weakness and light-headedness  Negative for dizziness and seizures  Psychiatric/Behavioral: Positive for confusion  Negative for agitation, behavioral problems and hallucinations  All other systems reviewed and are negative  Physical Exam  Physical Exam   Constitutional: She is oriented to person, place, and time  She appears well-developed and well-nourished  HENT:   Head: Normocephalic and atraumatic  Eyes: Pupils are equal, round, and reactive to light  EOM are normal    Neck: Normal range of motion  Cardiovascular: Normal rate, regular rhythm and normal heart sounds  Pulmonary/Chest: Effort normal and breath sounds normal    Abdominal: Soft  There is no tenderness  Musculoskeletal: Normal range of motion  Neurological: She is alert and oriented to person, place, and time  Skin: Skin is warm and dry  Psychiatric: She has a normal mood and affect  Her behavior is normal  Thought content normal    Nursing note and vitals reviewed        Vital Signs  ED Triage Vitals   Temperature Pulse Respirations Blood Pressure SpO2   07/04/19 1832 07/04/19 1832 07/04/19 1832 07/04/19 1832 07/04/19 1832   (!) 97 1 °F (36 2 °C) 60 18 (!) 244/106 96 %      Temp Source Heart Rate Source Patient Position - Orthostatic VS BP Location FiO2 (%)   07/04/19 1832 07/04/19 1832 07/04/19 1832 07/04/19 1832 --   Temporal Monitor Sitting Left arm       Pain Score       07/04/19 1941       No Pain           Vitals:    07/04/19 1853 07/04/19 1941 07/04/19 2052 07/04/19 2140   BP: (!) 197/96 (!) 184/79 (!) 183/77 136/96   Pulse:  57 56 63   Patient Position - Orthostatic VS: Lying Lying Lying Lying         Visual Acuity      ED Medications  Medications   sodium chloride 0 9 % bolus 500 mL (500 mL Intravenous New Bag 7/4/19 1934)       Diagnostic Studies  Results Reviewed     Procedure Component Value Units Date/Time    Basic metabolic panel [[id number]]  (Abnormal) Collected:  07/04/19 2006    Lab Status:  Final result Specimen:  Blood from Arm, Left Updated:  07/04/19 2039     Sodium 125 mmol/L      Potassium 5 2 mmol/L      Chloride 91 mmol/L      CO2 23 mmol/L      ANION GAP 11 mmol/L      BUN 19 mg/dL      Creatinine 0 96 mg/dL      Glucose 179 mg/dL      Calcium 9 0 mg/dL      eGFR 54 ml/min/1 73sq m     Narrative:       [place] guidelines for Chronic Kidney Disease (CKD):     Stage 1 with normal or high GFR (GFR > 90 mL/min/1 73 square meters)    Stage 2 Mild CKD (GFR = 60-89 mL/min/1 73 square meters)    Stage 3A Moderate CKD (GFR = 45-59 mL/min/1 73 square meters)    Stage 3B Moderate CKD (GFR = 30-44 mL/min/1 73 square meters)    Stage 4 Severe CKD (GFR = 15-29 mL/min/1 73 square meters)    Stage 5 End Stage CKD (GFR <15 mL/min/1 73 square meters)  Note: GFR calculation is accurate only with a steady state creatinine    Hepatic function panel [[id number]]  (Normal) Collected:  07/04/19 2006    Lab Status:  Final result Specimen:  Blood from Arm, Left Updated:  07/04/19 2039     Total Bilirubin 0 50 mg/dL      Bilirubin, Direct 0 08 mg/dL      Alkaline Phosphatase 61 U/L      AST 14 U/L      ALT 30 U/L      Total Protein 7 3 g/dL      Albumin 3 6 g/dL     TSH [[id number]]  (Abnormal) Collected:  07/04/19 2006    Lab Status:  Final result Specimen:  Blood from Arm, Left Updated:  07/04/19 2039     TSH 3RD GENERATON 4 812 uIU/mL     Narrative:       Patients undergoing fluorescein dye angiography may retain small amounts of fluorescein in the body for 48-72 hours post procedure  Samples containing fluorescein can produce falsely depressed TSH values  If the patient had this procedure,a specimen should be resubmitted post fluorescein clearance        Fingerstick Glucose (POCT) [[id number]]  (Abnormal) Collected:  07/04/19 2022    Lab Status:  Final result Updated:  07/04/19 2023     POC Glucose 212 mg/dl     Protime-INR [[id number]]  (Abnormal) Collected:  07/04/19 1925    Lab Status:  Final result Specimen:  Blood from Arm, Left Updated:  07/04/19 1947     Protime 24 0 seconds      INR 2 10    APTT [[id number]]  (Abnormal) Collected:  07/04/19 1925    Lab Status:  Final result Specimen:  Blood from Arm, Left Updated:  07/04/19 1947     PTT 96 seconds     Urine Microscopic [[id number]]  (Abnormal) Collected:  07/04/19 1932    Lab Status:  Final result Specimen:  Urine, Clean Catch Updated:  07/04/19 1943     RBC, UA Innumerable /hpf      WBC, UA 1-2 /hpf      Epithelial Cells Occasional /hpf      Bacteria, UA Occasional /hpf     Fingerstick Glucose (POCT) [[id number]]  (Abnormal) Collected:  07/04/19 1932    Lab Status:  Final result Updated:  07/04/19 1934     POC Glucose 149 mg/dl     CBC and differential [[id number]]  (Abnormal) Collected:  07/04/19 1925    Lab Status:  Final result Specimen:  Blood from Arm, Left Updated:  07/04/19 1934     WBC 12 17 Thousand/uL      RBC 4 60 Million/uL      Hemoglobin 13 8 g/dL      Hematocrit 42 4 %      MCV 92 fL      MCH 30 0 pg      MCHC 32 5 g/dL      RDW 13 2 %      MPV 9 9 fL      Platelets 990 Thousands/uL      nRBC 0 /100 WBCs      Neutrophils Relative 73 %      Immat GRANS % 1 %      Lymphocytes Relative 18 %      Monocytes Relative 7 %      Eosinophils Relative 1 %      Basophils Relative 0 %      Neutrophils Absolute 8 97 Thousands/µL      Immature Grans Absolute 0 06 Thousand/uL      Lymphocytes Absolute 2 14 Thousands/µL      Monocytes Absolute 0 85 Thousand/µL      Eosinophils Absolute 0 10 Thousand/µL      Basophils Absolute 0 05 Thousands/µL     POCT urinalysis dipstick [[id number]]  (Abnormal) Resulted:  07/04/19 1924    Lab Status:  Final result Specimen:  Urine Updated:  07/04/19 1924    ED Urine Macroscopic [[id number]]  (Abnormal) Collected:  07/04/19 1932    Lab Status:  Final result Specimen:  Urine Updated:  07/04/19 1921     Color, UA Yellow     Clarity, UA Cloudy     pH, UA 7 0     Leukocytes, UA Trace     Nitrite, UA Negative     Protein, UA Trace mg/dl      Glucose, UA Negative mg/dl      Ketones, UA Negative mg/dl      Urobilinogen, UA 0 2 E U /dl      Bilirubin, UA Negative     Blood, UA Large     Specific Welda, UA 1 015    Narrative:       CLINITEK RESULT    Fingerstick Glucose (POCT) [[id number]]  (Abnormal) Collected:  07/04/19 1839    Lab Status:  Final result Updated:  07/04/19 1840     POC Glucose 50 mg/dl                  No orders to display              Procedures  Procedures       ED Course  ED Course as of Jul 04 2140   Thu Jul 04, 2019 2043 Pt states continued improvement in mentation since presentation  She has been observed and her sugars have remained adequate  MDM  Number of Diagnoses or Management Options  Hypoglycemia: new and requires workup  Hyponatremia: new and requires workup  Diagnosis management comments: 1  Altered mental status - Pt with symptoms beginning ~1 5 hours PTA, BS found to be low and pt's mentation is improving with PO glucose load  Pt does have recent Hx of vaginal bleeding but states this is improving  Will check CBC for anemia, metabolic panel for electrolyte abnormalities and dehydration  Will recheck sugars  Order urine for infection         Amount and/or Complexity of Data Reviewed  Clinical lab tests: ordered and reviewed  Obtain history from someone other than the patient: yes  Discuss the patient with other providers: yes    Patient Progress  Patient progress: improved      Disposition  Final diagnoses:   Hypoglycemia   Hyponatremia     Time reflects when diagnosis was documented in both MDM as applicable and the Disposition within this note     Time User Action Codes Description Comment    7/4/2019  9:09 PM Erickson OBRIEN Add [E16 2] Hypoglycemia     7/4/2019  9:09 PM Erickson OBRIEN Add [E87 1] Hyponatremia       ED Disposition     ED Disposition Condition Date/Time Comment    Admit Stable Thu Jul 4, 2019  9:09 PM Case was discussed with SLIM PA and the patient's admission status was agreed to be Admission Status: inpatient status to the service of Dr Sagar Joseph   Follow-up Information    None         Current Discharge Medication List      CONTINUE these medications which have NOT CHANGED    Details   Acetaminophen (TYLENOL) 325 MG CAPS Take by mouth as needed       albuterol (PROAIR HFA) 90 mcg/act inhaler Inhale 2 puffs every 6 (six) hours as needed for wheezing  Qty: 8 5 g, Refills: 1    Comments: Substitution to a formulary equivalent within the same pharmaceutical class is authorized  Associated Diagnoses: Bronchitis      amiodarone 200 mg tablet Take 1 tablet (200 mg total) by mouth daily  Qty: 90 tablet, Refills: 2    Associated Diagnoses: Persistent atrial fibrillation (HCC)      Bimatoprost (LUMIGAN OP) Administer 1 drop to both eyes daily at bedtime Both eyes      chlorthalidone 25 mg tablet Take 0 5 tablets (12 5 mg total) by mouth daily  Qty: 15 tablet, Refills: 30    Associated Diagnoses: Atrial fibrillation, unspecified type (HCC)      Cholecalciferol (VITAMIN D3) 1000 units CAPS Take 5,000 Units by mouth daily       Cyanocobalamin (VITAMIN B 12 PO) Take 1,000 mcg by mouth daily       dabigatran etexilate (PRADAXA) 150 mg capsu Take 1 capsule (150 mg total) by mouth every 12 (twelve) hours for 90 days  Qty: 180 capsule, Refills: 1    Associated Diagnoses: CVA (cerebral vascular accident) ([facility] [address] );  Paroxysmal atrial fibrillation (HCC)      glipiZIDE (GLUCOTROL) 10 mg tablet Take 1 tablet (10 mg total) by mouth daily  Qty: 30 tablet, Refills: 0    Associated Diagnoses: Type 2 diabetes mellitus with hyperglycemia, without long-term current use of insulin ([facility])      hydrALAZINE (APRESOLINE) 50 mg tablet Take 1 tablet (50 mg total) by mouth 3 (three) times a day for 90 days  Qty: 90 tablet, Refills: 3    Associated Diagnoses: Essential hypertension      losartan-hydrochlorothiazide (HYZAAR) 50-12 5 mg per tablet Take 1 tablet by mouth daily  Qty: 30 tablet, Refills: 5    Associated Diagnoses: Hypertension, unspecified type      meclizine (ANTIVERT) 12 5 MG tablet Take 1 tablet (12 5 mg total) by mouth every 12 (twelve) hours as needed for dizziness  Qty: 15 tablet, Refills: 2    Associated Diagnoses: Severe dizziness      metFORMIN (GLUCOPHAGE) 500 mg tablet take 1 tablet by mouth twice a day with food  Qty: 180 tablet, Refills: 1    Associated Diagnoses: Uncontrolled type 2 diabetes mellitus with stable proliferative retinopathy, without long-term current use of insulin ([facility])      ONE TOUCH ULTRA TEST test strip TEST twice a day  Qty: 200 each, Refills: 3    Associated Diagnoses: Diabetes 1 5, managed as type 2 ([facility])      RESTASIS 0 05 % ophthalmic emulsion Refills: 0      rosuvastatin (CRESTOR) 10 MG tablet take 1 tablet by mouth at bedtime  Qty: 30 tablet, Refills: 5    Associated Diagnoses: Atrial fibrillation, unspecified type ([facility])      timolol (TIMOPTIC) 0 5 % ophthalmic solution PLACE 1 DROP IN THE RIGH EYE EVERY MORNING  Refills: 0      TRADJENTA 5 MG TABS TAKE 1 DAILY  Qty: 30 tablet, Refills: 5    Associated Diagnoses: Type 2 diabetes mellitus with hyperglycemia, without long-term current use of insulin ([facility])      ONETOUCH DELICA LANCETS 11W MISC TEST TWICE DAILY    Qty: 200 each, Refills: 3    Associated Diagnoses: Diabetes 1 5, managed as type 2 ([facility])      potassium chloride (K-DUR,KLOR-CON) 10 mEq tablet Take 1 tablet (10 mEq total) by mouth daily  Qty: 30 tablet, Refills: 5    Associated Diagnoses: Atrial fibrillation, unspecified type [facility])           No discharge procedures on file      ED Provider  Electronically Signed by           Meagan Sinha MD  07/04/19 7546

## 2019-07-04 NOTE — ED NOTES
Pt states her sugar have been lower  Family states in the 61' and 66's  Pt family states she has had long stretches between eating meals       Alena Navarro RN  19/23/75 0028

## 2019-07-05 ENCOUNTER — TELEPHONE (OUTPATIENT)
Dept: OBGYN CLINIC | Facility: CLINIC | Age: 84
End: 2019-07-05

## 2019-07-05 LAB
ANION GAP SERPL CALCULATED.3IONS-SCNC: 6 MMOL/L (ref 4–13)
ANION GAP SERPL CALCULATED.3IONS-SCNC: 6 MMOL/L (ref 4–13)
ANION GAP SERPL CALCULATED.3IONS-SCNC: 7 MMOL/L (ref 4–13)
ANION GAP SERPL CALCULATED.3IONS-SCNC: 8 MMOL/L (ref 4–13)
BASOPHILS # BLD AUTO: 0.04 THOUSANDS/ΜL (ref 0–0.1)
BASOPHILS NFR BLD AUTO: 1 % (ref 0–1)
BUN SERPL-MCNC: 16 MG/DL (ref 5–25)
BUN SERPL-MCNC: 17 MG/DL (ref 5–25)
BUN SERPL-MCNC: 18 MG/DL (ref 5–25)
BUN SERPL-MCNC: 19 MG/DL (ref 5–25)
CALCIUM SERPL-MCNC: 8.8 MG/DL (ref 8.3–10.1)
CALCIUM SERPL-MCNC: 8.9 MG/DL (ref 8.3–10.1)
CALCIUM SERPL-MCNC: 9 MG/DL (ref 8.3–10.1)
CALCIUM SERPL-MCNC: 9.2 MG/DL (ref 8.3–10.1)
CHLORIDE SERPL-SCNC: 94 MMOL/L (ref 100–108)
CHLORIDE SERPL-SCNC: 97 MMOL/L (ref 100–108)
CHLORIDE SERPL-SCNC: 97 MMOL/L (ref 100–108)
CHLORIDE SERPL-SCNC: 98 MMOL/L (ref 100–108)
CLINICAL INFO: NORMAL
CO2 SERPL-SCNC: 26 MMOL/L (ref 21–32)
CO2 SERPL-SCNC: 28 MMOL/L (ref 21–32)
CO2 SERPL-SCNC: 28 MMOL/L (ref 21–32)
CO2 SERPL-SCNC: 29 MMOL/L (ref 21–32)
CREAT SERPL-MCNC: 0.83 MG/DL (ref 0.6–1.3)
CREAT SERPL-MCNC: 0.86 MG/DL (ref 0.6–1.3)
CREAT SERPL-MCNC: 1.01 MG/DL (ref 0.6–1.3)
CREAT SERPL-MCNC: 1.09 MG/DL (ref 0.6–1.3)
EOSINOPHIL # BLD AUTO: 0.07 THOUSAND/ΜL (ref 0–0.61)
EOSINOPHIL NFR BLD AUTO: 2 % (ref 0–6)
ERYTHROCYTE [DISTWIDTH] IN BLOOD BY AUTOMATED COUNT: 13.1 % (ref 11.6–15.1)
GFR SERPL CREATININE-BSD FRML MDRD: 47 ML/MIN/1.73SQ M
GFR SERPL CREATININE-BSD FRML MDRD: 51 ML/MIN/1.73SQ M
GFR SERPL CREATININE-BSD FRML MDRD: 62 ML/MIN/1.73SQ M
GFR SERPL CREATININE-BSD FRML MDRD: 65 ML/MIN/1.73SQ M
GLUCOSE SERPL-MCNC: 116 MG/DL (ref 65–140)
GLUCOSE SERPL-MCNC: 131 MG/DL (ref 65–140)
GLUCOSE SERPL-MCNC: 159 MG/DL (ref 65–140)
GLUCOSE SERPL-MCNC: 174 MG/DL (ref 65–140)
GLUCOSE SERPL-MCNC: 244 MG/DL (ref 65–140)
GLUCOSE SERPL-MCNC: 251 MG/DL (ref 65–140)
GLUCOSE SERPL-MCNC: 87 MG/DL (ref 65–140)
GLUCOSE SERPL-MCNC: 87 MG/DL (ref 65–140)
GLUCOSE SERPL-MCNC: 98 MG/DL (ref 65–140)
HCT VFR BLD AUTO: 34.1 % (ref 34.8–46.1)
HGB BLD-MCNC: 11.2 G/DL (ref 11.5–15.4)
IMM GRANULOCYTES # BLD AUTO: 0.02 THOUSAND/UL (ref 0–0.2)
IMM GRANULOCYTES NFR BLD AUTO: 0 % (ref 0–2)
LYMPHOCYTES # BLD AUTO: 1.48 THOUSANDS/ΜL (ref 0.6–4.47)
LYMPHOCYTES NFR BLD AUTO: 32 % (ref 14–44)
MCH RBC QN AUTO: 30.1 PG (ref 26.8–34.3)
MCHC RBC AUTO-ENTMCNC: 32.8 G/DL (ref 31.4–37.4)
MCV RBC AUTO: 92 FL (ref 82–98)
MONOCYTES # BLD AUTO: 0.73 THOUSAND/ΜL (ref 0.17–1.22)
MONOCYTES NFR BLD AUTO: 16 % (ref 4–12)
NEUTROPHILS # BLD AUTO: 2.35 THOUSANDS/ΜL (ref 1.85–7.62)
NEUTS SEG NFR BLD AUTO: 49 % (ref 43–75)
NRBC BLD AUTO-RTO: 0 /100 WBCS
OSMOLALITY UR/SERPL-RTO: 280 MMOL/KG (ref 282–298)
OSMOLALITY UR: 272 MMOL/KG
PATH REPORT.COMMENTS IMP SPEC: NORMAL
PLATELET # BLD AUTO: 244 THOUSANDS/UL (ref 149–390)
PMV BLD AUTO: 10.2 FL (ref 8.9–12.7)
POTASSIUM SERPL-SCNC: 4.4 MMOL/L (ref 3.5–5.3)
POTASSIUM SERPL-SCNC: 4.5 MMOL/L (ref 3.5–5.3)
POTASSIUM SERPL-SCNC: 4.6 MMOL/L (ref 3.5–5.3)
POTASSIUM SERPL-SCNC: 4.6 MMOL/L (ref 3.5–5.3)
PROCEDURE TYPE: NORMAL
RBC # BLD AUTO: 3.72 MILLION/UL (ref 3.81–5.12)
SODIUM 24H UR-SCNC: 71 MOL/L
SODIUM SERPL-SCNC: 127 MMOL/L (ref 136–145)
SODIUM SERPL-SCNC: 131 MMOL/L (ref 136–145)
SODIUM SERPL-SCNC: 132 MMOL/L (ref 136–145)
SODIUM SERPL-SCNC: 134 MMOL/L (ref 136–145)
SPECIMEN SOURCE: NORMAL
WBC # BLD AUTO: 4.69 THOUSAND/UL (ref 4.31–10.16)

## 2019-07-05 PROCEDURE — 80048 BASIC METABOLIC PNL TOTAL CA: CPT | Performed by: INTERNAL MEDICINE

## 2019-07-05 PROCEDURE — G8996 SWALLOW CURRENT STATUS: HCPCS

## 2019-07-05 PROCEDURE — 99222 1ST HOSP IP/OBS MODERATE 55: CPT | Performed by: NURSE PRACTITIONER

## 2019-07-05 PROCEDURE — G8997 SWALLOW GOAL STATUS: HCPCS

## 2019-07-05 PROCEDURE — 82948 REAGENT STRIP/BLOOD GLUCOSE: CPT

## 2019-07-05 PROCEDURE — 99232 SBSQ HOSP IP/OBS MODERATE 35: CPT | Performed by: INTERNAL MEDICINE

## 2019-07-05 PROCEDURE — 92610 EVALUATE SWALLOWING FUNCTION: CPT

## 2019-07-05 PROCEDURE — 85025 COMPLETE CBC W/AUTO DIFF WBC: CPT | Performed by: NURSE PRACTITIONER

## 2019-07-05 PROCEDURE — 84439 ASSAY OF FREE THYROXINE: CPT | Performed by: INTERNAL MEDICINE

## 2019-07-05 PROCEDURE — 80048 BASIC METABOLIC PNL TOTAL CA: CPT | Performed by: NURSE PRACTITIONER

## 2019-07-05 PROCEDURE — G8998 SWALLOW D/C STATUS: HCPCS

## 2019-07-05 RX ORDER — DESMOPRESSIN ACETATE 4 UG/ML
2 INJECTION, SOLUTION INTRAVENOUS; SUBCUTANEOUS ONCE
Status: COMPLETED | OUTPATIENT
Start: 2019-07-05 | End: 2019-07-05

## 2019-07-05 RX ORDER — SODIUM CHLORIDE 9 MG/ML
75 INJECTION, SOLUTION INTRAVENOUS CONTINUOUS
Status: DISCONTINUED | OUTPATIENT
Start: 2019-07-05 | End: 2019-07-05

## 2019-07-05 RX ORDER — SODIUM CHLORIDE 450 MG/100ML
75 INJECTION, SOLUTION INTRAVENOUS CONTINUOUS
Status: DISCONTINUED | OUTPATIENT
Start: 2019-07-05 | End: 2019-07-05

## 2019-07-05 RX ORDER — POLYVINYL ALCOHOL 14 MG/ML
1 SOLUTION/ DROPS OPHTHALMIC 3 TIMES DAILY
Status: DISCONTINUED | OUTPATIENT
Start: 2019-07-05 | End: 2019-07-10 | Stop reason: HOSPADM

## 2019-07-05 RX ORDER — HYDRALAZINE HYDROCHLORIDE 25 MG/1
50 TABLET, FILM COATED ORAL 3 TIMES DAILY
Status: DISCONTINUED | OUTPATIENT
Start: 2019-07-05 | End: 2019-07-10 | Stop reason: HOSPADM

## 2019-07-05 RX ORDER — DEXTROSE MONOHYDRATE 50 MG/ML
100 INJECTION, SOLUTION INTRAVENOUS CONTINUOUS
Status: DISCONTINUED | OUTPATIENT
Start: 2019-07-05 | End: 2019-07-05

## 2019-07-05 RX ADMIN — DABIGATRAN ETEXILATE MESYLATE 150 MG: 150 CAPSULE ORAL at 08:34

## 2019-07-05 RX ADMIN — INSULIN LISPRO 1 UNITS: 100 INJECTION, SOLUTION INTRAVENOUS; SUBCUTANEOUS at 12:36

## 2019-07-05 RX ADMIN — TIMOLOL MALEATE 1 DROP: 5 SOLUTION/ DROPS OPHTHALMIC at 08:36

## 2019-07-05 RX ADMIN — INSULIN LISPRO 2 UNITS: 100 INJECTION, SOLUTION INTRAVENOUS; SUBCUTANEOUS at 00:03

## 2019-07-05 RX ADMIN — HYDRALAZINE HYDROCHLORIDE 50 MG: 25 TABLET ORAL at 21:26

## 2019-07-05 RX ADMIN — ALBUTEROL SULFATE 2 PUFF: 90 AEROSOL, METERED RESPIRATORY (INHALATION) at 23:24

## 2019-07-05 RX ADMIN — DESMOPRESSIN ACETATE 2 MCG: 4 SOLUTION INTRAVENOUS at 12:20

## 2019-07-05 RX ADMIN — DABIGATRAN ETEXILATE MESYLATE 150 MG: 150 CAPSULE ORAL at 21:26

## 2019-07-05 RX ADMIN — BIMATOPROST 1 DROP: 0.1 SOLUTION/ DROPS OPHTHALMIC at 21:26

## 2019-07-05 RX ADMIN — AMIODARONE HYDROCHLORIDE 200 MG: 200 TABLET ORAL at 08:34

## 2019-07-05 RX ADMIN — POLYVINYL ALCOHOL 1 DROP: 14 SOLUTION/ DROPS OPHTHALMIC at 21:27

## 2019-07-05 RX ADMIN — PRAVASTATIN SODIUM 40 MG: 40 TABLET ORAL at 17:11

## 2019-07-05 RX ADMIN — DEXTROSE 100 ML/HR: 5 SOLUTION INTRAVENOUS at 11:11

## 2019-07-05 NOTE — ASSESSMENT & PLAN NOTE
Hypertensive urgency at the time of admission likely secondary to anxiety  Blood pressure has improved with hydralazine    Off chlorthalidone and losartan/HCT due to hyponatremia

## 2019-07-05 NOTE — CONSULTS
Consultation - Nephrology   Vasquez Betancourt 80 y o  female MRN: [id number]  Unit/Bed#: [facility] [id number] Encounter: [id number]    ASSESSMENT/PLAN:  Hyponatremia:  Acute on chronic  Started on chlorthalidone and also on losartan/hydrochlorothiazide combination at home  Urine studies suggestive of SIADH  -serum sodium typically low to mid 130s   -presented with serum sodium of 125, concern for rapid correction, corrected about 8 points in 12 hours  - will stop NSS for now  - Monitor BMP every 6 hours x 3    - await results of 1200 BMP to further direct IVF, may need hypotonic fluid and DDAVP for reversal  t    -maintain on 1 5 L per day fluid restriction  -will hold hydrochlorothiazide and chlorthalidone   -serum Osmo:  280, urine Osmo:  272, urine sodium:  71    -TSH:  4 1  Hypertension:  Presented with systolic blood pressure in the 200s  Blood pressure noted to be 100/50 this morning   -continue hydralazine 50 mg 3 times per day with holding parameter   - avoid hypotension, rapid lowering of blood pressure, or high fluctuations  Anemia:  Likely secondary to blood loss with vaginal bleeding  Will continue to monitor and transfuse as needed  HISTORY OF PRESENT ILLNESS:  Requesting Physician: Antwon Sharif DO  Reason for Consult: Hyponatremia    Vasquez Betancourt is a 80y o  year old female with past medical history of hyponatremia, hypertension, diabetes, CVA, atrial fibrillation, and does/, who was admitted to [facility] after presenting with episode of confusion  She was noted to be hypoglycemic at the time  A renal consultation is requested today for assistance in the management of hyponatremia  She does not follow up outpatient with Nephrology  She is not aware if she has had low sodium issues in the past   She denies chest pain or shortness of breath  She denies nausea, vomiting, or diarrhea  She has been experiencing dysphagia  She states that she has been losing weight    She denies issues with urination      PAST MEDICAL HISTORY:  Past Medical History:   Diagnosis Date    Ambulatory dysfunction     CHF (congestive heart failure) (HCC)     pEFHF    Cholelithiasis     Coronary artery disease     Depression with anxiety     Diabetes mellitus (HCC)     niddm    Glaucoma     Hypertension     Hyponatremia     Ischemic stroke of frontal lobe (HCC)     Right     Obstructive sleep apnea     [facility]ox[facility] fibrillation[facility])     Vertigo        PAST SURGICAL HISTORY:  Past Surgical History:   Procedure Laterality Date    APPENDECTOMY      GLAUCOMA SURGERY  01/03/2016    [place] REPLACE AORTIC VALVE OPENFEMORAL ARTERY APPROACH N/A 4/17/2018    Procedure: REPLACEMENT AORTIC VALVE TRANSCATHETER (TAVR) TRANSFEMORAL W/ 23 MM AGUILAR STEVE S3 VALVE;  Surgeon: Criselda Barraza DO;  Location: [facility];  Service: Cardiac Surgery    TUBAL LIGATION         ALLERGIES:  Allergies   Allergen Reactions    Lipitor [Atorvastatin] GI Intolerance     Nausea stomach ache    Hydrochlorothiazide GI Intolerance    Lisinopril Cough    Metoprolol Headache    Aspirin GI Intolerance       SOCIAL HISTORY:  Social History     Substance and Sexual Activity   Alcohol Use Never    Frequency: Never    Binge frequency: Never     Social History     Substance and Sexual Activity   Drug Use Never     Social History     Tobacco Use   Smoking Status Never Smoker   Smokeless Tobacco Never Used       FAMILY HISTORY:  Family History   Problem Relation Age of Onset    No Known Problems Mother     Diabetes Father     Stroke Father     Coronary artery disease Sister     No Known Problems Brother     No Known Problems Son     Breast cancer Daughter     No Known Problems Maternal Grandmother     No Known Problems Maternal Grandfather     No Known Problems Paternal Grandmother     No Known Problems Paternal Grandfather     No Known Problems Cousin     Rheum arthritis Neg Hx     Osteoarthritis Neg Hx     Asthma Neg Hx     Heart failure Neg Hx     Hyperlipidemia Neg Hx     Hypertension Neg Hx     Migraines Neg Hx     Rashes / Skin problems Neg Hx     Seizures Neg Hx     Thyroid disease Neg Hx        MEDICATIONS:    Current Facility-Administered Medications:     acetaminophen (TYLENOL) tablet 650 mg, 650 mg, Oral, Q6H PRN, ROBBI Cook    albuterol (PROVENTIL HFA,VENTOLIN HFA) inhaler 2 puff, 2 puff, Inhalation, Q6H PRN, ROBBI Cook    amiodarone tablet 200 mg, 200 mg, Oral, Daily, ROBBI Cook, 200 mg at 07/05/19 0834    bimatoprost (LUMIGAN) 0 01 % ophthalmic solution 1 drop, 1 drop, Both Eyes, HS, ROBBI Cook, 1 drop at 07/04/19 2357    dabigatran etexilate (PRADAXA) capsule 150 mg, 150 mg, Oral, Q12H [facility], ROBBI Cook, 150 mg at 07/05/19 0834    hydrALAZINE (APRESOLINE) injection 5 mg, 5 mg, Intravenous, Q4H PRN, ROBBI Cook    hydrALAZINE (APRESOLINE) tablet 50 mg, 50 mg, Oral, TID, ROBBI Cook, 50 mg at 07/04/19 2357    insulin lispro (HumaLOG) 100 units/mL subcutaneous injection 1-5 Units, 1-5 Units, Subcutaneous, HS, ROBBI Cook, 2 Units at 07/05/19 0003    insulin lispro (HumaLOG) 100 units/mL subcutaneous injection 1-6 Units, 1-6 Units, Subcutaneous, TID AC **AND** Fingerstick Glucose (POCT), , , TID AC, ROBBI Cook    meclizine (ANTIVERT) tablet 12 5 mg, 12 5 mg, Oral, Q12H PRN, ROBBI Cook    ondansetron [facility]) injection 4 mg, 4 mg, Intravenous, Q6H PRN, ROBBI Cook    pravastatin (PRAVACHOL) tablet 40 mg, 40 mg, Oral, Daily With Dinner, ROBBI Cook    sodium chloride 0 9 % infusion, 75 mL/hr, Intravenous, Continuous, ROBBI Cook, Last Rate: 75 mL/hr at 07/04/19 2351, 75 mL/hr at 07/04/19 2351    timolol (TIMOPTIC) 0 5 % ophthalmic solution 1 drop, 1 drop, Right Eye, Daily, ROBBI Cook, 1 drop at 07/05/19 8107    REVIEW OF SYSTEMS:  A complete review of systems was performed and found to be negative unless otherwise noted in the history of present illness  General: No fevers, chills  Cardiovascular:  - chest pain, - leg edema  Respiratory: No cough, sputum production,  - shortness of breath  Gastrointestinal:  - nausea/vomiting,   diarrhea,  - abdominal pain  Genitourinary: No hematuria  No foamy urine    No dysuria    PHYSICAL EXAM:  Current Weight:    First Weight:    Vitals:    07/04/19 2052 07/04/19 2140 07/04/19 2300 07/05/19 0717   BP: (!) 183/77 136/96 (!) 187/90 100/50   BP Location: Right arm Left arm Left arm Right arm   Pulse: 56 63 60 58   Resp: 16 18 18 16   Temp:  97 7 °F (36 5 °C) 97 6 °F (36 4 °C) (!) 96 8 °F (36 °C)   TempSrc:  Temporal Temporal Temporal   SpO2: 97% 99% 100% 98%       Intake/Output Summary (Last 24 hours) at 7/5/2019 0956  Last data filed at 7/5/2019 0356  Gross per 24 hour   Intake 0 ml   Output 700 ml   Net -700 ml     General: NAD  Skin: warm, dry, intact, no rash  HEENT: Moist mucous membranes, sclera anicteric, normocephalic, atraumatic  Neck: No apparent JVD appreciated  Chest:lung sounds clear B/L, on RA   CVS:Regular rate and rhythm, no murmer   Abdomen: Soft, round, non-tender, +BS  Extremities: No B/L LE edema present  Neuro: alert and oriented  Psych: appropriate mood and affect     Invasive Devices:      Lab Results:   Results from last 7 days   Lab Units 07/05/19  0745 07/05/19  0524 07/05/19  0006 07/04/19 2006 07/04/19  1925  06/30/19  1348   WBC Thousand/uL  --  4 69  --   --  12 17*  --  7 36   HEMOGLOBIN g/dL  --  11 2*  --   --  13 8  --  13 1   HEMATOCRIT %  --  34 1*  --   --  42 4  --  40 0   PLATELETS Thousands/uL  --  244  --   --  301  --  281   POTASSIUM mmol/L 4 4  --  4 5 5 2  --    < > 4 6   CHLORIDE mmol/L 98*  --  94* 91*  --    < > 96*   CO2 mmol/L 28  --  26 23  --    < > 24   BUN mg/dL 17  --  18 19  --    < > 23   CREATININE mg/dL 0 86  -- 1  09 0 96  --    < > 0 98   CALCIUM mg/dL 9 2  --  9 0 9 0  --    < > 9 9   ALK PHOS U/L  --   --   --  61  --   --   --    ALT U/L  --   --   --  30  --   --   --    AST U/L  --   --   --  14  --   --   --     < > = values in this interval not displayed

## 2019-07-05 NOTE — PROGRESS NOTES
Notified admitting SLIM  Pt eating sandwich, gagged and vomited the food  Pt states that 'sometimes she gets food stuck in her throat'  Recommending swallow study  Dysphagia until study complete       ROBBI [place], changing diet and ordering study

## 2019-07-05 NOTE — ASSESSMENT & PLAN NOTE
Multifactorial: hypertensive encephalopathy vs hypoglycemia vs hyponatremia:  · /106   · BG 50   · Na 125  Resolved, continue to monitor for mental status changes

## 2019-07-05 NOTE — ASSESSMENT & PLAN NOTE
Lab Results   Component Value Date    HGBA1C 6 3 06/05/2019     Recent Labs     07/04/19  2358 07/05/19  0720 07/05/19  1103 07/05/19  1554   POCGLU 244* 87 174* 116     Diabetes mellitus with hypoglycemia  Glipizide recently decreased to 10 mg daily from b i d  May need further decrease, continue to hold hospitalization and sliding scale only

## 2019-07-05 NOTE — PLAN OF CARE
Problem: INFECTION - ADULT  Goal: Absence or prevention of progression during hospitalization  Description  INTERVENTIONS:  - Assess and monitor for signs and symptoms of infection  - Monitor lab/diagnostic results  - Monitor all insertion sites, i e  indwelling lines, tubes, and drains  - Monitor endotracheal (as able) and nasal secretions for changes in amount and color  - [place] appropriate cooling/warming therapies per order  - Administer medications as ordered  - Instruct and encourage patient and family to use good hand hygiene technique  - Identify and instruct in appropriate isolation precautions for identified infection/condition  Outcome: Progressing  Goal: Absence of fever/infection during neutropenic period  Description  INTERVENTIONS:  - Monitor WBC  - Implement neutropenic guidelines  Outcome: Progressing     Problem: SAFETY ADULT  Goal: Patient will remain free of falls  Description  INTERVENTIONS:  - Assess patient frequently for physical needs  -  Identify cognitive and physical deficits and behaviors that affect risk of falls    -  [place] fall precautions as indicated by assessment   - Educate patient/family on patient safety including physical limitations  - Instruct patient to call for assistance with activity based on assessment  - Modify environment to reduce risk of injury  - Consider OT/PT consult to assist with strengthening/mobility  Outcome: Progressing  Goal: Maintain or return to baseline ADL function  Description  INTERVENTIONS:  -  Assess patient's ability to carry out ADLs; assess patient's baseline for ADL function and identify physical deficits which impact ability to perform ADLs (bathing, care of mouth/teeth, toileting, grooming, dressing, etc )  - Assess/evaluate cause of self-care deficits   - Assess range of motion  - Assess patient's mobility; develop plan if impaired  - Assess patient's need for assistive devices and provide as appropriate  - Encourage maximum independence but intervene and supervise when necessary  ¯ Involve family in performance of ADLs  ¯ Assess for home care needs following discharge   ¯ Request OT consult to assist with ADL evaluation and planning for discharge  ¯ Provide patient education as appropriate  Outcome: Progressing  Goal: Maintain or return mobility status to optimal level  Description  INTERVENTIONS:  - Assess patient's baseline mobility status (ambulation, transfers, stairs, etc )    - Identify cognitive and physical deficits and behaviors that affect mobility  - Identify mobility aids required to assist with transfers and/or ambulation (gait belt, sit-to-stand, lift, walker, cane, etc )  - [place] fall precautions as indicated by assessment  - Record patient progress and toleration of activity level on Mobility SBAR; progress patient to next Phase/Stage  - Instruct patient to call for assistance with activity based on assessment  - Request Rehabilitation consult to assist with strengthening/weightbearing, etc   Outcome: Progressing     Problem: DISCHARGE PLANNING  Goal: Discharge to home or other facility with appropriate resources  Description  INTERVENTIONS:  - Identify barriers to discharge w/patient and caregiver  - Arrange for needed discharge resources and transportation as appropriate  - Identify discharge learning needs (meds, wound care, etc )  - Arrange for interpretive services to assist at discharge as needed  - Refer to Case Management Department for coordinating discharge planning if the patient needs post-hospital services based on physician/advanced practitioner order or complex needs related to functional status, cognitive ability, or social support system  Outcome: Progressing     Problem: Knowledge Deficit  Goal: Patient/family/caregiver demonstrates understanding of disease process, treatment plan, medications, and discharge instructions  Description  Complete learning assessment and assess knowledge base   Interventions:  - Provide teaching at level of understanding  - Provide teaching via preferred learning methods  Outcome: Progressing     Problem: GENITOURINARY - ADULT  Goal: Maintains or returns to baseline urinary function  Description  INTERVENTIONS:  - Assess urinary function  - Encourage oral fluids to ensure adequate hydration  - Administer IV fluids as ordered to ensure adequate hydration  - Administer ordered medications as needed  - Offer frequent toileting  - Follow urinary retention protocol if ordered  Outcome: Progressing  Goal: Absence of urinary retention  Description  INTERVENTIONS:  - Assess patients ability to void and empty bladder  - Monitor I/O  - Bladder scan as needed  - Discuss with physician/AP medications to alleviate retention as needed  - Discuss catheterization for long term situations as appropriate  Outcome: Progressing     Problem: METABOLIC, FLUID AND ELECTROLYTES - ADULT  Goal: Electrolytes maintained within normal limits  Description  INTERVENTIONS:  - Monitor labs and assess patient for signs and symptoms of electrolyte imbalances  - Administer electrolyte replacement as ordered  - Monitor response to electrolyte replacements, including repeat lab results as appropriate  - Instruct patient on fluid and nutrition as appropriate  Outcome: Progressing  Goal: Fluid balance maintained  Description  INTERVENTIONS:  - Monitor labs and assess for signs and symptoms of volume excess or deficit  - Monitor I/O and WT  - Instruct patient on fluid and nutrition as appropriate  Outcome: Progressing  Goal: Glucose maintained within target range  Description  INTERVENTIONS:  - Monitor Blood Glucose as ordered  - Assess for signs and symptoms of hyperglycemia and hypoglycemia  - Administer ordered medications to maintain glucose within target range  - Assess nutritional intake and initiate nutrition service referral as needed  Outcome: Progressing     Problem: SKIN/TISSUE INTEGRITY - ADULT  Goal: Skin integrity remains intact  Description  INTERVENTIONS  - Identify patients at risk for skin breakdown  - Assess and monitor skin integrity  - Assess and monitor nutrition and hydration status  - Monitor labs (i e  albumin)  - Assess for incontinence   - Turn and reposition patient  - Assist with mobility/ambulation  - Relieve pressure over bony prominences  - Avoid friction and shearing  - Provide appropriate hygiene as needed including keeping skin clean and dry  - Evaluate need for skin moisturizer/barrier cream  - Collaborate with interdisciplinary team (i e  Nutrition, Rehabilitation, etc )   - Patient/family teaching  Outcome: Progressing  Goal: Incision(s), wounds(s) or drain site(s) healing without S/S of infection  Description  INTERVENTIONS  - Assess and document risk factors for skin impairment   - Assess and document dressing, incision, wound bed, drain sites and surrounding tissue  - Initiate Nutrition services consult and/or wound management as needed  Outcome: Progressing  Goal: Oral mucous membranes remain intact  Description  INTERVENTIONS  - Assess oral mucosa and hygiene practices  - Implement preventative oral hygiene regimen  - Implement oral medicated treatments as ordered  - Initiate Nutrition services referral as needed  Outcome: Progressing     Problem: MUSCULOSKELETAL - ADULT  Goal: Maintain or return mobility to safest level of function  Description  INTERVENTIONS:  - Assess patient's ability to carry out ADLs; assess patient's baseline for ADL function and identify physical deficits which impact ability to perform ADLs (bathing, care of mouth/teeth, toileting, grooming, dressing, etc )  - Assess/evaluate cause of self-care deficits   - Assess range of motion  - Assess patient's mobility; develop plan if impaired  - Assess patient's need for assistive devices and provide as appropriate  - Encourage maximum independence but intervene and supervise when necessary  - Involve family in performance of ADLs  - Assess for home care needs following discharge   - Request OT consult to assist with ADL evaluation and planning for discharge  - Provide patient education as appropriate  Outcome: Progressing     Problem: Potential for Falls  Goal: Patient will remain free of falls  Description  INTERVENTIONS:  - Assess patient frequently for physical needs  -  Identify cognitive and physical deficits and behaviors that affect risk of falls    -  [place] fall precautions as indicated by assessment   - Educate patient/family on patient safety including physical limitations  - Instruct patient to call for assistance with activity based on assessment  - Modify environment to reduce risk of injury  - Consider OT/PT consult to assist with strengthening/mobility  Outcome: Progressing

## 2019-07-05 NOTE — ASSESSMENT & PLAN NOTE
Reported by nurse pt choking and coughing while eating a sandwich then vomited her meal; pt c/o food gets stuck in her throat sometimes  SLP consult  Dysphagia 2 diet  Aspiration precautions  Consider GI consult

## 2019-07-05 NOTE — ASSESSMENT & PLAN NOTE
WBC 12, afebrile; UA negative    Reports wheezing and cough productive of clear sputum, has been using her inhaler more often  Will obtain a chest x-ray  Monitor CBC

## 2019-07-05 NOTE — ASSESSMENT & PLAN NOTE
Na 125 (corrected Na 126); has a h/o hypoNa, ranges in the low 130's   Recently started on Chlorthalidone by cardiology on May 21st; will hold Consider discontinuing chlorthalidone and adding another antihypertensive (BP uncontrolled)  Will obtain a urine sodium, urine osmolality and serum osmolality  IV hydration 0 9 NS  1500ml Fluid restriction  Monitor serum BMP q8h

## 2019-07-05 NOTE — H&P
H&P- Genaro Avilez 1935, 80 y o  female MRN: [id number]    Unit/Bed#: [address] 222-01 Encounter: [id number]    Primary Care Provider: Sola Goldstein MD   Date and time admitted to hospital: 7/4/2019  6:35 PM      Hypertensive urgency  Assessment & Plan  Continue home medication hydralazine 50 mg t i d  Chlorthalidone and Hyzaar on hold due to hyponatremia  Add p r n  Hydralazine for SBP >546    * Metabolic encephalopathy  Assessment & Plan  Multifactorial: hypertensive encephalopathy vs hypoglycemia vs hyponatremia:  · /106   · BG 50   · Na 125  Resolved, continue to monitor for mental status changes    Dysphagia  Assessment & Plan  Reported by nurse pt choking and coughing while eating a sandwich then vomited her meal; pt c/o food gets stuck in her throat sometimes  SLP consult  Dysphagia 2 diet  Aspiration precautions  Consider GI consult    Type 2 diabetes mellitus with hypoglycemia without coma [facility])  Assessment & Plan  Lab Results   Component Value Date    HGBA1C 6 3 06/05/2019   ·  BG 50 on arrival; improved with D50  Glipizide decreased by PMD on May 22nd to 10mg daily from 10mg BID  · Hold oral hypoglycemics and start sliding scale  · CHO diet    Recent Labs     07/04/19  1839 07/04/19  1932 07/04/19 2022   POCGLU 50* 149* 212*       Blood Sugar Average: Last 72 hrs:  (P) 137    Hyponatremia  Assessment & Plan  Na 125 (corrected Na 126); has a h/o hypoNa, ranges in the low 130's  Recently started on Chlorthalidone by cardiology on May 21st; will hold Consider discontinuing chlorthalidone and adding another antihypertensive (BP uncontrolled)  Will obtain a urine sodium, urine osmolality and serum osmolality  IV hydration 0 9 NS  1500ml Fluid restriction  Monitor serum BMP q8h    Leukocytosis  Assessment & Plan  WBC 12, afebrile; UA negative    Reports wheezing and cough productive of clear sputum, has been using her inhaler more often  Will obtain a chest x-ray  Monitor CBC    Elevated TSH  Assessment & Plan  TSH 4 8, trending down from 6 3 in May, not on thyroid hormone replacement therapy; thought to be secondary to amiodarone    History of CVA (cerebrovascular accident)  Assessment & Plan  Admitted in May for CVA  Anticoagulated on pradaxa; was previously on Xarelto   Continue statin    Severe dizziness  Assessment & Plan  Chronic complaint, on meclizine  Seen by Neurology and recommended vestibular therapy and ENT follow-up    S/P TAVR (transcatheter aortic valve replacement)  Assessment & Plan  H/o aortic stenosis s/p AV replacement    Paroxysmal atrial fibrillation ([address] )  Assessment & Plan  S/p cardioversion  Maintained on Pradaxa for anticoagulation    VTE Prophylaxis: Dabigatran (Pradaxa)  / sequential compression device   Code Status: FC  POLST: POLST is not applicable to this patient  Discussion with family:     Anticipated Length of Stay:  Patient will be admitted on an Inpatient basis with an anticipated length of stay of  > 2 midnights  Justification for Hospital Stay: hypoNa, metabolic encephalopathy, hypertensive urgency    Total Time for Visit, including Counseling / Coordination of Care: 1 hour  Greater than 50% of this total time spent on direct patient counseling and coordination of care  Chief Complaint:   Confused    History of Present Illness:    Howie Cabral is a 80 y o  female who presents with c/o confusion, states she was doing a puzzle and "could not remember things," felt dizzy although states this is chronic, attempted to get up from her chair but fell back onto the seat  Her son-in-law checked her blood glucose and it was low, attempted to check blood pressure but the machine could not read it  Patient completely coherent now, AAOx3, states symptoms resolved in the ER  Had 1 episode of emesis on Monday  Had vaginal bleeding over the weekend, was scheduled for an ultrasound and states she drank a lot of fluids because she had to have a full bladder  Reports cough and wheezing although denies shortness of breath, states she uses an inhaler in the morning and night, cough productive of clear sputum, denies fever or malaise  Denies edema, ALBERTO, orthopnea, numbness, weakness, fatigue, muscle cramping,    nausea, constipation, diarrhea or dysuria  Review of Systems:    Review of Systems   Constitutional: Negative  HENT: Positive for trouble swallowing  Respiratory: Positive for cough and wheezing  Negative for chest tightness, shortness of breath and stridor  Cardiovascular: Negative  Gastrointestinal: Positive for vomiting  Negative for abdominal pain, constipation, diarrhea and nausea  One episode of emesis on Monday   Genitourinary: Positive for vaginal bleeding  Negative for difficulty urinating, dysuria and urgency  Neurological: Positive for dizziness and headaches  Negative for tremors, syncope, facial asymmetry, speech difficulty, weakness, light-headedness and numbness  Chronic intermittent dizziness   Psychiatric/Behavioral: Positive for confusion         Past Medical and Surgical History:     Past Medical History:   Diagnosis Date    Ambulatory dysfunction     CHF (congestive heart failure) ([facility])     pEFHF    Cholelithiasis     Coronary artery disease     Depression with anxiety     Diabetes mellitus ([facility] [address] )     niddm    Glaucoma     Hypertension     Hyponatremia     Ischemic stroke of frontal lobe ([facility])     Right     Obstructive sleep apnea     PAF (paroxysmal atrial fibrillation) ([facility])     Vertigo        Past Surgical History:   Procedure Laterality Date    APPENDECTOMY      GLAUCOMA SURGERY  01/03/2016    [place] REPLACE AORTIC VALVE OPENFEMORAL ARTERY APPROACH N/A 4/17/2018    Procedure: REPLACEMENT AORTIC VALVE TRANSCATHETER (TAVR) TRANSFEMORAL W/ 23 MM AGUILAR STEVE S3 VALVE;  Surgeon: Jere Davison DO;  Location: [facility];  Service: Cardiac Surgery    TUBAL LIGATION         Meds/Allergies:    Prior to Admission medications    Medication Sig Start Date End Date Taking?  Authorizing Provider   Acetaminophen (TYLENOL) 325 MG CAPS Take by mouth as needed    Yes Historical Provider, MD   albuterol (PROAIR HFA) 90 mcg/act inhaler Inhale 2 puffs every 6 (six) hours as needed for wheezing 7/1/19  Yes Hubbard MD Pal   amiodarone 200 mg tablet Take 1 tablet (200 mg total) by mouth daily 1/15/19  Yes Barbara Montes MD   Bimatoprost (LUMIGAN OP) Administer 1 drop to both eyes daily at bedtime Both eyes   Yes Historical Provider, MD   chlorthalidone 25 mg tablet Take 0 5 tablets (12 5 mg total) by mouth daily 5/21/19  Yes Barbara Montes MD   Cholecalciferol (VITAMIN D3) 1000 units CAPS Take 5,000 Units by mouth daily    Yes Historical Provider, MD   Cyanocobalamin (VITAMIN B 12 PO) Take 1,000 mcg by mouth daily    Yes Historical Provider, MD   dabigatran etexilate (PRADAXA) 150 mg capsu Take 1 capsule (150 mg total) by mouth every 12 (twelve) hours for 90 days 6/10/19 9/8/19 Yes Stevie Aguillon MD   glipiZIDE (GLUCOTROL) 10 mg tablet Take 1 tablet (10 mg total) by mouth daily 5/22/19  Yes Hubbard MD Pal   hydrALAZINE (APRESOLINE) 50 mg tablet Take 1 tablet (50 mg total) by mouth 3 (three) times a day for 90 days 6/7/19 9/5/19 Yes Hubbard MD Pal   losartan-hydrochlorothiazide [facility]) 50-12 5 mg per tablet Take 1 tablet by mouth daily 6/20/19  Yes Barbara Montes MD   meclizine (ANTIVERT) 12 5 MG tablet Take 1 tablet (12 5 mg total) by mouth every 12 (twelve) hours as needed for dizziness 6/5/19  Yes ROBBI Fitzgerald   metFORMIN (GLUCOPHAGE) 500 mg tablet take 1 tablet by mouth twice a day with food 7/2/19  Yes Hubbard BackMD regulo   ONE TOUCH ULTRA TEST test strip TEST twice a day 9/20/18  Yes Hubbard MD Pal   RESTASIS 0 05 % ophthalmic emulsion  7/3/18  Yes Historical Provider, MD   rosuvastatin (CRESTOR) 10 MG tablet take 1 tablet by mouth at bedtime 6/3/19  Yes Barbara Montes MD   timolol (TIMOPTIC) 0 5 % ophthalmic solution PLACE 1 DROP IN THE RIGH EYE EVERY MORNING 12/10/18  Yes Historical Provider, MD   TRADJENTA 5 MG TABS TAKE 1 DAILY 6/1/19  Yes Trent Crews MD   metFORMIN (GLUCOPHAGE) 500 mg tablet Take 1 tablet (500 mg total) by mouth 2 (two) times a day with meals for 90 days Cannot tolerate 1000mg because of GI upset  12/11/18 5/22/19  Trent Crews MD   [facility] MISC TEST TWICE DAILY  3/10/19   ROBBI Simms   potassium chloride (K-DUR,KLOR-CON) 10 mEq tablet Take 1 tablet (10 mEq total) by mouth daily 5/21/19   Marilyn Real MD     I have reviewed home medications with patient personally  Allergies: Allergies   Allergen Reactions    Lipitor [Atorvastatin] GI Intolerance     Nausea stomach ache    Hydrochlorothiazide GI Intolerance    Lisinopril Cough    Metoprolol Headache    Aspirin GI Intolerance       Social History:     Marital Status:     Occupation:  Retired  Patient Pre-hospital Living Situation:  Resides at home with daughter and son-in-law  Patient Pre-hospital Level of Mobility:  Walker  Patient Pre-hospital Diet Restrictions:   Substance Use History:   Social History     Substance and Sexual Activity   Alcohol Use Never    Frequency: Never    Binge frequency: Never     Social History     Tobacco Use   Smoking Status Never Smoker   Smokeless Tobacco Never Used     Social History     Substance and Sexual Activity   Drug Use Never       Family History:    Family History   Problem Relation Age of Onset    No Known Problems Mother     Diabetes Father     Stroke Father     Coronary artery disease Sister     No Known Problems Brother     No Known Problems Son     Breast cancer Daughter     No Known Problems Maternal Grandmother     No Known Problems Maternal Grandfather     No Known Problems Paternal Grandmother     No Known Problems Paternal Grandfather     No Known Problems Cousin     Rheum arthritis Neg Hx     Osteoarthritis Neg Hx     Asthma Neg Hx     Heart failure Neg Hx     Hyperlipidemia Neg Hx     Hypertension Neg Hx     Migraines Neg Hx     Rashes / Skin problems Neg Hx     Seizures Neg Hx     Thyroid disease Neg Hx        Physical Exam:     Vitals:   Blood Pressure: 136/96 (07/04/19 2140)  Pulse: 63 (07/04/19 2140)  Temperature: 97 7 °F (36 5 °C) (07/04/19 2140)  Temp Source: Temporal (07/04/19 2140)  Respirations: 18 (07/04/19 2140)  SpO2: 99 % (07/04/19 2140)    Physical Exam   Constitutional: She is oriented to person, place, and time  She appears well-developed and well-nourished  No distress  HENT:   Head: Normocephalic and atraumatic  Eyes: EOM are normal  Right eye exhibits no discharge  Left eye exhibits no discharge  Neck: Neck supple  Cardiovascular: Normal rate, regular rhythm, normal heart sounds and intact distal pulses  No murmur heard  Pulmonary/Chest: Effort normal and breath sounds normal  No stridor  No respiratory distress  She has no wheezes  She has no rales  Abdominal: Soft  Bowel sounds are normal  She exhibits no distension and no mass  There is no tenderness  There is no guarding  Increased bowel sounds   Musculoskeletal: She exhibits no edema or tenderness  Neurological: She is alert and oriented to person, place, and time  Equal strength B/L UE 5/5; B/LLE 4/5   Skin: Skin is warm and dry  She is not diaphoretic  Psychiatric: She has a normal mood and affect  Her behavior is normal  Judgment and thought content normal        Additional Data:     Lab Results: I have personally reviewed pertinent reports        Results from last 7 days   Lab Units 07/04/19  1925   WBC Thousand/uL 12 17*   HEMOGLOBIN g/dL 13 8   HEMATOCRIT % 42 4   PLATELETS Thousands/uL 301   NEUTROS PCT % 73   LYMPHS PCT % 18   MONOS PCT % 7   EOS PCT % 1     Results from last 7 days   Lab Units 07/04/19 2006   SODIUM mmol/L 125*   POTASSIUM mmol/L 5 2   CHLORIDE mmol/L 91*   CO2 mmol/L 23   BUN mg/dL 19   CREATININE mg/dL 0 96   ANION GAP mmol/L 11   CALCIUM mg/dL 9 0   ALBUMIN g/dL 3 6   TOTAL BILIRUBIN mg/dL 0 50   ALK PHOS U/L 61   ALT U/L 30   AST U/L 14   GLUCOSE RANDOM mg/dL 179*     Results from last 7 days   Lab Units 07/04/19  1925   INR  2 10*     Results from last 7 days   Lab Units 07/04/19 2022 07/04/19  1932 07/04/19  1839   POC GLUCOSE mg/dl 212* 149* 50*               Imaging: I have personally reviewed pertinent reports  XR chest pa & lateral    (Results Pending)       EKG, Pathology, and Other Studies Reviewed on Admission:    [facility] Yes     ** Please Note: This note has been constructed using a voice recognition system   **

## 2019-07-05 NOTE — PROGRESS NOTES
Progress Note - Janell Lux 1935, 80 y o  female MRN: [id number]    Unit/Bed#: [place] [address] 222-01 Encounter: [id number]    Primary Care Provider: Lauren Harden MD   Date and time admitted to hospital: 7/4/2019  6:35 PM        * Metabolic encephalopathy  Assessment & Plan  Metabolic encephalopathy secondary to hypoglycemia and hyponatremia  Seems to have returned to baseline  Dysphagia  Assessment & Plan  Patient reportedly choking on sandwich witnessed by nursing during the time of admission for encephalopathy  Has been seen by speech pathologist today to upgrade diet to dental soft  Hypertensive urgency  Assessment & Plan  Hypertensive urgency at the time of admission likely secondary to anxiety  Blood pressure has improved with hydralazine  Off chlorthalidone and losartan/HCT due to hyponatremia    Type 2 diabetes mellitus with hypoglycemia without coma [facility])  Assessment & Plan  Lab Results   Component Value Date    HGBA1C 6 3 06/05/2019     Recent Labs     07/04/19  2358 07/05/19  0720 07/05/19  1103 07/05/19  1554   POCGLU 244* 87 174* 116     Diabetes mellitus with hypoglycemia  Glipizide recently decreased to 10 mg daily from b i d  May need further decrease, continue to hold hospitalization and sliding scale only  Elevated TSH  Assessment & Plan  Elevated TSH, only mild  Check free T4      Results from last 7 days   Lab Units 07/04/19 2006   TSH 3RD GENERATON uIU/mL 4 812*       Thickened endometrium  Assessment & Plan  Should be evaluated by gynecology after discharge    S/P TAVR (transcatheter aortic valve replacement)  Assessment & Plan  History of TAVR    Paroxysmal atrial fibrillation (HCC)  Assessment & Plan  Paroxysmal atrial fibrillation on pradaxa    Hyponatremia  Assessment & Plan  Hyponatremia likely secondary to excess free water intake from having to drink water for pelvic ultrasound    Slightly overcorrected and Nephrology has given small dose of D5 W and DDAVP  Results from last 7 days   Lab Units 07/05/19  1210 07/05/19  0745 07/05/19  0006 07/04/19  2006 07/01/19  1414 06/30/19  1348   SODIUM mmol/L 131* 134* 127* 125* 132* 133*         VTE Pharmacologic Prophylaxis: Dabigatran (Pradaxa)    Patient Centered Rounds: I have performed bedside rounds with nursing staff today  Discussions with Specialists or Other Care Team Provider:  Nephrology  Education and Discussions with Family / Patient:     Time Spent for Care: 25 mins  More than 50% of total time spent on counseling and coordination of care as described above  Current Length of Stay: 1 day(s)  Current Patient Status: Inpatient     Certification Statement: The patient will continue to require additional inpatient hospital stay due to Metabolic encephalopathy  Discharge Plan / Estimated Discharge Date:  Possibly tomorrow depending on glucose and sodium    Code Status: Level 1 - Full Code  ______________________________________________________________________________    Subjective:   Patient seen and examined  Feeling much better today  No further confusion  Objective:   Vitals: Blood pressure 117/87, pulse 55, temperature 98 °F (36 7 °C), temperature source Temporal, resp  rate 18, SpO2 94 %      Physical Exam:   General appearance: alert, appears stated age and cooperative  Head: Normocephalic, without obvious abnormality, atraumatic  Lungs: clear to auscultation bilaterally  Heart: regular rate and rhythm  Abdomen: soft, non-tender, positive bowel sounds   Back: negative, range of motion normal  Extremities: extremities atraumatic, no cyanosis or edema  Neurologic: Grossly normal    Additional Data:   Labs:  Results from last 7 days   Lab Units 07/05/19  0524 07/04/19  1925 06/30/19  1348   WBC Thousand/uL 4 69 12 17* 7 36   HEMOGLOBIN g/dL 11 2* 13 8 13 1   HEMATOCRIT % 34 1* 42 4 40 0   MCV fL 92 92 91   PLATELETS Thousands/uL 244 301 281   INR   --  2 10* 2 62*     Results from last 7 days   Lab Units 07/05/19  1210 07/05/19  0745 07/05/19  0006 07/04/19 2006 07/01/19  1414 06/30/19  1348   SODIUM mmol/L 131* 134* 127* 125* 132* 133*   POTASSIUM mmol/L 4 6 4 4 4 5 5 2 4 1 4 6   CHLORIDE mmol/L 97* 98* 94* 91* 99* 96*   CO2 mmol/L 28 28 26 23 26 24   ANION GAP mmol/L 6 8 7 11 7 13   BUN mg/dL 16 17 18 19 21 23   CREATININE mg/dL 0 83 0 86 1 09 0 96 1 12 0 98   CALCIUM mg/dL 8 9 9 2 9 0 9 0 9 3 9 9   ALBUMIN g/dL  --   --   --  3 6  --   --    TOTAL BILIRUBIN mg/dL  --   --   --  0 50  --   --    ALK PHOS U/L  --   --   --  61  --   --    ALT U/L  --   --   --  30  --   --    AST U/L  --   --   --  14  --   --    EGFR ml/min/1 73sq m 65 62 47 54 45 53   GLUCOSE RANDOM mg/dL 159* 87 251* 179* 70 217*                      Results from last 7 days   Lab Units 07/05/19  1554 07/05/19  1103 07/05/19  0720 07/04/19  2358 07/04/19  2022 07/04/19  1932 07/04/19  1839   POC GLUCOSE mg/dl 116 174* 87 244* 212* 149* 50*         Results from last 7 days   Lab Units 07/04/19 2006   TSH 3RD GENERATON uIU/mL 4 812*     * I Have Reviewed All Lab Data Listed Above  Cultures:           Imaging:  Imaging Reports Reviewed Today Include:   Procedure: Xr Chest Pa & Lateral  Result Date: 7/5/2019  Impression: No acute cardiopulmonary disease   Workstation performed: [id number]     Scheduled Meds:  Current Facility-Administered Medications:  acetaminophen 650 mg Oral Q6H PRN Delmar Edmondsonme, CRNP   albuterol 2 puff Inhalation Q6H PRN Delmar Edmondsonme, CRNP   amiodarone 200 mg Oral Daily Delmar Rose CRNP   bimatoprost 1 drop Both Eyes HS Delmar Rose CRNP   dabigatran etexilate 150 mg Oral Q12H [address] Perlie Damme, CRNP   hydrALAZINE 5 mg Intravenous Q4H PRN ROBBI Charlton   hydrALAZINE 50 mg Oral TID ROBBI Rose   insulin lispro 1-5 Units Subcutaneous HS ROBBI Charlton   insulin lispro 1-6 Units Subcutaneous TID AC ROBBI Charlton   meclizine 12 5 mg Oral Q12H PRN ROBBI Perez   ondansetron 4 mg Intravenous Q6H PRN ROBBI Perez   polyvinyl alcohol 1 drop Both Eyes TID Carolina Najera DO   pravastatin 40 mg Oral Daily With ROBBI Wheat   timolol 1 drop Right Eye Daily ROBBI Perez       [facility] Internal Medicine  Hospitalist    ** Please Note: This note has been constructed using a voice recognition system   **

## 2019-07-05 NOTE — ASSESSMENT & PLAN NOTE
Metabolic encephalopathy secondary to hypoglycemia and hyponatremia  Seems to have returned to baseline

## 2019-07-05 NOTE — UTILIZATION REVIEW
Initial Clinical Review    Admission: Date/Time/Statement: 7/4/19 @ 2110     Orders Placed This Encounter   Procedures    Inpatient Admission     Standing Status:   Standing     Number of Occurrences:   1     Order Specific Question:   Admitting Physician     Answer:   Oliver Suero     Order Specific Question:   Level of Care     Answer:   Med Surg [16]     Order Specific Question:   Estimated length of stay     Answer:   More than 2 Midnights     Order Specific Question:   Certification     Answer:   I certify that inpatient services are medically necessary for this patient for a duration of greater than two midnights  See H&P and MD Progress Notes for additional information about the patient's course of treatment  ED Arrival Information     Expected Arrival Acuity Means of Arrival Escorted By Service Admission Type    - 7/4/2019 18:26 Emergent Walk-In Family Member Hospitalist Emergency    Arrival Complaint    -        Chief Complaint   Patient presents with    Dizziness     per family of patient, patient has been moving slower then normal, patient c/o of dizziness; patient was also seen at [facility] for vaginal bleeding on Tuesday    Weakness - Generalized       Assessment/Plan:  79 yo female presents to ED from home with alteration in mental status / confusion  starting 1 1/2 hrs PTA  Thinking & responding slowly per family  BS check @ home and it read low and home machine could not read bp  BP elevated, Na 125 and Glu 50 in traige - given juice to drink  Family notes she had vaginal bleeding last W/E and emesis x 1 on Monday  Admitted to IP with Hypertensive Urgency, Metabolic Encephalopathy, DM with Hypoglycemia and Hyponatremia  Plan includes: Continue home med hydralazine 50 mg t i d  Chlorthalidone and Hyzaar on hold due to hyponatremia, p r n  Hydralazine for SBP >175  Aspiration precautions and Speech consult for Dysphagia -  Pt gagged and vomitted food   BS improved - hold  oral hypoglycemics and start SSI  For HypoNa - IV hydration, fluid restrict,  BMP q8h, urine sodium, urine osmolality and serum osmolality    Per Nephrology 7/5: Hyponatremia:  Acute on chronic  presented with serum sodium of 125, concern for rapid correction, corrected about 8 points in 12 hours  - will stop NSS for now  Monitor BMP every 6 hours x 3  await results of 1200 BMP to further direct IVF, may need hypotonic fluid and DDAVP for reversal    -maintain on 1 5 L per day fluid restriction  -will hold hydrochlorothiazide and chlorthalidone      ED Triage Vitals   Temperature Pulse Respirations Blood Pressure SpO2   07/04/19 1832 07/04/19 1832 07/04/19 1832 07/04/19 1832 07/04/19 1832   (!) 97 1 °F (36 2 °C) 60 18 (!) 244/106 96 %      Temp Source Heart Rate Source Patient Position - Orthostatic VS BP Location FiO2 (%)   07/04/19 1832 07/04/19 1832 07/04/19 1832 07/04/19 1832 --   Temporal Monitor Sitting Left arm       Pain Score       07/04/19 1941       No Pain        Wt Readings from Last 1 Encounters:   06/30/19 79 kg (174 lb 2 6 oz)     Additional Vital Signs:   07/05/19 0717  96 8 °F (36 °C)  58  16  100/50  98 %  None (Room air)  Lying   07/04/19 2300  97 6 °F (36 4 °C)  60  18  187/90  100 %  None (Room air)  Lying   07/04/19 2140  97 7 °F (36 5 °C)  63  18  136/96  99 %  None (Room air)  Lying   07/04/19 2052    56  16  183/77  97 %  None (Room air)  Lying   07/04/19 1853        197/96      Lying       Pertinent Labs/Diagnostic Test Results:   Lab Units 07/05/19  0524 07/04/19  1925   WBC Thousand/uL 4 69 12 17*   HEMOGLOBIN g/dL 11 2* 13 8   HEMATOCRIT % 34 1* 42 4   PLATELETS Thousands/uL 244 301   NEUTROS ABS Thousands/µL 2 35 8 97*     Lab Units 07/05/19  0745 07/05/19  0006 07/04/19 2006   SODIUM mmol/L 134* 127* 125*   POTASSIUM mmol/L 4 4 4 5 5 2   CHLORIDE mmol/L 98* 94* 91*   CO2 mmol/L 28 26 23   ANION GAP mmol/L 8 7 11   BUN mg/dL 17 18 19   CREATININE mg/dL 0 86 1 09 0 96   EGFR ml/min/1 73sq m 62 47 54   CALCIUM mg/dL 9 2 9 0 9 0     Results from last 7 days   Lab Units 07/04/19  2006   AST U/L 14   ALT U/L 30   ALK PHOS U/L 61   TOTAL PROTEIN g/dL 7 3   ALBUMIN g/dL 3 6   TOTAL BILIRUBIN mg/dL 0 50   BILIRUBIN DIRECT mg/dL 0 08     Results from last 7 days   Lab Units 07/05/19  0720 07/04/19  2358 07/04/19 2022 07/04/19  1932 07/04/19  1839   POC GLUCOSE mg/dl 87 244* 212* 149* 50*     Lab Units 07/05/19  0745 07/05/19  0006   GLUCOSE RANDOM mg/dL 87 251*     Results from last 7 days   Lab Units 07/05/19  0743   OSMOLALITY, SERUM mmol/*     Lab Units 07/04/19  1925   PROTIME seconds 24 0*   INR  2 10*   PTT seconds 96*     Lab Units 07/04/19 2329 07/04/19  1932   CLARITY UA   --  Cloudy   COLOR UA   --  Yellow   SPEC GRAV UA   --  1 015   PH UA   --  7 0   GLUCOSE UA mg/dl  --  Negative   KETONES UA mg/dl  --  Negative   BLOOD UA   --  Large*   PROTEIN UA mg/dl  --  Trace*   NITRITE UA   --  Negative   BILIRUBIN UA   --  Negative   UROBILINOGEN UA E U /dl  --  0 2   LEUKOCYTES UA   --  Trace*   WBC UA /hpf  --  1-2*   RBC UA /hpf  --  Innumerable*   BACTERIA UA /hpf  --  Occasional   EPITHELIAL CELLS WET PREP /hpf  --  Occasional   SODIUM UR  71  --      CXR 7/5 No acute cardiopulmonary disease      ED Treatment:   Medication Administration from 07/04/2019 1826 to 07/04/2019 2128       Date/Time Order Dose Route Action Action by Comments     07/04/2019 1934 sodium chloride 0 9 % bolus 500 mL 500 mL Intravenous New Bag          Past Medical History:   Diagnosis Date    Ambulatory dysfunction     CHF (congestive heart failure) (HCC)     pEFHF    Cholelithiasis     Coronary artery disease     Depression with anxiety     Diabetes mellitus ([facility] )     niddm    Glaucoma     Hypertension     Hyponatremia     Ischemic stroke of frontal lobe (HCC)     Right     Obstructive sleep apnea     PA[facility]ox[facility]mal atrial fibrillation) [facility]     Vertigo      Present on Admission:   Metabolic encephalopathy   Elevated TSH   Type 2 diabetes mellitus with hypoglycemia without coma (HCC)   Paroxysmal atrial fibrillation (HCC)   Hypertensive urgency   Severe dizziness   Leukocytosis   Dysphagia    Admitting Diagnosis: Dizziness [R42]  Hyponatremia [E87 1]  Weakness [R53 1]  Hypoglycemia [E16 2]  Age/Sex: 80 y o  female     Admission Orders:  Current Facility-Administered Medications:  acetaminophen 650 mg Oral Q6H PRN    albuterol 2 puff Inhalation Q6H PRN    amiodarone 200 mg Oral Daily    bimatoprost 1 drop Both Eyes HS    dabigatran etexilate 150 mg Oral Q12H TROY    hydrALAZINE 5 mg Intravenous Q4H PRN    hydrALAZINE 50 mg Oral TID    insulin lispro 1-5 Units Subcutaneous HS  x 1    insulin lispro 1-6 Units Subcutaneous TID AC    meclizine 12 5 mg Oral Q12H PRN    ondansetron 4 mg Intravenous Q6H PRN    pravastatin 40 mg Oral Daily With Dinner    timolol 1 drop Right Eye Daily      IV NSS @ 75 / hr   Dysphagia @ Diet; Fluid restrict 1500 mls;  Aspiration precausions  Consult Speech  SCD's  BMP q8h  IP CONSULT TO CASE MANAGEMENT  IP CONSULT TO NEPHROLOGY    7/5: BMP  q6h x 3,  Hold IVF's    Network Utilization Review Department  Phone: [phone]; Fax [phone]  [email or web address]  ATTENTION: Please call with any questions or concerns to [phone]  and carefully listen to the prompts so that you are directed to the right person  Send all requests for admission clinical reviews, approved or denied determinations and any other requests to fax [phone]   All voicemails are confidential

## 2019-07-05 NOTE — ASSESSMENT & PLAN NOTE
Lab Results   Component Value Date    HGBA1C 6 3 06/05/2019   ·  BG 50 on arrival; improved with D50    Glipizide decreased by PMD on May 22nd to 10mg daily from 10mg BID  · Hold oral hypoglycemics and start sliding scale  · CHO diet    Recent Labs     07/04/19  1839 07/04/19  1932 07/04/19 2022   POCGLU 50* 149* 212*       Blood Sugar Average: Last 72 hrs:  (P) 137

## 2019-07-05 NOTE — SPEECH THERAPY NOTE
Speech-Language Pathology Bedside Swallow Evaluation    Patient Name: Jeff Rooney    MTVSE'G Date: 7/5/2019     Problem List  Patient Active Problem List   Diagnosis    Type 2 diabetes mellitus with hyperglycemia, without long-term current use of insulin ([facility]    Glaucoma    Hyperlipidemia    Hypertension    Hyponatremia    Depression with anxiety    Paroxysmal atrial fibrillation (HCC)    Ataxia    Type 2 [facility] hyperglycemia [facility]    CVA (cerebral vascular accident) [facility] [address] [facility]    Hearing loss    Insomnia    Left knee pain    PERI (obstructive sleep apnea)    Osteoarthritis    Osteoporosis    Periodic limb movement sleep disorder    Sleep talking    Tinnitus    Tricuspid valve disorders, non-rheumatic    Unsteady gait    History of stroke    Chronic diastolic heart failure (HCC)    S/P TAVR (transcatheter aortic valve replacement)    Severe dizziness    Thickened endometrium    Obesity (BMI 30-39  9)    History of CVA (cerebrovascular accident)    Headache    Esophageal thickening    Elevated TSH    Metabolic encephalopathy    Type 2 diabetes mellitus with hypoglycemia without coma ([facility]    Hypertensive urgency    Leukocytosis    Dysphagia       Past Medical History  Past Medical History:   Diagnosis Date    Ambulatory dysfunction     CHF (congestive heart failure) ([facility])     pEFHF    Cholelithiasis     Coronary artery disease     Depression with anxiety     Diabetes mellitus ([facility] )     niddm    Glaucoma     Hypertension     Hyponatremia     Ischemic stroke of frontal lobe (HCC)     Right     Obstructive sleep apnea     PAF (paroxysmal atrial fibrillation) ([facility]     Vertigo        Past Surgical History  Past Surgical History:   Procedure Laterality Date    APPENDECTOMY      GLAUCOMA SURGERY  01/03/2016    NE REPLACE AORTIC VALVE OPENFEMORAL ARTERY APPROACH N/A 4/17/2018    Procedure: REPLACEMENT AORTIC VALVE TRANSCATHETER (TAVR) TRANSFEMORAL W/ 23 MM AGUILAR STEVE S3 VALVE;  Surgeon: Katie Tovar DO;  Location: [facility];  Service: Cardiac Surgery    TUBAL LIGATION         Summary   Pt presented with s/s suggestive of mild oral dysphagia and suspected minimally impaired to [facility] pharyngeal swallow  Symptoms or concerns included decreased but functional mastication of regular solids  Re: reported choking episode on turkey sandwich last night pt said, "I tried to wash it down with water but it wouldn't go  It was just too dry " Pt reported she occasionally has difficulty with dry/solid foods but usually has no trouble swallowing  Pt is edentulous, has full denture set at home but does not wear them  Pt agreeable to dental soft diet during this hospitalization to minimize risk of further choking episodes  Risk for Aspiration: suspect low with liquids    Recommendations: soft/level 3 diet and thin liquids     Recommended Form of Meds: whole with liquid     Aspiration precautions and compensatory swallowing strategies: upright posture, only feed when fully alert, slow rate of feeding, small bites/sips and alternating bites and sips      Current Medical Status per [facility]' H&P 7/4/2019  Tiburcio Benjamin is a 80 y o  female who presents with c/o confusion, states she was doing a puzzle and "could not remember things," felt dizzy although states this is chronic, attempted to get up from her chair but fell back onto the seat  Her son-in-law checked her blood glucose and it was low, attempted to check blood pressure but the machine could not read it  Patient completely coherent now, AAOx3, states symptoms resolved in the ER  Had 1 episode of emesis on Monday  Had vaginal bleeding over the weekend, was scheduled for an ultrasound and states she drank a lot of fluids because she had to have a full bladder    Reports cough and wheezing although denies shortness of breath, states she uses an inhaler in the morning and night, cough productive of clear sputum, denies fever or malaise  Denies edema, ALBERTO, orthopnea, numbness, weakness, fatigue, muscle cramping, nausea, constipation, diarrhea or dysuria  Dysphagia  Assessment & Plan  Reported by nurse pt choking and coughing while eating a sandwich then vomited her meal; pt c/o food gets stuck in her throat sometimes  SLP consult  Dysphagia 2 diet  Aspiration precautions  Consider GI consult     Past medical history:  Please see H&P for details    Special Studies:  CXR results pending    Social/Education/Vocational Hx:  Pt lives with family      Swallow Information   Current Risks for Dysphagia & Aspiration: known history of dysphagia and AMS     Current Symptoms/Concerns: choking incident    Current Diet: mechanically altered/level 2 diet and thin liquids      Baseline Diet: regular diet and thin liquids      Baseline Assessment   Behavior/Cognition: alert    Speech/Language Status: able to participate in conversation and follow commands    Patient Positioning: upright in bed    Pain Status/Interventions/Response to Interventions:  No report of or nonverbal indications of pain  Swallow Mechanism Exam   Oral-motor structures and function are [facility] for symmetry, strength, ROM & coordination  Facial: symmetrical  Labial: WFL  Lingual: WFL  Velum: symmetrical  Mandible: adequate ROM  Dentition: edentulous  Vocal quality:clear/adequate   Volitional Cough: strong/productive       Consistencies Assessed and Performance   Consistencies Administered: thin liquids, puree and hard solids  Specific materials administered included apple juice, apple sauce, ehsan doone cookies    Oral Stage: mild  Mastication was mildly reduced/prolonged with regular solids but functional given additional time  Bolus formation and transfer were functional with puree/thin liquid with no significant oral residue noted  No overt s/s reduced oral control      Pharyngeal Stage: minimal to [facility]  Swallow Mechanics:  Swallowing initiation appeared prompt  Laryngeal rise was palpated and judged to be within functional limits  No coughing, throat clearing, change in vocal quality or respiratory status noted today  Pt did report occasional "sticking" in her throat when she eats dry solid foods  Esophageal Concerns: none reported    Strategies and Efficacy: SLP encouraged small bites and sips, and slow rate of intake with alternation of liquids and solids  Summary and Recommendations (see above)    Results Reviewed with: patient and RN     Treatment Recommended: No additional ST services needed at this time  Please re-consult ST with any new concerns

## 2019-07-05 NOTE — ASSESSMENT & PLAN NOTE
TSH 4 8, trending down from 6 3 in May, not on thyroid hormone replacement therapy; thought to be secondary to amiodarone

## 2019-07-05 NOTE — ASSESSMENT & PLAN NOTE
Wt Readings from Last 3 Encounters:   06/30/19 79 kg (174 lb 2 6 oz)   06/20/19 80 8 kg (178 lb 3 2 oz)   06/05/19 78 kg (172 lb)

## 2019-07-05 NOTE — TELEPHONE ENCOUNTER
Patients daughter called back anxious for results , patient is currently in the hospital   Please call

## 2019-07-05 NOTE — ASSESSMENT & PLAN NOTE
Chronic complaint, on meclizine  Seen by Neurology and recommended vestibular therapy and ENT follow-up

## 2019-07-05 NOTE — ASSESSMENT & PLAN NOTE
Continue home medication hydralazine 50 mg t i d  Chlorthalidone and Hyzaar on hold due to hyponatremia  Add p r n   Hydralazine for SBP >175

## 2019-07-05 NOTE — TELEPHONE ENCOUNTER
Patients daughter calling for ultrasound results and biopsy results   Please call and let patient know even if results are not in

## 2019-07-05 NOTE — DISCHARGE INSTR - DIET
Dysphagia 3/Soft to [employer] (no raw vegetables, no skins on fruit, avoid very dense chewy meat), thin liquids

## 2019-07-05 NOTE — PLAN OF CARE
Problem: INFECTION - ADULT  Goal: Absence or prevention of progression during hospitalization  Description  INTERVENTIONS:  - Assess and monitor for signs and symptoms of infection  - Monitor lab/diagnostic results  - Monitor all insertion sites, i e  indwelling lines, tubes, and drains  - Monitor endotracheal (as able) and nasal secretions for changes in amount and color  - [place] appropriate cooling/warming therapies per order  - Administer medications as ordered  - Instruct and encourage patient and family to use good hand hygiene technique  - Identify and instruct in appropriate isolation precautions for identified infection/condition  Outcome: Progressing  Goal: Absence of fever/infection during neutropenic period  Description  INTERVENTIONS:  - Monitor WBC  - Implement neutropenic guidelines  Outcome: Progressing     Problem: SAFETY ADULT  Goal: Patient will remain free of falls  Description  INTERVENTIONS:  - Assess patient frequently for physical needs  -  Identify cognitive and physical deficits and behaviors that affect risk of falls    -  [place] fall precautions as indicated by assessment   - Educate patient/family on patient safety including physical limitations  - Instruct patient to call for assistance with activity based on assessment  - Modify environment to reduce risk of injury  - Consider OT/PT consult to assist with strengthening/mobility  Outcome: Progressing  Goal: Maintain or return to baseline ADL function  Description  INTERVENTIONS:  -  Assess patient's ability to carry out ADLs; assess patient's baseline for ADL function and identify physical deficits which impact ability to perform ADLs (bathing, care of mouth/teeth, toileting, grooming, dressing, etc )  - Assess/evaluate cause of self-care deficits   - Assess range of motion  - Assess patient's mobility; develop plan if impaired  - Assess patient's need for assistive devices and provide as appropriate  - Encourage maximum independence but intervene and supervise when necessary  ¯ Involve family in performance of ADLs  ¯ Assess for home care needs following discharge   ¯ Request OT consult to assist with ADL evaluation and planning for discharge  ¯ Provide patient education as appropriate  Outcome: Progressing  Goal: Maintain or return mobility status to optimal level  Description  INTERVENTIONS:  - Assess patient's baseline mobility status (ambulation, transfers, stairs, etc )    - Identify cognitive and physical deficits and behaviors that affect mobility  - Identify mobility aids required to assist with transfers and/or ambulation (gait belt, sit-to-stand, lift, walker, cane, etc )  - [place] fall precautions as indicated by assessment  - Record patient progress and toleration of activity level on Mobility SBAR; progress patient to next Phase/Stage  - Instruct patient to call for assistance with activity based on assessment  - Request Rehabilitation consult to assist with strengthening/weightbearing, etc   Outcome: Progressing     Problem: DISCHARGE PLANNING  Goal: Discharge to home or other facility with appropriate resources  Description  INTERVENTIONS:  - Identify barriers to discharge w/patient and caregiver  - Arrange for needed discharge resources and transportation as appropriate  - Identify discharge learning needs (meds, wound care, etc )  - Arrange for interpretive services to assist at discharge as needed  - Refer to Case Management Department for coordinating discharge planning if the patient needs post-hospital services based on physician/advanced practitioner order or complex needs related to functional status, cognitive ability, or social support system  Outcome: Progressing     Problem: Knowledge Deficit  Goal: Patient/family/caregiver demonstrates understanding of disease process, treatment plan, medications, and discharge instructions  Description  Complete learning assessment and assess knowledge base   Interventions:  - Provide teaching at level of understanding  - Provide teaching via preferred learning methods  Outcome: Progressing     Problem: GENITOURINARY - ADULT  Goal: Maintains or returns to baseline urinary function  Description  INTERVENTIONS:  - Assess urinary function  - Encourage oral fluids to ensure adequate hydration  - Administer IV fluids as ordered to ensure adequate hydration  - Administer ordered medications as needed  - Offer frequent toileting  - Follow urinary retention protocol if ordered  Outcome: Progressing  Goal: Absence of urinary retention  Description  INTERVENTIONS:  - Assess patients ability to void and empty bladder  - Monitor I/O  - Bladder scan as needed  - Discuss with physician/AP medications to alleviate retention as needed  - Discuss catheterization for long term situations as appropriate  Outcome: Progressing     Problem: METABOLIC, FLUID AND ELECTROLYTES - ADULT  Goal: Electrolytes maintained within normal limits  Description  INTERVENTIONS:  - Monitor labs and assess patient for signs and symptoms of electrolyte imbalances  - Administer electrolyte replacement as ordered  - Monitor response to electrolyte replacements, including repeat lab results as appropriate  - Instruct patient on fluid and nutrition as appropriate  Outcome: Progressing  Goal: Fluid balance maintained  Description  INTERVENTIONS:  - Monitor labs and assess for signs and symptoms of volume excess or deficit  - Monitor I/O and WT  - Instruct patient on fluid and nutrition as appropriate  Outcome: Progressing  Goal: Glucose maintained within target range  Description  INTERVENTIONS:  - Monitor Blood Glucose as ordered  - Assess for signs and symptoms of hyperglycemia and hypoglycemia  - Administer ordered medications to maintain glucose within target range  - Assess nutritional intake and initiate nutrition service referral as needed  Outcome: Progressing     Problem: SKIN/TISSUE INTEGRITY - ADULT  Goal: Skin integrity remains intact  Description  INTERVENTIONS  - Identify patients at risk for skin breakdown  - Assess and monitor skin integrity  - Assess and monitor nutrition and hydration status  - Monitor labs (i e  albumin)  - Assess for incontinence   - Turn and reposition patient  - Assist with mobility/ambulation  - Relieve pressure over bony prominences  - Avoid friction and shearing  - Provide appropriate hygiene as needed including keeping skin clean and dry  - Evaluate need for skin moisturizer/barrier cream  - Collaborate with interdisciplinary team (i e  Nutrition, Rehabilitation, etc )   - Patient/family teaching  Outcome: Progressing  Goal: Incision(s), wounds(s) or drain site(s) healing without S/S of infection  Description  INTERVENTIONS  - Assess and document risk factors for skin impairment   - Assess and document dressing, incision, wound bed, drain sites and surrounding tissue  - Initiate Nutrition services consult and/or wound management as needed  Outcome: Progressing  Goal: Oral mucous membranes remain intact  Description  INTERVENTIONS  - Assess oral mucosa and hygiene practices  - Implement preventative oral hygiene regimen  - Implement oral medicated treatments as ordered  - Initiate Nutrition services referral as needed  Outcome: Progressing     Problem: MUSCULOSKELETAL - ADULT  Goal: Maintain or return mobility to safest level of function  Description  INTERVENTIONS:  - Assess patient's ability to carry out ADLs; assess patient's baseline for ADL function and identify physical deficits which impact ability to perform ADLs (bathing, care of mouth/teeth, toileting, grooming, dressing, etc )  - Assess/evaluate cause of self-care deficits   - Assess range of motion  - Assess patient's mobility; develop plan if impaired  - Assess patient's need for assistive devices and provide as appropriate  - Encourage maximum independence but intervene and supervise when necessary  - Involve family in performance of ADLs  - Assess for home care needs following discharge   - Request OT consult to assist with ADL evaluation and planning for discharge  - Provide patient education as appropriate  Outcome: Progressing

## 2019-07-05 NOTE — SOCIAL WORK
LOS: 1 GMLOS: None indicated  NOT A READMISSION  NOT A BUNDLE    CM met with the patient to review the CM role and discuss possible discharge needs  CM pointed out name/number on the white board and communicated she was that individual    Patient lives in a house with her dtr, Marilin AL (Mayela Stratton), and their children; one GANESH  Patients bathroom and bedroom is located on the 1st floor; patient denies difficulty with steps  Patient needs assistance with ADLs and functional mobility  Food shopping & meal prep is done by pts dtr  Patient uses a rollator for ambulation purposes  Patient is able to ambulate without assistance from a seated or laying position  No hx reported for any STR, MH, ETOH or alcohol; hx of VNA  Patient is retired  PCP identified as Dr Sayra Orlando  POA identified as Werner AL, but dtr is designated caregiver if/when needed  Patient uses [facility] on [facility] [address]  for Rx needs; patient made aware of [address] to use at discharge if needed  Patient does not drive; reports MIKAELA & dtr are available at discharge to transport home  CM received a consult for "transportation," but pt and family (who walked in towards the end of assessment) reported they will transport pt home when medically cleared  CM reviewed d/c planning process including the following: identifying help at home, patient preference for d/c planning needs, [facility] Meds to [facility] program, availability of treatment team to discuss questions or concerns patient and/or family may have regarding understanding medications and recognizing signs and symptoms once discharged  CM also encouraged patient to follow up with all recommended appointments after discharge  Patient advised of importance for patient and family to participate in managing patients medical well being  CM will continue to follow for any further anticipated discharge needs      JEREMIAH Ambrosio  7/5/2019  12:43

## 2019-07-05 NOTE — ASSESSMENT & PLAN NOTE
Hyponatremia likely secondary to excess free water intake from having to drink water for pelvic ultrasound  Slightly overcorrected and Nephrology has given small dose of D5 W and DDAVP      Results from last 7 days   Lab Units 07/05/19  1210 07/05/19  0745 07/05/19  0006 07/04/19 2006 07/01/19  1414 06/30/19  1348   SODIUM mmol/L 131* 134* 127* 125* 132* 133*

## 2019-07-05 NOTE — ASSESSMENT & PLAN NOTE
Patient reportedly choking on sandwich witnessed by nursing during the time of admission for encephalopathy  Has been seen by speech pathologist today to upgrade diet to dental soft

## 2019-07-05 NOTE — ASSESSMENT & PLAN NOTE
Elevated TSH, only mild    Check free T4      Results from last 7 days   Lab Units 07/04/19 2006   TSH 3RD GENERATON uIU/mL 4 812*

## 2019-07-06 LAB
ANION GAP SERPL CALCULATED.3IONS-SCNC: 6 MMOL/L (ref 4–13)
ANION GAP SERPL CALCULATED.3IONS-SCNC: 7 MMOL/L (ref 4–13)
ANION GAP SERPL CALCULATED.3IONS-SCNC: 9 MMOL/L (ref 4–13)
BUN SERPL-MCNC: 19 MG/DL (ref 5–25)
BUN SERPL-MCNC: 20 MG/DL (ref 5–25)
BUN SERPL-MCNC: 20 MG/DL (ref 5–25)
CALCIUM SERPL-MCNC: 8.8 MG/DL (ref 8.3–10.1)
CALCIUM SERPL-MCNC: 8.8 MG/DL (ref 8.3–10.1)
CALCIUM SERPL-MCNC: 9 MG/DL (ref 8.3–10.1)
CHLORIDE SERPL-SCNC: 89 MMOL/L (ref 100–108)
CHLORIDE SERPL-SCNC: 92 MMOL/L (ref 100–108)
CHLORIDE SERPL-SCNC: 93 MMOL/L (ref 100–108)
CO2 SERPL-SCNC: 26 MMOL/L (ref 21–32)
CO2 SERPL-SCNC: 26 MMOL/L (ref 21–32)
CO2 SERPL-SCNC: 27 MMOL/L (ref 21–32)
CREAT SERPL-MCNC: 0.79 MG/DL (ref 0.6–1.3)
CREAT SERPL-MCNC: 0.82 MG/DL (ref 0.6–1.3)
CREAT SERPL-MCNC: 0.91 MG/DL (ref 0.6–1.3)
ERYTHROCYTE [DISTWIDTH] IN BLOOD BY AUTOMATED COUNT: 13.1 % (ref 11.6–15.1)
GFR SERPL CREATININE-BSD FRML MDRD: 58 ML/MIN/1.73SQ M
GFR SERPL CREATININE-BSD FRML MDRD: 66 ML/MIN/1.73SQ M
GFR SERPL CREATININE-BSD FRML MDRD: 69 ML/MIN/1.73SQ M
GLUCOSE SERPL-MCNC: 109 MG/DL (ref 65–140)
GLUCOSE SERPL-MCNC: 118 MG/DL (ref 65–140)
GLUCOSE SERPL-MCNC: 125 MG/DL (ref 65–140)
GLUCOSE SERPL-MCNC: 127 MG/DL (ref 65–140)
GLUCOSE SERPL-MCNC: 132 MG/DL (ref 65–140)
GLUCOSE SERPL-MCNC: 151 MG/DL (ref 65–140)
GLUCOSE SERPL-MCNC: 178 MG/DL (ref 65–140)
HCT VFR BLD AUTO: 33 % (ref 34.8–46.1)
HGB BLD-MCNC: 10.9 G/DL (ref 11.5–15.4)
MCH RBC QN AUTO: 30.2 PG (ref 26.8–34.3)
MCHC RBC AUTO-ENTMCNC: 33 G/DL (ref 31.4–37.4)
MCV RBC AUTO: 91 FL (ref 82–98)
PLATELET # BLD AUTO: 233 THOUSANDS/UL (ref 149–390)
PMV BLD AUTO: 10.2 FL (ref 8.9–12.7)
POTASSIUM SERPL-SCNC: 4.1 MMOL/L (ref 3.5–5.3)
POTASSIUM SERPL-SCNC: 4.2 MMOL/L (ref 3.5–5.3)
POTASSIUM SERPL-SCNC: 4.3 MMOL/L (ref 3.5–5.3)
RBC # BLD AUTO: 3.61 MILLION/UL (ref 3.81–5.12)
SODIUM SERPL-SCNC: 121 MMOL/L (ref 136–145)
SODIUM SERPL-SCNC: 122 MMOL/L (ref 136–145)
SODIUM SERPL-SCNC: 126 MMOL/L (ref 136–145)
SODIUM SERPL-SCNC: 128 MMOL/L (ref 136–145)
T4 FREE SERPL-MCNC: 1.29 NG/DL (ref 0.76–1.46)
WBC # BLD AUTO: 6.57 THOUSAND/UL (ref 4.31–10.16)

## 2019-07-06 PROCEDURE — 99233 SBSQ HOSP IP/OBS HIGH 50: CPT | Performed by: NURSE PRACTITIONER

## 2019-07-06 PROCEDURE — 99232 SBSQ HOSP IP/OBS MODERATE 35: CPT | Performed by: INTERNAL MEDICINE

## 2019-07-06 PROCEDURE — 80048 BASIC METABOLIC PNL TOTAL CA: CPT | Performed by: INTERNAL MEDICINE

## 2019-07-06 PROCEDURE — 82948 REAGENT STRIP/BLOOD GLUCOSE: CPT

## 2019-07-06 PROCEDURE — 84295 ASSAY OF SERUM SODIUM: CPT | Performed by: NURSE PRACTITIONER

## 2019-07-06 PROCEDURE — 85027 COMPLETE CBC AUTOMATED: CPT | Performed by: INTERNAL MEDICINE

## 2019-07-06 RX ORDER — SODIUM CHLORIDE 1000 MG
2 TABLET, SOLUBLE MISCELLANEOUS EVERY 4 HOURS
Status: COMPLETED | OUTPATIENT
Start: 2019-07-06 | End: 2019-07-06

## 2019-07-06 RX ORDER — LOSARTAN POTASSIUM 50 MG/1
50 TABLET ORAL DAILY
Status: DISCONTINUED | OUTPATIENT
Start: 2019-07-07 | End: 2019-07-10 | Stop reason: HOSPADM

## 2019-07-06 RX ADMIN — HYDRALAZINE HYDROCHLORIDE 50 MG: 25 TABLET ORAL at 16:41

## 2019-07-06 RX ADMIN — AMIODARONE HYDROCHLORIDE 200 MG: 200 TABLET ORAL at 08:48

## 2019-07-06 RX ADMIN — ALBUTEROL SULFATE 2 PUFF: 90 AEROSOL, METERED RESPIRATORY (INHALATION) at 10:21

## 2019-07-06 RX ADMIN — INSULIN LISPRO 1 UNITS: 100 INJECTION, SOLUTION INTRAVENOUS; SUBCUTANEOUS at 12:21

## 2019-07-06 RX ADMIN — POLYVINYL ALCOHOL 1 DROP: 14 SOLUTION/ DROPS OPHTHALMIC at 16:43

## 2019-07-06 RX ADMIN — SODIUM CHLORIDE TAB 1 GM 2 G: 1 TAB at 22:27

## 2019-07-06 RX ADMIN — HYDRALAZINE HYDROCHLORIDE 50 MG: 25 TABLET ORAL at 08:48

## 2019-07-06 RX ADMIN — TIMOLOL MALEATE 1 DROP: 5 SOLUTION/ DROPS OPHTHALMIC at 08:37

## 2019-07-06 RX ADMIN — POLYVINYL ALCOHOL 1 DROP: 14 SOLUTION/ DROPS OPHTHALMIC at 08:37

## 2019-07-06 RX ADMIN — DABIGATRAN ETEXILATE MESYLATE 150 MG: 150 CAPSULE ORAL at 22:28

## 2019-07-06 RX ADMIN — SODIUM CHLORIDE TAB 1 GM 2 G: 1 TAB at 18:25

## 2019-07-06 RX ADMIN — POLYVINYL ALCOHOL 1 DROP: 14 SOLUTION/ DROPS OPHTHALMIC at 22:26

## 2019-07-06 RX ADMIN — PRAVASTATIN SODIUM 40 MG: 40 TABLET ORAL at 16:41

## 2019-07-06 RX ADMIN — BIMATOPROST 1 DROP: 0.1 SOLUTION/ DROPS OPHTHALMIC at 22:32

## 2019-07-06 RX ADMIN — INSULIN LISPRO 1 UNITS: 100 INJECTION, SOLUTION INTRAVENOUS; SUBCUTANEOUS at 22:30

## 2019-07-06 RX ADMIN — DABIGATRAN ETEXILATE MESYLATE 150 MG: 150 CAPSULE ORAL at 08:48

## 2019-07-06 RX ADMIN — HYDRALAZINE HYDROCHLORIDE 50 MG: 25 TABLET ORAL at 22:22

## 2019-07-06 NOTE — PROGRESS NOTES
[facility] PROGRESS NOTE   Janell Lux 80 y o  female MRN: [id number]  Unit/Bed#: [facility] [id number] Encounter: [id number]  Reason for Consult:  Hyponatremia    ASSESSMENT and PLAN:  1  Hyponatremia:  Acute on chronic  · Recent outpatient sodium level 132 on 07/01/2019  Patient was instructed to drink large amounts of fluid for pelvic ultrasound  · Recently placed on thiazide diuretic  · Sodium level 125 on admission  Patient given IV fluid/normal saline which resulted in rapid correction to 134 within 24 hours  Patient and given DDAVP and D5W  Sodium level subsequently declined to 131-132 but today is down to 126  · Decline in sodium likely related to prolonged DDAVP effect  · Workup:  Serum osmolality 280, urine osmolality 272, urine sodium 71  No significant diluting defect  · Plan:    · Tighten fluid restriction to 1200 mL per day    · Check sodium level stat- will likely initiate salt tablets  · No further thiazide diuretics-now listed in allergies as severe adverse reaction  2  Hypertension:    · Blood pressure accelerated on admission 244/106  · Blood pressure overall acceptable but somewhat labile  · Management per primary team  3  Hypoglycemia:  Resolved    DISPOSITION:  Check sodium level stat  Tighten fluid restriction  Will likely add salt tablets    SUBJECTIVE / INTERVAL HISTORY:  Wants to go home    I spoke with patient's son and daughter regarding current status and plan of care    OBJECTIVE:  Current Weight:    Vitals:    07/05/19 2100 07/05/19 2358 07/06/19 0728 07/06/19 1512   BP: 135/71 135/71 156/90 (!) 179/83   BP Location:  Left arm Right arm Left arm   Pulse:  55 (!) 50 61   Resp:  18 18 18   Temp:  (!) 97 4 °F (36 3 °C) 97 6 °F (36 4 °C) 97 5 °F (36 4 °C)   TempSrc:  Temporal Temporal Temporal   SpO2:  96% 96% 94%     No intake or output data in the 24 hours ending 07/06/19 1534  General: NAD  Skin: no rash  Eyes: anicteric sclera  ENT: moist mucous membrane  Neck: supple  Chest: CTA b/l, no ronchii, no wheeze, no rubs, no rales  CVS: s1s2, no murmur, no gallop, no rub  Abdomen: soft, nontender, nl sounds  Extremities: no edema LE b/l  : no lisa  Neuro: AAOX3  Psych: normal affect  Medications:    Current Facility-Administered Medications:     acetaminophen (TYLENOL) tablet 650 mg, 650 mg, Oral, Q6H PRN, ROBBI Burnette    albuterol (PROVENTIL HFA,VENTOLIN HFA) inhaler 2 puff, 2 puff, Inhalation, Q6H PRN, ROBBI Burnette, 2 puff at 07/06/19 1021    amiodarone tablet 200 mg, 200 mg, Oral, Daily, ROBBI Burnette, 200 mg at 07/06/19 0848    bimatoprost (LUMIGAN) 0 01 % ophthalmic solution 1 drop, 1 drop, Both Eyes, HS, ROBBI Burnette, 1 drop at 07/05/19 2126    dabigatran etexilate (PRADAXA) capsule 150 mg, 150 mg, Oral, Q12H [address], ROBBI Burnette, 150 mg at 07/06/19 0848    hydrALAZINE (APRESOLINE) injection 5 mg, 5 mg, Intravenous, Q4H PRN, ROBBI Burnette    hydrALAZINE (APRESOLINE) tablet 50 mg, 50 mg, Oral, TID, Tonye Plater, ROBBI, 50 mg at 07/06/19 0848    insulin lispro (HumaLOG) 100 units/mL subcutaneous injection 1-5 Units, 1-5 Units, Subcutaneous, HS, ROBBI Burnette, 2 Units at 07/05/19 0003    insulin lispro (HumaLOG) 100 units/mL subcutaneous injection 1-6 Units, 1-6 Units, Subcutaneous, TID AC, 1 Units at 07/06/19 1221 **AND** Fingerstick Glucose (POCT), , , TID AC, ROBBI Burnette    meclizine (ANTIVERT) tablet 12 5 mg, 12 5 mg, Oral, Q12H PRN, ROBBI Burnette    [facility]ansetron [facility]) injection 4 mg, 4 mg, Intravenous, Q6H PRN, ROBBI Burnette    polyvinyl alcohol (LIQUIFILM TEARS) 1 4 % ophthalmic solution 1 drop, 1 drop, Both Eyes, TID, Shemar Palacio DO, 1 drop at 07/06/19 0837    pravastatin (PRAVACHOL) tablet 40 mg, 40 mg, Oral, Daily With Dinner, ROBBI Burnette, 40 mg at 07/05/19 1711    timolol (TIMOPTIC) 0 5 % ophthalmic solution 1 drop, 1 drop, Right Eye, Daily, ROBBI Witt, 1 drop at 07/06/19 0315    Laboratory Results:  Results from last 7 days   Lab Units 07/06/19  0536 07/06/19  0019 07/05/19  1802 07/05/19  1210 07/05/19  0745 07/05/19  0524 07/05/19  0006 07/04/19 2006 07/04/19  1925  06/30/19  1348   WBC Thousand/uL 6 57  --   --   --   --  4 69  --   --  12 17*  --  7 36   HEMOGLOBIN g/dL 10 9*  --   --   --   --  11 2*  --   --  13 8  --  13 1   HEMATOCRIT % 33 0*  --   --   --   --  34 1*  --   --  42 4  --  40 0   PLATELETS Thousands/uL 233  --   --   --   --  244  --   --  301  --  281   POTASSIUM mmol/L 4 2 4 3 4 6 4 6 4 4  --  4 5 5 2  --    < > 4 6   CHLORIDE mmol/L 93* 92* 97* 97* 98*  --  94* 91*  --    < > 96*   CO2 mmol/L 26 27 29 28 28  --  26 23  --    < > 24   BUN mg/dL 20 20 19 16 17  --  18 19  --    < > 23   CREATININE mg/dL 0 82 0 91 1 01 0 83 0 86  --  1 09 0 96  --    < > 0 98   CALCIUM mg/dL 9 0 8 8 8 8 8 9 9 2  --  9 0 9 0  --    < > 9 9    < > = values in this interval not displayed

## 2019-07-06 NOTE — ASSESSMENT & PLAN NOTE
Hyponatremia likely secondary to excess free water intake from having to drink water for pelvic ultrasound  Slightly overcorrected and nephrology has given small dose of D5 W and DDAVP but now hyponatremic again  Further management per nephrology      Results from last 7 days   Lab Units 07/06/19  1649 07/06/19  0536 07/06/19  0019 07/05/19  1802 07/05/19  1210 07/05/19  0745 07/05/19  0006 07/04/19  2006 07/01/19  1414 06/30/19  1348   SODIUM mmol/L 122* 126* 128* 132* 131* 134* 127* 125* 132* 133*

## 2019-07-06 NOTE — ASSESSMENT & PLAN NOTE
Patient reportedly choking on sandwich witnessed by nursing during the time of admission for encephalopathy  Has been seen by speech pathologist to upgrade diet to dental soft

## 2019-07-06 NOTE — PLAN OF CARE
Problem: INFECTION - ADULT  Goal: Absence or prevention of progression during hospitalization  Description  INTERVENTIONS:  - Assess and monitor for signs and symptoms of infection  - Monitor lab/diagnostic results  - Monitor all insertion sites, i e  indwelling lines, tubes, and drains  - Monitor endotracheal (as able) and nasal secretions for changes in amount and color  - [place] appropriate cooling/warming therapies per order  - Administer medications as ordered  - Instruct and encourage patient and family to use good hand hygiene technique  - Identify and instruct in appropriate isolation precautions for identified infection/condition  Outcome: Progressing  Goal: Absence of fever/infection during neutropenic period  Description  INTERVENTIONS:  - Monitor WBC  - Implement neutropenic guidelines  Outcome: Progressing     Problem: SAFETY ADULT  Goal: Patient will remain free of falls  Description  INTERVENTIONS:  - Assess patient frequently for physical needs  -  Identify cognitive and physical deficits and behaviors that affect risk of falls    -  [place] fall precautions as indicated by assessment   - Educate patient/family on patient safety including physical limitations  - Instruct patient to call for assistance with activity based on assessment  - Modify environment to reduce risk of injury  - Consider OT/PT consult to assist with strengthening/mobility  Outcome: Progressing  Goal: Maintain or return to baseline ADL function  Description  INTERVENTIONS:  -  Assess patient's ability to carry out ADLs; assess patient's baseline for ADL function and identify physical deficits which impact ability to perform ADLs (bathing, care of mouth/teeth, toileting, grooming, dressing, etc )  - Assess/evaluate cause of self-care deficits   - Assess range of motion  - Assess patient's mobility; develop plan if impaired  - Assess patient's need for assistive devices and provide as appropriate  - Encourage maximum independence but intervene and supervise when necessary  ¯ Involve family in performance of ADLs  ¯ Assess for home care needs following discharge   ¯ Request OT consult to assist with ADL evaluation and planning for discharge  ¯ Provide patient education as appropriate  Outcome: Progressing  Goal: Maintain or return mobility status to optimal level  Description  INTERVENTIONS:  - Assess patient's baseline mobility status (ambulation, transfers, stairs, etc )    - Identify cognitive and physical deficits and behaviors that affect mobility  - Identify mobility aids required to assist with transfers and/or ambulation (gait belt, sit-to-stand, lift, walker, cane, etc )  - [place] fall precautions as indicated by assessment  - Record patient progress and toleration of activity level on Mobility SBAR; progress patient to next Phase/Stage  - Instruct patient to call for assistance with activity based on assessment  - Request Rehabilitation consult to assist with strengthening/weightbearing, etc   Outcome: Progressing     Problem: DISCHARGE PLANNING  Goal: Discharge to home or other facility with appropriate resources  Description  INTERVENTIONS:  - Identify barriers to discharge w/patient and caregiver  - Arrange for needed discharge resources and transportation as appropriate  - Identify discharge learning needs (meds, wound care, etc )  - Arrange for interpretive services to assist at discharge as needed  - Refer to Case Management Department for coordinating discharge planning if the patient needs post-hospital services based on physician/advanced practitioner order or complex needs related to functional status, cognitive ability, or social support system  Outcome: Progressing     Problem: Knowledge Deficit  Goal: Patient/family/caregiver demonstrates understanding of disease process, treatment plan, medications, and discharge instructions  Description  Complete learning assessment and assess knowledge base   Interventions:  - Provide teaching at level of understanding  - Provide teaching via preferred learning methods  Outcome: Progressing     Problem: GENITOURINARY - ADULT  Goal: Maintains or returns to baseline urinary function  Description  INTERVENTIONS:  - Assess urinary function  - Encourage oral fluids to ensure adequate hydration  - Administer IV fluids as ordered to ensure adequate hydration  - Administer ordered medications as needed  - Offer frequent toileting  - Follow urinary retention protocol if ordered  Outcome: Progressing  Goal: Absence of urinary retention  Description  INTERVENTIONS:  - Assess patients ability to void and empty bladder  - Monitor I/O  - Bladder scan as needed  - Discuss with physician/AP medications to alleviate retention as needed  - Discuss catheterization for long term situations as appropriate  Outcome: Progressing     Problem: METABOLIC, FLUID AND ELECTROLYTES - ADULT  Goal: Electrolytes maintained within normal limits  Description  INTERVENTIONS:  - Monitor labs and assess patient for signs and symptoms of electrolyte imbalances  - Administer electrolyte replacement as ordered  - Monitor response to electrolyte replacements, including repeat lab results as appropriate  - Instruct patient on fluid and nutrition as appropriate  Outcome: Progressing  Goal: Fluid balance maintained  Description  INTERVENTIONS:  - Monitor labs and assess for signs and symptoms of volume excess or deficit  - Monitor I/O and WT  - Instruct patient on fluid and nutrition as appropriate  Outcome: Progressing  Goal: Glucose maintained within target range  Description  INTERVENTIONS:  - Monitor Blood Glucose as ordered  - Assess for signs and symptoms of hyperglycemia and hypoglycemia  - Administer ordered medications to maintain glucose within target range  - Assess nutritional intake and initiate nutrition service referral as needed  Outcome: Progressing     Problem: SKIN/TISSUE INTEGRITY - ADULT  Goal: Skin integrity remains intact  Description  INTERVENTIONS  - Identify patients at risk for skin breakdown  - Assess and monitor skin integrity  - Assess and monitor nutrition and hydration status  - Monitor labs (i e  albumin)  - Assess for incontinence   - Turn and reposition patient  - Assist with mobility/ambulation  - Relieve pressure over bony prominences  - Avoid friction and shearing  - Provide appropriate hygiene as needed including keeping skin clean and dry  - Evaluate need for skin moisturizer/barrier cream  - Collaborate with interdisciplinary team (i e  Nutrition, Rehabilitation, etc )   - Patient/family teaching  Outcome: Progressing  Goal: Incision(s), wounds(s) or drain site(s) healing without S/S of infection  Description  INTERVENTIONS  - Assess and document risk factors for skin impairment   - Assess and document dressing, incision, wound bed, drain sites and surrounding tissue  - Initiate Nutrition services consult and/or wound management as needed  Outcome: Progressing  Goal: Oral mucous membranes remain intact  Description  INTERVENTIONS  - Assess oral mucosa and hygiene practices  - Implement preventative oral hygiene regimen  - Implement oral medicated treatments as ordered  - Initiate Nutrition services referral as needed  Outcome: Progressing     Problem: MUSCULOSKELETAL - ADULT  Goal: Maintain or return mobility to safest level of function  Description  INTERVENTIONS:  - Assess patient's ability to carry out ADLs; assess patient's baseline for ADL function and identify physical deficits which impact ability to perform ADLs (bathing, care of mouth/teeth, toileting, grooming, dressing, etc )  - Assess/evaluate cause of self-care deficits   - Assess range of motion  - Assess patient's mobility; develop plan if impaired  - Assess patient's need for assistive devices and provide as appropriate  - Encourage maximum independence but intervene and supervise when necessary  - Involve family in performance of ADLs  - Assess for home care needs following discharge   - Request OT consult to assist with ADL evaluation and planning for discharge  - Provide patient education as appropriate  Outcome: Progressing     Problem: Potential for Falls  Goal: Patient will remain free of falls  Description  INTERVENTIONS:  - Assess patient frequently for physical needs  -  Identify cognitive and physical deficits and behaviors that affect risk of falls    -  [place] fall precautions as indicated by assessment   - Educate patient/family on patient safety including physical limitations  - Instruct patient to call for assistance with activity based on assessment  - Modify environment to reduce risk of injury  - Consider OT/PT consult to assist with strengthening/mobility  Outcome: Progressing

## 2019-07-06 NOTE — PROGRESS NOTES
Progress Note - Cheyenne Leija 1935, 80 y o  female MRN: [id number]    [facility] [address] 222-01 Encounter: [id number]    Primary Care Provider: Hollis Mcneil MD   Date and time admitted to hospital: 7/4/2019  6:35 PM        * Metabolic encephalopathy  Assessment & Plan  Metabolic encephalopathy secondary to hypoglycemia and hyponatremia  Seems to have returned to baseline  Hyponatremia  Assessment & Plan  Hyponatremia likely secondary to excess free water intake from having to drink water for pelvic ultrasound  Slightly overcorrected and nephrology has given small dose of D5 W and DDAVP but now hyponatremic again  Further management per nephrology  Results from last 7 days   Lab Units 07/06/19  1649 07/06/19  0536 07/06/19  0019 07/05/19  1802 07/05/19  1210 07/05/19  0745 07/05/19  0006 07/04/19  2006 07/01/19  1414 06/30/19  1348   SODIUM mmol/L 122* 126* 128* 132* 131* 134* 127* 125* 132* 133*       Dysphagia  Assessment & Plan  Patient reportedly choking on sandwich witnessed by nursing during the time of admission for encephalopathy  Has been seen by speech pathologist to upgrade diet to dental soft  Hypertensive urgency  Assessment & Plan  Hypertensive urgency at the time of admission likely secondary to anxiety  Blood pressure has improved with hydralazine  Off chlorthalidone and losartan/HCT due to hyponatremia but restart losartan only today per nephrology    Type 2 diabetes mellitus with hypoglycemia without coma [facility])  Assessment & Plan  Lab Results   Component Value Date    HGBA1C 6 3 06/05/2019     Recent Labs     07/05/19  2110 07/06/19  0808 07/06/19  1154 07/06/19  1611   POCGLU 131 132 178* 118     Diabetes mellitus with hypoglycemia  Glipizide recently decreased to 10 mg daily from b i d  currently on hold    Will restart glipizide for morning at 5 mg    Elevated TSH  Assessment & Plan  Mildly elevated TSH but T4 within limits      Results from last 7 days   Lab Units 07/05/19  1802 07/04/19 2006   TSH 3RD GENERATON uIU/mL  --  4 812*   FREE T4 ng/dL 1 29  --        Thickened endometrium  Assessment & Plan  Should be evaluated by gynecology after discharge    S/P TAVR (transcatheter aortic valve replacement)  Assessment & Plan  History of TAVR    Paroxysmal atrial fibrillation (HCC)  Assessment & Plan  Paroxysmal atrial fibrillation on pradaxa      VTE Pharmacologic Prophylaxis: Dabigatran (Pradaxa)    Patient Centered Rounds: I have performed bedside rounds with nursing staff today  Discussions with Specialists or Other Care Team Provider:  Nephrology  Education and Discussions with Family / Patient:  Son    Time Spent for Care: 30 mins  More than 50% of total time spent on counseling and coordination of care as described above  Current Length of Stay: 2 day(s)  Current Patient Status: Inpatient     Certification Statement: The patient will continue to require additional inpatient hospital stay due to Metabolic encephalopathy  Discharge Plan / Estimated Discharge Date:     Code Status: Level 1 - Full Code  ______________________________________________________________________________    Subjective:   Patient seen and examined  No new complaints  Was hoping to go home but still hyponatremic    Objective:   Vitals: Blood pressure (!) 179/83, pulse 61, temperature 97 5 °F (36 4 °C), temperature source Temporal, resp  rate 18, SpO2 94 %      Physical Exam:   General appearance: alert, appears stated age and cooperative  Head: Normocephalic, without obvious abnormality, atraumatic  Lungs: clear to auscultation bilaterally  Heart: regular rate and rhythm and ++ murmur  Abdomen: soft, non-tender, positive bowel sounds   Back: negative, range of motion normal  Extremities: extremities atraumatic, no cyanosis or edema  Neurologic: Grossly normal    Additional Data:   Labs:  Results from last 7 days   Lab Units 07/06/19  0536 07/05/19  0524 07/04/19  1925 06/30/19  1348   WBC Thousand/uL 6 57 4 69 12 17* 7 36   HEMOGLOBIN g/dL 10 9* 11 2* 13 8 13 1   HEMATOCRIT % 33 0* 34 1* 42 4 40 0   MCV fL 91 92 92 91   PLATELETS Thousands/uL 233 244 301 281   INR   --   --  2 10* 2 62*     Results from last 7 days   Lab Units 07/06/19  1649 07/06/19  0536 07/06/19  0019 07/05/19  1802 07/05/19  1210 07/05/19  0745 07/05/19  0006 07/04/19 2006 07/01/19  1414 06/30/19  1348   SODIUM mmol/L 122* 126* 128* 132* 131* 134* 127* 125* 132* 133*   POTASSIUM mmol/L  --  4 2 4 3 4 6 4 6 4 4 4 5 5 2 4 1 4 6   CHLORIDE mmol/L  --  93* 92* 97* 97* 98* 94* 91* 99* 96*   CO2 mmol/L  --  26 27 29 28 28 26 23 26 24   ANION GAP mmol/L  --  7 9 6 6 8 7 11 7 13   BUN mg/dL  --  20 20 19 16 17 18 19 21 23   CREATININE mg/dL  --  0 82 0 91 1 01 0 83 0 86 1 09 0 96 1 12 0 98   CALCIUM mg/dL  --  9 0 8 8 8 8 8 9 9 2 9 0 9 0 9 3 9 9   ALBUMIN g/dL  --   --   --   --   --   --   --  3 6  --   --    TOTAL BILIRUBIN mg/dL  --   --   --   --   --   --   --  0 50  --   --    ALK PHOS U/L  --   --   --   --   --   --   --  61  --   --    ALT U/L  --   --   --   --   --   --   --  30  --   --    AST U/L  --   --   --   --   --   --   --  14  --   --    EGFR ml/min/1 73sq m  --  66 58 51 65 62 47 54 45 53   GLUCOSE RANDOM mg/dL  --  109 125 98 159* 87 251* 179* 70 217*                      Results from last 7 days   Lab Units 07/06/19  1611 07/06/19  1154 07/06/19  0808 07/05/19  2110 07/05/19  1554 07/05/19  1103 07/05/19  0720 07/04/19  2358 07/04/19  2022 07/04/19  1932 07/04/19  1839   POC GLUCOSE mg/dl 118 178* 132 131 116 174* 87 244* 212* 149* 50*         Results from last 7 days   Lab Units 07/05/19  1802 07/04/19  2006   TSH 3RD GENERATON uIU/mL  --  4 812*   FREE T4 ng/dL 1 29  --      * I Have Reviewed All Lab Data Listed Above      Cultures:           Imaging:  Imaging Reports Reviewed Today Include:   Procedure: Xr Chest Pa & Lateral    Result Date: 7/5/2019  Narrative: CHEST INDICATION:   Leukocytosis, coughing and wheezing  COMPARISON:  5/12/2019 EXAM PERFORMED/VIEWS:  XR CHEST PA & LATERAL FINDINGS:  Prosthetic aortic valve  Heart shadow appears unremarkable  Atherosclerotic vascular calcifications are noted  The lungs are clear  No pneumothorax or pleural effusion  Osseous structures appear within normal limits for patient age  Impression: No acute cardiopulmonary disease  Workstation performed: [id number]     Scheduled Meds:  Current Facility-Administered Medications:  acetaminophen 650 mg Oral Q6H PRN Mercedes Quiet, CRNP   albuterol 2 puff Inhalation Q6H PRN Mercedes Quiet, CRNP   amiodarone 200 mg Oral Daily Mercedes Quiet, CRNP   bimatoprost 1 drop Both Eyes HS Mercedes Quiet, CRNP   dabigatran etexilate 150 mg Oral Q12H [address] Mercedes Quiet, CRNP   hydrALAZINE 5 mg Intravenous Q4H PRN Mercedes Quiet, CRNP   hydrALAZINE 50 mg Oral TID [place], CRNP   insulin lispro 1-5 Units Subcutaneous HS Mercedes Quiet, CRNP   insulin lispro 1-6 Units Subcutaneous TID AC Mercedes Quiet, CRNP   [START ON 7/7/2019] losartan 50 mg Oral Daily Linda Eid MD   meclizine 12 5 mg Oral Q12H PRN Mercedes Quiet, CRNP   ondansetron 4 mg Intravenous Q6H PRN Mercedes Quiet, CRNP   polyvinyl alcohol 1 drop Both Eyes TID Keily Iniguez DO   pravastatin 40 mg Oral Daily With [employer], CRNP   sodium chloride 2 g Oral Q4H Linda Eid MD   timolol 1 drop Right Eye Daily Mercedes Quiet, [address] [facility] Internal Medicine  Hospitalist    ** Please Note: This note has been constructed using a voice recognition system   **

## 2019-07-06 NOTE — ASSESSMENT & PLAN NOTE
Mildly elevated TSH but T4 within limits      Results from last 7 days   Lab Units 07/05/19  1802 07/04/19  2006   TSH 3RD GENERATON uIU/mL  --  4 812*   FREE T4 ng/dL 1 29  --

## 2019-07-06 NOTE — ASSESSMENT & PLAN NOTE
Hypertensive urgency at the time of admission likely secondary to anxiety  Blood pressure has improved with hydralazine    Off chlorthalidone and losartan/HCT due to hyponatremia but restart losartan only today per nephrology

## 2019-07-06 NOTE — ASSESSMENT & PLAN NOTE
Lab Results   Component Value Date    HGBA1C 6 3 06/05/2019     Recent Labs     07/05/19  2110 07/06/19  0808 07/06/19  1154 07/06/19  1611   POCGLU 131 132 178* 118     Diabetes mellitus with hypoglycemia  Glipizide recently decreased to 10 mg daily from b i d  currently on hold    Will restart glipizide for morning at 5 mg

## 2019-07-07 PROBLEM — S69.92XA INJURY OF FINGER OF LEFT HAND: Status: ACTIVE | Noted: 2019-07-07

## 2019-07-07 LAB
ANION GAP SERPL CALCULATED.3IONS-SCNC: 5 MMOL/L (ref 4–13)
ANION GAP SERPL CALCULATED.3IONS-SCNC: 5 MMOL/L (ref 4–13)
BUN SERPL-MCNC: 17 MG/DL (ref 5–25)
BUN SERPL-MCNC: 17 MG/DL (ref 5–25)
CALCIUM SERPL-MCNC: 8.4 MG/DL (ref 8.3–10.1)
CALCIUM SERPL-MCNC: 8.6 MG/DL (ref 8.3–10.1)
CHLORIDE SERPL-SCNC: 92 MMOL/L (ref 100–108)
CHLORIDE SERPL-SCNC: 94 MMOL/L (ref 100–108)
CO2 SERPL-SCNC: 27 MMOL/L (ref 21–32)
CO2 SERPL-SCNC: 28 MMOL/L (ref 21–32)
CREAT SERPL-MCNC: 0.74 MG/DL (ref 0.6–1.3)
CREAT SERPL-MCNC: 0.84 MG/DL (ref 0.6–1.3)
GFR SERPL CREATININE-BSD FRML MDRD: 64 ML/MIN/1.73SQ M
GFR SERPL CREATININE-BSD FRML MDRD: 75 ML/MIN/1.73SQ M
GLUCOSE SERPL-MCNC: 123 MG/DL (ref 65–140)
GLUCOSE SERPL-MCNC: 136 MG/DL (ref 65–140)
GLUCOSE SERPL-MCNC: 140 MG/DL (ref 65–140)
GLUCOSE SERPL-MCNC: 157 MG/DL (ref 65–140)
GLUCOSE SERPL-MCNC: 189 MG/DL (ref 65–140)
GLUCOSE SERPL-MCNC: 205 MG/DL (ref 65–140)
POTASSIUM SERPL-SCNC: 4 MMOL/L (ref 3.5–5.3)
POTASSIUM SERPL-SCNC: 4.1 MMOL/L (ref 3.5–5.3)
SODIUM SERPL-SCNC: 124 MMOL/L (ref 136–145)
SODIUM SERPL-SCNC: 126 MMOL/L (ref 136–145)
SODIUM SERPL-SCNC: 127 MMOL/L (ref 136–145)

## 2019-07-07 PROCEDURE — 84295 ASSAY OF SERUM SODIUM: CPT | Performed by: INTERNAL MEDICINE

## 2019-07-07 PROCEDURE — 99233 SBSQ HOSP IP/OBS HIGH 50: CPT | Performed by: INTERNAL MEDICINE

## 2019-07-07 PROCEDURE — 82948 REAGENT STRIP/BLOOD GLUCOSE: CPT

## 2019-07-07 PROCEDURE — 99232 SBSQ HOSP IP/OBS MODERATE 35: CPT | Performed by: INTERNAL MEDICINE

## 2019-07-07 PROCEDURE — 80048 BASIC METABOLIC PNL TOTAL CA: CPT | Performed by: NURSE PRACTITIONER

## 2019-07-07 RX ORDER — GLIPIZIDE 5 MG/1
2.5 TABLET ORAL
Status: DISCONTINUED | OUTPATIENT
Start: 2019-07-07 | End: 2019-07-08

## 2019-07-07 RX ADMIN — DABIGATRAN ETEXILATE MESYLATE 150 MG: 150 CAPSULE ORAL at 08:04

## 2019-07-07 RX ADMIN — LOSARTAN POTASSIUM 50 MG: 50 TABLET, FILM COATED ORAL at 08:04

## 2019-07-07 RX ADMIN — POLYVINYL ALCOHOL 1 DROP: 14 SOLUTION/ DROPS OPHTHALMIC at 21:15

## 2019-07-07 RX ADMIN — ACETAMINOPHEN 650 MG: 325 TABLET ORAL at 08:03

## 2019-07-07 RX ADMIN — SODIUM CHLORIDE: 234 INJECTION INTRAMUSCULAR; INTRAVENOUS; SUBCUTANEOUS at 01:14

## 2019-07-07 RX ADMIN — HYDRALAZINE HYDROCHLORIDE 50 MG: 25 TABLET ORAL at 08:04

## 2019-07-07 RX ADMIN — HYDRALAZINE HYDROCHLORIDE 50 MG: 25 TABLET ORAL at 16:08

## 2019-07-07 RX ADMIN — BIMATOPROST 1 DROP: 0.1 SOLUTION/ DROPS OPHTHALMIC at 21:19

## 2019-07-07 RX ADMIN — HYDRALAZINE HYDROCHLORIDE 50 MG: 25 TABLET ORAL at 21:16

## 2019-07-07 RX ADMIN — POLYVINYL ALCOHOL 1 DROP: 14 SOLUTION/ DROPS OPHTHALMIC at 16:09

## 2019-07-07 RX ADMIN — GLIPIZIDE 2.5 MG: 5 TABLET ORAL at 17:55

## 2019-07-07 RX ADMIN — ALBUTEROL SULFATE 2 PUFF: 90 AEROSOL, METERED RESPIRATORY (INHALATION) at 17:06

## 2019-07-07 RX ADMIN — DABIGATRAN ETEXILATE MESYLATE 150 MG: 150 CAPSULE ORAL at 21:16

## 2019-07-07 RX ADMIN — POLYVINYL ALCOHOL 1 DROP: 14 SOLUTION/ DROPS OPHTHALMIC at 09:41

## 2019-07-07 RX ADMIN — PRAVASTATIN SODIUM 40 MG: 40 TABLET ORAL at 16:08

## 2019-07-07 RX ADMIN — TIMOLOL MALEATE 1 DROP: 5 SOLUTION/ DROPS OPHTHALMIC at 08:07

## 2019-07-07 RX ADMIN — INSULIN LISPRO 2 UNITS: 100 INJECTION, SOLUTION INTRAVENOUS; SUBCUTANEOUS at 11:36

## 2019-07-07 RX ADMIN — INSULIN LISPRO 1 UNITS: 100 INJECTION, SOLUTION INTRAVENOUS; SUBCUTANEOUS at 16:12

## 2019-07-07 RX ADMIN — AMIODARONE HYDROCHLORIDE 200 MG: 200 TABLET ORAL at 08:04

## 2019-07-07 NOTE — ASSESSMENT & PLAN NOTE
Patient reportedly choking on sandwich witnessed by nursing during the time of admission due to encephalopathy  Has been seen by speech pathologist to upgrade diet to dental soft

## 2019-07-07 NOTE — ASSESSMENT & PLAN NOTE
Hyponatremia likely secondary to excess free water intake from having to drink water for pelvic ultrasound  Slightly overcorrected and nephrology has given small dose of D5 W and DDAVP but now hyponatremic again    Currently improved on 1 8%NSS per nephrology     Results from last 7 days   Lab Units 07/07/19  1212 07/07/19  0455 07/06/19  2329 07/06/19  1649 07/06/19  0536 07/06/19  0019 07/05/19  1802 07/05/19  1210 07/05/19  0745 07/05/19  0006   SODIUM mmol/L 127* 124* 121* 122* 126* 128* 132* 131* 134* 127*

## 2019-07-07 NOTE — PROGRESS NOTES
[facility] PROGRESS NOTE   Rasheeda oRse 80 y o  female MRN: [id number]  Unit/Bed#: [place] [address] 222-01 Encounter: [id number]  Reason for Consult: Hyponatremia    ASSESSMENT and PLAN:  1  Hypoosmolar hyponatremia:  · Admitted with a serum sodium of 125 and rapidly improved with normal saline up to 134 which prompted the administration of D5W and DDAVP  · With D5W and DDAVP, Na went down to 121 prompting initiation of 1 8% saline  · Na is now rising at an appropriate rate  · Goal for today would be around 130 to 134      2  HTN:   · BP is on the high side  · Monitor with Losartan and Hydralazine  DISPOSITION:  · Hold 1 8% saline for now  · Check Na at 5 pm    · Goal would be around 130 to 134  Above plan discussed with RN  SUBJECTIVE / INTERVAL HISTORY:  Na went down to 121 last night and I started 1 8% saline at 50 cc/hr at 12:30 am    Na up to 124 at 5 am and 1 8% saline decreased to 40 cc/hr  Na up to 127 at 12 noon  No CP or SOB  OBJECTIVE:  Current Weight:    Vitals:    07/06/19 1512 07/06/19 2222 07/06/19 2248 07/07/19 0725   BP: (!) 179/83 167/63 154/64 168/72   BP Location: Left arm  Right arm Left arm   Pulse: 61  (!) 51 (!) 52   Resp: 18  18 18   Temp: 97 5 °F (36 4 °C)  (!) 97 4 °F (36 3 °C) 98 4 °F (36 9 °C)   TempSrc: Temporal  Temporal Temporal   SpO2: 94%  96% 94%     No intake or output data in the 24 hours ending 07/07/19 1548  General: conscious, coherent, cooperative, no distress  Chest/Lungs: clear breath sounds  CVS: distinct heart sounds, normal rate  Abdomen: soft  Extremities: no edema  : no lisa catheter  Neuro: awake, alert        Medications:    Current Facility-Administered Medications:     acetaminophen (TYLENOL) tablet 650 mg, 650 mg, Oral, Q6H PRN, ROBBI Witt, 650 mg at 07/07/19 0803    albuterol (PROVENTIL HFA,VENTOLIN HFA) inhaler 2 puff, 2 puff, Inhalation, Q6H PRN, ROBBI Witt, 2 puff at 07/06/19 1025   amiodarone tablet 200 mg, 200 mg, Oral, Daily, ROBBI Hernandez, 200 mg at 07/07/19 0804    bimatoprost (LUMIGAN) 0 01 % ophthalmic solution 1 drop, 1 drop, Both Eyes, HS, ROBBI Hernandez, 1 drop at 07/06/19 2232    dabigatran etexilate (PRADAXA) capsule 150 mg, 150 mg, Oral, Q12H [facility], ROBBI Hernandez, 150 mg at 07/07/19 0804    hydrALAZINE (APRESOLINE) injection 5 mg, 5 mg, Intravenous, Q4H PRN, ROBBI Hernandez    hydrALAZINE (APRESOLINE) tablet 50 mg, 50 mg, Oral, TID, ROBBI Tena, 50 mg at 07/07/19 0804    insulin lispro (HumaLOG) 100 units/mL subcutaneous injection 1-5 Units, 1-5 Units, Subcutaneous, HS, ROBBI Hernandez, 1 Units at 07/06/19 2230    insulin lispro (HumaLOG) 100 units/mL subcutaneous injection 1-6 Units, 1-6 Units, Subcutaneous, TID AC, 2 Units at 07/07/19 1136 **AND** Fingerstick Glucose (POCT), , , TID AC, ROBBI Hernandez    losartan (COZAAR) tablet 50 mg, 50 mg, Oral, Daily, Katelynn Stover MD, 50 mg at 07/07/19 0804    meclizine (ANTIVERT) tablet 12 5 mg, 12 5 mg, Oral, Q12H PRN, ROBBI Hernandez    ondansetron [facility]) injection 4 mg, 4 mg, Intravenous, Q6H PRN, ROBBI Hernandez    polyvinyl alcohol (LIQUIFILM TEARS) 1 4 % ophthalmic solution 1 drop, 1 drop, Both Eyes, TID, Shemar Palacio DO, 1 drop at 07/07/19 0941    pravastatin (PRAVACHOL) tablet 40 mg, 40 mg, Oral, Daily With [place], ROBBI Hernandez, 40 mg at 07/06/19 1641    sodium chloride (concentrated) 308 mEq in sterile water 1,000 mL infusion, , Intravenous, Continuous, Katelynn Stover MD, Last Rate: 40 mL/hr at 07/07/19 0555    timolol (TIMOPTIC) 0 5 % ophthalmic solution 1 drop, 1 drop, Right Eye, Daily, ROBBI Hernandez, 1 drop at 07/07/19 3252    Laboratory Results:  Results from last 7 days   Lab Units 07/07/19  1212 07/07/19  0455 07/06/19  2329 07/06/19  0536 07/06/19  0019 07/05/19  1802 07/05/19  1210  07/05/19  0524 07/04/19  1925   WBC Thousand/uL  --   --   --  6 57  --   --   --   --  4 69  --  12 17*   HEMOGLOBIN g/dL  --   --   --  10 9*  --   --   --   --  11 2*  --  13 8   HEMATOCRIT %  --   --   --  33 0*  --   --   --   --  34 1*  --  42 4   PLATELETS Thousands/uL  --   --   --  233  --   --   --   --  244  --  301   POTASSIUM mmol/L 4 1 4 0 4 1 4 2 4 3 4 6 4 6   < >  --    < >  --    CHLORIDE mmol/L 94* 92* 89* 93* 92* 97* 97*   < >  --    < >  --    CO2 mmol/L 28 27 26 26 27 29 28   < >  --    < >  --    BUN mg/dL 17 17 19 20 20 19 16   < >  --    < >  --    CREATININE mg/dL 0 84 0 74 0 79 0 82 0 91 1 01 0 83   < >  --    < >  --    CALCIUM mg/dL 8 4 8 6 8 8 9 0 8 8 8 8 8 9   < >  --    < >  --     < > = values in this interval not displayed

## 2019-07-07 NOTE — ASSESSMENT & PLAN NOTE
Injury to finger of left hand  Patient declined x-ray  Doubt foreign body as patient had it crushed in door in the emergency department

## 2019-07-07 NOTE — PROGRESS NOTES
Progress Note - Destiney Last 1935, 80 y o  female MRN: [id number]    Unit/Bed#: [place] [address] 222-01 Encounter: [id number]    Primary Care Provider: Vero Monzon MD   Date and time admitted to hospital: 7/4/2019  6:35 PM        * Metabolic encephalopathy  Assessment & Plan  Metabolic encephalopathy secondary to hypoglycemia and hyponatremia  Seems to have returned to baseline  Hyponatremia  Assessment & Plan  Hyponatremia likely secondary to excess free water intake from having to drink water for pelvic ultrasound  Slightly overcorrected and nephrology has given small dose of D5 W and DDAVP but now hyponatremic again  Currently improved on 1 8%NSS per nephrology     Results from last 7 days   Lab Units 07/07/19  1212 07/07/19  0455 07/06/19  2329 07/06/19  1649 07/06/19  0536 07/06/19  0019 07/05/19  1802 07/05/19  1210 07/05/19  0745 07/05/19  0006   SODIUM mmol/L 127* 124* 121* 122* 126* 128* 132* 131* 134* 127*       Injury of finger of left hand  Assessment & Plan  Injury to finger of left hand  Patient declined x-ray  Doubt foreign body as patient had it crushed in door in the emergency department  Dysphagia  Assessment & Plan  Patient reportedly choking on sandwich witnessed by nursing during the time of admission due to encephalopathy  Has been seen by speech pathologist to upgrade diet to dental soft  Hypertensive urgency  Assessment & Plan  Hypertensive urgency at the time of admission  Blood pressure has improved with hydralazine  Off chlorthalidone and losartan/HCT due to hyponatremia but has been restarted on losartan only per nephrology    Type 2 diabetes mellitus with hypoglycemia without coma [facility])  Assessment & Plan  Lab Results   Component Value Date    HGBA1C 6 3 06/05/2019     Recent Labs     07/06/19  2059 07/07/19  0727 07/07/19  1117 07/07/19  1555   POCGLU 151* 140 205* 157*     Diabetes mellitus with hypoglycemia  Glipizide recently decreased to 10 mg daily from b i d  currently on hold  Restarted glipizide at 2 5 mg b i d  Elevated TSH  Assessment & Plan  Mildly elevated TSH but T4 within limits      Results from last 7 days   Lab Units 07/05/19  1802 07/04/19 2006   TSH 3RD GENERATON uIU/mL  --  4 812*   FREE T4 ng/dL 1 29  --        History of CVA (cerebrovascular accident)  Assessment & Plan  History of CVA with residual dizziness  Anticoagulated on pradaxa    Thickened endometrium  Assessment & Plan  Follow-up with gynecology after discharge    S/P TAVR (transcatheter aortic valve replacement)  Assessment & Plan  History of TAVR    Paroxysmal atrial fibrillation (HCC)  Assessment & Plan  Paroxysmal atrial fibrillation on pradaxa, amiodarone      VTE Pharmacologic Prophylaxis: Dabigatran (Pradaxa)    Patient Centered Rounds: I have performed bedside rounds with nursing staff today  Discussions with Specialists or Other Care Team Provider:  Nephrology  Education and Discussions with Family / Patient:     Time Spent for Care: 30 mins  More than 50% of total time spent on counseling and coordination of care as described above  Current Length of Stay: 3 day(s)  Current Patient Status: Inpatient     Certification Statement: The patient will continue to require additional inpatient hospital stay due to Metabolic encephalopathy  Discharge Plan / Estimated Discharge Date:  Possibly Monday    Code Status: Level 1 - Full Code  ______________________________________________________________________________    Subjective:   Patient seen and examined  Chronic dizziness  Injury to finger improved  Objective:   Vitals: Blood pressure 164/60, pulse (!) 51, temperature (!) 97 3 °F (36 3 °C), temperature source Temporal, resp  rate 16, SpO2 98 %      Physical Exam:   General appearance: alert, appears stated age and cooperative  Head: Normocephalic, without obvious abnormality, atraumatic  Lungs: clear to auscultation bilaterally  Heart: regular rate and rhythm and ++ murmur  Abdomen: soft, non-tender, positive bowel sounds   Back: negative  Extremities: Improved edema and erythema of injured left finger  Neurologic: Grossly normal    Additional Data:   Labs:  Results from last 7 days   Lab Units 07/06/19  0536 07/05/19  0524 07/04/19  1925   WBC Thousand/uL 6 57 4 69 12 17*   HEMOGLOBIN g/dL 10 9* 11 2* 13 8   HEMATOCRIT % 33 0* 34 1* 42 4   MCV fL 91 92 92   PLATELETS Thousands/uL 233 244 301   INR   --   --  2 10*     Results from last 7 days   Lab Units 07/07/19  1212 07/07/19  0455 07/06/19  2329 07/06/19  1649 07/06/19  0536 07/06/19  0019 07/05/19  1802 07/05/19  1210 07/05/19  0745 07/05/19  0006 07/04/19 2006   SODIUM mmol/L 127* 124* 121* 122* 126* 128* 132* 131* 134* 127* 125*   POTASSIUM mmol/L 4 1 4 0 4 1  --  4 2 4 3 4 6 4 6 4 4 4 5 5 2   CHLORIDE mmol/L 94* 92* 89*  --  93* 92* 97* 97* 98* 94* 91*   CO2 mmol/L 28 27 26  --  26 27 29 28 28 26 23   ANION GAP mmol/L 5 5 6  --  7 9 6 6 8 7 11   BUN mg/dL 17 17 19  --  20 20 19 16 17 18 19   CREATININE mg/dL 0 84 0 74 0 79  --  0 82 0 91 1 01 0 83 0 86 1 09 0 96   CALCIUM mg/dL 8 4 8 6 8 8  --  9 0 8 8 8 8 8 9 9 2 9 0 9 0   ALBUMIN g/dL  --   --   --   --   --   --   --   --   --   --  3 6   TOTAL BILIRUBIN mg/dL  --   --   --   --   --   --   --   --   --   --  0 50   ALK PHOS U/L  --   --   --   --   --   --   --   --   --   --  61   ALT U/L  --   --   --   --   --   --   --   --   --   --  30   AST U/L  --   --   --   --   --   --   --   --   --   --  14   EGFR ml/min/1 73sq m 64 75 69  --  66 58 51 65 62 47 54   GLUCOSE RANDOM mg/dL 189* 123 127  --  109 125 98 159* 87 251* 179*                      Results from last 7 days   Lab Units 07/07/19  1555 07/07/19  1117 07/07/19  0727 07/06/19  2059 07/06/19  1611 07/06/19  1154 07/06/19  0808 07/05/19  2110 07/05/19  1554 07/05/19  1103 07/05/19  0720 07/04/19  2358   POC GLUCOSE mg/dl 157* 205* 140 151* 118 178* 132 131 116 174* 87 244*         Results from last 7 days   Lab Units 07/05/19  1802 07/04/19  2006   TSH 3RD GENERATON uIU/mL  --  4 812*   FREE T4 ng/dL 1 29  --      * I Have Reviewed All Lab Data Listed Above  Cultures:           Imaging:  Imaging Reports Reviewed Today Include:   Procedure: Xr Chest Pa & Lateral    Result Date: 7/5/2019  Narrative: CHEST INDICATION:   Leukocytosis, coughing and wheezing  COMPARISON:  5/12/2019 EXAM PERFORMED/VIEWS:  XR CHEST PA & LATERAL FINDINGS:  Prosthetic aortic valve  Heart shadow appears unremarkable  Atherosclerotic vascular calcifications are noted  The lungs are clear  No pneumothorax or pleural effusion  Osseous structures appear within normal limits for patient age  Impression: No acute cardiopulmonary disease   Workstation performed: [id number]     Scheduled Meds:  Current Facility-Administered Medications:  acetaminophen 650 mg Oral Q6H PRN ROBBI Diehl    albuterol 2 puff Inhalation Q6H PRN ROBBI Diehl    amiodarone 200 mg Oral Daily ROBBI Diehl    bimatoprost 1 drop Both Eyes HS ROBBI Diehl    dabigatran etexilate 150 mg Oral Q12H [address] [facility] [place], ROBBI    glipiZIDE 2 5 mg Oral BID AC Shemar Palacio, DO    hydrALAZINE 5 mg Intravenous Q4H PRN ROBBI Diehl    hydrALAZINE 50 mg Oral TID ROBBI Pérez    insulin lispro 1-5 Units Subcutaneous HS ROBBI Diehl    insulin lispro 1-6 Units Subcutaneous TID AC ROBBI Diehl    losartan 50 mg Oral Daily Aman Rajput MD    meclizine 12 5 mg Oral Q12H PRN ROBBI Diehl    ondansetron 4 mg Intravenous Q6H PRN ROBBI Diehl    polyvinyl alcohol 1 drop Both Eyes TID Duey Riding, DO    pravastatin 40 mg Oral Daily With [phone] [facility], ROBBI    IV infusion builder  Intravenous Continuous Aman Rajput MD Last Rate: Stopped (07/07/19 1548)   timolol 1 drop Right Eye Daily Niya Contreras, [address] [facility] Internal Medicine  Hospitalist    ** Please Note: This note has been constructed using a voice recognition system   **

## 2019-07-07 NOTE — PROGRESS NOTES
As requested, paged Dr Jacob Briones with 2300 sodium  Dr returned call  Pt sleeping so will wait for the morning sodium to change anything to increase urination  At [address] Jacob Briones called back  He is ordering 1 8% NS solution for patient

## 2019-07-07 NOTE — ASSESSMENT & PLAN NOTE
Hypertensive urgency at the time of admission  Blood pressure has improved with hydralazine    Off chlorthalidone and losartan/HCT due to hyponatremia but has been restarted on losartan only per nephrology

## 2019-07-07 NOTE — ASSESSMENT & PLAN NOTE
Lab Results   Component Value Date    HGBA1C 6 3 06/05/2019     Recent Labs     07/06/19 2059 07/07/19  0727 07/07/19  1117 07/07/19  1555   POCGLU 151* 140 205* 157*     Diabetes mellitus with hypoglycemia  Glipizide recently decreased to 10 mg daily from b i d  currently on hold  Restarted glipizide at 2 5 mg b i d

## 2019-07-07 NOTE — TREATMENT PLAN
RENAL NOTE   Destiney Last 80 y o  female MRN: [id number]  Unit/Bed#: [place] 68 2 -01 Encounter: [id number]    · Repeat Na at 5 pm was 126 - restart 1 8% saline at 50 cc/hr  · Goal for tomorrow morning 132 to 135

## 2019-07-07 NOTE — PLAN OF CARE
Problem: INFECTION - ADULT  Goal: Absence or prevention of progression during hospitalization  Description  INTERVENTIONS:  - Assess and monitor for signs and symptoms of infection  - Monitor lab/diagnostic results  - Monitor all insertion sites, i e  indwelling lines, tubes, and drains  - Monitor endotracheal (as able) and nasal secretions for changes in amount and color  - [place] appropriate cooling/warming therapies per order  - Administer medications as ordered  - Instruct and encourage patient and family to use good hand hygiene technique  - Identify and instruct in appropriate isolation precautions for identified infection/condition  Outcome: Progressing  Goal: Absence of fever/infection during neutropenic period  Description  INTERVENTIONS:  - Monitor WBC  - Implement neutropenic guidelines  Outcome: Progressing     Problem: SAFETY ADULT  Goal: Patient will remain free of falls  Description  INTERVENTIONS:  - Assess patient frequently for physical needs  -  Identify cognitive and physical deficits and behaviors that affect risk of falls    -  [place] fall precautions as indicated by assessment   - Educate patient/family on patient safety including physical limitations  - Instruct patient to call for assistance with activity based on assessment  - Modify environment to reduce risk of injury  - Consider OT/PT consult to assist with strengthening/mobility  Outcome: Progressing  Goal: Maintain or return to baseline ADL function  Description  INTERVENTIONS:  -  Assess patient's ability to carry out ADLs; assess patient's baseline for ADL function and identify physical deficits which impact ability to perform ADLs (bathing, care of mouth/teeth, toileting, grooming, dressing, etc )  - Assess/evaluate cause of self-care deficits   - Assess range of motion  - Assess patient's mobility; develop plan if impaired  - Assess patient's need for assistive devices and provide as appropriate  - Encourage maximum independence but intervene and supervise when necessary  ¯ Involve family in performance of ADLs  ¯ Assess for home care needs following discharge   ¯ Request OT consult to assist with ADL evaluation and planning for discharge  ¯ Provide patient education as appropriate  Outcome: Progressing  Goal: Maintain or return mobility status to optimal level  Description  INTERVENTIONS:  - Assess patient's baseline mobility status (ambulation, transfers, stairs, etc )    - Identify cognitive and physical deficits and behaviors that affect mobility  - Identify mobility aids required to assist with transfers and/or ambulation (gait belt, sit-to-stand, lift, walker, cane, etc )  - [place] fall precautions as indicated by assessment  - Record patient progress and toleration of activity level on Mobility SBAR; progress patient to next Phase/Stage  - Instruct patient to call for assistance with activity based on assessment  - Request Rehabilitation consult to assist with strengthening/weightbearing, etc   Outcome: Progressing     Problem: DISCHARGE PLANNING  Goal: Discharge to home or other facility with appropriate resources  Description  INTERVENTIONS:  - Identify barriers to discharge w/patient and caregiver  - Arrange for needed discharge resources and transportation as appropriate  - Identify discharge learning needs (meds, wound care, etc )  - Arrange for interpretive services to assist at discharge as needed  - Refer to Case Management Department for coordinating discharge planning if the patient needs post-hospital services based on physician/advanced practitioner order or complex needs related to functional status, cognitive ability, or social support system  Outcome: Progressing     Problem: Knowledge Deficit  Goal: Patient/family/caregiver demonstrates understanding of disease process, treatment plan, medications, and discharge instructions  Description  Complete learning assessment and assess knowledge base   Interventions:  - Provide teaching at level of understanding  - Provide teaching via preferred learning methods  Outcome: Progressing     Problem: GENITOURINARY - ADULT  Goal: Maintains or returns to baseline urinary function  Description  INTERVENTIONS:  - Assess urinary function  - Encourage oral fluids to ensure adequate hydration  - Administer IV fluids as ordered to ensure adequate hydration  - Administer ordered medications as needed  - Offer frequent toileting  - Follow urinary retention protocol if ordered  Outcome: Progressing  Goal: Absence of urinary retention  Description  INTERVENTIONS:  - Assess patients ability to void and empty bladder  - Monitor I/O  - Bladder scan as needed  - Discuss with physician/AP medications to alleviate retention as needed  - Discuss catheterization for long term situations as appropriate  Outcome: Progressing     Problem: METABOLIC, FLUID AND ELECTROLYTES - ADULT  Goal: Electrolytes maintained within normal limits  Description  INTERVENTIONS:  - Monitor labs and assess patient for signs and symptoms of electrolyte imbalances  - Administer electrolyte replacement as ordered  - Monitor response to electrolyte replacements, including repeat lab results as appropriate  - Instruct patient on fluid and nutrition as appropriate  Outcome: Progressing  Goal: Fluid balance maintained  Description  INTERVENTIONS:  - Monitor labs and assess for signs and symptoms of volume excess or deficit  - Monitor I/O and WT  - Instruct patient on fluid and nutrition as appropriate  Outcome: Progressing  Goal: Glucose maintained within target range  Description  INTERVENTIONS:  - Monitor Blood Glucose as ordered  - Assess for signs and symptoms of hyperglycemia and hypoglycemia  - Administer ordered medications to maintain glucose within target range  - Assess nutritional intake and initiate nutrition service referral as needed  Outcome: Progressing     Problem: SKIN/TISSUE INTEGRITY - ADULT  Goal: Skin integrity remains intact  Description  INTERVENTIONS  - Identify patients at risk for skin breakdown  - Assess and monitor skin integrity  - Assess and monitor nutrition and hydration status  - Monitor labs (i e  albumin)  - Assess for incontinence   - Turn and reposition patient  - Assist with mobility/ambulation  - Relieve pressure over bony prominences  - Avoid friction and shearing  - Provide appropriate hygiene as needed including keeping skin clean and dry  - Evaluate need for skin moisturizer/barrier cream  - Collaborate with interdisciplinary team (i e  Nutrition, Rehabilitation, etc )   - Patient/family teaching  Outcome: Progressing  Goal: Incision(s), wounds(s) or drain site(s) healing without S/S of infection  Description  INTERVENTIONS  - Assess and document risk factors for skin impairment   - Assess and document dressing, incision, wound bed, drain sites and surrounding tissue  - Initiate Nutrition services consult and/or wound management as needed  Outcome: Progressing  Goal: Oral mucous membranes remain intact  Description  INTERVENTIONS  - Assess oral mucosa and hygiene practices  - Implement preventative oral hygiene regimen  - Implement oral medicated treatments as ordered  - Initiate Nutrition services referral as needed  Outcome: Progressing     Problem: MUSCULOSKELETAL - ADULT  Goal: Maintain or return mobility to safest level of function  Description  INTERVENTIONS:  - Assess patient's ability to carry out ADLs; assess patient's baseline for ADL function and identify physical deficits which impact ability to perform ADLs (bathing, care of mouth/teeth, toileting, grooming, dressing, etc )  - Assess/evaluate cause of self-care deficits   - Assess range of motion  - Assess patient's mobility; develop plan if impaired  - Assess patient's need for assistive devices and provide as appropriate  - Encourage maximum independence but intervene and supervise when necessary  - Involve family in performance of ADLs  - Assess for home care needs following discharge   - Request OT consult to assist with ADL evaluation and planning for discharge  - Provide patient education as appropriate  Outcome: Progressing     Problem: Potential for Falls  Goal: Patient will remain free of falls  Description  INTERVENTIONS:  - Assess patient frequently for physical needs  -  Identify cognitive and physical deficits and behaviors that affect risk of falls    -  [place] fall precautions as indicated by assessment   - Educate patient/family on patient safety including physical limitations  - Instruct patient to call for assistance with activity based on assessment  - Modify environment to reduce risk of injury  - Consider OT/PT consult to assist with strengthening/mobility  Outcome: Progressing     Problem: Prexisting or High Potential for Compromised Skin Integrity  Goal: Skin integrity is maintained or improved  Description  INTERVENTIONS:  - Identify patients at risk for skin breakdown  - Assess and monitor skin integrity  - Assess and monitor nutrition and hydration status  - Monitor labs (i e  albumin)  - Assess for incontinence   - Turn and reposition patient  - Assist with mobility/ambulation  - Relieve pressure over bony prominences  - Avoid friction and shearing  - Provide appropriate hygiene as needed including keeping skin clean and dry  - Evaluate need for skin moisturizer/barrier cream  - Collaborate with interdisciplinary team (i e  Nutrition, Rehabilitation, etc )   - Patient/family teaching  Outcome: Progressing

## 2019-07-07 NOTE — PLAN OF CARE
Problem: INFECTION - ADULT  Goal: Absence or prevention of progression during hospitalization  Description  INTERVENTIONS:  - Assess and monitor for signs and symptoms of infection  - Monitor lab/diagnostic results  - Monitor all insertion sites, i e  indwelling lines, tubes, and drains  - Monitor endotracheal (as able) and nasal secretions for changes in amount and color  - [place] appropriate cooling/warming therapies per order  - Administer medications as ordered  - Instruct and encourage patient and family to use good hand hygiene technique  - Identify and instruct in appropriate isolation precautions for identified infection/condition  Outcome: Progressing  Goal: Absence of fever/infection during neutropenic period  Description  INTERVENTIONS:  - Monitor WBC  - Implement neutropenic guidelines  Outcome: Progressing     Problem: SAFETY ADULT  Goal: Patient will remain free of falls  Description  INTERVENTIONS:  - Assess patient frequently for physical needs  -  Identify cognitive and physical deficits and behaviors that affect risk of falls    -  [place] fall precautions as indicated by assessment   - Educate patient/family on patient safety including physical limitations  - Instruct patient to call for assistance with activity based on assessment  - Modify environment to reduce risk of injury  - Consider OT/PT consult to assist with strengthening/mobility  Outcome: Progressing  Goal: Maintain or return to baseline ADL function  Description  INTERVENTIONS:  -  Assess patient's ability to carry out ADLs; assess patient's baseline for ADL function and identify physical deficits which impact ability to perform ADLs (bathing, care of mouth/teeth, toileting, grooming, dressing, etc )  - Assess/evaluate cause of self-care deficits   - Assess range of motion  - Assess patient's mobility; develop plan if impaired  - Assess patient's need for assistive devices and provide as appropriate  - Encourage maximum independence but intervene and supervise when necessary  ¯ Involve family in performance of ADLs  ¯ Assess for home care needs following discharge   ¯ Request OT consult to assist with ADL evaluation and planning for discharge  ¯ Provide patient education as appropriate  Outcome: Progressing  Goal: Maintain or return mobility status to optimal level  Description  INTERVENTIONS:  - Assess patient's baseline mobility status (ambulation, transfers, stairs, etc )    - Identify cognitive and physical deficits and behaviors that affect mobility  - Identify mobility aids required to assist with transfers and/or ambulation (gait belt, sit-to-stand, lift, walker, cane, etc )  - [place] fall precautions as indicated by assessment  - Record patient progress and toleration of activity level on Mobility SBAR; progress patient to next Phase/Stage  - Instruct patient to call for assistance with activity based on assessment  - Request Rehabilitation consult to assist with strengthening/weightbearing, etc   Outcome: Progressing     Problem: DISCHARGE PLANNING  Goal: Discharge to home or other facility with appropriate resources  Description  INTERVENTIONS:  - Identify barriers to discharge w/patient and caregiver  - Arrange for needed discharge resources and transportation as appropriate  - Identify discharge learning needs (meds, wound care, etc )  - Arrange for interpretive services to assist at discharge as needed  - Refer to Case Management Department for coordinating discharge planning if the patient needs post-hospital services based on physician/advanced practitioner order or complex needs related to functional status, cognitive ability, or social support system  Outcome: Progressing     Problem: Knowledge Deficit  Goal: Patient/family/caregiver demonstrates understanding of disease process, treatment plan, medications, and discharge instructions  Description  Complete learning assessment and assess knowledge base   Interventions:  - Provide teaching at level of understanding  - Provide teaching via preferred learning methods  Outcome: Progressing     Problem: GENITOURINARY - ADULT  Goal: Maintains or returns to baseline urinary function  Description  INTERVENTIONS:  - Assess urinary function  - Encourage oral fluids to ensure adequate hydration  - Administer IV fluids as ordered to ensure adequate hydration  - Administer ordered medications as needed  - Offer frequent toileting  - Follow urinary retention protocol if ordered  Outcome: Progressing  Goal: Absence of urinary retention  Description  INTERVENTIONS:  - Assess patients ability to void and empty bladder  - Monitor I/O  - Bladder scan as needed  - Discuss with physician/AP medications to alleviate retention as needed  - Discuss catheterization for long term situations as appropriate  Outcome: Progressing     Problem: METABOLIC, FLUID AND ELECTROLYTES - ADULT  Goal: Electrolytes maintained within normal limits  Description  INTERVENTIONS:  - Monitor labs and assess patient for signs and symptoms of electrolyte imbalances  - Administer electrolyte replacement as ordered  - Monitor response to electrolyte replacements, including repeat lab results as appropriate  - Instruct patient on fluid and nutrition as appropriate  Outcome: Progressing  Goal: Fluid balance maintained  Description  INTERVENTIONS:  - Monitor labs and assess for signs and symptoms of volume excess or deficit  - Monitor I/O and WT  - Instruct patient on fluid and nutrition as appropriate  Outcome: Progressing  Goal: Glucose maintained within target range  Description  INTERVENTIONS:  - Monitor Blood Glucose as ordered  - Assess for signs and symptoms of hyperglycemia and hypoglycemia  - Administer ordered medications to maintain glucose within target range  - Assess nutritional intake and initiate nutrition service referral as needed  Outcome: Progressing     Problem: SKIN/TISSUE INTEGRITY - ADULT  Goal: Skin integrity remains intact  Description  INTERVENTIONS  - Identify patients at risk for skin breakdown  - Assess and monitor skin integrity  - Assess and monitor nutrition and hydration status  - Monitor labs (i e  albumin)  - Assess for incontinence   - Turn and reposition patient  - Assist with mobility/ambulation  - Relieve pressure over bony prominences  - Avoid friction and shearing  - Provide appropriate hygiene as needed including keeping skin clean and dry  - Evaluate need for skin moisturizer/barrier cream  - Collaborate with interdisciplinary team (i e  Nutrition, Rehabilitation, etc )   - Patient/family teaching  Outcome: Progressing  Goal: Incision(s), wounds(s) or drain site(s) healing without S/S of infection  Description  INTERVENTIONS  - Assess and document risk factors for skin impairment   - Assess and document dressing, incision, wound bed, drain sites and surrounding tissue  - Initiate Nutrition services consult and/or wound management as needed  Outcome: Progressing  Goal: Oral mucous membranes remain intact  Description  INTERVENTIONS  - Assess oral mucosa and hygiene practices  - Implement preventative oral hygiene regimen  - Implement oral medicated treatments as ordered  - Initiate Nutrition services referral as needed  Outcome: Progressing     Problem: MUSCULOSKELETAL - ADULT  Goal: Maintain or return mobility to safest level of function  Description  INTERVENTIONS:  - Assess patient's ability to carry out ADLs; assess patient's baseline for ADL function and identify physical deficits which impact ability to perform ADLs (bathing, care of mouth/teeth, toileting, grooming, dressing, etc )  - Assess/evaluate cause of self-care deficits   - Assess range of motion  - Assess patient's mobility; develop plan if impaired  - Assess patient's need for assistive devices and provide as appropriate  - Encourage maximum independence but intervene and supervise when necessary  - Involve family in performance of ADLs  - Assess for home care needs following discharge   - Request OT consult to assist with ADL evaluation and planning for discharge  - Provide patient education as appropriate  Outcome: Progressing     Problem: Potential for Falls  Goal: Patient will remain free of falls  Description  INTERVENTIONS:  - Assess patient frequently for physical needs  -  Identify cognitive and physical deficits and behaviors that affect risk of falls    -  [place] fall precautions as indicated by assessment   - Educate patient/family on patient safety including physical limitations  - Instruct patient to call for assistance with activity based on assessment  - Modify environment to reduce risk of injury  - Consider OT/PT consult to assist with strengthening/mobility  Outcome: Progressing     Problem: Prexisting or High Potential for Compromised Skin Integrity  Goal: Skin integrity is maintained or improved  Description  INTERVENTIONS:  - Identify patients at risk for skin breakdown  - Assess and monitor skin integrity  - Assess and monitor nutrition and hydration status  - Monitor labs (i e  albumin)  - Assess for incontinence   - Turn and reposition patient  - Assist with mobility/ambulation  - Relieve pressure over bony prominences  - Avoid friction and shearing  - Provide appropriate hygiene as needed including keeping skin clean and dry  - Evaluate need for skin moisturizer/barrier cream  - Collaborate with interdisciplinary team (i e  Nutrition, Rehabilitation, etc )   - Patient/family teaching  Outcome: Progressing     Problem: INFECTION - ADULT  Goal: Absence or prevention of progression during hospitalization  Description  INTERVENTIONS:  - Assess and monitor for signs and symptoms of infection  - Monitor lab/diagnostic results  - Monitor all insertion sites, i e  indwelling lines, tubes, and drains  - Monitor endotracheal (as able) and nasal secretions for changes in amount and color  - [place] appropriate cooling/warming therapies per order  - Administer medications as ordered  - Instruct and encourage patient and family to use good hand hygiene technique  - Identify and instruct in appropriate isolation precautions for identified infection/condition  Outcome: Progressing  Goal: Absence of fever/infection during neutropenic period  Description  INTERVENTIONS:  - Monitor WBC  - Implement neutropenic guidelines  Outcome: Progressing     Problem: SAFETY ADULT  Goal: Patient will remain free of falls  Description  INTERVENTIONS:  - Assess patient frequently for physical needs  -  Identify cognitive and physical deficits and behaviors that affect risk of falls    -  [place] fall precautions as indicated by assessment   - Educate patient/family on patient safety including physical limitations  - Instruct patient to call for assistance with activity based on assessment  - Modify environment to reduce risk of injury  - Consider OT/PT consult to assist with strengthening/mobility  Outcome: Progressing  Goal: Maintain or return to baseline ADL function  Description  INTERVENTIONS:  -  Assess patient's ability to carry out ADLs; assess patient's baseline for ADL function and identify physical deficits which impact ability to perform ADLs (bathing, care of mouth/teeth, toileting, grooming, dressing, etc )  - Assess/evaluate cause of self-care deficits   - Assess range of motion  - Assess patient's mobility; develop plan if impaired  - Assess patient's need for assistive devices and provide as appropriate  - Encourage maximum independence but intervene and supervise when necessary  ¯ Involve family in performance of ADLs  ¯ Assess for home care needs following discharge   ¯ Request OT consult to assist with ADL evaluation and planning for discharge  ¯ Provide patient education as appropriate  Outcome: Progressing  Goal: Maintain or return mobility status to optimal level  Description  INTERVENTIONS:  - Assess patient's baseline mobility status (ambulation, transfers, stairs, etc )    - Identify cognitive and physical deficits and behaviors that affect mobility  - Identify mobility aids required to assist with transfers and/or ambulation (gait belt, sit-to-stand, lift, walker, cane, etc )  - [place] fall precautions as indicated by assessment  - Record patient progress and toleration of activity level on Mobility SBAR; progress patient to next Phase/Stage  - Instruct patient to call for assistance with activity based on assessment  - Request Rehabilitation consult to assist with strengthening/weightbearing, etc   Outcome: Progressing     Problem: DISCHARGE PLANNING  Goal: Discharge to home or other facility with appropriate resources  Description  INTERVENTIONS:  - Identify barriers to discharge w/patient and caregiver  - Arrange for needed discharge resources and transportation as appropriate  - Identify discharge learning needs (meds, wound care, etc )  - Arrange for interpretive services to assist at discharge as needed  - Refer to Case Management Department for coordinating discharge planning if the patient needs post-hospital services based on physician/advanced practitioner order or complex needs related to functional status, cognitive ability, or social support system  Outcome: Progressing     Problem: Knowledge Deficit  Goal: Patient/family/caregiver demonstrates understanding of disease process, treatment plan, medications, and discharge instructions  Description  Complete learning assessment and assess knowledge base    Interventions:  - Provide teaching at level of understanding  - Provide teaching via preferred learning methods  Outcome: Progressing     Problem: GENITOURINARY - ADULT  Goal: Maintains or returns to baseline urinary function  Description  INTERVENTIONS:  - Assess urinary function  - Encourage oral fluids to ensure adequate hydration  - Administer IV fluids as ordered to ensure adequate hydration  - Administer ordered medications as needed  - Offer frequent toileting  - Follow urinary retention protocol if ordered  Outcome: Progressing  Goal: Absence of urinary retention  Description  INTERVENTIONS:  - Assess patients ability to void and empty bladder  - Monitor I/O  - Bladder scan as needed  - Discuss with physician/AP medications to alleviate retention as needed  - Discuss catheterization for long term situations as appropriate  Outcome: Progressing     Problem: METABOLIC, FLUID AND ELECTROLYTES - ADULT  Goal: Electrolytes maintained within normal limits  Description  INTERVENTIONS:  - Monitor labs and assess patient for signs and symptoms of electrolyte imbalances  - Administer electrolyte replacement as ordered  - Monitor response to electrolyte replacements, including repeat lab results as appropriate  - Instruct patient on fluid and nutrition as appropriate  Outcome: Progressing  Goal: Fluid balance maintained  Description  INTERVENTIONS:  - Monitor labs and assess for signs and symptoms of volume excess or deficit  - Monitor I/O and WT  - Instruct patient on fluid and nutrition as appropriate  Outcome: Progressing  Goal: Glucose maintained within target range  Description  INTERVENTIONS:  - Monitor Blood Glucose as ordered  - Assess for signs and symptoms of hyperglycemia and hypoglycemia  - Administer ordered medications to maintain glucose within target range  - Assess nutritional intake and initiate nutrition service referral as needed  Outcome: Progressing     Problem: SKIN/TISSUE INTEGRITY - ADULT  Goal: Skin integrity remains intact  Description  INTERVENTIONS  - Identify patients at risk for skin breakdown  - Assess and monitor skin integrity  - Assess and monitor nutrition and hydration status  - Monitor labs (i e  albumin)  - Assess for incontinence   - Turn and reposition patient  - Assist with mobility/ambulation  - Relieve pressure over bony prominences  - Avoid friction and shearing  - Provide appropriate hygiene as needed including keeping skin clean and dry  - Evaluate need for skin moisturizer/barrier cream  - Collaborate with interdisciplinary team (i e  Nutrition, Rehabilitation, etc )   - Patient/family teaching  Outcome: Progressing  Goal: Incision(s), wounds(s) or drain site(s) healing without S/S of infection  Description  INTERVENTIONS  - Assess and document risk factors for skin impairment   - Assess and document dressing, incision, wound bed, drain sites and surrounding tissue  - Initiate Nutrition services consult and/or wound management as needed  Outcome: Progressing  Goal: Oral mucous membranes remain intact  Description  INTERVENTIONS  - Assess oral mucosa and hygiene practices  - Implement preventative oral hygiene regimen  - Implement oral medicated treatments as ordered  - Initiate Nutrition services referral as needed  Outcome: Progressing     Problem: MUSCULOSKELETAL - ADULT  Goal: Maintain or return mobility to safest level of function  Description  INTERVENTIONS:  - Assess patient's ability to carry out ADLs; assess patient's baseline for ADL function and identify physical deficits which impact ability to perform ADLs (bathing, care of mouth/teeth, toileting, grooming, dressing, etc )  - Assess/evaluate cause of self-care deficits   - Assess range of motion  - Assess patient's mobility; develop plan if impaired  - Assess patient's need for assistive devices and provide as appropriate  - Encourage maximum independence but intervene and supervise when necessary  - Involve family in performance of ADLs  - Assess for home care needs following discharge   - Request OT consult to assist with ADL evaluation and planning for discharge  - Provide patient education as appropriate  Outcome: Progressing     Problem: Potential for Falls  Goal: Patient will remain free of falls  Description  INTERVENTIONS:  - Assess patient frequently for physical needs  -  Identify cognitive and physical deficits and behaviors that affect risk of falls    -  [place] fall precautions as indicated by assessment   - Educate patient/family on patient safety including physical limitations  - Instruct patient to call for assistance with activity based on assessment  - Modify environment to reduce risk of injury  - Consider OT/PT consult to assist with strengthening/mobility  Outcome: Progressing     Problem: Prexisting or High Potential for Compromised Skin Integrity  Goal: Skin integrity is maintained or improved  Description  INTERVENTIONS:  - Identify patients at risk for skin breakdown  - Assess and monitor skin integrity  - Assess and monitor nutrition and hydration status  - Monitor labs (i e  albumin)  - Assess for incontinence   - Turn and reposition patient  - Assist with mobility/ambulation  - Relieve pressure over bony prominences  - Avoid friction and shearing  - Provide appropriate hygiene as needed including keeping skin clean and dry  - Evaluate need for skin moisturizer/barrier cream  - Collaborate with interdisciplinary team (i e  Nutrition, Rehabilitation, etc )   - Patient/family teaching  Outcome: Progressing

## 2019-07-08 ENCOUNTER — TELEPHONE (OUTPATIENT)
Dept: OBGYN CLINIC | Facility: CLINIC | Age: 84
End: 2019-07-08

## 2019-07-08 PROBLEM — I16.0 HYPERTENSIVE URGENCY: Status: RESOLVED | Noted: 2019-07-04 | Resolved: 2019-07-08

## 2019-07-08 PROBLEM — D64.9 ANEMIA: Status: ACTIVE | Noted: 2019-07-08

## 2019-07-08 PROBLEM — D72.829 LEUKOCYTOSIS: Status: RESOLVED | Noted: 2019-07-04 | Resolved: 2019-07-08

## 2019-07-08 LAB
ANION GAP SERPL CALCULATED.3IONS-SCNC: 10 MMOL/L (ref 4–13)
ANION GAP SERPL CALCULATED.3IONS-SCNC: 6 MMOL/L (ref 4–13)
ANION GAP SERPL CALCULATED.3IONS-SCNC: 6 MMOL/L (ref 4–13)
BUN SERPL-MCNC: 15 MG/DL (ref 5–25)
BUN SERPL-MCNC: 16 MG/DL (ref 5–25)
BUN SERPL-MCNC: 17 MG/DL (ref 5–25)
CALCIUM SERPL-MCNC: 8.3 MG/DL (ref 8.3–10.1)
CALCIUM SERPL-MCNC: 8.6 MG/DL (ref 8.3–10.1)
CALCIUM SERPL-MCNC: 8.9 MG/DL (ref 8.3–10.1)
CHLORIDE SERPL-SCNC: 100 MMOL/L (ref 100–108)
CHLORIDE SERPL-SCNC: 102 MMOL/L (ref 100–108)
CHLORIDE SERPL-SCNC: 109 MMOL/L (ref 100–108)
CO2 SERPL-SCNC: 23 MMOL/L (ref 21–32)
CO2 SERPL-SCNC: 27 MMOL/L (ref 21–32)
CO2 SERPL-SCNC: 28 MMOL/L (ref 21–32)
CREAT SERPL-MCNC: 0.77 MG/DL (ref 0.6–1.3)
CREAT SERPL-MCNC: 0.87 MG/DL (ref 0.6–1.3)
CREAT SERPL-MCNC: 0.91 MG/DL (ref 0.6–1.3)
GFR SERPL CREATININE-BSD FRML MDRD: 58 ML/MIN/1.73SQ M
GFR SERPL CREATININE-BSD FRML MDRD: 61 ML/MIN/1.73SQ M
GFR SERPL CREATININE-BSD FRML MDRD: 71 ML/MIN/1.73SQ M
GLUCOSE SERPL-MCNC: 115 MG/DL (ref 65–140)
GLUCOSE SERPL-MCNC: 122 MG/DL (ref 65–140)
GLUCOSE SERPL-MCNC: 127 MG/DL (ref 65–140)
GLUCOSE SERPL-MCNC: 174 MG/DL (ref 65–140)
GLUCOSE SERPL-MCNC: 179 MG/DL (ref 65–140)
GLUCOSE SERPL-MCNC: 184 MG/DL (ref 65–140)
GLUCOSE SERPL-MCNC: 299 MG/DL (ref 65–140)
GLUCOSE SERPL-MCNC: 69 MG/DL (ref 65–140)
GLUCOSE SERPL-MCNC: 77 MG/DL (ref 65–140)
POTASSIUM SERPL-SCNC: 3.9 MMOL/L (ref 3.5–5.3)
POTASSIUM SERPL-SCNC: 4.4 MMOL/L (ref 3.5–5.3)
POTASSIUM SERPL-SCNC: 4.6 MMOL/L (ref 3.5–5.3)
SODIUM SERPL-SCNC: 133 MMOL/L (ref 136–145)
SODIUM SERPL-SCNC: 136 MMOL/L (ref 136–145)
SODIUM SERPL-SCNC: 142 MMOL/L (ref 136–145)

## 2019-07-08 PROCEDURE — 80048 BASIC METABOLIC PNL TOTAL CA: CPT | Performed by: INTERNAL MEDICINE

## 2019-07-08 PROCEDURE — 82948 REAGENT STRIP/BLOOD GLUCOSE: CPT

## 2019-07-08 PROCEDURE — 99232 SBSQ HOSP IP/OBS MODERATE 35: CPT | Performed by: INTERNAL MEDICINE

## 2019-07-08 RX ORDER — DEXTROSE MONOHYDRATE 50 MG/ML
75 INJECTION, SOLUTION INTRAVENOUS CONTINUOUS
Status: DISCONTINUED | OUTPATIENT
Start: 2019-07-08 | End: 2019-07-09

## 2019-07-08 RX ADMIN — DABIGATRAN ETEXILATE MESYLATE 150 MG: 150 CAPSULE ORAL at 07:59

## 2019-07-08 RX ADMIN — ALBUTEROL SULFATE 2 PUFF: 90 AEROSOL, METERED RESPIRATORY (INHALATION) at 22:23

## 2019-07-08 RX ADMIN — BIMATOPROST 1 DROP: 0.1 SOLUTION/ DROPS OPHTHALMIC at 22:03

## 2019-07-08 RX ADMIN — HYDRALAZINE HYDROCHLORIDE 50 MG: 25 TABLET ORAL at 17:11

## 2019-07-08 RX ADMIN — PRAVASTATIN SODIUM 40 MG: 40 TABLET ORAL at 17:11

## 2019-07-08 RX ADMIN — GLIPIZIDE 2.5 MG: 5 TABLET ORAL at 06:14

## 2019-07-08 RX ADMIN — POLYVINYL ALCOHOL 1 DROP: 14 SOLUTION/ DROPS OPHTHALMIC at 08:00

## 2019-07-08 RX ADMIN — LOSARTAN POTASSIUM 50 MG: 50 TABLET, FILM COATED ORAL at 07:59

## 2019-07-08 RX ADMIN — AMIODARONE HYDROCHLORIDE 200 MG: 200 TABLET ORAL at 07:59

## 2019-07-08 RX ADMIN — INSULIN LISPRO 3 UNITS: 100 INJECTION, SOLUTION INTRAVENOUS; SUBCUTANEOUS at 11:30

## 2019-07-08 RX ADMIN — POLYVINYL ALCOHOL 1 DROP: 14 SOLUTION/ DROPS OPHTHALMIC at 17:11

## 2019-07-08 RX ADMIN — INSULIN LISPRO 1 UNITS: 100 INJECTION, SOLUTION INTRAVENOUS; SUBCUTANEOUS at 22:02

## 2019-07-08 RX ADMIN — DEXTROSE 75 ML/HR: 5 SOLUTION INTRAVENOUS at 22:01

## 2019-07-08 RX ADMIN — POLYVINYL ALCOHOL 1 DROP: 14 SOLUTION/ DROPS OPHTHALMIC at 22:00

## 2019-07-08 RX ADMIN — HYDRALAZINE HYDROCHLORIDE 50 MG: 25 TABLET ORAL at 22:00

## 2019-07-08 RX ADMIN — TIMOLOL MALEATE 1 DROP: 5 SOLUTION/ DROPS OPHTHALMIC at 08:00

## 2019-07-08 RX ADMIN — GLIPIZIDE 2.5 MG: 5 TABLET ORAL at 17:11

## 2019-07-08 RX ADMIN — DABIGATRAN ETEXILATE MESYLATE 150 MG: 150 CAPSULE ORAL at 22:00

## 2019-07-08 RX ADMIN — DEXTROSE 75 ML/HR: 5 SOLUTION INTRAVENOUS at 10:44

## 2019-07-08 RX ADMIN — SODIUM CHLORIDE: 234 INJECTION INTRAMUSCULAR; INTRAVENOUS; SUBCUTANEOUS at 02:42

## 2019-07-08 RX ADMIN — HYDRALAZINE HYDROCHLORIDE 50 MG: 25 TABLET ORAL at 07:58

## 2019-07-08 NOTE — TELEPHONE ENCOUNTER
Spoke with Pt's daughter, Cash Gallardo, today via phone call (Ms Irwin's name is included on Pt's communication consent form in Pt's EHR)  Ms Christiano Roca informed that Pt's sonogram was unchanged from previous sonogram and biopsy was benign  (non-malignant)  Ms Christiano Roca was advised that if Pt starts to have heavy bleeding she should let Dr Claudene Castleman know  Ms Christiano Roca was further informed that it can be assumed that Pt's bleeding is due to her new anticoagulation medication per Dr Claudene Castleman  Reiterated to Ms Isaacs that if she has any questions/concerns to contact office

## 2019-07-08 NOTE — PLAN OF CARE
Problem: INFECTION - ADULT  Goal: Absence or prevention of progression during hospitalization  Description  INTERVENTIONS:  - Assess and monitor for signs and symptoms of infection  - Monitor lab/diagnostic results  - Monitor all insertion sites, i e  indwelling lines, tubes, and drains  - Monitor endotracheal (as able) and nasal secretions for changes in amount and color  - [place] appropriate cooling/warming therapies per order  - Administer medications as ordered  - Instruct and encourage patient and family to use good hand hygiene technique  - Identify and instruct in appropriate isolation precautions for identified infection/condition  Outcome: Progressing  Goal: Absence of fever/infection during neutropenic period  Description  INTERVENTIONS:  - Monitor WBC  - Implement neutropenic guidelines  Outcome: Progressing     Problem: SAFETY ADULT  Goal: Patient will remain free of falls  Description  INTERVENTIONS:  - Assess patient frequently for physical needs  -  Identify cognitive and physical deficits and behaviors that affect risk of falls    -  [place] fall precautions as indicated by assessment   - Educate patient/family on patient safety including physical limitations  - Instruct patient to call for assistance with activity based on assessment  - Modify environment to reduce risk of injury  - Consider OT/PT consult to assist with strengthening/mobility  Outcome: Progressing  Goal: Maintain or return to baseline ADL function  Description  INTERVENTIONS:  -  Assess patient's ability to carry out ADLs; assess patient's baseline for ADL function and identify physical deficits which impact ability to perform ADLs (bathing, care of mouth/teeth, toileting, grooming, dressing, etc )  - Assess/evaluate cause of self-care deficits   - Assess range of motion  - Assess patient's mobility; develop plan if impaired  - Assess patient's need for assistive devices and provide as appropriate  - Encourage maximum independence but intervene and supervise when necessary  ¯ Involve family in performance of ADLs  ¯ Assess for home care needs following discharge   ¯ Request OT consult to assist with ADL evaluation and planning for discharge  ¯ Provide patient education as appropriate  Outcome: Progressing  Goal: Maintain or return mobility status to optimal level  Description  INTERVENTIONS:  - Assess patient's baseline mobility status (ambulation, transfers, stairs, etc )    - Identify cognitive and physical deficits and behaviors that affect mobility  - Identify mobility aids required to assist with transfers and/or ambulation (gait belt, sit-to-stand, lift, walker, cane, etc )  - [place] fall precautions as indicated by assessment  - Record patient progress and toleration of activity level on Mobility SBAR; progress patient to next Phase/Stage  - Instruct patient to call for assistance with activity based on assessment  - Request Rehabilitation consult to assist with strengthening/weightbearing, etc   Outcome: Progressing     Problem: DISCHARGE PLANNING  Goal: Discharge to home or other facility with appropriate resources  Description  INTERVENTIONS:  - Identify barriers to discharge w/patient and caregiver  - Arrange for needed discharge resources and transportation as appropriate  - Identify discharge learning needs (meds, wound care, etc )  - Arrange for interpretive services to assist at discharge as needed  - Refer to Case Management Department for coordinating discharge planning if the patient needs post-hospital services based on physician/advanced practitioner order or complex needs related to functional status, cognitive ability, or social support system  Outcome: Progressing     Problem: Knowledge Deficit  Goal: Patient/family/caregiver demonstrates understanding of disease process, treatment plan, medications, and discharge instructions  Description  Complete learning assessment and assess knowledge base   Interventions:  - Provide teaching at level of understanding  - Provide teaching via preferred learning methods  Outcome: Progressing     Problem: GENITOURINARY - ADULT  Goal: Maintains or returns to baseline urinary function  Description  INTERVENTIONS:  - Assess urinary function  - Encourage oral fluids to ensure adequate hydration  - Administer IV fluids as ordered to ensure adequate hydration  - Administer ordered medications as needed  - Offer frequent toileting  - Follow urinary retention protocol if ordered  Outcome: Progressing  Goal: Absence of urinary retention  Description  INTERVENTIONS:  - Assess patients ability to void and empty bladder  - Monitor I/O  - Bladder scan as needed  - Discuss with physician/AP medications to alleviate retention as needed  - Discuss catheterization for long term situations as appropriate  Outcome: Progressing     Problem: METABOLIC, FLUID AND ELECTROLYTES - ADULT  Goal: Electrolytes maintained within normal limits  Description  INTERVENTIONS:  - Monitor labs and assess patient for signs and symptoms of electrolyte imbalances  - Administer electrolyte replacement as ordered  - Monitor response to electrolyte replacements, including repeat lab results as appropriate  - Instruct patient on fluid and nutrition as appropriate  Outcome: Progressing  Goal: Fluid balance maintained  Description  INTERVENTIONS:  - Monitor labs and assess for signs and symptoms of volume excess or deficit  - Monitor I/O and WT  - Instruct patient on fluid and nutrition as appropriate  Outcome: Progressing  Goal: Glucose maintained within target range  Description  INTERVENTIONS:  - Monitor Blood Glucose as ordered  - Assess for signs and symptoms of hyperglycemia and hypoglycemia  - Administer ordered medications to maintain glucose within target range  - Assess nutritional intake and initiate nutrition service referral as needed  Outcome: Progressing     Problem: SKIN/TISSUE INTEGRITY - ADULT  Goal: Skin integrity remains intact  Description  INTERVENTIONS  - Identify patients at risk for skin breakdown  - Assess and monitor skin integrity  - Assess and monitor nutrition and hydration status  - Monitor labs (i e  albumin)  - Assess for incontinence   - Turn and reposition patient  - Assist with mobility/ambulation  - Relieve pressure over bony prominences  - Avoid friction and shearing  - Provide appropriate hygiene as needed including keeping skin clean and dry  - Evaluate need for skin moisturizer/barrier cream  - Collaborate with interdisciplinary team (i e  Nutrition, Rehabilitation, etc )   - Patient/family teaching  Outcome: Progressing  Goal: Incision(s), wounds(s) or drain site(s) healing without S/S of infection  Description  INTERVENTIONS  - Assess and document risk factors for skin impairment   - Assess and document dressing, incision, wound bed, drain sites and surrounding tissue  - Initiate Nutrition services consult and/or wound management as needed  Outcome: Progressing  Goal: Oral mucous membranes remain intact  Description  INTERVENTIONS  - Assess oral mucosa and hygiene practices  - Implement preventative oral hygiene regimen  - Implement oral medicated treatments as ordered  - Initiate Nutrition services referral as needed  Outcome: Progressing     Problem: MUSCULOSKELETAL - ADULT  Goal: Maintain or return mobility to safest level of function  Description  INTERVENTIONS:  - Assess patient's ability to carry out ADLs; assess patient's baseline for ADL function and identify physical deficits which impact ability to perform ADLs (bathing, care of mouth/teeth, toileting, grooming, dressing, etc )  - Assess/evaluate cause of self-care deficits   - Assess range of motion  - Assess patient's mobility; develop plan if impaired  - Assess patient's need for assistive devices and provide as appropriate  - Encourage maximum independence but intervene and supervise when necessary  - Involve family in performance of ADLs  - Assess for home care needs following discharge   - Request OT consult to assist with ADL evaluation and planning for discharge  - Provide patient education as appropriate  Outcome: Progressing     Problem: Potential for Falls  Goal: Patient will remain free of falls  Description  INTERVENTIONS:  - Assess patient frequently for physical needs  -  Identify cognitive and physical deficits and behaviors that affect risk of falls    -  [place] fall precautions as indicated by assessment   - Educate patient/family on patient safety including physical limitations  - Instruct patient to call for assistance with activity based on assessment  - Modify environment to reduce risk of injury  - Consider OT/PT consult to assist with strengthening/mobility  Outcome: Progressing     Problem: Prexisting or High Potential for Compromised Skin Integrity  Goal: Skin integrity is maintained or improved  Description  INTERVENTIONS:  - Identify patients at risk for skin breakdown  - Assess and monitor skin integrity  - Assess and monitor nutrition and hydration status  - Monitor labs (i e  albumin)  - Assess for incontinence   - Turn and reposition patient  - Assist with mobility/ambulation  - Relieve pressure over bony prominences  - Avoid friction and shearing  - Provide appropriate hygiene as needed including keeping skin clean and dry  - Evaluate need for skin moisturizer/barrier cream  - Collaborate with interdisciplinary team (i e  Nutrition, Rehabilitation, etc )   - Patient/family teaching  Outcome: Progressing

## 2019-07-08 NOTE — TELEPHONE ENCOUNTER
Pt Abdulaziz Casiano calling for her mother test results and she is waiting for them, she knows they are in   Please Call back

## 2019-07-08 NOTE — SOCIAL WORK
LOS: 4 GMLOS: 3 3  NOT A READMISSION  NOT A BUNDLE    IMM was presented to pt, and although she understood as she had her mentation to her, her dtr told her she was not to sign "anything" while she was there  CM then called dtr, Mukul Padgett, to present rights over the phone       JEREMIAH Steel  7/8/2019  16:51

## 2019-07-08 NOTE — PROGRESS NOTES
NEPHROLOGY PROGRESS NOTE   Philippe Ko 80 y o  female MRN: [id number]  Unit/Bed#: [facility] [id number] Encounter: [id number]  Reason for Consult:  Hyponatremia    ASSESSMENT/PLAN:  1  Hyponatremia, initial sodium of 125, initially pre to 134 however was given D5 in DDAVP, sodium now improved to 142 with 1 8%, discontinue 1 8%, gentle D5W, goal for serum sodium today in the mid 130s  Urine osmolality 272, urine sodium 71  2  Hypertension, blood pressure overall appears improved      PLAN:  · Discontinue 1 8%  · Gentle D5W at 50-75  · Goal for serum sodium today in the mid 130s  · Blood pressure overall improved    SUBJECTIVE:  Seen examined  Patient awake alert  Offers no complaints  Denies any chest pain shortness of breath  Denies abdominal pain  Appetite seems to be stable  Review of Systems    OBJECTIVE:  Current Weight:    Vitals:    07/07/19 1558 07/07/19 2112 07/07/19 2307 07/08/19 0723   BP: 164/60 142/70 130/71 135/69   BP Location: Left arm Left arm Left arm Left arm   Pulse: (!) 51 (!) 53 (!) 53 (!) 53   Resp: 16 18 18 18   Temp: (!) 97 3 °F (36 3 °C)  (!) 97 3 °F (36 3 °C) 97 9 °F (36 6 °C)   TempSrc: Temporal  Temporal Temporal   SpO2: 98%  96% 94%       Intake/Output Summary (Last 24 hours) at 7/8/2019 1100  Last data filed at 7/8/2019 0713  Gross per 24 hour   Intake 1288 5 ml   Output    Net 1288 5 ml       Physical Exam   Constitutional: No distress  HENT:   Head: Normocephalic  Eyes: No scleral icterus  Neck: Neck supple  Cardiovascular: Normal rate and regular rhythm  Pulmonary/Chest: Effort normal and breath sounds normal    Abdominal: Soft  Musculoskeletal: She exhibits no edema  Neurological: She is alert  Skin: Skin is warm and dry         Medications:    Current Facility-Administered Medications:     acetaminophen (TYLENOL) tablet 650 mg, 650 mg, Oral, Q6H PRN, Geroge Perfect, CRNP, 650 mg at 07/07/19 0803    albuterol (PROVENTIL HFA,VENTOLIN HFA) inhaler 2 puff, 2 puff, Inhalation, Q6H PRN, PILAR GreerNP, 2 puff at 07/07/19 1706    amiodarone tablet 200 mg, 200 mg, Oral, Daily, Azalee Cloud, CRNP, 200 mg at 07/08/19 0759    bimatoprost (LUMIGAN) 0 01 % ophthalmic solution 1 drop, 1 drop, Both Eyes, HS, Consuelo Sloan, CRNP, 1 drop at 07/07/19 2119    dabigatran etexilate (PRADAXA) capsule 150 mg, 150 mg, Oral, Q12H [facility], Consuelo Sloan, CRNP, 150 mg at 07/08/19 0759    dextrose 5 % infusion, 75 mL/hr, Intravenous, Continuous, Manny Dawson DO, Last Rate: 75 mL/hr at 07/08/19 1044, 75 mL/hr at 07/08/19 1044    glipiZIDE (GLUCOTROL) tablet 2 5 mg, 2 5 mg, Oral, BID AC, Shemar Palacio DO, 2 5 mg at 07/08/19 0614    hydrALAZINE (APRESOLINE) injection 5 mg, 5 mg, Intravenous, Q4H PRN, ROBBI Greer    hydrALAZINE (APRESOLINE) tablet 50 mg, 50 mg, Oral, TID, Ritae AndreisPILARNP, 50 mg at 07/08/19 0758    insulin lispro (HumaLOG) 100 units/mL subcutaneous injection 1-5 Units, 1-5 Units, Subcutaneous, HS, ROBBI Greer, 1 Units at 07/06/19 2230    insulin lispro (HumaLOG) 100 units/mL subcutaneous injection 1-6 Units, 1-6 Units, Subcutaneous, TID AC, 1 Units at 07/07/19 1612 **AND** Fingerstick Glucose (POCT), , , TID AC, ROBBI Greer    losartan (COZAAR) tablet 50 mg, 50 mg, Oral, Daily, Ingrid Richards MD, 50 mg at 07/08/19 0759    meclizine (ANTIVERT) tablet 12 5 mg, 12 5 mg, Oral, Q12H PRN, ROBBI Greer    [facility]) injection 4 mg, 4 mg, Intravenous, Q6H PRN, Azalee Cloud, CRNP    polyvinyl alcohol (LIQUIFILM TEARS) 1 4 % ophthalmic solution 1 drop, 1 drop, Both Eyes, TID, Shemar Palacio, DO, 1 drop at 07/08/19 0800    pravastatin (PRAVACHOL) tablet 40 mg, 40 mg, Oral, Daily With Dinner, Azalee Cloud, CRNP, 40 mg at 07/07/19 1608    timolol (TIMOPTIC) 0 5 % ophthalmic solution 1 drop, 1 drop, Right Eye, Daily, Azalee Cloud, CRNP, 1 drop at 07/08/19 0800    Laboratory Results:  Results from last 7 days   Lab Units 07/08/19  0519 07/07/19  1212 07/07/19  0455 07/06/19  2329 07/06/19  0536 07/06/19  0019 07/05/19  1802  07/05/19  0524  07/04/19  1925   WBC Thousand/uL  --   --   --   --  6 57  --   --   --  4 69  --  12 17*   HEMOGLOBIN g/dL  --   --   --   --  10 9*  --   --   --  11 2*  --  13 8   HEMATOCRIT %  --   --   --   --  33 0*  --   --   --  34 1*  --  42 4   PLATELETS Thousands/uL  --   --   --   --  233  --   --   --  244  --  301   POTASSIUM mmol/L 3 9 4 1 4 0 4 1 4 2 4 3 4 6   < >  --    < >  --    CHLORIDE mmol/L 109* 94* 92* 89* 93* 92* 97*   < >  --    < >  --    CO2 mmol/L 23 28 27 26 26 27 29   < >  --    < >  --    BUN mg/dL 15 17 17 19 20 20 19   < >  --    < >  --    CREATININE mg/dL 0 77 0 84 0 74 0 79 0 82 0 91 1 01   < >  --    < >  --    CALCIUM mg/dL 8 3 8 4 8 6 8 8 9 0 8 8 8 8   < >  --    < >  --     < > = values in this interval not displayed

## 2019-07-08 NOTE — PLAN OF CARE
Problem: INFECTION - ADULT  Goal: Absence or prevention of progression during hospitalization  Description  INTERVENTIONS:  - Assess and monitor for signs and symptoms of infection  - Monitor lab/diagnostic results  - Monitor all insertion sites, i e  indwelling lines, tubes, and drains  - Monitor endotracheal (as able) and nasal secretions for changes in amount and color  - [place] appropriate cooling/warming therapies per order  - Administer medications as ordered  - Instruct and encourage patient and family to use good hand hygiene technique  - Identify and instruct in appropriate isolation precautions for identified infection/condition  Outcome: Progressing  Goal: Absence of fever/infection during neutropenic period  Description  INTERVENTIONS:  - Monitor WBC  - Implement neutropenic guidelines  Outcome: Progressing     Problem: SAFETY ADULT  Goal: Patient will remain free of falls  Description  INTERVENTIONS:  - Assess patient frequently for physical needs  -  Identify cognitive and physical deficits and behaviors that affect risk of falls    -  [place] fall precautions as indicated by assessment   - Educate patient/family on patient safety including physical limitations  - Instruct patient to call for assistance with activity based on assessment  - Modify environment to reduce risk of injury  - Consider OT/PT consult to assist with strengthening/mobility  Outcome: Progressing  Goal: Maintain or return to baseline ADL function  Description  INTERVENTIONS:  -  Assess patient's ability to carry out ADLs; assess patient's baseline for ADL function and identify physical deficits which impact ability to perform ADLs (bathing, care of mouth/teeth, toileting, grooming, dressing, etc )  - Assess/evaluate cause of self-care deficits   - Assess range of motion  - Assess patient's mobility; develop plan if impaired  - Assess patient's need for assistive devices and provide as appropriate  - Encourage maximum independence but intervene and supervise when necessary  ¯ Involve family in performance of ADLs  ¯ Assess for home care needs following discharge   ¯ Request OT consult to assist with ADL evaluation and planning for discharge  ¯ Provide patient education as appropriate  Outcome: Progressing  Goal: Maintain or return mobility status to optimal level  Description  INTERVENTIONS:  - Assess patient's baseline mobility status (ambulation, transfers, stairs, etc )    - Identify cognitive and physical deficits and behaviors that affect mobility  - Identify mobility aids required to assist with transfers and/or ambulation (gait belt, sit-to-stand, lift, walker, cane, etc )  - [place] fall precautions as indicated by assessment  - Record patient progress and toleration of activity level on Mobility SBAR; progress patient to next Phase/Stage  - Instruct patient to call for assistance with activity based on assessment  - Request Rehabilitation consult to assist with strengthening/weightbearing, etc   Outcome: Progressing     Problem: DISCHARGE PLANNING  Goal: Discharge to home or other facility with appropriate resources  Description  INTERVENTIONS:  - Identify barriers to discharge w/patient and caregiver  - Arrange for needed discharge resources and transportation as appropriate  - Identify discharge learning needs (meds, wound care, etc )  - Arrange for interpretive services to assist at discharge as needed  - Refer to Case Management Department for coordinating discharge planning if the patient needs post-hospital services based on physician/advanced practitioner order or complex needs related to functional status, cognitive ability, or social support system  Outcome: Progressing     Problem: Knowledge Deficit  Goal: Patient/family/caregiver demonstrates understanding of disease process, treatment plan, medications, and discharge instructions  Description  Complete learning assessment and assess knowledge base   Interventions:  - Provide teaching at level of understanding  - Provide teaching via preferred learning methods  Outcome: Progressing     Problem: GENITOURINARY - ADULT  Goal: Maintains or returns to baseline urinary function  Description  INTERVENTIONS:  - Assess urinary function  - Encourage oral fluids to ensure adequate hydration  - Administer IV fluids as ordered to ensure adequate hydration  - Administer ordered medications as needed  - Offer frequent toileting  - Follow urinary retention protocol if ordered  Outcome: Progressing  Goal: Absence of urinary retention  Description  INTERVENTIONS:  - Assess patients ability to void and empty bladder  - Monitor I/O  - Bladder scan as needed  - Discuss with physician/AP medications to alleviate retention as needed  - Discuss catheterization for long term situations as appropriate  Outcome: Progressing     Problem: METABOLIC, FLUID AND ELECTROLYTES - ADULT  Goal: Electrolytes maintained within normal limits  Description  INTERVENTIONS:  - Monitor labs and assess patient for signs and symptoms of electrolyte imbalances  - Administer electrolyte replacement as ordered  - Monitor response to electrolyte replacements, including repeat lab results as appropriate  - Instruct patient on fluid and nutrition as appropriate  Outcome: Progressing  Goal: Fluid balance maintained  Description  INTERVENTIONS:  - Monitor labs and assess for signs and symptoms of volume excess or deficit  - Monitor I/O and WT  - Instruct patient on fluid and nutrition as appropriate  Outcome: Progressing  Goal: Glucose maintained within target range  Description  INTERVENTIONS:  - Monitor Blood Glucose as ordered  - Assess for signs and symptoms of hyperglycemia and hypoglycemia  - Administer ordered medications to maintain glucose within target range  - Assess nutritional intake and initiate nutrition service referral as needed  Outcome: Progressing     Problem: SKIN/TISSUE INTEGRITY - ADULT  Goal: Skin integrity remains intact  Description  INTERVENTIONS  - Identify patients at risk for skin breakdown  - Assess and monitor skin integrity  - Assess and monitor nutrition and hydration status  - Monitor labs (i e  albumin)  - Assess for incontinence   - Turn and reposition patient  - Assist with mobility/ambulation  - Relieve pressure over bony prominences  - Avoid friction and shearing  - Provide appropriate hygiene as needed including keeping skin clean and dry  - Evaluate need for skin moisturizer/barrier cream  - Collaborate with interdisciplinary team (i e  Nutrition, Rehabilitation, etc )   - Patient/family teaching  Outcome: Progressing  Goal: Incision(s), wounds(s) or drain site(s) healing without S/S of infection  Description  INTERVENTIONS  - Assess and document risk factors for skin impairment   - Assess and document dressing, incision, wound bed, drain sites and surrounding tissue  - Initiate Nutrition services consult and/or wound management as needed  Outcome: Progressing  Goal: Oral mucous membranes remain intact  Description  INTERVENTIONS  - Assess oral mucosa and hygiene practices  - Implement preventative oral hygiene regimen  - Implement oral medicated treatments as ordered  - Initiate Nutrition services referral as needed  Outcome: Progressing     Problem: MUSCULOSKELETAL - ADULT  Goal: Maintain or return mobility to safest level of function  Description  INTERVENTIONS:  - Assess patient's ability to carry out ADLs; assess patient's baseline for ADL function and identify physical deficits which impact ability to perform ADLs (bathing, care of mouth/teeth, toileting, grooming, dressing, etc )  - Assess/evaluate cause of self-care deficits   - Assess range of motion  - Assess patient's mobility; develop plan if impaired  - Assess patient's need for assistive devices and provide as appropriate  - Encourage maximum independence but intervene and supervise when necessary  - Involve family in performance of ADLs  - Assess for home care needs following discharge   - Request OT consult to assist with ADL evaluation and planning for discharge  - Provide patient education as appropriate  Outcome: Progressing     Problem: Potential for Falls  Goal: Patient will remain free of falls  Description  INTERVENTIONS:  - Assess patient frequently for physical needs  -  Identify cognitive and physical deficits and behaviors that affect risk of falls    -  [place] fall precautions as indicated by assessment   - Educate patient/family on patient safety including physical limitations  - Instruct patient to call for assistance with activity based on assessment  - Modify environment to reduce risk of injury  - Consider OT/PT consult to assist with strengthening/mobility  Outcome: Progressing     Problem: Prexisting or High Potential for Compromised Skin Integrity  Goal: Skin integrity is maintained or improved  Description  INTERVENTIONS:  - Identify patients at risk for skin breakdown  - Assess and monitor skin integrity  - Assess and monitor nutrition and hydration status  - Monitor labs (i e  albumin)  - Assess for incontinence   - Turn and reposition patient  - Assist with mobility/ambulation  - Relieve pressure over bony prominences  - Avoid friction and shearing  - Provide appropriate hygiene as needed including keeping skin clean and dry  - Evaluate need for skin moisturizer/barrier cream  - Collaborate with interdisciplinary team (i e  Nutrition, Rehabilitation, etc )   - Patient/family teaching  Outcome: Progressing

## 2019-07-08 NOTE — PROGRESS NOTES
Progress Note - Vasquez Betancourt 80 y o  female MRN: [id number]    Unit/Bed#: [place] [address] 222-01 Encounter: [id number]      Assessment/Plan:  1-hypoglycemia in diabetes type 2 on oral agents:  Patient's home glipizide of 10 mg b i d  was initially held, and then decreased down to 2 5 mg   -patient continues to have hypoglycemia-blood sugar was 66 today despite eating breakfast and lunch   -discontinue glipizide and oral oral agents   -monitor p o  Intake  2-hyponatremia:  Patient presented with hyponatremia with an initial sodium of 125  This was felt to be likely secondary to excessive free water intake  Patient was on oral hydration for a pelvic ultrasound  -patient was also on chlorthalidone as well as recently started on the combination losartan/HCTZ    -Patient's hyponatremia improved rapidly to 134  She was given a dose of D5W and DDAVP  Appreciate Nephrology evaluation and recommendations    -had been on 1 8% IV fluids  Currently on D5W for a goal serum sodium in the mid 130s  -sodium was 142 this morning, and D5W her sodium is currently 136   -continue D5W    3-essential hypertension:  Patient has a history of essential hypertension  Blood pressure was markedly elevated at the time of admission    -initial chlorthalidone and losartan/HCTZ held on admission due to hyponatremia   -continue losartan alone   -p r n  Hydralazine   -blood pressure currently adequately controlled ranging a systolic of 757-156    4-status post metabolic encephalopathy:  Patient presented with metabolic encephalopathy, multifactorial, secondary to hypoglycemia and hyponatremia    -since resolved:  Patient currently awake alert and oriented x3    5-left finger injury:  Noted upon arrival to the ER  Patient declined x-ray      -Patient again declines x-ray    She notes she has full range of motion and denies any current pain    6-dysphagia:  Patient was evaluated by the speech therapy service:  Currently on dental soft diet and thin liquids    7-history of previous CVA:  Continue anticoagulation with Pradaxa  Continue statin  Continue outpatient neurology follow-up    8-incidental finding of thickened endometrium: In postpartum patient  Outpatient gyn follow-up  Seen in the office on 07/01     9-status post TAVR:  Continue outpatient cardiology follow-up    10-paroxysmal atrial fibrillation:  Currently rate controlled  Continue home amiodarone  Continue anticoagulation with Pradaxa    11-leukocytosis:  Patient presented with an initial leukocytosis with a white blood cell count of 12  This is most likely stress response as her follow-up white blood cell count was normal   No evidence of acute infection    12-elevated TSH:  Patient's TSH is being followed by her primary care provider as an outpatient  Is has decreased from 6 3 down to 4 8  Continue to follow closely as an outpatient as she is on amiodarone  13-chronic dizziness:  Patient follows with the neurology team   Continue meclizine  Continue vestibular therapy and ENT outpatient follow-up   -patient was seen by ENT on 06/10/2019  He was not felt to be a primary ENT issue  Patient did not wish to undergo vestibular testing at that time   -was recently seen by the neurology team in the office 6/5    14-hyperlipidemia:  Continue statin    15-anemia:  Patient's baseline hemoglobin appears to be approximately 13  Current hemoglobin is approximately 11  Likely secondary to her vaginal bleeding which is currently being evaluated as an outpatient by the gyn team    -patient is on anticoagulation with Pradaxa    16-family: updated daughter Silvio Salcido via phone      VTE Pharmacologic Prophylaxis: RX contraindicated due to: pradaxa  VTE Mechanical Prophylaxis: sequential compression device    Certification Statement: The patient will continue to require additional inpatient hospital stay due to need for further acute intervention for hyponatremia on ivf, hypoglycemia    Status: inpatient     Review gyn office visit from 07/01 for postmenopausal bleeding    Viewed cardiology office visit from 06/20:  History of aortic stenosis status post TAVR  History of coronary disease with a 60% 1st OM and 50% mid RCA stenosis  Patient also has chronic diastolic congestive heart failure she complained of dizziness during that exam   It was noted that her blood pressure was not controlled with a systolic 973-811  She was restarted on losartan/HCTZ     ==================================================================    Subjective:  Pt denies any pain anywhere  Denies any pain in her L hand/finger  Demonstrates she has full range of motion of her left fingers  Declines x-ray  She denies any pain anywhere else  Notes in the past she has had headaches, none currently  Denies any current headache  Denies any chest pain, back pain, abdominal or other extremity pain  Denies any shortness of breath  Denies any nausea, vomiting, diarrhea  Notes she is eating with a good appetite  She notes she has chronic dizziness which is unchanged  She relates she has had this since the stroke that has not improved with any medications or with meclizine  Physical Exam:   Temp:  [97 3 °F (36 3 °C)-97 9 °F (36 6 °C)] 97 8 °F (36 6 °C)  HR:  [53-56] 56  Resp:  [18] 18  BP: (123-142)/(69-73) 123/73    Gen:  Pleasant, non-tachypnic, non-dyspnic  Conversant  Sitting up in bed watching TV  Awake alert  Good eye contact  Smiling and joking  Heart: regular rate and rhythm, S1S2 present, no murmur, rub or gallop  Lungs: clear to ausculatation bilaterally  No wheezing, crackles, or rhonchi  No accessory muscle use or respiratory distress  Abd: soft, non-tender, non-distended  NABS, no guarding, rebound or peritoneal signs  Extremities: no clubbing, cyanosis  Left hand, 3rd digit with trace edema, and abrasion noted    Patient demonstrates full range of motion of her left fingers and hand  Able to form a fist   2+pedal pulses bilaterally  Neuro: awake, alert  Oriented x3  Moving all 4 extremities  Fluent and goal directed speech  Follows commands  Skin: warm and dry: no petechiae, purpura and rash  Faint ecchymoses noted of left 3rd and 4th finger  LABS:   Results from last 7 days   Lab Units 07/06/19  0536 07/05/19  0524 07/04/19  1925   WBC Thousand/uL 6 57 4 69 12 17*   HEMOGLOBIN g/dL 10 9* 11 2* 13 8   HEMATOCRIT % 33 0* 34 1* 42 4   PLATELETS Thousands/uL 233 244 301     Results from last 7 days   Lab Units 07/08/19  1620 07/08/19  0519 07/07/19  1212   POTASSIUM mmol/L 4 6 3 9 4 1   CHLORIDE mmol/L 102 109* 94*   CO2 mmol/L 28 23 28   BUN mg/dL 16 15 17   CREATININE mg/dL 0 87 0 77 0 84   CALCIUM mg/dL 8 9 8 3 8 4       Hospital Data:    7/5:  Chest x-ray:  No acute disease        ---------------------------------------------------------------------------------------------------------------  This note has been constructed using a voice recognition system

## 2019-07-08 NOTE — TELEPHONE ENCOUNTER
Please let her know that sono was unchanged from previous sono and biopsy was benign  If patient starts to have heavy bleeding they need to let me know  We are assuming her bleeding is due to her new anticoagulation medication

## 2019-07-09 LAB
ANION GAP SERPL CALCULATED.3IONS-SCNC: 9 MMOL/L (ref 4–13)
BUN SERPL-MCNC: 13 MG/DL (ref 5–25)
CALCIUM SERPL-MCNC: 9.2 MG/DL (ref 8.3–10.1)
CHLORIDE SERPL-SCNC: 98 MMOL/L (ref 100–108)
CO2 SERPL-SCNC: 25 MMOL/L (ref 21–32)
CREAT SERPL-MCNC: 0.8 MG/DL (ref 0.6–1.3)
ERYTHROCYTE [DISTWIDTH] IN BLOOD BY AUTOMATED COUNT: 13.9 % (ref 11.6–15.1)
GFR SERPL CREATININE-BSD FRML MDRD: 68 ML/MIN/1.73SQ M
GLUCOSE SERPL-MCNC: 131 MG/DL (ref 65–140)
GLUCOSE SERPL-MCNC: 191 MG/DL (ref 65–140)
GLUCOSE SERPL-MCNC: 198 MG/DL (ref 65–140)
GLUCOSE SERPL-MCNC: 230 MG/DL (ref 65–140)
GLUCOSE SERPL-MCNC: 236 MG/DL (ref 65–140)
HCT VFR BLD AUTO: 32.6 % (ref 34.8–46.1)
HGB BLD-MCNC: 10.8 G/DL (ref 11.5–15.4)
MCH RBC QN AUTO: 30.9 PG (ref 26.8–34.3)
MCHC RBC AUTO-ENTMCNC: 33.1 G/DL (ref 31.4–37.4)
MCV RBC AUTO: 93 FL (ref 82–98)
PLATELET # BLD AUTO: 238 THOUSANDS/UL (ref 149–390)
PMV BLD AUTO: 10.4 FL (ref 8.9–12.7)
POTASSIUM SERPL-SCNC: 4.2 MMOL/L (ref 3.5–5.3)
RBC # BLD AUTO: 3.5 MILLION/UL (ref 3.81–5.12)
SODIUM SERPL-SCNC: 132 MMOL/L (ref 136–145)
WBC # BLD AUTO: 7.04 THOUSAND/UL (ref 4.31–10.16)

## 2019-07-09 PROCEDURE — G8980 MOBILITY D/C STATUS: HCPCS

## 2019-07-09 PROCEDURE — G8987 SELF CARE CURRENT STATUS: HCPCS

## 2019-07-09 PROCEDURE — 85027 COMPLETE CBC AUTOMATED: CPT | Performed by: INTERNAL MEDICINE

## 2019-07-09 PROCEDURE — G8979 MOBILITY GOAL STATUS: HCPCS

## 2019-07-09 PROCEDURE — 99232 SBSQ HOSP IP/OBS MODERATE 35: CPT | Performed by: INTERNAL MEDICINE

## 2019-07-09 PROCEDURE — 80048 BASIC METABOLIC PNL TOTAL CA: CPT | Performed by: INTERNAL MEDICINE

## 2019-07-09 PROCEDURE — G8978 MOBILITY CURRENT STATUS: HCPCS

## 2019-07-09 PROCEDURE — G8989 SELF CARE D/C STATUS: HCPCS

## 2019-07-09 PROCEDURE — 97166 OT EVAL MOD COMPLEX 45 MIN: CPT

## 2019-07-09 PROCEDURE — G8988 SELF CARE GOAL STATUS: HCPCS

## 2019-07-09 PROCEDURE — 82948 REAGENT STRIP/BLOOD GLUCOSE: CPT

## 2019-07-09 PROCEDURE — 97163 PT EVAL HIGH COMPLEX 45 MIN: CPT

## 2019-07-09 RX ORDER — FUROSEMIDE 10 MG/ML
20 INJECTION INTRAMUSCULAR; INTRAVENOUS ONCE
Status: COMPLETED | OUTPATIENT
Start: 2019-07-09 | End: 2019-07-09

## 2019-07-09 RX ADMIN — LOSARTAN POTASSIUM 50 MG: 50 TABLET, FILM COATED ORAL at 08:25

## 2019-07-09 RX ADMIN — INSULIN LISPRO 3 UNITS: 100 INJECTION, SOLUTION INTRAVENOUS; SUBCUTANEOUS at 17:00

## 2019-07-09 RX ADMIN — BIMATOPROST 1 DROP: 0.1 SOLUTION/ DROPS OPHTHALMIC at 22:19

## 2019-07-09 RX ADMIN — INSULIN LISPRO 1 UNITS: 100 INJECTION, SOLUTION INTRAVENOUS; SUBCUTANEOUS at 08:22

## 2019-07-09 RX ADMIN — DABIGATRAN ETEXILATE MESYLATE 150 MG: 150 CAPSULE ORAL at 08:25

## 2019-07-09 RX ADMIN — POLYVINYL ALCOHOL 1 DROP: 14 SOLUTION/ DROPS OPHTHALMIC at 08:23

## 2019-07-09 RX ADMIN — HYDRALAZINE HYDROCHLORIDE 50 MG: 25 TABLET ORAL at 22:18

## 2019-07-09 RX ADMIN — TIMOLOL MALEATE 1 DROP: 5 SOLUTION/ DROPS OPHTHALMIC at 08:23

## 2019-07-09 RX ADMIN — HYDRALAZINE HYDROCHLORIDE 50 MG: 25 TABLET ORAL at 08:25

## 2019-07-09 RX ADMIN — HYDRALAZINE HYDROCHLORIDE 50 MG: 25 TABLET ORAL at 17:30

## 2019-07-09 RX ADMIN — POLYVINYL ALCOHOL 1 DROP: 14 SOLUTION/ DROPS OPHTHALMIC at 22:18

## 2019-07-09 RX ADMIN — DEXTROSE 75 ML/HR: 5 SOLUTION INTRAVENOUS at 12:19

## 2019-07-09 RX ADMIN — PRAVASTATIN SODIUM 40 MG: 40 TABLET ORAL at 17:30

## 2019-07-09 RX ADMIN — INSULIN LISPRO 3 UNITS: 100 INJECTION, SOLUTION INTRAVENOUS; SUBCUTANEOUS at 12:30

## 2019-07-09 RX ADMIN — FUROSEMIDE 20 MG: 10 INJECTION, SOLUTION INTRAMUSCULAR; INTRAVENOUS at 15:00

## 2019-07-09 RX ADMIN — POLYVINYL ALCOHOL 1 DROP: 14 SOLUTION/ DROPS OPHTHALMIC at 17:00

## 2019-07-09 RX ADMIN — DABIGATRAN ETEXILATE MESYLATE 150 MG: 150 CAPSULE ORAL at 22:17

## 2019-07-09 NOTE — PLAN OF CARE
Problem: INFECTION - ADULT  Goal: Absence or prevention of progression during hospitalization  Description  INTERVENTIONS:  - Assess and monitor for signs and symptoms of infection  - Monitor lab/diagnostic results  - Monitor all insertion sites, i e  indwelling lines, tubes, and drains  - Monitor endotracheal (as able) and nasal secretions for changes in amount and color  - [place] appropriate cooling/warming therapies per order  - Administer medications as ordered  - Instruct and encourage patient and family to use good hand hygiene technique  - Identify and instruct in appropriate isolation precautions for identified infection/condition  Outcome: Progressing  Goal: Absence of fever/infection during neutropenic period  Description  INTERVENTIONS:  - Monitor WBC  - Implement neutropenic guidelines  Outcome: Progressing     Problem: SAFETY ADULT  Goal: Patient will remain free of falls  Description  INTERVENTIONS:  - Assess patient frequently for physical needs  -  Identify cognitive and physical deficits and behaviors that affect risk of falls    -  [place] fall precautions as indicated by assessment   - Educate patient/family on patient safety including physical limitations  - Instruct patient to call for assistance with activity based on assessment  - Modify environment to reduce risk of injury  - Consider OT/PT consult to assist with strengthening/mobility  Outcome: Progressing  Goal: Maintain or return to baseline ADL function  Description  INTERVENTIONS:  -  Assess patient's ability to carry out ADLs; assess patient's baseline for ADL function and identify physical deficits which impact ability to perform ADLs (bathing, care of mouth/teeth, toileting, grooming, dressing, etc )  - Assess/evaluate cause of self-care deficits   - Assess range of motion  - Assess patient's mobility; develop plan if impaired  - Assess patient's need for assistive devices and provide as appropriate  - Encourage maximum independence but intervene and supervise when necessary  ¯ Involve family in performance of ADLs  ¯ Assess for home care needs following discharge   ¯ Request OT consult to assist with ADL evaluation and planning for discharge  ¯ Provide patient education as appropriate  Outcome: Progressing  Goal: Maintain or return mobility status to optimal level  Description  INTERVENTIONS:  - Assess patient's baseline mobility status (ambulation, transfers, stairs, etc )    - Identify cognitive and physical deficits and behaviors that affect mobility  - Identify mobility aids required to assist with transfers and/or ambulation (gait belt, sit-to-stand, lift, walker, cane, etc )  - [place] fall precautions as indicated by assessment  - Record patient progress and toleration of activity level on Mobility SBAR; progress patient to next Phase/Stage  - Instruct patient to call for assistance with activity based on assessment  - Request Rehabilitation consult to assist with strengthening/weightbearing, etc   Outcome: Progressing     Problem: DISCHARGE PLANNING  Goal: Discharge to home or other facility with appropriate resources  Description  INTERVENTIONS:  - Identify barriers to discharge w/patient and caregiver  - Arrange for needed discharge resources and transportation as appropriate  - Identify discharge learning needs (meds, wound care, etc )  - Arrange for interpretive services to assist at discharge as needed  - Refer to Case Management Department for coordinating discharge planning if the patient needs post-hospital services based on physician/advanced practitioner order or complex needs related to functional status, cognitive ability, or social support system  Outcome: Progressing     Problem: Knowledge Deficit  Goal: Patient/family/caregiver demonstrates understanding of disease process, treatment plan, medications, and discharge instructions  Description  Complete learning assessment and assess knowledge base   Interventions:  - Provide teaching at level of understanding  - Provide teaching via preferred learning methods  Outcome: Progressing     Problem: GENITOURINARY - ADULT  Goal: Maintains or returns to baseline urinary function  Description  INTERVENTIONS:  - Assess urinary function  - Encourage oral fluids to ensure adequate hydration  - Administer IV fluids as ordered to ensure adequate hydration  - Administer ordered medications as needed  - Offer frequent toileting  - Follow urinary retention protocol if ordered  Outcome: Progressing  Goal: Absence of urinary retention  Description  INTERVENTIONS:  - Assess patients ability to void and empty bladder  - Monitor I/O  - Bladder scan as needed  - Discuss with physician/AP medications to alleviate retention as needed  - Discuss catheterization for long term situations as appropriate  Outcome: Progressing     Problem: METABOLIC, FLUID AND ELECTROLYTES - ADULT  Goal: Electrolytes maintained within normal limits  Description  INTERVENTIONS:  - Monitor labs and assess patient for signs and symptoms of electrolyte imbalances  - Administer electrolyte replacement as ordered  - Monitor response to electrolyte replacements, including repeat lab results as appropriate  - Instruct patient on fluid and nutrition as appropriate  Outcome: Progressing  Goal: Fluid balance maintained  Description  INTERVENTIONS:  - Monitor labs and assess for signs and symptoms of volume excess or deficit  - Monitor I/O and WT  - Instruct patient on fluid and nutrition as appropriate  Outcome: Progressing  Goal: Glucose maintained within target range  Description  INTERVENTIONS:  - Monitor Blood Glucose as ordered  - Assess for signs and symptoms of hyperglycemia and hypoglycemia  - Administer ordered medications to maintain glucose within target range  - Assess nutritional intake and initiate nutrition service referral as needed  Outcome: Progressing     Problem: SKIN/TISSUE INTEGRITY - ADULT  Goal: Skin integrity remains intact  Description  INTERVENTIONS  - Identify patients at risk for skin breakdown  - Assess and monitor skin integrity  - Assess and monitor nutrition and hydration status  - Monitor labs (i e  albumin)  - Assess for incontinence   - Turn and reposition patient  - Assist with mobility/ambulation  - Relieve pressure over bony prominences  - Avoid friction and shearing  - Provide appropriate hygiene as needed including keeping skin clean and dry  - Evaluate need for skin moisturizer/barrier cream  - Collaborate with interdisciplinary team (i e  Nutrition, Rehabilitation, etc )   - Patient/family teaching  Outcome: Progressing  Goal: Incision(s), wounds(s) or drain site(s) healing without S/S of infection  Description  INTERVENTIONS  - Assess and document risk factors for skin impairment   - Assess and document dressing, incision, wound bed, drain sites and surrounding tissue  - Initiate Nutrition services consult and/or wound management as needed  Outcome: Progressing  Goal: Oral mucous membranes remain intact  Description  INTERVENTIONS  - Assess oral mucosa and hygiene practices  - Implement preventative oral hygiene regimen  - Implement oral medicated treatments as ordered  - Initiate Nutrition services referral as needed  Outcome: Progressing     Problem: MUSCULOSKELETAL - ADULT  Goal: Maintain or return mobility to safest level of function  Description  INTERVENTIONS:  - Assess patient's ability to carry out ADLs; assess patient's baseline for ADL function and identify physical deficits which impact ability to perform ADLs (bathing, care of mouth/teeth, toileting, grooming, dressing, etc )  - Assess/evaluate cause of self-care deficits   - Assess range of motion  - Assess patient's mobility; develop plan if impaired  - Assess patient's need for assistive devices and provide as appropriate  - Encourage maximum independence but intervene and supervise when necessary  - Involve family in performance of ADLs  - Assess for home care needs following discharge   - Request OT consult to assist with ADL evaluation and planning for discharge  - Provide patient education as appropriate  Outcome: Progressing     Problem: Potential for Falls  Goal: Patient will remain free of falls  Description  INTERVENTIONS:  - Assess patient frequently for physical needs  -  Identify cognitive and physical deficits and behaviors that affect risk of falls    -  [place] fall precautions as indicated by assessment   - Educate patient/family on patient safety including physical limitations  - Instruct patient to call for assistance with activity based on assessment  - Modify environment to reduce risk of injury  - Consider OT/PT consult to assist with strengthening/mobility  Outcome: Progressing     Problem: Prexisting or High Potential for Compromised Skin Integrity  Goal: Skin integrity is maintained or improved  Description  INTERVENTIONS:  - Identify patients at risk for skin breakdown  - Assess and monitor skin integrity  - Assess and monitor nutrition and hydration status  - Monitor labs (i e  albumin)  - Assess for incontinence   - Turn and reposition patient  - Assist with mobility/ambulation  - Relieve pressure over bony prominences  - Avoid friction and shearing  - Provide appropriate hygiene as needed including keeping skin clean and dry  - Evaluate need for skin moisturizer/barrier cream  - Collaborate with interdisciplinary team (i e  Nutrition, Rehabilitation, etc )   - Patient/family teaching  Outcome: Progressing

## 2019-07-09 NOTE — UTILIZATION REVIEW
Continued Stay Review    Date:  7/9/2019                          Current Patient Class: IP  Current Level of Care: Med Surg    HPI:84 y o  female initially admitted on 7/4/2019 with  Hypertensive Urgency, Metabolic Encephalopathy, DM with Hypoglycemia and Hyponatremia    Assessment/Plan: Na improved since admission - 132 today - down a little from yesterday  NaCL (concentrated)  308 mEQ in 1000 ml water @ 50 / hr DC'd 7/8    Blood pressure still labile, Lasix 20 mg IV x1,   Glipizide DC'd yesterday due to hypoglycemia ( BS 66)   BMP in am    Pertinent Labs/Diagnostic Results:   Results from last 7 days   Lab Units 07/09/19  0522 07/06/19  0536 07/05/19  0524 07/04/19  1925   WBC Thousand/uL 7 04 6 57 4 69 12 17*   HEMOGLOBIN g/dL 10 8* 10 9* 11 2* 13 8   HEMATOCRIT % 32 6* 33 0* 34 1* 42 4   PLATELETS Thousands/uL 238 233 244 301   NEUTROS ABS Thousands/µL  --   --  2 35 8 97*         Results from last 7 days   Lab Units 07/09/19  0522 07/08/19 2213 07/08/19  1620 07/08/19  0519   SODIUM mmol/L 132* 133* 136 142   POTASSIUM mmol/L 4 2 4 4 4 6 3 9   CHLORIDE mmol/L 98* 100 102 109*   CO2 mmol/L 25 27 28 23   ANION GAP mmol/L 9 6 6 10   BUN mg/dL 13 17 16 15   CREATININE mg/dL 0 80 0 91 0 87 0 77   EGFR ml/min/1 73sq m 68 58 61 71   CALCIUM mg/dL 9 2 8 6 8 9 8 3     Results from last 7 days   Lab Units 07/04/19 2006   AST U/L 14   ALT U/L 30   ALK PHOS U/L 61   TOTAL PROTEIN g/dL 7 3   ALBUMIN g/dL 3 6   TOTAL BILIRUBIN mg/dL 0 50   BILIRUBIN DIRECT mg/dL 0 08     Results from last 7 days   Lab Units 07/09/19  1110 07/09/19  0807 07/08/19 2126 07/08/19  1941 07/08/19  1716 07/08/19  1614 07/08/19  1108 07/08/19  0727 07/07/19 2122 07/07/19  1555   POC GLUCOSE mg/dl 236* 198* 179* 184* 127 69 299* 122 136 157*     Results from last 7 days   Lab Units 07/09/19  0522 07/08/19  2213 07/08/19  1620 07/08/19  0519 07/07/19  1212 07/07/19  0455 07/06/19  2329 07/06/19  0536 07/06/19  0019 07/05/19  1802   GLUCOSE RANDOM mg/dL 191* 174* 77 115 189* 123 127 109 125 98     Results from last 7 days   Lab Units 07/05/19  0743   OSMOLALITY, SERUM mmol/*     Results from last 7 days   Lab Units 07/04/19  1925   PROTIME seconds 24 0*   INR  2 10*   PTT seconds 96*     Results from last 7 days   Lab Units 07/04/19  2329 07/04/19  1932   CLARITY UA   --  Cloudy   COLOR UA   --  Yellow   SPEC GRAV UA   --  1 015   PH UA   --  7 0   GLUCOSE UA mg/dl  --  Negative   KETONES UA mg/dl  --  Negative   BLOOD UA   --  Large*   PROTEIN UA mg/dl  --  Trace*   NITRITE UA   --  Negative   BILIRUBIN UA   --  Negative   UROBILINOGEN UA E U /dl  --  0 2   LEUKOCYTES UA   --  Trace*   WBC UA /hpf  --  1-2*   RBC UA /hpf  --  Innumerable*   BACTERIA UA /hpf  --  Occasional   EPITHELIAL CELLS WET PREP /hpf  --  Occasional   SODIUM UR  71  --      Vital Signs:   07/09/19 1502  98 7 °F (37 1 °C)  59  18  147/82  98 %  None (Room air)  Lying   07/09/19 0804  97 °F (36 1 °C)   54   18  168/59  98 %  None (Room air)  Lying   07/08/19 2353  97 4 °F (36 3 °C)  60  18  158/81  96 %  None (Room air)  Lying   07/08/19 2100    54    134/74             Medications:   Scheduled Meds:   Current Facility-Administered Medications:  acetaminophen 650 mg Oral Q6H PRN    albuterol 2 puff Inhalation Q6H PRN    amiodarone 200 mg Oral Daily    bimatoprost 1 drop Both Eyes HS    dabigatran etexilate 150 mg Oral Q12H TROY    furosemide 20 mg Intravenous Once  7/9   hydrALAZINE 5 mg Intravenous Q4H PRN    hydrALAZINE 50 mg Oral TID    insulin lispro 1-5 Units Subcutaneous HS    insulin lispro 1-6 Units Subcutaneous TID AC    losartan 50 mg Oral Daily    meclizine 12 5 mg Oral Q12H PRN    ondansetron 4 mg Intravenous Q6H PRN    polyvinyl alcohol 1 drop Both Eyes TID    pravastatin 40 mg Oral Daily With Dinner    timolol 1 drop Right Eye Daily      IV D5W @ 75 / hr    Discharge Plan:  [place] 20 with family once medically cleared    Network Utilization Review Department  Phone: [phone]; Fax [phone]  [email or web address]  ATTENTION: Please call with any questions or concerns to [phone]  and carefully listen to the prompts so that you are directed to the right person  Send all requests for admission clinical reviews, approved or denied determinations and any other requests to fax [phone]   All voicemails are confidential
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
## 2019-07-09 NOTE — PHYSICAL THERAPY NOTE
Addendum to correct time in to 1035 that was initially mis-entered as 0935  Liset Yoo, PT, DPT, GCS   PT EVALUATION  Time-In: 1035  Time-Out: 1050  Total Time: 15 minutes    80 y o     [id number]    Dizziness [R42]  Hyponatremia [E87 1]  Weakness [R53 1]  Hypoglycemia [E16 2]    Length of Stay: 5    Past Medical History:   Diagnosis Date    Ambulatory dysfunction     CHF (congestive heart failure) (HCC)     pEFHF    Cholelithiasis     Coronary artery disease     Depression with anxiety     Diabetes mellitus ([facility] [address] )     niddm    Glaucoma     Hypertension     Hyponatremia     Ischemic stroke of frontal lobe (HCC)     Right     Obstructive sleep apnea     [facility]ox[facility]mal [facility] fibrillation) ([facility])     Vertigo          Past Surgical History:   Procedure Laterality Date    APPENDECTOMY      GLAUCOMA SURGERY  01/03/2016    [place] REPLACE AORTIC VALVE OPENFEMORAL ARTERY APPROACH N/A 4/17/2018    Procedure: REPLACEMENT AORTIC VALVE TRANSCATHETER (TAVR) TRANSFEMORAL W/ 23 MM AGUILAR STEVE S3 VALVE;  Surgeon: Cherri Cueto DO;  [facility];  Service: Cardiac Surgery    TUBAL LIGATION        07/09/19 1050   Note Type   Note type Eval only   Pain Assessment   Pain Assessment No/denies pain   Pain Score No Pain   Home Living   Type of [address] One level;Performs ADLs on one level;Stairs to enter without rails  (1 GANESH no railing (always has assistance from family))   Bathroom Shower/Tub Walk-in shower   [address] chair   Home Equipment Other (Comment)  (rollator)   Prior Function   Level of [place] Independent with ADLs and functional mobility; Needs assistance with IADLs   Lives With Daughter;Family  (son in law and grandchildren)   Receives Help From Family   ADL Assistance Independent   IADLs Needs assistance   Falls in the last 6 months 1 to 4  (2 falls, but no new ones since discharge 5/14)   Vocational Retired   [address] with rollator at baseline  She is never home alone for more than 30 minutes at a time  Her daughter and family assist her as needed  She reports that she has been up and walking with daughter in hallway on this admission  Restrictions/Precautions   Weight Bearing Precautions Per Order No   Other Precautions Cognitive; Fall Risk  (ESBL)   General   Additional Pertinent History h/o admission 5/12-5/14 at [address]   Family/Caregiver Present No   Cognition   Overall Cognitive Status WFL   Arousal/Participation Alert   Attention Within functional limits   Orientation Level Oriented X4   Memory Decreased short term memory   Following Commands Follows one step commands without difficulty   RUE Assessment   RUE Assessment WFL  (see OT eval)   LUE Assessment   LUE Assessment WFL  (see OT eval)   RLE Assessment   RLE Assessment WFL  (4/5)   LLE Assessment   LLE Assessment WFL  (4/5)   Coordination   Movements are Fluid and Coordinated 1   Sensation WFL   Light Touch   RLE Light Touch Grossly intact   LLE Light Touch Grossly intact   Bed Mobility   Additional Comments Bed mobility not observed  Pt sitting in bedside chair upon arrival    Transfers   Sit to Stand 5  Supervision   Additional items Increased time required;Armrests   Stand to Sit 5  Supervision   Additional items Armrests; Increased time required   Ambulation/Elevation   Gait pattern Short stride  (slow aubrey)   Gait Assistance 5  Supervision   Additional items Assist x 1   Assistive Device Rolling walker   Distance Pt ambulated 108ule5 w/ RW  Balance   Static Sitting Good   Dynamic Sitting Good   Static Standing Fair +   Dynamic Standing Fair   Ambulatory Fair   Endurance Deficit   Endurance Deficit No   Activity Tolerance   Activity Tolerance Patient tolerated treatment well   Medical Staff Made Aware Shukri Guan OT   Nurse Made Aware yes; [place], RN   Assessment   Prognosis Good   Problem List Decreased safety awareness   Assessment PT consult received and evaluation complete   Pt is 80 y o  Female admitted from home w/ daughter, son in law, and grandchildren for metabolic encephalopathy presenting to hospital for complaints of confusion  Pt with admission 5/12-5/14 at [address]  Pt agreeable to participate w/ therapy and identified by 2 patient identifiers: name and birth date  Pt has been ambulating hallways with daughter  Precautions include: fall risk and contact/isolation  Pt presents sitting in bedside chair upon arrival   Pt has orders for up with assistance  PTA pt was independent w/ all functional mobility w/ rollator, independent ADLS, and assisted w/ IADLS, no driving, yes recent falls 2, and retired  Pt presents w/ RLE MMT 4/5, LLE MMT 4/5, sit<>stand supervision, gait training supervision w/ RW, and  no pain  Pt declines at need for PT at this time  Pt demonstrates to be at baseline functional levels and no deficits warranting acute skilled PT in this setting  At this time recommending discharge home with family  At end of PT evaluation pt left sitting in bedside chair all needs in reach, call bell and phone in hand, and RN aware of patient status  History: co-morbidities, fall risk, GANESH, assisted for IADLS and recent admission Exam: strength, balance, locomotion, functional endurance, functional mobility and safety awareness with mobility Barthel Index: 2 Modified [place]:85Clinical: unstable/unpredictable: fall risk, decreased safety awareness, use of AD and contact/isolation Complexity: high   Barriers to Discharge None   Barriers to Discharge Comments never home alone for more than 30 minutes and single floor home; pt always has help when entering and exiting 1 GANESH   Goals   Patient Goals "to go home"   Treatment Day 0   Plan   Treatment/Interventions Functional transfer training; Endurance training;Patient/family training;Equipment eval/education; Bed mobility;Gait training;Spoke to nursing;OT   PT Frequency One time visit   Recommendation   Recommendation Home with family support Equipment Recommended   (use of personal rollator)   PT - OK to Discharge Yes  (once medically stable)   Modified Brittany Scale   Modified Brittany Scale 2   Barthel Index   Feeding 10   Bathing 5   Grooming Score 5   Dressing Score 10   Bladder Score 10   Bowels Score 10   Toilet Use Score 10   Transfers (Bed/Chair) Score 15   Mobility (Level Surface) Score 10   Stairs Score 0   Barthel Index Score 85         Alberto Gallagher, PT ,DPT

## 2019-07-09 NOTE — PROGRESS NOTES
NEPHROLOGY PROGRESS NOTE   Tiburcio Benjamin 80 y o  female MRN: [id number]  Unit/Bed#: [facility] [address] 222-01 Encounter: [id number]  Reason for Consult:  Hyponatremia    ASSESSMENT/PLAN:  1  Hyponatremia, initial sodium of 125, initially pre to 134 however was given D5 in DDAVP, sodium now improved to 142 with 1 8%, discontinue 1 8%, gentle D5W, goal for serum sodium today in the mid 130s  Urine osmolality 272, urine sodium 71  2  Hypertension, blood pressure overall appears improved       PLAN:  · Overall sodium levels appear fairly stable, slightly decreased today but correction appears appropriate  · Discontinue free water  · Blood pressure still labile, Lasix 20 mg IV x1, would likely discharge with 10 mg p o  Daily  · Stable renal function  · Avoid hydrochlorothiazide at discharge    SUBJECTIVE:  Seen examined  Patient awake alert  No active chest pain or shortness of breath  Denies abdominal pain  Complains of chronic dizziness  Review of Systems    OBJECTIVE:  Current Weight:    Vitals:    07/08/19 2353 07/09/19 0804 07/09/19 1229 07/09/19 1232   BP: 158/81 168/59     BP Location: Left arm Left arm     Pulse: 60 (!) 54     Resp: 18 18     Temp: (!) 97 4 °F (36 3 °C) (!) 97 °F (36 1 °C)     TempSrc: Temporal Temporal     SpO2: 96% 98%     Height:   4' 9 5" (1 461 m) 4' 9 5" (1 461 m)     No intake or output data in the 24 hours ending 07/09/19 1347    Physical Exam   Constitutional: No distress  HENT:   Head: Normocephalic  Neck: Neck supple  Cardiovascular: Normal rate and regular rhythm  Pulmonary/Chest: Effort normal and breath sounds normal    Abdominal: Soft  She exhibits distension  Musculoskeletal: She exhibits edema (Trace to 1+)  Neurological: She is alert  Skin: Skin is warm and dry         Medications:    Current Facility-Administered Medications:     acetaminophen (TYLENOL) tablet 650 mg, 650 mg, Oral, Q6H PRN, ROBBI Augustin, 650 mg at 07/07/19 0803    albuterol (PROVENTIL HFA,VENTOLIN HFA) inhaler 2 puff, 2 puff, Inhalation, Q6H PRN, ROBBI Charlton, 2 puff at 07/08/19 2223    amiodarone tablet 200 mg, 200 mg, Oral, Daily, ROBBI Charlton, 200 mg at 07/08/19 0759    bimatoprost (LUMIGAN) 0 01 % ophthalmic solution 1 drop, 1 drop, Both Eyes, HS, ROBBI Charlton, 1 drop at 07/08/19 2203    dabigatran etexilate (PRADAXA) capsule 150 mg, 150 mg, Oral, Q12H [facility], ROBBI Charlton, 150 mg at 07/09/19 0825    hydrALAZINE (APRESOLINE) injection 5 mg, 5 mg, Intravenous, Q4H PRN, ROBBI Charlton    hydrALAZINE (APRESOLINE) tablet 50 mg, 50 mg, Oral, TID, ROBBI Rose, 50 mg at 07/09/19 0825    insulin lispro (HumaLOG) 100 units/mL subcutaneous injection 1-5 Units, 1-5 Units, Subcutaneous, HS, ROBBI Charlton, 1 Units at 07/08/19 2202    insulin lispro (HumaLOG) 100 units/mL subcutaneous injection 1-6 Units, 1-6 Units, Subcutaneous, TID AC, 3 Units at 07/09/19 1230 **AND** Fingerstick Glucose (POCT), , , TID AC, ROBBI Charlton    losartan (COZAAR) tablet 50 mg, 50 mg, Oral, Daily, Rebekah Keller MD, 50 mg at 07/09/19 0825    meclizine (ANTIVERT) tablet 12 5 mg, 12 5 mg, Oral, Q12H PRN, ROBBI Charlton    ondansetron TELEOSF [facility]) injection 4 mg, 4 mg, Intravenous, Q6H PRN, ROBBI Charlton    polyvinyl alcohol (LIQUIFILM TEARS) 1 4 % ophthalmic solution 1 drop, 1 drop, Both Eyes, TID, Shemar Palacio DO, 1 drop at 07/09/19 0275    pravastatin (PRAVACHOL) tablet 40 mg, 40 mg, Oral, Daily With Dinner, ROBBI Charlton, 40 mg at 07/08/19 1711    timolol (TIMOPTIC) 0 5 % ophthalmic solution 1 drop, 1 drop, Right Eye, Daily, ROBBI Charlton, 1 drop at 07/09/19 0823    Laboratory Results:  Results from last 7 days   Lab Units 07/09/19  0522 07/08/19  2213 07/08/19  1620 07/08/19  0519 07/07/19  1212 07/07/19  0455 07/06/19  2329 07/06/19  0536  07/05/19  0524  07/04/19  1925   WBC Thousand/uL 7 04  --   --   --   --   --   --  6 57  --  4 69  --  12 17*   HEMOGLOBIN g/dL 10 8*  --   --   --   --   --   --  10 9*  --  11 2*  --  13 8   HEMATOCRIT % 32 6*  --   --   --   --   --   --  33 0*  --  34 1*  --  42 4   PLATELETS Thousands/uL 238  --   --   --   --   --   --  233  --  244  --  301   POTASSIUM mmol/L 4 2 4 4 4 6 3 9 4 1 4 0 4 1 4 2   < >  --    < >  --    CHLORIDE mmol/L 98* 100 102 109* 94* 92* 89* 93*   < >  --    < >  --    CO2 mmol/L 25 27 28 23 28 27 26 26   < >  --    < >  --    BUN mg/dL 13 17 16 15 17 17 19 20   < >  --    < >  --    CREATININE mg/dL 0 80 0 91 0 87 0 77 0 84 0 74 0 79 0 82   < >  --    < >  --    CALCIUM mg/dL 9 2 8 6 8 9 8 3 8 4 8 6 8 8 9 0   < >  --    < >  --     < > = values in this interval not displayed

## 2019-07-09 NOTE — OCCUPATIONAL THERAPY NOTE
OccupationalTherapy Evaluation(time=3156-5167)     Patient Name: Nir Bryan  BOZFX'T Date: 7/9/2019  Problem List  Patient Active Problem List   Diagnosis    Type 2 diabetes mellitus with hyperglycemia, without long-term current use of insulin ([facility] [address] )    Glaucoma    Hyperlipidemia    Hypertension    Hyponatremia    Depression with anxiety    Paroxysmal atrial fibrillation (HCC)    Ataxia    Type 2 diabetes mellitus with hyperglycemia (HCC)    CVA (cerebral vascular accident) ([facility] [address] )    Hearing loss    Insomnia    Left knee pain    PERI (obstructive sleep apnea)    Osteoarthritis    Osteoporosis    Periodic limb movement sleep disorder    Sleep talking    Tinnitus    Tricuspid valve disorders, non-rheumatic    Unsteady gait    History of stroke    Chronic diastolic heart failure (HCC)    S/P TAVR (transcatheter aortic valve replacement)    Severe dizziness    Thickened endometrium    Obesity (BMI 30-39  9)    History of CVA (cerebrovascular accident)    Headache    Esophageal thickening    Elevated TSH    Metabolic encephalopathy    Type 2 diabetes mellitus with hypoglycemia without coma (HCC)    Dysphagia    Injury of finger of left hand    Anemia     Past Medical History  Past Medical History:   Diagnosis Date    Ambulatory dysfunction     CHF (congestive heart failure) (HCC)     pEFHF    Cholelithiasis     Coronary artery disease     Depression with anxiety     Diabetes mellitus ([facility] [address] )     niddm    Glaucoma     Hypertension     Hyponatremia     Ischemic stroke of frontal lobe (HCC)     Right     Obstructive sleep apnea     [facility]ox[facility] fibrillation) ([facility])     Vertigo      Past Surgical History  Past Surgical History:   Procedure Laterality Date    APPENDECTOMY      GLAUCOMA SURGERY  01/03/2016    [place] REPLACE AORTIC VALVE OPENFEMORAL ARTERY APPROACH N/A 4/17/2018    Procedure: REPLACEMENT AORTIC VALVE TRANSCATHETER (TAVR) TRANSFEMORAL W/ 23 MM AGUILAR STEVE S3 VALVE;  Surgeon: Yanet Malone, DO;  [facility];  Service: Cardiac Surgery    TUBAL LIGATION               19 1055   Note Type   Note type Eval only   Restrictions/Precautions   Weight Bearing Precautions Per Order No   Other Precautions Contact/isolation; Fall Risk   Pain Assessment   Pain Assessment No/denies pain   Home Living   Type of [address] [facility] Two level; Able to live on main level with bedroom/bathroom  (1 analy)   [address]   Prior Function   Lives With Family  (dtr/MIKAELA)   ADL Assistance Independent   Falls in the last 6 months 0   Lifestyle   Autonomy PTA pt states independence with all aspects of her ADLs, transfers/ambulation--with RW; neg home alone(per pt's statement)   Reciprocal Relationships 5 children, 2    Service to Others worked in a "[employer]  72 ([employer]) [employer] "There is always someone around at home "   ADL   Where Assessed Chair   Eating Assistance 6  Modified independent   Grooming Assistance 6  Modified Independent   UB Bathing Assistance 6  Modified Independent   [address] 6  [address] [facility] 6  Modified independent   [address] 6  Modified independent   Transfers   Sit to [address]   Additional items Increased time required;Verbal cues;Armrests   Stand to Sit 5  Supervision   Additional items Verbal cues; Increased time required   Functional Mobility   Functional Mobility 5  Supervision   Additional items Rolling walker   Balance   Static Sitting Good   Dynamic Sitting Good   Static Standing Fair +   Dynamic Standing Fair   Activity Tolerance   Activity Tolerance Patient limited by fatigue   Medical Staff Made Aware MYKEL meyer T     RUE Assessment   RUE Assessment WFL   RUE Strength   RUE Overall Strength Within Functional Limits - strength 5/   LUE Assessment   LUE Assessment [facility] LUE Strength   LUE Overall Strength Within Functional Limits - strength 5/5   Hand Function   Gross Motor Coordination Functional   Fine Motor Coordination Functional   Sensation   Light Touch No apparent deficits   Proprioception   Proprioception No apparent deficits   Vision-Basic Assessment   Current Vision Wears glasses all the time   Vision - Complex Assessment   Acuity Able to read clock/calendar on wall without difficulty   Perception   Inattention/Neglect Appears intact   Cognition   Overall Cognitive Status WFL   Arousal/Participation Alert   Attention Within functional limits   Orientation Level Oriented X4   Memory Decreased short term memory  (2/3 recall after 5mins)   Following Commands Follows one step commands without difficulty   Assessment   Limitation Decreased endurance   Prognosis Good   Assessment Pt is a 83y/o female admitted to the hospital 2* symptoms of confusion and decreased ambulation  Pt noted with hypoglycemia, HTN urgency, hyponatremia, and metabolic encephalopathy  PTA pt states independence with all aspects of her ADLs, transfers/ambulation--with RW; neg home alone(per pt's statement)  During initial eval, pt demonstrated slight deficits with her functional balance, functional mobility, and and activity tolerance  Pt was able to demonstrate good ADL status and states no concerns about going directly home  Pt would benefit from a restorative program on unit to improve her overall endurance, balance, and mobility  Acute OT tx not indicated at this time 2* pt's limited ADL deficits      Goals   Patient Goals "to go home "   Plan   OT Frequency Eval only   Recommendation   OT Discharge Recommendation   (continue P T (outpt 2* hx "dizziness))   Barthel Index   Feeding 10   Bathing 5   Grooming Score 5   Dressing Score 10   Bladder Score 10   Bowels Score 10   Toilet Use Score 10   Transfers (Bed/Chair) Score 15   Mobility (Level Surface) Score 10   Stairs Score 0   Barthel Index Score 85 Lalito Artist, OT

## 2019-07-10 ENCOUNTER — TELEPHONE (OUTPATIENT)
Dept: NEPHROLOGY | Facility: CLINIC | Age: 84
End: 2019-07-10

## 2019-07-10 VITALS
SYSTOLIC BLOOD PRESSURE: 148 MMHG | HEIGHT: 58 IN | DIASTOLIC BLOOD PRESSURE: 55 MMHG | HEART RATE: 54 BPM | RESPIRATION RATE: 18 BRPM | TEMPERATURE: 98.4 F | OXYGEN SATURATION: 96 % | BODY MASS INDEX: 37.04 KG/M2

## 2019-07-10 LAB
ANION GAP SERPL CALCULATED.3IONS-SCNC: 7 MMOL/L (ref 4–13)
BUN SERPL-MCNC: 15 MG/DL (ref 5–25)
CALCIUM SERPL-MCNC: 9.2 MG/DL (ref 8.3–10.1)
CHLORIDE SERPL-SCNC: 97 MMOL/L (ref 100–108)
CO2 SERPL-SCNC: 28 MMOL/L (ref 21–32)
CREAT SERPL-MCNC: 0.86 MG/DL (ref 0.6–1.3)
GFR SERPL CREATININE-BSD FRML MDRD: 62 ML/MIN/1.73SQ M
GLUCOSE SERPL-MCNC: 189 MG/DL (ref 65–140)
GLUCOSE SERPL-MCNC: 205 MG/DL (ref 65–140)
GLUCOSE SERPL-MCNC: 205 MG/DL (ref 65–140)
POTASSIUM SERPL-SCNC: 4.2 MMOL/L (ref 3.5–5.3)
SODIUM SERPL-SCNC: 132 MMOL/L (ref 136–145)

## 2019-07-10 PROCEDURE — 82948 REAGENT STRIP/BLOOD GLUCOSE: CPT

## 2019-07-10 PROCEDURE — 80048 BASIC METABOLIC PNL TOTAL CA: CPT | Performed by: INTERNAL MEDICINE

## 2019-07-10 PROCEDURE — 99232 SBSQ HOSP IP/OBS MODERATE 35: CPT | Performed by: INTERNAL MEDICINE

## 2019-07-10 PROCEDURE — 99239 HOSP IP/OBS DSCHRG MGMT >30: CPT | Performed by: INTERNAL MEDICINE

## 2019-07-10 RX ORDER — LOSARTAN POTASSIUM 50 MG/1
50 TABLET ORAL DAILY
Qty: 30 TABLET | Refills: 0 | Status: SHIPPED | OUTPATIENT
Start: 2019-07-11 | End: 2019-08-05 | Stop reason: SDUPTHER

## 2019-07-10 RX ADMIN — ACETAMINOPHEN 650 MG: 325 TABLET ORAL at 10:04

## 2019-07-10 RX ADMIN — TIMOLOL MALEATE 1 DROP: 5 SOLUTION/ DROPS OPHTHALMIC at 08:31

## 2019-07-10 RX ADMIN — LOSARTAN POTASSIUM 50 MG: 50 TABLET, FILM COATED ORAL at 08:30

## 2019-07-10 RX ADMIN — HYDRALAZINE HYDROCHLORIDE 50 MG: 25 TABLET ORAL at 08:30

## 2019-07-10 RX ADMIN — DABIGATRAN ETEXILATE MESYLATE 150 MG: 150 CAPSULE ORAL at 08:30

## 2019-07-10 RX ADMIN — POLYVINYL ALCOHOL 1 DROP: 14 SOLUTION/ DROPS OPHTHALMIC at 08:31

## 2019-07-10 RX ADMIN — INSULIN LISPRO 2 UNITS: 100 INJECTION, SOLUTION INTRAVENOUS; SUBCUTANEOUS at 08:30

## 2019-07-10 NOTE — DISCHARGE INSTRUCTIONS
Discharge instructions:    Please take your medications only as directed on your discharge papers  Do not take any medications that are not listed as her home medications have been changed during this admission  Most importantly:  Your diuretics with chlorthalidone, and your losartan/HCTZ blood pressure medications have been stopped as this likely contributed to your low salt levels  You have been prescribed the losartan tablet alone without the diuretic portion for blood pressure, as well as continue on your home hydralazine  Please see your family doctor in 1 week for a checkup, as well as to repeat your blood work  Please have your blood test drawn at least 1 day before seeing your family doctor so she will have the results at your visit  Your blood sugars were low so your home glipizide has been stopped completely  Please continue only your metformin 500 mg tablet once daily in the morning  Please follow a diabetic diet  Please check your blood sugars twice a day, while fasting, and keep a record for your family doctor    They may have to continue to adjust her diabetes medicine    Please return to the ER with any problems at all, especially any pain, fever or chills, nausea or vomiting, confusion, or any other problems at all    Please continue to follow up with your neurologist, and cardiologist

## 2019-07-10 NOTE — DISCHARGE SUMMARY
Discharge Summary - Medical Humberto Fonseca 80 y o  female MRN: [id number]    [address] MED SURG Room / Bed: [place] 2 /[place] 2 M* Encounter: [id number]    BRIEF OVERVIEW      Admission Date: 7/4/2019       Discharge Date:  07/10/2019    Primary Diagnoses  Principal Problem:    Metabolic encephalopathy  Active Problems:    Hyperlipidemia    Hypertension    Hyponatremia    Paroxysmal atrial fibrillation (HCC)    S/P TAVR (transcatheter aortic valve replacement)    Severe dizziness    Thickened endometrium    History of CVA (cerebrovascular accident)    Elevated TSH    Type 2 diabetes mellitus with hypoglycemia without coma ([facility] [address] )    Dysphagia    Injury of finger of left hand    Anemia  Resolved Problems:    Hypertensive urgency    Leukocytosis      Service:  [facility]  Consulting Providers   Nephrology:  Dr Dawson    Procedures Performed   none    Hospital Studies:  7/5:  Chest x-ray:  No acute disease    Results from last 7 days   Lab Units 07/09/19  0522 07/06/19  0536 07/05/19  0524   WBC Thousand/uL 7 04 6 57 4 69   HEMOGLOBIN g/dL 10 8* 10 9* 11 2*   HEMATOCRIT % 32 6* 33 0* 34 1*   PLATELETS Thousands/uL 238 233 244     Results from last 7 days   Lab Units 07/10/19  0453 07/09/19  0522 07/08/19  2213   POTASSIUM mmol/L 4 2 4 2 4 4   CHLORIDE mmol/L 97* 98* 100   CO2 mmol/L 28 25 27   BUN mg/dL 15 13 17   CREATININE mg/dL 0 86 0 80 0 91   CALCIUM mg/dL 9 2 9 2 8 6       History and Physical Exam:  Please refer to the Admission H&P note    Hospital Course by Problem  1-s/p hypoglycemia in diabetes type 2 on oral agents:  Patient's home glipizide of 10 mg b i d  was initially held, and then decreased down to 2 5 mg but she continued to have significant hypoglycemia  Her glipizide was discontinued               -patient was on D5W infusion, with improvement in her hypoglycemia                -patient was monitored as D5W infusion was discontinued  She was noted to have good p o  Intake    -patient's blood sugars yesterday, off the D5W have been 198, 236, 230, and 131     -at discharge she will resume her home metformin 500 mg, however only once daily  She was instructed to check her blood sugars twice a day and keep a record for her primary care provider  She was instructed not to restart her glipizide or Tradjenta     2-hyponatremia:  Patient presented with hyponatremia with an initial sodium of 125  This was felt to be likely secondary to excessive free water intake   Patient was on oral hydration for a pelvic ultrasound                -however patient was also on chlorthalidone which had previously resulted in hyponatremia for her, as well as recently started on the combination losartan/HCTZ              -Patient's hyponatremia improved rapidly to 134  She was given a dose of D5W and DDAVP   Appreciate Nephrology evaluation and recommendations               -IGT been on 1 8% IV fluids   Currently on D5W for a goal serum sodium in the mid 130s              -sodium was stable at 133 yesterday, and 132 today on D5W  -D5W discontinued by Nephrology team:  Will recheck in a m      3-essential hypertension:  Patient has a history of essential hypertension   Blood pressure was markedly elevated at the time of admission               -initial chlorthalidone and losartan/HCTZ held on admission due to hyponatremia              -patient's antihypertensive regimen was titrated: At discharge she will be on losartan 50 mg daily, hydralazine 50 mg t i d    -blood pressure currently 148/55 prior to discharge  She will continue follow-up with her primary care provider     4-status post metabolic encephalopathy:  Patient presented with metabolic encephalopathy, multifactorial, secondary to hypoglycemia and hyponatremia                -since resolved:  Patient currently awake alert and oriented x3     5-left finger injury:  Noted upon arrival to the ER  Michelle Lu notes that the ER bathroom door closed on her finger when she was holding onto the door jam, during her evaluation for DUB  Patient declined x-ray                 -JSHQCPO again declines x-ray today   She notes she has full range of motion and denies any current pain     6-dysphagia:  Patient was evaluated by the speech therapy service:  Currently on dental soft diet and thin liquids      7-history of previous CVA:  Continue anticoagulation with Pradaxa   Continue statin   Continue outpatient neurology follow-up      8-incidental finding of thickened endometrium:  In postpartum patient    continue Outpatient gyn follow-up   Seen in the office on 07/01      9-status post TAVR:  Continue outpatient cardiology follow-up     10-paroxysmal atrial fibrillation:  Currently rate controlled   Continue home amiodarone   Continue anticoagulation with Pradaxa     11-leukocytosis:  Patient presented with an initial leukocytosis with a white blood cell count of 12  This was most likely stress response as her follow-up white blood cell count was normal   No evidence of acute infection     12-elevated TSH:  Patient's TSH is being followed by her primary care provider as an outpatient  Meherrin Cons has decreased from 6 3 down to 4 8   Continue to follow closely as an outpatient as she is on amiodarone      13-chronic dizziness:  Patient follows with the neurology team   Continue meclizine   Continue vestibular therapy and ENT outpatient follow-up              -VEGA was seen by ENT on 06/10/2019  Silverio Cedeno was not felt to be a primary ENT issue  Michelle Lu did not wish to undergo vestibular testing at that time              -was recently seen by the neurology team in the office 6/5     14-hyperlipidemia:  Continue statin     15-anemia:  Patient's baseline hemoglobin appears to be approximately 13  Current hemoglobin is approximately 11   Likely secondary to her vaginal bleeding which is currently being evaluated as an outpatient by the gyn team               -DERRICK is on anticoagulation with Pradaxa  This will continue to be followed as an outpatient, as she is being evaluated by the OBGYN service for dysfunctional uterine bleeding      16-family: updated daughter Aba Almonte via phone  Updated son in law at bedside (who manages pt's meds)    PT: home with family     VTE Pharmacologic Prophylaxis: RX contraindicated due to: pradaxa  VTE Mechanical Prophylaxis: sequential compression device     Discharge Condition: Improved  Discharge Disposition: Home/Self Care    Discharge Note and Physical Exam:   Patient relates that she feels better  She notes her dizziness is unchanged, and at her baseline  She notes she had a headache earlier which has improved  She denies any pain anywhere else  She denies any chest pain, back pain, abdominal pain  She denies any nausea, vomiting, diarrhea  She denies any shortness of breath  She notes her left 3rd finger continues group and again declines an x-ray  She denies any current bleeding  She notes she is tolerating p o  With a good appetite  She denies any weakness  She denies any numbness  Vitals:    07/10/19 0744   BP: 148/55   Pulse: (!) 54   Resp: 18   Temp: 98 4 °F (36 9 °C)   SpO2: 96%     General:  Pleasant, non-tachypnic, non-dyspnic  Conversant  Sitting up in bed  Heart: Regular rate and rhythm, S1S2 present  No murmur, rub or gallop  Lungs: Clear to auscultation bilaterally, no wheezing, rhonchi, or crackles  Good air movement  No accessory muscle use or respiratory distress  Abdomen: soft, non-tender, non-distended, NABS  No rebound or guarding  No mass or peritoneal signs  Extremities: no clubbing, cyanosis or edema  2+ pedal pulses bilaterally  Left hand, and 3rd finger with full range of motion  Able to form a fist   Faint ecchymoses noted left 3rd digit  Neurologic:  Awake and alert  Fluent speech  Moving all 4 extremities  Skin: warm and dry  No petechiae, purpura or rash  Fading ecchymoses left 3rd digit  Discharge Medications   Please see Medical Reconciliation Discharge Form    Discharge Follow Up Appointments: Sayra Orlando MD: 1 week    Discharge  Statement   Total Time Spent today including physical exam, discussion with patient and family, and discharge arrangements/care = 38 minutes  Updated patient and her son-in-law at the bedside  Reviewed medication changes, monitoring for hypoglycemia, and new blood pressure medication changes  Reviewed dental soft diet and dysphagia precautions      This note has been constructed using a voice recognition system

## 2019-07-10 NOTE — PLAN OF CARE
Problem: INFECTION - ADULT  Goal: Absence or prevention of progression during hospitalization  Description  INTERVENTIONS:  - Assess and monitor for signs and symptoms of infection  - Monitor lab/diagnostic results  - Monitor all insertion sites, i e  indwelling lines, tubes, and drains  - Monitor endotracheal (as able) and nasal secretions for changes in amount and color  - [place] appropriate cooling/warming therapies per order  - Administer medications as ordered  - Instruct and encourage patient and family to use good hand hygiene technique  - Identify and instruct in appropriate isolation precautions for identified infection/condition  Outcome: Progressing  Goal: Absence of fever/infection during neutropenic period  Description  INTERVENTIONS:  - Monitor WBC  - Implement neutropenic guidelines  Outcome: Progressing     Problem: SAFETY ADULT  Goal: Patient will remain free of falls  Description  INTERVENTIONS:  - Assess patient frequently for physical needs  -  Identify cognitive and physical deficits and behaviors that affect risk of falls    -  [place] fall precautions as indicated by assessment   - Educate patient/family on patient safety including physical limitations  - Instruct patient to call for assistance with activity based on assessment  - Modify environment to reduce risk of injury  - Consider OT/PT consult to assist with strengthening/mobility  Outcome: Progressing  Goal: Maintain or return to baseline ADL function  Description  INTERVENTIONS:  -  Assess patient's ability to carry out ADLs; assess patient's baseline for ADL function and identify physical deficits which impact ability to perform ADLs (bathing, care of mouth/teeth, toileting, grooming, dressing, etc )  - Assess/evaluate cause of self-care deficits   - Assess range of motion  - Assess patient's mobility; develop plan if impaired  - Assess patient's need for assistive devices and provide as appropriate  - Encourage maximum independence but intervene and supervise when necessary  ¯ Involve family in performance of ADLs  ¯ Assess for home care needs following discharge   ¯ Request OT consult to assist with ADL evaluation and planning for discharge  ¯ Provide patient education as appropriate  Outcome: Progressing  Goal: Maintain or return mobility status to optimal level  Description  INTERVENTIONS:  - Assess patient's baseline mobility status (ambulation, transfers, stairs, etc )    - Identify cognitive and physical deficits and behaviors that affect mobility  - Identify mobility aids required to assist with transfers and/or ambulation (gait belt, sit-to-stand, lift, walker, cane, etc )  - [place] fall precautions as indicated by assessment  - Record patient progress and toleration of activity level on Mobility SBAR; progress patient to next Phase/Stage  - Instruct patient to call for assistance with activity based on assessment  - Request Rehabilitation consult to assist with strengthening/weightbearing, etc   Outcome: Progressing     Problem: DISCHARGE PLANNING  Goal: Discharge to home or other facility with appropriate resources  Description  INTERVENTIONS:  - Identify barriers to discharge w/patient and caregiver  - Arrange for needed discharge resources and transportation as appropriate  - Identify discharge learning needs (meds, wound care, etc )  - Arrange for interpretive services to assist at discharge as needed  - Refer to Case Management Department for coordinating discharge planning if the patient needs post-hospital services based on physician/advanced practitioner order or complex needs related to functional status, cognitive ability, or social support system  Outcome: Progressing     Problem: Knowledge Deficit  Goal: Patient/family/caregiver demonstrates understanding of disease process, treatment plan, medications, and discharge instructions  Description  Complete learning assessment and assess knowledge base   Interventions:  - Provide teaching at level of understanding  - Provide teaching via preferred learning methods  Outcome: Progressing     Problem: GENITOURINARY - ADULT  Goal: Maintains or returns to baseline urinary function  Description  INTERVENTIONS:  - Assess urinary function  - Encourage oral fluids to ensure adequate hydration  - Administer IV fluids as ordered to ensure adequate hydration  - Administer ordered medications as needed  - Offer frequent toileting  - Follow urinary retention protocol if ordered  Outcome: Progressing  Goal: Absence of urinary retention  Description  INTERVENTIONS:  - Assess patients ability to void and empty bladder  - Monitor I/O  - Bladder scan as needed  - Discuss with physician/AP medications to alleviate retention as needed  - Discuss catheterization for long term situations as appropriate  Outcome: Progressing     Problem: METABOLIC, FLUID AND ELECTROLYTES - ADULT  Goal: Electrolytes maintained within normal limits  Description  INTERVENTIONS:  - Monitor labs and assess patient for signs and symptoms of electrolyte imbalances  - Administer electrolyte replacement as ordered  - Monitor response to electrolyte replacements, including repeat lab results as appropriate  - Instruct patient on fluid and nutrition as appropriate  Outcome: Progressing  Goal: Fluid balance maintained  Description  INTERVENTIONS:  - Monitor labs and assess for signs and symptoms of volume excess or deficit  - Monitor I/O and WT  - Instruct patient on fluid and nutrition as appropriate  Outcome: Progressing  Goal: Glucose maintained within target range  Description  INTERVENTIONS:  - Monitor Blood Glucose as ordered  - Assess for signs and symptoms of hyperglycemia and hypoglycemia  - Administer ordered medications to maintain glucose within target range  - Assess nutritional intake and initiate nutrition service referral as needed  Outcome: Progressing     Problem: SKIN/TISSUE INTEGRITY - ADULT  Goal: Skin integrity remains intact  Description  INTERVENTIONS  - Identify patients at risk for skin breakdown  - Assess and monitor skin integrity  - Assess and monitor nutrition and hydration status  - Monitor labs (i e  albumin)  - Assess for incontinence   - Turn and reposition patient  - Assist with mobility/ambulation  - Relieve pressure over bony prominences  - Avoid friction and shearing  - Provide appropriate hygiene as needed including keeping skin clean and dry  - Evaluate need for skin moisturizer/barrier cream  - Collaborate with interdisciplinary team (i e  Nutrition, Rehabilitation, etc )   - Patient/family teaching  Outcome: Progressing  Goal: Incision(s), wounds(s) or drain site(s) healing without S/S of infection  Description  INTERVENTIONS  - Assess and document risk factors for skin impairment   - Assess and document dressing, incision, wound bed, drain sites and surrounding tissue  - Initiate Nutrition services consult and/or wound management as needed  Outcome: Progressing  Goal: Oral mucous membranes remain intact  Description  INTERVENTIONS  - Assess oral mucosa and hygiene practices  - Implement preventative oral hygiene regimen  - Implement oral medicated treatments as ordered  - Initiate Nutrition services referral as needed  Outcome: Progressing     Problem: MUSCULOSKELETAL - ADULT  Goal: Maintain or return mobility to safest level of function  Description  INTERVENTIONS:  - Assess patient's ability to carry out ADLs; assess patient's baseline for ADL function and identify physical deficits which impact ability to perform ADLs (bathing, care of mouth/teeth, toileting, grooming, dressing, etc )  - Assess/evaluate cause of self-care deficits   - Assess range of motion  - Assess patient's mobility; develop plan if impaired  - Assess patient's need for assistive devices and provide as appropriate  - Encourage maximum independence but intervene and supervise when necessary  - Involve family in performance of ADLs  - Assess for home care needs following discharge   - Request OT consult to assist with ADL evaluation and planning for discharge  - Provide patient education as appropriate  Outcome: Progressing     Problem: Potential for Falls  Goal: Patient will remain free of falls  Description  INTERVENTIONS:  - Assess patient frequently for physical needs  -  Identify cognitive and physical deficits and behaviors that affect risk of falls  -  [place] fall precautions as indicated by assessment   - Educate patient/family on patient safety including physical limitations  - Instruct patient to call for assistance with activity based on assessment  - Modify environment to reduce risk of injury  - Consider OT/PT consult to assist with strengthening/mobility  Outcome: Progressing     Problem: Prexisting or High Potential for Compromised Skin Integrity  Goal: Skin integrity is maintained or improved  Description  INTERVENTIONS:  - Identify patients at risk for skin breakdown  - Assess and monitor skin integrity  - Assess and monitor nutrition and hydration status  - Monitor labs (i e  albumin)  - Assess for incontinence   - Turn and reposition patient  - Assist with mobility/ambulation  - Relieve pressure over bony prominences  - Avoid friction and shearing  - Provide appropriate hygiene as needed including keeping skin clean and dry  - Evaluate need for skin moisturizer/barrier cream  - Collaborate with interdisciplinary team (i e  Nutrition, Rehabilitation, etc )   - Patient/family teaching  Outcome: Progressing     Problem: Nutrition/Hydration-ADULT  Goal: Nutrient/Hydration intake appropriate for improving, restoring or maintaining nutritional needs  Description  Monitor and assess patient's nutrition/hydration status for malnutrition (ex- brittle hair, bruises, dry skin, pale skin and conjunctiva, muscle wasting, smooth red tongue, and disorientation)   Collaborate with interdisciplinary team and initiate plan and interventions as ordered  Monitor patient's weight and dietary intake as ordered or per policy  Utilize nutrition screening tool and intervene per policy  Determine patient's food preferences and provide high-protein, high-caloric foods as appropriate       INTERVENTIONS:  - Monitor oral intake, urinary output, labs, and treatment plans  - Assess nutrition and hydration status and recommend course of action  - Evaluate amount of meals eaten  - Assist patient with eating if necessary   - Allow adequate time for meals  - Recommend/ encourage appropriate diets, oral nutritional supplements, and vitamin/mineral supplements  - Order, calculate, and assess calorie counts as needed  - Recommend, monitor, and adjust tube feedings and TPN/PPN based on assessed needs  - Assess need for intravenous fluids  - Provide specific nutrition/hydration education as appropriate  - Include patient/family/caregiver in decisions related to nutrition  Outcome: Progressing

## 2019-07-10 NOTE — TELEPHONE ENCOUNTER
----- Message from Rona Jimenez PA-C sent at 7/10/2019  1:05 PM EDT -----  Please schedule hospital follow up in 2-3 weeks  Preferably in [facility] office

## 2019-07-10 NOTE — PROGRESS NOTES
NEPHROLOGY PROGRESS NOTE   Diego Salgado 80 y o  female MRN: [id number]  [facility] [address] [id number] Encounter: [id number]  Reason for Consult:  Hyponatremia    ASSESSMENT/PLAN:  1  Hyponatremia, initial sodium of 125, initially increased to 134 however was given D5 in DDAVP, sodium improved to 142 with 1 8%, given D5W given over-correction, now appears stable 132  ·  Urine osmolality 272, urine sodium 71  2  Hypertension, blood pressure overall appears improved       PLAN:  · Overall sodium levels appear fairly stable, suspect continued improvement  · Stable for discharge from Nephrology standpoint  · Avoid the hydrochlorothiazide, Lasix 10-20 mg daily as an outpatient with potassium supplementation, repeat BMP in 1 week post discharge  · Blood pressure appears stable    SUBJECTIVE:  Seen examined  Patient awake alert  No reports of nausea vomiting diarrhea  No reports of chest shortness of breath  Review of Systems    OBJECTIVE:  Current Weight:    Vitals:    07/09/19 1502 07/09/19 2219 07/09/19 2312 07/10/19 0744   BP: 147/82 (!) 178/91 151/82 148/55   BP Location: Left arm  Left arm Left arm   Pulse: 59 61 58 (!) 54   Resp: 18  18 18   Temp: 98 7 °F (37 1 °C)  97 7 °F (36 5 °C)    TempSrc: Temporal  Tympanic    SpO2: 98%  95% 96%   Height:         No intake or output data in the 24 hours ending 07/10/19 1000    Physical Exam   Constitutional: No distress  HENT:   Head: Normocephalic  Eyes: No scleral icterus  Neck: Neck supple  Cardiovascular: Normal rate and regular rhythm  Pulmonary/Chest: Effort normal and breath sounds normal    Abdominal: Soft  Musculoskeletal: She exhibits edema (Trace edema)  Neurological: She is alert  Skin: Skin is warm and dry         Medications:    Current Facility-Administered Medications:     acetaminophen (TYLENOL) tablet 650 mg, 650 mg, Oral, Q6H PRN, Mercedes Shah, PILARNP, 650 mg at 07/07/19 0803    albuterol (PROVENTIL HFA,VENTOLIN HFA) inhaler 2 puff, 2 puff, Inhalation, Q6H PRN, ROBBI Cook, 2 puff at 07/08/19 2223    amiodarone tablet 200 mg, 200 mg, Oral, Daily, ROBBI Cook, 200 mg at 07/08/19 0759    bimatoprost (LUMIGAN) 0 01 % ophthalmic solution 1 drop, 1 drop, Both Eyes, HS, ROBBI Cook, 1 drop at 07/09/19 2219    dabigatran etexilate (PRADAXA) capsule 150 mg, 150 mg, Oral, Q12H [address], ROBBI Cook, 150 mg at 07/10/19 0830    hydrALAZINE (APRESOLINE) injection 5 mg, 5 mg, Intravenous, Q4H PRN, ROBBI Cook    hydrALAZINE (APRESOLINE) tablet 50 mg, 50 mg, Oral, TID, ROBBI Ibarra, 50 mg at 07/10/19 0830    insulin lispro (HumaLOG) 100 units/mL subcutaneous injection 1-5 Units, 1-5 Units, Subcutaneous, HS, ROBBI Cook, 1 Units at 07/08/19 2202    insulin lispro (HumaLOG) 100 units/mL subcutaneous injection 1-6 Units, 1-6 Units, Subcutaneous, TID AC, 2 Units at 07/10/19 0830 **AND** Fingerstick Glucose (POCT), , , TID AC, ROBBI Cook    losartan (COZAAR) tablet 50 mg, 50 mg, Oral, Daily, Dominic Weiss MD, 50 mg at 07/10/19 0830    meclizine (ANTIVERT) tablet 12 5 mg, 12 5 mg, Oral, Q12H PRN, ROBBI Cook    ondansetron [facility]) injection 4 mg, 4 mg, Intravenous, Q6H PRN, ROBBI Cook    polyvinyl alcohol (LIQUIFILM TEARS) 1 4 % ophthalmic solution 1 drop, 1 drop, Both Eyes, TID, Shemar Palacio DO, 1 drop at 07/10/19 0831    pravastatin (PRAVACHOL) tablet 40 mg, 40 mg, Oral, Daily With Dinner, ROBBI Cook, 40 mg at 07/09/19 1730    timolol (TIMOPTIC) 0 5 % ophthalmic solution 1 drop, 1 drop, Right Eye, Daily, ROBBI Cook, 1 drop at 07/10/19 0831    Laboratory Results:  Results from last 7 days   Lab Units 07/10/19  0453 07/09/19  0522 07/08/19  2213 07/08/19  1620 07/08/19  0519 07/07/19  1212 07/07/19  0455  07/06/19  0536  07/05/19  0524  07/04/19  1925   WBC Thousand/uL  --  7 04  --   --   --   -- --   --  6 57  --  4 69  --  12 17*   HEMOGLOBIN g/dL  --  10 8*  --   --   --   --   --   --  10 9*  --  11 2*  --  13 8   HEMATOCRIT %  --  32 6*  --   --   --   --   --   --  33 0*  --  34 1*  --  42 4   PLATELETS Thousands/uL  --  238  --   --   --   --   --   --  233  --  244  --  301   POTASSIUM mmol/L 4 2 4 2 4 4 4 6 3 9 4 1 4 0   < > 4 2   < >  --    < >  --    CHLORIDE mmol/L 97* 98* 100 102 109* 94* 92*   < > 93*   < >  --    < >  --    CO2 mmol/L 28 25 27 28 23 28 27   < > 26   < >  --    < >  --    BUN mg/dL 15 13 17 16 15 17 17   < > 20   < >  --    < >  --    CREATININE mg/dL 0 86 0 80 0 91 0 87 0 77 0 84 0 74   < > 0 82   < >  --    < >  --    CALCIUM mg/dL 9 2 9 2 8 6 8 9 8 3 8 4 8 6   < > 9 0   < >  --    < >  --     < > = values in this interval not displayed

## 2019-07-10 NOTE — TELEPHONE ENCOUNTER
Patient has been schedule at the [facility] office for 07/31/19 at 12:30 pm with Dr Jeffery for hospital follow up

## 2019-07-10 NOTE — TELEPHONE ENCOUNTER
----- Message from Celio Valentin PA-C sent at 7/10/2019  1:05 PM EDT -----  Please schedule hospital follow up in 2-3 weeks  Preferably in [facility] office

## 2019-07-10 NOTE — PLAN OF CARE
Problem: INFECTION - ADULT  Goal: Absence or prevention of progression during hospitalization  Description  INTERVENTIONS:  - Assess and monitor for signs and symptoms of infection  - Monitor lab/diagnostic results  - Monitor all insertion sites, i e  indwelling lines, tubes, and drains  - Monitor endotracheal (as able) and nasal secretions for changes in amount and color  - [place] appropriate cooling/warming therapies per order  - Administer medications as ordered  - Instruct and encourage patient and family to use good hand hygiene technique  - Identify and instruct in appropriate isolation precautions for identified infection/condition  Outcome: Progressing  Goal: Absence of fever/infection during neutropenic period  Description  INTERVENTIONS:  - Monitor WBC  - Implement neutropenic guidelines  Outcome: Progressing     Problem: SAFETY ADULT  Goal: Patient will remain free of falls  Description  INTERVENTIONS:  - Assess patient frequently for physical needs  -  Identify cognitive and physical deficits and behaviors that affect risk of falls    -  [place] fall precautions as indicated by assessment   - Educate patient/family on patient safety including physical limitations  - Instruct patient to call for assistance with activity based on assessment  - Modify environment to reduce risk of injury  - Consider OT/PT consult to assist with strengthening/mobility  Outcome: Progressing  Goal: Maintain or return to baseline ADL function  Description  INTERVENTIONS:  -  Assess patient's ability to carry out ADLs; assess patient's baseline for ADL function and identify physical deficits which impact ability to perform ADLs (bathing, care of mouth/teeth, toileting, grooming, dressing, etc )  - Assess/evaluate cause of self-care deficits   - Assess range of motion  - Assess patient's mobility; develop plan if impaired  - Assess patient's need for assistive devices and provide as appropriate  - Encourage maximum independence but intervene and supervise when necessary  ¯ Involve family in performance of ADLs  ¯ Assess for home care needs following discharge   ¯ Request OT consult to assist with ADL evaluation and planning for discharge  ¯ Provide patient education as appropriate  Outcome: Progressing  Goal: Maintain or return mobility status to optimal level  Description  INTERVENTIONS:  - Assess patient's baseline mobility status (ambulation, transfers, stairs, etc )    - Identify cognitive and physical deficits and behaviors that affect mobility  - Identify mobility aids required to assist with transfers and/or ambulation (gait belt, sit-to-stand, lift, walker, cane, etc )  - [place] fall precautions as indicated by assessment  - Record patient progress and toleration of activity level on Mobility SBAR; progress patient to next Phase/Stage  - Instruct patient to call for assistance with activity based on assessment  - Request Rehabilitation consult to assist with strengthening/weightbearing, etc   Outcome: Progressing     Problem: DISCHARGE PLANNING  Goal: Discharge to home or other facility with appropriate resources  Description  INTERVENTIONS:  - Identify barriers to discharge w/patient and caregiver  - Arrange for needed discharge resources and transportation as appropriate  - Identify discharge learning needs (meds, wound care, etc )  - Arrange for interpretive services to assist at discharge as needed  - Refer to Case Management Department for coordinating discharge planning if the patient needs post-hospital services based on physician/advanced practitioner order or complex needs related to functional status, cognitive ability, or social support system  Outcome: Progressing     Problem: Knowledge Deficit  Goal: Patient/family/caregiver demonstrates understanding of disease process, treatment plan, medications, and discharge instructions  Description  Complete learning assessment and assess knowledge base   Interventions:  - Provide teaching at level of understanding  - Provide teaching via preferred learning methods  Outcome: Progressing     Problem: GENITOURINARY - ADULT  Goal: Maintains or returns to baseline urinary function  Description  INTERVENTIONS:  - Assess urinary function  - Encourage oral fluids to ensure adequate hydration  - Administer IV fluids as ordered to ensure adequate hydration  - Administer ordered medications as needed  - Offer frequent toileting  - Follow urinary retention protocol if ordered  Outcome: Progressing  Goal: Absence of urinary retention  Description  INTERVENTIONS:  - Assess patients ability to void and empty bladder  - Monitor I/O  - Bladder scan as needed  - Discuss with physician/AP medications to alleviate retention as needed  - Discuss catheterization for long term situations as appropriate  Outcome: Progressing     Problem: METABOLIC, FLUID AND ELECTROLYTES - ADULT  Goal: Electrolytes maintained within normal limits  Description  INTERVENTIONS:  - Monitor labs and assess patient for signs and symptoms of electrolyte imbalances  - Administer electrolyte replacement as ordered  - Monitor response to electrolyte replacements, including repeat lab results as appropriate  - Instruct patient on fluid and nutrition as appropriate  Outcome: Progressing  Goal: Fluid balance maintained  Description  INTERVENTIONS:  - Monitor labs and assess for signs and symptoms of volume excess or deficit  - Monitor I/O and WT  - Instruct patient on fluid and nutrition as appropriate  Outcome: Progressing  Goal: Glucose maintained within target range  Description  INTERVENTIONS:  - Monitor Blood Glucose as ordered  - Assess for signs and symptoms of hyperglycemia and hypoglycemia  - Administer ordered medications to maintain glucose within target range  - Assess nutritional intake and initiate nutrition service referral as needed  Outcome: Progressing     Problem: SKIN/TISSUE INTEGRITY - ADULT  Goal: Skin integrity remains intact  Description  INTERVENTIONS  - Identify patients at risk for skin breakdown  - Assess and monitor skin integrity  - Assess and monitor nutrition and hydration status  - Monitor labs (i e  albumin)  - Assess for incontinence   - Turn and reposition patient  - Assist with mobility/ambulation  - Relieve pressure over bony prominences  - Avoid friction and shearing  - Provide appropriate hygiene as needed including keeping skin clean and dry  - Evaluate need for skin moisturizer/barrier cream  - Collaborate with interdisciplinary team (i e  Nutrition, Rehabilitation, etc )   - Patient/family teaching  Outcome: Progressing  Goal: Incision(s), wounds(s) or drain site(s) healing without S/S of infection  Description  INTERVENTIONS  - Assess and document risk factors for skin impairment   - Assess and document dressing, incision, wound bed, drain sites and surrounding tissue  - Initiate Nutrition services consult and/or wound management as needed  Outcome: Progressing  Goal: Oral mucous membranes remain intact  Description  INTERVENTIONS  - Assess oral mucosa and hygiene practices  - Implement preventative oral hygiene regimen  - Implement oral medicated treatments as ordered  - Initiate Nutrition services referral as needed  Outcome: Progressing     Problem: MUSCULOSKELETAL - ADULT  Goal: Maintain or return mobility to safest level of function  Description  INTERVENTIONS:  - Assess patient's ability to carry out ADLs; assess patient's baseline for ADL function and identify physical deficits which impact ability to perform ADLs (bathing, care of mouth/teeth, toileting, grooming, dressing, etc )  - Assess/evaluate cause of self-care deficits   - Assess range of motion  - Assess patient's mobility; develop plan if impaired  - Assess patient's need for assistive devices and provide as appropriate  - Encourage maximum independence but intervene and supervise when necessary  - Involve family in performance of ADLs  - Assess for home care needs following discharge   - Request OT consult to assist with ADL evaluation and planning for discharge  - Provide patient education as appropriate  Outcome: Progressing     Problem: Potential for Falls  Goal: Patient will remain free of falls  Description  INTERVENTIONS:  - Assess patient frequently for physical needs  -  Identify cognitive and physical deficits and behaviors that affect risk of falls  -  [place] fall precautions as indicated by assessment   - Educate patient/family on patient safety including physical limitations  - Instruct patient to call for assistance with activity based on assessment  - Modify environment to reduce risk of injury  - Consider OT/PT consult to assist with strengthening/mobility  Outcome: Progressing     Problem: Prexisting or High Potential for Compromised Skin Integrity  Goal: Skin integrity is maintained or improved  Description  INTERVENTIONS:  - Identify patients at risk for skin breakdown  - Assess and monitor skin integrity  - Assess and monitor nutrition and hydration status  - Monitor labs (i e  albumin)  - Assess for incontinence   - Turn and reposition patient  - Assist with mobility/ambulation  - Relieve pressure over bony prominences  - Avoid friction and shearing  - Provide appropriate hygiene as needed including keeping skin clean and dry  - Evaluate need for skin moisturizer/barrier cream  - Collaborate with interdisciplinary team (i e  Nutrition, Rehabilitation, etc )   - Patient/family teaching  Outcome: Progressing     Problem: Nutrition/Hydration-ADULT  Goal: Nutrient/Hydration intake appropriate for improving, restoring or maintaining nutritional needs  Description  Monitor and assess patient's nutrition/hydration status for malnutrition (ex- brittle hair, bruises, dry skin, pale skin and conjunctiva, muscle wasting, smooth red tongue, and disorientation)   Collaborate with interdisciplinary team and initiate plan and interventions as ordered  Monitor patient's weight and dietary intake as ordered or per policy  Utilize nutrition screening tool and intervene per policy  Determine patient's food preferences and provide high-protein, high-caloric foods as appropriate       INTERVENTIONS:  - Monitor oral intake, urinary output, labs, and treatment plans  - Assess nutrition and hydration status and recommend course of action  - Evaluate amount of meals eaten  - Assist patient with eating if necessary   - Allow adequate time for meals  - Recommend/ encourage appropriate diets, oral nutritional supplements, and vitamin/mineral supplements  - Order, calculate, and assess calorie counts as needed  - Recommend, monitor, and adjust tube feedings and TPN/PPN based on assessed needs  - Assess need for intravenous fluids  - Provide specific nutrition/hydration education as appropriate  - Include patient/family/caregiver in decisions related to nutrition  Outcome: Progressing

## 2019-07-10 NOTE — PROGRESS NOTES
[facility] Progress Note  Patient: Segundo Morel 80 y o  female   MRN: [id number]  PCP: Trent Crews MD  Unit/Bed#: [place] [address] [address] [facility] [address] [id number] Encounter: [id number]  Date Of Visit: 07/10/19    Assessment:    Principal Problem:    Metabolic encephalopathy  Active Problems:    Hyperlipidemia    Hypertension    Hyponatremia    Paroxysmal atrial fibrillation (HCC)    S/P TAVR (transcatheter aortic valve replacement)    Severe dizziness    Thickened endometrium    History of CVA (cerebrovascular accident)    Elevated TSH    Type 2 diabetes mellitus with hypoglycemia without coma ([facility] [address] )    Dysphagia    Injury of finger of left hand    Anemia      Plan:    1  Hyponatremia  · Initial sodium on admission of 125 - likely due to excessive free water intake  Pt was hydrating in order to have a full bladder for pelvic US  · Pt also on chlorthalidone and recently started on losartan/HCTZ - these meds d/c at time of admission  · NS IVF rapidly increased from 125 to 134 in 24 hours  · Nephrology input appreciated - given DDAVP and D5W which caused Na to fall to 131 >126  · Urine osmolality 272, urine sodium 71  · Pt given 1 8% saline in addition to D5W with a goal sodium of 130s  · Na stable at 132 yesterday and today  Nephrology advised d/c 1 8%sodium and continue gentle D5W  · Continue to monitor    2  Metabolic encephalopathy  · As presented in ED - likely related to hypoglycemia, hyponatremia and elevated BP (244/106)  · Resolved as pt is A&O x3, answering questions appropriately  3  Chronic dizziness  · Pt continues to c/o chronic dizziness and f/b neurology as an outpt  · Continue meclizine while inpt and vestibular therapy/ENT as outpt    4  T2DM with hypoglycemia without coma  · Episode of hypoglycemia on 7/4/19 with BGL of 50 - Glipizide 10mg BID initially held then decreased the d/c  · BGL fluctuated from 100-200s while she was receiving D5W infusion     · D5W d/c due to stablization of sodium - continue to monitor BGL, sliding scale insulin and hold oral agents  5  Dysphagia  · Pt with an episode of coughing/choking while eating a sandwich  · Evaluated by SLP - dental soft diet with thin liquids    6  Anemia  · Baseline Hgb appears to be 13  Most recent Hgb 10 8 - likely due to vaginal bleeding on anticoagulation which is being f/b outpt obgyn    7  Leukocytosis  · WBC 12 17 at time of admission, but improved to 7 04 yesterday  · Pt afebrile, well appearing and likely stressor related - continue to monitor    8  HTN  · /106 at time of admission - now 542-133C systolic  · Chlorthalidone and losartan/HCTZ held at admission due to hyponatremia  · Continue losartan 50mg daily and hydralazine 50mg TID  9  HLD  · Continue statin    10  Paroxysmal Afib  · NSR today, rate controlled and already anticoagulated on Pradaxa  11  S/p TAVR  · Continue outpt cardiology management    12  Elevated TSH  · Followed as an outpt - 6 3 > 4 8 on 7/4/19  · Pt asymptomatic, likely due to amiodarone use  · Continue outpt f/u     13  Hx of CVA  · Anticoagulated on pradaxa  · Continue outpt neurology management  14  Thickened endometrium  · Pt seen by ObGyn on 7/1/19 as an outpt due to PMB  · EMB completed in the office on that day and pelvic US ordered  · Continue outpt management following d/c  VTE Pharmacologic Prophylaxis:   Pharmacologic: Dabigatran (Pradaxa)  Mechanical VTE Prophylaxis in Place: Yes    Patient Centered Rounds: I have performed bedside rounds with nursing staff today  Discussions with Specialists or Other Care Team Provider: none    Education and Discussions with Family / Patient: patient    Time Spent for Care: 30 minutes  More than 50% of total time spent on counseling and coordination of care as described above      Current Length of Stay: 6 day(s)    Current Patient Status: Inpatient   Certification Statement: The patient will continue to require additional inpatient hospital stay due to monitoring sodium levels    Discharge Plan / Estimated Discharge Date: pending clinical course    Code Status: Level 1 - Full Code      Subjective:   81 yo F who is seen and examined on HD6 c/o feeling much improved and waiting for discharge  She c/o chronic dizziness, but denies any HA, syncope, CP, SOB, abdominal pain, N/V/D, leg pain or leg swelling  She is eating and drinking adequately without surpassing fluid restriction  She admits to feeling slightly confused this morning when she woke up around 4AM  She states she felt "out of sorts" and didn't know where she was  She ultimately was able to redirect herself and "figure it out"  Denies any confusion currently  Objective:     Vitals:   Temp (24hrs), Av 2 °F (36 8 °C), Min:97 7 °F (36 5 °C), Max:98 7 °F (37 1 °C)    Temp:  [97 7 °F (36 5 °C)-98 7 °F (37 1 °C)] 97 7 °F (36 5 °C)  HR:  [54-61] 54  Resp:  [18] 18  BP: (147-178)/(55-91) 148/55  SpO2:  [95 %-98 %] 96 %  Body mass index is 37 04 kg/m²  Input and Output Summary (last 24 hours):     No intake or output data in the 24 hours ending 07/10/19 1001    Physical Exam:     Physical Exam   Constitutional: She is oriented to person, place, and time  She appears well-developed and well-nourished  No distress  Sitting upright in bed   HENT:   Head: Normocephalic and atraumatic  Right Ear: External ear normal    Left Ear: External ear normal    Nose: Nose normal    Mouth/Throat: Oropharynx is clear and moist    Eyes: Conjunctivae and EOM are normal    Neck: Normal range of motion  Neck supple  Cardiovascular: Normal rate, regular rhythm, normal heart sounds and intact distal pulses  No murmur heard  Pulmonary/Chest: Effort normal and breath sounds normal  No stridor  No respiratory distress  She has no wheezes  She has no rales  Abdominal: Soft  Bowel sounds are normal  She exhibits no distension  There is no tenderness  Musculoskeletal: Normal range of motion   She exhibits no edema or tenderness  No b/l LE edema or calf tenderness  Neurological: She is alert and oriented to person, place, and time  Pt elicits slight confusion this AM, but is currently awake, alert and oriented x 3  Skin: Skin is warm and dry  No rash noted  She is not diaphoretic  Psychiatric: She has a normal mood and affect  Her behavior is normal    Nursing note and vitals reviewed  Additional Data:     Labs:    Results from last 7 days   Lab Units 07/09/19  0522  07/05/19  0524   WBC Thousand/uL 7 04   < > 4 69   HEMOGLOBIN g/dL 10 8*   < > 11 2*   HEMATOCRIT % 32 6*   < > 34 1*   PLATELETS Thousands/uL 238   < > 244   NEUTROS PCT %  --   --  49   LYMPHS PCT %  --   --  32   MONOS PCT %  --   --  16*   EOS PCT %  --   --  2    < > = values in this interval not displayed  Results from last 7 days   Lab Units 07/10/19  0453  07/04/19 2006   POTASSIUM mmol/L 4 2   < > 5 2   CHLORIDE mmol/L 97*   < > 91*   CO2 mmol/L 28   < > 23   BUN mg/dL 15   < > 19   CREATININE mg/dL 0 86   < > 0 96   CALCIUM mg/dL 9 2   < > 9 0   ALK PHOS U/L  --   --  61   ALT U/L  --   --  30   AST U/L  --   --  14    < > = values in this interval not displayed  Results from last 7 days   Lab Units 07/04/19  1925   INR  2 10*       * I Have Reviewed All Lab Data Listed Above  * Additional Pertinent Lab Tests Reviewed:  [facility] Admission Reviewed    Imaging:    Imaging Reports Reviewed Today Include: all  Imaging Personally Reviewed by Myself Includes:  all    Recent Cultures (last 7 days):           Last 24 Hours Medication List:     Current Facility-Administered Medications:  acetaminophen 650 mg Oral Q6H PRN ROBBI Hernandez   albuterol 2 puff Inhalation Q6H PRN ROBBI Hernandez   amiodarone 200 mg Oral Daily ROBBI Hernandez   bimatoprost 1 drop Both Eyes HS ROBBI Hernandez   dabigatran etexilate 150 mg Oral Q12H [facility], CRNP   hydrALAZINE 5 mg Intravenous Q4H PRN Crystal Juan Miguel, CRNP   hydrALAZINE 50 mg Oral TID Jem Young, ROBBI   insulin lispro 1-5 Units Subcutaneous HS Crystal Juan Miguel, CRNP   insulin lispro 1-6 Units Subcutaneous TID AC Crystal Juan Miguel, CRNP   losartan 50 mg Oral Daily Brian Corea MD   meclizine 12 5 mg Oral Q12H PRN Crystal Juan Miguel, CRNP   ondansetron 4 mg Intravenous Q6H PRN Crystal Juan Miguel, CRNP   polyvinyl alcohol 1 drop Both Eyes TID Lilliana Echavarria DO   pravastatin 40 mg Oral Daily With [id number], CRNP   timolol 1 drop Right Eye Daily Crystal Bullard, [facility], Patient Was Seen By: Abel Lu , PA-S    7/10/2019  10:01 AM      ** Please Note: This note has been constructed using a voice recognition system   **

## 2019-07-11 ENCOUNTER — TRANSITIONAL CARE MANAGEMENT (OUTPATIENT)
Dept: FAMILY MEDICINE CLINIC | Facility: CLINIC | Age: 84
End: 2019-07-11

## 2019-07-17 ENCOUNTER — OFFICE VISIT (OUTPATIENT)
Dept: FAMILY MEDICINE CLINIC | Facility: CLINIC | Age: 84
End: 2019-07-17
Payer: COMMERCIAL

## 2019-07-17 VITALS
BODY MASS INDEX: 36.31 KG/M2 | HEIGHT: 58 IN | DIASTOLIC BLOOD PRESSURE: 58 MMHG | RESPIRATION RATE: 16 BRPM | TEMPERATURE: 99.6 F | SYSTOLIC BLOOD PRESSURE: 140 MMHG | HEART RATE: 60 BPM | WEIGHT: 173 LBS

## 2019-07-17 DIAGNOSIS — E87.1 HYPONATREMIA: ICD-10-CM

## 2019-07-17 DIAGNOSIS — E11.649 TYPE 2 DIABETES MELLITUS WITH HYPOGLYCEMIA WITHOUT COMA, WITHOUT LONG-TERM CURRENT USE OF INSULIN (HCC): ICD-10-CM

## 2019-07-17 DIAGNOSIS — I10 ESSENTIAL HYPERTENSION: Chronic | ICD-10-CM

## 2019-07-17 DIAGNOSIS — R79.89 ELEVATED TSH: ICD-10-CM

## 2019-07-17 DIAGNOSIS — D64.9 ANEMIA, UNSPECIFIED TYPE: ICD-10-CM

## 2019-07-17 DIAGNOSIS — I63.521 CEREBROVASCULAR ACCIDENT (CVA) DUE TO OCCLUSION OF RIGHT ANTERIOR CEREBRAL ARTERY (HCC): ICD-10-CM

## 2019-07-17 DIAGNOSIS — I48.0 PAROXYSMAL ATRIAL FIBRILLATION (HCC): ICD-10-CM

## 2019-07-17 DIAGNOSIS — Z09 HOSPITAL DISCHARGE FOLLOW-UP: Primary | ICD-10-CM

## 2019-07-17 DIAGNOSIS — G93.41 METABOLIC ENCEPHALOPATHY: ICD-10-CM

## 2019-07-17 DIAGNOSIS — R93.89 THICKENED ENDOMETRIUM: ICD-10-CM

## 2019-07-17 PROCEDURE — 1111F DSCHRG MED/CURRENT MED MERGE: CPT | Performed by: NURSE PRACTITIONER

## 2019-07-17 PROCEDURE — 99496 TRANSJ CARE MGMT HIGH F2F 7D: CPT | Performed by: NURSE PRACTITIONER

## 2019-07-17 RX ORDER — ADHESIVE BANDAGE 3/4"
BANDAGE TOPICAL 2 TIMES DAILY
Qty: 1 EACH | Refills: 0 | Status: SHIPPED | OUTPATIENT
Start: 2019-07-17

## 2019-07-17 NOTE — ASSESSMENT & PLAN NOTE
Lab Results   Component Value Date    HGBA1C 6 3 06/05/2019     Glipizide and Tradjenta D/C'd at discharge, Metformin also decreased to 500 mg daily  BS now running high in the 200s   We will add back on Tradjenta 5 mg daily   She will continue to monitor BS at home and bring log to her appt here with Dr Claudell Mode on 7/29/19

## 2019-07-17 NOTE — ASSESSMENT & PLAN NOTE
Hgb at around 11 prior to discharge, baseline is in the 13s  2/2 to vaginal bleeding possibly (which has stopped)  Will need a repeat CBC

## 2019-07-17 NOTE — ASSESSMENT & PLAN NOTE
Now taking Losartan Losartan 50 mg and Hydralazine 50 mg TID  Chlorthalidone and Losartan/HCTZ D/C'd due to hyponatremia   BP is acceptable today  I did ask her to check BP BID and also bring this log to appt on 7/29/19 with our office  Follow a low sodium diet

## 2019-07-17 NOTE — ASSESSMENT & PLAN NOTE
Na up to 133   She is limiting her water intake  BMP done yesterday, 7/16/19, at [address] (no results available at this time)

## 2019-07-17 NOTE — PROGRESS NOTES
Chief Complaint   Patient presents with    Transition of Care Management     6/4-4/66/88 Metabolic encephalopathy     Health Maintenance   Topic Date Due    HEPATITIS B VACCINES (1 of 3 - Risk 3-dose series) 03/23/1954    DTaP,Tdap,and Td Vaccines (1 - Tdap) 03/23/1956    Pneumococcal Vaccine: 65+ Years (1 of 2 - PCV13) 03/23/2000    Medicare Annual Wellness Visit (AWV)  08/25/2018    PT PLAN OF CARE  12/13/2018    INFLUENZA VACCINE  09/17/2019 (Originally 7/1/2019)    Urinary Incontinence Screening  07/23/2019    HEMOGLOBIN A1C  09/05/2019    Diabetic Foot Exam  11/05/2019    DM Eye Exam  03/12/2020    Fall Risk  03/18/2020    Depression Screening PHQ  03/18/2020    BMI: Followup Plan  03/18/2020    URINE MICROALBUMIN  06/05/2020    BMI: Adult  06/30/2020    DXA SCAN  05/02/2022    Pneumococcal Vaccine: Pediatrics (0 to 5 Years) and At-Risk Patients (6 to 59 Years)  Aged Out     Assessment/Plan:     RTO as scheduled on 7/29/19   Bring BP and BS along to visit   BMP results from 7/16/19 pending  Needs repeat CBC (perferably within the next week or so) and TSH in approximately 6 weeks     Type 2 diabetes mellitus with hypoglycemia without coma ([facility] )  Lab Results   Component Value Date    HGBA1C 6 3 06/05/2019     Glipizide and Tradjenta D/C'd at discharge, Metformin also decreased to 500 mg daily  BS now running high in the 200s   We will add back on Tradjenta 5 mg daily   She will continue to monitor BS at home and bring log to her appt here with Dr Skyler Cerna on 7/29/19    Hypertension  Now taking Losartan Losartan 50 mg and Hydralazine 50 mg TID  Chlorthalidone and Losartan/HCTZ D/C'd due to hyponatremia   BP is acceptable today  I did ask her to check BP BID and also bring this log to appt on 7/29/19 with our office  Follow a low sodium diet    Paroxysmal atrial fibrillation ([facility] )  Stable, managed by Cardiology  Continue Pradaxa, Amiodarone     CVA (cerebral vascular accident) ([facility] )  Stable, managed by Neurology  Continue Pradaxa, statin    Metabolic encephalopathy  Multifactorial including hypoglycemia and hyponatremia  Resolved at this time    Hyponatremia  Na up to 133   She is limiting her water intake  BMP done yesterday, 7/16/19, at [address] (no results available at this time)    Thickened endometrium  Managed by GYN  Recent biopsy negative   No longer having abnormal uterine bleeding    Elevated TSH  TSH down to 4 8  This should be rechecked in approximately 6 weeks or so    Anemia  Hgb at around 11 prior to discharge, baseline is in the 13s  2/2 to vaginal bleeding possibly (which has stopped)  Will need a repeat CBC       Diagnoses and all orders for this visit:    Hospital discharge follow-up    Essential hypertension  -     Blood Pressure Monitoring (BLOOD PRESSURE CUFF) MISC; by Does not apply route 2 (two) times a day  -     Misc  Devices (STETHOSCOPE) MISC; by Does not apply route 2 (two) times a day    Type 2 diabetes mellitus with hypoglycemia without coma, without long-term current use of insulin (HCC)  -     linaGLIPtin (TRADJENTA) 5 MG TABS; Take 5 mg by mouth daily    Paroxysmal atrial fibrillation (HCC)    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)    Metabolic encephalopathy    Hyponatremia    Thickened endometrium    Anemia, unspecified type  -     CBC and differential; Future    Elevated TSH  -     TSH, 3rd generation with Free T4 reflex; Future         Subjective:     Patient ID: Aaron Lynn is a 80 y o  female      HPI      Pt presents with her daughter today for a TCM  Admitted to [facility] 7/4/19 to 1/31/36 with metabolic encephalopathy   Found to be hypertensive and hypoglycemia in the ED  Once admitted, found to continue with low blood sugars  Her home regimen included Glipizide 10 mg BID, Tradjenta 5 mg and Metformin 500 mg BID  Her Glipizide was initially decreased, then held, then discontinued  Her Tradjenta was also discontinued and Metformin was decreased to 500 mg one tab daily   A1C at 6 3 from June of 2019  Since being home, blood sugars have been running low 200s    Pt was also found to have hyponatremia with Na at 125  [place] to be secondary to increased water intake for a recent pelvic U/S  Her Chlorthalidone was discontinued and Losartan/HCTZ was switched to just Losartan   She was started on D5W and her Sodium elevated to 133 (she was evaluated by Nephrology during her stay)    For her HTN, again, Chlorthalidone stopped and Losartan/HCTZ switched to Losartan  Also taking Hydralazine 50 mg TID  She does check her BP at home but forgot her log today and can't recall the readings   Her daughter would like a script for a manual BP cuff and stethoscope to check BP at home  She is comfortable with doing this   She denies chest pain, palpitations, edema, SOB, dizziness, headaches     Her metabolic encephalopathy was felt to be multifactorial, 2/2 to hypoglycemia and hyponatremia     Pt with a H/O CVA, follows with Neurology   On Pradaxa, statin    PAF- follows with cardiology   On Pradaxa and Amiodarone     Incidental finding of thickened endometrium pt was just recently evaluated by GYN, had a biopsy and U/S  She was having abnormal uterine bleeding but this has since stopped    Elevated TSH- TSH at 6 3, down to 4 8    Anemia- baseline Hgb around 13, down to 11 during her hospitalization   Felt to be 2/2 to vaginal bleeding   She denies SOB, lightheadedness, weakness     Pt was discharged home  She lives with her daughter and son in law  Feels she is back to her baseline  Offers no acute complaints today  She did just have a BMP done yesterday, 7/16/19 at [address] [place] [address] (no results available as of yet)    Review of Systems   Constitutional: Negative for chills, fatigue and fever  Respiratory: Negative for cough, shortness of breath and wheezing  Cardiovascular: Negative for chest pain, palpitations and leg swelling     Gastrointestinal: Negative for abdominal pain, blood in stool, constipation, diarrhea and nausea  Genitourinary: Negative for dysuria  Musculoskeletal: Negative for arthralgias and myalgias  Skin: Negative for rash and wound  Neurological: Negative for dizziness, weakness, numbness and headaches  Hematological: Negative for adenopathy  Does not bruise/bleed easily  Psychiatric/Behavioral: Negative for sleep disturbance and suicidal ideas  Objective:     Physical Exam   Constitutional: She is oriented to person, place, and time  She appears well-developed and well-nourished  No distress  HENT:   Head: Normocephalic and atraumatic  Eyes: Pupils are equal, round, and reactive to light  Conjunctivae are normal    Neck: Normal range of motion  Neck supple  No thyromegaly present  Cardiovascular: Normal rate, regular rhythm and normal heart sounds  No murmur heard  No LE edema   Pulmonary/Chest: Effort normal and breath sounds normal  No respiratory distress  She has no wheezes  Abdominal: Soft  Bowel sounds are normal  She exhibits no distension  There is no tenderness  Musculoskeletal: Normal range of motion  Lymphadenopathy:     She has no cervical adenopathy  Neurological: She is alert and oriented to person, place, and time  Skin: Skin is warm and dry  She is not diaphoretic  Psychiatric: She has a normal mood and affect  Vitals:    07/17/19 1330   BP: 140/58   Pulse: 60   Resp: 16   Temp: 99 6 °F (37 6 °C)   TempSrc: Tympanic   Weight: 78 5 kg (173 lb)   Height: 4' 9 5" (1 461 m)       Transitional Care Management Review:  Uriel Ceron is a 80 y o  female here for TCM follow up       During the TCM phone call patient stated:    TCM Call (since 6/16/2019)     Date and time call was made  7/11/2019  8:52 AM    Hospital care reviewed  Records reviewed    Patient was hospitialized at  [facility]    Date of Admission  07/04/19    Date of discharge  07/10/19    Diagnosis  Metabolic encephalopathy    Disposition  Home    Were the patients medications reviewed and updated  Yes    Current Symptoms  None      TCM Call (since 6/16/2019)     Post hospital issues  None    Should patient be enrolled in anticoag monitoring? Yes PRADAXA    Scheduled for follow up?   Yes    Patients specialists  Nephrologist    Did you obtain your prescribed medications  Yes    Do you need help managing your prescriptions or medications  No    Is transportation to your appointment needed  Yes    Specify why  Provided by daughter    I have advised the patient to call PCP with any new or worsening symptoms  Rosario Candelaria, [address] [facility]    The type of support provided  Emotional; Physical    Do you have social support  Yes, as much as I need    Are you recieving any outpatient services  No    Are you recieving home care services  No    Are you using any community resources  No    Current waiver services  No    Have you fallen in the last 12 months  No    Interperter language line needed  No    Counseling  Family    Counseling topics  instructions for management          ROBBI Robles

## 2019-07-23 ENCOUNTER — OFFICE VISIT (OUTPATIENT)
Dept: CARDIOLOGY CLINIC | Facility: CLINIC | Age: 84
End: 2019-07-23
Payer: COMMERCIAL

## 2019-07-23 VITALS
BODY MASS INDEX: 37.54 KG/M2 | HEART RATE: 60 BPM | OXYGEN SATURATION: 97 % | SYSTOLIC BLOOD PRESSURE: 162 MMHG | DIASTOLIC BLOOD PRESSURE: 62 MMHG | WEIGHT: 174 LBS | HEIGHT: 57 IN

## 2019-07-23 DIAGNOSIS — I10 ESSENTIAL HYPERTENSION: ICD-10-CM

## 2019-07-23 PROCEDURE — 99214 OFFICE O/P EST MOD 30 MIN: CPT | Performed by: INTERNAL MEDICINE

## 2019-07-23 RX ORDER — HYDRALAZINE HYDROCHLORIDE 25 MG/1
75 TABLET, FILM COATED ORAL 3 TIMES DAILY
Qty: 90 TABLET | Refills: 3 | Status: SHIPPED | OUTPATIENT
Start: 2019-07-23 | End: 2019-07-23 | Stop reason: SDUPTHER

## 2019-07-23 RX ORDER — HYDRALAZINE HYDROCHLORIDE 25 MG/1
75 TABLET, FILM COATED ORAL 3 TIMES DAILY
Qty: 270 TABLET | Refills: 3 | Status: SHIPPED | OUTPATIENT
Start: 2019-07-23 | End: 2019-11-30 | Stop reason: SDUPTHER

## 2019-07-23 RX ORDER — NIFEDIPINE 30 MG/1
60 TABLET, EXTENDED RELEASE ORAL DAILY
Qty: 60 TABLET | Refills: 5 | Status: SHIPPED | OUTPATIENT
Start: 2019-07-23 | End: 2019-07-31

## 2019-07-23 NOTE — PROGRESS NOTES
[facility] [place]  [address]  [facility], [address] [facility]  [phone]    Cardiology Follow up    Patient:  Sharifa Newton  :  1935  MRN:  [id number]    History of Present Illness:     80-year-old female with past medical history of aortic stenosis status post recent transcatheter aortic valve replacement, coronary disease with a 60% 1st obtuse marginal stenosis as well as the mid RCA 50% stenosis, persistent atrial fibrillation status post cardioversion, previous CVA/TIA and recurrent CVA on Xarelto now on Pradaxa, less than 50% carotid stenosis bilaterally from 2018, diastolic heart failure, diabetes, hypertension, dyslipidemia, obstructive sleep apnea not on CPAP, anxiety/depression presents for cardiology follow-up  She was hospitalized for hyponatremia that may have been from excessive free water intake prior to a pelvic ultrasound as well as secondary to hydrochlorothiazide  She did have some vaginal bleeding which has resolved  She continues to have some dizziness which has been a chronic problem for her    She has not had any chest pain, shortness of breath, palpitations, or syncope    Patient Active Problem List   Diagnosis    Type 2 diabetes mellitus with hyperglycemia, without long-term current use of insulin (HCC)    Glaucoma    Hyperlipidemia    Hypertension    Hyponatremia    Depression with anxiety    Paroxysmal atrial fibrillation (HCC)    Ataxia    Type 2 diabetes mellitus with hyperglycemia (HCC)    CVA (cerebral vascular accident) ([facility] )    Hearing loss    Insomnia    Left knee pain    PERI (obstructive sleep apnea)    Osteoarthritis    Osteoporosis    Periodic limb movement sleep disorder    Sleep talking    Tinnitus    Tricuspid valve disorders, non-rheumatic    Unsteady gait    History of stroke    Chronic diastolic heart failure (HCC)    S/P TAVR (transcatheter aortic valve replacement)    Severe dizziness    Thickened endometrium    Obesity (BMI 30-39  9)    History of CVA (cerebrovascular accident)    Headache    Esophageal thickening    Elevated TSH    Metabolic encephalopathy    Type 2 diabetes mellitus with hypoglycemia without coma (HCC)    Dysphagia    Injury of finger of left hand    Anemia       Past Surgical History  Past Surgical History:   Procedure Laterality Date    APPENDECTOMY      GLAUCOMA SURGERY  01/03/2016    LA REPLACE AORTIC VALVE OPENFEMORAL ARTERY APPROACH N/A 4/17/2018    Procedure: REPLACEMENT AORTIC VALVE TRANSCATHETER (TAVR) TRANSFEMORAL W/ 23 MM AGUILAR STEVE S3 VALVE;  Surgeon: Diomedes Perez DO;  Location: [facility];  Service: Cardiac Surgery    TUBAL LIGATION         Social History   Social History     Socioeconomic History    Marital status:       Spouse name: Not on file    Number of children: Not on file    Years of education: Not on file    Highest education level: Not on file   Occupational History    Not on file   Social Needs    Financial resource strain: Not on file    Food insecurity:     Worry: Not on file     Inability: Not on file    Transportation needs:     Medical: Not on file     Non-medical: Not on file   Tobacco Use    Smoking status: Never Smoker    Smokeless tobacco: Never Used   Substance and Sexual Activity    Alcohol use: Never     Frequency: Never     Binge frequency: Never    Drug use: Never    Sexual activity: Not Currently   Lifestyle    Physical activity:     Days per week: Not on file     Minutes per session: Not on file    Stress: Not on file   Relationships    Social connections:     Talks on phone: Not on file     Gets together: Not on file     Attends Sabianist service: Not on file     Active member of club or organization: Not on file     Attends meetings of clubs or organizations: Not on file     Relationship status: Not on file    Intimate partner violence:     Fear of current or ex partner: Not on file     Emotionally abused: Not on file Physically abused: Not on file     Forced sexual activity: Not on file   Other Topics Concern    Not on file   Social History Narrative    Not on file        Allergies   Allergen Reactions    Lipitor [Atorvastatin] GI Intolerance     Nausea stomach ache    Hydrochlorothiazide GI Intolerance    Lisinopril Cough    Metoprolol Headache    Aspirin GI Intolerance       Family History   Family History   Problem Relation Age of Onset    No Known Problems Mother     Diabetes Father     Stroke Father     Coronary artery disease Sister     No Known Problems Brother     No Known Problems Son     Breast cancer Daughter     No Known Problems Maternal Grandmother     No Known Problems Maternal Grandfather     No Known Problems Paternal Grandmother     No Known Problems Paternal Grandfather     No Known Problems Cousin     Rheum arthritis Neg Hx     Osteoarthritis Neg Hx     Asthma Neg Hx     Heart failure Neg Hx     Hyperlipidemia Neg Hx     Hypertension Neg Hx     Migraines Neg Hx     Rashes / Skin problems Neg Hx     Seizures Neg Hx     Thyroid disease Neg Hx        Review of Systems:  Review of Systems   Constitutional: Negative for chills, fatigue and fever  HENT: Negative for hearing loss, nosebleeds and tinnitus  Eyes: Negative for pain  Respiratory: Negative for cough, chest tightness and shortness of breath  Cardiovascular: Negative for chest pain, palpitations and leg swelling  Gastrointestinal: Negative for abdominal pain, nausea and vomiting  Endocrine: Negative for cold intolerance and heat intolerance  Genitourinary: Negative for difficulty urinating, hematuria and vaginal bleeding  Musculoskeletal: Negative for arthralgias  Skin: Negative for rash  Allergic/Immunologic: Negative for environmental allergies  Neurological: Negative for dizziness, syncope, weakness and light-headedness     Psychiatric/Behavioral: Negative for decreased concentration and sleep disturbance  The patient is not nervous/anxious  Current Outpatient Medications:     Acetaminophen (TYLENOL) 325 MG CAPS, Take by mouth as needed , Disp: , Rfl:     albuterol (PROAIR HFA) 90 mcg/act inhaler, Inhale 2 puffs every 6 (six) hours as needed for wheezing, Disp: 8 5 g, Rfl: 1    amiodarone 200 mg tablet, Take 1 tablet (200 mg total) by mouth daily, Disp: 90 tablet, Rfl: 2    Bimatoprost (LUMIGAN OP), Administer 1 drop to both eyes daily at bedtime Both eyes, Disp: , Rfl:     Blood Pressure Monitoring (BLOOD PRESSURE CUFF) MISC, by Does not apply route 2 (two) times a day, Disp: 1 each, Rfl: 0    Cholecalciferol (VITAMIN D3) 1000 units CAPS, Take 5,000 Units by mouth daily , Disp: , Rfl:     Cyanocobalamin (VITAMIN B 12 PO), Take 1,000 mcg by mouth daily , Disp: , Rfl:     dabigatran etexilate (PRADAXA) 150 mg capsu, Take 1 capsule (150 mg total) by mouth every 12 (twelve) hours for 90 days, Disp: 180 capsule, Rfl: 1    hydrALAZINE (APRESOLINE) 50 mg tablet, Take 1 tablet (50 mg total) by mouth 3 (three) times a day for 90 days, Disp: 90 tablet, Rfl: 3    linaGLIPtin (TRADJENTA) 5 MG TABS, Take 5 mg by mouth daily, Disp: 30 tablet, Rfl: 0    losartan (COZAAR) 50 mg tablet, Take 1 tablet (50 mg total) by mouth daily, Disp: 30 tablet, Rfl: 0    meclizine (ANTIVERT) 12 5 MG tablet, Take 1 tablet (12 5 mg total) by mouth every 12 (twelve) hours as needed for dizziness, Disp: 15 tablet, Rfl: 2    metFORMIN (GLUCOPHAGE) 500 mg tablet, Take 1 tablet (500 mg total) by mouth daily with breakfast for 162 days, Disp: 180 tablet, Rfl: 0    Misc  Devices (STETHOSCOPE) MISC, by Does not apply route 2 (two) times a day, Disp: 1 each, Rfl: 0    ONE TOUCH ULTRA TEST test strip, TEST twice a day, Disp: 200 each, Rfl: 3    ONETOUCH DELICA LANCETS 40G MISC, TEST TWICE DAILY  , Disp: 200 each, Rfl: 3    potassium chloride (K-DUR,KLOR-CON) 10 mEq tablet, Take 1 tablet (10 mEq total) by mouth daily, Disp: 30 tablet, Rfl: 5    RESTASIS 0 05 % ophthalmic emulsion, , Disp: , Rfl: 0    rosuvastatin (CRESTOR) 10 MG tablet, take 1 tablet by mouth at bedtime, Disp: 30 tablet, Rfl: 5    timolol (TIMOPTIC) 0 5 % ophthalmic solution, PLACE 1 DROP IN THE RIGH EYE EVERY MORNING, Disp: , Rfl: 0     Physical Exam:    There were no vitals filed for this visit  Physical Exam   Constitutional: She is oriented to person, place, and time  She appears well-developed and well-nourished  HENT:   Head: Normocephalic  Right Ear: External ear normal    Left Ear: External ear normal    Mouth/Throat: Oropharynx is clear and moist    Eyes: Pupils are equal, round, and reactive to light  Neck: No JVD present  Carotid bruit is not present  Cardiovascular: Normal rate, regular rhythm and intact distal pulses  Exam reveals no gallop and no friction rub  No murmur heard  Pulmonary/Chest: Effort normal and breath sounds normal  No tachypnea  No respiratory distress  She has no wheezes  She has no rales  She exhibits no tenderness  Abdominal: Soft  She exhibits no distension  There is no tenderness  There is no rebound and no guarding  Musculoskeletal: She exhibits no edema  Neurological: She is alert and oriented to person, place, and time  Skin: Skin is warm and dry  Psychiatric: She has a normal mood and affect  Her behavior is normal  Judgment and thought content normal    Nursing note and vitals reviewed  Labs:not applicable    Assessment/Plan:    1  Persistent atrial fibrillation status post cardioversion now in sinus rhythm  She is on amiodarone and Pradaxa  Recent comprehensive metabolic panel and TSH were within normal limits  She will need to follow up with Ophthalmology and pulmonology  2  Hypertension  This is not controlled-blood pressure treatment has been limited by possible side effects to medicines      I would like to increase the hydralazine to 75 mg 3 times per day and have her retry nifedipine at 30 mg daily for few days and if she tolerates this go back to 60 mg daily  Otherwise we may need to try an alpha-blocker    3  Coronary disease  She is on anticoagulation and statin  4  History of CVA  As above  5  Bilateral carotid disease less than 50%  This is noted  6  Status post TAVR  This is noted  7  Vaginal bleeding-she seems to be tolerating the Pradaxa and has had no recurrence  We will see her back next month for follow-up  Thank you so much, please not hesitate to contact me with any questions or concerns        Fanny Henriquez MD  7/23/2019  1:35 PM

## 2019-07-29 ENCOUNTER — OFFICE VISIT (OUTPATIENT)
Dept: FAMILY MEDICINE CLINIC | Facility: CLINIC | Age: 84
End: 2019-07-29
Payer: COMMERCIAL

## 2019-07-29 VITALS
BODY MASS INDEX: 37.58 KG/M2 | RESPIRATION RATE: 16 BRPM | OXYGEN SATURATION: 96 % | SYSTOLIC BLOOD PRESSURE: 168 MMHG | TEMPERATURE: 98.9 F | WEIGHT: 174.2 LBS | DIASTOLIC BLOOD PRESSURE: 80 MMHG | HEART RATE: 68 BPM | HEIGHT: 57 IN

## 2019-07-29 DIAGNOSIS — I10 ESSENTIAL HYPERTENSION: Chronic | ICD-10-CM

## 2019-07-29 DIAGNOSIS — E11.65 TYPE 2 DIABETES MELLITUS WITH HYPERGLYCEMIA, WITHOUT LONG-TERM CURRENT USE OF INSULIN (HCC): Primary | ICD-10-CM

## 2019-07-29 DIAGNOSIS — K22.89 ESOPHAGEAL THICKENING: ICD-10-CM

## 2019-07-29 DIAGNOSIS — E78.5 HYPERLIPIDEMIA, UNSPECIFIED HYPERLIPIDEMIA TYPE: Chronic | ICD-10-CM

## 2019-07-29 PROCEDURE — 99214 OFFICE O/P EST MOD 30 MIN: CPT | Performed by: FAMILY MEDICINE

## 2019-07-29 NOTE — PROGRESS NOTES
Chief Complaint   Patient presents with    Follow-up     4 months  Health Maintenance   Topic Date Due    HEPATITIS B VACCINES (1 of 3 - Risk 3-dose series) 03/23/1954    DTaP,Tdap,and Td Vaccines (1 - Tdap) 03[birth year]/1956    Pneumococcal Vaccine: 65+ Years (1 of 2 - PCV13) 03/23/2000    Medicare Annual Wellness Visit (AWV)  08/25/2018    PT PLAN OF CARE  12/13/2018    Urinary Incontinence Screening  07/23/2019    INFLUENZA VACCINE  09/17/2019 (Originally 7/1/2019)    HEMOGLOBIN A1C  09/05/2019    Diabetic Foot Exam  11/05/2019    DM Eye Exam  03/12/2020    Fall Risk  03/18/2020    Depression Screening PHQ  03/18/2020    BMI: Followup Plan  03/18/2020    URINE MICROALBUMIN  06/05/2020    BMI: Adult  07/23/2020    DXA SCAN  05/02/2022    Pneumococcal Vaccine: Pediatrics (0 to 5 Years) and At-Risk Patients (6 to 59 Years)  Aged Out     BMI Counseling: Body mass index is 37 7 kg/m²  Discussed the patient's BMI with her  The BMI is above average  BMI counseling and education was provided to the patient  Nutrition recommendations include reducing portion sizes, decreasing overall calorie intake and 3-5 servings of fruits/vegetables daily  Assessment/Plan:    DM---controlled  Increase to metformin 500mg bid and tradjenta 5mg QD  HTN---Continue losartan 50mg QD, hydralazine 75mg tid, nifedipine 30mg QD per cardiology  BP elevated in office today  Pt denies symptoms like headache, vision change, SOB or chest pain  DASH diet  Check BP at home 2/day and call office if BP always >140/90  Hyperlipidemia---controlled  Low fat diet  Continue crestor per cardiology  Afib---continue amiodarone  FU cardiology  CVA---continue pradaxa  FU neurology  Esophageal thickening---refer to GI     Dizzy---continue meclizine 12 5mg QD      Refused flu shot  Refused PCV13 and pneumovax     RTO in 4 months           Diagnoses and all orders for this visit:    Type 2 diabetes mellitus with hyperglycemia, without long-term current use of insulin (HCC)  -     Comprehensive metabolic panel; Future  -     Hemoglobin A1C; Future  -     Lipid Panel with Direct LDL reflex; Future    Essential hypertension  -     Comprehensive metabolic panel; Future  -     Hemoglobin A1C; Future  -     Lipid Panel with Direct LDL reflex; Future    Hyperlipidemia, unspecified hyperlipidemia type  -     Comprehensive metabolic panel; Future  -     Hemoglobin A1C; Future  -     Lipid Panel with Direct LDL reflex; Future    Esophageal thickening  -     Ambulatory referral to Gastroenterology; Future          Subjective:      Patient ID: Sunshine Arvizu is a 80 y o  female  HPI    Pt is here with her daughter and her son        DM---5/2019 HgA1C 6 1 better  She is on metformin 500mg QD and tradjenta 5mg QD  Blood sugar at home 200 recently  Denies hypoglycemia  Occasionally hands numbness or tingling  FU opthalmology Dr Courtney Pinto and Dr Merle Cruz regularly Q 3 months  Fu podiatry Dr Ana [facility] Q 6 month       HTN---Stopped lisinopril because it caused her coughing  Stopped metoprolol because it gave her severe headache  Saw cardiology, increase hydralazine to 75mg tid and nifedipine 60mg QD  Pt states nifedipine 60mg made her dizzy, she is on 30mg QD only     She is on losartan 50mg QD also     Afib---she is on amiodarone per cardiology  CVA---She is on pradaxa 150mg bid       Hyperlipidemia---She is on crestor per cardiology       Dizzy---She is on meclizine 12 5mg QD which helped  Tried PT but no help  Saw ENT, does not think it is from inner ear  Possible from previous stroke  Esophageal thickening---5/2019 CTA showed mild esophageal thickening  Pt did not see GI yet       Lives with family     Denies recent falls        The following portions of the patient's history were reviewed and updated as appropriate: allergies, current medications, past family history, past medical history, past social history, past surgical history and problem list     Review of Systems   Constitutional: Negative for appetite change, chills and fever  HENT: Negative for congestion, ear pain, sinus pain and sore throat  Eyes: Negative for discharge and itching  Respiratory: Negative for apnea, cough, chest tightness, shortness of breath and wheezing  Cardiovascular: Negative for chest pain, palpitations and leg swelling  Gastrointestinal: Negative for abdominal pain, anal bleeding, constipation, diarrhea, nausea and vomiting  Endocrine: Negative for cold intolerance, heat intolerance and polyuria  Genitourinary: Negative for difficulty urinating and dysuria  Musculoskeletal: Negative for arthralgias, back pain and myalgias  Skin: Negative for rash  Neurological: Negative for dizziness and headaches  Psychiatric/Behavioral: Negative for agitation  Objective:      /80 (BP Location: Left arm, Patient Position: Sitting, Cuff Size: Adult)   Pulse 68   Temp 98 9 °F (37 2 °C) (Tympanic)   Resp 16   Ht 4' 9" (1 448 m)   Wt 79 kg (174 lb 3 2 oz)   SpO2 96%   BMI 37 70 kg/m²          Physical Exam   Constitutional: She appears well-developed  No distress  HENT:   Head: Normocephalic  Eyes: Pupils are equal, round, and reactive to light  Conjunctivae are normal  Right eye exhibits no discharge  Left eye exhibits no discharge  Neck: Normal range of motion  No thyromegaly present  Cardiovascular: Normal rate, regular rhythm and normal heart sounds  Exam reveals no gallop and no friction rub  Pulses are weak pulses  No murmur heard  Pulses:       Dorsalis pedis pulses are 1+ on the right side, and 1+ on the left side  Pulmonary/Chest: Effort normal and breath sounds normal  No respiratory distress  She has no wheezes  She has no rales  She exhibits no tenderness  Abdominal: Soft  Bowel sounds are normal    Musculoskeletal: Normal range of motion  She exhibits no edema     Feet:   Right Foot:   Skin Integrity: Negative for ulcer, skin breakdown, erythema, warmth, callus or dry skin  Left Foot:   Skin Integrity: Negative for ulcer, skin breakdown, erythema, warmth, callus or dry skin  Lymphadenopathy:     She has no cervical adenopathy  Neurological: She is alert  Psychiatric: She has a normal mood and affect  Patient's shoes and socks removed  Right Foot/Ankle   Right Foot Inspection  Skin Exam: skin normal and skin intact no dry skin, no warmth, no callus, no erythema, no maceration, no abnormal color, no pre-ulcer, no ulcer and no callus                            Sensory       Monofilament testing: intact  Vascular    The right DP pulse is 1+  Left Foot/Ankle  Left Foot Inspection  Skin Exam: skin normal and skin intactno dry skin, no warmth, no erythema, no maceration, normal color, no pre-ulcer, no ulcer and no callus                                         Sensory       Monofilament: intact  Vascular    The left DP pulse is 1+  Assign Risk Category:  No deformity present;  No loss of protective sensation; Weak pulses       Risk: 1

## 2019-07-30 ENCOUNTER — TELEPHONE (OUTPATIENT)
Dept: NEPHROLOGY | Facility: CLINIC | Age: 84
End: 2019-07-30

## 2019-07-31 ENCOUNTER — OFFICE VISIT (OUTPATIENT)
Dept: NEPHROLOGY | Facility: CLINIC | Age: 84
End: 2019-07-31
Payer: COMMERCIAL

## 2019-07-31 VITALS
HEART RATE: 64 BPM | WEIGHT: 168.6 LBS | HEIGHT: 57 IN | SYSTOLIC BLOOD PRESSURE: 112 MMHG | DIASTOLIC BLOOD PRESSURE: 64 MMHG | BODY MASS INDEX: 36.38 KG/M2

## 2019-07-31 DIAGNOSIS — E87.1 HYPONATREMIA: Primary | ICD-10-CM

## 2019-07-31 DIAGNOSIS — E11.65 TYPE 2 DIABETES MELLITUS WITH HYPERGLYCEMIA, WITHOUT LONG-TERM CURRENT USE OF INSULIN (HCC): ICD-10-CM

## 2019-07-31 DIAGNOSIS — I10 ESSENTIAL HYPERTENSION: Chronic | ICD-10-CM

## 2019-07-31 DIAGNOSIS — I48.0 PAROXYSMAL ATRIAL FIBRILLATION (HCC): ICD-10-CM

## 2019-07-31 DIAGNOSIS — I50.32 CHRONIC DIASTOLIC HEART FAILURE (HCC): Chronic | ICD-10-CM

## 2019-07-31 PROBLEM — G93.41 METABOLIC ENCEPHALOPATHY: Status: RESOLVED | Noted: 2019-07-04 | Resolved: 2019-07-31

## 2019-07-31 PROCEDURE — 99213 OFFICE O/P EST LOW 20 MIN: CPT | Performed by: INTERNAL MEDICINE

## 2019-07-31 PROCEDURE — 3074F SYST BP LT 130 MM HG: CPT | Performed by: INTERNAL MEDICINE

## 2019-07-31 NOTE — PATIENT INSTRUCTIONS
Stop nifedipine for now  Call with blood pressure readings in 2 weeks  Follow up with PCP for sodium levels    - Please call me in 10 days after having your blood work done to review the results if you do not hear back from me or my office, as I may have not received the results  - please remember to perform blood work prior to the next visit  - Please call if the blood pressure top number is greater than 150 or less than 110 consistently  - Please call if you are gaining more than 2lbs in 2 days for adjustment of water pills  Things to do to reduce your blood pressure include working with all your physician to do the following:  ~ stop smoking if you smoke  ~ increase cardiovascular exercise like walking and swimming    ~ modify your diet to decrease fat and salt intake  ~ reduce your weight if you are overweight or obese   ~ increase the consumption of fruits, vegetables and whole grains  ~ decrease alcohol consumption if you consume alcohol    ~ try to minimize stress in your life with lifestyle modifications  ~ be compliant with your anti-hypertensive medications  ~ adjust your medications to help improve your vascular stiffness and decrease risks for heart attacks and strokes  ~ Please AVOID the following pain medications    LIST OF NSAIDS (NONSTEROIDAL ANTI-INFLAMMATORY DRUGS) AND MOSS-2 INHIBITORS    DIFLUNISAL (DOLOBID)  IBUPROFEN (MOTRIN, ADVIL)  FLURBIPROFEN (ANSAID)  KETOPROFEN (ORUDIS, ORUVAIL)  FENOPROFEN (NALFON)  NABUMETONE (RELAFEN)  PIROXICAM (FELDENE)  NAPROXEN (ALEVE, NAPROSYN, NAPRELAN, ANAPROX)  DICLOFENAC (VOLTAREN, CATAFLAM)  INDOMETHACIN (INDOCIN)  SULINDAC (CLINORIL)  TOLMETIN (TOLETIN)  ETODOLAC (LODINE)  MELOXICAM (MOBIC)  KETOROLAC (TORADOL)  OXAPROZIN (DAYPRO)  CELECOXIB (CELEBREX)

## 2019-07-31 NOTE — PROGRESS NOTES
Nephrology Follow up Consultation  Rosie Enriquez 80 y o  female MRN: [id number]            BACKGROUND:  Rosie Enriquez is a 80 y  o female who was referred by Jaison Pereira MD for evaluation of Follow-up    ASSESSMENT / PLAN:   80 y o   female with pmh of multiple co-morbidities including HTN, DM, CVA and AFib status post recent hospitalization at which time she was noted to be hyponatremic  Presents to the office for follow-up post recent hospitalization  1  Hyponatremia:  - Patient has a baseline creatinine of 0 7-1 0 mg/dL  Most recent labs show a Creatinine of 0 91 mg/dL  Renal function remains stable  - patient was admitted with a sodium level of 125 mEq  Sodium at the time of discharge was at 132 mEq  - most recent sodium from 7/16/19 was 135 mEq   - sodium remains stable was likely due to HCTZ use  Continue to avoid HCTZ use  - patient to follow-up with PCP with BMPs every 3 months  - Proteinuria - most recent microalbumin to creatinine ratio 55 mg/dL in June 2019  - Acid base and lytes stable  - Clinically the patient appears to be euvolemic  - Recommend to avoid use of NSAIDs, nephrotoxins  Caution advised with regards to exposure to IV contrast dye    - Discussed with the patient in depth her renal status, including the possible etiologies for developing CKD due to hypertensive nephrosclerosis plus diabetic glomerular nodular sclerosis plus age-related nephron loss plus cardiorenal syndrome    - Advised the patient that when her GFR is close to 20mL/min then will start discussing about RRT(renal replacement therapy) options such as renal transplant, peritoneal dialysis and hemodialysis  - Discussed with the patient how we need to work together to delay the progression of CKD with optimal BP control based on their age and co-morbidities, optimal BS control with HbA1c of <7% and trying to reduce proteinuria by the use of anti-proteinuric agents       2  Hypertension:  - Patient is on on losartan 50 mg p o  Q day, hydralazine 75 mg p o  T i d , nifedipine 30 mg p o  Q day  K-Dur 10 mEq p o  Q day  - advised patient to stop nifedipine and inform you blood pressures in 2 weeks  - Goal BP of < 140/90 based on age and comorbidities  - Instructed to follow low sodium (2gm)diet   - Advised to hold ACEI/ARBs if patient suffers from dehydration due to gastrointestinal losses due to risk of MOHINDER secondary to failure to autoregulate  3  Hemoglobin:  - Goal Hb of 10-12 g/dL  - Most recent labs suggestive of 10 8 grams/deciliter   - no role for IV iron at this time    4  CKD-MBD(Mineral Bone Disease): - Based on patients CKD stage following is the goal of therapy  - Maintain calcium phosphorus product of < 55   - Patient is currently at goal   - Continue patient on vitamin-D 5000 units p o  Q day    5  Lipids:  - goal LDL less than 70  - on Crestor  - management as per primary team    6  DM:  - management as per Primary team  - on metformin and Tradjenta  - last A1c of 6 3% in June 2019  7  Afib/ s/p TAVR:  - Management as per primary team  - on amiodarone  -  message sent to cardiology about discontinuation of nifedipine as patient is symptomatic and with low SBP  8  Nutrition:  - Encouraged patient to follow a renal diet comprising of low potassium, low phosphorus and protein restriction to 0 8gm/kg  - Will check serum albumin with next blood work  9  Followup:  - Patient is to follow-up p r n , with lab work to be performed every 3 months with PCP  Patient to call with blood pressure readings in 2 weeks  Noah Montilla MD, FASN, 7/31/2019, 12:51 PM             SUBJECTIVE: 80 y o  female presents to the office for follow-up status post recent hospitalization which she was managed for hyponatremia  Home SBP is about 113-115 while on hydralazine 75mg tid and nifedipine 30mg po Q24   Recently hydralazine was increased and nifedipine was added and she has been having issues with rather low blood pressures  Presents to the visit with her son and daughter-in-law  There may should concern was her low blood pressures now that she is on nifedipine along with the higher dose of hydralazine  Happy with all the information that they have gotten would prefer to minimize multiple doctors and stay with PCP for follow-up labs  Review of Systems   Constitutional: Negative for appetite change, diaphoresis and fever  HENT: Negative for congestion and sore throat  Respiratory: Negative for cough, shortness of breath and wheezing  Cardiovascular: Negative for chest pain, palpitations and leg swelling  Gastrointestinal: Negative for abdominal pain, constipation, diarrhea and nausea  Genitourinary: Negative for difficulty urinating, dysuria and hematuria  Musculoskeletal: Negative for back pain  Neurological: Positive for dizziness  Psychiatric/Behavioral: Negative for confusion  All other systems reviewed and are negative        PAST MEDICAL HISTORY:  Past Medical History:   Diagnosis Date    Ambulatory dysfunction     CHF (congestive heart failure) (formerly [facility])     pEFHF    Cholelithiasis     Coronary artery disease     Depression with anxiety     Diabetes mellitus ([facility] )     niddm    Glaucoma     Hypertension     Hyponatremia     Ischemic stroke of frontal lobe (formerly [facility])     Right     Obstructive sleep apnea     [facility]     Vertigo        PROBLEM LIST    Patient Active Problem List   Diagnosis    Type 2 diabetes mellitus with hyperglycemia, without long-term current use of insulin (formerly [facility])    Glaucoma    Hyperlipidemia    Hypertension    Hyponatremia    Depression with anxiety    Paroxysmal atrial fibrillation ([facility])    Ataxia    Type 2 diabetes mellitus with hyperglycemia ([facility])    [facility] vascular accident[facility]    Hearing loss    Insomnia    Left knee pain    PERI (obstructive sleep apnea)    Osteoarthritis    Osteoporosis    Periodic limb movement sleep disorder    Sleep talking    Tinnitus    Tricuspid valve disorders, non-rheumatic    Unsteady gait    History of stroke    Chronic diastolic heart failure (HCC)    S/P TAVR (transcatheter aortic valve replacement)    Severe dizziness    Thickened endometrium    Obesity (BMI 30-39  9)    History of CVA (cerebrovascular accident)    Headache    Esophageal thickening    Elevated TSH    Type 2 diabetes mellitus with hypoglycemia without coma (HCC)    Dysphagia    Injury of finger of left hand    Anemia       PAST SURGICAL HISTORY:  Past Surgical History:   Procedure Laterality Date    APPENDECTOMY      GLAUCOMA SURGERY  01/03/2016    OR REPLACE AORTIC VALVE OPENFEMORAL ARTERY APPROACH N/A 4/17/2018    Procedure: REPLACEMENT AORTIC VALVE TRANSCATHETER (TAVR) TRANSFEMORAL W/ 23 MM AGUILAR STEVE S3 VALVE;  Surgeon: Connie Jimenez DO;  Location: [facility];  Service: Cardiac Surgery    TUBAL LIGATION         SOCIAL HISTORY :   reports that she has never smoked  She has never used smokeless tobacco  She reports that she does not drink alcohol or use drugs      FAMILY HISTORY:  Family History   Problem Relation Age of Onset    No Known Problems Mother     Diabetes Father     Stroke Father     Coronary artery disease Sister     No Known Problems Brother     No Known Problems Son     Breast cancer Daughter     No Known Problems Maternal Grandmother     No Known Problems Maternal Grandfather     No Known Problems Paternal Grandmother     No Known Problems Paternal Grandfather     No Known Problems Cousin     Rheum arthritis Neg Hx     Osteoarthritis Neg Hx     Asthma Neg Hx     Heart failure Neg Hx     Hyperlipidemia Neg Hx     Hypertension Neg Hx     Migraines Neg Hx     Rashes / Skin problems Neg Hx     Seizures Neg Hx     Thyroid disease Neg Hx        ALLERGIES:  Allergies   Allergen Reactions    Lipitor [Atorvastatin] GI Intolerance     Nausea stomach ache    Aspirin GI Intolerance    Hydrochlorothiazide GI Intolerance    Lisinopril Cough    Metoprolol Headache           PHYSICAL EXAM:  Vitals:    07/31/19 1140 07/31/19 1201   BP: 106/62 112/64   BP Location: Left arm    Patient Position: Sitting    Cuff Size: Adult    Pulse: 64    Weight: 76 5 kg (168 lb 9 6 oz)    Height: 4' 9" (1 448 m)      Body mass index is 36 48 kg/m²  Physical Exam   Constitutional: She is oriented to person, place, and time  She appears well-developed and well-nourished  No distress  HENT:   Head: Normocephalic and atraumatic  Mouth/Throat: Oropharynx is clear and moist  No oropharyngeal exudate  Eyes: Conjunctivae are normal  No scleral icterus  Neck: Neck supple  No JVD present  No tracheal deviation present  Cardiovascular: Normal heart sounds  Exam reveals no friction rub  Pulmonary/Chest: Effort normal  She has no wheezes  She has no rales  Abdominal: Soft  She exhibits no mass  There is no tenderness  Musculoskeletal: She exhibits no edema  Neurological: She is alert and oriented to person, place, and time  Skin: Skin is warm  No rash noted  She is not diaphoretic  Psychiatric: She has a normal mood and affect  Her behavior is normal    Vitals reviewed  LABORATORY DATA:     Results from last 6 Months   Lab Units 07/10/19  0453 07/09/19  0522 07/08/19  2213  07/06/19  0536  07/05/19  0524   WBC Thousand/uL  --  7 04  --   --  6 57  --  4 69   HEMOGLOBIN g/dL  --  10 8*  --   --  10 9*  --  11 2*   HEMATOCRIT %  --  32 6*  --   --  33 0*  --  34 1*   PLATELETS Thousands/uL  --  238  --   --  233  --  244   POTASSIUM mmol/L 4 2 4 2 4 4   < > 4 2   < >  --    CHLORIDE mmol/L 97* 98* 100   < > 93*   < >  --    CO2 mmol/L 28 25 27   < > 26   < >  --    BUN mg/dL 15 13 17   < > 20   < >  --    CREATININE mg/dL 0 86 0 80 0 91   < > 0 82   < >  --    CALCIUM mg/dL 9 2 9 2 8 6   < > 9 0   < >  --     < > = values in this interval not displayed          rest all reviewed    RADIOLOGY:  No orders to display     Rest all reviewed        MEDICATIONS:    Current Outpatient Medications:     Acetaminophen (TYLENOL) 325 MG CAPS, Take by mouth as needed , Disp: , Rfl:     albuterol (PROAIR HFA) 90 mcg/act inhaler, Inhale 2 puffs every 6 (six) hours as needed for wheezing, Disp: 8 5 g, Rfl: 1    amiodarone 200 mg tablet, Take 1 tablet (200 mg total) by mouth daily, Disp: 90 tablet, Rfl: 2    Bimatoprost (LUMIGAN OP), Administer 1 drop to both eyes daily at bedtime Both eyes, Disp: , Rfl:     Blood Pressure Monitoring (BLOOD PRESSURE CUFF) MISC, by Does not apply route 2 (two) times a day, Disp: 1 each, Rfl: 0    Cholecalciferol (VITAMIN D3) 1000 units CAPS, Take 5,000 Units by mouth daily , Disp: , Rfl:     Cyanocobalamin (VITAMIN B 12 PO), Take 1,000 mcg by mouth daily , Disp: , Rfl:     dabigatran etexilate (PRADAXA) 150 mg capsu, Take 1 capsule (150 mg total) by mouth every 12 (twelve) hours for 90 days, Disp: 180 capsule, Rfl: 1    hydrALAZINE (APRESOLINE) 25 mg tablet, Take 3 tablets (75 mg total) by mouth 3 (three) times a day for 90 days, Disp: 270 tablet, Rfl: 3    linaGLIPtin (TRADJENTA) 5 MG TABS, Take 5 mg by mouth daily, Disp: 30 tablet, Rfl: 0    losartan (COZAAR) 50 mg tablet, Take 1 tablet (50 mg total) by mouth daily, Disp: 30 tablet, Rfl: 0    meclizine (ANTIVERT) 12 5 MG tablet, Take 1 tablet (12 5 mg total) by mouth every 12 (twelve) hours as needed for dizziness, Disp: 15 tablet, Rfl: 2    metFORMIN (GLUCOPHAGE) 500 mg tablet, Take 1 tablet (500 mg total) by mouth daily with breakfast for 162 days, Disp: 180 tablet, Rfl: 0    Misc  Devices (STETHOSCOPE) MISC, by Does not apply route 2 (two) times a day, Disp: 1 each, Rfl: 0    ONE TOUCH ULTRA TEST test strip, TEST twice a day, Disp: 200 each, Rfl: 3    ONETOUCH DELICA LANCETS 94B MISC, TEST TWICE DAILY  , Disp: 200 each, Rfl: 3    potassium chloride (K-DUR,KLOR-CON) 10 mEq tablet, Take 1 tablet (10 mEq total) by mouth daily, Disp: 30 tablet, Rfl: 5    RESTASIS 0 05 % ophthalmic emulsion, , Disp: , Rfl: 0    rosuvastatin (CRESTOR) 10 MG tablet, take 1 tablet by mouth at bedtime, Disp: 30 tablet, Rfl: 5    timolol (TIMOPTIC) 0 5 % ophthalmic solution, PLACE 1 DROP IN THE RIGH EYE EVERY MORNING, Disp: , Rfl: 0          Portions of the record may have been created with voice recognition software  Occasional wrong word or "sound a like" substitutions may have occurred due to the inherent limitations of voice recognition software  Read the chart carefully and recognize, using context, where substitutions have occurred  If you have any questions, please contact the dictating provider

## 2019-08-05 DIAGNOSIS — I10 ESSENTIAL HYPERTENSION: Chronic | ICD-10-CM

## 2019-08-05 DIAGNOSIS — R42 SEVERE DIZZINESS: ICD-10-CM

## 2019-08-05 DIAGNOSIS — I48.91 ATRIAL FIBRILLATION, UNSPECIFIED TYPE (HCC): ICD-10-CM

## 2019-08-05 RX ORDER — LOSARTAN POTASSIUM 50 MG/1
50 TABLET ORAL DAILY
Qty: 30 TABLET | Refills: 5 | Status: SHIPPED | OUTPATIENT
Start: 2019-08-05 | End: 2020-01-28

## 2019-08-05 RX ORDER — POTASSIUM CHLORIDE 750 MG/1
10 TABLET, EXTENDED RELEASE ORAL DAILY
Qty: 30 TABLET | Refills: 5 | Status: SHIPPED | OUTPATIENT
Start: 2019-08-05 | End: 2020-01-28

## 2019-08-05 RX ORDER — MECLIZINE HCL 12.5 MG/1
12.5 TABLET ORAL EVERY 12 HOURS PRN
Qty: 15 TABLET | Refills: 2 | Status: SHIPPED | OUTPATIENT
Start: 2019-08-05 | End: 2019-08-15 | Stop reason: SDUPTHER

## 2019-08-05 NOTE — TELEPHONE ENCOUNTER
Refill Meclizine 12 5 mg   1 tab q 12  Hrs for dizziness        and Losartan 50mg   Patient states with potassium? ?       [address]

## 2019-08-15 ENCOUNTER — TELEPHONE (OUTPATIENT)
Dept: FAMILY MEDICINE CLINIC | Facility: CLINIC | Age: 84
End: 2019-08-15

## 2019-08-15 DIAGNOSIS — R42 SEVERE DIZZINESS: ICD-10-CM

## 2019-08-15 RX ORDER — MECLIZINE HCL 12.5 MG/1
12.5 TABLET ORAL EVERY 12 HOURS PRN
Qty: 60 TABLET | Refills: 3 | Status: SHIPPED | OUTPATIENT
Start: 2019-08-15 | End: 2019-11-25 | Stop reason: ALTCHOICE

## 2019-08-15 NOTE — TELEPHONE ENCOUNTER
Patient's son in law called  She is currently on meclizine 12 5 mg twice daily  She is only getting quantity 15 at a time   He wants to know if it can be sent in to [address] [place] [address] as a 30 day supply instead

## 2019-08-23 ENCOUNTER — OFFICE VISIT (OUTPATIENT)
Dept: CARDIOLOGY CLINIC | Facility: CLINIC | Age: 84
End: 2019-08-23
Payer: COMMERCIAL

## 2019-08-23 VITALS
HEIGHT: 57 IN | HEART RATE: 60 BPM | WEIGHT: 167 LBS | BODY MASS INDEX: 36.03 KG/M2 | DIASTOLIC BLOOD PRESSURE: 60 MMHG | SYSTOLIC BLOOD PRESSURE: 122 MMHG

## 2019-08-23 DIAGNOSIS — I48.91 ATRIAL FIBRILLATION, UNSPECIFIED TYPE (HCC): ICD-10-CM

## 2019-08-23 DIAGNOSIS — I10 ESSENTIAL HYPERTENSION: Primary | Chronic | ICD-10-CM

## 2019-08-23 DIAGNOSIS — Z79.899 LONG TERM CURRENT USE OF AMIODARONE: ICD-10-CM

## 2019-08-23 PROCEDURE — 93000 ELECTROCARDIOGRAM COMPLETE: CPT | Performed by: INTERNAL MEDICINE

## 2019-08-23 PROCEDURE — 99214 OFFICE O/P EST MOD 30 MIN: CPT | Performed by: INTERNAL MEDICINE

## 2019-08-23 RX ORDER — NIFEDIPINE 30 MG/1
30 TABLET, EXTENDED RELEASE ORAL DAILY
Qty: 90 TABLET | Refills: 2
Start: 2019-08-23 | End: 2020-07-30 | Stop reason: SDUPTHER

## 2019-08-23 RX ORDER — ROSUVASTATIN CALCIUM 20 MG/1
20 TABLET, COATED ORAL
Qty: 30 TABLET | Refills: 2 | Status: SHIPPED | OUTPATIENT
Start: 2019-08-23 | End: 2019-11-30 | Stop reason: SDUPTHER

## 2019-08-23 NOTE — PROGRESS NOTES
[address] [facility] [place]  [address]  [facility] [facility]  [phone]    Cardiology Follow up    Patient:  Penne Lundborg  :  1935  MRN:  [id number]    History of Present Illness:        51-year-old female with past medical history of aortic stenosis status post recent transcatheter aortic valve replacement, coronary disease with a 60% 1st obtuse marginal stenosis as well as the mid RCA 50% stenosis, persistent atrial fibrillation status post cardioversion, previous CVA/TIA and recurrent CVA on Xarelto now on Pradaxa, less than 50% carotid stenosis bilaterally from 2018, diastolic heart failure, diabetes, hypertension, dyslipidemia, obstructive sleep apnea not on CPAP, anxiety/depression presents for cardiology follow-up  She denies any chest pain, shortness of breath, palpitations or syncope but does continue to have the off balance and dizzy feeling that she has that has been attributed to her prior stroke  Nifedipine was recently stopped by Nephrology  Patient Active Problem List   Diagnosis    Type 2 diabetes mellitus with hyperglycemia, without long-term current use of insulin ([address] )    Glaucoma    Hyperlipidemia    Hypertension    Hyponatremia    Depression with anxiety    Paroxysmal atrial fibrillation (HCC)    Ataxia    Type 2 diabetes mellitus with hyperglycemia (HCC)    CVA (cerebral vascular accident) ([address] )    Hearing loss    Insomnia    Left knee pain    PERI (obstructive sleep apnea)    Osteoarthritis    Osteoporosis    Periodic limb movement sleep disorder    Sleep talking    Tinnitus    Tricuspid valve disorders, non-rheumatic    Unsteady gait    History of stroke    Chronic diastolic heart failure (HCC)    S/P TAVR (transcatheter aortic valve replacement)    Severe dizziness    Thickened endometrium    Obesity (BMI 30-39  9)    History of CVA (cerebrovascular accident)    Headache    Esophageal thickening    Elevated TSH    Type 2 diabetes mellitus with hypoglycemia without coma ([facility] )    Dysphagia    Injury of finger of left hand    Anemia       Past Surgical History  Past Surgical History:   Procedure Laterality Date    APPENDECTOMY      GLAUCOMA SURGERY  01/03/2016    NC REPLACE AORTIC VALVE OPENFEMORAL ARTERY APPROACH N/A 4/17/2018    Procedure: REPLACEMENT AORTIC VALVE TRANSCATHETER (TAVR) TRANSFEMORAL W/ 23 MM AGUILAR STEVE S3 VALVE;  Surgeon: Agnela Carey DO;  [facility]: [facility];  Service: Cardiac Surgery    TUBAL LIGATION         Social History   Social History     Socioeconomic History    Marital status:       Spouse name: Not on file    Number of children: Not on file    Years of education: Not on file    Highest education level: Not on file   Occupational History    Not on file   Social Needs    Financial resource strain: Not on file    Food insecurity:     Worry: Not on file     Inability: Not on file    Transportation needs:     Medical: Not on file     Non-medical: Not on file   Tobacco Use    Smoking status: Never Smoker    Smokeless tobacco: Never Used   Substance and Sexual Activity    Alcohol use: Never     Frequency: Never     Binge frequency: Never    Drug use: Never    Sexual activity: Not Currently   Lifestyle    Physical activity:     Days per week: Not on file     Minutes per session: Not on file    Stress: Not on file   Relationships    Social connections:     Talks on phone: Not on file     Gets together: Not on file     Attends Ep[employer]opalian service: Not on file     Active member of club or organization: Not on file     Attends meetings of clubs or organizations: Not on file     Relationship status: Not on file    Intimate partner violence:     Fear of current or ex partner: Not on file     Emotionally abused: Not on file     Physically abused: Not on file     Forced sexual activity: Not on file   Other Topics Concern    Not on file   Social History Narrative    Not on file Allergies   Allergen Reactions    Lipitor [Atorvastatin] GI Intolerance     Nausea stomach ache    Aspirin GI Intolerance    Hydrochlorothiazide GI Intolerance    Lisinopril Cough    Metoprolol Headache       Family History   Family History   Problem Relation Age of Onset    No Known Problems Mother     Diabetes Father     Stroke Father     Coronary artery disease Sister     No Known Problems Brother     No Known Problems Son     Breast cancer Daughter     No Known Problems Maternal Grandmother     No Known Problems Maternal Grandfather     No Known Problems Paternal Grandmother     No Known Problems Paternal Grandfather     No Known Problems Cousin     Rheum arthritis Neg Hx     Osteoarthritis Neg Hx     Asthma Neg Hx     Heart failure Neg Hx     Hyperlipidemia Neg Hx     Hypertension Neg Hx     Migraines Neg Hx     Rashes / Skin problems Neg Hx     Seizures Neg Hx     Thyroid disease Neg Hx        Review of Systems:  Review of Systems   Constitutional: Negative for chills, fatigue and fever  HENT: Negative for hearing loss, nosebleeds and tinnitus  Eyes: Negative for pain  Respiratory: Negative for cough, chest tightness and shortness of breath  Cardiovascular: Negative for chest pain, palpitations and leg swelling  Gastrointestinal: Negative for abdominal pain, nausea and vomiting  Endocrine: Negative for cold intolerance and heat intolerance  Genitourinary: Negative for difficulty urinating, hematuria and vaginal bleeding  Musculoskeletal: Negative for arthralgias  Skin: Negative for rash  Allergic/Immunologic: Negative for environmental allergies  Neurological: Negative for dizziness, syncope, weakness and light-headedness  Psychiatric/Behavioral: Negative for decreased concentration and sleep disturbance  The patient is not nervous/anxious            Current Outpatient Medications:     Acetaminophen (TYLENOL) 325 MG CAPS, Take by mouth as needed , Disp: , Rfl:     albuterol (PROAIR HFA) 90 mcg/act inhaler, Inhale 2 puffs every 6 (six) hours as needed for wheezing, Disp: 8 5 g, Rfl: 1    amiodarone 200 mg tablet, Take 1 tablet (200 mg total) by mouth daily, Disp: 90 tablet, Rfl: 2    Bimatoprost (LUMIGAN OP), Administer 1 drop to both eyes daily at bedtime Both eyes, Disp: , Rfl:     Blood Pressure Monitoring (BLOOD PRESSURE CUFF) MISC, by Does not apply route 2 (two) times a day, Disp: 1 each, Rfl: 0    Cholecalciferol (VITAMIN D3) 1000 units CAPS, Take 5,000 Units by mouth daily , Disp: , Rfl:     Cyanocobalamin (VITAMIN B 12 PO), Take 1,000 mcg by mouth daily , Disp: , Rfl:     dabigatran etexilate (PRADAXA) 150 mg capsu, Take 1 capsule (150 mg total) by mouth every 12 (twelve) hours for 90 days, Disp: 180 capsule, Rfl: 1    hydrALAZINE (APRESOLINE) 25 mg tablet, Take 3 tablets (75 mg total) by mouth 3 (three) times a day for 90 days, Disp: 270 tablet, Rfl: 3    linaGLIPtin (TRADJENTA) 5 MG TABS, Take 5 mg by mouth daily, Disp: 30 tablet, Rfl: 0    meclizine (ANTIVERT) 12 5 MG tablet, Take 1 tablet (12 5 mg total) by mouth every 12 (twelve) hours as needed for dizziness for up to 30 days, Disp: 60 tablet, Rfl: 3    metFORMIN (GLUCOPHAGE) 500 mg tablet, Take 1 tablet (500 mg total) by mouth daily with breakfast for 162 days, Disp: 180 tablet, Rfl: 0    Misc  Devices (STETHOSCOPE) MISC, by Does not apply route 2 (two) times a day, Disp: 1 each, Rfl: 0    ONE TOUCH ULTRA TEST test strip, TEST twice a day, Disp: 200 each, Rfl: 3    ONETOUCH DELICA LANCETS 36X MISC, TEST TWICE DAILY  , Disp: 200 each, Rfl: 3    potassium chloride (K-DUR,KLOR-CON) 10 mEq tablet, Take 1 tablet (10 mEq total) by mouth daily, Disp: 30 tablet, Rfl: 5    RESTASIS 0 05 % ophthalmic emulsion, , Disp: , Rfl: 0    rosuvastatin (CRESTOR) 10 MG tablet, take 1 tablet by mouth at bedtime, Disp: 30 tablet, Rfl: 5    timolol (TIMOPTIC) 0 5 % ophthalmic solution, PLACE 1 DROP IN THE RIGH EYE EVERY MORNING, Disp: , Rfl: 0    losartan (COZAAR) 50 mg tablet, Take 1 tablet (50 mg total) by mouth daily (Patient not taking: Reported on 8/23/2019), Disp: 30 tablet, Rfl: 5     Physical Exam:    Vitals:    08/23/19 1340   Weight: 75 8 kg (167 lb)   Height: 4' 9" (1 448 m)       Physical Exam   Constitutional: She is oriented to person, place, and time  She appears well-developed and well-nourished  HENT:   Head: Normocephalic  Right Ear: External ear normal    Left Ear: External ear normal    Mouth/Throat: Oropharynx is clear and moist    Eyes: Pupils are equal, round, and reactive to light  Neck: No JVD present  Carotid bruit is not present  Cardiovascular: Normal rate, regular rhythm and intact distal pulses  Exam reveals no gallop and no friction rub  No murmur heard  Pulmonary/Chest: Effort normal and breath sounds normal  No tachypnea  No respiratory distress  She has no wheezes  She has no rales  She exhibits no tenderness  Abdominal: Soft  She exhibits no distension  There is no tenderness  There is no rebound and no guarding  Musculoskeletal: She exhibits no edema  Neurological: She is alert and oriented to person, place, and time  Skin: Skin is warm and dry  Psychiatric: She has a normal mood and affect  Her behavior is normal  Judgment and thought content normal    Nursing note and vitals reviewed  Labs:not applicable    Assessment/Plan:    1  Persistent atrial fibrillation status post cardioversion now in sinus rhythm  She is on amiodarone and Pradaxa  Recent comprehensive metabolic panel and TSH are within normal limits  She will need follow-up with ophthalmology and pulmonology  2  Hypertension  Her pressure is in the 150s today and at home as well  I would like her to go back on the next visit being and increased to 60 if needed  She is to call me if her blood pressures above 140 at home      She otherwise continues on losartan and hydralazine  3  Coronary disease  She is on anticoagulation and statin  I would like to increase her Crestor to 20 mg nightly  Will get fasting lipids before the next visit    4  History of CVA  As above  5  Bilateral less than 50% carotid disease  This is noted  6  Status post TAVR  This is noted  Will see her back in 3 months for follow-up  Thank you so much, please do not hesitate to contact me with any questions or concerns            Sagar Espinal MD  8/23/2019  1:49 PM

## 2019-09-23 NOTE — PROGRESS NOTES
[address] [facility]  PATIENT:  Chelsy Valiente  MRN:  [id number]  :  1935  DATE OF SERVICE:  2019      Assessment/Plan:        Problem List Items Addressed This Visit        Endocrine    Type 2 diabetes mellitus with hyperglycemia, without long-term current use of insulin (HCC)       Cardiovascular and Mediastinum    Hypertension (Chronic)    Paroxysmal atrial fibrillation (HCC)    St[facility]cerebr[facility]) ([facility] ) - Primary          I do not think patient has dizziness is more off-balance sensation  She has poor coordination and thus patient feels off balance  She does not think that Antivert helps  If anything it sedates her more  Continue Pradaxa 150 mg twice a day  History of Present Illness: We had the pleasure of evaluating Miguel Bernabe neurological consultation today   As you know,  she is a 80 y  o  right handed female  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  - Diastolic heart failure  - Diabetes  - Hypertension/hyperlipidemia  - Obstructive sleep apnea not on CPAP  - Anxiety/depression      Stroke:  May 12, 2019 MRI brain:IMPRESSION:   Punctate focus of acute infarction within the deep right cerebral periventricular white matter or possibly the right caudate body  Moderate cerebral chronic microangiopathic disease  Chronic focal infarctions within the right frontal operculum and right cerebellum  Hearing loss:  Has hearing aid in place       Dizziness (after further discussion it seems it was off-balance sensation then dizziness): Occurs: daily   Last: occurs throughout the day and lasts for a while  Onset: with suddenly movements  Describes: feels as if e is going to fall over  She was falling in the past but after her cardiac surgery she is not falling  Associated sx: none     Headaches:   What medications do you take or have you taken for your headaches? PREVENTIVE: none  ABORTIVE: tylenol     Have you used CBD or THC for your headaches and how often? no  Non-Medical/Alternative Treatments used in the past for headaches: PT     Aura and how long does it last - none      Started after the defibrillator was placed on April 19th 2018       How often do the headaches occur -  They did get better but had one today  What time of the day do the headaches start - 11 am today and now it is better  It lasted for half an hour  How long do the headaches last - severe headache have improved and when they do get worse they do not last long  Are you ever headache free - none  Where are they located - frontal  What is the intensity of pain - 6/10  Describe your usual headache - nagging and aching  Associated symptoms:   · Photophobia  · Blurred vision  · runny or stuffed-up nose  · Tinnitus, light-headed or dizzy, stiff or sore neck   · prefer to be alone and in a dark room, unable to work        MRI head: 1/12/2018  IMPRESSION:   1  No evidence of acute infarct, hemorrhage or mass    2  Chronic right frontal infarct   Periventricular and subcortical white matter lesions, consistent with microangiopathic disease      MRI head 1/7/2017  IMPRESSION:  - Focus of FLAIR and T2 signal abnormality involving cortex and subcortical right frontal lobe with a thin rim of precontrast T1 hyperintensity   Subacute infarct should be considered   A more recent venous infarct could have a similar appearance     Short-term interval follow-up gadolinium-enhanced MRI recommended in one month  - Moderate chronic microvascular disease      MRA and MRV 1/7/2017-   IMPRESSION:   No focal intracranial stenosis or aneurysm  Past Medical History:   Diagnosis Date    Ambulatory dysfunction     CHF (congestive heart failure) ([facility])     pEFHF    Cholelithiasis     Coronary artery disease     Depression with anxiety     Diabetes mellitus ([facility] )     niddm    Glaucoma     Hypertension     Hyponatremia     Ischemic stroke of frontal lobe ([facility])     Right     Obstructive sleep apnea     PAF (paroxysmal atrial fibrillation) ([facility])     Vertigo        Patient Active Problem List   Diagnosis    Type 2 diabetes mellitus with hyperglycemia, without long-term current use of insulin ([facility])    Glaucoma    Hyperlipidemia    Hypertension    Hyponatremia    Depression with anxiety    Paroxysmal atrial fibrillation ([facility])    Ataxia    Type 2 diabetes mellitus with hyperglycemia ([facility])    CVA (cerebral vascular accident) ([facility] )    Hearing loss    Insomnia    Left knee pain    PERI (obstructive sleep apnea)    Osteoarthritis    Osteoporosis    Periodic limb movement sleep disorder    Sleep talking    Tinnitus    Tricuspid valve disorders, non-rheumatic    Unsteady gait    History of stroke    Chronic diastolic heart failure ([facility])    S/P TAVR (transcatheter aortic valve replacement)    Severe dizziness    Thickened endometrium    Obesity (BMI 30-39  9)    History of CVA (cerebrovascular accident)    Headache    Esophageal thickening    Elevated TSH    Dysphagia    Injury of finger of left hand    Anemia    Stroke (cerebrum) ([facility])       Medications:      Current Outpatient Medications   Medication Sig Dispense Refill    Acetaminophen (TYLENOL) 325 MG CAPS Take 1 capsule by mouth as needed       albuterol (PROAIR HFA) 90 mcg/act inhaler Inhale 2 puffs every 6 (six) hours as needed for wheezing 8 5 g 1    amiodarone 200 mg tablet Take 1 tablet (200 mg total) by mouth daily 90 tablet 2    Bimatoprost (LUMIGAN OP) Administer 1 drop to both eyes daily at bedtime Both eyes      Blood Pressure Monitoring (BLOOD PRESSURE CUFF) MISC by Does not apply route 2 (two) times a day 1 each 0    Cholecalciferol (VITAMIN D3) 1000 units CAPS Take 5,000 Units by mouth daily       Cyanocobalamin (VITAMIN B 12 PO) Take 1,000 mcg by mouth daily       dabigatran etexilate (PRADAXA) 150 mg capsu Take 1 capsule (150 mg total) by mouth every 12 (twelve) hours for 90 days 180 capsule 1    hydrALAZINE (APRESOLINE) 25 mg tablet Take 3 tablets (75 mg total) by mouth 3 (three) times a day for 90 days 270 tablet 3    linaGLIPtin (TRADJENTA) 5 MG TABS Take 5 mg by mouth daily 30 tablet 0    losartan (COZAAR) 50 mg tablet Take 1 tablet (50 mg total) by mouth daily 30 tablet 5    meclizine (ANTIVERT) 12 5 MG tablet Take 1 tablet (12 5 mg total) by mouth every 12 (twelve) hours as needed for dizziness for up to 30 days 60 tablet 3    metFORMIN (GLUCOPHAGE) 500 mg tablet Take 1 tablet (500 mg total) by mouth daily with breakfast for 162 days (Patient taking differently: Take 500 mg by mouth 2 (two) times a day with meals ) 180 tablet 0    Misc  Devices (STETHOSCOPE) MISC by Does not apply route 2 (two) times a day 1 each 0    NIFEdipine (PROCARDIA XL) 30 mg 24 hr tablet Take 1 tablet (30 mg total) by mouth daily 90 tablet 2    ONE TOUCH ULTRA TEST test strip TEST twice a day 200 each 3    ONETOUCH DELICA LANCETS 66J MISC TEST TWICE DAILY   200 each 3    potassium chloride (K-DUR,KLOR-CON) 10 mEq tablet Take 1 tablet (10 mEq total) by mouth daily 30 tablet 5    RESTASIS 0 05 % ophthalmic emulsion Administer 1 drop to both eyes daily   0    rosuvastatin (CRESTOR) 20 MG tablet Take 1 tablet (20 mg total) by mouth daily at bedtime 30 tablet 2    timolol (TIMOPTIC) 0 5 % ophthalmic solution Administer 1 drop to both eyes daily   0     No current facility-administered medications for this visit  Allergies: Allergies   Allergen Reactions    Lipitor [Atorvastatin] GI Intolerance     Nausea stomach ache    Aspirin GI Intolerance    Hydrochlorothiazide GI Intolerance    Lisinopril Cough    Metoprolol Headache       Family History:     Family History   Problem Relation Age of Onset    No Known Problems Mother     Diabetes Father     Stroke Father     Coronary artery disease Sister     No Known Problems Brother     No Known Problems Son     Breast cancer Daughter     No Known Problems Maternal Grandmother     No Known Problems Maternal Grandfather     No Known Problems Paternal Grandmother     No Known Problems Paternal Grandfather     No Known Problems Cousin     Rheum arthritis Neg Hx     Osteoarthritis Neg Hx     Asthma Neg Hx     Heart failure Neg Hx     Hyperlipidemia Neg Hx     Hypertension Neg Hx     Migraines Neg Hx     Rashes / Skin problems Neg Hx     Seizures Neg Hx     Thyroid disease Neg Hx        Social History:     Social History     Socioeconomic History    Marital status:       Spouse name: Not on file    Number of children: Not on file    Years of education: Not on file    Highest education level: Not on file   Occupational History    Not on file   Social Needs    Financial resource strain: Not on file    Food insecurity:     Worry: Not on file     Inability: Not on file    Transportation needs:     Medical: Not on file     Non-medical: Not on file   Tobacco Use    Smoking status: Never Smoker    Smokeless tobacco: Never Used   Substance and Sexual Activity    Alcohol use: Never     Frequency: Never     Binge frequency: Never    Drug use: Never    Sexual activity: Not Currently   Lifestyle    Physical activity:     Days per week: Not on file     Minutes per session: Not on file    Stress: Not on file   Relationships    Social connections:     Talks on phone: Not on file     Gets together: Not on file     Attends Judaism service: Not on file     Active member of club or organization: Not on file     Attends meetings of clubs or organizations: Not on file     Relationship status: Not on file    Intimate partner violence:     Fear of current or ex partner: Not on file     Emotionally abused: Not on file     Physically abused: Not on file     Forced sexual activity: Not on file   Other Topics Concern    Not on file   Social History Narrative    Not on file         Objective:   Physical Exam:                                                                   Vitals:            /60 (BP Location: Left arm, Patient Position: Sitting, Cuff Size: Standard)   Pulse 58   Ht 4' 9" (1 448 m)   BMI 36 14 kg/m²   BP Readings from Last 3 Encounters:   09/24/19 132/60   08/23/19 122/60   07/31/19 112/64     Pulse Readings from Last 3 Encounters:   09/24/19 58   08/23/19 60   07/31/19 64            CONSTITUTIONAL: Came in on a wheelchair today     Eyes:  PERRLA, EOM normal      Neck:  Normal ROM, neck supple  HEENT:  Normocephalic atraumatic  No meningismus  Oropharynx is clear and moist  No oral mucosal lesions  Chest:  Respirations regular and unlabored  Cardiovascular:  Distal extremities warm without palpable edema or tenderness, no observed significant swelling  Musculoskeletal:  Full range of motion  Skin:  warm and dry   Psychiatric:  Normal behavior and appropriate affect        Neurological Examination:   Mental status/cognitive function: Orientated to time, place and person  Cranial Nerves: 2 to 12 intact (missing dentition)  Motor Exam:    5/5 upper extremity-with effort  5/5 lower extremity-with after  Sensory: grossly intact light touch in all extremities     Reflexes:   1/4 upper extremity  1/4 bilateral knee, 0 for in bilateral ankle   No clonus noted  Coordination:  Past-pointing bilaterally with finger-to-nose   Gait:  Patient needs support getting up from her wheelchair and cannot walk without support  Review of Systems:   Review of Systems   Constitutional: Negative  HENT: Negative  Eyes: Positive for photophobia  Blurred Vision   Respiratory: Negative  Cardiovascular: Negative  Gastrointestinal: Negative  Endocrine: Negative  Genitourinary: Negative  Musculoskeletal: Positive for back pain  Skin: Negative  Allergic/Immunologic: Negative  Neurological: Positive for dizziness, light-headedness (Occasionally) and headaches (Occasionally )  Hematological: Negative  Psychiatric/Behavioral: The patient is nervous/anxious  I personally reviewed and updated the ROS that was entered by the medical assistant       I have spent 30 minutes with Patient  today in which greater than 50% of this time was spent in counseling/coordination of care regarding Prognosis, Risks and benefits of tx options, Intructions for management, Patient and family education, Importance of tx compliance, Risk factor reductions, Impressions and Plan of care as above        Author:  Danilo Schmidt MD 9/26/2019 5:01 PM

## 2019-09-24 ENCOUNTER — OFFICE VISIT (OUTPATIENT)
Dept: NEUROLOGY | Facility: CLINIC | Age: 84
End: 2019-09-24
Payer: COMMERCIAL

## 2019-09-24 VITALS
HEART RATE: 58 BPM | SYSTOLIC BLOOD PRESSURE: 132 MMHG | BODY MASS INDEX: 36.14 KG/M2 | DIASTOLIC BLOOD PRESSURE: 60 MMHG | HEIGHT: 57 IN

## 2019-09-24 DIAGNOSIS — I48.0 PAROXYSMAL ATRIAL FIBRILLATION (HCC): ICD-10-CM

## 2019-09-24 DIAGNOSIS — I10 ESSENTIAL HYPERTENSION: Chronic | ICD-10-CM

## 2019-09-24 DIAGNOSIS — E11.65 TYPE 2 DIABETES MELLITUS WITH HYPERGLYCEMIA, WITHOUT LONG-TERM CURRENT USE OF INSULIN (HCC): ICD-10-CM

## 2019-09-24 DIAGNOSIS — I63.00 CEREBROVASCULAR ACCIDENT (CVA) DUE TO THROMBOSIS OF PRECEREBRAL ARTERY (HCC): Primary | ICD-10-CM

## 2019-09-24 PROBLEM — E11.649 TYPE 2 DIABETES MELLITUS WITH HYPOGLYCEMIA WITHOUT COMA (HCC): Status: RESOLVED | Noted: 2019-07-04 | Resolved: 2019-09-24

## 2019-09-24 PROCEDURE — 3078F DIAST BP <80 MM HG: CPT | Performed by: PSYCHIATRY & NEUROLOGY

## 2019-09-24 PROCEDURE — 99214 OFFICE O/P EST MOD 30 MIN: CPT | Performed by: PSYCHIATRY & NEUROLOGY

## 2019-09-24 PROCEDURE — 3075F SYST BP GE 130 - 139MM HG: CPT | Performed by: PSYCHIATRY & NEUROLOGY

## 2019-09-25 PROBLEM — I63.30 CEREBROVASCULAR ACCIDENT (CVA) DUE TO THROMBOSIS OF CEREBRAL ARTERY (HCC): Status: RESOLVED | Noted: 2019-09-25 | Resolved: 2019-09-25

## 2019-09-25 PROBLEM — I63.9 STROKE (CEREBRUM) (HCC): Status: ACTIVE | Noted: 2019-09-25

## 2019-09-25 PROBLEM — I63.30 CEREBROVASCULAR ACCIDENT (CVA) DUE TO THROMBOSIS OF CEREBRAL ARTERY (HCC): Status: ACTIVE | Noted: 2019-09-25

## 2019-10-08 ENCOUNTER — TELEPHONE (OUTPATIENT)
Dept: CARDIOLOGY CLINIC | Facility: CLINIC | Age: 84
End: 2019-10-08

## 2019-10-08 NOTE — TELEPHONE ENCOUNTER
Received phone call from daughter because her Mom feels her BP reading as too low (going by the readings only because she is asymptomatic according to daughter) BP readings have been 112/70, 126/72 11/80  106/61 127/71   assurred her the reading were fine as long as she wasn't experiencing dizziness, near syncope ect   she states pt has no symptoms but will forward to Dr Amaya Garza

## 2019-10-14 ENCOUNTER — TELEPHONE (OUTPATIENT)
Dept: OBGYN CLINIC | Facility: CLINIC | Age: 84
End: 2019-10-14

## 2019-10-14 NOTE — PROGRESS NOTES
[facility] - Outpatient Consultation  Howie Cabral 80 y o  female MRN: [id number]  Encounter: [id number]          ASSESSMENT AND PLAN:      1  Esophageal thickening  2  Abnormal CT scan    I discussed with the patient today that they differential diagnosis of her abnormal CT and dysphagia symptoms includes mechanical obstruction due to stricture, esophageal web, malignancy  Other etiologies include abnormal esophageal motility  She has no other red flag symptoms at this time  She has no history of longstanding reflux  Given her significant comorbidities and recent 05/2019 CVA while on anticoagulation we will plan to perform her upper endoscopy at [facility]ending sale to [facility]   We will discuss with her neurologist if it is safe to hold the medication prior to the procedure, especially given her high risk for CVA  If it is unsafe to perform EGD off anticoagulation, which I will order a barium esophagram for better evaluation  3  Screening for colon cancer  Patient has declined colonoscopy for colorectal cancer screening in the past   She continues to decline colonoscopy at this time  We briefly discussed the indications for colorectal cancer screening  Given her significant comorbidities and a more acute issue of abnormal CT of esophageal wall thickening we will defer further discussions for colonoscopy at this time  ______________________________________________________________________    HPI:    61-year-old female with past history of aortic stenosis status post transcatheter aortic valve replacement 2018, coronary artery disease, atrial fibrillation status post cardioversion, prior CVA currently on Pradaxa the, hypertension, PERI, presents for evaluation of esophageal thickening seen on recent CT  Patient was admitted in May 2019 with complaints of dizziness and gait instability found to have new CVA, while she was on Xarelto    She also underwent a CTA showing esophageal wall thickening of the mid-esophagus  She notes intermittent dysphagia to solid foods which has been occurring for some time  She was specifically having issues with meats and passed grounds  She reports feeling food getting stuck in her throat  She denies coughing with eating  She has no dysphagia to liquids  Her appetite has been a little lower than usual   Her weight has been stable  She denies nausea or vomiting  She denies history of acid reflux  She denies odynophagia  Her bowel movements are regular every day or every other day and of normal color and caliber  She has no prior EGD or colonoscopy  She has declined colonoscopy for colorectal cancer screening in the past     She has no family history of GI or liver disease  She is a never smoker and denies alcohol use  Past surgical history includes an appendectomy  REVIEW OF SYSTEMS:    CONSTITUTIONAL: Denies any fever, chills, rigors, and weight loss  HEENT: No earache or tinnitus  Denies hearing loss or visual disturbances  CARDIOVASCULAR: No chest pain or palpitations  RESPIRATORY: Denies any cough, hemoptysis, shortness of breath or dyspnea on exertion  GASTROINTESTINAL: As noted in the History of Present Illness  GENITOURINARY: No problems with urination  Denies any hematuria or dysuria  NEUROLOGIC: endorses  dizziness or vertigo, denies headaches  MUSCULOSKELETAL: Denies any muscle or joint pain  SKIN: Denies skin rashes or itching  ENDOCRINE: Denies excessive thirst  Denies intolerance to heat or cold  PSYCHOSOCIAL: Denies depression or anxiety  Denies any recent memory loss         Historical Information   Past Medical History:   Diagnosis Date    Ambulatory dysfunction     CH[facility]congestive heart failure) ([facility])     pEFHF    Cholelithiasis     Coronary artery disease     Depression with anxiety     Diabetes mellitus ([address] )     niddm    Glaucoma     Hypertension     Hyponatremia     Ischemic stroke of frontal lobe (HCC)     Right     Obstructive sleep apnea     PAF (paroxysmal atrial fibrillation) (HCC)     Vertigo      Past Surgical History:   Procedure Laterality Date    APPENDECTOMY      GLAUCOMA SURGERY  01/03/2016    UT REPLACE AORTIC VALVE OPENFEMORAL ARTERY APPROACH N/A 4/17/2018    Procedure: REPLACEMENT AORTIC VALVE TRANSCATHETER (TAVR) TRANSFEMORAL W/ 23 MM AGUILAR STEVE S3 VALVE;  Surgeon: Katie Tovar DO;  [facility];  Service: Cardiac Surgery    TUBAL LIGATION       Social History   Social History     Substance and Sexual Activity   Alcohol Use Never    Frequency: Never    Binge frequency: Never     Social History     Substance and Sexual Activity   Drug Use Never     Social History     Tobacco Use   Smoking Status Never Smoker   Smokeless Tobacco Never Used     Family History   Problem Relation Age of Onset    No Known Problems Mother     Diabetes Father     Stroke Father     Coronary artery disease Sister     No Known Problems Brother     No Known Problems Son     Breast cancer Daughter     No Known Problems Maternal Grandmother     No Known Problems Maternal Grandfather     No Known Problems Paternal Grandmother     No Known Problems Paternal Grandfather     No Known Problems Cousin     Rheum arthritis Neg Hx     Osteoarthritis Neg Hx     Asthma Neg Hx     Heart failure Neg Hx     Hyperlipidemia Neg Hx     Hypertension Neg Hx     Migraines Neg Hx     Rashes / Skin problems Neg Hx     Seizures Neg Hx     Thyroid disease Neg Hx        Meds/Allergies       Current Outpatient Medications:     Acetaminophen (TYLENOL) 325 MG CAPS    albuterol (PROAIR HFA) 90 mcg/act inhaler    amiodarone 200 mg tablet    Bimatoprost (LUMIGAN OP)    Blood Pressure Monitoring (BLOOD PRESSURE CUFF) MISC    Cholecalciferol (VITAMIN D3) 1000 units CAPS    Cyanocobalamin (VITAMIN B 12 PO)    dabigatran etexilate (PRADAXA) 150 mg capsu    hydrALAZINE (APRESOLINE) 25 mg tablet   linaGLIPtin (TRADJENTA) 5 MG TABS    losartan (COZAAR) 50 mg tablet    meclizine (ANTIVERT) 12 5 MG tablet    metFORMIN (GLUCOPHAGE) 500 mg tablet    Misc  Devices (STETHOSCOPE) MISC    NIFEdipine (PROCARDIA XL) 30 mg 24 hr tablet    ONE TOUCH ULTRA TEST test strip    ONETOUCH DELICA LANCETS 31B MISC    potassium chloride (K-DUR,KLOR-CON) 10 mEq tablet    RESTASIS 0 05 % ophthalmic emulsion    rosuvastatin (CRESTOR) 20 MG tablet    timolol (TIMOPTIC) 0 5 % ophthalmic solution    Allergies   Allergen Reactions    Lipitor [Atorvastatin] GI Intolerance     Nausea stomach ache    Aspirin GI Intolerance    Hydrochlorothiazide GI Intolerance    Lisinopril Cough    Metoprolol Headache           Objective     Blood pressure 119/66, pulse 62, temperature 98 1 °F (36 7 °C), temperature source Tympanic, weight 77 8 kg (171 lb 9 6 oz)  There is no height or weight on file to calculate BMI  PHYSICAL EXAM:      General Appearance:   Elderly female, Alert, cooperative, no distress   HEENT:   Normocephalic, atraumatic, anicteric  Neck:  Supple, symmetrical, trachea midline   Lungs:   Clear to auscultation bilaterally; no rales, rhonchi or wheezing; respirations unlabored    Heart[de-identified]   Regular rate and rhythm; no murmur, rub, or gallop  Abdomen:   Soft, non-tender, non-distended; normal bowel sounds; no masses, no organomegaly    Genitalia:   Deferred    Rectal:   Deferred    Extremities:  No cyanosis, clubbing or edema    Pulses:  2+ and symmetric    Skin:  No jaundice, rashes, or lesions    Lymph nodes:  No palpable cervical lymphadenopathy        Lab Results:   No visits with results within 1 Day(s) from this visit  Latest known visit with results is:   No results displayed because visit has over 200 results  Radiology Results:   No results found

## 2019-10-14 NOTE — TELEPHONE ENCOUNTER
Pt daughter Zeina Haney called to let us know she started to bleed again over the weekend  All the test showed normal, she wanted us to be aware it happen again  Please call

## 2019-10-15 ENCOUNTER — TRANSCRIBE ORDERS (OUTPATIENT)
Dept: ADMINISTRATIVE | Facility: HOSPITAL | Age: 84
End: 2019-10-15

## 2019-10-15 ENCOUNTER — APPOINTMENT (OUTPATIENT)
Dept: LAB | Facility: HOSPITAL | Age: 84
End: 2019-10-15
Attending: INTERNAL MEDICINE
Payer: COMMERCIAL

## 2019-10-15 ENCOUNTER — HOSPITAL ENCOUNTER (EMERGENCY)
Facility: HOSPITAL | Age: 84
Discharge: HOME | End: 2019-10-15
Attending: EMERGENCY MEDICINE | Admitting: EMERGENCY MEDICINE
Payer: COMMERCIAL

## 2019-10-15 ENCOUNTER — CONSULT (OUTPATIENT)
Dept: GASTROENTEROLOGY | Facility: MEDICAL CENTER | Age: 84
End: 2019-10-15
Payer: COMMERCIAL

## 2019-10-15 VITALS
TEMPERATURE: 98.4 F | HEART RATE: 62 BPM | OXYGEN SATURATION: 94 % | RESPIRATION RATE: 16 BRPM | SYSTOLIC BLOOD PRESSURE: 128 MMHG | DIASTOLIC BLOOD PRESSURE: 66 MMHG

## 2019-10-15 VITALS
HEART RATE: 62 BPM | TEMPERATURE: 98.1 F | DIASTOLIC BLOOD PRESSURE: 66 MMHG | SYSTOLIC BLOOD PRESSURE: 119 MMHG | BODY MASS INDEX: 37.13 KG/M2 | WEIGHT: 171.6 LBS

## 2019-10-15 DIAGNOSIS — Z79.899 AT RISK FOR AMIODARONE TOXICITY WITH LONG TERM USE: ICD-10-CM

## 2019-10-15 DIAGNOSIS — E11.65 UNCONTROLLED TYPE 2 DIABETES MELLITUS WITH HYPERGLYCEMIA (HCC): Primary | ICD-10-CM

## 2019-10-15 DIAGNOSIS — R89.9 ABNORMAL LABORATORY TEST RESULT: Primary | ICD-10-CM

## 2019-10-15 DIAGNOSIS — K22.89 ESOPHAGEAL THICKENING: ICD-10-CM

## 2019-10-15 DIAGNOSIS — I10 ESSENTIAL HYPERTENSION: Chronic | ICD-10-CM

## 2019-10-15 DIAGNOSIS — E11.65 TYPE 2 DIABETES MELLITUS WITH HYPERGLYCEMIA, WITHOUT LONG-TERM CURRENT USE OF INSULIN (HCC): ICD-10-CM

## 2019-10-15 DIAGNOSIS — E87.1 HYPONATREMIA: Primary | ICD-10-CM

## 2019-10-15 DIAGNOSIS — E78.5 HYPERLIPIDEMIA, UNSPECIFIED HYPERLIPIDEMIA TYPE: Chronic | ICD-10-CM

## 2019-10-15 DIAGNOSIS — R93.89 ABNORMAL CT SCAN: Primary | ICD-10-CM

## 2019-10-15 DIAGNOSIS — R79.89 ELEVATED TSH: ICD-10-CM

## 2019-10-15 DIAGNOSIS — Z91.89 AT RISK FOR AMIODARONE TOXICITY WITH LONG TERM USE: ICD-10-CM

## 2019-10-15 DIAGNOSIS — Z12.11 SCREENING FOR COLON CANCER: ICD-10-CM

## 2019-10-15 LAB
ALBUMIN SERPL BCP-MCNC: 4 G/DL (ref 3.5–5)
ALP SERPL-CCNC: 68 U/L (ref 46–116)
ALT SERPL W P-5'-P-CCNC: 46 U/L (ref 12–78)
ANION GAP SERPL CALCULATED.3IONS-SCNC: 14 MMOL/L (ref 4–13)
ANION GAP SERPL CALCULATED.3IONS-SCNC: 9 MMOL/L (ref 4–13)
AST SERPL W P-5'-P-CCNC: 21 U/L (ref 5–45)
BASOPHILS # BLD AUTO: 0.04 THOUSANDS/ΜL (ref 0–0.1)
BASOPHILS NFR BLD AUTO: 1 % (ref 0–1)
BILIRUB SERPL-MCNC: 0.46 MG/DL (ref 0.2–1)
BUN SERPL-MCNC: 22 MG/DL (ref 5–25)
BUN SERPL-MCNC: 22 MG/DL (ref 5–25)
CALCIUM SERPL-MCNC: 9.4 MG/DL (ref 8.3–10.1)
CALCIUM SERPL-MCNC: 9.5 MG/DL (ref 8.3–10.1)
CHLORIDE SERPL-SCNC: 103 MMOL/L (ref 100–108)
CHLORIDE SERPL-SCNC: 85 MMOL/L (ref 100–108)
CHOLEST SERPL-MCNC: 127 MG/DL (ref 50–200)
CO2 SERPL-SCNC: 24 MMOL/L (ref 21–32)
CO2 SERPL-SCNC: 25 MMOL/L (ref 21–32)
CREAT SERPL-MCNC: 0.99 MG/DL (ref 0.6–1.3)
CREAT SERPL-MCNC: 1.07 MG/DL (ref 0.6–1.3)
CREAT UR-MCNC: 87.8 MG/DL
EOSINOPHIL # BLD AUTO: 0.08 THOUSAND/ΜL (ref 0–0.61)
EOSINOPHIL NFR BLD AUTO: 1 % (ref 0–6)
ERYTHROCYTE [DISTWIDTH] IN BLOOD BY AUTOMATED COUNT: 15.8 % (ref 11.6–15.1)
EST. AVERAGE GLUCOSE BLD GHB EST-MCNC: 192 MG/DL
GFR SERPL CREATININE-BSD FRML MDRD: 48 ML/MIN/1.73SQ M
GFR SERPL CREATININE-BSD FRML MDRD: 52 ML/MIN/1.73SQ M
GLUCOSE SERPL-MCNC: 199 MG/DL (ref 65–140)
GLUCOSE SERPL-MCNC: 208 MG/DL (ref 65–140)
HBA1C MFR BLD: 8.3 % (ref 4.2–6.3)
HCT VFR BLD AUTO: 37.6 % (ref 34.8–46.1)
HDLC SERPL-MCNC: 56 MG/DL (ref 40–60)
HGB BLD-MCNC: 11.8 G/DL (ref 11.5–15.4)
IMM GRANULOCYTES # BLD AUTO: 0.02 THOUSAND/UL (ref 0–0.2)
IMM GRANULOCYTES NFR BLD AUTO: 0 % (ref 0–2)
LDLC SERPL CALC-MCNC: 51 MG/DL (ref 0–100)
LYMPHOCYTES # BLD AUTO: 1.47 THOUSANDS/ΜL (ref 0.6–4.47)
LYMPHOCYTES NFR BLD AUTO: 26 % (ref 14–44)
MAGNESIUM SERPL-MCNC: 1.5 MG/DL (ref 1.6–2.6)
MCH RBC QN AUTO: 27.6 PG (ref 26.8–34.3)
MCHC RBC AUTO-ENTMCNC: 31.4 G/DL (ref 31.4–37.4)
MCV RBC AUTO: 88 FL (ref 82–98)
MICROALBUMIN UR-MCNC: 27.4 MG/L (ref 0–20)
MICROALBUMIN/CREAT 24H UR: 31 MG/G CREATININE (ref 0–30)
MONOCYTES # BLD AUTO: 0.56 THOUSAND/ΜL (ref 0.17–1.22)
MONOCYTES NFR BLD AUTO: 10 % (ref 4–12)
NEUTROPHILS # BLD AUTO: 3.39 THOUSANDS/ΜL (ref 1.85–7.62)
NEUTS SEG NFR BLD AUTO: 62 % (ref 43–75)
NRBC BLD AUTO-RTO: 0 /100 WBCS
PHOSPHATE SERPL-MCNC: 3.5 MG/DL (ref 2.3–4.1)
PLATELET # BLD AUTO: 249 THOUSANDS/UL (ref 149–390)
PMV BLD AUTO: 10.6 FL (ref 8.9–12.7)
POTASSIUM SERPL-SCNC: 3.8 MMOL/L (ref 3.5–5.3)
POTASSIUM SERPL-SCNC: 4.7 MMOL/L (ref 3.5–5.3)
PROT SERPL-MCNC: 7.7 G/DL (ref 6.4–8.2)
RBC # BLD AUTO: 4.27 MILLION/UL (ref 3.81–5.12)
SODIUM SERPL-SCNC: 123 MMOL/L (ref 136–145)
SODIUM SERPL-SCNC: 137 MMOL/L (ref 136–145)
T4 FREE SERPL-MCNC: 1.38 NG/DL (ref 0.76–1.46)
TRIGL SERPL-MCNC: 100 MG/DL
TSH SERPL DL<=0.05 MIU/L-ACNC: 4.52 UIU/ML (ref 0.36–3.74)
WBC # BLD AUTO: 5.56 THOUSAND/UL (ref 4.31–10.16)

## 2019-10-15 PROCEDURE — 84439 ASSAY OF FREE THYROXINE: CPT

## 2019-10-15 PROCEDURE — 82570 ASSAY OF URINE CREATININE: CPT | Performed by: FAMILY MEDICINE

## 2019-10-15 PROCEDURE — 99283 EMERGENCY DEPT VISIT LOW MDM: CPT

## 2019-10-15 PROCEDURE — 99203 OFFICE O/P NEW LOW 30 MIN: CPT | Performed by: INTERNAL MEDICINE

## 2019-10-15 PROCEDURE — 80048 BASIC METABOLIC PNL TOTAL CA: CPT | Performed by: EMERGENCY MEDICINE

## 2019-10-15 PROCEDURE — 85025 COMPLETE CBC W/AUTO DIFF WBC: CPT | Performed by: EMERGENCY MEDICINE

## 2019-10-15 PROCEDURE — 36415 COLL VENOUS BLD VENIPUNCTURE: CPT

## 2019-10-15 PROCEDURE — 83735 ASSAY OF MAGNESIUM: CPT | Performed by: EMERGENCY MEDICINE

## 2019-10-15 PROCEDURE — 84443 ASSAY THYROID STIM HORMONE: CPT

## 2019-10-15 PROCEDURE — 80061 LIPID PANEL: CPT

## 2019-10-15 PROCEDURE — 83036 HEMOGLOBIN GLYCOSYLATED A1C: CPT

## 2019-10-15 PROCEDURE — 80053 COMPREHEN METABOLIC PANEL: CPT

## 2019-10-15 PROCEDURE — 84100 ASSAY OF PHOSPHORUS: CPT | Performed by: EMERGENCY MEDICINE

## 2019-10-15 PROCEDURE — 82043 UR ALBUMIN QUANTITATIVE: CPT | Performed by: FAMILY MEDICINE

## 2019-10-15 PROCEDURE — 99282 EMERGENCY DEPT VISIT SF MDM: CPT | Performed by: EMERGENCY MEDICINE

## 2019-10-15 NOTE — PATIENT INSTRUCTIONS
The patient is scheduled on 10/24/19 at [facility] for an EGD with Dr Dean Meredith  All instructions, including diabetic, were gone over with the patient in the office by the MA  She is also on Pradaxa and I will send [facility] ( Dr Janina Ruiz) and medication clearance sheet   This was all emailed to [employer]

## 2019-10-15 NOTE — TELEPHONE ENCOUNTER
As long as it is not heavy she does not need more workup at this time  She is on a blood thinner and some VB is not an uncommon side effect

## 2019-10-15 NOTE — ED PROVIDER NOTES
History  Chief Complaint   Patient presents with    Abnormal Lab     paitent feeling more tired than baseline  seen at PCP for labs  patient with sodium of 15     35-year-old female sent in for evaluation of abnormal lab  Patient had outpatient sodium level checked today and was found to be low  Patient reports that she has been weak for a while but this is unchanged  History of hyponatremia in the past       History provided by:  Patient  Of hyponatremia in the past     Prior to Admission Medications   Prescriptions Last Dose Informant Patient Reported? Taking? Acetaminophen (TYLENOL) 325 MG CAPS  Child Yes Yes   Sig: Take 1 capsule by mouth as needed    Bimatoprost (LUMIGAN OP)  Child Yes Yes   Sig: Administer 1 drop to both eyes daily at bedtime Both eyes   Blood Pressure Monitoring (BLOOD PRESSURE CUFF) MISC  Child No Yes   Sig: by Does not apply route 2 (two) times a day   Cholecalciferol (VITAMIN D3) 1000 units CAPS  Child Yes Yes   Sig: Take 5,000 Units by mouth daily    Cyanocobalamin (VITAMIN B 12 PO)  Child Yes Yes   Sig: Take 1,000 mcg by mouth daily    Misc  Devices (STETHOSCOPE) MISC  Child No No   Sig: by Does not apply route 2 (two) times a day   NIFEdipine (PROCARDIA XL) 30 mg 24 hr tablet  Child No Yes   Sig: Take 1 tablet (30 mg total) by mouth daily   Netarsudil Dimesylate (RHOPRESSA) 0 02 % SOLN  Self Yes Yes   Sig: Apply to eye   ONE TOUCH ULTRA TEST test strip  Child No No   Sig: TEST twice a day   ONETOUCH DELICA LANCETS 93V MISC  Child No No   Sig: TEST TWICE DAILY     RESTASIS 0 05 % ophthalmic emulsion  Child Yes Yes   Sig: Administer 1 drop to both eyes daily    albuterol (PROAIR HFA) 90 mcg/act inhaler  Child No Yes   Sig: Inhale 2 puffs every 6 (six) hours as needed for wheezing   amiodarone 200 mg tablet  Child No Yes   Sig: Take 1 tablet (200 mg total) by mouth daily   dabigatran etexilate (PRADAXA) 150 mg capsu  Child No Yes   Sig: Take 1 capsule (150 mg total) by mouth every 12 (twelve) hours for 90 days   hydrALAZINE (APRESOLINE) 25 mg tablet  Child No Yes   Sig: Take 3 tablets (75 mg total) by mouth 3 (three) times a day for 90 days   linaGLIPtin (TRADJENTA) 5 MG TABS  Child No Yes   Sig: Take 5 mg by mouth daily   losartan (COZAAR) 50 mg tablet  Child No Yes   Sig: Take 1 tablet (50 mg total) by mouth daily   meclizine (ANTIVERT) 12 5 MG tablet  Child No No   Sig: Take 1 tablet (12 5 mg total) by mouth every 12 (twelve) hours as needed for dizziness for up to 30 days   metFORMIN (GLUCOPHAGE) 500 mg tablet  Child No Yes   Sig: Take 1 tablet (500 mg total) by mouth daily with breakfast for 162 days   Patient taking differently: Take 500 mg by mouth 2 (two) times a day with meals    potassium chloride (K-DUR,KLOR-CON) 10 mEq tablet  Child No Yes   Sig: Take 1 tablet (10 mEq total) by mouth daily   rosuvastatin (CRESTOR) 20 MG tablet  Child No Yes   Sig: Take 1 tablet (20 mg total) by mouth daily at bedtime   timolol (TIMOPTIC) 0 5 % ophthalmic solution  Child Yes Yes   Sig: Administer 1 drop to both eyes daily       Facility-Administered Medications: None       Past Medical History:   Diagnosis Date    Ambulatory dysfunction     CH[facility]congest[facility]) ([facility])     pEFHF    Cholelithiasis     Coronary artery disease     Depression with anxiety     Diabetes mellitus ([facility] [address] )     niddm    Glaucoma     Hypertension     Hyponatremia     Ischemic stroke of frontal lobe ([facility])     Right     Obstructive sleep apnea     PAF (paroxysmal atrial fibrillation) ([facility])     Vertigo        Past Surgical History:   Procedure Laterality Date    APPENDECTOMY      GLAUCOMA SURGERY  01/03/2016    [place] REPLACE AORTIC VALVE OPENFEMORAL ARTERY APPROACH N/A 4/17/2018    Procedure: REPLACEMENT AORTIC VALVE TRANSCATHETER (TAVR) TRANSFEMORAL W/ 23 MM AGUILAR STEVE S3 VALVE;  Surgeon: Chiki De Los Santos DO;  [facility];  Service: Cardiac Surgery    TUBAL LIGATION         Family History Problem Relation Age of Onset    No Known Problems Mother     Diabetes Father     Stroke Father     Coronary artery disease Sister     No Known Problems Brother     No Known Problems Son     Breast cancer Daughter     No Known Problems Maternal Grandmother     No Known Problems Maternal Grandfather     No Known Problems Paternal Grandmother     No Known Problems Paternal Grandfather     No Known Problems Cousin     Rheum arthritis Neg Hx     Osteoarthritis Neg Hx     Asthma Neg Hx     Heart failure Neg Hx     Hyperlipidemia Neg Hx     Hypertension Neg Hx     Migraines Neg Hx     Rashes / Skin problems Neg Hx     Seizures Neg Hx     Thyroid disease Neg Hx      I have reviewed and agree with the history as documented  Social History     Tobacco Use    Smoking status: Never Smoker    Smokeless tobacco: Never Used   Substance Use Topics    Alcohol use: Never     Frequency: Never     Binge frequency: Never    Drug use: Never        Review of Systems   Constitutional: Negative for activity change, appetite change, fatigue and fever  HENT: Negative for congestion, dental problem, ear pain, rhinorrhea and sore throat  Eyes: Negative for pain and redness  Respiratory: Negative for chest tightness, shortness of breath and wheezing  Cardiovascular: Negative for chest pain and palpitations  Gastrointestinal: Negative for abdominal pain, blood in stool, constipation, diarrhea, nausea and vomiting  Endocrine: Negative for cold intolerance and heat intolerance  Genitourinary: Negative for dysuria, frequency and hematuria  Musculoskeletal: Negative for arthralgias and myalgias  Skin: Negative for color change, pallor and rash  Neurological: Negative for weakness and numbness  Hematological: Does not bruise/bleed easily  Psychiatric/Behavioral: Negative for agitation, hallucinations and suicidal ideas         Physical Exam  Physical Exam   Constitutional: She is oriented to person, place, and time  She appears well-developed and well-nourished  HENT:   Mouth/Throat: No oropharyngeal exudate  TMs normal bilaterally no pharyngeal erythema no rhinorrhea nontender palpation of sinuses, normal looking turbinates   Eyes: Conjunctivae and EOM are normal    Neck: Normal range of motion  Neck supple  No meningeal signs   Cardiovascular: Normal rate, regular rhythm, normal heart sounds and intact distal pulses  Pulmonary/Chest: Effort normal and breath sounds normal  No respiratory distress  She has no wheezes  She has no rales  She exhibits no tenderness  Abdominal: Soft  Bowel sounds are normal  She exhibits no distension and no mass  There is no tenderness  No hernia  No cvat   Musculoskeletal: Normal range of motion  She exhibits no edema  Lymphadenopathy:     She has no cervical adenopathy  Neurological: She is alert and oriented to person, place, and time  No cranial nerve deficit  Skin: No rash noted  No erythema  No edema   Psychiatric: She has a normal mood and affect  Her behavior is normal    Nursing note and vitals reviewed        Vital Signs  ED Triage Vitals [10/15/19 1808]   Temperature Pulse Respirations Blood Pressure SpO2   98 4 °F (36 9 °C) 69 18 170/74 95 %      Temp Source Heart Rate Source Patient Position - Orthostatic VS BP Location FiO2 (%)   Oral Monitor Lying Left arm --      Pain Score       No Pain           Vitals:    10/15/19 1808 10/15/19 1956   BP: 170/74 128/66   Pulse: 69 62   Patient Position - Orthostatic VS: Lying Lying         Visual Acuity      ED Medications  Medications - No data to display    Diagnostic Studies  Results Reviewed     Procedure Component Value Units Date/Time    Basic metabolic panel [[id number]]  (Abnormal) Collected:  10/15/19 1918    Lab Status:  Final result Specimen:  Blood from Arm, Left Updated:  10/15/19 2012     Sodium 137 mmol/L      Potassium 4 7 mmol/L      Chloride 103 mmol/L      CO2 25 mmol/L      ANION GAP 9 mmol/L      BUN 22 mg/dL      Creatinine 1 07 mg/dL      Glucose 199 mg/dL      Calcium 9 5 mg/dL      eGFR 48 ml/min/1 73sq m     Narrative:       [place] guidelines for Chronic Kidney Disease (CKD):     Stage 1 with normal or high GFR (GFR > 90 mL/min/1 73 square meters)    Stage 2 Mild CKD (GFR = 60-89 mL/min/1 73 square meters)    Stage 3A Moderate CKD (GFR = 45-59 mL/min/1 73 square meters)    Stage 3B Moderate CKD (GFR = 30-44 mL/min/1 73 square meters)    Stage 4 Severe CKD (GFR = 15-29 mL/min/1 73 square meters)    Stage 5 End Stage CKD (GFR <15 mL/min/1 73 square meters)  Note: GFR calculation is accurate only with a steady state creatinine    Phosphorus [[id number]]  (Normal) Collected:  10/15/19 1918    Lab Status:  Final result Specimen:  Blood from Arm, Left Updated:  10/15/19 1955     Phosphorus 3 5 mg/dL     Magnesium [[id number]]  (Abnormal) Collected:  10/15/19 1918    Lab Status:  Final result Specimen:  Blood from Arm, Left Updated:  10/15/19 1955     Magnesium 1 5 mg/dL     CBC and differential [[id number]]  (Abnormal) Collected:  10/15/19 1918    Lab Status:  Final result Specimen:  Blood from Arm, Left Updated:  10/15/19 1928     WBC 5 56 Thousand/uL      RBC 4 27 Million/uL      Hemoglobin 11 8 g/dL      Hematocrit 37 6 %      MCV 88 fL      MCH 27 6 pg      MCHC 31 4 g/dL      RDW 15 8 %      MPV 10 6 fL      Platelets 207 Thousands/uL      nRBC 0 /100 WBCs      Neutrophils Relative 62 %      Immat GRANS % 0 %      Lymphocytes Relative 26 %      Monocytes Relative 10 %      Eosinophils Relative 1 %      Basophils Relative 1 %      Neutrophils Absolute 3 39 Thousands/µL      Immature Grans Absolute 0 02 Thousand/uL      Lymphocytes Absolute 1 47 Thousands/µL      Monocytes Absolute 0 56 Thousand/µL      Eosinophils Absolute 0 08 Thousand/µL      Basophils Absolute 0 04 Thousands/µL                  No orders to display              Procedures  Procedures ED Course  ED Course as of Oct 15 2052   Tue Oct 15, 2019   2051 Patient's lab work was repeated and within normal limits  Patient be discharged home with outpatient follow-up  MDM  Number of Diagnoses or Management Options  Diagnosis management comments: Acute hypontaremiaa-will repeat labs      Disposition  Final diagnoses:   Abnormal laboratory test result     Time reflects when diagnosis was documented in both MDM as applicable and the Disposition within this note     Time User Action Codes Description Comment    10/15/2019  8:52 PM Mercy Crane Add [R89 9] Abnormal laboratory test result       ED Disposition     ED Disposition Condition Date/Time Comment    Discharge Stable Tue Oct 15, 2019  8:52 PM Ronnie Patiño discharge to home/self care  Follow-up Information     Follow up With Specialties Details Why Contact Robin Carver MD Family Medicine Schedule an appointment as soon as possible for a visit in 2 days  [address] [facility]  [phone]            Patient's Medications   Discharge Prescriptions    No medications on file     No discharge procedures on file      ED Provider  Electronically Signed by           Pavan Avilez MD  10/15/19 2052

## 2019-10-15 NOTE — TELEPHONE ENCOUNTER
Spoke with patients daughter Jes Paiz  Advised vaginal bleeding can be a common side effect with blood thinner  To report heavy or continued bleeding

## 2019-10-15 NOTE — ED NOTES
Pt reports no symptoms other than increased fatigue over the last few weeks       Quique Escoto, RN  10/15/19 1182

## 2019-10-16 ENCOUNTER — TELEPHONE (OUTPATIENT)
Dept: FAMILY MEDICINE CLINIC | Facility: CLINIC | Age: 84
End: 2019-10-16

## 2019-10-16 NOTE — TELEPHONE ENCOUNTER
Rafael Abebe called Blanca Sky she took Elijah Rascon to the hospital to check on her sodium and they did not find anything wrong  Rafael Abebe would like to have Dr James Solis give her a call at her earliest convenience  Rafael Abebe can be reached at [phone] any questions

## 2019-10-16 NOTE — TELEPHONE ENCOUNTER
I called Tuyet Seals and reviewed ER note  Recheck lab showed normal sodium last night  Pt feels fine and daughter is happy

## 2019-10-17 ENCOUNTER — TRANSCRIBE ORDERS (OUTPATIENT)
Dept: GASTROENTEROLOGY | Facility: CLINIC | Age: 84
End: 2019-10-17

## 2019-10-17 ENCOUNTER — TELEPHONE (OUTPATIENT)
Dept: GASTROENTEROLOGY | Facility: CLINIC | Age: 84
End: 2019-10-17

## 2019-10-17 NOTE — TELEPHONE ENCOUNTER
Called [facility]'s Cardiology where med clearance was originally faxed, they told me that pt is an [place] patient  They gave me the backline fax number for their office [phone]  Called the [place] office, spoke to one of their nurses to let them know that I am faxing a med clearance over and need it back ASAP

## 2019-10-18 ENCOUNTER — TELEPHONE (OUTPATIENT)
Dept: CARDIOLOGY CLINIC | Facility: CLINIC | Age: 84
End: 2019-10-18

## 2019-10-18 DIAGNOSIS — I48.0 PAROXYSMAL ATRIAL FIBRILLATION (HCC): Primary | ICD-10-CM

## 2019-10-18 NOTE — TELEPHONE ENCOUNTER
Received med clearance back from their office stating that the pt should not stop Pradaxa, but would need Lovenox bridging  LMOM for nurses at [place] [address] Cardiology to make sure this would be taken care of

## 2019-10-18 NOTE — TELEPHONE ENCOUNTER
Phone call to patient, reviewed lovenox instructions per dr shi  Daughter and son in law, santos agreed to same  understood same  Will send prescription to [facility] pharmacy

## 2019-10-18 NOTE — TELEPHONE ENCOUNTER
Phone call from dr Maddie Alrbecht, he request hold on pradaxa to begin as last dose on sun  10/20 pm dose, then start lovenox 80 mg every 12 hours on 10/21 for total of 6 doses, then restart pradaxa asap post egd  Phone call to patient , spoke with son in law and daughter, reviewed dr Maddie Albrecht would like last dose of pradaxa to be Sunday pm, then start lovenox 80 mg q 12 hours on Monday 10/21 x 3 days prior to egd on 10/24   Pt to restart pradaxa asap post egd if ok with GI

## 2019-10-18 NOTE — TELEPHONE ENCOUNTER
Phone call from Minna Soto at [place] [address]  They received the paper that Moises Webber cannot stop her Pradaxa for procedure that Dr Graham Look sent back  However, he suggested bridging, he must bridge, they do not do that for our Pradaxa  Please let them know how you want to procede and dose please  We need to call patient and set up  Petros Carver is the most familiar with bridging, for our coumadin patients,  but she has all physicians order, dose, and give all the specifics to her before she calls them  It has to be ordered and dosed by physician

## 2019-10-18 NOTE — TELEPHONE ENCOUNTER
Dr Rossy Blake is the one who signed the paper, but I just reviewed her numbers  Her weight is 78 kg  It looks like she could probably be on 80 mg of Lovenox subcutaneously twice daily to start 5 days prior to her procedure  Would stop the Pradaxa 5 days prior to procedure and start Lovenox instead  Resume Pradaxa as soon as possible after the procedure when okay by GI

## 2019-10-21 ENCOUNTER — TELEPHONE (OUTPATIENT)
Dept: GASTROENTEROLOGY | Facility: CLINIC | Age: 84
End: 2019-10-21

## 2019-10-21 NOTE — TELEPHONE ENCOUNTER
Patients GI provider:  Dr Juli Schmidt     Number to return call: ([phone]    Reason for call: Pts son in law calling to advise that Osito Calvillo is sick with a cold   Please advise if it is ok for her to still have her EGD on Thursday     Scheduled procedure/appointment date if applicable: Apt/procedure - 10/24 EGD

## 2019-10-21 NOTE — TELEPHONE ENCOUNTER
Called daughter in law Federico Faria) regarding Pradaxa and Lovenox bridging  They have the medications and were instructed on what to do by Cardiology office  However, pt is not feeling well (cough, runny nose, etc), but no fever  Per the family's request, pt r/s to 10/28/19 at [facility] with Dr Stanley Miguel as that is the soonest available   [place] [facility] lab directions with my direct line emailed to pt's daughter in law at [email or web address]

## 2019-10-28 ENCOUNTER — ANESTHESIA (OUTPATIENT)
Dept: GASTROENTEROLOGY | Facility: HOSPITAL | Age: 84
End: 2019-10-28

## 2019-10-28 ENCOUNTER — HOSPITAL ENCOUNTER (OUTPATIENT)
Dept: GASTROENTEROLOGY | Facility: HOSPITAL | Age: 84
Setting detail: OUTPATIENT SURGERY
Discharge: HOME | End: 2019-10-28
Attending: INTERNAL MEDICINE | Admitting: INTERNAL MEDICINE
Payer: COMMERCIAL

## 2019-10-28 ENCOUNTER — ANESTHESIA EVENT (OUTPATIENT)
Dept: GASTROENTEROLOGY | Facility: HOSPITAL | Age: 84
End: 2019-10-28

## 2019-10-28 VITALS
RESPIRATION RATE: 16 BRPM | SYSTOLIC BLOOD PRESSURE: 147 MMHG | HEART RATE: 57 BPM | OXYGEN SATURATION: 94 % | TEMPERATURE: 98.8 F | DIASTOLIC BLOOD PRESSURE: 67 MMHG

## 2019-10-28 DIAGNOSIS — K22.89 ESOPHAGEAL THICKENING: ICD-10-CM

## 2019-10-28 DIAGNOSIS — R93.89 ABNORMAL CT SCAN: ICD-10-CM

## 2019-10-28 PROCEDURE — C1726 CATH, BAL DIL, NON-VASCULAR: HCPCS

## 2019-10-28 PROCEDURE — 82948 REAGENT STRIP/BLOOD GLUCOSE: CPT

## 2019-10-28 PROCEDURE — 43249 ESOPH EGD DILATION <30 MM: CPT | Performed by: INTERNAL MEDICINE

## 2019-10-28 RX ORDER — PROPOFOL 10 MG/ML
INJECTION, EMULSION INTRAVENOUS CONTINUOUS PRN
Status: DISCONTINUED | OUTPATIENT
Start: 2019-10-28 | End: 2019-10-28 | Stop reason: SURG

## 2019-10-28 RX ORDER — SODIUM CHLORIDE 9 MG/ML
INJECTION, SOLUTION INTRAVENOUS CONTINUOUS PRN
Status: DISCONTINUED | OUTPATIENT
Start: 2019-10-28 | End: 2019-10-28 | Stop reason: SURG

## 2019-10-28 RX ORDER — LIDOCAINE HYDROCHLORIDE 10 MG/ML
INJECTION, SOLUTION EPIDURAL; INFILTRATION; INTRACAUDAL; PERINEURAL AS NEEDED
Status: DISCONTINUED | OUTPATIENT
Start: 2019-10-28 | End: 2019-10-28 | Stop reason: SURG

## 2019-10-28 RX ORDER — PROPOFOL 10 MG/ML
INJECTION, EMULSION INTRAVENOUS AS NEEDED
Status: DISCONTINUED | OUTPATIENT
Start: 2019-10-28 | End: 2019-10-28 | Stop reason: SURG

## 2019-10-28 RX ADMIN — PROPOFOL 40 MG: 10 INJECTION, EMULSION INTRAVENOUS at 14:25

## 2019-10-28 RX ADMIN — SODIUM CHLORIDE: 0.9 INJECTION, SOLUTION INTRAVENOUS at 14:20

## 2019-10-28 RX ADMIN — LIDOCAINE HYDROCHLORIDE 50 MG: 10 INJECTION, SOLUTION EPIDURAL; INFILTRATION; INTRACAUDAL; PERINEURAL at 14:25

## 2019-10-28 RX ADMIN — PROPOFOL 50 MCG/KG/MIN: 10 INJECTION, EMULSION INTRAVENOUS at 14:25

## 2019-10-28 NOTE — ANESTHESIA PREPROCEDURE EVALUATION
Review of Systems/Medical History  Patient summary reviewed        Cardiovascular  Hyperlipidemia, Hypertension , Valve replacement aortic valve  replacement, CAD , CHF ,   Comment: Transthoracic Echocardiogram  Limited 2D     Study date:  14-May-2019     Patient: Job Roche  MR number: [id number]  Account number: [de-identified]  : 1935  Age: 80 years  Gender: Female  Status: Inpatient  Location: Bedside  Height: 57 in  Weight: 166 lb  BP: 161/ 79 mmHg     Indications: Transient ischemic attack; Bubble study      Diagnoses: G45 9 - Transient cerebral ischemic attack, unspecified     Sonographer:  Wendi Franks RDCS  Primary Physician:  Danielle Ervin MD  Referring Physician:  Marline Bradshaw PA-C  Group:  [id number]  Interpreting Physician:  DO JOAQUIN Rubio     LEFT VENTRICLE:  Systolic function was normal  Ejection fraction was estimated to be 65 %  There were no regional wall motion abnormalities  Wall thickness was mildly increased      ATRIAL SEPTUM:  No defect or patent foramen ovale was identified  Agitated saline contrast injection was performed  There was no right-to-left shunt, with provocative maneuvers to increase right atrial pressure      MITRAL VALVE:  There was mild annular calcification      HISTORY: PRIOR HISTORY: Hypertension, Hyperlipidemia, Heart failure, Atrial fibrillation, Diabetes, Coronary artery disease, Obstructive sleep apnea, S/p TAVR (23 mm Vera Lizzie)  ,  Pulmonary  Sleep apnea ,        GI/Hepatic            Endo/Other  Diabetes ,      GYN       Hematology  Anemia ,     Musculoskeletal    Arthritis     Neurology    CVA , Headaches,    Psychology   Anxiety, Depression ,              Physical Exam    Airway    Mallampati score: II  TM Distance: >3 FB  Neck ROM: full     Dental   upper dentures and lower dentures,     Cardiovascular      Pulmonary      Other Findings        Anesthesia Plan  ASA Score- 3     Anesthesia Type- IV sedation with anesthesia with ASA Monitors  Additional Monitors:   Airway Plan:         Plan Factors-    Induction- intravenous  Postoperative Plan-     Informed Consent- Anesthetic plan and risks discussed with patient  I personally reviewed this patient with the CRNA  Discussed and agreed on the Anesthesia Plan with the CRNA  Kate Velez

## 2019-10-28 NOTE — H&P
History and Physical -  [facility]  Gwendolyn Hicks 80 y o  female MRN: [id number]                  HPI: Gwendolyn Hicks is a 80y o  year old female who presents for EGD      REVIEW OF SYSTEMS: Per the HPI, and otherwise unremarkable      Historical Information   Past Medical History:   Diagnosis Date    Ambulatory dysfunction     CHF (congestive heart failure) (HCC)     pEFHF    Cholelithiasis     Coronary artery disease     Depression with anxiety     Diabetes mellitus (HCC)     niddm    Glaucoma     Hypertension     Hyponatremia     Ischemic stroke of frontal lobe (HCC)     Right     Obstructive sleep apnea     PAF (paroxysmal atrial fibrillation) (HCC)     Vertigo      Past Surgical History:   Procedure Laterality Date    APPENDECTOMY      GLAUCOMA SURGERY  01/03/2016    OK REPLACE AORTIC VALVE OPENFEMORAL ARTERY APPROACH N/A 4/17/2018    Procedure: REPLACEMENT AORTIC VALVE TRANSCATHETER (TAVR) TRANSFEMORAL W/ 23 MM AGUILAR STEVE S3 VALVE;  Surgeon: Seven Olivia DO;  [facility];  Service: Cardiac Surgery    TUBAL LIGATION       Social History   Social History     Substance and Sexual Activity   Alcohol Use Never    Frequency: Never    Binge frequency: Never     Social History     Substance and Sexual Activity   Drug Use Never     Social History     Tobacco Use   Smoking Status Never Smoker   Smokeless Tobacco Never Used     Family History   Problem Relation Age of Onset    No Known Problems Mother     Diabetes Father     Stroke Father     Coronary artery disease Sister     No Known Problems Brother     No Known Problems Son     Breast cancer Daughter     No Known Problems Maternal Grandmother     No Known Problems Maternal Grandfather     No Known Problems Paternal Grandmother     No Known Problems Paternal Grandfather     No Known Problems Cousin     Rheum arthritis Neg Hx     Osteoarthritis Neg Hx     Asthma Neg Hx     Heart failure Neg Hx  Hyperlipidemia Neg Hx     Hypertension Neg Hx     Migraines Neg Hx     Rashes / Skin problems Neg Hx     Seizures Neg Hx     Thyroid disease Neg Hx        Meds/Allergies       (Not in a hospital admission)    Allergies   Allergen Reactions    Lipitor [Atorvastatin] GI Intolerance     Nausea stomach ache    Aspirin GI Intolerance    Hydrochlorothiazide GI Intolerance    Lisinopril Cough    Metoprolol Headache       Objective     BP (!) 202/86   Pulse 64   Temp 98 8 °F (37 1 °C) (Oral)   Resp 16   SpO2 96%       PHYSICAL EXAM    Gen: NAD  CV: RRR  CHEST: Clear  ABD: soft, NT/ND  EXT: no edema      ASSESSMENT/PLAN:  This is a 80y o  year old female here for EGD, and she is stable and optimized for her procedure

## 2019-11-05 LAB — GLUCOSE SERPL-MCNC: 196 MG/DL (ref 65–140)

## 2019-11-22 ENCOUNTER — OFFICE VISIT (OUTPATIENT)
Dept: GASTROENTEROLOGY | Facility: MEDICAL CENTER | Age: 84
End: 2019-11-22
Payer: COMMERCIAL

## 2019-11-22 VITALS
TEMPERATURE: 97.6 F | HEART RATE: 60 BPM | BODY MASS INDEX: 35.48 KG/M2 | SYSTOLIC BLOOD PRESSURE: 122 MMHG | HEIGHT: 58 IN | DIASTOLIC BLOOD PRESSURE: 70 MMHG | WEIGHT: 169 LBS

## 2019-11-22 DIAGNOSIS — R13.10 DYSPHAGIA, UNSPECIFIED TYPE: ICD-10-CM

## 2019-11-22 DIAGNOSIS — K22.89 ESOPHAGEAL THICKENING: Primary | ICD-10-CM

## 2019-11-22 PROCEDURE — 99213 OFFICE O/P EST LOW 20 MIN: CPT | Performed by: PHYSICIAN ASSISTANT

## 2019-11-22 NOTE — PROGRESS NOTES
Assessment/Plan:     Diagnoses and all orders for this visit:    Esophageal thickening  Dysphagia, unspecified type    Patient was found to have esophageal wall thickening on imaging and ultimately underwent an endoscopy for further evaluation secondary to her dysphagia  She was found to have a torturous esophagus, presbyesophagus and a large hiatal hernia  She denies any recent episodes of dysphagia  Given her age I do not think she would be a surgical candidate for hiatal hernia repair  Therefore would recommend continuing with conservative management, sitting upright while eating, chewing food well, making sure food is moist and not lying down after eating  Given she is asymptomatic at this time will see her back on an as-needed basis  I asked her to call if she had any further complaints  Subjective:      Patient ID: Neena Palomino is a 80 y o  female  HPI     This is a follow-up for abnormal imaging, dysphagia and to discuss her recent endoscopy  Patient was admitted in the hospital in May secondary to CVA, who underwent CTA showing esophageal wall thickening  She was admitting to choking on food intermittently for some time, therefore endoscopy was recommended  This was performed on October and showed presbyesophagus, esophagus was dilated, large hiatal hernia  She states since then she has not had any significant episodes of choking  She denies any heartburn or reflux  She denies any abdominal pain  She denies any difficulty with her bowels  She has never had a colonoscopy nor does she wish to      Patient Active Problem List   Diagnosis    Type 2 diabetes mellitus with hyperglycemia, without long-term current use of insulin ([facility] )    Glaucoma    Hyperlipidemia    Hypertension    Hyponatremia    Depression with anxiety    Paroxysmal atrial fibrillation (HCC)    Ataxia    Type 2 diabetes mellitus with hyperglycemia ([facility] )    [facility]    Hearing loss    Insomnia    Left knee pain    PERI (obstructive sleep apnea)    Osteoarthritis    Osteoporosis    Periodic limb movement sleep disorder    Sleep talking    Tinnitus    Tricuspid valve disorders, non-rheumatic    Unsteady gait    History of stroke    Chronic diastolic heart failure (HCC)    S/P TAVR (transcatheter aortic valve replacement)    Severe dizziness    Thickened endometrium    Obesity (BMI 30-39  9)    History of CVA (cerebrovascular accident)    Headache    Esophageal thickening    Elevated TSH    Dysphagia    Injury of finger of left hand    Anemia    Stroke (cerebrum) (HCC)     Allergies   Allergen Reactions    Lipitor [Atorvastatin] GI Intolerance     Nausea stomach ache    Aspirin GI Intolerance    Hydrochlorothiazide GI Intolerance    Lisinopril Cough    Metoprolol Headache     Current Outpatient Medications on File Prior to Visit   Medication Sig    Acetaminophen (TYLENOL) 325 MG CAPS Take 1 capsule by mouth as needed     albuterol (PROAIR HFA) 90 mcg/act inhaler Inhale 2 puffs every 6 (six) hours as needed for wheezing    amiodarone 200 mg tablet Take 1 tablet (200 mg total) by mouth daily    Bimatoprost (LUMIGAN OP) Administer 1 drop to both eyes daily at bedtime Both eyes    Blood Pressure Monitoring (BLOOD PRESSURE CUFF) MISC by Does not apply route 2 (two) times a day    Cholecalciferol (VITAMIN D3) 1000 units CAPS Take 5,000 Units by mouth daily     Cyanocobalamin (VITAMIN B 12 PO) Take 1,000 mcg by mouth daily     linaGLIPtin (TRADJENTA) 5 MG TABS Take 5 mg by mouth daily    losartan (COZAAR) 50 mg tablet Take 1 tablet (50 mg total) by mouth daily    metFORMIN (GLUCOPHAGE) 500 mg tablet Take 1 tablet (500 mg total) by mouth daily with breakfast for 162 days (Patient taking differently: Take 500 mg by mouth 2 (two) times a day with meals )    Misc   Devices (STETHOSCOPE) MISC by Does not apply route 2 (two) times a day    Netarsudil Dimesylate (RHOPRESSA) 0 02 % SOLN Apply to eye    NIFEdipine (PROCARDIA XL) 30 mg 24 hr tablet Take 1 tablet (30 mg total) by mouth daily    ONE TOUCH ULTRA TEST test strip TEST twice a day    ONETOUCH DELICA LANCETS 38K MISC TEST TWICE DAILY   potassium chloride (K-DUR,KLOR-CON) 10 mEq tablet Take 1 tablet (10 mEq total) by mouth daily    RESTASIS 0 05 % ophthalmic emulsion Administer 1 drop to both eyes daily     rosuvastatin (CRESTOR) 20 MG tablet Take 1 tablet (20 mg total) by mouth daily at bedtime    timolol (TIMOPTIC) 0 5 % ophthalmic solution Administer 1 drop to both eyes daily     dabigatran etexilate (PRADAXA) 150 mg capsu Take 1 capsule (150 mg total) by mouth every 12 (twelve) hours for 90 days    enoxaparin (LOVENOX) 80 mg/0 8 mL Take 80 mg sq every 12 hours as directed (Patient not taking: Reported on 11/22/2019)    hydrALAZINE (APRESOLINE) 25 mg tablet Take 3 tablets (75 mg total) by mouth 3 (three) times a day for 90 days    meclizine (ANTIVERT) 12 5 MG tablet Take 1 tablet (12 5 mg total) by mouth every 12 (twelve) hours as needed for dizziness for up to 30 days     No current facility-administered medications on file prior to visit        Family History   Problem Relation Age of Onset    No Known Problems Mother     Diabetes Father     Stroke Father     Coronary artery disease Sister     No Known Problems Brother     No Known Problems Son     Breast cancer Daughter     No Known Problems Maternal Grandmother     No Known Problems Maternal Grandfather     No Known Problems Paternal Grandmother     No Known Problems Paternal Grandfather     No Known Problems Cousin     Rheum arthritis Neg Hx     Osteoarthritis Neg Hx     Asthma Neg Hx     Heart failure Neg Hx     Hyperlipidemia Neg Hx     Hypertension Neg Hx     Migraines Neg Hx     Rashes / Skin problems Neg Hx     Seizures Neg Hx     Thyroid disease Neg Hx      Past Medical History:   Diagnosis Date    Ambulatory dysfunction     CH[facility]congestive heart failure) ([facility])     pEFHF    Cholelithiasis     Coronary artery disease     Depression with anxiety     Diabetes mellitus ([facility] [facility] )     niddm    Glaucoma     Hypertension     Hyponatremia     Ischemic stroke of frontal lobe ([facility])     Right     Obstructive sleep apnea     PAF (paroxysmal atrial fibrillation) ([facility])     Vertigo      Social History     Socioeconomic History    Marital status:       Spouse name: None    Number of children: None    Years of education: None    Highest education level: None   Occupational History    None   Social Needs    Financial resource strain: None    Food insecurity:     Worry: None     Inability: None    Transportation needs:     Medical: None     Non-medical: None   Tobacco Use    Smoking status: Never Smoker    Smokeless tobacco: Never Used   Substance and Sexual Activity    Alcohol use: Never     Frequency: Never     Binge frequency: Never    Drug use: Never    Sexual activity: Not Currently   Lifestyle    Physical activity:     Days per week: None     Minutes per session: None    Stress: None   Relationships    Social connections:     Talks on phone: None     Gets together: None     Attends [employer] service: None     Active member of club or organization: None     Attends meetings of clubs or organizations: None     Relationship status: None    Intimate partner violence:     Fear of current or ex partner: None     Emotionally abused: None     Physically abused: None     Forced sexual activity: None   Other Topics Concern    None   Social History Narrative    None     Past Surgical History:   Procedure Laterality Date    APPENDECTOMY      GLAUCOMA SURGERY  01/03/2016    MI REPLACE AORTIC VALVE OPENFEMORAL ARTERY APPROACH N/A 4/17/2018    Procedure: REPLACEMENT AORTIC VALVE TRANSCATHETER (TAVR) TRANSFEMORAL W/ 23 MM AGUILAR STEVE S3 VALVE;  Surgeon: Francisca Bello DO;  [facility];  Service: Cardiac Surgery    TUBAL LIGATION      UPPER GASTROINTESTINAL ENDOSCOPY  10/28/2019         Review of Systems   All other systems reviewed and are negative  Objective:      /70 (BP Location: Right arm, Patient Position: Sitting, Cuff Size: Adult)   Pulse 60   Temp 97 6 °F (36 4 °C)   Ht 4' 9 5" (1 461 m)   Wt 76 7 kg (169 lb)   BMI 35 94 kg/m²          Physical Exam   Constitutional: She is oriented to person, place, and time  She appears well-developed and well-nourished  HENT:   Head: Normocephalic and atraumatic  Eyes: Conjunctivae and EOM are normal    Neck: Normal range of motion  Cardiovascular: Normal rate and regular rhythm  Pulmonary/Chest: Effort normal and breath sounds normal    Abdominal: Soft  Bowel sounds are normal  She exhibits no distension  There is no tenderness  Musculoskeletal: Normal range of motion  Neurological: She is alert and oriented to person, place, and time  Skin: Skin is warm and dry  Psychiatric: She has a normal mood and affect   Her behavior is normal

## 2019-11-23 DIAGNOSIS — E13.9 DIABETES 1.5, MANAGED AS TYPE 2 (HCC): ICD-10-CM

## 2019-11-25 ENCOUNTER — OFFICE VISIT (OUTPATIENT)
Dept: FAMILY MEDICINE CLINIC | Facility: CLINIC | Age: 84
End: 2019-11-25
Payer: COMMERCIAL

## 2019-11-25 ENCOUNTER — OFFICE VISIT (OUTPATIENT)
Dept: CARDIOLOGY CLINIC | Facility: CLINIC | Age: 84
End: 2019-11-25
Payer: COMMERCIAL

## 2019-11-25 VITALS
HEIGHT: 57 IN | WEIGHT: 170 LBS | BODY MASS INDEX: 36.68 KG/M2 | SYSTOLIC BLOOD PRESSURE: 112 MMHG | DIASTOLIC BLOOD PRESSURE: 56 MMHG | RESPIRATION RATE: 16 BRPM | HEART RATE: 64 BPM

## 2019-11-25 VITALS
HEIGHT: 57 IN | DIASTOLIC BLOOD PRESSURE: 54 MMHG | OXYGEN SATURATION: 96 % | WEIGHT: 168.6 LBS | SYSTOLIC BLOOD PRESSURE: 136 MMHG | RESPIRATION RATE: 16 BRPM | HEART RATE: 60 BPM | BODY MASS INDEX: 36.38 KG/M2 | TEMPERATURE: 98 F

## 2019-11-25 DIAGNOSIS — E11.65 TYPE 2 DIABETES MELLITUS WITH HYPERGLYCEMIA, WITHOUT LONG-TERM CURRENT USE OF INSULIN (HCC): Primary | ICD-10-CM

## 2019-11-25 DIAGNOSIS — Z95.2 S/P TAVR (TRANSCATHETER AORTIC VALVE REPLACEMENT): ICD-10-CM

## 2019-11-25 DIAGNOSIS — I48.0 PAROXYSMAL ATRIAL FIBRILLATION (HCC): ICD-10-CM

## 2019-11-25 DIAGNOSIS — E78.5 HYPERLIPIDEMIA, UNSPECIFIED HYPERLIPIDEMIA TYPE: Chronic | ICD-10-CM

## 2019-11-25 DIAGNOSIS — I50.32 CHRONIC DIASTOLIC HEART FAILURE (HCC): Chronic | ICD-10-CM

## 2019-11-25 DIAGNOSIS — I63.521 CEREBROVASCULAR ACCIDENT (CVA) DUE TO OCCLUSION OF RIGHT ANTERIOR CEREBRAL ARTERY (HCC): ICD-10-CM

## 2019-11-25 DIAGNOSIS — Z00.00 MEDICARE ANNUAL WELLNESS VISIT, SUBSEQUENT: ICD-10-CM

## 2019-11-25 DIAGNOSIS — E66.9 OBESITY (BMI 30-39.9): ICD-10-CM

## 2019-11-25 DIAGNOSIS — I10 ESSENTIAL HYPERTENSION: Primary | Chronic | ICD-10-CM

## 2019-11-25 DIAGNOSIS — R42 SEVERE DIZZINESS: ICD-10-CM

## 2019-11-25 DIAGNOSIS — I10 ESSENTIAL HYPERTENSION: Chronic | ICD-10-CM

## 2019-11-25 PROBLEM — D64.9 ANEMIA: Status: RESOLVED | Noted: 2019-07-08 | Resolved: 2019-11-25

## 2019-11-25 PROCEDURE — 99214 OFFICE O/P EST MOD 30 MIN: CPT

## 2019-11-25 PROCEDURE — 1101F PT FALLS ASSESS-DOCD LE1/YR: CPT | Performed by: FAMILY MEDICINE

## 2019-11-25 PROCEDURE — 99214 OFFICE O/P EST MOD 30 MIN: CPT | Performed by: FAMILY MEDICINE

## 2019-11-25 PROCEDURE — 3725F SCREEN DEPRESSION PERFORMED: CPT | Performed by: FAMILY MEDICINE

## 2019-11-25 PROCEDURE — 1036F TOBACCO NON-USER: CPT | Performed by: FAMILY MEDICINE

## 2019-11-25 PROCEDURE — G0439 PPPS, SUBSEQ VISIT: HCPCS | Performed by: FAMILY MEDICINE

## 2019-11-25 NOTE — PATIENT INSTRUCTIONS
Medicare Preventive Visit Patient Instructions  Thank you for completing your Welcome to Medicare Visit or Medicare Annual Wellness Visit today  Your next wellness visit will be due in one year (11/25/2020)  The screening/preventive services that you may require over the next 5-10 years are detailed below  Some tests may not apply to you based off risk factors and/or age  Screening tests ordered at today's visit but not completed yet may show as past due  Also, please note that scanned in results may not display below  Preventive Screenings:  Service Recommendations Previous Testing/Comments   Colorectal Cancer Screening  * Colonoscopy    * Fecal Occult Blood Test (FOBT)/Fecal Immunochemical Test (FIT)  * Fecal DNA/Cologuard Test  * Flexible Sigmoidoscopy Age: 54-65 years old   Colonoscopy: every 10 years (may be performed more frequently if at higher risk)  OR  FOBT/FIT: every 1 year  OR  Cologuard: every 3 years  OR  Sigmoidoscopy: every 5 years  Screening may be recommended earlier than age 48 if at higher risk for colorectal cancer  Also, an individualized decision between you and your healthcare provider will decide whether screening between the ages of 74-80 would be appropriate  Colonoscopy: Not on file  FOBT/FIT: Not on file  Cologuard: Not on file  Sigmoidoscopy: Not on file         Breast Cancer Screening Age: 36 years old  Frequency: every 1-2 years  Not required if history of left and right mastectomy Mammogram: Not on file       Cervical Cancer Screening Between the ages of 21-29, pap smear recommended once every 3 years  Between the ages of 33-67, can perform pap smear with HPV co-testing every 5 years     Recommendations may differ for women with a history of total hysterectomy, cervical cancer, or abnormal pap smears in past  Pap Smear: Not on file    Screening Not Indicated   Hepatitis C Screening Once for adults born between [facility]  More frequently in patients at high risk for Hepatitis C Hep C Antibody: Not on file       Diabetes Screening 1-2 times per year if you're at risk for diabetes or have pre-diabetes Fasting glucose: 224 mg/dL   A1C: 8 3 %    Screening Not Indicated  History Diabetes   Cholesterol Screening Once every 5 years if you don't have a lipid disorder  May order more often based on risk factors  Lipid panel: 10/15/2019    Screening Not Indicated  History Lipid Disorder     Other Preventive Screenings Covered by Medicare:  1  Abdominal Aortic Aneurysm (AAA) Screening: covered once if your at risk  You're considered to be at risk if you have a family history of AAA  2  Lung Cancer Screening: covers low dose CT scan once per year if you meet all of the following conditions: (1) Age 50-69; (2) No signs or symptoms of lung cancer; (3) Current smoker or have quit smoking within the last 15 years; (4) You have a tobacco smoking history of at least 30 pack years (packs per day multiplied by number of years you smoked); (5) You get a written order from a healthcare provider  3  Glaucoma Screening: covered annually if you're considered high risk: (1) You have diabetes OR (2) Family history of glaucoma OR (3)  aged 48 and older OR (3)  American aged 72 and older  3  Osteoporosis Screening: covered every 2 years if you meet one of the following conditions: (1) You're estrogen deficient and at risk for osteoporosis based off medical history and other findings; (2) Have a vertebral abnormality; (3) On glucocorticoid therapy for more than 3 months; (4) Have primary hyperparathyroidism; (5) On osteoporosis medications and need to assess response to drug therapy  · Last bone density test (DXA Scan): 05/02/2017  5  HIV Screening: covered annually if you're between the age of 12-76  Also covered annually if you are younger than 13 and older than 72 with risk factors for HIV infection   For pregnant patients, it is covered up to 3 times per pregnancy  Immunizations:  Immunization Recommendations   Influenza Vaccine Annual influenza vaccination during flu season is recommended for all persons aged >= 6 months who do not have contraindications   Pneumococcal Vaccine (Prevnar and Pneumovax)  * Prevnar = PCV13  * Pneumovax = PPSV23   Adults 25-60 years old: 1-3 doses may be recommended based on certain risk factors  Adults 72 years old: Prevnar (PCV13) vaccine recommended followed by Pneumovax (PPSV23) vaccine  If already received PPSV23 since turning 65, then PCV13 recommended at least one year after PPSV23 dose  Hepatitis B Vaccine 3 dose series if at intermediate or high risk (ex: diabetes, end stage renal disease, liver disease)   Tetanus (Td) Vaccine - COST NOT COVERED BY MEDICARE PART B Following completion of primary series, a booster dose should be given every 10 years to maintain immunity against tetanus  Td may also be given as tetanus wound prophylaxis  Tdap Vaccine - COST NOT COVERED BY MEDICARE PART B Recommended at least once for all adults  For pregnant patients, recommended with each pregnancy  Shingles Vaccine (Shingrix) - COST NOT COVERED BY MEDICARE PART B  2 shot series recommended in those aged 48 and above     Health Maintenance Due:      Topic Date Due    DXA SCAN  05/02/2022     Immunizations Due:      Topic Date Due    DTaP,Tdap,and Td Vaccines (1 - Tdap) 03/23/1946    Hepatitis B Vaccine (1 of 3 - Risk 3-dose series) 03/23/1954    Pneumococcal Vaccine: 65+ Years (1 of 2 - PCV13) 03/23/2000    Influenza Vaccine  07/01/2019     Advance Directives   What are advance directives? Advance directives are legal documents that state your wishes and plans for medical care  These plans are made ahead of time in case you lose your ability to make decisions for yourself  Advance directives can apply to any medical decision, such as the treatments you want, and if you want to donate organs     What are the types of advance directives? There are many types of advance directives, and each state has rules about how to use them  You may choose a combination of any of the following:  · Living will: This is a written record of the treatment you want  You can also choose which treatments you do not want, which to limit, and which to stop at a certain time  This includes surgery, medicine, IV fluid, and tube feedings  · Durable power of  for healthcare [facility]): This is a written record that states who you want to make healthcare choices for you when you are unable to make them for yourself  This person, called a proxy, is usually a family member or a friend  You may choose more than 1 proxy  · Do not resuscitate (DNR) order:  A DNR order is used in case your heart stops beating or you stop breathing  It is a request not to have certain forms of treatment, such as CPR  A DNR order may be included in other types of advance directives  · Medical directive: This covers the care that you want if you are in a coma, near death, or unable to make decisions for yourself  You can list the treatments you want for each condition  Treatment may include pain medicine, surgery, blood transfusions, dialysis, IV or tube feedings, and a ventilator (breathing machine)  · Values history: This document has questions about your views, beliefs, and how you feel and think about life  This information can help others choose the care that you would choose  Why are advance directives important? An advance directive helps you control your care  Although spoken wishes may be used, it is better to have your wishes written down  Spoken wishes can be misunderstood, or not followed  Treatments may be given even if you do not want them  An advance directive may make it easier for your family to make difficult choices about your care     Weight Management   Why it is important to manage your weight:  Being overweight increases your risk of health conditions such as heart disease, high blood pressure, type 2 diabetes, and certain types of cancer  It can also increase your risk for osteoarthritis, sleep apnea, and other respiratory problems  Aim for a slow, steady weight loss  Even a small amount of weight loss can lower your risk of health problems  How to lose weight safely:  A safe and healthy way to lose weight is to eat fewer calories and get regular exercise  You can lose up about 1 pound a week by decreasing the number of calories you eat by 500 calories each day  Healthy meal plan for weight management:  A healthy meal plan includes a variety of foods, contains fewer calories, and helps you stay healthy  A healthy meal plan includes the following:  · Eat whole-grain foods more often  A healthy meal plan should contain fiber  Fiber is the part of grains, fruits, and vegetables that is not broken down by your body  Whole-grain foods are healthy and provide extra fiber in your diet  Some examples of whole-grain foods are whole-wheat breads and pastas, oatmeal, brown rice, and bulgur  · Eat a variety of vegetables every day  Include dark, leafy greens such as spinach, kale, madelyn greens, and mustard greens  Eat yellow and orange vegetables such as carrots, sweet potatoes, and winter squash  · Eat a variety of fruits every day  Choose fresh or canned fruit (canned in its own juice or light syrup) instead of juice  Fruit juice has very little or no fiber  · Eat low-fat dairy foods  Drink fat-free (skim) milk or 1% milk  Eat fat-free yogurt and low-fat cottage cheese  Try low-fat cheeses such as mozzarella and other reduced-fat cheeses  · Choose meat and other protein foods that are low in fat  Choose beans or other legumes such as split peas or lentils  Choose fish, skinless poultry (chicken or turkey), or lean cuts of red meat (beef or pork)  Before you cook meat or poultry, cut off any visible fat  · Use less fat and oil    Try baking foods instead of frying them  Add less fat, such as margarine, sour cream, regular salad dressing and mayonnaise to foods  Eat fewer high-fat foods  Some examples of high-fat foods include french fries, doughnuts, ice cream, and cakes  · Eat fewer sweets  Limit foods and drinks that are high in sugar  This includes candy, cookies, regular soda, and sweetened drinks  Exercise:  Exercise at least 30 minutes per day on most days of the week  Some examples of exercise include walking, biking, dancing, and swimming  You can also fit in more physical activity by taking the stairs instead of the elevator or parking farther away from stores  Ask your healthcare provider about the best exercise plan for you  © Copyright [employer] 2018 Information is for End User's use only and may not be sold, redistributed or otherwise used for commercial purposes   All illustrations and images included in CareNotes® are the copyrighted property of [employer]  or [address] [place] [address]

## 2019-11-25 NOTE — PROGRESS NOTES
Cardiology   Lavetta Opitz, M D Keri Nimrod, D O Arnie Plumber, M D Basil Brazier, M D Essie Asper, D O   -------------------------------------------------------------------  [facility]  [address], [place] [address]  [phone]  [phone]  11/25/19  Rebecca Doherty  YOB: 1935   MRN: [id number]      Referring Physian: María Carter MD  [facility], [address] [facility]     HPI: Rebecca oDherty is a 80 y o  female with a past medical history of aortic stenosis status post recent transcatheter aortic valve replacement, coronary disease with a 60% 1st obtuse marginal stenosis as well as the mid RCA 50% stenosis, persistent atrial fibrillation status post cardioversion, previous CVA/TIA and recurrent CVA on Xarelto now on Pradaxa, less than 50% carotid stenosis bilaterally from 2018, diastolic heart failure, diabetes, hypertension, dyslipidemia, obstructive sleep apnea not on CPAP, anxiety/depression presents for cardiology follow-up  She is doing well  She denies any chest pain, shortness of breath, palpitations, dyspnea on exertion  She does note chronic dizziness since her previous stroke and she is followed by Neurology  She had recent CMP as well as lipid panel which were reviewed and controlled  Review of Systems   Constitutional: Negative for chills and fever  HENT: Negative for facial swelling and sore throat  Eyes: Negative for visual disturbance  Respiratory: Negative for cough, chest tightness, shortness of breath and wheezing  Cardiovascular: Negative for chest pain, palpitations and leg swelling  Gastrointestinal: Negative for abdominal pain, blood in stool, constipation, diarrhea, nausea and vomiting  Endocrine: Negative for cold intolerance and heat intolerance  Genitourinary: Negative for decreased urine volume, difficulty urinating, dysuria and hematuria     Musculoskeletal: Negative for arthralgias, back pain and myalgias  Skin: Negative for rash  Neurological: Negative for dizziness, syncope, weakness and numbness  Psychiatric/Behavioral: Negative for agitation, behavioral problems and confusion  The patient is not nervous/anxious  OBJECTIVE  Vitals:    11/25/19 1514   BP: 112/56   Pulse: 64   Resp: 16       Physical Exam   General appearance: alert and oriented, in no acute distress, elderly female  Head: Normocephalic, without obvious abnormality, atraumatic  Eyes: conjunctivae/corneas clear  Anicteric  Neck: no adenopathy, no carotid bruit, no JVD  Lungs: clear to auscultation bilaterally  Heart: regular rate and rhythm, S1, S2 normal, no murmur, click, rub or gallop  Abdomen: soft, non-tender; bowel sounds normal; no masses,  no organomegaly  Extremities: extremities normal, warm and well-perfused; no cyanosis, clubbing, or edema  Skin: Skin color, texture, turgor normal  No rashes or lesions     EKG  No results found for this visit on 11/25/19  IMPRESSION:  1  Aortic stenosis status post TAVR  2  Coronary disease with 60% 1st obtuse marginal stenosis as well as mid RCA 50% stenosis  3  Persistent atrial fibrillation status post cardioversion  4  Previous CVA/TIA and recurrent CVA on Xarelto, now on Pradaxa  5  Less than 50% carotid stenosis bilaterally from 2018  6  History of diastolic heart failure  7  Diabetes  8  Hypertension  9  Dyslipidemia  10  Obstructive sleep apnea not on CPAP  11  Anxiety  12  Depression    DISCUSSION/RECOMMENDATIONS:   At this point she is stable  Blood pressure is very well controlled  Her lipids are very well controlled   She has no acute complaints today   She denies any symptoms at all currently    Her heart rate is controlled today 64 beats per minute and is regular on exam   I would continue her amiodarone, Pradaxa, hydralazine, losartan, nifedipine, Crestor 20 mg   I think at this point would be okay for her to follow up every 6 months, sooner if any acute issues arise  --------------------------------------------------------------------------------  TREADMILL STRESS  No results found for this or any previous visit    ----------------------------------------------------------------------------------------------  NUCLEAR STRESS TEST: No results found for this or any previous visit  No results found for this or any previous visit     --------------------------------------------------------------------------------  CATH:  No results found for this or any previous visit   --------------------------------------------------------------------------------  ECHO:   Results for orders placed during the hospital encounter of 19   Echo complete with contrast if indicated    [address]  [facility], [address] [facility]  [phone]    Transthoracic Echocardiogram  2D, M-mode, Doppler, and Color Doppler    Study date:  08-Mar-2019    Patient: Patric Bell  MR number: [id number]  Account number: [de-identified]  : 1935  Age: 80 years  Gender: Female  Status: Outpatient  Location: [facility]  Height: 57 5 in  Weight: 162 lb  BP: 247/ 107 mmHg    Indications: S/P TAVR 1 year follow-up  Diagnoses: I35 9 - Nonrheumatic aortic valve disorder, unspecified    Sonographer:  Fortino Gatica RDCS  Primary Physician:  Herbie Watson MD  Referring Physician:  Hannah Jerez DO  Group:  [facility]  Interpreting Physician:  Vik Quezada MD    SUMMARY    LEFT VENTRICLE:  Systolic function was normal by visual assessment  Ejection fraction was estimated to be 60 %  There were no regional wall motion abnormalities  Doppler parameters were consistent with abnormal left ventricular relaxation (grade 1 diastolic dysfunction)  LEFT ATRIUM:  The atrium was mildly dilated  MITRAL VALVE:  There was mild to moderate annular calcification    There was mild regurgitation  AORTIC VALVE:  A #23 Vera Lizzie (TAVR) bioprosthesis was present  It is well seated  It exhibited normal function  Valve mean gradient was 9 mmHg  Estimated aortic valve area (by VTI) was 1 35 cm squared  TRICUSPID VALVE:  There was mild regurgitation  HISTORY: PRIOR HISTORY: DM2  AS  Hypertension  Atrial fibrillation  CVA  Hyperlipidemia  S/P TAVR; 4/17/18  PROCEDURE: The study was performed in the [address] [place] [address]  This was a routine study  The transthoracic approach was used  The study included complete 2D imaging, M-mode, complete spectral Doppler, and color Doppler  The  heart rate was 60 bpm, at the start of the study  Image quality was adequate  LEFT VENTRICLE: Size was normal  Systolic function was normal by visual assessment  Ejection fraction was estimated to be 60 %  There were no regional wall motion abnormalities  Wall thickness was normal  DOPPLER: There was an increased  relative contribution of atrial contraction to ventricular filling  Doppler parameters were consistent with abnormal left ventricular relaxation (grade 1 diastolic dysfunction)  RIGHT VENTRICLE: The size was normal  Systolic function was normal  DOPPLER: Systolic pressure was within the normal range  Estimated peak pressure was 25 mmHg  LEFT ATRIUM: The atrium was mildly dilated  RIGHT ATRIUM: Size was normal     MITRAL VALVE: There was mild to moderate annular calcification  Valve structure was normal  There was normal leaflet separation  DOPPLER: The transmitral velocity was within the normal range  There was no evidence for stenosis  There was  mild regurgitation  AORTIC VALVE: A #23 Vera Lizzie (TAVR) bioprosthesis was present  It is well seated  It exhibited normal function  DOPPLER: Transaortic velocity was within the normal range  There was no evidence for stenosis  There was no significant  regurgitation      TRICUSPID VALVE: The valve structure was normal  There was normal leaflet separation  DOPPLER: The transtricuspid velocity was within the normal range  There was no evidence for stenosis  There was mild regurgitation  PULMONIC VALVE: Leaflets exhibited normal thickness, no calcification, and normal cuspal separation  DOPPLER: The transpulmonic velocity was within the normal range  There was no regurgitation  PERICARDIUM: There was no pericardial effusion  AORTA: The root exhibited normal size  SYSTEMIC VEINS: IVC: The inferior vena cava was normal in size and course  Respirophasic changes were normal     MEASUREMENT TABLES    2D MEASUREMENTS  LVOT   (Reference normals)  Diam   20 mm   (--)    DOPPLER MEASUREMENTS  LVOT   (Reference normals)  VTI   22 cm   (--)  Stroke vol   69 12 ml   (--)  Stroke index   0 43 ml/m squared   (--)  Aortic valve   (Reference normals)  Peak malcolm   212 cm/s   (--)  VTI   51 cm   (--)  Mean gradient   9 mmHg   (--)  Obstr index, VTI   0 43    (--)  Valve area, VTI   1 35 cm squared   (--)  Area index, VTI   0 81 cm squared/m squared   (--)    SYSTEM MEASUREMENT TABLES    2D  %FS: 31 5 %  Ao Diam: 3 2 cm  EDV(Teich): 72 2 ml  EF(Teich): 59 9 %  ESV(Teich): 29 ml  IVSd: 0 8 cm  LA Area: 21 5 cm2  LA Diam: 3 9 cm  LVEDV MOD A4C: 41 9 ml  LVEF MOD A4C: 63 6 %  LVESV MOD A4C: 15 2 ml  LVIDd: 4 1 cm  LVIDs: 2 8 cm  LVLd A4C: 6 5 cm  LVLs A4C: 5 4 cm  LVOT Diam: 1 9 cm  LVPWd: 0 9 cm  RA Area: 13 1 cm2  RVIDd: 3 2 cm  SV MOD A4C: 26 7 ml  SV(Teich): 43 2 ml    CW  AV Env  Ti: 369 1 ms  AV VTI: 51 cm  AV Vmax: 2 1 m/s  AV Vmean: 1 4 m/s  AV maxP 9 mmHg  AV meanP 7 mmHg  TR Vmax: 2 2 m/s  TR maxP 5 mmHg    MM  TAPSE: 2 cm    PW  TEN (VTI): 1 3 cm2  TEN Vmax: 1 2 cm2  AVC: 437 7 ms  E': 0 m/s  E/E': 16 2  HR: 53 1 BPM  LVCO Dopp: 3 4 L/min  LVOT Env  Ti: 373 7 ms  LVOT VTI: 22 cm  LVOT Vmax: 0 9 m/s  LVOT Vmean: 0 6 m/s  LVOT maxPG: 3 2 mmHg  LVOT meanP 6 mmHg  LVSV Dopp: 64 1 ml  MV A Malcolm: 1 m/s  MV Dec [place]: 3 7 m/s2  MV DecT: 214 2 ms  MV E Malcolm: 0 8 m/s  MV E/A Ratio: 0 8  MV PHT: 62 1 ms  MVA By PHT: 3 5 cm2    [facility] Accredited Echocardiography Laboratory    Prepared and electronically signed by    Madison Loomis MD  Signed 14-Mar-2019 08:48:43       Results for orders placed during the hospital encounter of 19   KATHLEEN    Narrative [address]  [address]  [place], [address]  [phone]    Transesophageal Echocardiogram  2D and Doppler    Study date:  2019    Patient: Caden Hinson  MR number: [id number]  Account number: [de-identified]  : 1935  Age: 80 years  Gender: Female  Status: Outpatient  Location: Cath lab  Height: 58 in  Weight: 159 lb  BP: 143/ 98 mmHg    Indications: Atrial fibrillation  Diagnoses: I48 0 - Atrial fibrillation    Sonographer:  YAEL Timmons  Primary Physician:  Jolinda Mortimer, MD  Group:  [facility]  RN:  Rizwan Newton RN BSN  Interpreting Physician:  Svitlana Bhatti MD    SUMMARY    LEFT VENTRICLE:  Systolic function was normal     LEFT ATRIUM:  The atrium was dilated  LEFT ATRIAL APPENDAGE:  No thrombus was identified  MITRAL VALVE:  There was mild to moderate regurgitation  AORTIC VALVE:  A bioprosthesis was present  It exhibited normal function  There was trace regurgitation  TRICUSPID VALVE:  There was mild to moderate regurgitation  AORTA:  There was atheroma noted in the descending aorta  HISTORY: PRIOR HISTORY: Congestive heart failure  Atrial fibrillation  Risk factors: hypertension, diabetes, and medication-treated hypercholesterolemia  PRIOR PROCEDURES: Bioprosthesis of aortic valve (TAVR #23 Booker Stephen  PROCEDURE: The procedure was performed in the catheterization laboratory  This was a routine study  The risks and alternatives of the procedure were explained to the patient and informed consent was obtained  The transesophageal approach  was used   The study included complete 2D imaging, limited spectral Doppler, and probe insertion (without interpretation)  The heart rate was 136 bpm, at the start of the study  by the attending cardiologist  Marivel Aj with ease  One  intubation attempt(s)  There was no blood detected on the probe  Image quality was good  MEDICATIONS: General anesthesia administered by anesthesia team     LEFT VENTRICLE: Size was normal  Systolic function was normal     RIGHT VENTRICLE: The size was normal  Systolic function was normal     LEFT ATRIUM: The atrium was dilated  APPENDAGE: The size was normal  No thrombus was identified  RIGHT ATRIUM: Size was normal     MITRAL VALVE: Valve structure was normal  There was normal leaflet separation  There was no echocardiographic evidence of vegetation  DOPPLER: There was mild to moderate regurgitation  AORTIC VALVE: A bioprosthesis was present  It exhibited normal function  DOPPLER: There was trace regurgitation  TRICUSPID VALVE: The valve structure was normal  There was normal leaflet separation  There was no echocardiographic evidence of vegetation  DOPPLER: There was mild to moderate regurgitation  PULMONIC VALVE: Not well visualized  PERICARDIUM: There was no thickening or calcification  AORTA: There was atheroma noted in the descending aorta  [address] [facility]    Prepared and electronically signed by    Maria Isabel Barnes MD  Signed 11-Jan-2019 15:49:30       --------------------------------------------------------------------------------  Piyush Chadwick  No results found for this or any previous visit    No results found for this or any previous visit   --------------------------------------------------------------------------------  CAROTIDS  Results for orders placed during the hospital encounter of 04/05/18   VAS carotid complete study    Narrative    [facility] REPORT  CLINICAL:  Indications:  Patient presents for a general health evaluation secondary to future open heart  surgery  Pre TAVR  Patient is asymptomatic at this time  Operative History  No prior heart or vascular surgery  Risk Factors  The patient has history of HTN, Diabetes, severe aortic stenosis, AFIB,  ischemic stroke, obstructive sleep apnea, vertigo, and hyperlipidemia  The  patient's current BMI is 35 49, Weight (lb) is 164 lb and Height (in) is 57 in  She is a non smoker  Clinical  Right Brachial Pressure:  190/76 mmHg, Left Brachial Pressure:  192/76 mmHg  NOTE:  PT had not taken BP meds this morning prior to evaluation secondary to  KATHLEEN  FINDINGS:     Right        Impression  PSV  EDV (cm/s)  Direction of Flow  Ratio    Dist  ICA                126          14                      1 66    Mid  ICA                  57          11                      0 76    Prox  ICA    1 - 49%      60          10                      0 79    Dist CCA                  60           9                              Mid CCA                   76          10                      1 56    Prox CCA                  49           8                              Ext Carotid               87           0                      1 15    Prox Vert                 41           7  Antegrade                   Subclavian               213           0                                 Left         Impression  PSV  EDV (cm/s)  Direction of Flow  Ratio    Dist  ICA                 72          10                      1 12    Mid  ICA                  73          11                      1 14    Prox   ICA    1 - 49%      92          15                      1 42    Dist CCA                  71          13                              Mid CCA                   64          10                      0 78    Prox CCA                  83          12                              Ext Carotid               93           9                      1 45    Prox Vert                 79           9  Antegrade                   Subclavian 214           0                                       CONCLUSION:  Impression  RIGHT:  There is <50% stenosis noted in the internal carotid artery  Plaque is  heterogenous/calcified and irregular  Vertebral artery flow is antegrade  There is no significant subclavian artery  disease  LEFT:  There is <50% stenosis noted in the internal carotid artery  Plaque is  heterogenous/calcified and irregular  Vertebral artery flow is antegrade  There is no significant subclavian artery  disease  Compared to previous study on 01/09/2018, there is no B/L interval change in  the disease process  Internal carotid artery stenosis determination by consensus criteria from:  Denisse Schwartz , et al  Carotid Artery Stenosis: Gray-Scale and Doppler US Diagnosis  - Society of Radiologists in [facility] [address], Radiology 2003;  [phone]       SIGNATURE:  Electronically Signed by: [place] MD, RPVI on 2018-04-05 01:01:52 PM      --------------------------------------------------------------------------------  There are no diagnoses linked to this encounter    ======================================================    Past Medical History:   Diagnosis Date    Ambulatory dysfunction     CH[facility]congestive heart failure) ([facility] )     pEFHF    Cholelithiasis     Coronary artery disease     Depression with anxiety     Diabetes mellitus ([facility] )     niddm    Glaucoma     Hypertension     Hyponatremia     Ischemic stroke of frontal lobe (HCC)     Right     Obstructive sleep apnea     [facility]ox[facility]     Vertigo      Past Surgical History:   Procedure Laterality Date    APPENDECTOMY      GLAUCOMA SURGERY  01/03/2016    [place] REPLACE AORTIC VALVE OPENFEMORAL ARTERY APPROACH N/A 4/17/2018    Procedure: REPLACEMENT AORTIC VALVE TRANSCATHETER (TAVR) TRANSFEMORAL W/ 23 MM AGUILAR STEVE S3 VALVE;  Surgeon: Reynaldo Pinedo DO;  Location: [facility];  Service: Cardiac Surgery    TUBAL LIGATION      UPPER GASTROINTESTINAL ENDOSCOPY  10/28/2019         Medications  Current Outpatient Medications   Medication Sig Dispense Refill    Acetaminophen (TYLENOL) 325 MG CAPS Take 1 capsule by mouth as needed       albuterol (PROAIR HFA) 90 mcg/act inhaler Inhale 2 puffs every 6 (six) hours as needed for wheezing 8 5 g 1    amiodarone 200 mg tablet Take 1 tablet (200 mg total) by mouth daily 90 tablet 2    Bimatoprost (LUMIGAN OP) Administer 1 drop to both eyes daily at bedtime Both eyes      Blood Pressure Monitoring (BLOOD PRESSURE CUFF) MISC by Does not apply route 2 (two) times a day 1 each 0    Cholecalciferol (VITAMIN D3) 1000 units CAPS Take 5,000 Units by mouth daily       Cyanocobalamin (VITAMIN B 12 PO) Take 1,000 mcg by mouth daily       dabigatran etexilate (PRADAXA) 150 mg capsu Take 1 capsule (150 mg total) by mouth every 12 (twelve) hours for 90 days 180 capsule 1    hydrALAZINE (APRESOLINE) 25 mg tablet Take 3 tablets (75 mg total) by mouth 3 (three) times a day for 90 days 270 tablet 3    linaGLIPtin (TRADJENTA) 5 MG TABS Take 5 mg by mouth daily 30 tablet 0    losartan (COZAAR) 50 mg tablet Take 1 tablet (50 mg total) by mouth daily 30 tablet 5    metFORMIN (GLUCOPHAGE) 500 mg tablet Take 1 tablet (500 mg total) by mouth daily with breakfast for 162 days (Patient taking differently: Take 500 mg by mouth 2 (two) times a day with meals ) 180 tablet 0    Misc  Devices (STETHOSCOPE) MISC by Does not apply route 2 (two) times a day 1 each 0    Netarsudil Dimesylate (RHOPRESSA) 0 02 % SOLN Apply to eye      NIFEdipine (PROCARDIA XL) 30 mg 24 hr tablet Take 1 tablet (30 mg total) by mouth daily 90 tablet 2    ONETOUCH DELICA LANCETS 44G MISC TEST TWICE DAILY   200 each 3    potassium chloride (K-DUR,KLOR-CON) 10 mEq tablet Take 1 tablet (10 mEq total) by mouth daily 30 tablet 5    RESTASIS 0 05 % ophthalmic emulsion Administer 1 drop to both eyes daily   0    rosuvastatin (CRESTOR) 20 MG tablet Take 1 tablet (20 mg total) by mouth daily at bedtime 30 tablet 2    timolol (TIMOPTIC) 0 5 % ophthalmic solution Administer 1 drop to both eyes daily   0    ONE TOUCH ULTRA TEST test strip TEST twice a day 200 each 3     No current facility-administered medications for this visit  Allergies   Allergen Reactions    Lipitor [Atorvastatin] GI Intolerance     Nausea stomach ache    Aspirin GI Intolerance    Hydrochlorothiazide GI Intolerance    Lisinopril Cough    Metoprolol Headache       Social History     Socioeconomic History    Marital status:       Spouse name: Not on file    Number of children: Not on file    Years of education: Not on file    Highest education level: Not on file   Occupational History    Not on file   Social Needs    Financial resource strain: Not on file    Food insecurity:     Worry: Not on file     Inability: Not on file    Transportation needs:     Medical: Not on file     Non-medical: Not on file   Tobacco Use    Smoking status: Never Smoker    Smokeless tobacco: Never Used   Substance and Sexual Activity    Alcohol use: Never     Frequency: Never     Binge frequency: Never    Drug use: Never    Sexual activity: Not Currently   Lifestyle    Physical activity:     Days per week: Not on file     Minutes per session: Not on file    Stress: Not on file   Relationships    Social connections:     Talks on phone: Not on file     Gets together: Not on file     Attends Quaker service: Not on file     Active member of club or organization: Not on file     Attends meetings of clubs or organizations: Not on file     Relationship status: Not on file    Intimate partner violence:     Fear of current or ex partner: Not on file     Emotionally abused: Not on file     Physically abused: Not on file     Forced sexual activity: Not on file   Other Topics Concern    Not on file   Social History Narrative    Not on file        Family History Problem Relation Age of Onset    No Known Problems Mother     Diabetes Father     Stroke Father     Coronary artery disease Sister     No Known Problems Brother     No Known Problems Son     Breast cancer Daughter     No Known Problems Maternal Grandmother     No Known Problems Maternal Grandfather     No Known Problems Paternal Grandmother     No Known Problems Paternal Grandfather     No Known Problems Cousin     Rheum arthritis Neg Hx     Osteoarthritis Neg Hx     Asthma Neg Hx     Heart failure Neg Hx     Hyperlipidemia Neg Hx     Hypertension Neg Hx     Migraines Neg Hx     Rashes / Skin problems Neg Hx     Seizures Neg Hx     Thyroid disease Neg Hx        Lab Results   Component Value Date    WBC 5 56 10/15/2019    HGB 11 8 10/15/2019    HCT 37 6 10/15/2019    MCV 88 10/15/2019     10/15/2019      Lab Results   Component Value Date    SODIUM 137 10/15/2019    K 4 7 10/15/2019     10/15/2019    CO2 25 10/15/2019    BUN 22 10/15/2019    CREATININE 1 07 10/15/2019    GLUC 199 (H) 10/15/2019    CALCIUM 9 5 10/15/2019      Lab Results   Component Value Date    HGBA1C 8 3 (H) 10/15/2019      Lab Results   Component Value Date    CHOL 130 12/15/2017    CHOL 158 08/18/2017    CHOL 124 (L) 04/22/2017     Lab Results   Component Value Date    HDL 56 10/15/2019    HDL 58 05/12/2019    HDL 56 03/11/2019     Lab Results   Component Value Date    LDLCALC 51 10/15/2019    LDLCALC 78 05/12/2019    LDLCALC 51 04/06/2018     Lab Results   Component Value Date    TRIG 100 10/15/2019    TRIG 140 05/12/2019    TRIG 121 03/11/2019     No results found for: [place], [place]   Lab Results   Component Value Date    INR 2 10 (H) 07/04/2019    INR 2 62 (H) 06/30/2019    INR 1 23 (H) 05/12/2019    PROTIME 24 0 (H) 07/04/2019    PROTIME 28 6 (H) 06/30/2019    PROTIME 15 6 (H) 05/12/2019          Patient Active Problem List    Diagnosis Date Noted    Stroke (cerebrum) ([facility] ) 09/25/2019    Injury of finger of left hand 07/07/2019    Dysphagia 07/04/2019    History of CVA (cerebrovascular accident) 05/12/2019    Headache 05/12/2019    Esophageal thickening 05/12/2019    Obesity (BMI 30-39 9) 01/10/2019    Thickened endometrium 08/15/2018    Severe dizziness 07/30/2018    S/P TAVR (transcatheter aortic valve replacement) 04/17/2018    Chronic diastolic heart failure ([facility] ) 04/05/2018    History of stroke 03/02/2018    Ataxia 01/12/2018    Type 2 diabetes mellitus with hyperglycemia ([address] [address] 01/12/2018    Left knee pain 12/04/2017    PERI (obstructive sleep apnea) 09/29/2017    Periodic limb movement sleep disorder 09/29/2017    Paroxysmal atrial fibrillation ([address] ) 09/11/2017    Sleep talking 08/25/2017    Osteoporosis 05/04/2017    Insomnia 01/25/2017    Hyponatremia 01/16/2017    Depression with anxiety 01/16/2017    CVA (cerebral vascular accident) ([facility] [address] ) 01/13/2017    Unsteady gait 01/13/2017    Type 2 diabetes mellitus with hyperglycemia, without long-term current use of insulin ([facility] [address] )     Glaucoma     Hyperlipidemia     Hypertension     Tinnitus 04/04/2014    Hearing loss 12/12/2012    Osteoarthritis 08/07/2012    Tricuspid valve disorders, non-rheumatic 08/07/2012       Portions of the record may have been created with voice recognition software  Occasional wrong word or "sound a like" substitutions may have occurred due to the inherent limitations of voice recognition software  Read the chart carefully and recognize, using context, where substitutions have occurred        Sindy Mtz DO  11/25/2019 3:41 PM

## 2019-11-25 NOTE — PROGRESS NOTES
Chief Complaint   Patient presents with   [facility]     Subsequent   Follow-up     4 months  Health Maintenance   Topic Date Due    DTaP,Tdap,and Td Vaccines (1 - Tdap) 03/23/1946    Hepatitis B Vaccine (1 of 3 - Risk 3-dose series) [birth year]/1954    Pneumococcal Vaccine: 65+ Years (1 of 2 - PCV13) 03/23/2000    Medicare Annual Wellness Visit (AWV)  08/25/2018    PT PLAN OF CARE  12/13/2018    Influenza Vaccine  07/01/2019    Urinary Incontinence Screening  07/23/2019    HEMOGLOBIN A1C  01/15/2020    DM Eye Exam  03/12/2020    Fall Risk  03/18/2020    BMI: Followup Plan  07/29/2020    Diabetic Foot Exam  07/29/2020    URINE MICROALBUMIN  10/15/2020    BMI: Adult  11/22/2020    DXA SCAN  05/02/2022    Pneumococcal Vaccine: Pediatrics (0 to 5 Years) and At-Risk Patients (6 to 59 Years)  Aged Out    HIB Vaccine  Aged Out    IPV Vaccine  Aged Out    Hepatitis A Vaccine  Aged Out    Meningococcal ACWY Vaccine  Aged Out    HPV Vaccine  Aged Out      Assessment and Plan:     Problem List Items Addressed This Visit        Endocrine    Type 2 diabetes mellitus with hyperglycemia, without long-term current use of insulin ([address] ) - Primary       Lab Results   Component Value Date    HGBA1C 8 3 (H) 10/15/2019     Advised pt to follow low carb diet  Continue metformin 500mg bid and tradjenta 5mg Qd  Relevant Orders    Comprehensive metabolic panel    Hemoglobin A1C With EAG       Cardiovascular and Mediastinum    Hypertension (Chronic)     FU cardiology  Controlled  Continue nifedipine 30mg Qd, losartan 50mg QD and hydralazine 75mg tid  Paroxysmal atrial fibrillation [facility])     FU cardiology  Continue amiodarone and pradaxa  Other    Hyperlipidemia (Chronic)     10/2019 Lipid 127/100/56/51, continue crestor 20mg qhs  Obesity (BMI 30-39  9)      Other Visit Diagnoses     Medicare annual wellness visit, subsequent              Falls Plan of Care: balance, strength, and gait training instructions were provided  Recommended assistive device to help with gait and balance  Preventive health issues were discussed with patient, and age appropriate screening tests were ordered as noted in patient's After Visit Summary  Personalized health advice and appropriate referrals for health education or preventive services given if needed, as noted in patient's After Visit Summary  History of Present Illness:     Patient presents for Medicare Annual Wellness visit    Patient Care Team:  Michi Keller MD as PCP - General     Problem List:     Patient Active Problem List   Diagnosis    Type 2 diabetes mellitus with hyperglycemia, without long-term current use of insulin ([facility] )    Glaucoma    Hyperlipidemia    Hypertension    Hyponatremia    Depression with anxiety    Paroxysmal atrial fibrillation (HCC)    Ataxia    Type 2 diabetes mellitus with hyperglycemia ([facility] )    CVA (cerebral vascular accident) ([place] [address] )    Hearing loss    Insomnia    Left knee pain    PERI (obstructive sleep apnea)    Osteoarthritis    Osteoporosis    Periodic limb movement sleep disorder    Sleep talking    Tinnitus    Tricuspid valve disorders, non-rheumatic    Unsteady gait    History of stroke    [facility]    S/P TAVR (transcatheter aortic valve replacement)    Severe dizziness    Thickened endometrium    Obesity (BMI 30-39  9)    History of CVA (cerebrovascular accident)    Headache    Esophageal thickening    Dysphagia    Injury of finger of left hand    Stroke (cerebrum) ([facility])      Past Medical and Surgical History:     Past Medical History:   Diagnosis Date    Ambulatory dysfunction     CHF (congestive heart failure) ([facility])     pEFHF    Cholelithiasis     Coronary artery disease     Depression with anxiety     Diabetes mellitus [facility] )     niddm    Glaucoma     Hypertension     Hyponatremia     Ischemic stroke of frontal lobe ([facility] ) Right     Obstructive sleep apnea     PAF (paroxysmal atrial fibrillation) (HCC)     Vertigo      Past Surgical History:   Procedure Laterality Date    APPENDECTOMY      GLAUCOMA SURGERY  01/03/2016    MI REPLACE AORTIC VALVE OPENFEMORAL ARTERY APPROACH N/A 4/17/2018    Procedure: REPLACEMENT AORTIC VALVE TRANSCATHETER (TAVR) TRANSFEMORAL W/ 21 MM AGUILAR STEVE S3 VALVE;  Surgeon: Tre Martinez DO;  [facility];  Service: Cardiac Surgery    TUBAL LIGATION      UPPER GASTROINTESTINAL ENDOSCOPY  10/28/2019      Family History:     Family History   Problem Relation Age of Onset    No Known Problems Mother     Diabetes Father     Stroke Father     Coronary artery disease Sister     No Known Problems Brother     No Known Problems Son     Breast cancer Daughter     No Known Problems Maternal Grandmother     No Known Problems Maternal Grandfather     No Known Problems Paternal Grandmother     No Known Problems Paternal Grandfather     No Known Problems Cousin     Rheum arthritis Neg Hx     Osteoarthritis Neg Hx     Asthma Neg Hx     Heart failure Neg Hx     Hyperlipidemia Neg Hx     Hypertension Neg Hx     Migraines Neg Hx     Rashes / Skin problems Neg Hx     Seizures Neg Hx     Thyroid disease Neg Hx       Social History:     Social History     Socioeconomic History    Marital status:       Spouse name: None    Number of children: None    Years of education: None    Highest education level: None   Occupational History    None   Social Needs    Financial resource strain: None    Food insecurity:     Worry: None     Inability: None    Transportation needs:     Medical: None     Non-medical: None   Tobacco Use    Smoking status: Never Smoker    Smokeless tobacco: Never Used   Substance and Sexual Activity    Alcohol use: Never     Frequency: Never     Binge frequency: Never    Drug use: Never    Sexual activity: Not Currently   Lifestyle    Physical activity: Days per week: None     Minutes per session: None    Stress: None   Relationships    Social connections:     Talks on phone: None     Gets together: None     Attends Rastafari service: None     Active member of club or organization: None     Attends meetings of clubs or organizations: None     Relationship status: None    Intimate partner violence:     Fear of current or ex partner: None     Emotionally abused: None     Physically abused: None     Forced sexual activity: None   Other Topics Concern    None   Social History Narrative    None       Medications and Allergies:     Current Outpatient Medications   Medication Sig Dispense Refill    Acetaminophen (TYLENOL) 325 MG CAPS Take 1 capsule by mouth as needed       albuterol (PROAIR HFA) 90 mcg/act inhaler Inhale 2 puffs every 6 (six) hours as needed for wheezing 8 5 g 1    amiodarone 200 mg tablet Take 1 tablet (200 mg total) by mouth daily 90 tablet 2    Bimatoprost (LUMIGAN OP) Administer 1 drop to both eyes daily at bedtime Both eyes      Blood Pressure Monitoring (BLOOD PRESSURE CUFF) MISC by Does not apply route 2 (two) times a day 1 each 0    Cholecalciferol (VITAMIN D3) 1000 units CAPS Take 5,000 Units by mouth daily       Cyanocobalamin (VITAMIN B 12 PO) Take 1,000 mcg by mouth daily       dabigatran etexilate (PRADAXA) 150 mg capsu Take 1 capsule (150 mg total) by mouth every 12 (twelve) hours for 90 days 180 capsule 1    hydrALAZINE (APRESOLINE) 25 mg tablet Take 3 tablets (75 mg total) by mouth 3 (three) times a day for 90 days 270 tablet 3    linaGLIPtin (TRADJENTA) 5 MG TABS Take 5 mg by mouth daily 30 tablet 0    losartan (COZAAR) 50 mg tablet Take 1 tablet (50 mg total) by mouth daily 30 tablet 5    metFORMIN (GLUCOPHAGE) 500 mg tablet Take 1 tablet (500 mg total) by mouth daily with breakfast for 162 days (Patient taking differently: Take 500 mg by mouth 2 (two) times a day with meals ) 180 tablet 0    Misc   Devices (STETHOSCOPE) MISC by Does not apply route 2 (two) times a day 1 each 0    Netarsudil Dimesylate (RHOPRESSA) 0 02 % SOLN Apply to eye      NIFEdipine (PROCARDIA XL) 30 mg 24 hr tablet Take 1 tablet (30 mg total) by mouth daily 90 tablet 2    ONE TOUCH ULTRA TEST test strip TEST twice a day 200 each 3    ONETOUCH DELICA LANCETS 99R MISC TEST TWICE DAILY  200 each 3    potassium chloride (K-DUR,KLOR-CON) 10 mEq tablet Take 1 tablet (10 mEq total) by mouth daily 30 tablet 5    RESTASIS 0 05 % ophthalmic emulsion Administer 1 drop to both eyes daily   0    rosuvastatin (CRESTOR) 20 MG tablet Take 1 tablet (20 mg total) by mouth daily at bedtime 30 tablet 2    timolol (TIMOPTIC) 0 5 % ophthalmic solution Administer 1 drop to both eyes daily   0     No current facility-administered medications for this visit  Allergies   Allergen Reactions    Lipitor [Atorvastatin] GI Intolerance     Nausea stomach ache    Aspirin GI Intolerance    Hydrochlorothiazide GI Intolerance    Lisinopril Cough    Metoprolol Headache      Immunizations:     Immunization History   Administered Date(s) Administered    Influenza TIV (IM) 1935      Health Maintenance:         Topic Date Due    DXA SCAN  05/02/2022         Topic Date Due    DTaP,Tdap,and Td Vaccines (1 - Tdap) 03[birth year]/1946    Hepatitis B Vaccine (1 of 3 - Risk 3-dose series) 03/23/1954    Pneumococcal Vaccine: 65+ Years (1 of 2 - PCV13) 03/23/2000    Influenza Vaccine  07/01/2019      Medicare Health Risk Assessment:     /54 (BP Location: Left arm, Patient Position: Sitting, Cuff Size: Adult)   Pulse 60   Temp 98 °F (36 7 °C) (Tympanic)   Resp 16   Ht 4' 9" (1 448 m)   Wt 76 5 kg (168 lb 9 6 oz)   SpO2 96%   BMI 36 48 kg/m²      Osito Calvillo is here for her Subsequent Wellness visit  Health Risk Assessment:   Patient rates overall health as good  Patient feels that their physical health rating is same  Eyesight was rated as same   Hearing was rated as slightly worse  Patient feels that their emotional and mental health rating is same  Pain experienced in the last 7 days has been none  Depression Screening:   PHQ-2 Score: 0      Fall Risk Screening: In the past year, patient has experienced: no history of falling in past year      Urinary Incontinence Screening:   Patient has not leaked urine accidently in the last six months  Home Safety:  Patient has trouble with stairs inside or outside of their home  Patient has working smoke alarms and has working carbon monoxide detector  Home safety hazards include: none  Nutrition:   Current diet is Regular  Medications:   Patient is not able to manage medications  Activities of Daily Living (ADLs)/Instrumental Activities of Daily Living (IADLs):   Walk and transfer into and out of bed and chair?: Yes  Dress and groom yourself?: Yes    Bathe or shower yourself?: No    Feed yourself? Yes  Do your laundry/housekeeping?: Yes  Manage your money, pay your bills and track your expenses?: Yes  Make your own meals?: No    Do your own shopping?: No    ADL comments: Housekeeping: no    Previous Hospitalizations:   Any hospitalizations or ED visits within the last 12 months?: Yes    How many hospitalizations have you had in the last year?: 1-2    Advance Care Planning:   Living will: Yes    Durable POA for healthcare:  Yes    Advanced directive: Yes    End of Life Decisions reviewed with patient: Yes    Provider agrees with end of life decisions: Yes      Cognitive Screening:   Provider or family/friend/caregiver concerned regarding cognition?: No    PREVENTIVE SCREENINGS      Cardiovascular Screening:    General: History Lipid Disorder and Screening Current      Diabetes Screening:     General: History Diabetes and Screening Current      Colorectal Cancer Screening:     General: Screening Not Indicated      Breast Cancer Screening:     General: Screening Not Indicated      Cervical Cancer Screening: General: Screening Not Indicated      Osteoporosis Screening:    General: Screening Not Indicated and History Osteoporosis      Nixon Lazo MD

## 2019-11-25 NOTE — ASSESSMENT & PLAN NOTE
Lab Results   Component Value Date    HGBA1C 8 3 (H) 10/15/2019     Advised pt to follow low carb diet  Continue metformin 500mg bid and tradjenta 5mg Qd

## 2019-11-25 NOTE — PROGRESS NOTES
Assessment/Plan:  BMI Counseling: Body mass index is 36 48 kg/m²  The BMI is above normal  Nutrition recommendations include reducing portion sizes, decreasing overall calorie intake and 3-5 servings of fruits/vegetables daily  Type 2 diabetes mellitus with hyperglycemia, without long-term current use of insulin (HCC)    Lab Results   Component Value Date    HGBA1C 8 3 (H) 10/15/2019     Advised pt to follow low carb diet  Continue metformin 500mg bid and tradjenta 5mg Qd  Hypertension  FU cardiology  Controlled  Continue nifedipine 30mg Qd, losartan 50mg QD and hydralazine 75mg tid  Paroxysmal atrial fibrillation [facility])  FU cardiology  Continue amiodarone and pradaxa  Hyperlipidemia  10/2019 Lipid 127/100/56/51, continue crestor 20mg qhs  Reviewed lab in 10/2019  CBC ok  CMP showed Na 123  Went to ER, repeat lab showed Na 137 normal  TSH ok  Lipid ok  hgA1C 8 3 elevated  Mc/ 31 pt is on losartan, cannot tolerate lisinopril     Refused flu shot  Refused PCV13 and pneumovax  RTO in 4 months      POA---son-in-law Darwin Isaacs  Living will---Full code       Diagnoses and all orders for this visit:    Type 2 diabetes mellitus with hyperglycemia, without long-term current use of insulin ([facility] [address] )  -     Comprehensive metabolic panel; Future  -     Hemoglobin A1C With EAG; Future    Essential hypertension    Paroxysmal atrial fibrillation (HCC)    Hyperlipidemia, unspecified hyperlipidemia type    Obesity (BMI 30-39  9)    Medicare annual wellness visit, subsequent          Subjective:      Patient ID: Katrina Hdez is a 80 y o  female  HPI    Pt is here with her daughter         DM---10/2019 HgA1C 8 3 Pt ate more candy recently    She is on metformin 500mg QD and tradjenta 5mg QD  Blood sugar at home 200 recently  Denies hypoglycemia  Occasionally hands numbness or tingling  FU opthalmology Dr Martha Rocha and Dr Octavio Shah regularly Q 3 months  Had mild retinopathy     Fu podiatry   [facility] podiatry Q 6 month       HTN---Stopped lisinopril because it caused her coughing  Stopped metoprolol because it gave her severe headache  Saw cardiology  She is on hydralazine to 75mg tid and nifedipine 30mg QD and losartan 50mg Qd  Afib---she is on amiodarone and pradaxa per cardiology  CVA---Has off balance sensation  Saw neurology  She is on pradaxa 150mg bid  Had vaginal bleeding  FU OBGYN  No problem per daughter       Hyperlipidemia---She is on crestor per cardiology       Esophageal thickening---5/2019 CTA showed mild esophageal thickening  Saw GI and had EGD in 10/2019       Lives with family  Denies recent falls  Used walker at home  Mood is ok  Denies depression  The following portions of the patient's history were reviewed and updated as appropriate: allergies, current medications, past family history, past medical history, past social history, past surgical history and problem list     Review of Systems   Constitutional: Negative for appetite change, chills and fever  HENT: Negative for congestion, ear pain, sinus pain and sore throat  Eyes: Negative for discharge and itching  Respiratory: Negative for apnea, cough, chest tightness, shortness of breath and wheezing  Cardiovascular: Negative for chest pain, palpitations and leg swelling  Gastrointestinal: Negative for abdominal pain, anal bleeding, constipation, diarrhea, nausea and vomiting  Endocrine: Negative for cold intolerance, heat intolerance and polyuria  Genitourinary: Negative for difficulty urinating and dysuria  Musculoskeletal: Negative for arthralgias, back pain and myalgias  Skin: Negative for rash  Neurological: Negative for dizziness and headaches  Psychiatric/Behavioral: Negative for agitation           Objective:      /54 (BP Location: Left arm, Patient Position: Sitting, Cuff Size: Adult)   Pulse 60   Temp 98 °F (36 7 °C) (Tympanic)   Resp 16   Ht 4' 9" (1 448 m)   Wt 76 5 kg (168 lb 9 6 oz)   SpO2 96%   BMI 36 48 kg/m²          Physical Exam   Constitutional: She appears well-developed  No distress  HENT:   Head: Normocephalic  Right Ear: External ear normal    Left Ear: External ear normal    Nose: Nose normal    Mouth/Throat: Oropharynx is clear and moist    Eyes: Pupils are equal, round, and reactive to light  Conjunctivae are normal  Right eye exhibits no discharge  Left eye exhibits no discharge  Neck: Normal range of motion  No thyromegaly present  Cardiovascular: Normal rate, regular rhythm and normal heart sounds  Exam reveals no gallop and no friction rub  No murmur heard  Pulmonary/Chest: Effort normal and breath sounds normal  No respiratory distress  She has no wheezes  She has no rales  She exhibits no tenderness  Abdominal: Soft  Bowel sounds are normal    Musculoskeletal: Normal range of motion  She exhibits no edema  Unsteady gait   Lymphadenopathy:     She has no cervical adenopathy  Neurological: She is alert  Psychiatric: She has a normal mood and affect

## 2019-11-30 DIAGNOSIS — I10 ESSENTIAL HYPERTENSION: ICD-10-CM

## 2019-11-30 DIAGNOSIS — I48.91 ATRIAL FIBRILLATION, UNSPECIFIED TYPE (HCC): ICD-10-CM

## 2019-12-02 RX ORDER — ROSUVASTATIN CALCIUM 20 MG/1
TABLET, COATED ORAL
Qty: 30 TABLET | Refills: 5 | Status: SHIPPED | OUTPATIENT
Start: 2019-12-02 | End: 2020-01-16 | Stop reason: SDUPTHER

## 2019-12-02 RX ORDER — HYDRALAZINE HYDROCHLORIDE 25 MG/1
TABLET, FILM COATED ORAL
Qty: 270 TABLET | Refills: 3 | Status: SHIPPED | OUTPATIENT
Start: 2019-12-02 | End: 2020-02-28

## 2019-12-04 ENCOUNTER — TELEPHONE (OUTPATIENT)
Dept: GASTROENTEROLOGY | Facility: CLINIC | Age: 84
End: 2019-12-04

## 2019-12-04 DIAGNOSIS — I48.0 PAROXYSMAL ATRIAL FIBRILLATION (HCC): ICD-10-CM

## 2019-12-04 DIAGNOSIS — I63.9 CVA (CEREBRAL VASCULAR ACCIDENT) (HCC): ICD-10-CM

## 2019-12-04 RX ORDER — ATENOLOL 100 MG/1
TABLET ORAL
Qty: 180 CAPSULE | Refills: 1 | Status: SHIPPED | OUTPATIENT
Start: 2019-12-04 | End: 2020-03-10

## 2019-12-04 NOTE — TELEPHONE ENCOUNTER
Patients GI provider:  Dr Pederson Records    Number to return call: [phone]    Reason for call: Pts daughter Pamella Rollins called in to advise she received a bill for her mothers ov on 10/15 because the doctor wasn't in par with the insurance at that time  Please advise on how this could be corrected   Thank you     Scheduled procedure/appointment date if applicable: Apt/procedure - n/a

## 2019-12-05 DIAGNOSIS — E11.65 TYPE 2 DIABETES MELLITUS WITH HYPERGLYCEMIA, WITHOUT LONG-TERM CURRENT USE OF INSULIN (HCC): ICD-10-CM

## 2019-12-05 RX ORDER — LINAGLIPTIN 5 MG/1
TABLET, FILM COATED ORAL
Qty: 30 TABLET | Refills: 5 | Status: SHIPPED | OUTPATIENT
Start: 2019-12-05 | End: 2020-06-19 | Stop reason: SDUPTHER

## 2019-12-09 ENCOUNTER — TELEPHONE (OUTPATIENT)
Dept: GASTROENTEROLOGY | Facility: MEDICAL CENTER | Age: 84
End: 2019-12-09

## 2019-12-09 NOTE — TELEPHONE ENCOUNTER
Talked to [facility] 49 charges will be written off    Talked to Alana Berman will call when I have figured things out with the billing department         Documentation       Ivory Hdz routed conversation to You 5 days ago      Ivory Hdz 5 days ago         Patients GI provider:  Dr Elma Roa     Number to return call: [phone]     Reason for call: Pts daughter Alana Berman called in to advise she received a bill for her mothers ov on 10/15 because the doctor wasn't in par with the insurance at that time  Please advise on how this could be corrected   Thank you      Scheduled procedure/appointment date if applicable: Apt/procedure - n/a

## 2019-12-29 DIAGNOSIS — IMO0002: ICD-10-CM

## 2020-01-16 ENCOUNTER — TELEPHONE (OUTPATIENT)
Dept: FAMILY MEDICINE CLINIC | Facility: CLINIC | Age: 85
End: 2020-01-16

## 2020-01-16 DIAGNOSIS — I48.91 ATRIAL FIBRILLATION, UNSPECIFIED TYPE (HCC): ICD-10-CM

## 2020-01-16 RX ORDER — ROSUVASTATIN CALCIUM 20 MG/1
20 TABLET, COATED ORAL
Qty: 90 TABLET | Refills: 3 | Status: SHIPPED | OUTPATIENT
Start: 2020-01-16 | End: 2020-11-03 | Stop reason: HOSPADM

## 2020-01-16 NOTE — TELEPHONE ENCOUNTER
Order done 
Pharmacy requesting to change Rosuvastatin 20 mg    1 daily to 90 day supply
tea/coffee/occasional tea

## 2020-01-28 DIAGNOSIS — I10 ESSENTIAL HYPERTENSION: Chronic | ICD-10-CM

## 2020-01-28 DIAGNOSIS — I48.91 ATRIAL FIBRILLATION, UNSPECIFIED TYPE (HCC): ICD-10-CM

## 2020-01-28 RX ORDER — POTASSIUM CHLORIDE 750 MG/1
TABLET, EXTENDED RELEASE ORAL
Qty: 30 TABLET | Refills: 5 | Status: SHIPPED | OUTPATIENT
Start: 2020-01-28 | End: 2020-11-04 | Stop reason: ALTCHOICE

## 2020-01-28 RX ORDER — LOSARTAN POTASSIUM 50 MG/1
TABLET ORAL
Qty: 30 TABLET | Refills: 5 | Status: SHIPPED | OUTPATIENT
Start: 2020-01-28 | End: 2020-02-07 | Stop reason: SDUPTHER

## 2020-02-07 DIAGNOSIS — I10 ESSENTIAL HYPERTENSION: Chronic | ICD-10-CM

## 2020-02-07 RX ORDER — LOSARTAN POTASSIUM 50 MG/1
50 TABLET ORAL DAILY
Qty: 90 TABLET | Refills: 1 | Status: SHIPPED | OUTPATIENT
Start: 2020-02-07 | End: 2020-06-16 | Stop reason: SDUPTHER

## 2020-02-19 RX ORDER — BIMATOPROST 0.01 %
DROPS OPHTHALMIC (EYE)
COMMUNITY
Start: 2020-01-20 | End: 2020-07-16 | Stop reason: SDUPTHER

## 2020-02-24 ENCOUNTER — OFFICE VISIT (OUTPATIENT)
Dept: NEUROLOGY | Facility: CLINIC | Age: 85
End: 2020-02-24
Payer: COMMERCIAL

## 2020-02-24 VITALS
SYSTOLIC BLOOD PRESSURE: 120 MMHG | WEIGHT: 165 LBS | BODY MASS INDEX: 35.6 KG/M2 | DIASTOLIC BLOOD PRESSURE: 58 MMHG | HEART RATE: 64 BPM | HEIGHT: 57 IN

## 2020-02-24 DIAGNOSIS — R42 SEVERE DIZZINESS: Primary | ICD-10-CM

## 2020-02-24 DIAGNOSIS — I63.00 CEREBROVASCULAR ACCIDENT (CVA) DUE TO THROMBOSIS OF PRECEREBRAL ARTERY (HCC): ICD-10-CM

## 2020-02-24 DIAGNOSIS — I10 ESSENTIAL HYPERTENSION: Chronic | ICD-10-CM

## 2020-02-24 DIAGNOSIS — Z86.73 HISTORY OF CVA (CEREBROVASCULAR ACCIDENT): Chronic | ICD-10-CM

## 2020-02-24 DIAGNOSIS — I48.0 PAROXYSMAL ATRIAL FIBRILLATION (HCC): ICD-10-CM

## 2020-02-24 PROCEDURE — 99214 OFFICE O/P EST MOD 30 MIN: CPT | Performed by: PHYSICIAN ASSISTANT

## 2020-02-24 PROCEDURE — 1036F TOBACCO NON-USER: CPT | Performed by: PHYSICIAN ASSISTANT

## 2020-02-24 PROCEDURE — 3074F SYST BP LT 130 MM HG: CPT | Performed by: PHYSICIAN ASSISTANT

## 2020-02-24 PROCEDURE — 1160F RVW MEDS BY RX/DR IN RCRD: CPT | Performed by: PHYSICIAN ASSISTANT

## 2020-02-24 PROCEDURE — 3008F BODY MASS INDEX DOCD: CPT | Performed by: PHYSICIAN ASSISTANT

## 2020-02-24 PROCEDURE — 3078F DIAST BP <80 MM HG: CPT | Performed by: PHYSICIAN ASSISTANT

## 2020-02-24 RX ORDER — SENNOSIDES 8.6 MG
650 CAPSULE ORAL 2 TIMES DAILY PRN
Status: ON HOLD | COMMUNITY
End: 2020-08-26 | Stop reason: CLARIF

## 2020-02-24 RX ORDER — SCOLOPAMINE TRANSDERMAL SYSTEM 1 MG/1
1 PATCH, EXTENDED RELEASE TRANSDERMAL
Qty: 10 PATCH | Refills: 2 | Status: SHIPPED | OUTPATIENT
Start: 2020-02-24 | End: 2020-03-10 | Stop reason: ALTCHOICE

## 2020-02-24 NOTE — PROGRESS NOTES
Review of Systems   Constitutional: Positive for fatigue  HENT: Negative  Eyes: Negative  Respiratory: Negative  Cardiovascular: Negative  Gastrointestinal: Negative  Endocrine: Negative  Genitourinary: Negative  Musculoskeletal: Positive for arthralgias, back pain and gait problem (ambulates with walker (recent fall 3 weeks ago) )  Skin: Negative  Allergic/Immunologic: Negative  Neurological: Positive for dizziness (worsening )  Hematological: Negative  Psychiatric/Behavioral: Positive for confusion (forgetfulness ) and sleep disturbance (difficulty staying asleep occasionally )

## 2020-02-24 NOTE — PROGRESS NOTES
[facility]  PATIENT:  Mark Lyons  MRN:  [id number]  :  1935  DATE OF SERVICE:  2020      Assessment/Plan:     Stroke (cerebrum) ([facility] )  1  Continue with good control of your secondary stroke risk factors including blood pressure, cholesterol, and blood sugar  I will defer monitoring and management of these issues to your primary care physician  2  Continue your pradaxa, statin, blood pressure, and diabetes medications as prescribed  3  Try the Scopolamine patch every 3 days behind your ear    4  Any new stroke-like symptoms such as facial drooping, slurred speech/inability to speak/talking non-sense, persistent dizziness, painless loss of vision, or numbness/tingling/weakness on one side of your body you should call 911 or go to the nearest emergency room as soon as possible  History of CVA (cerebrovascular accident)  1  Continue with good control of your secondary stroke risk factors including blood pressure, cholesterol, and blood sugar  I will defer monitoring and management of these issues to your primary care physician  2  Continue your pradaxa, statin, blood pressure, and diabetes medications as prescribed  3  Try the Scopolamine patch every 3 days behind your ear    4  Any new stroke-like symptoms such as facial drooping, slurred speech/inability to speak/talking non-sense, persistent dizziness, painless loss of vision, or numbness/tingling/weakness on one side of your body you should call 911 or go to the nearest emergency room as soon as possible       Hypertension  Continue to follow closely with cardiology    Paroxysmal atrial fibrillation ([facility] )  Continue to follow closely with cardiology         Problem List Items Addressed This Visit        Cardiovascular and Mediastinum    Hypertension (Chronic)     Continue to follow closely with cardiology         Paroxysmal atrial fibrillation ([facility] )     Continue to follow closely with cardiology Stroke (cerebrum) ([facility] )     1  Continue with good control of your secondary stroke risk factors including blood pressure, cholesterol, and blood sugar  I will defer monitoring and management of these issues to your primary care physician  2  Continue your pradaxa, statin, blood pressure, and diabetes medications as prescribed  3  Try the Scopolamine patch every 3 days behind your ear    4  Any new stroke-like symptoms such as facial drooping, slurred speech/inability to speak/talking non-sense, persistent dizziness, painless loss of vision, or numbness/tingling/weakness on one side of your body you should call 911 or go to the nearest emergency room as soon as possible  Other    History of CVA (cerebrovascular accident) (Chronic)     1  Continue with good control of your secondary stroke risk factors including blood pressure, cholesterol, and blood sugar  I will defer monitoring and management of these issues to your primary care physician  2  Continue your pradaxa, statin, blood pressure, and diabetes medications as prescribed  3  Try the Scopolamine patch every 3 days behind your ear    4  Any new stroke-like symptoms such as facial drooping, slurred speech/inability to speak/talking non-sense, persistent dizziness, painless loss of vision, or numbness/tingling/weakness on one side of your body you should call 911 or go to the nearest emergency room as soon as possible  Severe dizziness - Primary    Relevant Medications    scopolamine (TRANSDERM-SCOP) 1 5 mg/3 days TD 72 hr patch              History of Present Illness: We had the pleasure of evaluating Genaro Avilez in neurological follow up  today for headaches  As you know,  she is a 80 y o   right handed female  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 She states she does not want a colonoscopy  - Aortic stenosis status post transcatheter aortic valve replacement - April 2017  - Coronary disease with a 60% 1st obtuse marginal stenosis as well as the mid RCA 50% stenosis,   - paroxysmal atrial fibrillation on Xarelto changed to Pradaxa 150 twice a day after patient was admitted to the hospital in May of 2019  - Stroke-right frontal and right cerebellar infarcts without residual deficits  - Diastolic heart failure  - Diabetes  - Hypertension/hyperlipidemia  - Obstructive sleep apnea not on CPAP  - Anxiety/depression      Stroke:  May 12, 2019 MRI brain:IMPRESSION:   Punctate focus of acute infarction within the deep right cerebral periventricular white matter or possibly the right caudate body  Moderate cerebral chronic microangiopathic disease  Chronic focal infarctions within the right frontal operculum and right cerebellum      Hearing loss:  Has hearing aid in place       Dizziness:  Feels like her head is going around in a [place]  Occurs: daily   Last: occurs throughout the day and lasts for a short period of time  Onset: with suddenly movements or just randomly  Describes: feels as if she is going to fall over  Patient did fall a few weeks ago in the afternoon in her bathroom, has been a few months since her last fall but has fallen since cardiac surgery  Has a walker and has not fallen using the walker  Associated sx: none     Headaches:   What medications do you take or have you taken for your headaches?    PREVENTIVE: none  ABORTIVE: tylenol     Have you used CBD or THC for your headaches and how often? no  Non-Medical/Alternative Treatments used in the past for headaches: PT     Aura and how long does it last - none      Started after the defibrillator was placed on April 19th 2018       How often do the headaches occur -  They did get better but had one today  What time of the day do the headaches start - 11 am today and now it is better  It lasted for half an hour  How long do the headaches last - severe headache have improved and when they do get worse they do not last long  Are you ever headache free - none  Where are they located - frontal  What is the intensity of pain - 6/10  Describe your usual headache - nagging and aching  Associated symptoms:   · Photophobia  · Blurred vision  · runny or stuffed-up nose  · Tinnitus, light-headed or dizzy, stiff or sore neck   · prefer to be alone and in a dark room, unable to work        MRI head: 1/12/2018  IMPRESSION:   1  No evidence of acute infarct, hemorrhage or mass    2  Chronic right frontal infarct  Periventricular and subcortical white matter lesions, consistent with microangiopathic disease      MRI head 1/7/2017  IMPRESSION:  - Focus of FLAIR and T2 signal abnormality involving cortex and subcortical right frontal lobe with a thin rim of precontrast T1 hyperintensity   Subacute infarct should be considered   A more recent venous infarct could have a similar appearance     Short-term interval follow-up gadolinium-enhanced MRI recommended in one month  - Moderate chronic microvascular disease      MRA and MRV 1/7/2017-   IMPRESSION:   No focal intracranial stenosis or aneurysm      Reviewed old notes from physician seen in the past- see above HPI for summary of previous encounters       Past Medical History:   Diagnosis Date    Ambulatory dysfunction     CHF (congestive heart failure) ([facility])     pEFHF    Cholelithiasis     Coronary artery disease     Depression with anxiety     Diabetes mellitus ([facility] )     niddm    Glaucoma     Hypertension     Hyponatremia     Ischemic stroke of frontal lobe ([facility])     Right     Obstructive sleep apnea     PAF (paroxysmal atrial fibrillation) ([facility])     Vertigo        Patient Active Problem List   Diagnosis    Type 2 diabetes mellitus with hyperglycemia, without long-term current use of insulin ([facility] )    Glaucoma    Hyperlipidemia    Hypertension    Hyponatremia    Depression with anxiety    Paroxysmal atrial fibrillation ([facility])    Ataxia    Type 2 diabetes mellitus with hyperglycemia ([facility])    CVA (cerebral vascular accident) ([facility] )    Hearing loss    Insomnia    Left knee pain    PERI (obstructive sleep apnea)    Osteoarthritis    Osteoporosis    Periodic limb movement sleep disorder    Sleep talking    Tinnitus    Tricuspid valve disorders, non-rheumatic    Unsteady gait    History of stroke    Chronic diastolic heart failure ([facility])    S/P TAVR (transcatheter aortic valve replacement)    Severe dizziness    Thickened endometrium    Obesity (BMI 30-39  9)    History of CVA (cerebrovascular accident)    Headache    Esophageal thickening    Dysphagia    Injury of finger of left hand    Stroke (cerebrum) ([facility])       Medications:      Current Outpatient Medications   Medication Sig Dispense Refill    acetaminophen (TYLENOL) 650 mg CR tablet Take 650 mg by mouth 2 (two) times a day as needed for mild pain      albuterol (PROAIR HFA) 90 mcg/act inhaler Inhale 2 puffs every 6 (six) hours as needed for wheezing 8 5 g 1    amiodarone 200 mg tablet Take 1 tablet (200 mg total) by mouth daily 90 tablet 2    Bimatoprost (LUMIGAN OP) Administer 1 drop to both eyes daily at bedtime Both eyes      Blood Pressure Monitoring (BLOOD PRESSURE CUFF) MISC by Does not apply route 2 (two) times a day 1 each 0    Cholecalciferol (VITAMIN D3) 1000 units CAPS Take 5,000 Units by mouth daily       Cyanocobalamin (VITAMIN B 12 PO) Take 1,000 mcg by mouth daily       hydrALAZINE (APRESOLINE) 25 mg tablet TAKE 3 TABLETS BY MOUTH 3 TIMES A DAY   270 tablet 3    losartan (COZAAR) 50 mg tablet Take 1 tablet (50 mg total) by mouth daily 90 tablet 1    metFORMIN (GLUCOPHAGE) 500 mg tablet take 1 tablet by mouth twice a day with food 180 tablet 3    Netarsudil Dimesylate (RHOPRESSA) 0 02 % SOLN Apply to eye Left eye      NIFEdipine (PROCARDIA XL) 30 mg 24 hr tablet Take 1 tablet (30 mg total) by mouth daily 90 tablet 2    potassium chloride (K-DUR,KLOR-CON) 10 mEq tablet take 1 tablet by mouth once daily 30 tablet 5    PRADAXA 150 MG capsu TAKE ONE CAPSULE BY MOUTH EVERY 12 HOURS FOR 90 DAYS  180 capsule 1    RESTASIS 0 05 % ophthalmic emulsion Administer 1 drop to both eyes daily   0    rosuvastatin (CRESTOR) 20 MG tablet Take 1 tablet (20 mg total) by mouth daily at bedtime 90 tablet 3    timolol (TIMOPTIC) 0 5 % ophthalmic solution Administer 1 drop to both eyes daily   0    TRADJENTA 5 MG TABS take 1 tablet by mouth once daily 30 tablet 5    Acetaminophen (TYLENOL) 325 MG CAPS Take 1 capsule by mouth as needed       LUMIGAN 0 01 % ophthalmic drops       metFORMIN (GLUCOPHAGE) 500 mg tablet Take 1 tablet (500 mg total) by mouth daily with breakfast for 162 days (Patient not taking: Reported on 2/24/2020) 180 tablet 0    Misc  Devices (STETHOSCOPE) MISC by Does not apply route 2 (two) times a day (Patient not taking: Reported on 2/24/2020) 1 each 0    ONE TOUCH ULTRA TEST test strip TEST twice a day (Patient not taking: Reported on 2/24/2020) 200 each 3    ONETOUCH DELICA LANCETS 22S MISC TEST TWICE DAILY  (Patient not taking: Reported on 2/24/2020) 200 each 3    scopolamine (TRANSDERM-SCOP) 1 5 mg/3 days TD 72 hr patch Place 1 patch on the skin every third day 10 patch 2     No current facility-administered medications for this visit  Allergies:       Allergies   Allergen Reactions    Lipitor [Atorvastatin] GI Intolerance     Nausea stomach ache    Aspirin GI Intolerance    Hydrochlorothiazide GI Intolerance    Lisinopril Cough    Metoprolol Headache       Family History:     Family History   Problem Relation Age of Onset    No Known Problems Mother     Diabetes Father     Stroke Father     Coronary artery disease Sister     No Known Problems Brother     No Known Problems Son     Breast cancer Daughter     No Known Problems Maternal Grandmother     No Known Problems Maternal Grandfather     No Known Problems Paternal Grandmother     No Known Problems Paternal Grandfather     No Known Problems Cousin     Rheum arthritis Neg Hx     Osteoarthritis Neg Hx     Asthma Neg Hx     Heart failure Neg Hx     Hyperlipidemia Neg Hx     Hypertension Neg Hx     Migraines Neg Hx     Rashes / Skin problems Neg Hx     Seizures Neg Hx     Thyroid disease Neg Hx        Social History:     Social History     Socioeconomic History    Marital status:       Spouse name: Not on file    Number of children: Not on file    Years of education: Not on file    Highest education level: Not on file   Occupational History    Not on file   Social Needs    Financial resource strain: Not on file    Food insecurity:     Worry: Not on file     Inability: Not on file    Transportation needs:     Medical: Not on file     Non-medical: Not on file   Tobacco Use    Smoking status: Never Smoker    Smokeless tobacco: Never Used   Substance and Sexual Activity    Alcohol use: Never     Frequency: Never     Binge frequency: Never    Drug use: Never    Sexual activity: Not Currently   Lifestyle    Physical activity:     Days per week: Not on file     Minutes per session: Not on file    Stress: Not on file   Relationships    Social connections:     Talks on phone: Not on file     Gets together: Not on file     Attends Alevism service: Not on file     Active member of club or organization: Not on file     Attends meetings of clubs or organizations: Not on file     Relationship status: Not on file    Intimate partner violence:     Fear of current or ex partner: Not on file     Emotionally abused: Not on file     Physically abused: Not on file     Forced sexual activity: Not on file   Other Topics Concern    Not on file   Social History Narrative    Not on file         Objective:   Physical Exam: Vitals:            /58 (BP Location: Left arm, Patient Position: Sitting, Cuff Size: Adult)   Pulse 64   Ht 4' 9" (1 448 m)   Wt 74 8 kg (165 lb)   BMI 35 71 kg/m²   BP Readings from Last 3 Encounters:   02/24/20 120/58   11/25/19 112/56   11/25/19 136/54     Pulse Readings from Last 3 Encounters:   02/24/20 64   11/25/19 64   11/25/19 60                CONSTITUTIONAL: Well developed, well nourished, well groomed  No dysmorphic features  Eyes:  PERRLA, EOM normal      Neck:  Normal ROM, neck supple  HEENT:  Normocephalic atraumatic  No meningismus  Oropharynx is clear and moist  No oral mucosal lesions  Chest:  Respirations regular and unlabored  Cardiovascular:  Distal extremities warm   Musculoskeletal:  Full range of motion  Skin:  warm and dry   Psychiatric:  Normal behavior and appropriate affect      SKULL AND SPINE  ROM: Full range of motion  Tenderness: No focal tenderness    MENTAL STATUS  Orientation: Alert and oriented x 3  Fund of knowledge: Intact  CRANIAL NERVES  II: PERRL  III, IV, VI: Extraocular movements intact  No nystagmus  V: Facial sensation normal V1-V3  VII: Facial movements normal and symmetric  VIII: Intact hearing bilaterally  IX, X: Palate elevation normal and symmetric  XI: Intact trapezius, SCM strength  XII: Tongue protrudes to the midline    MOTOR (Upper and lower extremities)   Bulk/tone/abnormal movement: Normal muscle bulk and tone  Strength: Strength 5/5 throughout  COORDINATION   Station/Gait: sitting in a wheelchair    Reflexes:  deep tendon reflexes are 1+/4 throughout     Review of Systems:   Review of Systems  Constitutional: Positive for fatigue  HENT: Negative  Eyes: Negative  Respiratory: Negative  Cardiovascular: Negative  Gastrointestinal: Negative  Endocrine: Negative  Genitourinary: Negative      Musculoskeletal: Positive for arthralgias, back pain and gait problem (ambulates with walker (recent fall 3 weeks ago) )  Skin: Negative  Allergic/Immunologic: Negative  Neurological: Positive for dizziness (worsening )  Hematological: Negative  Psychiatric/Behavioral: Positive for confusion (forgetfulness ) and sleep disturbance (difficulty staying asleep occasionally )       Greater than 50% of the 25 minutes evaluation was a face-to-face discussion regarding  the pathophysiology of her current symptoms and further plan, as well as counseling, educating, and coordinating the patient's care including prognosis of diagnosis, diagnostic results, impression, and recommendations, risks and benefits of treatment and instructions for disease self management    I personally reviewed the ROS entered by the MA      Author:  Fernando Tello PA-C 2/24/2020 10:09 AM

## 2020-02-24 NOTE — PATIENT INSTRUCTIONS
1  Continue with good control of your secondary stroke risk factors including blood pressure, cholesterol, and blood sugar  I will defer monitoring and management of these issues to your primary care physician  2  Continue your pradaxa, statin, blood pressure, and diabetes medications as prescribed  3  Try the Scopolamine patch every 3 days behind your ear    4  Any new stroke-like symptoms such as facial drooping, slurred speech/inability to speak/talking non-sense, persistent dizziness, painless loss of vision, or numbness/tingling/weakness on one side of your body you should call 911 or go to the nearest emergency room as soon as possible

## 2020-02-28 DIAGNOSIS — I10 ESSENTIAL HYPERTENSION: ICD-10-CM

## 2020-02-28 DIAGNOSIS — E13.9 DIABETES 1.5, MANAGED AS TYPE 2 (HCC): ICD-10-CM

## 2020-02-28 RX ORDER — HYDRALAZINE HYDROCHLORIDE 25 MG/1
TABLET, FILM COATED ORAL
Qty: 270 TABLET | Refills: 3 | Status: SHIPPED | OUTPATIENT
Start: 2020-02-28 | End: 2020-03-24

## 2020-02-28 RX ORDER — LANCETS 33 GAUGE
EACH MISCELLANEOUS
Qty: 200 EACH | Refills: 3 | Status: SHIPPED | OUTPATIENT
Start: 2020-02-28

## 2020-03-08 ENCOUNTER — HOSPITAL ENCOUNTER (EMERGENCY)
Facility: HOSPITAL | Age: 85
Discharge: HOME | End: 2020-03-09
Attending: EMERGENCY MEDICINE | Admitting: EMERGENCY MEDICINE
Payer: COMMERCIAL

## 2020-03-08 DIAGNOSIS — R11.2 NON-INTRACTABLE VOMITING WITH NAUSEA, UNSPECIFIED VOMITING TYPE: Primary | ICD-10-CM

## 2020-03-08 DIAGNOSIS — R53.81 PHYSICAL DECONDITIONING: ICD-10-CM

## 2020-03-08 DIAGNOSIS — R63.0 LACK OF APPETITE: ICD-10-CM

## 2020-03-08 DIAGNOSIS — K80.20 CHOLELITHIASIS: ICD-10-CM

## 2020-03-08 PROCEDURE — 99284 EMERGENCY DEPT VISIT MOD MDM: CPT

## 2020-03-09 ENCOUNTER — APPOINTMENT (EMERGENCY)
Dept: CT IMAGING | Facility: HOSPITAL | Age: 85
End: 2020-03-09
Payer: COMMERCIAL

## 2020-03-09 VITALS
TEMPERATURE: 98.1 F | WEIGHT: 166.01 LBS | OXYGEN SATURATION: 95 % | HEART RATE: 82 BPM | SYSTOLIC BLOOD PRESSURE: 146 MMHG | BODY MASS INDEX: 35.92 KG/M2 | RESPIRATION RATE: 18 BRPM | DIASTOLIC BLOOD PRESSURE: 64 MMHG

## 2020-03-09 DIAGNOSIS — I48.0 PAROXYSMAL ATRIAL FIBRILLATION (HCC): ICD-10-CM

## 2020-03-09 DIAGNOSIS — I63.9 CVA (CEREBRAL VASCULAR ACCIDENT) (HCC): ICD-10-CM

## 2020-03-09 LAB
ALBUMIN SERPL BCP-MCNC: 4.1 G/DL (ref 3.5–5)
ALP SERPL-CCNC: 59 U/L (ref 46–116)
ALT SERPL W P-5'-P-CCNC: 67 U/L (ref 12–78)
ANION GAP SERPL CALCULATED.3IONS-SCNC: 11 MMOL/L (ref 4–13)
AST SERPL W P-5'-P-CCNC: 33 U/L (ref 5–45)
BASOPHILS # BLD AUTO: 0.04 THOUSANDS/ΜL (ref 0–0.1)
BASOPHILS NFR BLD AUTO: 1 % (ref 0–1)
BILIRUB SERPL-MCNC: 0.45 MG/DL (ref 0.2–1)
BUN SERPL-MCNC: 19 MG/DL (ref 5–25)
CALCIUM SERPL-MCNC: 9.4 MG/DL (ref 8.3–10.1)
CHLORIDE SERPL-SCNC: 99 MMOL/L (ref 100–108)
CO2 SERPL-SCNC: 24 MMOL/L (ref 21–32)
CREAT SERPL-MCNC: 1.07 MG/DL (ref 0.6–1.3)
EOSINOPHIL # BLD AUTO: 0.04 THOUSAND/ΜL (ref 0–0.61)
EOSINOPHIL NFR BLD AUTO: 1 % (ref 0–6)
ERYTHROCYTE [DISTWIDTH] IN BLOOD BY AUTOMATED COUNT: 16 % (ref 11.6–15.1)
FLUAV RNA NPH QL NAA+PROBE: NORMAL
FLUBV RNA NPH QL NAA+PROBE: NORMAL
GFR SERPL CREATININE-BSD FRML MDRD: 48 ML/MIN/1.73SQ M
GLUCOSE SERPL-MCNC: 237 MG/DL (ref 65–140)
HCT VFR BLD AUTO: 37.2 % (ref 34.8–46.1)
HGB BLD-MCNC: 11.8 G/DL (ref 11.5–15.4)
IMM GRANULOCYTES # BLD AUTO: 0.03 THOUSAND/UL (ref 0–0.2)
IMM GRANULOCYTES NFR BLD AUTO: 0 % (ref 0–2)
LYMPHOCYTES # BLD AUTO: 1.7 THOUSANDS/ΜL (ref 0.6–4.47)
LYMPHOCYTES NFR BLD AUTO: 25 % (ref 14–44)
MCH RBC QN AUTO: 27.3 PG (ref 26.8–34.3)
MCHC RBC AUTO-ENTMCNC: 31.7 G/DL (ref 31.4–37.4)
MCV RBC AUTO: 86 FL (ref 82–98)
MONOCYTES # BLD AUTO: 0.76 THOUSAND/ΜL (ref 0.17–1.22)
MONOCYTES NFR BLD AUTO: 11 % (ref 4–12)
NEUTROPHILS # BLD AUTO: 4.18 THOUSANDS/ΜL (ref 1.85–7.62)
NEUTS SEG NFR BLD AUTO: 62 % (ref 43–75)
NRBC BLD AUTO-RTO: 0 /100 WBCS
PLATELET # BLD AUTO: 262 THOUSANDS/UL (ref 149–390)
PMV BLD AUTO: 10.4 FL (ref 8.9–12.7)
POTASSIUM SERPL-SCNC: 4.1 MMOL/L (ref 3.5–5.3)
PROT SERPL-MCNC: 7.9 G/DL (ref 6.4–8.2)
RBC # BLD AUTO: 4.32 MILLION/UL (ref 3.81–5.12)
RSV RNA NPH QL NAA+PROBE: NORMAL
SODIUM SERPL-SCNC: 134 MMOL/L (ref 136–145)
WBC # BLD AUTO: 6.75 THOUSAND/UL (ref 4.31–10.16)

## 2020-03-09 PROCEDURE — 96374 THER/PROPH/DIAG INJ IV PUSH: CPT

## 2020-03-09 PROCEDURE — 85025 COMPLETE CBC W/AUTO DIFF WBC: CPT | Performed by: EMERGENCY MEDICINE

## 2020-03-09 PROCEDURE — 74177 CT ABD & PELVIS W/CONTRAST: CPT

## 2020-03-09 PROCEDURE — 99284 EMERGENCY DEPT VISIT MOD MDM: CPT | Performed by: EMERGENCY MEDICINE

## 2020-03-09 PROCEDURE — 96361 HYDRATE IV INFUSION ADD-ON: CPT

## 2020-03-09 PROCEDURE — 36415 COLL VENOUS BLD VENIPUNCTURE: CPT | Performed by: EMERGENCY MEDICINE

## 2020-03-09 PROCEDURE — 87631 RESP VIRUS 3-5 TARGETS: CPT | Performed by: EMERGENCY MEDICINE

## 2020-03-09 PROCEDURE — 80053 COMPREHEN METABOLIC PANEL: CPT | Performed by: EMERGENCY MEDICINE

## 2020-03-09 RX ORDER — ONDANSETRON 4 MG/1
4 TABLET, ORALLY DISINTEGRATING ORAL EVERY 8 HOURS PRN
Qty: 20 TABLET | Refills: 0 | Status: SHIPPED | OUTPATIENT
Start: 2020-03-09 | End: 2020-03-10 | Stop reason: RX

## 2020-03-09 RX ORDER — ONDANSETRON 2 MG/ML
4 INJECTION INTRAMUSCULAR; INTRAVENOUS ONCE
Status: COMPLETED | OUTPATIENT
Start: 2020-03-09 | End: 2020-03-09

## 2020-03-09 RX ADMIN — SODIUM CHLORIDE 500 ML: 0.9 INJECTION, SOLUTION INTRAVENOUS at 00:17

## 2020-03-09 RX ADMIN — ONDANSETRON 4 MG: 2 INJECTION INTRAMUSCULAR; INTRAVENOUS at 00:17

## 2020-03-09 RX ADMIN — IOHEXOL 100 ML: 350 INJECTION, SOLUTION INTRAVENOUS at 01:04

## 2020-03-09 NOTE — ED PROVIDER NOTES
=  History  Chief Complaint   Patient presents with    Nausea     Nausea and generalized bodyaches-denies active vomiting  Pt is an 80year old female with a PMH of aortic valve replacement, CHF, hypertension, PAF, CVA, type II DM presenting with n/v and loss of appetite  Pt states for months she has not been able to eat how she normally would like to  Worsening over the past 2 days with lack of appetite and n/v  Son in law states it has not been months, but definitely has been declining over the last few days  Associated body aches and dry cough  Denies fevers, chest pain, Sob, d/c, urinary symptoms  Prior tubal ligation but no other surgeries  Notes mild weight loss as well  No sick contacts or recent travel  Prior to Admission Medications   Prescriptions Last Dose Informant Patient Reported? Taking? Acetaminophen (TYLENOL) 325 MG CAPS  Child Yes No   Sig: Take 1 capsule by mouth as needed    Bimatoprost (LUMIGAN OP)  Child Yes No   Sig: Administer 1 drop to both eyes daily at bedtime Both eyes   Blood Pressure Monitoring (BLOOD PRESSURE CUFF) MISC  Child No No   Sig: by Does not apply route 2 (two) times a day   Cholecalciferol (VITAMIN D3) 1000 units CAPS  Child Yes No   Sig: Take 5,000 Units by mouth daily    Cyanocobalamin (VITAMIN B 12 PO)  Child Yes No   Sig: Take 1,000 mcg by mouth daily    LUMIGAN 0 01 % ophthalmic drops  Child Yes No   Misc   Devices (STETHOSCOPE) MISC  Child No No   Sig: by Does not apply route 2 (two) times a day   Patient not taking: Reported on 2/24/2020   NIFEdipine (PROCARDIA XL) 30 mg 24 hr tablet  Child No No   Sig: Take 1 tablet (30 mg total) by mouth daily   Netarsudil Dimesylate (RHOPRESSA) 0 02 % SOLN  Child Yes No   Sig: Apply to eye Left eye   ONE TOUCH ULTRA TEST test strip  Child No No   Sig: TEST twice a day   Patient not taking: Reported on 5/04/2641   OneTouch Delica Lancets 49L MISC   No No   Sig: TEST TWICE DAILY   PRADAXA 150 MG capsu  Child No No Sig: TAKE ONE CAPSULE BY MOUTH EVERY 12 HOURS FOR 90 DAYS     RESTASIS 0 05 % ophthalmic emulsion  Child Yes No   Sig: Administer 1 drop to both eyes daily    TRADJENTA 5 MG TABS  Child No No   Sig: take 1 tablet by mouth once daily   acetaminophen (TYLENOL) 650 mg CR tablet  Child Yes No   Sig: Take 650 mg by mouth 2 (two) times a day as needed for mild pain   albuterol (PROAIR HFA) 90 mcg/act inhaler  Child No No   Sig: Inhale 2 puffs every 6 (six) hours as needed for wheezing   amiodarone 200 mg tablet  Child No No   Sig: Take 1 tablet (200 mg total) by mouth daily   hydrALAZINE (APRESOLINE) 25 mg tablet   No No   Sig: take 3 tablets by mouth three times a day   losartan (COZAAR) 50 mg tablet  Child No No   Sig: Take 1 tablet (50 mg total) by mouth daily   metFORMIN (GLUCOPHAGE) 500 mg tablet  Child No No   Sig: Take 1 tablet (500 mg total) by mouth daily with breakfast for 162 days   Patient not taking: Reported on 2/24/2020   metFORMIN (GLUCOPHAGE) 500 mg tablet  Child No No   Sig: take 1 tablet by mouth twice a day with food   potassium chloride (K-DUR,KLOR-CON) 10 mEq tablet  Child No No   Sig: take 1 tablet by mouth once daily   rosuvastatin (CRESTOR) 20 MG tablet  Child No No   Sig: Take 1 tablet (20 mg total) by mouth daily at bedtime   scopolamine (TRANSDERM-SCOP) 1 5 mg/3 days TD 72 hr patch   No No   Sig: Place 1 patch on the skin every third day   timolol (TIMOPTIC) 0 5 % ophthalmic solution  Child Yes No   Sig: Administer 1 drop to both eyes daily       Facility-Administered Medications: None       Past Medical History:   Diagnosis Date    Ambulatory dysfunction     CHF (congestive heart failure) ([facility])     pEFHF    Cholelithiasis     Coronary artery disease     Depression with anxiety     Diabetes mellitus ([facility])     niddm    Glaucoma     Hypertension     Hyponatremia     Ischemic stroke of frontal lobe ([facility])     Right     Obstructive sleep apnea     PAF [facility]paroxysmal atrial fibrillation) ([facility] [address] )     Vertigo        Past Surgical History:   Procedure Laterality Date    APPENDECTOMY      GLAUCOMA SURGERY  01/03/2016    [place] REPLACE AORTIC VALVE OPENFEMORAL ARTERY APPROACH N/A 4/17/2018    Procedure: REPLACEMENT AORTIC VALVE TRANSCATHETER (TAVR) TRANSFEMORAL W/ 21 MM AGUILAR STEVE S3 VALVE;  Surgeon: Angel Brown DO;  [facility]: [facility];  Service: Cardiac Surgery    TUBAL LIGATION      UPPER GASTROINTESTINAL ENDOSCOPY  10/28/2019       Family History   Problem Relation Age of Onset    No Known Problems Mother     Diabetes Father     Stroke Father     Coronary artery disease Sister     No Known Problems Brother     No Known Problems Son     Breast cancer Daughter     No Known Problems Maternal Grandmother     No Known Problems Maternal Grandfather     No Known Problems Paternal Grandmother     No Known Problems Paternal Grandfather     No Known Problems Cousin     Rheum arthritis Neg Hx     Osteoarthritis Neg Hx     Asthma Neg Hx     Heart failure Neg Hx     Hyperlipidemia Neg Hx     Hypertension Neg Hx     Migraines Neg Hx     Rashes / Skin problems Neg Hx     Seizures Neg Hx     Thyroid disease Neg Hx      I have reviewed and agree with the history as documented  E-Cigarette/Vaping     E-Cigarette/Vaping Substances    Nicotine No     THC No     CBD No     Flavoring No     Other No     Unknown No      Social History     Tobacco Use    Smoking status: Never Smoker    Smokeless tobacco: Never Used   Substance Use Topics    Alcohol use: Never     Frequency: Never     Binge frequency: Never    Drug use: Never       Review of Systems   Constitutional: Negative  HENT: Negative  Respiratory: Positive for cough  Negative for apnea, choking, shortness of breath and wheezing  Cardiovascular: Negative  Gastrointestinal: Positive for nausea and vomiting  Negative for abdominal distention, abdominal pain, constipation and diarrhea  Genitourinary: Negative  Musculoskeletal: Positive for myalgias  Negative for back pain  Neurological: Negative  All other systems reviewed and are negative  Physical Exam  Physical Exam   Constitutional: She is oriented to person, place, and time  She appears well-developed and well-nourished  No distress  HENT:   Head: Normocephalic and atraumatic  Right Ear: External ear normal    Left Ear: External ear normal    Nose: Nose normal    Eyes: Conjunctivae and EOM are normal    Neck: Normal range of motion  Neck supple  Cardiovascular: Normal rate, regular rhythm, normal heart sounds and intact distal pulses  Pulmonary/Chest: Effort normal and breath sounds normal    Abdominal: Soft  Bowel sounds are normal  She exhibits no distension  There is no tenderness  Musculoskeletal: Normal range of motion  Neurological: She is alert and oriented to person, place, and time  Skin: Skin is warm and dry  She is not diaphoretic         Vital Signs  ED Triage Vitals   Temperature Pulse Respirations Blood Pressure SpO2   03/08/20 2341 03/08/20 2341 03/08/20 2341 03/08/20 2343 03/08/20 2341   98 1 °F (36 7 °C) 78 20 (!) 180/79 99 %      Temp src Heart Rate Source Patient Position - Orthostatic VS BP Location FiO2 (%)   -- 03/08/20 2341 03/08/20 2341 03/08/20 2341 --    Monitor Lying Left arm       Pain Score       03/08/20 2341       8           Vitals:    03/08/20 2341 03/08/20 2343 03/09/20 0150   BP:  (!) 180/79 146/64   Pulse: 78  82   Patient Position - Orthostatic VS: Lying  Lying         Visual Acuity      ED Medications  Medications   ondansetron (ZOFRAN) injection 4 mg (4 mg Intravenous Given 3/9/20 0017)   sodium chloride 0 9 % bolus 500 mL (0 mL Intravenous Stopped 3/9/20 0150)   iohexol (OMNIPAQUE) 350 MG/ML injection (MULTI-DOSE) 100 mL (100 mL Intravenous Given 3/9/20 0104)       Diagnostic Studies  Results Reviewed     Procedure Component Value Units Date/Time    Influenza A/B and RSV PCR [[id number]]  (Normal) Collected:  03/09/20 0017    Lab Status:  Final result Specimen:  Nasopharyngeal Swab Updated:  03/09/20 0109     INFLUENZA A PCR None Detected     INFLUENZA B PCR None Detected     RSV PCR None Detected    Comprehensive metabolic panel [[id number]]  (Abnormal) Collected:  03/09/20 0017    Lab Status:  Final result Specimen:  Blood from Arm, Left Updated:  03/09/20 0045     Sodium 134 mmol/L      Potassium 4 1 mmol/L      Chloride 99 mmol/L      CO2 24 mmol/L      ANION GAP 11 mmol/L      BUN 19 mg/dL      Creatinine 1 07 mg/dL      Glucose 237 mg/dL      Calcium 9 4 mg/dL      AST 33 U/L      ALT 67 U/L      Alkaline Phosphatase 59 U/L      Total Protein 7 9 g/dL      Albumin 4 1 g/dL      Total Bilirubin 0 45 mg/dL      eGFR 48 ml/min/1 73sq m     Narrative:       National Kidney Disease Foundation guidelines for Chronic Kidney Disease (CKD):     Stage 1 with normal or high GFR (GFR > 90 mL/min/1 73 square meters)    Stage 2 Mild CKD (GFR = 60-89 mL/min/1 73 square meters)    Stage 3A Moderate CKD (GFR = 45-59 mL/min/1 73 square meters)    Stage 3B Moderate CKD (GFR = 30-44 mL/min/1 73 square meters)    Stage 4 Severe CKD (GFR = 15-29 mL/min/1 73 square meters)    Stage 5 End Stage CKD (GFR <15 mL/min/1 73 square meters)  Note: GFR calculation is accurate only with a steady state creatinine    CBC and differential [[id number]]  (Abnormal) Collected:  03/09/20 0017    Lab Status:  Final result Specimen:  Blood from Arm, Left Updated:  03/09/20 0030     WBC 6 75 Thousand/uL      RBC 4 32 Million/uL      Hemoglobin 11 8 g/dL      Hematocrit 37 2 %      MCV 86 fL      MCH 27 3 pg      MCHC 31 7 g/dL      RDW 16 0 %      MPV 10 4 fL      Platelets 350 Thousands/uL      nRBC 0 /100 WBCs      Neutrophils Relative 62 %      Immat GRANS % 0 %      Lymphocytes Relative 25 %      Monocytes Relative 11 %      Eosinophils Relative 1 %      Basophils Relative 1 %      Neutrophils Absolute 4 18 Thousands/µL      Immature Grans Absolute 0 03 Thousand/uL      Lymphocytes Absolute 1 70 Thousands/µL      Monocytes Absolute 0 76 Thousand/µL      Eosinophils Absolute 0 04 Thousand/µL      Basophils Absolute 0 04 Thousands/µL                  CT abdomen pelvis with contrast   Final Result by Angelique Smalls MD (03/09 0138)      1  Cholelithiasis without evidence of cholecystitis  2   The lower esophagus is patulous which may be due to gastroesophageal reflux or esophageal dysmotility  Small hiatal hernia  Workstation performed: [id number]                    Procedures  Procedures         ED Course  ED Course as of Mar 09 0220   Mon Mar 09, 2020   0218 1  Cholelithiasis without evidence of cholecystitis  2   The lower esophagus is patulous which may be due to gastroesophageal reflux or esophageal dysmotility  Small hiatal hernia          0218 Blood work and CT unremarkable  Pt feeling better and has not vomited after Zofran  Pt needs assistance to bathroom but is at baseline per family  They do not note increased weakness  Family agreeable to discharge home  Spoke about outpatient PT for the pt with PCP  I do not feel she needs admission at this time  Educated on return precautions                                    MDM      Disposition  Final diagnoses:   Non-intractable vomiting with nausea, unspecified vomiting type   Lack of appetite   Physical deconditioning   Cholelithiasis     Time reflects when diagnosis was documented in both MDM as applicable and the Disposition within this note     Time User Action Codes Description Comment    3/9/2020  1:57 AM Scarlett Mule B Add [R11 2] Non-intractable vomiting with nausea, unspecified vomiting type     3/9/2020  1:57 AM Scarlett Mule B Add [R63 0] Lack of appetite     3/9/2020  1:57 AM Scarlett Mule B Add [R53 81] Physical deconditioning     3/9/2020  2:20 AM Nisreen Kiser Add [K80 20] Cholelithiasis       ED Disposition     ED Disposition Condition Date/Time Comment    Discharge Good Mon Mar 9, 2020  1:57 AM Narda Montana discharge to home/self care  Follow-up Information     Follow up With Specialties Details Why Shivani Dee MD Family Medicine Schedule an appointment as soon as possible for a visit today  [address] [facility]  [phone]            Patient's Medications   Discharge Prescriptions    ONDANSETRON (ZOFRAN-ODT) 4 MG DISINTEGRATING TABLET    Take 1 tablet (4 mg total) by mouth every 8 (eight) hours as needed for nausea or vomiting       Start Date: 3/9/2020  End Date: --       Order Dose: 4 mg       Quantity: 20 tablet    Refills: 0     No discharge procedures on file      PDMP Review     None          ED Provider  Electronically Signed by           J Luis Sanon PA-C  03/09/20 7344

## 2020-03-09 NOTE — ED NOTES
Patient transported to [address] [place] [address], [address] [place] [address]  03/09/20 6917

## 2020-03-10 ENCOUNTER — OFFICE VISIT (OUTPATIENT)
Dept: FAMILY MEDICINE CLINIC | Facility: CLINIC | Age: 85
End: 2020-03-10
Payer: COMMERCIAL

## 2020-03-10 ENCOUNTER — TELEPHONE (OUTPATIENT)
Dept: FAMILY MEDICINE CLINIC | Facility: CLINIC | Age: 85
End: 2020-03-10

## 2020-03-10 VITALS
DIASTOLIC BLOOD PRESSURE: 60 MMHG | HEIGHT: 57 IN | RESPIRATION RATE: 16 BRPM | SYSTOLIC BLOOD PRESSURE: 118 MMHG | BODY MASS INDEX: 36.2 KG/M2 | HEART RATE: 68 BPM | WEIGHT: 167.8 LBS | OXYGEN SATURATION: 97 % | TEMPERATURE: 98.2 F

## 2020-03-10 DIAGNOSIS — K22.89 ESOPHAGEAL THICKENING: Primary | ICD-10-CM

## 2020-03-10 DIAGNOSIS — K21.9 GASTROESOPHAGEAL REFLUX DISEASE, ESOPHAGITIS PRESENCE NOT SPECIFIED: ICD-10-CM

## 2020-03-10 DIAGNOSIS — R11.0 NAUSEA: Primary | ICD-10-CM

## 2020-03-10 PROBLEM — S69.92XA INJURY OF FINGER OF LEFT HAND: Status: RESOLVED | Noted: 2019-07-07 | Resolved: 2020-03-10

## 2020-03-10 PROCEDURE — 3008F BODY MASS INDEX DOCD: CPT | Performed by: FAMILY MEDICINE

## 2020-03-10 PROCEDURE — 99214 OFFICE O/P EST MOD 30 MIN: CPT | Performed by: FAMILY MEDICINE

## 2020-03-10 PROCEDURE — 3074F SYST BP LT 130 MM HG: CPT | Performed by: FAMILY MEDICINE

## 2020-03-10 PROCEDURE — 1036F TOBACCO NON-USER: CPT | Performed by: FAMILY MEDICINE

## 2020-03-10 PROCEDURE — 1160F RVW MEDS BY RX/DR IN RCRD: CPT | Performed by: FAMILY MEDICINE

## 2020-03-10 PROCEDURE — 3078F DIAST BP <80 MM HG: CPT | Performed by: FAMILY MEDICINE

## 2020-03-10 RX ORDER — ATENOLOL 100 MG/1
TABLET ORAL
Qty: 180 CAPSULE | Refills: 1 | Status: SHIPPED | OUTPATIENT
Start: 2020-03-10

## 2020-03-10 RX ORDER — FAMOTIDINE 20 MG/1
20 TABLET, FILM COATED ORAL 2 TIMES DAILY
Qty: 60 TABLET | Refills: 5 | Status: SHIPPED | OUTPATIENT
Start: 2020-03-10 | End: 2020-03-10 | Stop reason: RX

## 2020-03-10 RX ORDER — ONDANSETRON 4 MG/1
4 TABLET, FILM COATED ORAL EVERY 8 HOURS PRN
Qty: 30 TABLET | Refills: 0 | Status: SHIPPED | OUTPATIENT
Start: 2020-03-10 | End: 2020-08-20 | Stop reason: SDUPTHER

## 2020-03-10 RX ORDER — CIMETIDINE 400 MG/1
400 TABLET, FILM COATED ORAL 2 TIMES DAILY
Qty: 60 TABLET | Refills: 1 | Status: SHIPPED | OUTPATIENT
Start: 2020-03-10 | End: 2020-11-04 | Stop reason: ALTCHOICE

## 2020-03-10 NOTE — TELEPHONE ENCOUNTER
Bo called from [facility] stating that famotidine (PEPCID) 20 mg tablet is on back order and there is no release order date   Please advise

## 2020-03-10 NOTE — PROGRESS NOTES
Chief Complaint   Patient presents with    Follow-up     ED seen on 03/08/20 for Non-intractable vomiting with nausea, unspecified vomiting type  Assessment/Plan:    Reviewed ER report  Give zofran  SE educated pt  Give famotidine  SE educated pt  Call office if no improving or worse  Diagnoses and all orders for this visit:    Nausea  -     ondansetron (ZOFRAN) 4 mg tablet; Take 1 tablet (4 mg total) by mouth every 8 (eight) hours as needed for nausea or vomiting    Gastroesophageal reflux disease, esophagitis presence not specified  -     famotidine (PEPCID) 20 mg tablet; Take 1 tablet (20 mg total) by mouth 2 (two) times a day          Subjective:      Patient ID: Yola Garcia is a 80 y o  female  HPI    Pt is here with her son and daughter-in-law  C/o Nausea for 3 days  She felt decreased appetite recently  Denies fever  Had nausea and vomiting on Saturday  Went to ER  Labs were normal   CT abdomen showed "Cholelithiasis without evidence of cholecystitis  The lower esophagus is patulous which may be due to gastroesophageal reflux or esophageal dysmotility  Small hiatal hernia "  Pt got zofran and felt better  Discharged home  Pt states she still feels nausea  No vomiting  Denies fever  Sob, CP, abdominal pain or urinary problems             The following portions of the patient's history were reviewed and updated as appropriate: allergies, current medications, past family history, past medical history, past social history, past surgical history and problem list     Review of Systems   Constitutional: Negative for appetite change, chills and fever  HENT: Negative for congestion, ear pain, sinus pain and sore throat  Eyes: Negative for discharge and itching  Respiratory: Negative for apnea, cough, chest tightness, shortness of breath and wheezing  Cardiovascular: Negative for chest pain, palpitations and leg swelling     Gastrointestinal: Negative for abdominal pain, anal bleeding, constipation, diarrhea, nausea and vomiting  Endocrine: Negative for cold intolerance, heat intolerance and polyuria  Genitourinary: Negative for difficulty urinating and dysuria  Musculoskeletal: Negative for arthralgias, back pain and myalgias  Skin: Negative for rash  Neurological: Negative for dizziness and headaches  Psychiatric/Behavioral: Negative for agitation  Objective:      /60 (BP Location: Left arm, Patient Position: Sitting, Cuff Size: Adult)   Pulse 68   Temp 98 2 °F (36 8 °C) (Tympanic)   Resp 16   Ht 4' 9" (1 448 m)   Wt 76 1 kg (167 lb 12 8 oz)   SpO2 93%   BMI 36 31 kg/m²          Physical Exam   Constitutional: She appears well-developed  No distress  HENT:   Head: Normocephalic  Eyes: Pupils are equal, round, and reactive to light  Conjunctivae are normal  Right eye exhibits no discharge  Left eye exhibits no discharge  Neck: Normal range of motion  No thyromegaly present  Cardiovascular: Normal rate, regular rhythm and normal heart sounds  Exam reveals no gallop and no friction rub  No murmur heard  Pulmonary/Chest: Effort normal and breath sounds normal  No respiratory distress  She has no wheezes  She has no rales  She exhibits no tenderness  Abdominal: Soft  Bowel sounds are normal  She exhibits no distension and no mass  There is no tenderness  There is no rebound and no guarding  Musculoskeletal: Normal range of motion  She exhibits no edema  Lymphadenopathy:     She has no cervical adenopathy  Neurological: She is alert  Psychiatric: She has a normal mood and affect

## 2020-03-16 ENCOUNTER — TELEPHONE (OUTPATIENT)
Dept: FAMILY MEDICINE CLINIC | Facility: CLINIC | Age: 85
End: 2020-03-16

## 2020-03-16 NOTE — TELEPHONE ENCOUNTER
Insurance calling for more information in regards to the prior auth for the Zofran  They need to know why the zofran is being prescribed? [facility]#[id number]

## 2020-03-16 NOTE — TELEPHONE ENCOUNTER
Called [facility] spoke with Susie Rivera asked what is causing patient nausea provided information from notes

## 2020-03-24 DIAGNOSIS — I10 ESSENTIAL HYPERTENSION: ICD-10-CM

## 2020-03-24 RX ORDER — HYDRALAZINE HYDROCHLORIDE 25 MG/1
TABLET, FILM COATED ORAL
Qty: 270 TABLET | Refills: 1 | Status: SHIPPED | OUTPATIENT
Start: 2020-03-24 | End: 2020-04-28

## 2020-03-30 ENCOUNTER — TELEMEDICINE (OUTPATIENT)
Dept: FAMILY MEDICINE CLINIC | Facility: CLINIC | Age: 85
End: 2020-03-30
Payer: COMMERCIAL

## 2020-03-30 VITALS
HEIGHT: 57 IN | WEIGHT: 162 LBS | DIASTOLIC BLOOD PRESSURE: 75 MMHG | BODY MASS INDEX: 34.95 KG/M2 | SYSTOLIC BLOOD PRESSURE: 128 MMHG | HEART RATE: 64 BPM | TEMPERATURE: 98.2 F

## 2020-03-30 DIAGNOSIS — E78.5 HYPERLIPIDEMIA, UNSPECIFIED HYPERLIPIDEMIA TYPE: Chronic | ICD-10-CM

## 2020-03-30 DIAGNOSIS — Z86.73 HISTORY OF CVA (CEREBROVASCULAR ACCIDENT): Chronic | ICD-10-CM

## 2020-03-30 DIAGNOSIS — E11.65 TYPE 2 DIABETES MELLITUS WITH HYPERGLYCEMIA, WITHOUT LONG-TERM CURRENT USE OF INSULIN (HCC): Primary | ICD-10-CM

## 2020-03-30 DIAGNOSIS — I48.0 PAROXYSMAL ATRIAL FIBRILLATION (HCC): ICD-10-CM

## 2020-03-30 DIAGNOSIS — I10 ESSENTIAL HYPERTENSION: Chronic | ICD-10-CM

## 2020-03-30 DIAGNOSIS — R13.10 DYSPHAGIA, UNSPECIFIED TYPE: ICD-10-CM

## 2020-03-30 PROCEDURE — 3008F BODY MASS INDEX DOCD: CPT | Performed by: FAMILY MEDICINE

## 2020-03-30 PROCEDURE — 1160F RVW MEDS BY RX/DR IN RCRD: CPT | Performed by: FAMILY MEDICINE

## 2020-03-30 PROCEDURE — 1036F TOBACCO NON-USER: CPT | Performed by: FAMILY MEDICINE

## 2020-03-30 PROCEDURE — 99213 OFFICE O/P EST LOW 20 MIN: CPT | Performed by: FAMILY MEDICINE

## 2020-03-30 PROCEDURE — 3078F DIAST BP <80 MM HG: CPT | Performed by: FAMILY MEDICINE

## 2020-03-30 PROCEDURE — 3074F SYST BP LT 130 MM HG: CPT | Performed by: FAMILY MEDICINE

## 2020-03-30 NOTE — ASSESSMENT & PLAN NOTE
Lab Results   Component Value Date    HGBA1C 8 3 (H) 10/15/2019     Follow low carb diet  Continue metformin 500mg bid and tradjenta 5mg QD

## 2020-03-30 NOTE — PROGRESS NOTES
Virtual Brief Visit    Problem List Items Addressed This Visit        Digestive    Dysphagia     Continue cimetidine 400mg bid as needed  Endocrine    Type 2 diabetes mellitus with hyperglycemia, without long-term current use of insulin ([facility] [address] ) - Primary       Lab Results   Component Value Date    HGBA1C 8 3 (H) 10/15/2019     Follow low carb diet  Continue metformin 500mg bid and tradjenta 5mg QD  Relevant Orders    CBC and differential    Comprehensive metabolic panel    Hemoglobin A1C With EAG    Lipid Panel with Direct LDL reflex    TSH, 3rd generation with Free T4 reflex       Cardiovascular and Mediastinum    Hypertension (Chronic)     Controlled  DASH diet  Continue nifedipine 30mg QD, losartan 50mg QD and hydralazine 25mg bid  Relevant Orders    CBC and differential    Comprehensive metabolic panel    Hemoglobin A1C With EAG    Lipid Panel with Direct LDL reflex    TSH, 3rd generation with Free T4 reflex    Paroxysmal atrial fibrillation (HCC)     [facility] cardiology, continue amiodarone 200mg QD  Other    Hyperlipidemia (Chronic)     Low fat diet  Continue crestor 20mg qhs  Relevant Orders    CBC and differential    Comprehensive metabolic panel    Hemoglobin A1C With EAG    Lipid Panel with Direct LDL reflex    TSH, 3rd generation with Free T4 reflex    History of CVA (cerebrovascular accident) (Chronic)     Continue pradaxa 150mg bid  Relevant Orders    CBC and differential    Comprehensive metabolic panel    Hemoglobin A1C With EAG    Lipid Panel with Direct LDL reflex    TSH, 3rd generation with Free T4 reflex                 Refused flu shot  Refused PCV13 and pneumovax  RTO in 4 months        Reason for visit is   Chief Complaint   Patient presents with    Follow-up     4 months       Health Maintenance   Topic Date Due    DTaP,Tdap,and Td Vaccines (1 - Tdap) 03/23/1946    Pneumococcal Vaccine: 65+ Years (1 of 2 - PCV13) 03/23/2000    PT PLAN OF CARE  12/13/2018    Influenza Vaccine  07/01/2019    HEMOGLOBIN A1C  01/15/2020    DM Eye Exam  03/12/2020    Diabetic Foot Exam  07/29/2020    Fall Risk  11/25/2020    Medicare Annual Wellness Visit (AWV)  11/25/2020    BMI: Followup Plan  11/25/2020    BMI: Adult  03/10/2021    DXA SCAN  05/02/2022    Pneumococcal Vaccine: Pediatrics (0 to 5 Years) and At-Risk Patients (6 to 59 Years)  Aged Out    HIB Vaccine  Aged Out    Hepatitis B Vaccine  Aged Out    IPV Vaccine  Aged Out    Hepatitis A Vaccine  Aged Out    Meningococcal ACWY Vaccine  Aged Out    HPV Vaccine  Aged Out     Vitals:    03/30/20 1033   BP: 128/75   BP Location: Left arm   Patient Position: Sitting   Cuff Size: Adult   Pulse: 64   Temp: 98 2 °F (36 8 °C)   TempSrc: Temporal   Weight: 73 5 kg (162 lb)   Height: 4' 9" (1 448 m)     Encounter provider María Carter MD    Provider located at [address] [place] [id number]      Recent Visits  No visits were found meeting these conditions  Showing recent visits within past 7 days and meeting all other requirements     Today's Visits  Date Type Provider Dept   03/30/20 [facility] [address], [address] today's visits and meeting all other requirements     Future Appointments  No visits were found meeting these conditions  Showing future appointments within next 150 days and meeting all other requirements        After connecting through telephone, the patient was identified by name and date of birth  Rebecca Doherty was informed that this is a telemedicine visit and that the visit is being conducted through telephone  My office door was closed  No one else was in the room  She acknowledged consent and understanding of privacy and security of the video platform  The patient has agreed to participate and understands they can discontinue the visit at any time      Patient is aware this is a billable service  Subjective  Cy Brothers is a 80 y o  female for 4 months follow up  GERD---Use cimetidine 400mg bid as needed which helped  DM---10/2019 HgA1C 8 3 Pt did not do labs because of COVID19 [place]  She is on metformin 500mg bid and tradjenta 5mg QD  Denies side effects  Blood sugar at home 200 today because she ate cake last night  Denies hypoglycemia  Occasionally hands numbness or tingling  FU opthalmology Dr Pippa Cheng and Dr Isaias Du regularly Q 3 months  Had mild retinopathy  [facility] Q 6 month       HTN---Stopped lisinopril because it caused her coughing  Stopped metoprolol because it gave her severe headache    Saw cardiology  She is on hydralazine to 75mg bid and nifedipine 30mg QD and losartan 50mg Qd  BP is good today at home  Afib---she is on amiodarone and pradaxa per cardiology  CVA---Has off balance sensation  Saw neurology  She is on pradaxa 150mg bid       Hyperlipidemia---She is on crestor per cardiology  Denies side effects       Lives with family  Denies recent falls  Used walker at home  Mood is ok   Denies depression           Past Medical History:   Diagnosis Date    Ambulatory dysfunction     CHF (congestive heart failure) ([facility])     pEFHF    Cholelithiasis     Coronary artery disease     Depression with anxiety     Diabetes mellitus ([facility] [address] )     niddm    Glaucoma     Hypertension     Hyponatremia     Injury of finger of left hand 7/7/2019    Ischemic stroke of frontal lobe ([facility])     Right     Obstructive sleep apnea     PAF (paroxysmal atrial fibrillation) ([facility])     Vertigo        Past Surgical History:   Procedure Laterality Date    APPENDECTOMY      GLAUCOMA SURGERY  01/03/2016    [place] REPLACE AORTIC VALVE OPENFEMORAL ARTERY APPROACH N/A 4/17/2018    Procedure: REPLACEMENT AORTIC VALVE TRANSCATHETER (TAVR) TRANSFEMORAL W/ 23 MM AGUILAR STEVE S3 VALVE;  Surgeon: Chiki De Los Santos DO;  Location: [facility];  Service: Cardiac Surgery    TUBAL LIGATION      UPPER GASTROINTESTINAL ENDOSCOPY  10/28/2019       Current Outpatient Medications   Medication Sig Dispense Refill    Acetaminophen (TYLENOL) 325 MG CAPS Take 1 capsule by mouth as needed       acetaminophen (TYLENOL) 650 mg CR tablet Take 650 mg by mouth 2 (two) times a day as needed for mild pain      albuterol (PROAIR HFA) 90 mcg/act inhaler Inhale 2 puffs every 6 (six) hours as needed for wheezing 8 5 g 1    amiodarone 200 mg tablet Take 1 tablet (200 mg total) by mouth daily 90 tablet 2    Bimatoprost (LUMIGAN OP) Administer 1 drop to both eyes daily at bedtime Both eyes      Blood Pressure Monitoring (BLOOD PRESSURE CUFF) MISC by Does not apply route 2 (two) times a day 1 each 0    Cholecalciferol (VITAMIN D3) 1000 units CAPS Take 5,000 Units by mouth daily       cimetidine (TAGAMET) 400 mg tablet Take 1 tablet (400 mg total) by mouth 2 (two) times a day 60 tablet 1    Cyanocobalamin (VITAMIN B 12 PO) Take 1,000 mcg by mouth daily       hydrALAZINE (APRESOLINE) 25 mg tablet take 3 tablets by mouth three times a day 270 tablet 1    losartan (COZAAR) 50 mg tablet Take 1 tablet (50 mg total) by mouth daily 90 tablet 1    LUMIGAN 0 01 % ophthalmic drops       metFORMIN (GLUCOPHAGE) 500 mg tablet take 1 tablet by mouth twice a day with food 180 tablet 3    Netarsudil Dimesylate (RHOPRESSA) 0 02 % SOLN Apply to eye Left eye      NIFEdipine (PROCARDIA XL) 30 mg 24 hr tablet Take 1 tablet (30 mg total) by mouth daily 90 tablet 2    ondansetron (ZOFRAN) 4 mg tablet Take 1 tablet (4 mg total) by mouth every 8 (eight) hours as needed for nausea or vomiting 30 tablet 0    OneTouch Delica Lancets 05K MISC TEST TWICE DAILY 200 each 3    potassium chloride (K-DUR,KLOR-CON) 10 mEq tablet take 1 tablet by mouth once daily 30 tablet 5    PRADAXA 150 MG capsu TAKE 1 CAPSULE BY MOUTH EVERY 12 HOURS FOR 90 DAYS 180 capsule 1    RESTASIS 0 05 % ophthalmic emulsion Administer 1 drop to both eyes daily   0    rosuvastatin (CRESTOR) 20 MG tablet Take 1 tablet (20 mg total) by mouth daily at bedtime 90 tablet 3    timolol (TIMOPTIC) 0 5 % ophthalmic solution Administer 1 drop to both eyes daily   0    TRADJENTA 5 MG TABS take 1 tablet by mouth once daily 30 tablet 5    Misc  Devices (STETHOSCOPE) MISC by Does not apply route 2 (two) times a day (Patient not taking: Reported on 2/24/2020) 1 each 0    ONE TOUCH ULTRA TEST test strip TEST twice a day (Patient not taking: Reported on 2/24/2020) 200 each 3     No current facility-administered medications for this visit  Allergies   Allergen Reactions    Lipitor [Atorvastatin] GI Intolerance     Nausea stomach ache    Aspirin GI Intolerance    Hydrochlorothiazide GI Intolerance    Lisinopril Cough    Metoprolol Headache       Review of Systems   Constitutional: Negative for appetite change, chills and fever  HENT: Negative for congestion, ear pain, sinus pain and sore throat  Eyes: Negative for discharge and itching  Respiratory: Negative for apnea, cough, chest tightness, shortness of breath and wheezing  Cardiovascular: Negative for chest pain, palpitations and leg swelling  Gastrointestinal: Negative for abdominal pain, anal bleeding, constipation, diarrhea, nausea and vomiting  Endocrine: Negative for cold intolerance, heat intolerance and polyuria  Genitourinary: Negative for difficulty urinating and dysuria  Musculoskeletal: Negative for arthralgias, back pain and myalgias  Skin: Negative for rash  Neurological: Negative for dizziness and headaches  Psychiatric/Behavioral: Negative for agitation  I spent 15 minutes with the patient during this visit

## 2020-04-15 NOTE — TELEPHONE ENCOUNTER
Patients GI provider:  Dr Gina Champion    Number to return call: [phone]  Reason for call: Pt's daughter Zeina Haney calling requesting to speak with  regarding a bill she is still receiving from  on 10/15/20  She would like a call to discuss       Scheduled procedure/appointment date if applicable: Apt/procedure n/a

## 2020-04-16 NOTE — TELEPHONE ENCOUNTER
Via email Kyree Baer states the amount will be adjusted off, sent to her coordinator on 4-16-20-advised pts daughter Rafael Abebe that if she receives another bill with charges to call me directly

## 2020-04-28 DIAGNOSIS — I10 ESSENTIAL HYPERTENSION: ICD-10-CM

## 2020-04-28 RX ORDER — HYDRALAZINE HYDROCHLORIDE 25 MG/1
TABLET, FILM COATED ORAL
Qty: 270 TABLET | Refills: 1 | Status: SHIPPED | OUTPATIENT
Start: 2020-04-28 | End: 2020-05-26

## 2020-05-23 DIAGNOSIS — I10 ESSENTIAL HYPERTENSION: ICD-10-CM

## 2020-05-23 DIAGNOSIS — E13.9 DIABETES 1.5, MANAGED AS TYPE 2 (HCC): ICD-10-CM

## 2020-05-26 RX ORDER — LANCETS 33 GAUGE
EACH MISCELLANEOUS
Qty: 200 EACH | Refills: 3 | OUTPATIENT
Start: 2020-05-26

## 2020-05-26 RX ORDER — HYDRALAZINE HYDROCHLORIDE 25 MG/1
TABLET, FILM COATED ORAL
Qty: 270 TABLET | Refills: 1 | Status: ON HOLD | OUTPATIENT
Start: 2020-05-26 | End: 2020-08-26 | Stop reason: SDUPTHER

## 2020-06-02 DIAGNOSIS — I63.9 CVA (CEREBRAL VASCULAR ACCIDENT) (HCC): ICD-10-CM

## 2020-06-02 DIAGNOSIS — I48.0 PAROXYSMAL ATRIAL FIBRILLATION (HCC): ICD-10-CM

## 2020-06-02 RX ORDER — ATENOLOL 100 MG/1
TABLET ORAL
Qty: 180 CAPSULE | Refills: 1 | OUTPATIENT
Start: 2020-06-02

## 2020-06-15 ENCOUNTER — TELEPHONE (OUTPATIENT)
Dept: CARDIOLOGY CLINIC | Facility: CLINIC | Age: 85
End: 2020-06-15

## 2020-06-16 ENCOUNTER — OFFICE VISIT (OUTPATIENT)
Dept: CARDIOLOGY CLINIC | Facility: CLINIC | Age: 85
End: 2020-06-16
Payer: COMMERCIAL

## 2020-06-16 VITALS
WEIGHT: 161.6 LBS | BODY MASS INDEX: 34.86 KG/M2 | TEMPERATURE: 97.3 F | HEART RATE: 59 BPM | SYSTOLIC BLOOD PRESSURE: 138 MMHG | HEIGHT: 57 IN | DIASTOLIC BLOOD PRESSURE: 63 MMHG

## 2020-06-16 DIAGNOSIS — I63.521 CEREBROVASCULAR ACCIDENT (CVA) DUE TO OCCLUSION OF RIGHT ANTERIOR CEREBRAL ARTERY (HCC): ICD-10-CM

## 2020-06-16 DIAGNOSIS — I36.9 TRICUSPID VALVE DISORDERS, NON-RHEUMATIC: ICD-10-CM

## 2020-06-16 DIAGNOSIS — I48.91 ATRIAL FIBRILLATION, UNSPECIFIED TYPE (HCC): Primary | ICD-10-CM

## 2020-06-16 DIAGNOSIS — E78.5 HYPERLIPIDEMIA, UNSPECIFIED HYPERLIPIDEMIA TYPE: Chronic | ICD-10-CM

## 2020-06-16 DIAGNOSIS — Z95.2 S/P TAVR (TRANSCATHETER AORTIC VALVE REPLACEMENT): ICD-10-CM

## 2020-06-16 DIAGNOSIS — E66.9 OBESITY (BMI 30-39.9): ICD-10-CM

## 2020-06-16 DIAGNOSIS — I50.32 CHRONIC DIASTOLIC HEART FAILURE (HCC): Chronic | ICD-10-CM

## 2020-06-16 DIAGNOSIS — I48.0 PAROXYSMAL ATRIAL FIBRILLATION (HCC): ICD-10-CM

## 2020-06-16 DIAGNOSIS — E11.65 TYPE 2 DIABETES MELLITUS WITH HYPERGLYCEMIA, WITHOUT LONG-TERM CURRENT USE OF INSULIN (HCC): ICD-10-CM

## 2020-06-16 DIAGNOSIS — I10 ESSENTIAL HYPERTENSION: Chronic | ICD-10-CM

## 2020-06-16 DIAGNOSIS — I63.00 CEREBROVASCULAR ACCIDENT (CVA) DUE TO THROMBOSIS OF PRECEREBRAL ARTERY (HCC): ICD-10-CM

## 2020-06-16 PROCEDURE — 3008F BODY MASS INDEX DOCD: CPT

## 2020-06-16 PROCEDURE — 1160F RVW MEDS BY RX/DR IN RCRD: CPT

## 2020-06-16 PROCEDURE — 99214 OFFICE O/P EST MOD 30 MIN: CPT

## 2020-06-16 PROCEDURE — 93000 ELECTROCARDIOGRAM COMPLETE: CPT

## 2020-06-16 PROCEDURE — 3078F DIAST BP <80 MM HG: CPT

## 2020-06-16 PROCEDURE — 4010F ACE/ARB THERAPY RXD/TAKEN: CPT

## 2020-06-16 PROCEDURE — 3075F SYST BP GE 130 - 139MM HG: CPT

## 2020-06-16 PROCEDURE — 1036F TOBACCO NON-USER: CPT

## 2020-06-16 RX ORDER — LOSARTAN POTASSIUM 50 MG/1
50 TABLET ORAL DAILY
COMMUNITY
Start: 2020-05-19

## 2020-06-19 DIAGNOSIS — E11.65 TYPE 2 DIABETES MELLITUS WITH HYPERGLYCEMIA, WITHOUT LONG-TERM CURRENT USE OF INSULIN (HCC): ICD-10-CM

## 2020-07-16 ENCOUNTER — TELEPHONE (OUTPATIENT)
Dept: FAMILY MEDICINE CLINIC | Facility: CLINIC | Age: 85
End: 2020-07-16

## 2020-07-16 ENCOUNTER — OFFICE VISIT (OUTPATIENT)
Dept: FAMILY MEDICINE CLINIC | Facility: CLINIC | Age: 85
End: 2020-07-16
Payer: COMMERCIAL

## 2020-07-16 VITALS
RESPIRATION RATE: 20 BRPM | OXYGEN SATURATION: 97 % | SYSTOLIC BLOOD PRESSURE: 172 MMHG | WEIGHT: 160.4 LBS | HEIGHT: 57 IN | BODY MASS INDEX: 34.61 KG/M2 | HEART RATE: 64 BPM | DIASTOLIC BLOOD PRESSURE: 72 MMHG | TEMPERATURE: 98.1 F

## 2020-07-16 DIAGNOSIS — G89.29 CHRONIC BILATERAL LOW BACK PAIN WITH SCIATICA, SCIATICA LATERALITY UNSPECIFIED: Primary | ICD-10-CM

## 2020-07-16 DIAGNOSIS — M54.40 CHRONIC BILATERAL LOW BACK PAIN WITH SCIATICA, SCIATICA LATERALITY UNSPECIFIED: Primary | ICD-10-CM

## 2020-07-16 PROCEDURE — 1036F TOBACCO NON-USER: CPT | Performed by: FAMILY MEDICINE

## 2020-07-16 PROCEDURE — 1160F RVW MEDS BY RX/DR IN RCRD: CPT | Performed by: FAMILY MEDICINE

## 2020-07-16 PROCEDURE — 3078F DIAST BP <80 MM HG: CPT | Performed by: FAMILY MEDICINE

## 2020-07-16 PROCEDURE — 3077F SYST BP >= 140 MM HG: CPT | Performed by: FAMILY MEDICINE

## 2020-07-16 PROCEDURE — 3008F BODY MASS INDEX DOCD: CPT | Performed by: FAMILY MEDICINE

## 2020-07-16 PROCEDURE — 99214 OFFICE O/P EST MOD 30 MIN: CPT | Performed by: FAMILY MEDICINE

## 2020-07-16 NOTE — PROGRESS NOTES
Chief Complaint   Patient presents with    Back Pain     Lower back worse last few months  Assessment/Plan:    Suggest to try gabapentin  Pt refused because she is afraid of side effects  Pt would like to do MRI  Advised pt to do PT for 6 weeks otherwise insurance may not approve MRI  Pt refused to do PT  Refer to pain specialist    Earl Verde as scheduled with labs  Diagnoses and all orders for this visit:    Chronic bilateral low back pain with sciatica, sciatica laterality unspecified  -     XR spine lumbar minimum 4 views non injury; Future  -     Ambulatory referral to Pain Management; Future          Subjective:      Patient ID: Twan Harrell is a 80 y o  female  HPI    Pt is here with her son-in-law  C/o lower back pain for 2 years  Constant, 8/10, sharp pain  Radiating down to legs/ankles  Sometimes numbness/tingling  Worse in the morning  Better in the afternoon  Denies recent falls  Denies fever, Sob, CP, n/v/abd pain  Tried OTC icehot and tylenol  She cannot take ibuprofen/aleve/motrin because she is on pradaxa  3/2018 lumbar xray showed arthritis  The following portions of the patient's history were reviewed and updated as appropriate: allergies, current medications, past family history, past medical history, past social history, past surgical history and problem list     Review of Systems   Constitutional: Negative for appetite change, chills and fever  HENT: Negative for congestion, ear pain, sinus pain and sore throat  Eyes: Negative for discharge and itching  Respiratory: Negative for apnea, cough, chest tightness, shortness of breath and wheezing  Cardiovascular: Negative for chest pain, palpitations and leg swelling  Gastrointestinal: Negative for abdominal pain, anal bleeding, constipation, diarrhea, nausea and vomiting  Endocrine: Negative for cold intolerance, heat intolerance and polyuria     Genitourinary: Negative for difficulty urinating and dysuria  Musculoskeletal: Positive for back pain  Negative for arthralgias and myalgias  Skin: Negative for rash  Neurological: Negative for dizziness and headaches  Psychiatric/Behavioral: Negative for agitation  Objective:      BP (!) 172/72 (BP Location: Left arm, Patient Position: Sitting, Cuff Size: Adult)   Pulse 64   Temp 98 1 °F (36 7 °C) (Oral)   Resp 20   Ht 4' 9" (1 448 m)   Wt 72 8 kg (160 lb 6 4 oz)   SpO2 97%   BMI 34 71 kg/m²          Physical Exam   Constitutional: She appears well-developed  No distress  HENT:   Head: Normocephalic  Eyes: Conjunctivae are normal  Right eye exhibits no discharge  Left eye exhibits no discharge  Neck: Normal range of motion  No thyromegaly present  Cardiovascular: Normal rate, regular rhythm and normal heart sounds  Exam reveals no gallop and no friction rub  No murmur heard  Pulmonary/Chest: Effort normal and breath sounds normal  No respiratory distress  She has no wheezes  She has no rales  She exhibits no tenderness  Abdominal: Soft  Bowel sounds are normal    Musculoskeletal: She exhibits tenderness  She exhibits no edema or deformity  Lower back tenderness, no swollen or erythema   Lymphadenopathy:     She has no cervical adenopathy  Neurological: She is alert  Psychiatric: She has a normal mood and affect

## 2020-07-16 NOTE — TELEPHONE ENCOUNTER
Back pain for couple of months  Would like a MRI ordered  Please advise        Caller: Ben Rios  [facility]#:[phone]

## 2020-07-18 ENCOUNTER — HOSPITAL ENCOUNTER (OUTPATIENT)
Dept: RADIOLOGY | Facility: HOSPITAL | Age: 85
Discharge: HOME | End: 2020-07-18
Payer: COMMERCIAL

## 2020-07-18 ENCOUNTER — APPOINTMENT (OUTPATIENT)
Dept: LAB | Facility: HOSPITAL | Age: 85
End: 2020-07-18
Payer: COMMERCIAL

## 2020-07-18 DIAGNOSIS — Z86.73 HISTORY OF CVA (CEREBROVASCULAR ACCIDENT): ICD-10-CM

## 2020-07-18 DIAGNOSIS — I10 ESSENTIAL HYPERTENSION: ICD-10-CM

## 2020-07-18 DIAGNOSIS — M54.40 CHRONIC BILATERAL LOW BACK PAIN WITH SCIATICA, SCIATICA LATERALITY UNSPECIFIED: ICD-10-CM

## 2020-07-18 DIAGNOSIS — G89.29 CHRONIC BILATERAL LOW BACK PAIN WITH SCIATICA, SCIATICA LATERALITY UNSPECIFIED: ICD-10-CM

## 2020-07-18 DIAGNOSIS — E78.5 HYPERLIPIDEMIA, UNSPECIFIED HYPERLIPIDEMIA TYPE: ICD-10-CM

## 2020-07-18 DIAGNOSIS — E11.65 TYPE 2 DIABETES MELLITUS WITH HYPERGLYCEMIA, WITHOUT LONG-TERM CURRENT USE OF INSULIN (HCC): ICD-10-CM

## 2020-07-18 LAB
ALBUMIN SERPL BCP-MCNC: 3.5 G/DL (ref 3.5–5)
ALP SERPL-CCNC: 66 U/L (ref 46–116)
ALT SERPL W P-5'-P-CCNC: 81 U/L (ref 12–78)
ANION GAP SERPL CALCULATED.3IONS-SCNC: 11 MMOL/L (ref 4–13)
AST SERPL W P-5'-P-CCNC: 46 U/L (ref 5–45)
BASOPHILS # BLD AUTO: 0.07 THOUSANDS/ΜL (ref 0–0.1)
BASOPHILS NFR BLD AUTO: 1 % (ref 0–1)
BILIRUB SERPL-MCNC: 0.45 MG/DL (ref 0.2–1)
BUN SERPL-MCNC: 20 MG/DL (ref 5–25)
CALCIUM SERPL-MCNC: 9.4 MG/DL (ref 8.3–10.1)
CHLORIDE SERPL-SCNC: 101 MMOL/L (ref 100–108)
CHOLEST SERPL-MCNC: 116 MG/DL (ref 50–200)
CO2 SERPL-SCNC: 24 MMOL/L (ref 21–32)
CREAT SERPL-MCNC: 1.14 MG/DL (ref 0.6–1.3)
EOSINOPHIL # BLD AUTO: 0.06 THOUSAND/ΜL (ref 0–0.61)
EOSINOPHIL NFR BLD AUTO: 1 % (ref 0–6)
ERYTHROCYTE [DISTWIDTH] IN BLOOD BY AUTOMATED COUNT: 17.2 % (ref 11.6–15.1)
EST. AVERAGE GLUCOSE BLD GHB EST-MCNC: 180 MG/DL
GFR SERPL CREATININE-BSD FRML MDRD: 44 ML/MIN/1.73SQ M
GLUCOSE P FAST SERPL-MCNC: 210 MG/DL (ref 65–99)
HBA1C MFR BLD: 7.9 %
HCT VFR BLD AUTO: 35.7 % (ref 34.8–46.1)
HDLC SERPL-MCNC: 36 MG/DL
HGB BLD-MCNC: 10.7 G/DL (ref 11.5–15.4)
IMM GRANULOCYTES # BLD AUTO: 0.04 THOUSAND/UL (ref 0–0.2)
IMM GRANULOCYTES NFR BLD AUTO: 1 % (ref 0–2)
LDLC SERPL CALC-MCNC: 50 MG/DL (ref 0–100)
LYMPHOCYTES # BLD AUTO: 1.86 THOUSANDS/ΜL (ref 0.6–4.47)
LYMPHOCYTES NFR BLD AUTO: 22 % (ref 14–44)
MCH RBC QN AUTO: 25 PG (ref 26.8–34.3)
MCHC RBC AUTO-ENTMCNC: 30 G/DL (ref 31.4–37.4)
MCV RBC AUTO: 83 FL (ref 82–98)
MONOCYTES # BLD AUTO: 0.61 THOUSAND/ΜL (ref 0.17–1.22)
MONOCYTES NFR BLD AUTO: 7 % (ref 4–12)
NEUTROPHILS # BLD AUTO: 5.78 THOUSANDS/ΜL (ref 1.85–7.62)
NEUTS SEG NFR BLD AUTO: 68 % (ref 43–75)
NRBC BLD AUTO-RTO: 0 /100 WBCS
PLATELET # BLD AUTO: 301 THOUSANDS/UL (ref 149–390)
PMV BLD AUTO: 11.1 FL (ref 8.9–12.7)
POTASSIUM SERPL-SCNC: 4.8 MMOL/L (ref 3.5–5.3)
PROT SERPL-MCNC: 7.4 G/DL (ref 6.4–8.2)
RBC # BLD AUTO: 4.28 MILLION/UL (ref 3.81–5.12)
SODIUM SERPL-SCNC: 136 MMOL/L (ref 136–145)
T4 FREE SERPL-MCNC: 1.45 NG/DL (ref 0.76–1.46)
TRIGL SERPL-MCNC: 150 MG/DL
TSH SERPL DL<=0.05 MIU/L-ACNC: 7.53 UIU/ML (ref 0.36–3.74)
WBC # BLD AUTO: 8.42 THOUSAND/UL (ref 4.31–10.16)

## 2020-07-18 PROCEDURE — 84439 ASSAY OF FREE THYROXINE: CPT

## 2020-07-18 PROCEDURE — 83036 HEMOGLOBIN GLYCOSYLATED A1C: CPT

## 2020-07-18 PROCEDURE — 84443 ASSAY THYROID STIM HORMONE: CPT

## 2020-07-18 PROCEDURE — 72110 X-RAY EXAM L-2 SPINE 4/>VWS: CPT

## 2020-07-18 PROCEDURE — 80053 COMPREHEN METABOLIC PANEL: CPT

## 2020-07-18 PROCEDURE — 80061 LIPID PANEL: CPT

## 2020-07-18 PROCEDURE — 85025 COMPLETE CBC W/AUTO DIFF WBC: CPT

## 2020-07-18 PROCEDURE — 36415 COLL VENOUS BLD VENIPUNCTURE: CPT

## 2020-07-22 ENCOUNTER — TELEPHONE (OUTPATIENT)
Dept: FAMILY MEDICINE CLINIC | Facility: CLINIC | Age: 85
End: 2020-07-22

## 2020-07-22 NOTE — TELEPHONE ENCOUNTER
The patient's daughter would like the results for her mother's lab work and xray  Please advise  Thank  You

## 2020-07-22 NOTE — TELEPHONE ENCOUNTER
The xray results are not complete yet, but the labs were completed on 7/18/2020  Please review once you have the results

## 2020-07-22 NOTE — TELEPHONE ENCOUNTER
Labs are ok except liver enzymes are slightly elevated, HgA1C 7 9 a little better than before  Will DW pt on 7/30/2020 OV

## 2020-07-30 ENCOUNTER — TELEMEDICINE (OUTPATIENT)
Dept: FAMILY MEDICINE CLINIC | Facility: CLINIC | Age: 85
End: 2020-07-30
Payer: COMMERCIAL

## 2020-07-30 VITALS — DIASTOLIC BLOOD PRESSURE: 75 MMHG | SYSTOLIC BLOOD PRESSURE: 135 MMHG

## 2020-07-30 DIAGNOSIS — M54.40 CHRONIC BILATERAL LOW BACK PAIN WITH SCIATICA, SCIATICA LATERALITY UNSPECIFIED: ICD-10-CM

## 2020-07-30 DIAGNOSIS — I48.0 PAROXYSMAL ATRIAL FIBRILLATION (HCC): ICD-10-CM

## 2020-07-30 DIAGNOSIS — G89.29 CHRONIC BILATERAL LOW BACK PAIN WITH SCIATICA, SCIATICA LATERALITY UNSPECIFIED: ICD-10-CM

## 2020-07-30 DIAGNOSIS — R74.8 ELEVATED LIVER ENZYMES: ICD-10-CM

## 2020-07-30 DIAGNOSIS — I10 ESSENTIAL HYPERTENSION: Chronic | ICD-10-CM

## 2020-07-30 DIAGNOSIS — E11.65 TYPE 2 DIABETES MELLITUS WITH HYPERGLYCEMIA, WITHOUT LONG-TERM CURRENT USE OF INSULIN (HCC): Primary | ICD-10-CM

## 2020-07-30 DIAGNOSIS — I48.91 ATRIAL FIBRILLATION, UNSPECIFIED TYPE (HCC): ICD-10-CM

## 2020-07-30 DIAGNOSIS — E78.5 HYPERLIPIDEMIA, UNSPECIFIED HYPERLIPIDEMIA TYPE: Chronic | ICD-10-CM

## 2020-07-30 PROCEDURE — 1036F TOBACCO NON-USER: CPT | Performed by: FAMILY MEDICINE

## 2020-07-30 PROCEDURE — 3078F DIAST BP <80 MM HG: CPT | Performed by: FAMILY MEDICINE

## 2020-07-30 PROCEDURE — 1160F RVW MEDS BY RX/DR IN RCRD: CPT | Performed by: FAMILY MEDICINE

## 2020-07-30 PROCEDURE — 3075F SYST BP GE 130 - 139MM HG: CPT | Performed by: FAMILY MEDICINE

## 2020-07-30 PROCEDURE — 3288F FALL RISK ASSESSMENT DOCD: CPT | Performed by: FAMILY MEDICINE

## 2020-07-30 PROCEDURE — 1101F PT FALLS ASSESS-DOCD LE1/YR: CPT | Performed by: FAMILY MEDICINE

## 2020-07-30 PROCEDURE — 99214 OFFICE O/P EST MOD 30 MIN: CPT | Performed by: FAMILY MEDICINE

## 2020-07-30 PROCEDURE — 3051F HG A1C>EQUAL 7.0%<8.0%: CPT | Performed by: FAMILY MEDICINE

## 2020-07-30 RX ORDER — NIFEDIPINE 30 MG/1
30 TABLET, EXTENDED RELEASE ORAL DAILY
Qty: 90 TABLET | Refills: 3 | Status: SHIPPED | OUTPATIENT
Start: 2020-07-30

## 2020-07-30 NOTE — PROGRESS NOTES
Virtual Regular Visit      Assessment/Plan:    Problem List Items Addressed This Visit        Endocrine    Type 2 diabetes mellitus with hyperglycemia, without long-term current use of insulin ([facility] [address] ) - Primary       Lab Results   Component Value Date    HGBA1C 7 9 (H) 07/18/2020     Stable  Low carb diet  Continue metformin 500mg bid and tradjenta 5mg QD  Relevant Orders    CBC and differential    Comprehensive metabolic panel    Hemoglobin A1C With EAG    Microalbumin / creatinine urine ratio    TSH, 3rd generation with Free T4 reflex       Cardiovascular and Mediastinum    Hypertension (Chronic)     Controlled  DASH diet  Continue hydralazine, nifedipine and losartan  Relevant Medications    NIFEdipine (PROCARDIA XL) 30 mg 24 hr tablet    Other Relevant Orders    CBC and differential    Comprehensive metabolic panel    Hemoglobin A1C With EAG    Microalbumin / creatinine urine ratio    TSH, 3rd generation with Free T4 reflex    Paroxysmal atrial fibrillation ([facility] [address] )     FU cardiology  Continue pradaxa  Relevant Medications    NIFEdipine (PROCARDIA XL) 30 mg 24 hr tablet       Nervous and Auditory    Chronic bilateral low back pain with sciatica     FU pain specialist as scheduled  Other    Hyperlipidemia (Chronic)     7/2020 Lipid 116/150/36/50 stable  Continue crestor 20mg qhs  Relevant Orders    CBC and differential    Comprehensive metabolic panel    Hemoglobin A1C With EAG    Microalbumin / creatinine urine ratio    TSH, 3rd generation with Free T4 reflex    Elevated liver enzymes     7/2020 AST 46, ALT 81 slightly elevated  Avoid tylenol, alcohol liver toxic agents  Will monitor                Other Visit Diagnoses     Atrial fibrillation, unspecified type ([facility] [address] )        Relevant Medications    NIFEdipine (PROCARDIA XL) 30 mg 24 hr tablet        Reviewed lab in 7/2020  HgA1C 7 9  TSH 7 532, free T4 1 45 normal  CMP showed AST 46, ALT 81 slightly elevated   CBC showed Hg 10 7 low   Lipid 116/150/36/50 stable    Refused flu shot  Refused PCV13 and pneumovax  RTO in 4 months                Reason for visit is   Chief Complaint   Patient presents with    Follow-up     4 months and review labs   Virtual Regular Visit        Health Maintenance   Topic Date Due    DTaP,Tdap,and Td Vaccines (1 - Tdap) 03/23/1946    Pneumococcal Vaccine: 65+ Years (1 of 2 - PCV13) 03/23/2000    PT PLAN OF CARE  12/13/2018    DM Eye Exam  03/12/2020    Influenza Vaccine  07/01/2020    Diabetic Foot Exam  07/29/2020    HEMOGLOBIN A1C  10/18/2020    BMI: Followup Plan  11/25/2020    Medicare Annual Wellness Visit (AWV)  11/25/2020    BMI: Adult  07/16/2021    Fall Risk  07/30/2021    DXA SCAN  05/02/2022    Pneumococcal Vaccine: Pediatrics (0 to 5 Years) and At-Risk Patients (6 to 59 Years)  Aged Out    HIB Vaccine  Aged Out    Hepatitis B Vaccine  Aged Out    IPV Vaccine  Aged Out    Hepatitis A Vaccine  Aged Out    Meningococcal ACWY Vaccine  Aged Out    HPV Vaccine  Aged Out       Encounter provider Abdifatah Martinez MD    Provider located at [address] [place] [id number]      Recent Visits  No visits were found meeting these conditions  Showing recent visits within past 7 days and meeting all other requirements     Today's Visits  Date Type Provider Dept   07/30/20 [facility] [address], [address] today's visits and meeting all other requirements     Future Appointments  No visits were found meeting these conditions  Showing future appointments within next 150 days and meeting all other requirements      It was my intent to perform this visit via video technology but the patient was not able to do a video connection so the visit was completed via audio telephone only  The patient was identified by name and date of birth   Dangelo Corrales was informed that this is a telemedicine visit and that the visit is being conducted through telephone  My office door was closed  No one else was in the room  She acknowledged consent and understanding of privacy and security of the video platform  The patient has agreed to participate and understands they can discontinue the visit at any time  Patient is aware this is a billable service  Subjective  Bessy Conner is a 80 y o  female for 4 months check up   HPI     Lower back pain---Will see pain specialist tomorrow  Pt is on tylenol daily for pain  DM---7/2020 HgA1C 7 9 stable  She is on metformin 500mg QD and tradjenta 5mg QD  Denies side effects  Occasionally hands numbness or tingling  FU opthalmology Dr Jeanne Gowers and Dr Harman Lewis regularly Q 3 months  Had mild retinopathy  [facility] Q 6 month       HTN---Stopped lisinopril because it caused her coughing  Stopped metoprolol because it gave her severe headache    Saw cardiology  She is on hydralazine to 75mg tid and nifedipine 30mg QD and losartan 50mg Qd  BP at home today 135/75 per son  Afib---she is on amiodarone and pradaxa per cardiology  CVA---Has off balance sensation  Saw neurology before  She is on pradaxa 150mg bid    Had vaginal bleeding  [facility] OBGYN  No problem per daughter before       Hyperlipidemia---She is on crestor per cardiology       Lives with family  Denies recent falls  Used walker at home  Mood is ok  Denies depression         Past Medical History:   Diagnosis Date    Ambulatory dysfunction     CHF (congestive heart failure) ([facility])     pEFHF    Cholelithiasis     Coronary artery disease     Depression with anxiety     Diabetes mellitus ([facility] [address] )     niddm    Glaucoma     Hypertension     Hyponatremia     Injury of finger of left hand 7/7/2019    Ischemic stroke of frontal lobe ([facility])     Right     Obstructive sleep apnea     PAF (paroxysmal atrial fibrillation) ([facility])     Vertigo        Past Surgical History:   Procedure Laterality Date    APPENDECTOMY      GLAUCOMA SURGERY  01/03/2016    HI REPLACE AORTIC VALVE OPENFEMORAL ARTERY APPROACH N/A 4/17/2018    Procedure: REPLACEMENT AORTIC VALVE TRANSCATHETER (TAVR) TRANSFEMORAL W/ 23 MM AGUILAR STEVE S3 VALVE;  Surgeon: Lynne Patrick DO;  Location: [facility];  Service: Cardiac Surgery    TUBAL LIGATION      UPPER GASTROINTESTINAL ENDOSCOPY  10/28/2019       Current Outpatient Medications   Medication Sig Dispense Refill    Acetaminophen (TYLENOL) 325 MG CAPS Take 1 capsule by mouth as needed       acetaminophen (TYLENOL) 650 mg CR tablet Take 650 mg by mouth 2 (two) times a day as needed for mild pain      albuterol (PROAIR HFA) 90 mcg/act inhaler Inhale 2 puffs every 6 (six) hours as needed for wheezing 8 5 g 1    amiodarone 200 mg tablet Take 1 tablet (200 mg total) by mouth daily 90 tablet 2    Bimatoprost (LUMIGAN OP) Administer 1 drop to both eyes daily at bedtime Both eyes      Blood Pressure Monitoring (BLOOD PRESSURE CUFF) MISC by Does not apply route 2 (two) times a day 1 each 0    Cholecalciferol (VITAMIN D3) 1000 units CAPS Take 5,000 Units by mouth daily       cimetidine (TAGAMET) 400 mg tablet Take 1 tablet (400 mg total) by mouth 2 (two) times a day 60 tablet 1    Cyanocobalamin (VITAMIN B 12 PO) Take 1,000 mcg by mouth daily       hydrALAZINE (APRESOLINE) 25 mg tablet take 3 tablets by mouth three times a day (Patient taking differently: 2 (two) times a day ) 270 tablet 1    linaGLIPtin (Tradjenta) 5 MG TABS Take 5 mg by mouth daily 90 tablet 3    losartan (COZAAR) 50 mg tablet Take 50 mg by mouth daily      metFORMIN (GLUCOPHAGE) 500 mg tablet take 1 tablet by mouth twice a day with food 180 tablet 3    Misc   Devices (STETHOSCOPE) MISC by Does not apply route 2 (two) times a day (Patient not taking: Reported on 2/24/2020) 1 each 0    Netarsudil Dimesylate (RHOPRESSA) 0 02 % SOLN Apply to eye Left eye      NIFEdipine (PROCARDIA XL) 30 mg 24 hr tablet Take 1 tablet (30 mg total) by mouth daily 90 tablet 3    ondansetron (ZOFRAN) 4 mg tablet Take 1 tablet (4 mg total) by mouth every 8 (eight) hours as needed for nausea or vomiting 30 tablet 0    ONE TOUCH ULTRA TEST test strip TEST twice a day 200 each 3    OneTouch Delica Lancets 39S MISC TEST TWICE DAILY 200 each 3    potassium chloride (K-DUR,KLOR-CON) 10 mEq tablet take 1 tablet by mouth once daily 30 tablet 5    PRADAXA 150 MG capsu TAKE 1 CAPSULE BY MOUTH EVERY 12 HOURS FOR 90 DAYS 180 capsule 1    RESTASIS 0 05 % ophthalmic emulsion Administer 1 drop to both eyes daily   0    rosuvastatin (CRESTOR) 20 MG tablet Take 1 tablet (20 mg total) by mouth daily at bedtime 90 tablet 3    timolol (TIMOPTIC) 0 5 % ophthalmic solution Administer 1 drop to both eyes daily   0     No current facility-administered medications for this visit  Allergies   Allergen Reactions    Lipitor [Atorvastatin] GI Intolerance     Nausea stomach ache    Aspirin GI Intolerance    Hydrochlorothiazide GI Intolerance    Lisinopril Cough    Metoprolol Headache       Review of Systems   Constitutional: Negative for appetite change, chills and fever  HENT: Negative for congestion, ear pain, sinus pain and sore throat  Eyes: Negative for discharge and itching  Respiratory: Negative for apnea, cough, chest tightness, shortness of breath and wheezing  Cardiovascular: Negative for chest pain, palpitations and leg swelling  Gastrointestinal: Negative for abdominal pain, anal bleeding, constipation, diarrhea, nausea and vomiting  Endocrine: Negative for cold intolerance, heat intolerance and polyuria  Genitourinary: Negative for difficulty urinating and dysuria  Musculoskeletal: Positive for back pain  Negative for arthralgias and myalgias  Skin: Negative for rash  Neurological: Negative for dizziness and headaches     Psychiatric/Behavioral: Negative for agitation  Video Exam    Vitals:    07/30/20 1330   BP: 135/75       Physical Exam     I spent 20 minutes directly with the patient during this visit      [facility] acknowledges that she has consented to an online visit or consultation  She understands that the online visit is based solely on information provided by her, and that, in the absence of a face-to-face physical evaluation by the physician, the diagnosis she receives is both limited and provisional in terms of accuracy and completeness  This is not intended to replace a full medical face-to-face evaluation by the physician  Louie Gibson understands and accepts these terms

## 2020-07-30 NOTE — ASSESSMENT & PLAN NOTE
Lab Results   Component Value Date    HGBA1C 7 9 (H) 07/18/2020     Stable  Low carb diet  Continue metformin 500mg bid and tradjenta 5mg QD

## 2020-07-31 ENCOUNTER — CONSULT (OUTPATIENT)
Dept: PAIN MEDICINE | Facility: MEDICAL CENTER | Age: 85
End: 2020-07-31
Payer: COMMERCIAL

## 2020-07-31 VITALS
BODY MASS INDEX: 33.44 KG/M2 | RESPIRATION RATE: 18 BRPM | WEIGHT: 155 LBS | SYSTOLIC BLOOD PRESSURE: 122 MMHG | HEIGHT: 57 IN | DIASTOLIC BLOOD PRESSURE: 73 MMHG | HEART RATE: 64 BPM | TEMPERATURE: 97.6 F

## 2020-07-31 DIAGNOSIS — M54.40 CHRONIC BILATERAL LOW BACK PAIN WITH SCIATICA, SCIATICA LATERALITY UNSPECIFIED: ICD-10-CM

## 2020-07-31 DIAGNOSIS — G89.29 CHRONIC BILATERAL LOW BACK PAIN WITH SCIATICA, SCIATICA LATERALITY UNSPECIFIED: ICD-10-CM

## 2020-07-31 PROCEDURE — 3051F HG A1C>EQUAL 7.0%<8.0%: CPT | Performed by: PHYSICAL MEDICINE & REHABILITATION

## 2020-07-31 PROCEDURE — 1160F RVW MEDS BY RX/DR IN RCRD: CPT | Performed by: PHYSICAL MEDICINE & REHABILITATION

## 2020-07-31 PROCEDURE — 3078F DIAST BP <80 MM HG: CPT | Performed by: PHYSICAL MEDICINE & REHABILITATION

## 2020-07-31 PROCEDURE — 3074F SYST BP LT 130 MM HG: CPT | Performed by: PHYSICAL MEDICINE & REHABILITATION

## 2020-07-31 PROCEDURE — 1036F TOBACCO NON-USER: CPT | Performed by: PHYSICAL MEDICINE & REHABILITATION

## 2020-07-31 PROCEDURE — 3008F BODY MASS INDEX DOCD: CPT | Performed by: PHYSICAL MEDICINE & REHABILITATION

## 2020-07-31 PROCEDURE — 99204 OFFICE O/P NEW MOD 45 MIN: CPT | Performed by: PHYSICAL MEDICINE & REHABILITATION

## 2020-07-31 NOTE — PATIENT INSTRUCTIONS
Sacroiliitis   WHAT YOU NEED TO KNOW:   Sacroiliitis is a painful swelling of one or both of your sacroiliac joints that lasts at least 3 months  The sacroiliac joint connects your pelvis to the base of your spine  DISCHARGE INSTRUCTIONS:   Medicines:  Ask for more information about these and other medicines you may need to treat sacroiliitis:  · Pain medicine: You may be given medicine to take away or decrease pain  Do not wait until the pain is severe before you take your medicine  You may be given the medicine as a pill to swallow or as a lotion that you put on the painful area  · NSAIDs  help decrease swelling and pain or fever  This medicine is available with or without a doctor's order  NSAIDs can cause stomach bleeding or kidney problems in certain people  If you take blood thinner medicine, always ask your healthcare provider if NSAIDs are safe for you  Always read the medicine label and follow directions  · Muscle relaxers  help decrease pain and muscle spasms  · Take your medicine as directed  Contact your healthcare provider if you think your medicine is not helping or if you have side effects  Tell him of her if you are allergic to any medicine  Keep a list of the medicines, vitamins, and herbs you take  Include the amounts, and when and why you take them  Bring the list or the pill bottles to follow-up visits  Carry your medicine list with you in case of an emergency  Physical therapy:  Your healthcare provider may suggest physical therapy  Your physical therapist may teach you exercises to improve posture (the way you stand and sit), flexibility, and strength in your lower back  He may also teach you how to remain safely active and avoid further injury  Follow the exercise plan given to you by your physical therapist   Rest:  Follow your healthcare provider's instructions about how much rest you should get  Avoid activity that worsens your pain    Ice or heat packs:  Use ice or heat packs on the sore area of your body to decrease the pain and swelling  Put ice in a plastic bag covered with a towel on your low back  Cover heated items with a towel to avoid burns  Use ice and heat as directed  Follow up with your healthcare provider or spine specialist within 1 to 2 weeks:  Write down your questions so you remember to ask them during your visits  Contact your healthcare provider or spine specialist if:   · Your pain makes it hard for you to do your daily activities, such as work or school  · Your pain does not go away after treatment  · You feel depressed or anxious  · You have questions about your condition or care  Return to the emergency department if:   · You have a fever  · Your pain is worse than before  · Your pain prevents you from sleeping  © 2017 [facility] Information is for End User's use only and may not be sold, redistributed or otherwise used for commercial purposes  All illustrations and images included in CareNotes® are the copyrighted property of [employer]  or [employer]  The above information is an  only  It is not intended as medical advice for individual conditions or treatments  Talk to your doctor, nurse or pharmacist before following any medical regimen to see if it is safe and effective for you

## 2020-07-31 NOTE — PROGRESS NOTES
Assessment  1  Chronic bilateral low back pain with sciatica, sciatica laterality unspecified        Plan  Ms Slava Guerrero is a pleasant 71-year-old female who presents for initial evaluation regarding 3 years duration of mid to low back and buttock pain bilaterally  During today's evaluation she is demonstrating clinical evidence of bilateral sacroiliitis that she has been managing conservatively with relative rest, over-the-counter NSAIDs and stretching with minimal relief in her pain  At this time I believe a diagnostic and therapeutic bilateral SI joint injection would be advisable, however, patient and her son-in-law who is present during the exam wishes to talk over the injection option with family prior to making a decision  For now I have provided the patient with home exercises as well as information regarding the SI joint injections  She will discuss with family and call us back with her decision  My impressions and treatment recommendations were discussed in detail with the patient who verbalized understanding and had no further questions  Discharge instructions were provided  I personally saw and examined the patient and I agree with the above discussed plan of care  No orders of the defined types were placed in this encounter  No orders of the defined types were placed in this encounter  History of Present Illness    Phani Laughlin is a 80 y o  female presents to [place] [address] [facility] for initial evaluation regarding 3 years duration of mid upper back and low back pain  Patient denies any significant inciting event or recent trauma  Today reports severe pain rated 8/10 and interfering with daily activities  Pain is constant 100% of the time that is present throughout the day morning, afternoon and evening  Describes the pain as dull aching  Also complains of lower extremity weakness but denies falls  She uses a walker for ambulation    Pain is worse with bending, lifting and improves with relative rest   Currently taking over-the-counter Tylenol which is providing minimal to no significant improvement in her pain  Presents today for initial evaluation  We do have an x-ray of the lumbar spine but do not have a MRI  I have personally reviewed and/or updated the patient's past medical history, past surgical history, family history, social history, current medications, allergies, and vital signs today  Review of Systems   Constitutional: Negative for fever and unexpected weight change  HENT: Positive for hearing loss  Negative for trouble swallowing  Eyes: Positive for redness  Negative for visual disturbance  Respiratory: Positive for cough and wheezing  Negative for shortness of breath  Cardiovascular: Negative for chest pain and palpitations  Gastrointestinal: Negative for constipation, diarrhea, nausea and vomiting  Endocrine: Negative for cold intolerance, heat intolerance and polydipsia  Genitourinary: Negative for difficulty urinating and frequency  Musculoskeletal: Positive for back pain, joint swelling and myalgias  Negative for arthralgias and gait problem  Skin: Negative for rash  Neurological: Positive for dizziness and headaches  Negative for seizures, syncope and weakness  Hematological: Does not bruise/bleed easily  Psychiatric/Behavioral: Positive for decreased concentration, dysphoric mood and sleep disturbance  The patient is nervous/anxious  All other systems reviewed and are negative        Patient Active Problem List   Diagnosis    Type 2 diabetes mellitus with hyperglycemia, without long-term current use of insulin ([facility] )    Glaucoma    Hyperlipidemia    Hypertension    Hyponatremia    Depression with anxiety    Paroxysmal atrial fibrillation (HCC)    Ataxia    CVA (cerebral vascular accident) ([facility] )    Hearing loss    Insomnia    Left knee pain    PERI (obstructive sleep apnea)    Osteoarthritis    Osteoporosis    Periodic limb movement sleep disorder    Sleep talking    Tinnitus    Tricuspid valve disorders, non-rheumatic    Unsteady gait    Chronic diastolic heart failure (HCC)    S/P TAVR (transcatheter aortic valve replacement)    Severe dizziness    Thickened endometrium    Obesity (BMI 30-39  9)    History of CVA (cerebrovascular accident)    Headache    Esophageal thickening    Dysphagia    Stroke (cerebrum) (HCC)    Chronic bilateral low back pain with sciatica    Elevated liver enzymes       Past Medical History:   Diagnosis Date    Ambulatory dysfunction     CHF (congestive heart failure) (HCC)     pEFHF    Cholelithiasis     Chronic pain disorder     Coronary artery disease     Depression with anxiety     Diabetes mellitus ([facility] [facility] )     niddm    Glaucoma     Hypertension     Hyponatremia     Injury of finger of left hand 7/7/2019    Ischemic stroke of frontal lobe (HCC)     Right     Low back pain     Obstructive sleep apnea     PAF [facility]oxysmal atrial fibrillation) [facility])     Vertigo        Past Surgical History:   Procedure Laterality Date    APPENDECTOMY      GLAUCOMA SURGERY  01/03/2016    ME REPLACE AORTIC VALVE OPENFEMORAL ARTERY APPROACH N/A 4/17/2018    Procedure: REPLACEMENT AORTIC VALVE TRANSCATHETER (TAVR) TRANSFEMORAL W/ 23 MM AGUILAR STEVE S3 VALVE;  Surgeon: Vandana Simon DO;  [facility];  Service: Cardiac Surgery    TUBAL LIGATION      UPPER GASTROINTESTINAL ENDOSCOPY  10/28/2019       Family History   Problem Relation Age of Onset    No Known Problems Mother     Diabetes Father     Stroke Father     Coronary artery disease Sister     No Known Problems Brother     No Known Problems Son     Breast cancer Daughter     No Known Problems Maternal Grandmother     No Known Problems Maternal Grandfather     No Known Problems Paternal Grandmother     No Known Problems Paternal Grandfather     No Known Problems Cousin     Rheum arthritis Neg Hx     Osteoarthritis Neg Hx     Asthma Neg Hx     Heart failure Neg Hx     Hyperlipidemia Neg Hx     Hypertension Neg Hx     Migraines Neg Hx     Rashes / Skin problems Neg Hx     Seizures Neg Hx     Thyroid disease Neg Hx        Social History     Occupational History    Not on file   Tobacco Use    Smoking status: Never Smoker    Smokeless tobacco: Never Used   Substance and Sexual Activity    Alcohol use: Never     Frequency: Never     Binge frequency: Never    Drug use: Never    Sexual activity: Not Currently       Current Outpatient Medications on File Prior to Visit   Medication Sig    Acetaminophen (TYLENOL) 325 MG CAPS Take 1 capsule by mouth as needed     acetaminophen (TYLENOL) 650 mg CR tablet Take 650 mg by mouth 2 (two) times a day as needed for mild pain    albuterol (PROAIR HFA) 90 mcg/act inhaler Inhale 2 puffs every 6 (six) hours as needed for wheezing    amiodarone 200 mg tablet Take 1 tablet (200 mg total) by mouth daily    Bimatoprost (LUMIGAN OP) Administer 1 drop to both eyes daily at bedtime Both eyes    Cholecalciferol (VITAMIN D3) 1000 units CAPS Take 5,000 Units by mouth daily     Cyanocobalamin (VITAMIN B 12 PO) Take 1,000 mcg by mouth daily     hydrALAZINE (APRESOLINE) 25 mg tablet take 3 tablets by mouth three times a day (Patient taking differently: 2 (two) times a day )    linaGLIPtin (Tradjenta) 5 MG TABS Take 5 mg by mouth daily    losartan (COZAAR) 50 mg tablet Take 50 mg by mouth daily    metFORMIN (GLUCOPHAGE) 500 mg tablet take 1 tablet by mouth twice a day with food    Netarsudil Dimesylate (RHOPRESSA) 0 02 % SOLN Apply to eye Left eye    NIFEdipine (PROCARDIA XL) 30 mg 24 hr tablet Take 1 tablet (30 mg total) by mouth daily    potassium chloride (K-DUR,KLOR-CON) 10 mEq tablet take 1 tablet by mouth once daily    PRADAXA 150 MG capsu TAKE 1 CAPSULE BY MOUTH EVERY 12 HOURS FOR 90 DAYS    RESTASIS 0 05 % ophthalmic emulsion Administer 1 drop to both eyes daily     rosuvastatin (CRESTOR) 20 MG tablet Take 1 tablet (20 mg total) by mouth daily at bedtime    timolol (TIMOPTIC) 0 5 % ophthalmic solution Administer 1 drop to both eyes daily     Blood Pressure Monitoring (BLOOD PRESSURE CUFF) MISC by Does not apply route 2 (two) times a day    cimetidine (TAGAMET) 400 mg tablet Take 1 tablet (400 mg total) by mouth 2 (two) times a day (Patient not taking: Reported on 7/31/2020)    Misc  Devices (STETHOSCOPE) MISC by Does not apply route 2 (two) times a day (Patient not taking: Reported on 2/24/2020)    ondansetron (ZOFRAN) 4 mg tablet Take 1 tablet (4 mg total) by mouth every 8 (eight) hours as needed for nausea or vomiting (Patient not taking: Reported on 7/31/2020)    ONE TOUCH ULTRA TEST test strip TEST twice a day    OneTouch Delica Lancets 57P MISC TEST TWICE DAILY     No current facility-administered medications on file prior to visit  Allergies   Allergen Reactions    Lipitor [Atorvastatin] GI Intolerance     Nausea stomach ache    Aspirin GI Intolerance    Hydrochlorothiazide GI Intolerance    Lisinopril Cough    Metoprolol Headache       Physical Exam    /73   Pulse 64   Temp 97 6 °F (36 4 °C)   Resp 18   Ht 4' 9" (1 448 m)   Wt 70 3 kg (155 lb)   BMI 33 54 kg/m²   General: Well-developed, well-nourished individual in no acute distress  Mental: Appropriate mood and affect  Grossly oriented with coherent speech and thought processing  Neuro:  Cranial nerves: Cranial nerve function is grossly intact bilaterally  Strength: Bilateral lower extremity strength is normal and symmetric except for bilateral hip flexors 4+ out of 5 limited  No atrophy or tone abnormalities noted by pain in the back  Reflexes: Bilateral lower extremity muscle stretch reflexes are physiologic and symmetric  No ankle clonus is noted  Sensation: No loss of sensation is noted    SLR/Foraminal Compression Maneuvers: Straight leg raising in the   sitting position is negative  for radicular pain  POSITIVE Franco finger bilaterally  POSITIVE Mic's test bilaterally  POSITIVE sacral compression testing bilaterally  Gait:  Gait/gross motor: Gait is antalgic and requires Rollator  Musculoskeletal:  Palpation: Inspection and palpation of the spine and extremities are remarkable for tenderness to palpation bilateral lumbar paraspinals and distal to PSIS bilaterally  Spine:  Decreased active and passive range of motion with lumbar flexion extension limited by pain  No gross axial skeletal deformities  Skin: Skin inspection grossly negative for erythema, breakdown, or concerning lesions in affected area  Lymph: No lymphadenopathy is appreciated in the involved extremity  Vessels: No lower extremity edema  Lungs: Breathing is comfortable and regular  No dyspnea noted during examination  Eyes: Visual field grossly intact to confrontation  No redness appreciated  ENT: No craniofacial deformities or asymmetry  No neck masses appreciated  Imaging  Study Result     LUMBAR SPINE     INDICATION:   M54 40: Lumbago with sciatica, unspecified side  G89 29: Other chronic pain      COMPARISON:  03/01/2018     VIEWS:  XR SPINE LUMBAR MINIMUM 4 VIEWS NON INJURY  Images: 5     FINDINGS:     There are 5 non rib bearing lumbar vertebral bodies       There is no evidence of acute fracture or destructive osseous lesion      Alignment is unremarkable       Degenerative changes throughout the lumbar spine with disc space narrowing and spurring      The pedicles appear intact      Diffuse arterial calcifications are present  There is a 1 2 cm calcified gallstone in the right upper quadrant      IMPRESSION:     Degenerative changes    Calcified gallstone         Workstation performed: [id number]

## 2020-08-08 DIAGNOSIS — I48.19 PERSISTENT ATRIAL FIBRILLATION (HCC): ICD-10-CM

## 2020-08-08 NOTE — TELEPHONE ENCOUNTER
Timur a call requesting a refill on My amiodarone 200 mg one tablet daily  To be sent in to the [address]

## 2020-08-10 ENCOUNTER — TELEPHONE (OUTPATIENT)
Dept: CARDIOLOGY CLINIC | Facility: CLINIC | Age: 85
End: 2020-08-10

## 2020-08-10 RX ORDER — AMIODARONE HYDROCHLORIDE 200 MG/1
200 TABLET ORAL DAILY
Qty: 90 TABLET | Refills: 2 | Status: ON HOLD | OUTPATIENT
Start: 2020-08-10 | End: 2020-11-03 | Stop reason: SDUPTHER

## 2020-08-10 NOTE — TELEPHONE ENCOUNTER
Pt would like refill of Amiodarone 200 mg  I did try to refill this but says pending order already in Epic

## 2020-08-20 ENCOUNTER — HOSPITAL ENCOUNTER (EMERGENCY)
Facility: HOSPITAL | Age: 85
Discharge: HOME | End: 2020-08-20
Attending: EMERGENCY MEDICINE | Admitting: EMERGENCY MEDICINE
Payer: COMMERCIAL

## 2020-08-20 ENCOUNTER — TRANSCRIBE ORDERS (OUTPATIENT)
Dept: LAB | Facility: AMBULARY SURGERY CENTER | Age: 85
End: 2020-08-20

## 2020-08-20 ENCOUNTER — TELEMEDICINE (OUTPATIENT)
Dept: FAMILY MEDICINE CLINIC | Facility: CLINIC | Age: 85
End: 2020-08-20
Payer: COMMERCIAL

## 2020-08-20 ENCOUNTER — APPOINTMENT (OUTPATIENT)
Dept: LAB | Facility: AMBULARY SURGERY CENTER | Age: 85
End: 2020-08-20
Payer: COMMERCIAL

## 2020-08-20 VITALS
OXYGEN SATURATION: 97 % | TEMPERATURE: 98.2 F | SYSTOLIC BLOOD PRESSURE: 129 MMHG | HEART RATE: 86 BPM | RESPIRATION RATE: 20 BRPM | DIASTOLIC BLOOD PRESSURE: 58 MMHG

## 2020-08-20 VITALS — DIASTOLIC BLOOD PRESSURE: 68 MMHG | TEMPERATURE: 97.1 F | SYSTOLIC BLOOD PRESSURE: 132 MMHG

## 2020-08-20 DIAGNOSIS — T16.1XXA FOREIGN BODY IN RIGHT EAR: Primary | ICD-10-CM

## 2020-08-20 DIAGNOSIS — N39.0 LOWER URINARY TRACT INFECTIOUS DISEASE: ICD-10-CM

## 2020-08-20 DIAGNOSIS — N39.0 LOWER URINARY TRACT INFECTIOUS DISEASE: Primary | ICD-10-CM

## 2020-08-20 DIAGNOSIS — R33.9 URINE RETENTION: ICD-10-CM

## 2020-08-20 DIAGNOSIS — Z00.00 NORMAL EXAM: ICD-10-CM

## 2020-08-20 DIAGNOSIS — R11.0 NAUSEA: Primary | ICD-10-CM

## 2020-08-20 LAB
BACTERIA UR QL AUTO: ABNORMAL /HPF
BILIRUB UR QL STRIP: NEGATIVE
CLARITY UR: ABNORMAL
COLOR UR: YELLOW
GLUCOSE UR STRIP-MCNC: ABNORMAL MG/DL
HGB UR QL STRIP.AUTO: ABNORMAL
HYALINE CASTS #/AREA URNS LPF: ABNORMAL /LPF
KETONES UR STRIP-MCNC: NEGATIVE MG/DL
LEUKOCYTE ESTERASE UR QL STRIP: NEGATIVE
NITRITE UR QL STRIP: NEGATIVE
NON-SQ EPI CELLS URNS QL MICRO: ABNORMAL /HPF
PH UR STRIP.AUTO: 6 [PH]
PROT UR STRIP-MCNC: ABNORMAL MG/DL
RBC #/AREA URNS AUTO: ABNORMAL /HPF
SP GR UR STRIP.AUTO: 1.02 (ref 1–1.03)
UROBILINOGEN UR QL STRIP.AUTO: 0.2 E.U./DL
WBC #/AREA URNS AUTO: ABNORMAL /HPF

## 2020-08-20 PROCEDURE — 3075F SYST BP GE 130 - 139MM HG: CPT | Performed by: FAMILY MEDICINE

## 2020-08-20 PROCEDURE — 3051F HG A1C>EQUAL 7.0%<8.0%: CPT | Performed by: FAMILY MEDICINE

## 2020-08-20 PROCEDURE — 99214 OFFICE O/P EST MOD 30 MIN: CPT | Performed by: FAMILY MEDICINE

## 2020-08-20 PROCEDURE — 1160F RVW MEDS BY RX/DR IN RCRD: CPT | Performed by: FAMILY MEDICINE

## 2020-08-20 PROCEDURE — 1036F TOBACCO NON-USER: CPT | Performed by: FAMILY MEDICINE

## 2020-08-20 PROCEDURE — 99282 EMERGENCY DEPT VISIT SF MDM: CPT

## 2020-08-20 PROCEDURE — 99284 EMERGENCY DEPT VISIT MOD MDM: CPT | Performed by: EMERGENCY MEDICINE

## 2020-08-20 PROCEDURE — 3078F DIAST BP <80 MM HG: CPT | Performed by: FAMILY MEDICINE

## 2020-08-20 PROCEDURE — 87086 URINE CULTURE/COLONY COUNT: CPT

## 2020-08-20 PROCEDURE — 81001 URINALYSIS AUTO W/SCOPE: CPT

## 2020-08-20 RX ORDER — ONDANSETRON 4 MG/1
4 TABLET, FILM COATED ORAL EVERY 8 HOURS PRN
Qty: 30 TABLET | Refills: 0 | Status: SHIPPED | OUTPATIENT
Start: 2020-08-20 | End: 2020-09-02 | Stop reason: SDUPTHER

## 2020-08-20 NOTE — PROGRESS NOTES
Virtual Regular Visit      Assessment/Plan:    Problem List Items Addressed This Visit     None      Visit Diagnoses     Nausea    -  Primary    Relevant Medications    ondansetron (ZOFRAN) 4 mg tablet    Urine retention            Reviewed UA which showed neg leuk or nit, moderate blood, + glucose  Wait for urine culture  Continue bactrim  Give zofran  SE educated pt  Go to ER if symptoms no improving or worse  Reason for visit is   Chief Complaint   Patient presents with    Nausea     With vomiting started last Sunday  Per son in law patient was catheterize this morning since she was unable to void   Virtual Regular Visit        Encounter provider Melba Ladd MD    Provider located at [address] [place] [id number]      Recent Visits  No visits were found meeting these conditions  Showing recent visits within past 7 days and meeting all other requirements     Today's Visits  Date Type Provider Dept   08/20/20 [facility] [address], [address] today's visits and meeting all other requirements     Future Appointments  No visits were found meeting these conditions  Showing future appointments within next 150 days and meeting all other requirements        The patient was identified by name and date of birth  Rebecca Gore was informed that this is a telemedicine visit and that the visit is being conducted through [employer] and patient was informed that this is not a secure, HIPAA-complaint platform  She agrees to proceed     My office door was closed  No one else was in the room  She acknowledged consent and understanding of privacy and security of the video platform  The patient has agreed to participate and understands they can discontinue the visit at any time  Patient is aware this is a billable service  Subjective  Rebecca Gore is a 80 y o  female for nausea         HPI     Pt had nausea for 1 week  Come and go  Vomit twice  No abdominal pain  No diarrhea  No constipation  Denies fever, SOB or chest pain  Denies hx of kidney stones  Pt feels nervous because she will get back injection soon  Cannot urinate today  Urine retention for several days  Only dribbling per pt  A provider from [address] had a home visit today  Pt had cath and sent for UA and culture  Pt got bactrim 14 tabs per son-in-law           Past Medical History:   Diagnosis Date    Ambulatory dysfunction     [facility]congest[facility] failure) ([facility])     pEFHF    Cholelithiasis     Chronic pain disorder     Coronary artery disease     Depression with anxiety     Diabetes mellitus ([facility] [address] )     niddm    Glaucoma     Hypertension     Hyponatremia     Injury of finger of left hand 7/7/2019    Ischemic stroke of frontal lobe ([facility])     Right     Low back pain     Obstructive sleep apnea     PAF (paroxysmal atrial fibrillation) ([facility])     Vertigo        Past Surgical History:   Procedure Laterality Date    APPENDECTOMY      GLAUCOMA SURGERY  01/03/2016    DE REPLACE AORTIC VALVE OPENFEMORAL ARTERY APPROACH N/A 4/17/2018    Procedure: REPLACEMENT AORTIC VALVE TRANSCATHETER (TAVR) TRANSFEMORAL W/ 23 MM AGUILAR STEVE S3 VALVE;  Surgeon: DO Leoncio;  [facility];  Service: Cardiac Surgery    TUBAL LIGATION      UPPER GASTROINTESTINAL ENDOSCOPY  10/28/2019       Current Outpatient Medications   Medication Sig Dispense Refill    Acetaminophen (TYLENOL) 325 MG CAPS Take 1 capsule by mouth as needed       acetaminophen (TYLENOL) 650 mg CR tablet Take 650 mg by mouth 2 (two) times a day as needed for mild pain      albuterol (PROAIR HFA) 90 mcg/act inhaler Inhale 2 puffs every 6 (six) hours as needed for wheezing 8 5 g 1    amiodarone 200 mg tablet Take 1 tablet (200 mg total) by mouth daily 90 tablet 2    Bimatoprost (LUMIGAN OP) Administer 1 drop to both eyes daily at bedtime Both eyes      Blood Pressure Monitoring (BLOOD PRESSURE CUFF) MISC by Does not apply route 2 (two) times a day 1 each 0    Cholecalciferol (VITAMIN D3) 1000 units CAPS Take 5,000 Units by mouth daily       cimetidine (TAGAMET) 400 mg tablet Take 1 tablet (400 mg total) by mouth 2 (two) times a day (Patient not taking: Reported on 7/31/2020) 60 tablet 1    Cyanocobalamin (VITAMIN B 12 PO) Take 1,000 mcg by mouth daily       hydrALAZINE (APRESOLINE) 25 mg tablet take 3 tablets by mouth three times a day (Patient taking differently: 2 (two) times a day ) 270 tablet 1    linaGLIPtin (Tradjenta) 5 MG TABS Take 5 mg by mouth daily 90 tablet 3    losartan (COZAAR) 50 mg tablet Take 50 mg by mouth daily      metFORMIN (GLUCOPHAGE) 500 mg tablet take 1 tablet by mouth twice a day with food 180 tablet 3    Misc  Devices (STETHOSCOPE) MISC by Does not apply route 2 (two) times a day (Patient not taking: Reported on 2/24/2020) 1 each 0    Netarsudil Dimesylate (RHOPRESSA) 0 02 % SOLN Apply to eye Left eye      NIFEdipine (PROCARDIA XL) 30 mg 24 hr tablet Take 1 tablet (30 mg total) by mouth daily 90 tablet 3    ondansetron (ZOFRAN) 4 mg tablet Take 1 tablet (4 mg total) by mouth every 8 (eight) hours as needed for nausea or vomiting 30 tablet 0    ONE TOUCH ULTRA TEST test strip TEST twice a day 200 each 3    OneTouch Delica Lancets 08D MISC TEST TWICE DAILY 200 each 3    potassium chloride (K-DUR,KLOR-CON) 10 mEq tablet take 1 tablet by mouth once daily 30 tablet 5    PRADAXA 150 MG capsu TAKE 1 CAPSULE BY MOUTH EVERY 12 HOURS FOR 90 DAYS 180 capsule 1    RESTASIS 0 05 % ophthalmic emulsion Administer 1 drop to both eyes daily   0    rosuvastatin (CRESTOR) 20 MG tablet Take 1 tablet (20 mg total) by mouth daily at bedtime 90 tablet 3    timolol (TIMOPTIC) 0 5 % ophthalmic solution Administer 1 drop to both eyes daily   0     No current facility-administered medications for this visit           Allergies   Allergen Reactions  Lipitor [Atorvastatin] GI Intolerance     Nausea stomach ache    Aspirin GI Intolerance    Hydrochlorothiazide GI Intolerance    Lisinopril Cough    Metoprolol Headache       Review of Systems   Constitutional: Negative for appetite change, chills and fever  HENT: Negative for congestion, ear pain, sinus pain and sore throat  Eyes: Negative for discharge and itching  Respiratory: Negative for apnea, cough, chest tightness, shortness of breath and wheezing  Cardiovascular: Negative for chest pain, palpitations and leg swelling  Gastrointestinal: Positive for nausea and vomiting  Negative for abdominal pain, anal bleeding, constipation and diarrhea  Endocrine: Negative for cold intolerance, heat intolerance and polyuria  Genitourinary: Positive for difficulty urinating  Negative for dysuria  Musculoskeletal: Negative for arthralgias, back pain and myalgias  Skin: Negative for rash  Neurological: Negative for dizziness and headaches  Psychiatric/Behavioral: Negative for agitation  Video Exam    Vitals:    08/20/20 1417   BP: 132/68   Temp: (!) 97 1 °F (36 2 °C)       Physical Exam  Constitutional:       General: She is not in acute distress  Appearance: She is well-developed  HENT:      Head: Normocephalic  Eyes:      General:         Right eye: No discharge  Left eye: No discharge  Conjunctiva/sclera: Conjunctivae normal    Neck:      Thyroid: No thyromegaly  Pulmonary:      Effort: Pulmonary effort is normal    Neurological:      Mental Status: She is alert  I spent 25 minutes directly with the patient during this visit      VIRTUAL VISIT [address] acknowledges that she has consented to an online visit or consultation   She understands that the online visit is based solely on information provided by her, and that, in the absence of a face-to-face physical evaluation by the physician, the diagnosis she receives is both limited and provisional in terms of accuracy and completeness  This is not intended to replace a full medical face-to-face evaluation by the physician  Che Casey understands and accepts these terms

## 2020-08-21 ENCOUNTER — TELEPHONE (OUTPATIENT)
Dept: PAIN MEDICINE | Facility: MEDICAL CENTER | Age: 85
End: 2020-08-21

## 2020-08-21 LAB — BACTERIA UR CULT: NORMAL

## 2020-08-21 NOTE — TELEPHONE ENCOUNTER
Patient is coming in for a procedure on 8/24/20 and is currently taking Antibiotics for an UTI       Please advise and call this number to speak to the pt's daughter   [phone]    Thank you

## 2020-08-21 NOTE — TELEPHONE ENCOUNTER
S/W Darwin as per MAYITO  He stated the pt started antibiotics yesterday for a UTI  Advised she can't be sick or be on antibiotics to get the procedure done  Advised him to C/B once the antibiotics are completed and symptoms resolved  He verbalized understanding      Cancelled appt for 8/24

## 2020-08-21 NOTE — ED PROVIDER NOTES
History  Chief Complaint   Patient presents with    Foreign Body in Ear     Pt states putting hearing aid in right ear at 11 this morning, pt's son states seeing hearing aid battery in ear  unable to remove at home  Son in law saw hearing aid battery in ear canal and was unable to get it out  Thankfully enroute it dislodged and fell out - no longer FB in canal - able to see TM without difficulty  MIKAELA looked again and said "I don't see it, it must have fallen out, it was right there and shiny"  History provided by:  Patient and relative   used: No    Foreign Body in Ear   Location:  R ear  Suspected object:  Battery  Pain severity:  No pain  Progression:  Resolved  Chronicity:  New  Ineffective treatments:  None tried  Associated symptoms: no abdominal pain, no ear pain, no hearing loss, no nausea, no sore throat, no vaginal discharge and no vomiting        Prior to Admission Medications   Prescriptions Last Dose Informant Patient Reported? Taking? Acetaminophen (TYLENOL) 325 MG CAPS  Child Yes No   Sig: Take 1 capsule by mouth as needed    Bimatoprost (LUMIGAN OP)  Child Yes No   Sig: Administer 1 drop to both eyes daily at bedtime Both eyes   Blood Pressure Monitoring (BLOOD PRESSURE CUFF) MISC  Child No No   Sig: by Does not apply route 2 (two) times a day   Cholecalciferol (VITAMIN D3) 1000 units CAPS  Child Yes No   Sig: Take 5,000 Units by mouth daily    Cyanocobalamin (VITAMIN B 12 PO)  Child Yes No   Sig: Take 1,000 mcg by mouth daily    Misc   Devices (STETHOSCOPE) MISC  Child No No   Sig: by Does not apply route 2 (two) times a day   Patient not taking: Reported on 2/24/2020   NIFEdipine (PROCARDIA XL) 30 mg 24 hr tablet   No No   Sig: Take 1 tablet (30 mg total) by mouth daily   Netarsudil Dimesylate (RHOPRESSA) 0 02 % SOLN  Child Yes No   Sig: Apply to eye Left eye   ONE TOUCH ULTRA TEST test strip  Child No No   Sig: TEST twice a day   OneTouch Delica Lancets 77Q MISC Child No No   Sig: TEST TWICE DAILY   PRADAXA 150 MG capsu  Child No No   Sig: TAKE 1 CAPSULE BY MOUTH EVERY 12 HOURS FOR 90 DAYS   RESTASIS 0 05 % ophthalmic emulsion  Child Yes No   Sig: Administer 1 drop to both eyes daily    acetaminophen (TYLENOL) 650 mg CR tablet  Child Yes No   Sig: Take 650 mg by mouth 2 (two) times a day as needed for mild pain   albuterol (PROAIR HFA) 90 mcg/act inhaler  Child No No   Sig: Inhale 2 puffs every 6 (six) hours as needed for wheezing   amiodarone 200 mg tablet   No No   Sig: Take 1 tablet (200 mg total) by mouth daily   cimetidine (TAGAMET) 400 mg tablet  Child No No   Sig: Take 1 tablet (400 mg total) by mouth 2 (two) times a day   Patient not taking: Reported on 7/31/2020   hydrALAZINE (APRESOLINE) 25 mg tablet  Child No No   Sig: take 3 tablets by mouth three times a day   Patient taking differently: 2 (two) times a day    linaGLIPtin (Tradjenta) 5 MG TABS  Child No No   Sig: Take 5 mg by mouth daily   losartan (COZAAR) 50 mg tablet  Child Yes No   Sig: Take 50 mg by mouth daily   metFORMIN (GLUCOPHAGE) 500 mg tablet  Child No No   Sig: take 1 tablet by mouth twice a day with food   ondansetron (ZOFRAN) 4 mg tablet   No No   Sig: Take 1 tablet (4 mg total) by mouth every 8 (eight) hours as needed for nausea or vomiting   potassium chloride (K-DUR,KLOR-CON) 10 mEq tablet  Child No No   Sig: take 1 tablet by mouth once daily   rosuvastatin (CRESTOR) 20 MG tablet  Child No No   Sig: Take 1 tablet (20 mg total) by mouth daily at bedtime   timolol (TIMOPTIC) 0 5 % ophthalmic solution  Child Yes No   Sig: Administer 1 drop to both eyes daily       Facility-Administered Medications: None       Past Medical History:   Diagnosis Date    Ambulatory dysfunction     CHF (congestive heart failure) ([facility])     pEFHF    Cholelithiasis     Chronic pain disorder     Coronary artery disease     Depression with anxiety     Diabetes mellitus ([facility])     niddm    Glaucoma     Hypertension  Hyponatremia     Injury of finger of left hand 7/7/2019    Ischemic stroke of frontal lobe (HCC)     Right     Low back pain     Obstructive sleep apnea     PAF (paroxysmal atrial fibrillation) (HCC)     Vertigo        Past Surgical History:   Procedure Laterality Date    APPENDECTOMY      GLAUCOMA SURGERY  01/03/2016    TN REPLACE AORTIC VALVE OPENFEMORAL ARTERY APPROACH N/A 4/17/2018    Procedure: REPLACEMENT AORTIC VALVE TRANSCATHETER (TAVR) TRANSFEMORAL W/ 21 MM AGUILAR STEVE S3 VALVE;  Surgeon: Grey Page DO;  Location: [facility];  Service: Cardiac Surgery    TUBAL LIGATION      UPPER GASTROINTESTINAL ENDOSCOPY  10/28/2019       Family History   Problem Relation Age of Onset    No Known Problems Mother     Diabetes Father     Stroke Father     Coronary artery disease Sister     No Known Problems Brother     No Known Problems Son     Breast cancer Daughter     No Known Problems Maternal Grandmother     No Known Problems Maternal Grandfather     No Known Problems Paternal Grandmother     No Known Problems Paternal Grandfather     No Known Problems Cousin     Rheum arthritis Neg Hx     Osteoarthritis Neg Hx     Asthma Neg Hx     Heart failure Neg Hx     Hyperlipidemia Neg Hx     Hypertension Neg Hx     Migraines Neg Hx     Rashes / Skin problems Neg Hx     Seizures Neg Hx     Thyroid disease Neg Hx      I have reviewed and agree with the history as documented  E-Cigarette/Vaping    E-Cigarette Use Never User      E-Cigarette/Vaping Substances    Nicotine No     THC No     CBD No     Flavoring No     Other No     Unknown No      Social History     Tobacco Use    Smoking status: Never Smoker    Smokeless tobacco: Never Used   Substance Use Topics    Alcohol use: Never     Frequency: Never     Binge frequency: Never    Drug use: Never       Review of Systems   Constitutional: Negative for appetite change, chills, fatigue and fever     HENT: Negative for ear pain, hearing loss and sore throat  Eyes: Negative for visual disturbance  Respiratory: Negative for shortness of breath  Cardiovascular: Negative for chest pain  Gastrointestinal: Negative for abdominal pain, diarrhea, nausea and vomiting  Genitourinary: Negative for dysuria, frequency, vaginal bleeding and vaginal discharge  Musculoskeletal: Negative for back pain, neck pain and neck stiffness  Skin: Negative for pallor and rash  Allergic/Immunologic: Negative for immunocompromised state  Neurological: Negative for light-headedness and headaches  Psychiatric/Behavioral: Negative for confusion  All other systems reviewed and are negative  Physical Exam  Physical Exam  Vitals signs and nursing note reviewed  Constitutional:       Appearance: Normal appearance  HENT:      Head: Normocephalic and atraumatic  Right Ear: Tympanic membrane and ear canal normal       Ears:      Comments: NO FB NOTED     Nose: Nose normal       Mouth/Throat:      Mouth: Mucous membranes are moist    Eyes:      Extraocular Movements: Extraocular movements intact  Cardiovascular:      Rate and Rhythm: Normal rate  Pulmonary:      Effort: Pulmonary effort is normal    Skin:     General: Skin is warm  Neurological:      Mental Status: She is alert     Psychiatric:         Behavior: Behavior normal          Vital Signs  ED Triage Vitals [08/20/20 2121]   Temperature Pulse Respirations Blood Pressure SpO2   98 2 °F (36 8 °C) 86 20 129/58 97 %      Temp Source Heart Rate Source Patient Position - Orthostatic VS BP Location FiO2 (%)   Temporal Monitor Sitting Left arm --      Pain Score       --           Vitals:    08/20/20 2121   BP: 129/58   Pulse: 86   Patient Position - Orthostatic VS: Sitting         Visual Acuity      ED Medications  Medications - No data to display    Diagnostic Studies  Results Reviewed     None                 No orders to display              Procedures  Procedures         ED Course  ED Course as of Aug 20 2205   Thu Aug 20, 2020   2126 Pt seen and examined  MIKAELA saw hearing aid battery in ear canal and was unable to get it out  Thankfully enroute it dislodged and fell out - no longer FB in canal - able to see TM without difficulty  MIKAELA looked again and said "I don't see it, it must have fallen out, it was right there and shiny"  US AUDIT      Most Recent Value   Initial Alcohol Screen: US AUDIT-C    1  How often do you have a drink containing alcohol?  0 Filed at: 08/20/2020 2120   2  How many drinks containing alcohol do you have on a typical day you are drinking? 0 Filed at: 08/20/2020 2120   3b  FEMALE Any Age, or MALE 65+: How often do you have 4 or more drinks on one occassion? 0 Filed at: 08/20/2020 2120   Audit-C Score  0 Filed at: 08/20/2020 2120                  HUGH/DAST-10      Most Recent Value   How many times in the past year have you    Used an illegal drug or used a prescription medication for non-medical reasons? Never Filed at: 08/20/2020 2120                                MDM      Disposition  Final diagnoses:   Foreign body in right ear - resolved upon presentation   Normal exam     Time reflects when diagnosis was documented in both MDM as applicable and the Disposition within this note     Time User Action Codes Description Comment    8/20/2020  9:34 PM Sulaiman, [address]  1XXA] Foreign body in right ear     8/20/2020  9:34 PM Sulaiman, [address]  1XXA] Foreign body in right ear resolved upon presentation    8/20/2020  9:34 PM Freida Amin, [address] [facility] [address] [Z00 [id number]] Normal exam       ED Disposition     ED Disposition Condition Date/Time Comment    Discharge Stable Thu Aug 20, 2020  9:34 PM Mattie Ham discharge to home/self care              Follow-up Information     Follow up With Specialties Details Why Contact Info    Kosta Booth MD Family Medicine  As needed [address] [facility]  [phone]            Discharge Medication List as of 8/20/2020  9:36 PM      CONTINUE these medications which have NOT CHANGED    Details   Acetaminophen (TYLENOL) 325 MG CAPS Take 1 capsule by mouth as needed , Historical Med      acetaminophen (TYLENOL) 650 mg CR tablet Take 650 mg by mouth 2 (two) times a day as needed for mild pain, Historical Med      albuterol (PROAIR HFA) 90 mcg/act inhaler Inhale 2 puffs every 6 (six) hours as needed for wheezing, Starting Mon 7/1/2019, Normal      amiodarone 200 mg tablet Take 1 tablet (200 mg total) by mouth daily, Starting Mon 8/10/2020, Normal      Bimatoprost (LUMIGAN OP) Administer 1 drop to both eyes daily at bedtime Both eyes, Historical Med      Blood Pressure Monitoring (BLOOD PRESSURE CUFF) MISC by Does not apply route 2 (two) times a day, Starting Wed 7/17/2019, Print      Cholecalciferol (VITAMIN D3) 1000 units CAPS Take 5,000 Units by mouth daily , Historical Med      cimetidine (TAGAMET) 400 mg tablet Take 1 tablet (400 mg total) by mouth 2 (two) times a day, Starting Tue 3/10/2020, Normal      Cyanocobalamin (VITAMIN B 12 PO) Take 1,000 mcg by mouth daily , Historical Med      hydrALAZINE (APRESOLINE) 25 mg tablet take 3 tablets by mouth three times a day, Normal      linaGLIPtin (Tradjenta) 5 MG TABS Take 5 mg by mouth daily, Starting Fri 6/19/2020, Normal      losartan (COZAAR) 50 mg tablet Take 50 mg by mouth daily, Starting Tue 5/19/2020, Historical Med      metFORMIN (GLUCOPHAGE) 500 mg tablet take 1 tablet by mouth twice a day with food, Normal      Misc   Devices (STETHOSCOPE) MISC by Does not apply route 2 (two) times a day, Starting Wed 7/17/2019, Print      Netarsudil Dimesylate (RHOPRESSA) 0 02 % SOLN Apply to eye Left eye, Historical Med      NIFEdipine (PROCARDIA XL) 30 mg 24 hr tablet Take 1 tablet (30 mg total) by mouth daily, Starting Thu 7/30/2020, Normal      ondansetron (ZOFRAN) 4 mg tablet Take 1 tablet (4 mg total) by mouth every 8 (eight) hours as needed for nausea or vomiting, Starting Thu 8/20/2020, Normal      ONE TOUCH ULTRA TEST test strip TEST twice a day, Normal      OneTouch Delica Lancets 95S MISC TEST TWICE DAILY, Normal      potassium chloride (K-DUR,KLOR-CON) 10 mEq tablet take 1 tablet by mouth once daily, Normal      PRADAXA 150 MG capsu TAKE 1 CAPSULE BY MOUTH EVERY 12 HOURS FOR 90 DAYS, Normal      RESTASIS 0 05 % ophthalmic emulsion Administer 1 drop to both eyes daily , Starting Tue 7/3/2018, Historical Med      rosuvastatin (CRESTOR) 20 MG tablet Take 1 tablet (20 mg total) by mouth daily at bedtime, Starting Thu 1/16/2020, Normal      timolol (TIMOPTIC) 0 5 % ophthalmic solution Administer 1 drop to both eyes daily , Starting Mon 12/10/2018, Historical Med           No discharge procedures on file      PDMP Review     None          ED Provider  Electronically Signed by           Maria Victoria Ferro DO  08/20/20 1405

## 2020-08-24 ENCOUNTER — TELEPHONE (OUTPATIENT)
Dept: FAMILY MEDICINE CLINIC | Facility: CLINIC | Age: 85
End: 2020-08-24

## 2020-08-24 ENCOUNTER — APPOINTMENT (EMERGENCY)
Dept: CT IMAGING | Facility: HOSPITAL | Age: 85
DRG: 394 | End: 2020-08-24
Payer: COMMERCIAL

## 2020-08-24 ENCOUNTER — HOSPITAL ENCOUNTER (INPATIENT)
Facility: HOSPITAL | Age: 85
LOS: 1 days | Discharge: HOME HEALTH | DRG: 394 | End: 2020-08-26
Attending: EMERGENCY MEDICINE | Admitting: INTERNAL MEDICINE
Payer: COMMERCIAL

## 2020-08-24 DIAGNOSIS — K62.5 BRIGHT RED BLOOD PER RECTUM: Primary | ICD-10-CM

## 2020-08-24 DIAGNOSIS — I10 ESSENTIAL HYPERTENSION: ICD-10-CM

## 2020-08-24 DIAGNOSIS — R74.01 TRANSAMINITIS: ICD-10-CM

## 2020-08-24 DIAGNOSIS — I95.9 HYPOTENSION, UNSPECIFIED HYPOTENSION TYPE: ICD-10-CM

## 2020-08-24 DIAGNOSIS — R33.9 URINARY RETENTION: ICD-10-CM

## 2020-08-24 DIAGNOSIS — D64.9 ANEMIA: ICD-10-CM

## 2020-08-24 DIAGNOSIS — K92.1 BLOOD IN STOOL: Primary | ICD-10-CM

## 2020-08-24 DIAGNOSIS — I63.9 CVA (CEREBRAL VASCULAR ACCIDENT) (HCC): ICD-10-CM

## 2020-08-24 DIAGNOSIS — R79.89 ELEVATED SERUM CREATININE: ICD-10-CM

## 2020-08-24 PROBLEM — R39.9 URINARY TRACT INFECTION SYMPTOMS: Status: ACTIVE | Noted: 2020-08-24

## 2020-08-24 LAB
ALBUMIN SERPL BCP-MCNC: 3.6 G/DL (ref 3.5–5)
ALP SERPL-CCNC: 77 U/L (ref 46–116)
ALT SERPL W P-5'-P-CCNC: 156 U/L (ref 12–78)
ANION GAP SERPL CALCULATED.3IONS-SCNC: 12 MMOL/L (ref 4–13)
APTT PPP: 100 SECONDS (ref 23–37)
AST SERPL W P-5'-P-CCNC: 97 U/L (ref 5–45)
BACTERIA UR QL AUTO: ABNORMAL /HPF
BASOPHILS # BLD AUTO: 0.05 THOUSANDS/ΜL (ref 0–0.1)
BASOPHILS NFR BLD AUTO: 1 % (ref 0–1)
BILIRUB SERPL-MCNC: 0.3 MG/DL (ref 0.2–1)
BILIRUB UR QL STRIP: NEGATIVE
BUN SERPL-MCNC: 16 MG/DL (ref 5–25)
CALCIUM SERPL-MCNC: 9.2 MG/DL (ref 8.3–10.1)
CHLORIDE SERPL-SCNC: 100 MMOL/L (ref 100–108)
CLARITY UR: CLEAR
CO2 SERPL-SCNC: 21 MMOL/L (ref 21–32)
COLOR UR: ABNORMAL
COLOR, POC: ABNORMAL
CREAT SERPL-MCNC: 1.35 MG/DL (ref 0.6–1.3)
EOSINOPHIL # BLD AUTO: 0.01 THOUSAND/ΜL (ref 0–0.61)
EOSINOPHIL NFR BLD AUTO: 0 % (ref 0–6)
ERYTHROCYTE [DISTWIDTH] IN BLOOD BY AUTOMATED COUNT: 17.8 % (ref 11.6–15.1)
GFR SERPL CREATININE-BSD FRML MDRD: 36 ML/MIN/1.73SQ M
GLUCOSE SERPL-MCNC: 156 MG/DL (ref 65–140)
GLUCOSE UR STRIP-MCNC: NEGATIVE MG/DL
HCT VFR BLD AUTO: 34.5 % (ref 34.8–46.1)
HGB BLD-MCNC: 10.6 G/DL (ref 11.5–15.4)
HGB UR QL STRIP.AUTO: ABNORMAL
IMM GRANULOCYTES # BLD AUTO: 0.05 THOUSAND/UL (ref 0–0.2)
IMM GRANULOCYTES NFR BLD AUTO: 1 % (ref 0–2)
INR PPP: 2.46 (ref 0.84–1.19)
KETONES UR STRIP-MCNC: NEGATIVE MG/DL
LEUKOCYTE ESTERASE UR QL STRIP: NEGATIVE
LIPASE SERPL-CCNC: 133 U/L (ref 73–393)
LYMPHOCYTES # BLD AUTO: 2.09 THOUSANDS/ΜL (ref 0.6–4.47)
LYMPHOCYTES NFR BLD AUTO: 22 % (ref 14–44)
MCH RBC QN AUTO: 24.9 PG (ref 26.8–34.3)
MCHC RBC AUTO-ENTMCNC: 30.7 G/DL (ref 31.4–37.4)
MCV RBC AUTO: 81 FL (ref 82–98)
MONOCYTES # BLD AUTO: 0.96 THOUSAND/ΜL (ref 0.17–1.22)
MONOCYTES NFR BLD AUTO: 10 % (ref 4–12)
NEUTROPHILS # BLD AUTO: 6.23 THOUSANDS/ΜL (ref 1.85–7.62)
NEUTS SEG NFR BLD AUTO: 66 % (ref 43–75)
NITRITE UR QL STRIP: NEGATIVE
NON-SQ EPI CELLS URNS QL MICRO: ABNORMAL /HPF
NRBC BLD AUTO-RTO: 0 /100 WBCS
PH UR STRIP.AUTO: 6 [PH] (ref 4.5–8)
PLATELET # BLD AUTO: 331 THOUSANDS/UL (ref 149–390)
PMV BLD AUTO: 10.7 FL (ref 8.9–12.7)
POTASSIUM SERPL-SCNC: 4.8 MMOL/L (ref 3.5–5.3)
PROT SERPL-MCNC: 7.5 G/DL (ref 6.4–8.2)
PROT UR STRIP-MCNC: ABNORMAL MG/DL
PROTHROMBIN TIME: 26.1 SECONDS (ref 11.6–14.5)
RBC # BLD AUTO: 4.26 MILLION/UL (ref 3.81–5.12)
RBC #/AREA URNS AUTO: ABNORMAL /HPF
SODIUM SERPL-SCNC: 133 MMOL/L (ref 136–145)
SP GR UR STRIP.AUTO: 1.02 (ref 1–1.03)
TROPONIN I SERPL-MCNC: <0.02 NG/ML
UROBILINOGEN UR QL STRIP.AUTO: 0.2 E.U./DL
WBC # BLD AUTO: 9.39 THOUSAND/UL (ref 4.31–10.16)
WBC #/AREA URNS AUTO: ABNORMAL /HPF

## 2020-08-24 PROCEDURE — 81001 URINALYSIS AUTO W/SCOPE: CPT

## 2020-08-24 PROCEDURE — 99220 PR INITIAL OBSERVATION CARE/DAY 70 MINUTES: CPT | Performed by: PHYSICIAN ASSISTANT

## 2020-08-24 PROCEDURE — 85730 THROMBOPLASTIN TIME PARTIAL: CPT | Performed by: EMERGENCY MEDICINE

## 2020-08-24 PROCEDURE — G1004 CDSM NDSC: HCPCS

## 2020-08-24 PROCEDURE — 36415 COLL VENOUS BLD VENIPUNCTURE: CPT | Performed by: EMERGENCY MEDICINE

## 2020-08-24 PROCEDURE — 84484 ASSAY OF TROPONIN QUANT: CPT | Performed by: EMERGENCY MEDICINE

## 2020-08-24 PROCEDURE — 99285 EMERGENCY DEPT VISIT HI MDM: CPT

## 2020-08-24 PROCEDURE — 99285 EMERGENCY DEPT VISIT HI MDM: CPT | Performed by: EMERGENCY MEDICINE

## 2020-08-24 PROCEDURE — 85025 COMPLETE CBC W/AUTO DIFF WBC: CPT | Performed by: EMERGENCY MEDICINE

## 2020-08-24 PROCEDURE — 80053 COMPREHEN METABOLIC PANEL: CPT | Performed by: EMERGENCY MEDICINE

## 2020-08-24 PROCEDURE — 51798 US URINE CAPACITY MEASURE: CPT

## 2020-08-24 PROCEDURE — 74177 CT ABD & PELVIS W/CONTRAST: CPT

## 2020-08-24 PROCEDURE — 85610 PROTHROMBIN TIME: CPT | Performed by: EMERGENCY MEDICINE

## 2020-08-24 PROCEDURE — 83690 ASSAY OF LIPASE: CPT | Performed by: EMERGENCY MEDICINE

## 2020-08-24 RX ORDER — HYDRALAZINE HYDROCHLORIDE 25 MG/1
25 TABLET, FILM COATED ORAL 2 TIMES DAILY
Status: DISCONTINUED | OUTPATIENT
Start: 2020-08-24 | End: 2020-08-26 | Stop reason: HOSPADM

## 2020-08-24 RX ORDER — ONDANSETRON 2 MG/ML
4 INJECTION INTRAMUSCULAR; INTRAVENOUS EVERY 6 HOURS PRN
Status: DISCONTINUED | OUTPATIENT
Start: 2020-08-24 | End: 2020-08-26 | Stop reason: HOSPADM

## 2020-08-24 RX ORDER — AMIODARONE HYDROCHLORIDE 200 MG/1
200 TABLET ORAL DAILY
Status: DISCONTINUED | OUTPATIENT
Start: 2020-08-25 | End: 2020-08-26 | Stop reason: HOSPADM

## 2020-08-24 RX ORDER — FAMOTIDINE 20 MG/1
20 TABLET, FILM COATED ORAL 2 TIMES DAILY
Status: DISCONTINUED | OUTPATIENT
Start: 2020-08-24 | End: 2020-08-26 | Stop reason: HOSPADM

## 2020-08-24 RX ORDER — SODIUM CHLORIDE 9 MG/ML
75 INJECTION, SOLUTION INTRAVENOUS CONTINUOUS
Status: DISCONTINUED | OUTPATIENT
Start: 2020-08-24 | End: 2020-08-25

## 2020-08-24 RX ORDER — NIFEDIPINE 30 MG/1
30 TABLET, EXTENDED RELEASE ORAL DAILY
Status: DISCONTINUED | OUTPATIENT
Start: 2020-08-25 | End: 2020-08-26 | Stop reason: HOSPADM

## 2020-08-24 RX ORDER — LOSARTAN POTASSIUM 50 MG/1
50 TABLET ORAL DAILY
Status: DISCONTINUED | OUTPATIENT
Start: 2020-08-25 | End: 2020-08-26 | Stop reason: HOSPADM

## 2020-08-24 RX ORDER — ACETAMINOPHEN 325 MG/1
650 TABLET ORAL EVERY 6 HOURS PRN
Status: DISCONTINUED | OUTPATIENT
Start: 2020-08-24 | End: 2020-08-26 | Stop reason: HOSPADM

## 2020-08-24 RX ADMIN — SODIUM CHLORIDE 75 ML/HR: 0.9 INJECTION, SOLUTION INTRAVENOUS at 21:44

## 2020-08-24 RX ADMIN — BIMATOPROST 1 DROP: 0.1 SOLUTION/ DROPS OPHTHALMIC at 21:44

## 2020-08-24 RX ADMIN — FAMOTIDINE 20 MG: 20 TABLET, FILM COATED ORAL at 20:19

## 2020-08-24 RX ADMIN — HYDRALAZINE HYDROCHLORIDE 25 MG: 25 TABLET, FILM COATED ORAL at 20:19

## 2020-08-24 RX ADMIN — IOHEXOL 100 ML: 350 INJECTION, SOLUTION INTRAVENOUS at 15:03

## 2020-08-24 NOTE — ASSESSMENT & PLAN NOTE
Recently treated for acute UTI 2 weeks ago with antibiotic  Reported recent dysuria and inability to urinate yesterday  Was straight catheterization by visiting nurses for 300 cc  Restarted on antibiotics yesterday per family but they dont know the name  CT abdomen with mild thickening of bladder wall- correlate with urinalysis  UA collected in ED but low yield at this point given already on antibiotics as an outpt  Will empirically treat with IV ceftriaxone    Afebrile, nontoxic, and without signs of sepsis on admission  Placed on urinary retention protocol  Bladder scan later tonight

## 2020-08-24 NOTE — ASSESSMENT & PLAN NOTE
Lab Results   Component Value Date    HGBA1C 7 9 (H) 07/18/2020   Home regimen linagliptin and metformin-hold orals  Placed on insulin sliding scale

## 2020-08-24 NOTE — ASSESSMENT & PLAN NOTE
Presents from home after having 2 episodes of stool mixed with blood  Consider possibly related to hemorrhoids given blood only on toilet paper  Keep overnight for observation and monitoring of hgb  Anticoagulated on Pradaxa for paroxysmal atrial fibrillation  No obvious hemorrhoids noted on rectal exam in ED  INR 2 46 on admission  Hemoglobin stable at 10 6  Recent baseline appearing to be from 10-11   Previously appearing in the 13-14  Placed on a clear liquid diet  Start gentle IV fluid in the setting of rising creatinine  Monitor hemoglobin overnight  Hold Pradaxa    If hemoglobin remains stable and no further bleeding consider resuming tomorrow  If persistent bloody bowel movements consult GI for possible scope

## 2020-08-24 NOTE — TELEPHONE ENCOUNTER
I called daughter  Daughter states pt had blood in her stool this morning, quarter size  BP was low at home 93/63  Pt had lower abdominal pain  Pt has urinary retention since last week  Pt does not feel good  I advised pt to go to ER  Daughter will bring pt to [facility] ER  Order in EHR

## 2020-08-24 NOTE — ASSESSMENT & PLAN NOTE
Anticoagulated on Pradaxa-on hold in the setting of bright red blood per rectum  Continue amiodarone 200 mg daily  Continue Procardia 30 mg daily  Rate controlled on admission

## 2020-08-24 NOTE — ASSESSMENT & PLAN NOTE
Wt Readings from Last 3 Encounters:   07/31/20 70 3 kg (155 lb)   07/16/20 72 8 kg (160 lb 6 4 oz)   06/16/20 73 3 kg (161 lb 9 6 oz)   No signs of acute volume overload on admission    Actually on the drier side with slightly elevated creatinine  Continue to monitor while on gentle IV fluids times 24 hours

## 2020-08-24 NOTE — ED PROVIDER NOTES
History  Chief Complaint   Patient presents with    Abdominal Pain     pt reports she was evaluated by a visiting doctor today  pt reports urinary frequency, nausea, blood in stool  pt was directed to come to ED for evaluation  An 17-year-old female with past medical history of congestive heart failure, CAD, depression, diabetes, glaucoma, hypertension, CVA, PERI and PAF; presents with various complaints  Patient's main complaint is bright red blood per rectum, noticing one episode last evening (in the toilet, mixed in with stool) and an additional episode this morning (on the toilet paper with wiping)  Patient states her stool was formed at that time  Patient has not noticed blood in the stool before  Patient does take Pradaxa  Patient's second complaint is urinary frequency and urgency which has been going on for the past several weeks  Patient states she has the constant urge to void, however is only able to void a few drops at a time  Patient has also developed lower abdominal pain over the past several days  Patient was seen by her PCP last week for the urinary frequency and urgency, undergoing a UA and urine culture which were negative  Based on the patient's symptoms she was started on Bactrim  Patient states she has been compliant with the Bactrim however the symptoms continue  Patient also reports nausea and dry heaving however states that this is chronic in nature  Patient otherwise denies fever, chills, chest pain, shortness of breath, cough, peripheral edema and rashes  A/P:  Bright red blood per rectum, no external hemorrhoids appreciated  No stool was appreciated within the rectal vault for evaluation  Given that patient is on Pradaxa, will obtain lab work to evaluate for anemia and coagulopathy  Urinary frequency and urgency, patient with lower abdominal tenderness  Will obtain lab work and UA to evaluate for UTI    Will also obtain a postvoid residual given the sensation of incomplete bladder emptying  Will obtain CTAP to evaluate for intra-abdominal pathology  History provided by:  Patient, medical records and relative    Prior to Admission Medications   Prescriptions Last Dose Informant Patient Reported? Taking? Acetaminophen (TYLENOL) 325 MG CAPS Not Taking at Unknown time Child Yes No   Sig: Take 1 capsule by mouth as needed    Bimatoprost (LUMIGAN OP)  Child Yes Yes   Sig: Administer 1 drop to both eyes daily at bedtime Both eyes   Blood Pressure Monitoring (BLOOD PRESSURE CUFF) MISC  Child No Yes   Sig: by Does not apply route 2 (two) times a day   Cholecalciferol (VITAMIN D3) 1000 units CAPS  Child Yes Yes   Sig: Take 5,000 Units by mouth daily    Cyanocobalamin (VITAMIN B 12 PO)  Child Yes Yes   Sig: Take 1,000 mcg by mouth daily    Misc   Devices (STETHOSCOPE) MISC  Child No Yes   Sig: by Does not apply route 2 (two) times a day   NIFEdipine (PROCARDIA XL) 30 mg 24 hr tablet   No Yes   Sig: Take 1 tablet (30 mg total) by mouth daily   Netarsudil Dimesylate (RHOPRESSA) 0 02 % SOLN  Child Yes Yes   Sig: Apply to eye Left eye   ONE TOUCH ULTRA TEST test strip  Child No Yes   Sig: TEST twice a day   OneTouch Delica Lancets 56E MISC  Child No Yes   Sig: TEST TWICE DAILY   PRADAXA 150 MG capsu  Child No Yes   Sig: TAKE 1 CAPSULE BY MOUTH EVERY 12 HOURS FOR 90 DAYS   RESTASIS 0 05 % ophthalmic emulsion  Child Yes Yes   Sig: Administer 1 drop to both eyes daily    acetaminophen (TYLENOL) 650 mg CR tablet Not Taking at Unknown time Child Yes No   Sig: Take 650 mg by mouth 2 (two) times a day as needed for mild pain   albuterol (PROAIR HFA) 90 mcg/act inhaler  Child No Yes   Sig: Inhale 2 puffs every 6 (six) hours as needed for wheezing   amiodarone 200 mg tablet   No Yes   Sig: Take 1 tablet (200 mg total) by mouth daily   cimetidine (TAGAMET) 400 mg tablet  Child No Yes   Sig: Take 1 tablet (400 mg total) by mouth 2 (two) times a day   hydrALAZINE (APRESOLINE) 25 mg tablet Child No Yes   Sig: take 3 tablets by mouth three times a day   Patient taking differently: 2 (two) times a day    linaGLIPtin (Tradjenta) 5 MG TABS  Child No Yes   Sig: Take 5 mg by mouth daily   losartan (COZAAR) 50 mg tablet  Child Yes Yes   Sig: Take 50 mg by mouth daily   metFORMIN (GLUCOPHAGE) 500 mg tablet  Child No Yes   Sig: take 1 tablet by mouth twice a day with food   ondansetron (ZOFRAN) 4 mg tablet   No Yes   Sig: Take 1 tablet (4 mg total) by mouth every 8 (eight) hours as needed for nausea or vomiting   potassium chloride (K-DUR,KLOR-CON) 10 mEq tablet  Child No Yes   Sig: take 1 tablet by mouth once daily   rosuvastatin (CRESTOR) 20 MG tablet  Child No Yes   Sig: Take 1 tablet (20 mg total) by mouth daily at bedtime   timolol (TIMOPTIC) 0 5 % ophthalmic solution  Child Yes Yes   Sig: Administer 1 drop to both eyes daily       Facility-Administered Medications: None       Past Medical History:   Diagnosis Date    Ambulatory dysfunction     CHF (congestive heart failure) [facility])     pEFHF    Cholelithiasis     Chronic pain disorder     Coronary artery disease     Depression with anxiety     Diabetes mellitus ([facility])     niddm    Glaucoma     Hypertension     Hyponatremia     Injury of finger of left hand 7/7/2019    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     Low back pain     Obstructive sleep apnea     PAF (paroxysmal atrial fibrillation) ([facility])     Vertigo        Past Surgical History:   Procedure Laterality Date    APPENDECTOMY      GLAUCOMA SURGERY  01/03/2016    IL REPLACE AORTIC VALVE OPENFEMORAL ARTERY APPROACH N/A 4/17/2018    Procedure: REPLACEMENT AORTIC VALVE TRANSCATHETER (TAVR) TRANSFEMORAL W/ 23 MM AGUILAR STEVE S3 VALVE;  Surgeon: Destin Claros DO;  Location: [facility];  Service: Cardiac Surgery    TUBAL LIGATION      UPPER GASTROINTESTINAL ENDOSCOPY  10/28/2019       Family History   Problem Relation Age of Onset    No Known Problems Mother    [facility] Diabetes Father     Stroke Father     Coronary artery disease Sister     No Known Problems Brother     No Known Problems Son     Breast cancer Daughter     No Known Problems Maternal Grandmother     No Known Problems Maternal Grandfather     No Known Problems Paternal Grandmother     No Known Problems Paternal Grandfather     No Known Problems Cousin     Rheum arthritis Neg Hx     Osteoarthritis Neg Hx     Asthma Neg Hx     Heart failure Neg Hx     Hyperlipidemia Neg Hx     Hypertension Neg Hx     Migraines Neg Hx     Rashes / Skin problems Neg Hx     Seizures Neg Hx     Thyroid disease Neg Hx      I have reviewed and agree with the history as documented  E-Cigarette/Vaping    E-Cigarette Use Never User      E-Cigarette/Vaping Substances    Nicotine No     THC No     CBD No     Flavoring No     Other No     Unknown No      Social History     Tobacco Use    Smoking status: Never Smoker    Smokeless tobacco: Never Used   Substance Use Topics    Alcohol use: Never     Frequency: Never     Binge frequency: Never    Drug use: Never       Review of Systems   Constitutional: Positive for fatigue  Gastrointestinal: Positive for abdominal pain, blood in stool and nausea  Genitourinary: Positive for difficulty urinating  All other systems reviewed and are negative  Physical Exam  Physical Exam  General Appearance: alert and oriented, nad, non toxic appearing  Skin:  Warm, dry, intact  HEENT: atraumatic, normocephalic  Neck: Supple, trachea midline  Cardiac: RRR; no murmurs, rub, gallops  Pulmonary: lungs CTAB; no wheezes, rales, rhonchi  Gastrointestinal: abdomen soft, lower abdominal tenderness, nondistended; no guarding or rebound tenderness; good bowel sounds, no mass or bruits  Rectal exam chaperoned by Fredy Pinzon RN  No external hemorrhoids noted  No stool within rectal vault for guaiac testing    Extremities:  no pedal edema, 2+ pulses; no calf tenderness, no clubbing, no cyanosis  Neuro:  no focal motor or sensory deficits, CN 2-12 grossly intact  Psych:  Normal mood and affect, normal judgement and insight      Vital Signs  ED Triage Vitals [08/24/20 1307]   Temperature Pulse Respirations Blood Pressure SpO2   98 8 °F (37 1 °C) 65 17 143/72 97 %      Temp Source Heart Rate Source Patient Position - Orthostatic VS BP Location FiO2 (%)   Oral Monitor Sitting Right arm --      Pain Score       --           Vitals:    08/24/20 1307 08/24/20 1529 08/24/20 1739   BP: 143/72 149/65 140/63   Pulse: 65 60 67   Patient Position - Orthostatic VS: Sitting Lying Lying         Visual Acuity      ED Medications  Medications   iohexol (OMNIPAQUE) 350 MG/ML injection (MULTI-DOSE) 100 mL (100 mL Intravenous Given 8/24/20 1503)       Diagnostic Studies  Results Reviewed     Procedure Component Value Units Date/Time    Urine Microscopic [[id number]]  (Abnormal) Collected:  08/24/20 1448    Lab Status:  Final result Specimen:  Urine, Clean Catch Updated:  08/24/20 1554     RBC, UA Innumerable /hpf      WBC, UA 2-4 /hpf      Epithelial Cells Occasional /hpf      Bacteria, UA None Seen /hpf     POCT urinalysis dipstick [[id number]]  (Abnormal) Resulted:  08/24/20 1452    Lab Status:  Final result Specimen:  Urine Updated:  08/24/20 1453     Color, UA -    Urine Macroscopic, POC [[id number]]  (Abnormal) Collected:  08/24/20 1448    Lab Status:  Final result Specimen:  Urine Updated:  08/24/20 1449     Color, UA Margaret     Clarity, UA Clear     pH, UA 6 0     Leukocytes, UA Negative     Nitrite, UA Negative     Protein,  (2+) mg/dl      Glucose, UA Negative mg/dl      Ketones, UA Negative mg/dl      Urobilinogen, UA 0 2 E U /dl      Bilirubin, UA Negative     Blood, UA Large     Specific Harper, UA 1 020    Narrative:       CLINITEK RESULT    Protime-INR [[id number]]  (Abnormal) Collected:  08/24/20 1356    Lab Status:  Final result Specimen:  Blood from Arm, Left Updated:  08/24/20 1439     Protime 26 1 seconds      INR 2 46    APTT [[id number]]  (Abnormal) Collected:  08/24/20 1356    Lab Status:  Final result Specimen:  Blood from Arm, Left Updated:  08/24/20 1439      seconds     Troponin I [[id number]]  (Normal) Collected:  08/24/20 1356    Lab Status:  Final result Specimen:  Blood from Arm, Left Updated:  08/24/20 1428     Troponin I <0 02 ng/mL     Lipase [[id number]]  (Normal) Collected:  08/24/20 1356    Lab Status:  Final result Specimen:  Blood from Arm, Left Updated:  08/24/20 1427     Lipase 133 u/L     Comprehensive metabolic panel [[id number]]  (Abnormal) Collected:  08/24/20 1356    Lab Status:  Final result Specimen:  Blood from Arm, Left Updated:  08/24/20 1427     Sodium 133 mmol/L      Potassium 4 8 mmol/L      Chloride 100 mmol/L      CO2 21 mmol/L      ANION GAP 12 mmol/L      BUN 16 mg/dL      Creatinine 1 35 mg/dL      Glucose 156 mg/dL      Calcium 9 2 mg/dL      AST 97 U/L       U/L      Alkaline Phosphatase 77 U/L      Total Protein 7 5 g/dL      Albumin 3 6 g/dL      Total Bilirubin 0 30 mg/dL      eGFR 36 ml/min/1 73sq m     Narrative:       [place] guidelines for Chronic Kidney Disease (CKD):     Stage 1 with normal or high GFR (GFR > 90 mL/min/1 73 square meters)    Stage 2 Mild CKD (GFR = 60-89 mL/min/1 73 square meters)    Stage 3A Moderate CKD (GFR = 45-59 mL/min/1 73 square meters)    Stage 3B Moderate CKD (GFR = 30-44 mL/min/1 73 square meters)    Stage 4 Severe CKD (GFR = 15-29 mL/min/1 73 square meters)    Stage 5 End Stage CKD (GFR <15 mL/min/1 73 square meters)  Note: GFR calculation is accurate only with a steady state creatinine    CBC and differential [[id number]]  (Abnormal) Collected:  08/24/20 1356    Lab Status:  Final result Specimen:  Blood from Arm, Left Updated:  08/24/20 1405     WBC 9 39 Thousand/uL      RBC 4 26 Million/uL      Hemoglobin 10 6 g/dL      Hematocrit 34 5 %      MCV 81 fL      MCH 24 9 pg      MCHC 30 7 g/dL      RDW 17 8 %      MPV 10 7 fL      Platelets 202 Thousands/uL      nRBC 0 /100 WBCs      Neutrophils Relative 66 %      Immat GRANS % 1 %      Lymphocytes Relative 22 %      Monocytes Relative 10 %      Eosinophils Relative 0 %      Basophils Relative 1 %      Neutrophils Absolute 6 23 Thousands/µL      Immature Grans Absolute 0 05 Thousand/uL      Lymphocytes Absolute 2 09 Thousands/µL      Monocytes Absolute 0 96 Thousand/µL      Eosinophils Absolute 0 01 Thousand/µL      Basophils Absolute 0 05 Thousands/µL                  CT abdomen pelvis with contrast   Final Result by Dimitry Rosales MD (08/24 1533)      Cholelithiasis without cholecystitis  Stable hiatal hernia  Mild thickening of the bladder wall, correlate with urinalysis  Workstation performed: [id number]                    Procedures  Procedures         ED Course  ED Course as of Aug 24 1830   Mon Aug 24, 2020   1439 Trending up from recent labs   AST(!): 97   1439 Trending up from recent labs   ALT(!): 156   1440 Baseline around 1, trending up over the past year   Creatinine(!): 1 35   1441 Baseline    Hemoglobin(!): 10 6   1445 Patient voided 200 cc of urine while sitting on the commode  Postvoid residual obtained via bladder scan with 167 cc      1452 Urine negative for infection   Nitrite, UA: Negative   1538 1 ) Cholelithiasis without cholecystitis  2 ) Stable hiatal hernia  3 )  Mild thickening of the bladder wall, correlate with urinalysis  Pt's UA is negative for acute infection with recent negative urine culture  Pt only with lower abdominal tenderness  CT abdomen pelvis with contrast   1555 Pt and daughter updated on results  Pt with a benign abdominal at this time  Given progressively worsening creatinine, LFT's and symptoms will proceed with admission for further evaluation                                                  MDM      Disposition  Final diagnoses:   Bright red blood per rectum   Transaminitis Elevated serum creatinine     Time reflects when diagnosis was documented in both MDM as applicable and the Disposition within this note     Time User Action Codes Description Comment    8/24/2020  4:17 PM Joe Holt Add [K62 5] Bright red blood per rectum     8/24/2020  4:17 PM [address] [R74 0] Transaminitis     8/24/2020  4:17 PM Teri Laurent Add [R79 89] Elevated serum creatinine       ED Disposition     ED Disposition Condition Date/Time Comment    Admit Stable Mon Aug 24, 2020  4:18 PM Case was discussed with MAYO and the patient's admission status was agreed to be Admission Status: observation status to the service of Dr Carlos Baker   Follow-up Information    None         Patient's Medications   Discharge Prescriptions    No medications on file     No discharge procedures on file      PDMP Review     None          ED Provider  Electronically Signed by           Jules Jimenez DO  08/24/20 3248

## 2020-08-24 NOTE — ASSESSMENT & PLAN NOTE
Presenting with AST 97,   In the setting of abdominal pain  CT abdomen and pelvis:  Mild prominence of periportal triads  Cholelithiasis without cholecystitis  Atrophic fatty infiltrate of pancreas, hiatal hernia, no ascites, mild thickening of bladder wall correlate with urinalysis  Hold statin  Monitor on IV fluids  Continue to trend

## 2020-08-24 NOTE — TELEPHONE ENCOUNTER
[facility], [address] (Phone: [phone]; Fax: [phone]) states patient is having urinary retention and rectal bleeding  Her BP is 93/63  [facility] is requesting a GI Consent be issued and faxed to the number above

## 2020-08-24 NOTE — H&P
H&P- Reji Abarca 1935, 80 y o  female MRN: [id number]    Unit/Bed#: ED 17 Encounter: [id number]    Primary Care Provider: Ab Dhillon MD   Date and time admitted to hospital: 8/24/2020  1:16 PM        * Bright red blood per rectum  Assessment & Plan  Presents from home after having 2 episodes of stool mixed with blood  Consider possibly related to hemorrhoids given blood only on toilet paper  Keep overnight for observation and monitoring of hgb  Anticoagulated on Pradaxa for paroxysmal atrial fibrillation  No obvious hemorrhoids noted on rectal exam in ED  INR 2 46 on admission  Hemoglobin stable at 10 6  Recent baseline appearing to be from 10-11   Previously appearing in the 13-14  Placed on a clear liquid diet  Start gentle IV fluid in the setting of rising creatinine  Monitor hemoglobin overnight  Hold Pradaxa  If hemoglobin remains stable and no further bleeding consider resuming tomorrow  If persistent bloody bowel movements consult GI for possible scope    Urinary tract infection symptoms  Assessment & Plan  Recently treated for acute UTI 2 weeks ago with antibiotic  Reported recent dysuria and inability to urinate yesterday  Was straight catheterization by visiting nurses for 300 cc  Restarted on antibiotics yesterday per family but they dont know the name  CT abdomen with mild thickening of bladder wall- correlate with urinalysis  UA collected in ED but low yield at this point given already on antibiotics as an outpt  Will empirically treat with IV ceftriaxone  Afebrile, nontoxic, and without signs of sepsis on admission  Placed on urinary retention protocol  Bladder scan later tonight    Transaminitis  Assessment & Plan  Presenting with AST 97,   In the setting of abdominal pain  CT abdomen and pelvis:  Mild prominence of periportal triads  Cholelithiasis without cholecystitis    Atrophic fatty infiltrate of pancreas, hiatal hernia, no ascites, mild thickening of bladder wall correlate with urinalysis  Hold statin  Monitor on IV fluids  Continue to trend  History of CVA (cerebrovascular accident)  Assessment & Plan  History of CVA  Maintained on Pradaxa on statin  Both currently on hold    Chronic diastolic heart failure [facility])  Assessment & Plan  Wt Readings from Last 3 Encounters:   07/31/20 70 3 kg (155 lb)   07/16/20 72 8 kg (160 lb 6 4 oz)   06/16/20 73 3 kg (161 lb 9 6 oz)   No signs of acute volume overload on admission  Actually on the drier side with slightly elevated creatinine  Continue to monitor while on gentle IV fluids times 24 hours    Paroxysmal atrial fibrillation (HCC)  Assessment & Plan  Anticoagulated on Pradaxa-on hold in the setting of bright red blood per rectum  Continue amiodarone 200 mg daily  Continue Procardia 30 mg daily  Rate controlled on admission    Hyponatremia  Assessment & Plan  Sodium 133  Suspect secondary to dehydration  Recheck in a m  Hypertension  Assessment & Plan  Home regimen hydralazine 25 mg 3 times daily, nifedipine 30 mg daily, losartan 50 mg daily    Hyperlipidemia  Assessment & Plan  On Crestor 20 mg daily  Hold in the setting of elevated AST and ALT    Glaucoma  Assessment & Plan  Continue home eye drops    Type 2 diabetes mellitus with hyperglycemia, without long-term current use of insulin [facility])  Assessment & Plan  Lab Results   Component Value Date    HGBA1C 7 9 (H) 07/18/2020   Home regimen linagliptin and metformin-hold orals  Placed on insulin sliding scale  VTE Prophylaxis: Pharmacologic VTE Prophylaxis contraindicated due to Bright red blood per rectum  / sequential compression device   Code Status:  Full code  Anticipated Length of Stay:  Patient will be admitted on an Observation basis with an anticipated length of stay of  less than 2 midnights   Justification for Hospital Stay:  Bright red blood per rectum with need for further monitoring    Chief Complaint:   2 episodes blood with BM / urinary complaints    History of Present Illness:    Pablo Patterson is a 80 y o  female with past medical history of chronic diastolic CHF pre-hospital not maintained on diuretics, essential hypertension, hyperlipidemia, type 2 diabetes, history of CVA, glaucoma  She presents after noticing blood mixed in with her stool and on the toilet paper after wiping  This occurred last night as well as this morning  Stool was brown in color and but did have small spots of red on it  She has also been having 1 week of nausea and vomiting  She complains of abdominal pain  Yesterday she was unable to urinate  Daughter called home visiting nurses who came to assess the patient  They noted her blood pressure to be low in the 90 systolically  They also straight cathed her for a total of 300 cc  Patient has been having urinary symptoms on and off for the past 2 weeks as well  She complains of burning  She was on antibiotic 2 weeks ago and completed it course  Symptoms again reoccurred  She was started on another antibiotic yesterday however family is unsure of both names  Lives at home with daughter  Uses a walker to ambulate  Poor ambulatory status at baseline     Review of Systems:    General:   No Fever or chills; + burning with urination   EENT:   No ear pain, facial swelling; No sneezing, sore throat  Skin:   No rashes, color changes  Respiratory:     No shortness of breath, cough,   Cardiovascular:     No chest pain, palpitations  Gastrointestinal:    No nausea, vomiting, diarrhea; + lower abdominal pain  Musculoskeletal:     No arthralgias, myalgias, swelling  Neurologic:   No dizziness, numbness, weakness  No speech difficulties     Psych:   No agitation,     Otherwise, All other twelve-point review of systems normal      Past Medical and Surgical History:     Past Medical History:   Diagnosis Date    Ambulatory dysfunction     CHF (congestive heart failure) (HCC)     pEFHF    Cholelithiasis     Chronic pain disorder     Coronary artery disease     Depression with anxiety     Diabetes mellitus ([facility] [address] )     niddm    Glaucoma     Hypertension     Hyponatremia     Injury of finger of left hand 7/7/2019    Ischemic stroke of frontal lobe (HCC)     Right     Low back pain     Obstructive sleep apnea     PAF (paroxysmal atrial fibrillation) (HCC)     Vertigo        Past Surgical History:   Procedure Laterality Date    APPENDECTOMY      GLAUCOMA SURGERY  01/03/2016    NM REPLACE AORTIC VALVE OPENFEMORAL ARTERY APPROACH N/A 4/17/2018    Procedure: REPLACEMENT AORTIC VALVE TRANSCATHETER (TAVR) TRANSFEMORAL W/ 23 MM AGUILAR STEVE S3 VALVE;  Surgeon: Kyle Najera DO;  Location: [facility];  Service: Cardiac Surgery    TUBAL LIGATION      UPPER GASTROINTESTINAL ENDOSCOPY  10/28/2019       Meds/Allergies:    No current facility-administered medications for this encounter        Current Outpatient Medications   Medication Sig Dispense Refill    albuterol (PROAIR HFA) 90 mcg/act inhaler Inhale 2 puffs every 6 (six) hours as needed for wheezing 8 5 g 1    amiodarone 200 mg tablet Take 1 tablet (200 mg total) by mouth daily 90 tablet 2    Bimatoprost (LUMIGAN OP) Administer 1 drop to both eyes daily at bedtime Both eyes      Blood Pressure Monitoring (BLOOD PRESSURE CUFF) MISC by Does not apply route 2 (two) times a day 1 each 0    Cholecalciferol (VITAMIN D3) 1000 units CAPS Take 5,000 Units by mouth daily       cimetidine (TAGAMET) 400 mg tablet Take 1 tablet (400 mg total) by mouth 2 (two) times a day 60 tablet 1    Cyanocobalamin (VITAMIN B 12 PO) Take 1,000 mcg by mouth daily       hydrALAZINE (APRESOLINE) 25 mg tablet take 3 tablets by mouth three times a day (Patient taking differently: 2 (two) times a day ) 270 tablet 1    linaGLIPtin (Tradjenta) 5 MG TABS Take 5 mg by mouth daily 90 tablet 3    losartan (COZAAR) 50 mg tablet Take 50 mg by mouth daily      metFORMIN (GLUCOPHAGE) 500 mg tablet take 1 tablet by mouth twice a day with food 180 tablet 3    Misc  Devices (STETHOSCOPE) MISC by Does not apply route 2 (two) times a day 1 each 0    Netarsudil Dimesylate (RHOPRESSA) 0 02 % SOLN Apply to eye Left eye      NIFEdipine (PROCARDIA XL) 30 mg 24 hr tablet Take 1 tablet (30 mg total) by mouth daily 90 tablet 3    ondansetron (ZOFRAN) 4 mg tablet Take 1 tablet (4 mg total) by mouth every 8 (eight) hours as needed for nausea or vomiting 30 tablet 0    ONE TOUCH ULTRA TEST test strip TEST twice a day 200 each 3    OneTouch Delica Lancets 92N MISC TEST TWICE DAILY 200 each 3    potassium chloride (K-DUR,KLOR-CON) 10 mEq tablet take 1 tablet by mouth once daily 30 tablet 5    PRADAXA 150 MG capsu TAKE 1 CAPSULE BY MOUTH EVERY 12 HOURS FOR 90 DAYS 180 capsule 1    RESTASIS 0 05 % ophthalmic emulsion Administer 1 drop to both eyes daily   0    rosuvastatin (CRESTOR) 20 MG tablet Take 1 tablet (20 mg total) by mouth daily at bedtime 90 tablet 3    timolol (TIMOPTIC) 0 5 % ophthalmic solution Administer 1 drop to both eyes daily   0    Acetaminophen (TYLENOL) 325 MG CAPS Take 1 capsule by mouth as needed       acetaminophen (TYLENOL) 650 mg CR tablet Take 650 mg by mouth 2 (two) times a day as needed for mild pain         Allergies   Allergen Reactions    Lipitor [Atorvastatin] GI Intolerance     Nausea stomach ache    Aspirin GI Intolerance    Hydrochlorothiazide GI Intolerance    Lisinopril Cough    Metoprolol Headache       Allergies: Allergies   Allergen Reactions    Lipitor [Atorvastatin] GI Intolerance     Nausea stomach ache    Aspirin GI Intolerance    Hydrochlorothiazide GI Intolerance    Lisinopril Cough    Metoprolol Headache       Social History:     Marital Status:       Substance Use History:   Social History     Substance and Sexual Activity   Alcohol Use Never    Frequency: Never    Binge frequency: Never     Social History     Tobacco Use   Smoking Status Never Smoker   Smokeless Tobacco Never Used     Social History     Substance and Sexual Activity   Drug Use Never       Family History:    non-contributory    Physical Exam:     Vitals:   Blood Pressure: 140/63 (08/24/20 1739)  Pulse: 67 (08/24/20 1739)  Temperature: 98 8 °F (37 1 °C) (08/24/20 1307)  Temp Source: Oral (08/24/20 1307)  Respirations: 18 (08/24/20 1739)  SpO2: 93 % (08/24/20 1739)    Physical Exam  Vitals signs and nursing note reviewed  Constitutional:       General: She is not in acute distress  Appearance: Normal appearance  She is obese  She is not toxic-appearing or diaphoretic  HENT:      Head: Normocephalic and atraumatic  Eyes:      General: No scleral icterus  Cardiovascular:      Rate and Rhythm: Normal rate and regular rhythm  Pulmonary:      Effort: Pulmonary effort is normal  No respiratory distress  Breath sounds: Normal breath sounds  No stridor  No wheezing or rhonchi  Abdominal:      General: Bowel sounds are normal       Palpations: Abdomen is soft  Musculoskeletal:         General: No swelling or deformity  Skin:     General: Skin is warm and dry  Neurological:      Mental Status: She is alert and oriented to person, place, and time  Psychiatric:         Mood and Affect: Mood normal          Behavior: Behavior normal          Additional Data:     Lab Results: I have personally reviewed pertinent reports        Results from last 7 days   Lab Units 08/24/20  1356   WBC Thousand/uL 9 39   HEMOGLOBIN g/dL 10 6*   HEMATOCRIT % 34 5*   PLATELETS Thousands/uL 331   NEUTROS PCT % 66   LYMPHS PCT % 22   MONOS PCT % 10   EOS PCT % 0     Results from last 7 days   Lab Units 08/24/20  1356   POTASSIUM mmol/L 4 8   CHLORIDE mmol/L 100   CO2 mmol/L 21   BUN mg/dL 16   CREATININE mg/dL 1 35*   CALCIUM mg/dL 9 2   ALK PHOS U/L 77   ALT U/L 156*   AST U/L 97*     Results from last 7 days   Lab Units 08/24/20  1356   INR  2 46*       Imaging: I have personally reviewed pertinent reports  Ct Abdomen Pelvis With Contrast    Result Date: 8/24/2020  Narrative: CT ABDOMEN AND PELVIS WITH IV CONTRAST INDICATION:   Abdominal pain, acute, nonlocalized  Urinary frequency, nausea, and body stools  COMPARISON:  CT abdomen and pelvis study of March 9, 2020  TECHNIQUE:  CT examination of the abdomen and pelvis was performed  Axial, sagittal, and coronal 2D reformatted images were created from the source data and submitted for interpretation  Radiation dose length product (DLP) for this visit:  478 mGy-cm   This examination, like all CT scans performed in the [facility], was performed utilizing techniques to minimize radiation dose exposure, including the use of iterative reconstruction and automated exposure control  IV Contrast:  100 mL of iohexol (OMNIPAQUE) Enteric Contrast:  Enteric contrast was not administered  FINDINGS: ABDOMEN LOWER CHEST:  No clinically significant abnormality identified in the visualized lower chest   Status post TAVR  LIVER/BILIARY TREE:  Mild prominence of the periportal triads  GALLBLADDER:  Cholelithiasis without cholecystitis  SPLEEN:  Unremarkable  PANCREAS:  Atrophic with fatty infiltration  ADRENAL GLANDS:  Stable thickening of the adrenal glands, left more than right  KIDNEYS/URETERS:  No hydronephrosis or urinary tract calculus  One or more sharply circumscribed subcentimeter renal hypodensities are present, too small to accurately characterize, and statistically most likely benign findings  According to recent literature (Radiology 2019) no further workup of these findings is recommended  STOMACH AND BOWEL:  Hiatal hernia  APPENDIX:  No findings to suggest appendicitis  ABDOMINOPELVIC CAVITY:  No ascites  No pneumoperitoneum  No lymphadenopathy  VESSELS:  Extensive atherosclerotic disease of the aorta and its branches    Celiac and superior mesenteric artery origin stenosis due to atherosclerotic plaque PELVIS REPRODUCTIVE ORGANS: Unremarkable for patient's age  URINARY BLADDER:  Minimal bladder wall thickening compared to the prior study  ABDOMINAL WALL/INGUINAL REGIONS:  Unremarkable  OSSEOUS STRUCTURES:  No acute fracture or destructive osseous lesion  Lower thoracic kyphosis and mildly exaggerated lumbar lordosis with multilevel lumbar spondylosis  Impression: Cholelithiasis without cholecystitis  Stable hiatal hernia  Mild thickening of the bladder wall, correlate with urinalysis  Workstation performed: [id number]       EKG, Pathology, and Other Studies Reviewed on Admission:   · EKG: none    Allscripts Records Reviewed: Yes     Total Time for Visit, including Counseling / Coordination of Care: 45 minutes  Greater than 50% of this total time spent on direct patient counseling and coordination of care  ** Please Note: This note has been constructed using a voice recognition system   **

## 2020-08-25 LAB
ALBUMIN SERPL BCP-MCNC: 2.9 G/DL (ref 3.5–5)
ALP SERPL-CCNC: 60 U/L (ref 46–116)
ALT SERPL W P-5'-P-CCNC: 132 U/L (ref 12–78)
ANION GAP SERPL CALCULATED.3IONS-SCNC: 11 MMOL/L (ref 4–13)
AST SERPL W P-5'-P-CCNC: 81 U/L (ref 5–45)
BILIRUB SERPL-MCNC: 0.26 MG/DL (ref 0.2–1)
BUN SERPL-MCNC: 14 MG/DL (ref 5–25)
CALCIUM SERPL-MCNC: 8.6 MG/DL (ref 8.3–10.1)
CHLORIDE SERPL-SCNC: 104 MMOL/L (ref 100–108)
CO2 SERPL-SCNC: 23 MMOL/L (ref 21–32)
CREAT SERPL-MCNC: 1.13 MG/DL (ref 0.6–1.3)
ERYTHROCYTE [DISTWIDTH] IN BLOOD BY AUTOMATED COUNT: 18.1 % (ref 11.6–15.1)
FERRITIN SERPL-MCNC: 15 NG/ML (ref 8–388)
FOLATE SERPL-MCNC: 3 NG/ML (ref 3.1–17.5)
GFR SERPL CREATININE-BSD FRML MDRD: 44 ML/MIN/1.73SQ M
GLUCOSE P FAST SERPL-MCNC: 105 MG/DL (ref 65–99)
GLUCOSE SERPL-MCNC: 105 MG/DL (ref 65–140)
GLUCOSE SERPL-MCNC: 137 MG/DL (ref 65–140)
GLUCOSE SERPL-MCNC: 167 MG/DL (ref 65–140)
HBV CORE AB SER QL: NORMAL
HBV CORE IGM SER QL: NORMAL
HBV SURFACE AG SER QL: NORMAL
HCT VFR BLD AUTO: 29.1 % (ref 34.8–46.1)
HCT VFR BLD AUTO: 29.6 % (ref 34.8–46.1)
HCV AB SER QL: NORMAL
HGB BLD-MCNC: 8.9 G/DL (ref 11.5–15.4)
HGB BLD-MCNC: 9.1 G/DL (ref 11.5–15.4)
IRON SATN MFR SERPL: 8 %
IRON SERPL-MCNC: 27 UG/DL (ref 50–170)
MCH RBC QN AUTO: 24.9 PG (ref 26.8–34.3)
MCHC RBC AUTO-ENTMCNC: 30.6 G/DL (ref 31.4–37.4)
MCV RBC AUTO: 81 FL (ref 82–98)
PLATELET # BLD AUTO: 279 THOUSANDS/UL (ref 149–390)
PMV BLD AUTO: 11.4 FL (ref 8.9–12.7)
POTASSIUM SERPL-SCNC: 4.5 MMOL/L (ref 3.5–5.3)
PROT SERPL-MCNC: 6.1 G/DL (ref 6.4–8.2)
RBC # BLD AUTO: 3.58 MILLION/UL (ref 3.81–5.12)
SODIUM SERPL-SCNC: 138 MMOL/L (ref 136–145)
T4 FREE SERPL-MCNC: 1.32 NG/DL (ref 0.76–1.46)
TIBC SERPL-MCNC: 335 UG/DL (ref 250–450)
TSH SERPL DL<=0.05 MIU/L-ACNC: 6.14 UIU/ML (ref 0.36–3.74)
VIT B12 SERPL-MCNC: 865 PG/ML (ref 100–900)
WBC # BLD AUTO: 6.64 THOUSAND/UL (ref 4.31–10.16)

## 2020-08-25 PROCEDURE — 85027 COMPLETE CBC AUTOMATED: CPT | Performed by: PHYSICIAN ASSISTANT

## 2020-08-25 PROCEDURE — 83550 IRON BINDING TEST: CPT | Performed by: PHYSICIAN ASSISTANT

## 2020-08-25 PROCEDURE — 83540 ASSAY OF IRON: CPT | Performed by: PHYSICIAN ASSISTANT

## 2020-08-25 PROCEDURE — 86803 HEPATITIS C AB TEST: CPT | Performed by: INTERNAL MEDICINE

## 2020-08-25 PROCEDURE — 86704 HEP B CORE ANTIBODY TOTAL: CPT | Performed by: INTERNAL MEDICINE

## 2020-08-25 PROCEDURE — 84443 ASSAY THYROID STIM HORMONE: CPT | Performed by: PHYSICIAN ASSISTANT

## 2020-08-25 PROCEDURE — 85018 HEMOGLOBIN: CPT | Performed by: PHYSICIAN ASSISTANT

## 2020-08-25 PROCEDURE — 82607 VITAMIN B-12: CPT | Performed by: PHYSICIAN ASSISTANT

## 2020-08-25 PROCEDURE — 86705 HEP B CORE ANTIBODY IGM: CPT | Performed by: INTERNAL MEDICINE

## 2020-08-25 PROCEDURE — 99222 1ST HOSP IP/OBS MODERATE 55: CPT | Performed by: INTERNAL MEDICINE

## 2020-08-25 PROCEDURE — 99232 SBSQ HOSP IP/OBS MODERATE 35: CPT | Performed by: INTERNAL MEDICINE

## 2020-08-25 PROCEDURE — 80053 COMPREHEN METABOLIC PANEL: CPT | Performed by: PHYSICIAN ASSISTANT

## 2020-08-25 PROCEDURE — 85014 HEMATOCRIT: CPT | Performed by: PHYSICIAN ASSISTANT

## 2020-08-25 PROCEDURE — 82728 ASSAY OF FERRITIN: CPT | Performed by: PHYSICIAN ASSISTANT

## 2020-08-25 PROCEDURE — 87340 HEPATITIS B SURFACE AG IA: CPT | Performed by: INTERNAL MEDICINE

## 2020-08-25 PROCEDURE — 82746 ASSAY OF FOLIC ACID SERUM: CPT | Performed by: PHYSICIAN ASSISTANT

## 2020-08-25 PROCEDURE — 82948 REAGENT STRIP/BLOOD GLUCOSE: CPT

## 2020-08-25 PROCEDURE — 84439 ASSAY OF FREE THYROXINE: CPT | Performed by: PHYSICIAN ASSISTANT

## 2020-08-25 RX ORDER — SULFAMETHOXAZOLE AND TRIMETHOPRIM 800; 160 MG/1; MG/1
1 TABLET ORAL EVERY 12 HOURS SCHEDULED
Status: DISCONTINUED | OUTPATIENT
Start: 2020-08-25 | End: 2020-08-26 | Stop reason: HOSPADM

## 2020-08-25 RX ORDER — PANTOPRAZOLE SODIUM 40 MG/1
40 TABLET, DELAYED RELEASE ORAL
Status: DISCONTINUED | OUTPATIENT
Start: 2020-08-25 | End: 2020-08-26 | Stop reason: HOSPADM

## 2020-08-25 RX ORDER — DABIGATRAN ETEXILATE 75 MG/1
75 CAPSULE, COATED PELLETS ORAL 2 TIMES DAILY
Status: DISCONTINUED | OUTPATIENT
Start: 2020-08-25 | End: 2020-08-26 | Stop reason: HOSPADM

## 2020-08-25 RX ORDER — PRAVASTATIN SODIUM 40 MG
40 TABLET ORAL
Status: DISCONTINUED | OUTPATIENT
Start: 2020-08-25 | End: 2020-08-26 | Stop reason: HOSPADM

## 2020-08-25 RX ORDER — CYCLOSPORINE 0.5 MG/ML
1 EMULSION OPHTHALMIC 2 TIMES DAILY
Status: DISCONTINUED | OUTPATIENT
Start: 2020-08-25 | End: 2020-08-26 | Stop reason: HOSPADM

## 2020-08-25 RX ORDER — DABIGATRAN ETEXILATE 150 MG/1
150 CAPSULE, COATED PELLETS ORAL 2 TIMES DAILY
Status: DISCONTINUED | OUTPATIENT
Start: 2020-08-25 | End: 2020-08-25

## 2020-08-25 RX ADMIN — AMIODARONE HYDROCHLORIDE 200 MG: 200 TABLET ORAL at 08:30

## 2020-08-25 RX ADMIN — DABIGATRAN ETEXILATE MESYLATE 75 MG: 75 CAPSULE ORAL at 17:14

## 2020-08-25 RX ADMIN — BIMATOPROST 1 DROP: 0.1 SOLUTION/ DROPS OPHTHALMIC at 21:30

## 2020-08-25 RX ADMIN — FAMOTIDINE 20 MG: 20 TABLET, FILM COATED ORAL at 17:15

## 2020-08-25 RX ADMIN — PRAVASTATIN SODIUM 40 MG: 40 TABLET ORAL at 17:14

## 2020-08-25 RX ADMIN — PANTOPRAZOLE SODIUM 40 MG: 40 TABLET, DELAYED RELEASE ORAL at 11:57

## 2020-08-25 RX ADMIN — FAMOTIDINE 20 MG: 20 TABLET, FILM COATED ORAL at 08:30

## 2020-08-25 RX ADMIN — INSULIN LISPRO 1 UNITS: 100 INJECTION, SOLUTION INTRAVENOUS; SUBCUTANEOUS at 17:16

## 2020-08-25 RX ADMIN — HYDRALAZINE HYDROCHLORIDE 25 MG: 25 TABLET, FILM COATED ORAL at 17:14

## 2020-08-25 RX ADMIN — SULFAMETHOXAZOLE AND TRIMETHOPRIM 1 TABLET: 800; 160 TABLET ORAL at 21:30

## 2020-08-25 RX ADMIN — LOSARTAN POTASSIUM 50 MG: 50 TABLET, FILM COATED ORAL at 08:30

## 2020-08-25 RX ADMIN — SULFAMETHOXAZOLE AND TRIMETHOPRIM 1 TABLET: 800; 160 TABLET ORAL at 14:53

## 2020-08-25 RX ADMIN — HYDRALAZINE HYDROCHLORIDE 25 MG: 25 TABLET, FILM COATED ORAL at 08:30

## 2020-08-25 RX ADMIN — SODIUM CHLORIDE 75 ML/HR: 0.9 INJECTION, SOLUTION INTRAVENOUS at 14:06

## 2020-08-25 RX ADMIN — NIFEDIPINE 30 MG: 30 TABLET, FILM COATED, EXTENDED RELEASE ORAL at 08:30

## 2020-08-25 RX ADMIN — CYCLOSPORINE 1 DROP: 0.5 EMULSION OPHTHALMIC at 21:30

## 2020-08-25 NOTE — ASSESSMENT & PLAN NOTE
· Frequent UTI currently on Bactrim ds    Four more doses to complete course as prescribed  · Patient did require straight catheterization for PVR>300 by [facility]  · No further symptoms during hospitalization

## 2020-08-25 NOTE — ASSESSMENT & PLAN NOTE
· Transaminitis without clear cause    Hepatitis panel ordered by gastroenterology    Results from last 7 days   Lab Units 08/25/20  0527 08/24/20  1356   AST U/L 81* 97*   ALT U/L 132* 156*   TOTAL BILIRUBIN mg/dL 0 26 0 30

## 2020-08-25 NOTE — ASSESSMENT & PLAN NOTE
· BRBPR while on full anticoagulation with Pradaxa  · Patient was once again offered colonoscopy by GI but declined  · No further episodes    Will restart Pradaxa and monitor with advancement of diet

## 2020-08-25 NOTE — ASSESSMENT & PLAN NOTE
Wt Readings from Last 3 Encounters:   07/31/20 70 3 kg (155 lb)   07/16/20 72 8 kg (160 lb 6 4 oz)   06/16/20 73 3 kg (161 lb 9 6 oz)     · Chronic diastolic CHF currently stable and appears to be at euvolemia without need for diuretics

## 2020-08-25 NOTE — PLAN OF CARE
Problem: Potential for Falls  Goal: Patient will remain free of falls  Description: INTERVENTIONS:  - Assess patient frequently for physical needs  -  Identify cognitive and physical deficits and behaviors that affect risk of falls    -  [place] fall precautions as indicated by assessment   - Educate patient/family on patient safety including physical limitations  - Instruct patient to call for assistance with activity based on assessment  - Modify environment to reduce risk of injury  - Consider OT/PT consult to assist with strengthening/mobility  Outcome: Progressing     Problem: Prexisting or High Potential for Compromised Skin Integrity  Goal: Skin integrity is maintained or improved  Description: INTERVENTIONS:  - Identify patients at risk for skin breakdown  - Assess and monitor skin integrity  - Assess and monitor nutrition and hydration status  - Monitor labs   - Assess for incontinence   - Turn and reposition patient  - Assist with mobility/ambulation  - Relieve pressure over bony prominences  - Avoid friction and shearing  - Provide appropriate hygiene as needed including keeping skin clean and dry  - Evaluate need for skin moisturizer/barrier cream  - Collaborate with interdisciplinary team   - Patient/family teaching  - Consider wound care consult   Outcome: Progressing     Problem: PAIN - ADULT  Goal: Verbalizes/displays adequate comfort level or baseline comfort level  Description: Interventions:  - Encourage patient to monitor pain and request assistance  - Assess pain using appropriate pain scale  - Administer analgesics based on type and severity of pain and evaluate response  - Implement non-pharmacological measures as appropriate and evaluate response  - Consider cultural and social influences on pain and pain management  - Notify physician/advanced practitioner if interventions unsuccessful or patient reports new pain  Outcome: Progressing     Problem: INFECTION - ADULT  Goal: Absence or prevention of progression during hospitalization  Description: INTERVENTIONS:  - Assess and monitor for signs and symptoms of infection  - Monitor lab/diagnostic results  - Monitor all insertion sites, i e  indwelling lines, tubes, and drains  - Monitor endotracheal if appropriate and nasal secretions for changes in amount and color  - [place] appropriate cooling/warming therapies per order  - Administer medications as ordered  - Instruct and encourage patient and family to use good hand hygiene technique  - Identify and instruct in appropriate isolation precautions for identified infection/condition  Outcome: Progressing     Problem: SAFETY ADULT  Goal: Patient will remain free of falls  Description: INTERVENTIONS:  - Assess patient frequently for physical needs  -  Identify cognitive and physical deficits and behaviors that affect risk of falls    -  [place] fall precautions as indicated by assessment   - Educate patient/family on patient safety including physical limitations  - Instruct patient to call for assistance with activity based on assessment  - Modify environment to reduce risk of injury  - Consider OT/PT consult to assist with strengthening/mobility  Outcome: Progressing  Goal: Maintain or return to baseline ADL function  Description: INTERVENTIONS:  -  Assess patient's ability to carry out ADLs; assess patient's baseline for ADL function and identify physical deficits which impact ability to perform ADLs (bathing, care of mouth/teeth, toileting, grooming, dressing, etc )  - Assess/evaluate cause of self-care deficits   - Assess range of motion  - Assess patient's mobility; develop plan if impaired  - Assess patient's need for assistive devices and provide as appropriate  - Encourage maximum independence but intervene and supervise when necessary  - Involve family in performance of ADLs  - Assess for home care needs following discharge   - Consider OT consult to assist with ADL evaluation and planning for discharge  - Provide patient education as appropriate  Outcome: Progressing  Goal: Maintain or return mobility status to optimal level  Description: INTERVENTIONS:  - Assess patient's baseline mobility status (ambulation, transfers, stairs, etc )    - Identify cognitive and physical deficits and behaviors that affect mobility  - Identify mobility aids required to assist with transfers and/or ambulation (gait belt, sit-to-stand, lift, walker, cane, etc )  - [place] fall precautions as indicated by assessment  - Record patient progress and toleration of activity level on Mobility SBAR; progress patient to next Phase/Stage  - Instruct patient to call for assistance with activity based on assessment  - Consider rehabilitation consult to assist with strengthening/weightbearing, etc   Outcome: Progressing     Problem: DISCHARGE PLANNING  Goal: Discharge to home or other facility with appropriate resources  Description: INTERVENTIONS:  - Identify barriers to discharge w/patient and caregiver  - Arrange for needed discharge resources and transportation as appropriate  - Identify discharge learning needs (meds, wound care, etc )  - Arrange for interpretive services to assist at discharge as needed  - Refer to Case Management Department for coordinating discharge planning if the patient needs post-hospital services based on physician/advanced practitioner order or complex needs related to functional status, cognitive ability, or social support system  Outcome: Progressing     Problem: Knowledge Deficit  Goal: Patient/family/caregiver demonstrates understanding of disease process, treatment plan, medications, and discharge instructions  Description: Complete learning assessment and assess knowledge base    Interventions:  - Provide teaching at level of understanding  - Provide teaching via preferred learning methods  Outcome: Progressing

## 2020-08-25 NOTE — UTILIZATION REVIEW
Initial Clinical Review    Admission: Date/Time/Statement:   Admission Orders (From admission, onward)             Pt placed in observation status on 8/24 @ 1620 changed to inpatient status on 8/25 @ 14:12 due to need for continued monitoring for her GI bleed  08/25/20 1412    Inpatient Admission       Transfer Service: General Medicine       Question  Answer  Comment    Admitting Physician  SAULO RECIO     Level of Care  Med Surg     Estimated length of stay  More than 2 Midnights     Certification  I certify that inpatient services are medically necessary for this patient for a duration of greater than two midnights  See H&P and MD Progress Notes for additional information about the patient's course of treatment  ED Arrival Information     Expected Arrival Acuity Means of Arrival Escorted By Service Admission Type    8/24/2020 8/24/2020 12:57 Urgent Wheelchair Family Member Hospitalist Urgent    Arrival Complaint    Blood in stool        Chief Complaint   Patient presents with    Abdominal Pain     pt reports she was evaluated by a visiting doctor today  pt reports urinary frequency, nausea, blood in stool  pt was directed to come to ED for evaluation  Assessment/Plan:  79 yo f with a pmh of  afib on Pradaxa, chronic diastolic CHF not on diuretics, HTN, HLD , DM2, glaucoma and Hx of CVA  She presents after noticing blood mixed in with her stool and on toilet paper after wiping  She also reports having 1 week of nausea and vomiting with abdominal pain  VNA noted her BP in the low 30'A systolical, she was straight cathed for 300 cc as she has been having urinary symptoms on & off the past 2 weeks as well, burning, she has been on an antibiotic 2 weeks ago and completed a course, but now symptoms have reoccurred and abx' s restarted yesterday  She is admitted to observation status for evaluation of BRBPR and treatment of her UTI on Rocephin        Gastroenterology - 8/25 pt presented with multiple episodes of  rectal  Bleed, and anemia  Pt on Pradaxa  Bleeding may be due to hemorrhoids vs anal fissure  Colonoscopy suggested she declined due to her advanced age  Elevated liver enzymes  may be drug induced liver injury due to recent antibiotics vs transient ischemic hepatitis, NAFLD vrs  viral hepatitis  Plan to trend liver enzymes and check a viral hepatitis panel  8/25 Plan to restart Pradaxa and monitor status with advancement of her diet  Hyponatremia on admission due to slight dehydration  Resolved with IVF's  UTI on Bactrim 4 more doses to complete course  Hepatitis panel pending         ED Triage Vitals   Temperature Pulse Respirations Blood Pressure SpO2   08/24/20 1307 08/24/20 1307 08/24/20 1307 08/24/20 1307 08/24/20 1307   98 8 °F (37 1 °C) 65 17 143/72 97 %      Temp Source Heart Rate Source Patient Position - Orthostatic VS BP Location FiO2 (%)   08/24/20 1307 08/24/20 1307 08/24/20 1307 08/24/20 1307 --   Oral Monitor Sitting Right arm       Pain Score       08/24/20 2133       No Pain          Wt Readings from Last 1 Encounters:   07/31/20 70 3 kg (155 lb)     Additional Vital Signs:   Date/Time   Temp   Pulse   Resp   BP   MAP (mmHg)   SpO2   O2 Device   Patient Position - Orthostatic VS    08/25/20 07:19:07   98 2 °F (36 8 °C)   62   18   140/49Abnormal     79   96 %          08/24/20 22:13:16   98 3 °F (36 8 °C)   65   18   147/56   86   95 %          08/24/20 2152                     None (Room air)       08/24/20 21:19:35   98 2 °F (36 8 °C)   70   16   89/51Abnormal     64   98 %          08/24/20 1947      70   18   138/61      95 %   None (Room air)       08/24/20 1739      67   18   140/63      93 %   None (Room air)   Lying    08/24/20 1529      60   18   149/65      94 %   None (Room air)   Lying          Pertinent Labs/Diagnostic Test Results:       Results from last 7 days   Lab Units 08/25/20  0526 08/25/20  0000 08/24/20  1356   WBC Thousand/uL 6 64  --  9 39   HEMOGLOBIN g/dL 8 9* 9 1* 10 6*   HEMATOCRIT % 29 1* 29 6* 34 5*   PLATELETS Thousands/uL 279  --  331   NEUTROS ABS Thousands/µL  --   --  6 23         Results from last 7 days   Lab Units 08/25/20  0527 08/24/20  1356   SODIUM mmol/L 138 133*   POTASSIUM mmol/L 4 5 4 8   CHLORIDE mmol/L 104 100   CO2 mmol/L 23 21   ANION GAP mmol/L 11 12   BUN mg/dL 14 16   CREATININE mg/dL 1 13 1 35*   EGFR ml/min/1 73sq m 44 36   CALCIUM mg/dL 8 6 9 2     Results from last 7 days   Lab Units 08/25/20  0527 08/24/20  1356   AST U/L 81* 97*   ALT U/L 132* 156*   ALK PHOS U/L 60 77   TOTAL PROTEIN g/dL 6 1* 7 5   ALBUMIN g/dL 2 9* 3 6   TOTAL BILIRUBIN mg/dL 0 26 0 30         Results from last 7 days   Lab Units 08/25/20  0527 08/24/20  1356   GLUCOSE RANDOM mg/dL 105 156*     Results from last 7 days   Lab Units 08/24/20  1356   TROPONIN I ng/mL <0 02         Results from last 7 days   Lab Units 08/24/20  1356   PROTIME seconds 26 1*   INR  2 46*   PTT seconds 100*     Results from last 7 days   Lab Units 08/24/20  1356   LIPASE u/L 133     Results from last 7 days   Lab Units 08/24/20  1452 08/24/20  1448 08/20/20  0703   CLARITY UA   --  Clear Cloudy   COLOR UA  - Margaret Yellow   SPEC GRAV UA   --  1 020 1 016   PH UA   --  6 0 6 0   GLUCOSE UA mg/dl  --  Negative 500 (1/2%)*   KETONES UA mg/dl  --  Negative Negative   BLOOD UA   --  Large* Moderate*   PROTEIN UA mg/dl  --  100 (2+)* 30 (1+)*   NITRITE UA   --  Negative Negative   BILIRUBIN UA   --  Negative Negative   UROBILINOGEN UA E U /dl  --  0 2 0 2   LEUKOCYTES UA   --  Negative Negative   WBC UA /hpf  --  2-4* 4-10*   RBC UA /hpf  --  Innumerable* Innumerable*   BACTERIA UA /hpf  --  None Seen None Seen   EPITHELIAL CELLS WET PREP /hpf  --  Occasional None Seen     Results from last 7 days   Lab Units 08/20/20  0703   URINE CULTURE  No Growth <1000 cfu/mL     CT A &P - 8/24 - Cholelithiasis without cholecystitis  Stable hiatal hernia     Mild thickening of the bladder wall, correlate with urinalysis       ED Treatment:   Medication Administration from 08/24/2020 1239 to 08/24/2020 2127       Date/Time Order Dose Route Action Comments     08/24/2020 1503 iohexol (OMNIPAQUE) 350 MG/ML injection (MULTI-DOSE) 100 mL 100 mL Intravenous Given      08/24/2020 2019 famotidine (PEPCID) tablet 20 mg 20 mg Oral Given      08/24/2020 2019 hydrALAZINE (APRESOLINE) tablet 25 mg 25 mg Oral Given         Past Medical History:   Diagnosis Date    Ambulatory dysfunction     CHF (congestive heart failure) ([facility])     pEFHF    Cholelithiasis     Chronic pain disorder     Coronary artery disease     Depression with anxiety     Diabetes mellitus ([facility] [address] )     niddm    Glaucoma     Hypertension     Hyponatremia     Injury of finger of left hand 7/7/2019    Ischemic stroke of frontal lobe ([facility])     Right     Low back pain     Obstructive sleep apnea     PAF (paroxysmal atrial fibrillation) ([facility])     Vertigo      Present on Admission:   Bright red blood per rectum   Hyperlipidemia   Hypertension   Chronic diastolic heart failure ([facility])   Type 2 diabetes mellitus with hyperglycemia, without long-term current use of insulin ([facility])   Glaucoma   Hyponatremia   Paroxysmal atrial fibrillation ([facility])   Urinary tract infection symptoms   Transaminitis      Admitting Diagnosis: Blood in stool [K92 1]  Bright red blood per rectum [K62 5]  Transaminitis [R74 0]  Elevated serum creatinine [R79 89]  Age/Sex: 80 y o  female  Admission Orders:  Scheduled Medications:  amiodarone, 200 mg, Oral, Daily  bimatoprost, 1 drop, Both Eyes, HS  famotidine, 20 mg, Oral, BID  hydrALAZINE, 25 mg, Oral, BID  losartan, 50 mg, Oral, Daily  NIFEdipine, 30 mg, Oral, Daily  Restasis opth emulsion 1 gtt  Bid both eyes  Pradaxa 75 mg po bid - restarted 8/25 - PM           Continuous IV Infusions:  sodium chloride, 75 mL/hr, Intravenous, Continuous- d/c'd 8/25 @ 14:12      PRN Meds:  acetaminophen, 650 mg, Oral, Q6H PRN  ondansetron, 4 mg, Intravenous, Q6H PRN      Nursing orders - VS - up & OOB as tolerated - SCD's to le's - diet clear liquids advanced to cons carb  On 8/25     Network Utilization Review Department  [email or web address]  ATTENTION: Please call with any questions or concerns to [phone] and carefully listen to the prompts so that you are directed to the right person  All voicemails are confidential   [facility] all requests for admission clinical reviews, approved or denied determinations and any other requests to dedicated fax number below belonging to the campus where the patient is receiving treatment   List of dedicated fax numbers for the Facilities:  [address] DENIALS (Administrative/Medical Necessity) [phone]   [address] (Maternity/NICU/Pediatrics) [phone]   Madison Handing [phone]   Chidi Butts [phone]   Patricia Benoit [phone]   Mily Jimenez [phone]   [address] [place] [address] [address] [place] [address] [phone]   [facility]  [phone]   [address] [facility] [address]  [address] [facility] [address] [facility] [phone]

## 2020-08-25 NOTE — APP STUDENT NOTE
Progress Note- Bessy Conner 1935, 80 y o  female MRN: [id number]   Unit/Bed#: ED 17 Encounter: [id number]     Assessment/Plan:  * Bright red blood per rectum  Assessment & Plan  · Presents from home after having 2 episodes of stool mixed with blood  · Consider possibly related to hemorrhoids given blood only on toilet paper  · No obvious hemorrhoids noted on rectal exam in ED  · INR 2 46 on admission  · Hemoglobin 8 9 today, was 10 6 on admission (baseline 10-11), continue to monitor   · Start PPI per GI   · Resume Pradaxa today and monitor for bleeding  · GI consulted, appreciate recommendations     Urinary tract infection symptoms  Assessment & Plan  · Recently treated for acute UTI 2 weeks ago with Bactrim, was restarted on abx yesterday per family, but urine didn't grow bacteria   · Reported dysuria and inability to urinate yesterday-- was straight cathed by visiting nurses for 300 cc  · CT abdomen with mild thickening of bladder wall- correlate with urinalysis  · UA collected in ED but low yield at this point given already on antibiotics as an outpt  · Will empirically treat with IV ceftriaxone     Transaminitis  Assessment & Plan  · Presenting with AST 97,  in the setting of abdominal pain  · CT abdomen and pelvis:  Mild prominence of periportal triads  Cholelithiasis without cholecystitis   Atrophic fatty infiltrate of pancreas, hiatal hernia, no ascites, mild thickening of bladder wall correlate with urinalysis  · Avoid hepatotoxic medications   · Resume statin   · Monitor on IV fluids  · Continue to trend, AST 81,  today  · Check chronic hepatitis panel      History of CVA (cerebrovascular accident)  Assessment & Plan  · History of CVA  · Maintained on Pradaxa and statin  · Both currently on hold     Chronic diastolic heart failure [facility])  Assessment & Plan      Wt Readings from Last 3 Encounters:   07/31/20 70 3 kg (155 lb)   07/16/20 72 8 kg (160 lb 6 4 oz)   06/16/20 73 3 kg (161 lb 9 6 oz)     · No signs of acute volume overload on admission  · Continue to monitor while on IV fluids  · Restart diuretic      Paroxysmal atrial fibrillation (HCC)  Assessment & Plan  · Anticoagulated on Pradaxa-- on hold in the setting of bright red blood per rectum  · Continue amiodarone 200 mg daily and Procardia 30 mg daily  · Rate controlled on admission     Hyponatremia  Assessment & Plan  · Resolved  · Sodium 138 today, continue to monitor      Hypertension  Assessment & Plan  · Home regimen hydralazine 25 mg 3 times daily, nifedipine 30 mg daily, losartan 50 mg daily     Hyperlipidemia  Assessment & Plan  · On Crestor 20 mg daily, resume      Glaucoma  Assessment & Plan  · Continue home eye drops     Type 2 diabetes mellitus with hyperglycemia, without long-term current use of insulin (HCC)  Assessment & Plan        Lab Results   Component Value Date     HGBA1C 7 9 (H) 07/18/2020     · Home regimen linagliptin and metformin-- hold orals  · Placed on insulin sliding scale    Subjective: The patient states she feels much better today  She denies any symptomatic anemia including lightheadedness, chest pain, fatigue, SOB, palpitations  She does endorse dizziness but states she is chronically dizzy from her prior CVA  She had a BM this morning with no blood in the stool or on the toilet paper  No abdominal pain, no N/V/D  She states she is still feeling like she has to urinate and then doesn't have any urine output when she goes  No burning or pain with urination  She notes her urine has been a little darker than normal      Objective:  Vitals: Temp 98 2, Pulse 62, Respiratory rate 18, Blood pressure 140/49, O2 96% on room air, Height 4'9", Weight 70 3kg (155 lbs), BMI 33 54  Physical Exam:  Constitutional:  She is not in acute distress  Normal appearance  She is obese  She is not toxic-appearing or diaphoretic  HENT: Head normocephalic and atraumatic  No scleral icterus    Cardiovascular: Normal rate and regular rhythm  Pulmonary: Pulmonary effort is normal  No respiratory distress  Normal breath sounds  No stridor  No wheezing or rhonchi  Abdominal: Bowel sounds are normal   Abdomen is soft, non-tender, non-distended  Musculoskeletal:  No swelling or deformity  Skin: Skin is warm and dry  Neurological:  She is alert and oriented to person, place, and time     Psychiatric:  Mood normal  Behavior normal

## 2020-08-25 NOTE — PROGRESS NOTES
Progress Note - Matty Duggan 1935, 80 y o  female MRN: [id number]    Unit/Bed#: [place] [address] 218-01 Encounter: [id number]    Primary Care Provider: Yosi Weldon MD   Date and time admitted to hospital: 8/24/2020  1:16 PM        * Bright red blood per rectum  Assessment & Plan  · BRBPR while on full anticoagulation with Pradaxa  · Patient was once again offered colonoscopy by GI but declined  · No further episodes  Will restart Pradaxa and monitor with advancement of diet     Hyponatremia  Assessment & Plan  · Hyponatremia present admission secondary to slight dehydration  · Resolved with IV fluids  Will discontinue given history of congestive heart failure    Results from last 7 days   Lab Units 08/25/20  0527 08/24/20  1356   SODIUM mmol/L 138 133*       Transaminitis  Assessment & Plan  · Transaminitis without clear cause  Hepatitis panel ordered by gastroenterology    Results from last 7 days   Lab Units 08/25/20  0527 08/24/20  1356   AST U/L 81* 97*   ALT U/L 132* 156*   TOTAL BILIRUBIN mg/dL 0 26 0 30       Urinary tract infection symptoms  Assessment & Plan  · Frequent UTI currently on Bactrim ds    Four more doses to complete course as prescribed  · Patient did require straight catheterization for PVR>300 by VNA  · No further symptoms during hospitalization     History of CVA (cerebrovascular accident)  Assessment & Plan  · History of CVA x2 without any residual deficits    Chronic diastolic heart failure (HCC)  Assessment & Plan  Wt Readings from Last 3 Encounters:   07/31/20 70 3 kg (155 lb)   07/16/20 72 8 kg (160 lb 6 4 oz)   06/16/20 73 3 kg (161 lb 9 6 oz)     · Chronic diastolic CHF currently stable and appears to be at euvolemia without need for diuretics    Paroxysmal atrial fibrillation (HCC)  Assessment & Plan  · Paroxysmal atrial fibrillation on amiodarone  · Pradaxa currently on hold but will restart given resolution of gastrointestinal bleeding    Hypertension  Assessment & Plan  · Essential hypertension continue nifedipine hydralazine and losartan    Hyperlipidemia  Assessment & Plan  · Hyperlipidemia will restart statin    Glaucoma  Assessment & Plan  · Glaucoma continue eyedrops    Type 2 diabetes mellitus with hyperglycemia, without long-term current use of insulin (HCC)  Assessment & Plan  Lab Results   Component Value Date    HGBA1C 7 9 (H) 07/18/2020        · Diabetes mellitus holding oral agents  Will add sliding scale insulin      VTE Pharmacologic Prophylaxis: Restart Pradaxa    Patient Centered Rounds: I have performed bedside rounds with nursing staff today  Discussions with Specialists or Other Care Team Provider:  GI  Education and Discussions with Family / Patient:  Daughter    Time Spent for Care: 25 mins  More than 50% of total time spent on counseling and coordination of care as described above  Current Length of Stay: 0 day(s)  Current Patient Status: Inpatient     Certification Statement: The patient will need greater than two midnight inpatient hospitalization due to GI bleeding     Discharge Plan / Estimated Discharge Date:     Code Status: Level 1 - Full Code  ______________________________________________________________________________    Subjective:   Patient seen and examined  No further episodes of bloody bowel movements since stopping Pradaxa overnight    Objective:   Vitals: Blood pressure (!) 140/49, pulse 62, temperature 98 2 °F (36 8 °C), resp  rate 18, SpO2 96 %  Physical Exam  Vitals signs reviewed  Constitutional:       General: She is not in acute distress  Appearance: Normal appearance  HENT:      Head: Atraumatic  Eyes:      General: No scleral icterus  Extraocular Movements: Extraocular movements intact  Cardiovascular:      Rate and Rhythm: Regular rhythm  Heart sounds: Normal heart sounds  Pulmonary:      Breath sounds: No wheezing        Comments: diminished breath sounds bibasilar  Abdominal:      General: Bowel sounds are normal       Palpations: Abdomen is soft  Tenderness: There is no guarding or rebound  Musculoskeletal:         General: No swelling  Skin:     General: Skin is warm  Neurological:      Mental Status: She is alert and oriented to person, place, and time  Mental status is at baseline  Psychiatric:         Mood and Affect: Mood normal        Additional Data:   Labs:  Results from last 7 days   Lab Units 08/25/20  0526 08/25/20  0000 08/24/20  1356   WBC Thousand/uL 6 64  --  9 39   HEMOGLOBIN g/dL 8 9* 9 1* 10 6*   HEMATOCRIT % 29 1* 29 6* 34 5*   MCV fL 81*  --  81*   PLATELETS Thousands/uL 279  --  331   INR   --   --  2 46*     Results from last 7 days   Lab Units 08/25/20  0527 08/24/20  1356   SODIUM mmol/L 138 133*   POTASSIUM mmol/L 4 5 4 8   CHLORIDE mmol/L 104 100   CO2 mmol/L 23 21   ANION GAP mmol/L 11 12   BUN mg/dL 14 16   CREATININE mg/dL 1 13 1 35*   CALCIUM mg/dL 8 6 9 2   ALBUMIN g/dL 2 9* 3 6   TOTAL BILIRUBIN mg/dL 0 26 0 30   ALK PHOS U/L 60 77   ALT U/L 132* 156*   AST U/L 81* 97*   EGFR ml/min/1 73sq m 44 36   GLUCOSE RANDOM mg/dL 105 156*         Results from last 7 days   Lab Units 08/24/20  1356   TROPONIN I ng/mL <0 02                      Results from last 7 days   Lab Units 08/25/20  0527   TSH 3RD GENERATON uIU/mL 6 137*     * I Have Reviewed All Lab Data Listed Above  Cultures:   Results from last 7 days   Lab Units 08/20/20  0703   URINE CULTURE  No Growth <1000 cfu/mL             Imaging:  Imaging Reports Reviewed Today Include:   Ct Abdomen Pelvis With Contrast    Result Date: 8/24/2020  Impression: Cholelithiasis without cholecystitis  Stable hiatal hernia  Mild thickening of the bladder wall, correlate with urinalysis   Workstation performed: [id number]     Scheduled Meds:  Current Facility-Administered Medications   Medication Dose Route Frequency Provider Last Rate    acetaminophen  650 mg Oral Q6H PRN Donel ALLA Edward      amiodarone  200 mg Oral Daily Tamar Pigeclary, PA-C      bimatoprost  1 drop Both Eyes HS Tamar Piger, PA-C      famotidine  20 mg Oral BID Radene Jose, PA-C      hydrALAZINE  25 mg Oral BID Radene Jose, PA-C      losartan  50 mg Oral Daily Tamar Piger, PA-C      NIFEdipine  30 mg Oral Daily Tamar Piger, PA-C      ondansetron  4 mg Intravenous Q6H PRN Radene Randle, PA-C      pantoprazole  40 mg Oral Early Morning Libia Owens PA-C      sodium chloride  75 mL/hr Intravenous Continuous Radene Jose, PA-C 75 mL/hr (08/25/20 1406)       Mary Meade DO  [facility] Internal Medicine  Hospitalist    ** Please Note: This note has been constructed using a voice recognition system   **

## 2020-08-25 NOTE — PLAN OF CARE
Problem: Potential for Falls  Goal: Patient will remain free of falls  Description: INTERVENTIONS:  - Assess patient frequently for physical needs  -  Identify cognitive and physical deficits and behaviors that affect risk of falls    -  [facility] fall precautions as indicated by assessment   - Educate patient/family on patient safety including physical limitations  - Instruct patient to call for assistance with activity based on assessment  - Modify environment to reduce risk of injury  - Consider OT/PT consult to assist with strengthening/mobility  Outcome: Progressing     Problem: Prexisting or High Potential for Compromised Skin Integrity  Goal: Skin integrity is maintained or improved  Description: INTERVENTIONS:  - Identify patients at risk for skin breakdown  - Assess and monitor skin integrity  - Assess and monitor nutrition and hydration status  - Monitor labs   - Assess for incontinence   - Turn and reposition patient  - Assist with mobility/ambulation  - Relieve pressure over bony prominences  - Avoid friction and shearing  - Provide appropriate hygiene as needed including keeping skin clean and dry  - Evaluate need for skin moisturizer/barrier cream  - Collaborate with interdisciplinary team   - Patient/family teaching  - Consider wound care consult   Outcome: Progressing     Problem: PAIN - ADULT  Goal: Verbalizes/displays adequate comfort level or baseline comfort level  Description: Interventions:  - Encourage patient to monitor pain and request assistance  - Assess pain using appropriate pain scale  - Administer analgesics based on type and severity of pain and evaluate response  - Implement non-pharmacological measures as appropriate and evaluate response  - Consider cultural and social influences on pain and pain management  - Notify physician/advanced practitioner if interventions unsuccessful or patient reports new pain  Outcome: Progressing     Problem: INFECTION - ADULT  Goal: Absence or prevention of progression during hospitalization  Description: INTERVENTIONS:  - Assess and monitor for signs and symptoms of infection  - Monitor lab/diagnostic results  - Monitor all insertion sites, i e  indwelling lines, tubes, and drains  - Monitor endotracheal if appropriate and nasal secretions for changes in amount and color  - [place] appropriate cooling/warming therapies per order  - Administer medications as ordered  - Instruct and encourage patient and family to use good hand hygiene technique  - Identify and instruct in appropriate isolation precautions for identified infection/condition  Outcome: Progressing     Problem: SAFETY ADULT  Goal: Patient will remain free of falls  Description: INTERVENTIONS:  - Assess patient frequently for physical needs  -  Identify cognitive and physical deficits and behaviors that affect risk of falls    -  [place] fall precautions as indicated by assessment   - Educate patient/family on patient safety including physical limitations  - Instruct patient to call for assistance with activity based on assessment  - Modify environment to reduce risk of injury  - Consider OT/PT consult to assist with strengthening/mobility  Outcome: Progressing  Goal: Maintain or return to baseline ADL function  Description: INTERVENTIONS:  -  Assess patient's ability to carry out ADLs; assess patient's baseline for ADL function and identify physical deficits which impact ability to perform ADLs (bathing, care of mouth/teeth, toileting, grooming, dressing, etc )  - Assess/evaluate cause of self-care deficits   - Assess range of motion  - Assess patient's mobility; develop plan if impaired  - Assess patient's need for assistive devices and provide as appropriate  - Encourage maximum independence but intervene and supervise when necessary  - Involve family in performance of ADLs  - Assess for home care needs following discharge   - Consider OT consult to assist with ADL evaluation and planning for discharge  - Provide patient education as appropriate  Outcome: Progressing  Goal: Maintain or return mobility status to optimal level  Description: INTERVENTIONS:  - Assess patient's baseline mobility status (ambulation, transfers, stairs, etc )    - Identify cognitive and physical deficits and behaviors that affect mobility  - Identify mobility aids required to assist with transfers and/or ambulation (gait belt, sit-to-stand, lift, walker, cane, etc )  - [place] fall precautions as indicated by assessment  - Record patient progress and toleration of activity level on Mobility SBAR; progress patient to next Phase/Stage  - Instruct patient to call for assistance with activity based on assessment  - Consider rehabilitation consult to assist with strengthening/weightbearing, etc   Outcome: Progressing     Problem: DISCHARGE PLANNING  Goal: Discharge to home or other facility with appropriate resources  Description: INTERVENTIONS:  - Identify barriers to discharge w/patient and caregiver  - Arrange for needed discharge resources and transportation as appropriate  - Identify discharge learning needs (meds, wound care, etc )  - Arrange for interpretive services to assist at discharge as needed  - Refer to Case Management Department for coordinating discharge planning if the patient needs post-hospital services based on physician/advanced practitioner order or complex needs related to functional status, cognitive ability, or social support system  Outcome: Progressing     Problem: Knowledge Deficit  Goal: Patient/family/caregiver demonstrates understanding of disease process, treatment plan, medications, and discharge instructions  Description: Complete learning assessment and assess knowledge base    Interventions:  - Provide teaching at level of understanding  - Provide teaching via preferred learning methods  Outcome: Progressing

## 2020-08-25 NOTE — ASSESSMENT & PLAN NOTE
· Paroxysmal atrial fibrillation on amiodarone  · Pradaxa currently on hold but will restart given resolution of gastrointestinal bleeding

## 2020-08-25 NOTE — ASSESSMENT & PLAN NOTE
Lab Results   Component Value Date    HGBA1C 7 9 (H) 07/18/2020        · Diabetes mellitus holding oral agents    Will add sliding scale insulin

## 2020-08-25 NOTE — ASSESSMENT & PLAN NOTE
· Hyponatremia present admission secondary to slight dehydration  · Resolved with IV fluids    Will discontinue given history of congestive heart failure    Results from last 7 days   Lab Units 08/25/20  0527 08/24/20  1356   SODIUM mmol/L 138 133*

## 2020-08-25 NOTE — CONSULTS
[facility] - New Consultation  Mattie Ham 80 y o  female MRN: [id number]  Encounter: [id number]          ASSESSMENT AND PLAN:      1  Asymptomatic normocytic anemia with rectal bleeding:  Patient with reported history of 2 episodes of BRBPR with brown stool prior to hospitalization per patient's daughter  Likely hemorrhoidal   Bowel movement in the hospital most recently brown without visible blood  Baseline hemoglobin 10-11 g/dL, initially 11 8 g/dL, now downtrended to 8 9 g/dL most recently  Given minimal amount of blood loss, would suspect this is likely multifactorial anemia, with potentially some dilutional effect from IV fluids administered during this hospitalization the setting of urinary tract infection  Patient reporting suprapubic pain, likely associated with urinary tract infection, rather than GI etiology  Suspect nausea also similarly associated with urinary tract infection, though cannot rule out potential overlapping dyspepsia  - maintain 2 large bore IVs  - maintain active T&S  - trend H&H Q8-12H  - please transfuse for Hgb <7  - close hemodynamic monitoring; please notify GI team if any hemodynamic instability or new bleeding  - avoid NSAIDs  - start PPI daily for possible dyspepsia  - recommended colonoscopy for patient to further evaluate rectal bleeding, and reviewed risks/benefits, however pt declined colonoscopy at present time based on advanced age; encouraged pt to reach out to GI if she changes her mind and would like to pursue further endoscopic evaluation    2  Abnormal LFTs in hepatocellular pattern:  AST and ALT noted to be mildly elevated on 07/18/2020 with AST of 46, ALT of 81, and normal T bili  On current admission, AST elevated to 97 --> 81, ALT elevated to 156-->132, with T bili and ALP normal   Patient recently completed course of antibiotics for urinary tract infection, the family cannot recall the name of the antibiotic used    Consider possible DILI from recent antibiotic vs  steatohepatitis (though fatty liver disease not noted on CT A/P)  Noted to have cholelithiasis, but no choledocholithiasis reported on CT A/P and LFTs appear more hepatocellular than obstructive in pattern  - trend LFTs daily  - trend INR daily  - avoid hepatotoxic medications  - check chronic hep panel    Reccs d/w Dr Lucille Anderson, primary team   ______________________________________________________________________    HPI:  Brown Grey is an 80F with PMH of AFib on Pradaxa, CVA, hypertension, status post TAVR, hyperlipidemia, glaucoma, type 2 diabetes, CHF, presbyesophagus, and recent diagnosis of UTI status post antibiotic treatment who presents to the hospital for suprapubic pain, dysuria, and intermittent nausea over the past week  Gastroenterology was consulted to comment on 2 episodes of BRBPR that patient's daughter reports witnessing at home prior to hospitalization  Per patient's daughter, patient had 2 bowel movements of brown stool, with some bright red blood in the toilet water and on the toilet paper prior to current admission  Prior to interview, patient has had a bowel movement in her hospital bathroom, which appears brown and nonbloody  Patient denies knowledge of any history of hemorrhoids or prior GI bleeding  Patient denies rectal pain, abdominal pain at present time, nausea, vomiting, hematemesis, melena, use of oral iron or Pepto-Bismol, NSAID use, alcohol use, tobacco use, odynophagia, new or worsening dysphagia, or recent unintentional weight loss  Patient reports that Wisam Osorio runs in the family," but denies any family history of GI cancer specifically  Last endoscopy performed 10/2019 showing presbyesophagus, with esophageal dilation performed at that time  Documentation indicates that patient is historically refused colonoscopy, and patient confirms that she has never had a colonoscopy and is not interested in having one performed  During current hospitalization, patient's hemoglobin has down trended to 8 9 most recently (baseline 10-11 g/dL) with low normal MCV  Most recent CMP with normal BUN  On 08/24, patient was noted to have AST elevation to 97, and ALT elevation 02/01/2056, with normal T bili  LFTs have since down trended today with AST of 81, , normal alk-phos, and T bili of 0 26  CT scan of abdomen pelvis shows mild prominence of the periportal triads, cholelithiasis without cholecystitis, a stable hiatal hernia, atrophic pancreas with fatty infiltration, and mild thickening of the bladder wall  REVIEW OF SYSTEMS:    CONSTITUTIONAL: Denies any fever, chills, rigors, and weight loss  HEENT: No earache or tinnitus  Denies hearing loss or visual disturbances  CARDIOVASCULAR: No chest pain or palpitations  RESPIRATORY: Denies any cough, hemoptysis, shortness of breath or dyspnea on exertion  GASTROINTESTINAL: As noted in the History of Present Illness  GENITOURINARY: +dysuria   +suprapubic pain  NEUROLOGIC: No dizziness or vertigo, denies headaches  MUSCULOSKELETAL: Denies any muscle or joint pain  SKIN: Denies skin rashes or itching  ENDOCRINE: Denies excessive thirst  Denies intolerance to heat or cold  PSYCHOSOCIAL: Denies depression or anxiety  Denies any recent memory loss         Historical Information   Past Medical History:   Diagnosis Date    Ambulatory dysfunction     CHF (congestive heart failure) (HCC)     pEFHF    Cholelithiasis     Chronic pain disorder     Coronary artery disease     Depression with anxiety     Diabetes mellitus ([facility] [address] )     niddm    Glaucoma     Hypertension     Hyponatremia     Injury of finger of left hand 7/7/2019    Ischemic stroke of frontal lobe (HCC)     Right     Low back pain     Obstructive sleep apnea     PAF (paroxysmal atrial fibrillation) (HCC)     Vertigo      Past Surgical History:   Procedure Laterality Date    APPENDECTOMY      GLAUCOMA SURGERY  01/03/2016    MS REPLACE AORTIC VALVE OPENFEMORAL ARTERY APPROACH N/A 4/17/2018    Procedure: REPLACEMENT AORTIC VALVE TRANSCATHETER (TAVR) TRANSFEMORAL W/ 23 MM AGUILAR STEVE S3 VALVE;  Surgeon: Michaela David DO;  Location: [facility];  Service: Cardiac Surgery    TUBAL LIGATION      UPPER GASTROINTESTINAL ENDOSCOPY  10/28/2019     Social History   Social History     Substance and Sexual Activity   Alcohol Use Not Currently    Frequency: Never    Binge frequency: Never     Social History     Substance and Sexual Activity   Drug Use Never     Social History     Tobacco Use   Smoking Status Never Smoker   Smokeless Tobacco Never Used     Family History   Problem Relation Age of Onset    No Known Problems Mother     Diabetes Father     Stroke Father     Coronary artery disease Sister     No Known Problems Brother     No Known Problems Son     Breast cancer Daughter     No Known Problems Maternal Grandmother     No Known Problems Maternal Grandfather     No Known Problems Paternal Grandmother     No Known Problems Paternal Grandfather     No Known Problems Cousin     Rheum arthritis Neg Hx     Osteoarthritis Neg Hx     Asthma Neg Hx     Heart failure Neg Hx     Hyperlipidemia Neg Hx     Hypertension Neg Hx     Migraines Neg Hx     Rashes / Skin problems Neg Hx     Seizures Neg Hx     Thyroid disease Neg Hx        Meds/Allergies       Current Facility-Administered Medications:     acetaminophen (TYLENOL) tablet 650 mg, 650 mg, Oral, Q6H PRN    amiodarone tablet 200 mg, 200 mg, Oral, Daily, 200 mg at 08/25/20 0830    bimatoprost (LUMIGAN) 0 01 % ophthalmic solution 1 drop, 1 drop, Both Eyes, HS, 1 drop at 08/24/20 2144    famotidine (PEPCID) tablet 20 mg, 20 mg, Oral, BID, 20 mg at 08/25/20 0830    hydrALAZINE (APRESOLINE) tablet 25 mg, 25 mg, Oral, BID, 25 mg at 08/25/20 0830    losartan (COZAAR) tablet 50 mg, 50 mg, Oral, Daily, 50 mg at 08/25/20 0830    NIFEdipine (PROCARDIA XL) 24 hr tablet 30 mg, 30 mg, Oral, Daily, 30 mg at 08/25/20 0830    ondansetron (ZOFRAN) injection 4 mg, 4 mg, Intravenous, Q6H PRN    sodium chloride 0 9 % infusion, 75 mL/hr, Intravenous, Continuous, 75 mL/hr at 08/24/20 5445    Allergies   Allergen Reactions    Lipitor [Atorvastatin] GI Intolerance     Nausea stomach ache    Aspirin GI Intolerance    Hydrochlorothiazide GI Intolerance    Lisinopril Cough    Metoprolol Headache           Objective     Blood pressure (!) 140/49, pulse 62, temperature 98 2 °F (36 8 °C), resp  rate 18, SpO2 96 %  There is no height or weight on file to calculate BMI      PHYSICAL EXAM:      General Appearance:   Alert, cooperative, no distress, elderly female, +Samish   HEENT:   Normocephalic, atraumatic, anicteric   Neck:  Supple, symmetrical, trachea midline   Lungs:   Clear to auscultation bilaterally; respirations even and unlabored   Heart:   Regular rate and rhythm; +S1/+S2   Abdomen:   +mild suprapubic TTP without rebound or guarding; soft, non-distended; normal bowel sounds; no masses, no organomegaly    Genitalia:   Deferred    Rectal:   Deferred    Extremities:  No cyanosis, clubbing     Pulses:  2+ and symmetric    Skin:  No jaundice, rashes, or lesions          Lab Results:   Admission on 08/24/2020   Component Date Value    WBC 08/24/2020 9 39     RBC 08/24/2020 4 26     Hemoglobin 08/24/2020 10 6*    Hematocrit 08/24/2020 34 5*    MCV 08/24/2020 81*    MCH 08/24/2020 24 9*    MCHC 08/24/2020 30 7*    RDW 08/24/2020 17 8*    MPV 08/24/2020 10 7     Platelets 38/79/4015 331     nRBC 08/24/2020 0     Neutrophils Relative 08/24/2020 66     Immat GRANS % 08/24/2020 1     Lymphocytes Relative 08/24/2020 22     Monocytes Relative 08/24/2020 10     Eosinophils Relative 08/24/2020 0     Basophils Relative 08/24/2020 1     Neutrophils Absolute 08/24/2020 6 23     Immature Grans Absolute 08/24/2020 0 05     Lymphocytes Absolute 08/24/2020 2 09  Monocytes Absolute 08/24/2020 0 96     Eosinophils Absolute 08/24/2020 0 01     Basophils Absolute 08/24/2020 0 05     Protime 08/24/2020 26 1*    INR 08/24/2020 2 46*    PTT 08/24/2020 100*    Sodium 08/24/2020 133*    Potassium 08/24/2020 4 8     Chloride 08/24/2020 100     CO2 08/24/2020 21     ANION GAP 08/24/2020 12     BUN 08/24/2020 16     Creatinine 08/24/2020 1 35*    Glucose 08/24/2020 156*    Calcium 08/24/2020 9 2     AST 08/24/2020 97*    ALT 08/24/2020 156*    Alkaline Phosphatase 08/24/2020 77     Total Protein 08/24/2020 7 5     Albumin 08/24/2020 3 6     Total Bilirubin 08/24/2020 0 30     eGFR 08/24/2020 36     Lipase 08/24/2020 133     Color, UA 08/24/2020 -     Troponin I 08/24/2020 <0 02     Color, UA 08/24/2020 Margaret     Clarity, UA 08/24/2020 Clear     pH, UA 08/24/2020 6 0     Leukocytes, UA 08/24/2020 Negative     Nitrite, UA 08/24/2020 Negative     Protein, UA 08/24/2020 100 (2+)*    Glucose, UA 08/24/2020 Negative     Ketones, UA 08/24/2020 Negative     Urobilinogen, UA 08/24/2020 0 2     Bilirubin, UA 08/24/2020 Negative     Blood, UA 08/24/2020 Large*    Specific [place], UA 08/24/2020 1 020     RBC, UA 08/24/2020 Innumerable*    WBC, UA 08/24/2020 2-4*    Epithelial Cells 08/24/2020 Occasional     Bacteria, UA 08/24/2020 None Seen     Hemoglobin 08/25/2020 9 1*    Hematocrit 08/25/2020 29 6*    Sodium 08/25/2020 138     Potassium 08/25/2020 4 5     Chloride 08/25/2020 104     CO2 08/25/2020 23     ANION GAP 08/25/2020 11     BUN 08/25/2020 14     Creatinine 08/25/2020 1 13     Glucose 08/25/2020 105     Glucose, Fasting 08/25/2020 105*    Calcium 08/25/2020 8 6     AST 08/25/2020 81*    ALT 08/25/2020 132*    Alkaline Phosphatase 08/25/2020 60     Total Protein 08/25/2020 6 1*    Albumin 08/25/2020 2 9*    Total Bilirubin 08/25/2020 0 26     eGFR 08/25/2020 44     WBC 08/25/2020 6 64     RBC 08/25/2020 3 58*    Hemoglobin 08/25/2020 8 9*    Hematocrit 08/25/2020 29 1*    MCV 08/25/2020 81*    MCH 08/25/2020 24 9*    MCHC 08/25/2020 30 6*    RDW 08/25/2020 18 1*    Platelets 26/34/5883 279     MPV 08/25/2020 11 4          Radiology Results:   Ct Abdomen Pelvis With Contrast    Result Date: 8/24/2020  Narrative: CT ABDOMEN AND PELVIS WITH IV CONTRAST INDICATION:   Abdominal pain, acute, nonlocalized  Urinary frequency, nausea, and body stools  COMPARISON:  CT abdomen and pelvis study of March 9, 2020  TECHNIQUE:  CT examination of the abdomen and pelvis was performed  Axial, sagittal, and coronal 2D reformatted images were created from the source data and submitted for interpretation  Radiation dose length product (DLP) for this visit:  478 mGy-cm   This examination, like all CT scans performed in the [facility], was performed utilizing techniques to minimize radiation dose exposure, including the use of iterative reconstruction and automated exposure control  IV Contrast:  100 mL of iohexol (OMNIPAQUE) Enteric Contrast:  Enteric contrast was not administered  FINDINGS: ABDOMEN LOWER CHEST:  No clinically significant abnormality identified in the visualized lower chest   Status post TAVR  LIVER/BILIARY TREE:  Mild prominence of the periportal triads  GALLBLADDER:  Cholelithiasis without cholecystitis  SPLEEN:  Unremarkable  PANCREAS:  Atrophic with fatty infiltration  ADRENAL GLANDS:  Stable thickening of the adrenal glands, left more than right  KIDNEYS/URETERS:  No hydronephrosis or urinary tract calculus  One or more sharply circumscribed subcentimeter renal hypodensities are present, too small to accurately characterize, and statistically most likely benign findings  According to recent literature (Radiology 2019) no further workup of these findings is recommended  STOMACH AND BOWEL:  Hiatal hernia  APPENDIX:  No findings to suggest appendicitis  ABDOMINOPELVIC CAVITY:  No ascites  No pneumoperitoneum  No lymphadenopathy  VESSELS:  Extensive atherosclerotic disease of the aorta and its branches  Celiac and superior mesenteric artery origin stenosis due to atherosclerotic plaque PELVIS REPRODUCTIVE ORGANS:  Unremarkable for patient's age  URINARY BLADDER:  Minimal bladder wall thickening compared to the prior study  ABDOMINAL WALL/INGUINAL REGIONS:  Unremarkable  OSSEOUS STRUCTURES:  No acute fracture or destructive osseous lesion  Lower thoracic kyphosis and mildly exaggerated lumbar lordosis with multilevel lumbar spondylosis  Impression: Cholelithiasis without cholecystitis  Stable hiatal hernia  Mild thickening of the bladder wall, correlate with urinalysis  Workstation performed: [id number]       The patient was seen and examined by Dr Radha Sadler, all hay medical decisions were made with Dr Radha Sadler  Thank you for allowing us to participate in the care of this pleasant patient  We will follow up with you closely

## 2020-08-26 VITALS
DIASTOLIC BLOOD PRESSURE: 79 MMHG | RESPIRATION RATE: 18 BRPM | SYSTOLIC BLOOD PRESSURE: 138 MMHG | HEART RATE: 61 BPM | OXYGEN SATURATION: 98 % | TEMPERATURE: 98.3 F

## 2020-08-26 DIAGNOSIS — R74.8 ELEVATED LIVER ENZYMES: Primary | ICD-10-CM

## 2020-08-26 PROBLEM — E53.8 FOLATE DEFICIENCY: Status: ACTIVE | Noted: 2020-08-26

## 2020-08-26 LAB
ALBUMIN SERPL BCP-MCNC: 2.9 G/DL (ref 3.5–5)
ALP SERPL-CCNC: 66 U/L (ref 46–116)
ALT SERPL W P-5'-P-CCNC: 173 U/L (ref 12–78)
ANION GAP SERPL CALCULATED.3IONS-SCNC: 12 MMOL/L (ref 4–13)
AST SERPL W P-5'-P-CCNC: 110 U/L (ref 5–45)
BILIRUB SERPL-MCNC: 0.27 MG/DL (ref 0.2–1)
BUN SERPL-MCNC: 10 MG/DL (ref 5–25)
CALCIUM SERPL-MCNC: 8.4 MG/DL (ref 8.3–10.1)
CHLORIDE SERPL-SCNC: 106 MMOL/L (ref 100–108)
CO2 SERPL-SCNC: 20 MMOL/L (ref 21–32)
CREAT SERPL-MCNC: 1.16 MG/DL (ref 0.6–1.3)
ERYTHROCYTE [DISTWIDTH] IN BLOOD BY AUTOMATED COUNT: 18.1 % (ref 11.6–15.1)
GFR SERPL CREATININE-BSD FRML MDRD: 43 ML/MIN/1.73SQ M
GLUCOSE SERPL-MCNC: 116 MG/DL (ref 65–140)
GLUCOSE SERPL-MCNC: 120 MG/DL (ref 65–140)
GLUCOSE SERPL-MCNC: 160 MG/DL (ref 65–140)
GLUCOSE SERPL-MCNC: 305 MG/DL (ref 65–140)
HCT VFR BLD AUTO: 30.2 % (ref 34.8–46.1)
HGB BLD-MCNC: 9.3 G/DL (ref 11.5–15.4)
MCH RBC QN AUTO: 25.1 PG (ref 26.8–34.3)
MCHC RBC AUTO-ENTMCNC: 30.8 G/DL (ref 31.4–37.4)
MCV RBC AUTO: 82 FL (ref 82–98)
PLATELET # BLD AUTO: 266 THOUSANDS/UL (ref 149–390)
PMV BLD AUTO: 10.7 FL (ref 8.9–12.7)
POTASSIUM SERPL-SCNC: 4.4 MMOL/L (ref 3.5–5.3)
PROT SERPL-MCNC: 6.4 G/DL (ref 6.4–8.2)
RBC # BLD AUTO: 3.7 MILLION/UL (ref 3.81–5.12)
SODIUM SERPL-SCNC: 138 MMOL/L (ref 136–145)
WBC # BLD AUTO: 6.65 THOUSAND/UL (ref 4.31–10.16)

## 2020-08-26 PROCEDURE — 80053 COMPREHEN METABOLIC PANEL: CPT | Performed by: INTERNAL MEDICINE

## 2020-08-26 PROCEDURE — 99232 SBSQ HOSP IP/OBS MODERATE 35: CPT | Performed by: INTERNAL MEDICINE

## 2020-08-26 PROCEDURE — 82948 REAGENT STRIP/BLOOD GLUCOSE: CPT

## 2020-08-26 PROCEDURE — 97163 PT EVAL HIGH COMPLEX 45 MIN: CPT

## 2020-08-26 PROCEDURE — 85027 COMPLETE CBC AUTOMATED: CPT | Performed by: INTERNAL MEDICINE

## 2020-08-26 PROCEDURE — 99239 HOSP IP/OBS DSCHRG MGMT >30: CPT | Performed by: INTERNAL MEDICINE

## 2020-08-26 RX ORDER — HYDRALAZINE HYDROCHLORIDE 25 MG/1
25 TABLET, FILM COATED ORAL 2 TIMES DAILY
Start: 2020-08-26 | End: 2020-10-12 | Stop reason: SDUPTHER

## 2020-08-26 RX ORDER — FOLIC ACID 1 MG/1
1 TABLET ORAL DAILY
Qty: 30 TABLET | Refills: 2 | Status: SHIPPED | OUTPATIENT
Start: 2020-08-26

## 2020-08-26 RX ORDER — PANTOPRAZOLE SODIUM 40 MG/1
40 TABLET, DELAYED RELEASE ORAL
Qty: 30 TABLET | Refills: 2 | Status: SHIPPED | OUTPATIENT
Start: 2020-08-27

## 2020-08-26 RX ORDER — FOLIC ACID 1 MG/1
1 TABLET ORAL DAILY
Status: DISCONTINUED | OUTPATIENT
Start: 2020-08-26 | End: 2020-08-26 | Stop reason: HOSPADM

## 2020-08-26 RX ADMIN — PANTOPRAZOLE SODIUM 40 MG: 40 TABLET, DELAYED RELEASE ORAL at 05:04

## 2020-08-26 RX ADMIN — DABIGATRAN ETEXILATE MESYLATE 75 MG: 75 CAPSULE ORAL at 09:15

## 2020-08-26 RX ADMIN — HYDRALAZINE HYDROCHLORIDE 25 MG: 25 TABLET, FILM COATED ORAL at 09:15

## 2020-08-26 RX ADMIN — HYDRALAZINE HYDROCHLORIDE 25 MG: 25 TABLET, FILM COATED ORAL at 17:09

## 2020-08-26 RX ADMIN — LOSARTAN POTASSIUM 50 MG: 50 TABLET, FILM COATED ORAL at 09:15

## 2020-08-26 RX ADMIN — INSULIN LISPRO 4 UNITS: 100 INJECTION, SOLUTION INTRAVENOUS; SUBCUTANEOUS at 12:57

## 2020-08-26 RX ADMIN — AMIODARONE HYDROCHLORIDE 200 MG: 200 TABLET ORAL at 09:15

## 2020-08-26 RX ADMIN — SULFAMETHOXAZOLE AND TRIMETHOPRIM 1 TABLET: 800; 160 TABLET ORAL at 09:16

## 2020-08-26 RX ADMIN — IRON SUCROSE 200 MG: 20 INJECTION, SOLUTION INTRAVENOUS at 15:40

## 2020-08-26 RX ADMIN — FAMOTIDINE 20 MG: 20 TABLET, FILM COATED ORAL at 09:15

## 2020-08-26 RX ADMIN — NIFEDIPINE 30 MG: 30 TABLET, FILM COATED, EXTENDED RELEASE ORAL at 09:16

## 2020-08-26 RX ADMIN — FAMOTIDINE 20 MG: 20 TABLET, FILM COATED ORAL at 17:09

## 2020-08-26 RX ADMIN — CYCLOSPORINE 1 DROP: 0.5 EMULSION OPHTHALMIC at 09:15

## 2020-08-26 RX ADMIN — INSULIN LISPRO 1 UNITS: 100 INJECTION, SOLUTION INTRAVENOUS; SUBCUTANEOUS at 09:16

## 2020-08-26 RX ADMIN — PRAVASTATIN SODIUM 40 MG: 40 TABLET ORAL at 17:09

## 2020-08-26 NOTE — PROGRESS NOTES
Progress Note - Mattie Ham 80 y o  female MRN: [id number]    Unit/Bed#: [place] [address] -01 Encounter: [id number]    Subjective:     Patient seen and examined at bedside  Her son-in-law is present in the room  She has not had any further blood in the stool  She has no abdominal pain  Objective:     Vitals: Blood pressure 152/59, pulse 65, temperature 98 °F (36 7 °C), temperature source Oral, resp  rate 18, SpO2 96 %  ,There is no height or weight on file to calculate BMI  Intake/Output Summary (Last 24 hours) at 8/26/2020 1249  Last data filed at 8/26/2020 0501  Gross per 24 hour   Intake    Output 500 ml   Net -500 ml       Physical Exam:     General Appearance: Alert, pleasant elderly female, appears stated age and cooperative  Lungs: Clear to auscultation bilaterally  Heart: Regular rate and rhythm  Abdomen: Soft, non-tender, non-distended; bowel sounds normal  Extremities: No cyanosis, edema    Invasive Devices     Peripheral Intravenous Line            Peripheral IV 08/24/20 Left Antecubital 1 day                Lab Results:  Results from last 7 days   Lab Units 08/26/20  0504  08/24/20  1356   WBC Thousand/uL 6 65   < > 9 39   HEMOGLOBIN g/dL 9 3*   < > 10 6*   HEMATOCRIT % 30 2*   < > 34 5*   PLATELETS Thousands/uL 266   < > 331   NEUTROS PCT %  --   --  66   LYMPHS PCT %  --   --  22   MONOS PCT %  --   --  10   EOS PCT %  --   --  0    < > = values in this interval not displayed  Results from last 7 days   Lab Units 08/26/20  0504   POTASSIUM mmol/L 4 4   CHLORIDE mmol/L 106   CO2 mmol/L 20*   BUN mg/dL 10   CREATININE mg/dL 1 16   CALCIUM mg/dL 8 4   ALK PHOS U/L 66   ALT U/L 173*   AST U/L 110*     Results from last 7 days   Lab Units 08/24/20  1356   INR  2 46*     Results from last 7 days   Lab Units 08/24/20  1356   LIPASE u/L 133       Imaging Studies: I have personally reviewed pertinent imaging studies      Ct Abdomen Pelvis With Contrast    Result Date: 8/24/2020  Impression: Cholelithiasis without cholecystitis  Stable hiatal hernia  Mild thickening of the bladder wall, correlate with urinalysis  Workstation performed: [id number]       Assessment and Plan:    Discussed plan with SLIM    42-year-old female with atrial fibrillation on Pradaxa, CVA, hypertension, status post TAVR, type 2 diabetes, CHF, and recurrent UTI admitted with UTI, bright red blood per rectum, and elevated liver enzymes  1) Bright red blood per rectum:  Patient reported 2 episodes of bright red blood per rectum prior to hospitalization  She has had no further bleeding since admission  Hemoglobin currently 9 3, down from baseline 10-11  Her mild rectal bleeding was likely due to hemorrhoids or anal fissure  Differential diagnosis also includes AVM, large polyp, malignancy     -Patient declines colonoscopy due to advanced age, which is reasonable  -I told her to monitor for increased bleeding, dizziness, shortness of breath which can be signs of significant blood loss  She should come back to the hospital in this case  2) Elevated liver enzymes:  Liver enzymes relatively stable today    Chronic hepatitis panel negative  This may be due to drug-induced liver injury from recent antibiotics  Differential also includes transient ischemic hepatitis, NAFLD, other viral hepatitis     -We recommend checking liver enzymes 2 weeks after discharge  -If enzymes remain elevated, she can follow up in our GI office for further liver evaluation    GI will sign off  Please call with any questions or concerns

## 2020-08-26 NOTE — ASSESSMENT & PLAN NOTE
· BRBPR while on full anticoagulation with Pradaxa  · Patient was once again offered colonoscopy by GI but declined  · No further episodes even after restarting Pradaxa and advancement of diet

## 2020-08-26 NOTE — PLAN OF CARE
Problem: Potential for Falls  Goal: Patient will remain free of falls  Description: INTERVENTIONS:  - Assess patient frequently for physical needs  -  Identify cognitive and physical deficits and behaviors that affect risk of falls    -  [place] fall precautions as indicated by assessment   - Educate patient/family on patient safety including physical limitations  - Instruct patient to call for assistance with activity based on assessment  - Modify environment to reduce risk of injury  - Consider OT/PT consult to assist with strengthening/mobility  Outcome: Adequate for Discharge     Problem: Prexisting or High Potential for Compromised Skin Integrity  Goal: Skin integrity is maintained or improved  Description: INTERVENTIONS:  - Identify patients at risk for skin breakdown  - Assess and monitor skin integrity  - Assess and monitor nutrition and hydration status  - Monitor labs   - Assess for incontinence   - Turn and reposition patient  - Assist with mobility/ambulation  - Relieve pressure over bony prominences  - Avoid friction and shearing  - Provide appropriate hygiene as needed including keeping skin clean and dry  - Evaluate need for skin moisturizer/barrier cream  - Collaborate with interdisciplinary team   - Patient/family teaching  - Consider wound care consult   Outcome: Adequate for Discharge     Problem: PAIN - ADULT  Goal: Verbalizes/displays adequate comfort level or baseline comfort level  Description: Interventions:  - Encourage patient to monitor pain and request assistance  - Assess pain using appropriate pain scale  - Administer analgesics based on type and severity of pain and evaluate response  - Implement non-pharmacological measures as appropriate and evaluate response  - Consider cultural and social influences on pain and pain management  - Notify physician/advanced practitioner if interventions unsuccessful or patient reports new pain  Outcome: Adequate for Discharge     Problem: INFECTION - ADULT  Goal: Absence or prevention of progression during hospitalization  Description: INTERVENTIONS:  - Assess and monitor for signs and symptoms of infection  - Monitor lab/diagnostic results  - Monitor all insertion sites, i e  indwelling lines, tubes, and drains  - Monitor endotracheal if appropriate and nasal secretions for changes in amount and color  - [place] appropriate cooling/warming therapies per order  - Administer medications as ordered  - Instruct and encourage patient and family to use good hand hygiene technique  - Identify and instruct in appropriate isolation precautions for identified infection/condition  Outcome: Adequate for Discharge     Problem: SAFETY ADULT  Goal: Patient will remain free of falls  Description: INTERVENTIONS:  - Assess patient frequently for physical needs  -  Identify cognitive and physical deficits and behaviors that affect risk of falls    -  [place] fall precautions as indicated by assessment   - Educate patient/family on patient safety including physical limitations  - Instruct patient to call for assistance with activity based on assessment  - Modify environment to reduce risk of injury  - Consider OT/PT consult to assist with strengthening/mobility  Outcome: Adequate for Discharge  Goal: Maintain or return to baseline ADL function  Description: INTERVENTIONS:  -  Assess patient's ability to carry out ADLs; assess patient's baseline for ADL function and identify physical deficits which impact ability to perform ADLs (bathing, care of mouth/teeth, toileting, grooming, dressing, etc )  - Assess/evaluate cause of self-care deficits   - Assess range of motion  - Assess patient's mobility; develop plan if impaired  - Assess patient's need for assistive devices and provide as appropriate  - Encourage maximum independence but intervene and supervise when necessary  - Involve family in performance of ADLs  - Assess for home care needs following discharge   - Consider OT consult to assist with ADL evaluation and planning for discharge  - Provide patient education as appropriate  Outcome: Adequate for Discharge  Goal: Maintain or return mobility status to optimal level  Description: INTERVENTIONS:  - Assess patient's baseline mobility status (ambulation, transfers, stairs, etc )    - Identify cognitive and physical deficits and behaviors that affect mobility  - Identify mobility aids required to assist with transfers and/or ambulation (gait belt, sit-to-stand, lift, walker, cane, etc )  - [place] fall precautions as indicated by assessment  - Record patient progress and toleration of activity level on Mobility SBAR; progress patient to next Phase/Stage  - Instruct patient to call for assistance with activity based on assessment  - Consider rehabilitation consult to assist with strengthening/weightbearing, etc   Outcome: Adequate for Discharge     Problem: DISCHARGE PLANNING  Goal: Discharge to home or other facility with appropriate resources  Description: INTERVENTIONS:  - Identify barriers to discharge w/patient and caregiver  - Arrange for needed discharge resources and transportation as appropriate  - Identify discharge learning needs (meds, wound care, etc )  - Arrange for interpretive services to assist at discharge as needed  - Refer to Case Management Department for coordinating discharge planning if the patient needs post-hospital services based on physician/advanced practitioner order or complex needs related to functional status, cognitive ability, or social support system  Outcome: Adequate for Discharge     Problem: Knowledge Deficit  Goal: Patient/family/caregiver demonstrates understanding of disease process, treatment plan, medications, and discharge instructions  Description: Complete learning assessment and assess knowledge base    Interventions:  - Provide teaching at level of understanding  - Provide teaching via preferred learning methods  Outcome: Adequate for Discharge

## 2020-08-26 NOTE — ASSESSMENT & PLAN NOTE
· Transaminitis without clear cause  Hepatitis panel ordered by gastroenterology which was negative  · May be secondary to recent use of Bactrim    Patient has GI follow-up in couple weeks    Results from last 7 days   Lab Units 08/26/20  0504 08/25/20  0527 08/24/20  1356   AST U/L 110* 81* 97*   ALT U/L 173* 132* 156*   TOTAL BILIRUBIN mg/dL 0 27 0 26 0 30

## 2020-08-26 NOTE — ASSESSMENT & PLAN NOTE
· Hyponatremia present admission secondary to slight dehydration  · Resolved with IV fluids    Discontinued given history of congestive heart failure    Results from last 7 days   Lab Units 08/26/20  0504 08/25/20  0527 08/24/20  1356   SODIUM mmol/L 138 138 133*

## 2020-08-26 NOTE — ASSESSMENT & PLAN NOTE
· Anemia appears secondary to iron and folic acid deficiency  · Patient given Venofer 200 mg x 1 during hospitalization and will be discharged with folic acid      Results from last 7 days   Lab Units 08/26/20  0504 08/25/20  0526 08/25/20  0000 08/24/20  1356   HEMOGLOBIN g/dL 9 3* 8 9* 9 1* 10 6*     Results from last 7 days   Lab Units 08/25/20  0527   VITAMIN B 12 pg/mL 865   FOLATE ng/mL 3 0*   IRON ug/dL 27*   TIBC ug/dL 335

## 2020-08-26 NOTE — SOCIAL WORK
Pt medically stable for d/c with therapy recommending HHPT Discussed Freedom of Choice with both patient and caregiver  List of agencies given to both patient and caregiver via in person  both patient and caregiver aware the list is custom filtered for them by preference  and that [facility] post acute providers are designated  Request to keep care within [facility] with [facility] referral made and case accepted  No further questions/concerns voiced  No barriers to dc identified

## 2020-08-26 NOTE — PHYSICAL THERAPY NOTE
PHYSICAL THERAPY EVALUATION          Patient Name: Twan Harrell  [id number] Date: 8/26/2020   PT EVALUATION    80 y o     [id number]    Blood in stool [K92 1]  Bright red blood per rectum [K62 5]  Transaminitis [R74 0]  Elevated serum creatinine [R79 89]    Past Medical History:   Diagnosis Date    Ambulatory dysfunction     CHF (congestive heart failure) (HCC)     pEFHF    Cholelithiasis     Chronic pain disorder     Coronary artery disease     Depression with anxiety     Diabetes mellitus ([facility] [address] )     niddm    Glaucoma     Hypertension     Hyponatremia     Injury of finger of left hand 7/7/2019    Ischemic stroke of frontal lobe (HCC)     Right     Low back pain     Obstructive sleep apnea     [facility]oxysmal [facility] fibrillation) [facility])     Vertigo      Past Surgical History:   Procedure Laterality Date    APPENDECTOMY      GLAUCOMA SURGERY  01/03/2016    DE REPLACE AORTIC VALVE OPENFEMORAL ARTERY APPROACH N/A 4/17/2018    Procedure: REPLACEMENT AORTIC VALVE TRANSCATHETER (TAVR) TRANSFEMORAL W/ 23 MM AGUILAR STEVE S3 VALVE;  Surgeon: Marco Antonio Reyes DO;  [facility];  Service: Cardiac Surgery    TUBAL LIGATION      UPPER GASTROINTESTINAL ENDOSCOPY  10/28/2019        08/26/20 1025   Note Type   Note type Eval only   Pain Assessment   Pain Assessment Tool Pain Assessment not indicated - pt denies pain   Pain Score No Pain   Home Living   Type of [address] One level;Ramped entrance   Bathroom Shower/Tub Walk-in shower  (reports sponge bathe primarily, daughter S showers )   Bathroom Toilet Standard   Bathroom Equipment Shower chair   [address] Other (Comment)  (Rollator)   Prior Function   Level of [place] Independent with ADLs and functional mobility; Needs assistance with IADLs   Lives With Daughter;Family  (MIKAELA)   Receives Help From Family   ADL Assistance Independent   IADLs Needs assistance  (dtr, MIKAELA perform)   Falls in the last 6 months 0   Vocational Retired   Comments pta pt reports being indep for ADLs  reports sponge bathes or occ daughter will help with showers in the walk in shower  reports between dtr and MIKAELA she is never alone   Restrictions/Precautions   Weight Bearing Precautions Per Order No   Other Precautions Contact/isolation; Fall Risk   General   Additional Pertinent History pt admitted 8/24/20 for BRBPR  pt declining colonoscopy  up and OOB orders  hx CHF, ambulatory dysfunction, CPD, CAD, depression w anxiety, DM, galaucoma, ischemic stroke, LBP, vertigo   Family/Caregiver Present No   Cognition   Overall Cognitive Status WFL   Arousal/Participation Cooperative   Orientation Level Oriented X4   Memory Within functional limits   Following Commands Follows one step commands with increased time or repetition   Comments cues ot redirect attention   RLE Assessment   RLE Assessment X   Strength RLE   RLE Overall Strength 4-/5   R Hip Flexion 3+/5   LLE Assessment   LLE Assessment X   Strength LLE   LLE Overall Strength 4-/5   L Hip Flexion 3+/5   Coordination   Movements are Fluid and Coordinated 1   Transfers   Sit to Stand 5  Supervision   Additional items Armrests; Increased time required   Stand to Sit 5  Supervision   Additional items Armrests; Increased time required;Verbal cues   Additional Comments close S for safety   Ambulation/Elevation   Gait pattern Poor UE support; Short stride; Excessively slow  (poor sequencing w RW)   Gait Assistance 4  Minimal assist   Additional items Assist x 1;Verbal cues  (vc for safe use of RW)   Assistive Device Rolling walker   Distance 60'   Stair Management Assistance Not tested   Balance   Static Standing Fair -   Dynamic Standing Poor +   Ambulatory Poor +   Endurance Deficit   Endurance Deficit Yes   Endurance Deficit Description fatigue   Activity Tolerance   Activity Tolerance Patient limited by fatigue   Nurse Made Aware Kisha De Luna RN   Assessment   Prognosis Fair   Problem List Decreased strength; Impaired balance;Decreased mobility; Decreased safety awareness   Assessment Matty Duggan is a 80 y o  female [facility] for Bright red blood per rectum  Pt  has a past medical history of Ambulatory dysfunction, CHF (congestive heart failure) ([facility] [address] ), Cholelithiasis, Chronic pain disorder, Coronary artery disease, Depression with anxiety, Diabetes mellitus ([facility] [address] ), Glaucoma, Hypertension, Hyponatremia, Injury of finger of left hand (7/7/2019), Ischemic stroke of frontal lobe (HCC), Low back pain, Obstructive sleep apnea, PAF (paroxysmal atrial fibrillation) ([facility] [address] ), and Vertigo    PT was consulted and pt was seen on 8/26/2020 for mobility assessment and d/c planning  Pt presents contact precautions, high fall risk, multiple lines  Pt is currently functioning at a  supervision assistance x1 level for transfers, minimum assistance x1 level for ambulation with Rolling Walker  Pt demonstrated BLE weakness, proximal >distal  Reports doing some exercise at home (floor bike) but is easily fatigued  Requires vc for safety throughout session, yong as it relates to safe use of RW- to maintain all four points of contact on ground  No obvious LOB noted  Pt will benefit from continued skilled IP PT to address the above mentioned impairments  in order to maximize recovery and increase functional independence when completing mobility and ADLs  At this time PT recommendations for d/c are home w family support and HHPT when medically stable  Barriers to Discharge None   Goals   Patient Goals to get better   STG Expiration Date 09/05/20   Short Term Goal #1 1)  Pt will perform bed mobility with James demonstrating appropriate technique 100% of the time in order to improve function  2)  Perform all transfers with James demonstrating safe and appropriate technique 100% of the time in order to improve ability to negotiate safely in home environment  3) Amb with least restrictive AD > 150'x1 with mod I in order to demonstrate ability to negotiate in home environment  4)  Improve overall strength and balance 1/2 grade in order to optimize ability to perform functional tasks and reduce fall risk  5) Increase activity tolerance to 45 minutes in order to improve endurance to functional tasks  6)  PT for ongoing patient and family/caregiver education, DME needs and d/c planning in order to promote highest level of function in least restrictive environment  PT Treatment Day 0   Plan   Treatment/Interventions Functional transfer training;LE strengthening/ROM; Therapeutic exercise; Endurance training;Patient/family training;Equipment eval/education; Bed mobility;Gait training;Spoke to nursing   PT Frequency 2-3x/wk   Recommendation   PT Discharge Recommendation Return to previous environment with social support;Home with skilled therapy  (HHPT for strengthening, balance)   PT - OK to Discharge Yes   Additional Comments when medically stable   Modified [place] Scale   Modified [place] Scale 3   Barthel Index   Feeding 10   Bathing 0   Grooming Score 5   Dressing Score 10   Bladder Score 10   Bowels Score 10   Toilet Use Score 10   Transfers (Bed/Chair) Score 10   Mobility (Level Surface) Score 0   Stairs Score 0   Barthel Index Score 65   History: co - morbidities, age, coping styles (depression, anxiety), social background (never alone, family support), fall risk, use of assistive device, assist for iadl's, cognition (safety awareness), multiple lines  Exam: impairments in systems including musculoskeletal (strength), neuromuscular (balance, gait, transfers, motor function), cognition, barthel index  Clinical: unstable/unpredictable; medical status  Complexity:high      Gavin Forward, PT

## 2020-08-26 NOTE — PLAN OF CARE
Problem: Potential for Falls  Goal: Patient will remain free of falls  Description: INTERVENTIONS:  - Assess patient frequently for physical needs  -  Identify cognitive and physical deficits and behaviors that affect risk of falls    -  [place] fall precautions as indicated by assessment   - Educate patient/family on patient safety including physical limitations  - Instruct patient to call for assistance with activity based on assessment  - Modify environment to reduce risk of injury  - Consider OT/PT consult to assist with strengthening/mobility  Outcome: Progressing     Problem: Prexisting or High Potential for Compromised Skin Integrity  Goal: Skin integrity is maintained or improved  Description: INTERVENTIONS:  - Identify patients at risk for skin breakdown  - Assess and monitor skin integrity  - Assess and monitor nutrition and hydration status  - Monitor labs   - Assess for incontinence   - Turn and reposition patient  - Assist with mobility/ambulation  - Relieve pressure over bony prominences  - Avoid friction and shearing  - Provide appropriate hygiene as needed including keeping skin clean and dry  - Evaluate need for skin moisturizer/barrier cream  - Collaborate with interdisciplinary team   - Patient/family teaching  - Consider wound care consult   Outcome: Progressing     Problem: PAIN - ADULT  Goal: Verbalizes/displays adequate comfort level or baseline comfort level  Description: Interventions:  - Encourage patient to monitor pain and request assistance  - Assess pain using appropriate pain scale  - Administer analgesics based on type and severity of pain and evaluate response  - Implement non-pharmacological measures as appropriate and evaluate response  - Consider cultural and social influences on pain and pain management  - Notify physician/advanced practitioner if interventions unsuccessful or patient reports new pain  Outcome: Progressing     Problem: INFECTION - ADULT  Goal: Absence or prevention of progression during hospitalization  Description: INTERVENTIONS:  - Assess and monitor for signs and symptoms of infection  - Monitor lab/diagnostic results  - Monitor all insertion sites, i e  indwelling lines, tubes, and drains  - Monitor endotracheal if appropriate and nasal secretions for changes in amount and color  - [place] appropriate cooling/warming therapies per order  - Administer medications as ordered  - Instruct and encourage patient and family to use good hand hygiene technique  - Identify and instruct in appropriate isolation precautions for identified infection/condition  Outcome: Progressing     Problem: SAFETY ADULT  Goal: Patient will remain free of falls  Description: INTERVENTIONS:  - Assess patient frequently for physical needs  -  Identify cognitive and physical deficits and behaviors that affect risk of falls    -  [place] fall precautions as indicated by assessment   - Educate patient/family on patient safety including physical limitations  - Instruct patient to call for assistance with activity based on assessment  - Modify environment to reduce risk of injury  - Consider OT/PT consult to assist with strengthening/mobility  Outcome: Progressing  Goal: Maintain or return to baseline ADL function  Description: INTERVENTIONS:  -  Assess patient's ability to carry out ADLs; assess patient's baseline for ADL function and identify physical deficits which impact ability to perform ADLs (bathing, care of mouth/teeth, toileting, grooming, dressing, etc )  - Assess/evaluate cause of self-care deficits   - Assess range of motion  - Assess patient's mobility; develop plan if impaired  - Assess patient's need for assistive devices and provide as appropriate  - Encourage maximum independence but intervene and supervise when necessary  - Involve family in performance of ADLs  - Assess for home care needs following discharge   - Consider OT consult to assist with ADL evaluation and planning for discharge  - Provide patient education as appropriate  Outcome: Progressing  Goal: Maintain or return mobility status to optimal level  Description: INTERVENTIONS:  - Assess patient's baseline mobility status (ambulation, transfers, stairs, etc )    - Identify cognitive and physical deficits and behaviors that affect mobility  - Identify mobility aids required to assist with transfers and/or ambulation (gait belt, sit-to-stand, lift, walker, cane, etc )  - [place] fall precautions as indicated by assessment  - Record patient progress and toleration of activity level on Mobility SBAR; progress patient to next Phase/Stage  - Instruct patient to call for assistance with activity based on assessment  - Consider rehabilitation consult to assist with strengthening/weightbearing, etc   Outcome: Progressing     Problem: DISCHARGE PLANNING  Goal: Discharge to home or other facility with appropriate resources  Description: INTERVENTIONS:  - Identify barriers to discharge w/patient and caregiver  - Arrange for needed discharge resources and transportation as appropriate  - Identify discharge learning needs (meds, wound care, etc )  - Arrange for interpretive services to assist at discharge as needed  - Refer to Case Management Department for coordinating discharge planning if the patient needs post-hospital services based on physician/advanced practitioner order or complex needs related to functional status, cognitive ability, or social support system  Outcome: Progressing     Problem: Knowledge Deficit  Goal: Patient/family/caregiver demonstrates understanding of disease process, treatment plan, medications, and discharge instructions  Description: Complete learning assessment and assess knowledge base    Interventions:  - Provide teaching at level of understanding  - Provide teaching via preferred learning methods  Outcome: Progressing

## 2020-08-26 NOTE — ASSESSMENT & PLAN NOTE
· Frequent UTI currently on Bactrim ds    Completed course as prescribed  · Patient did require straight catheterization for PVR>300 by VNA  · No further symptoms during hospitalization

## 2020-08-26 NOTE — PLAN OF CARE
Problem: PHYSICAL THERAPY ADULT  Goal: Performs mobility at highest level of function for planned discharge setting  See evaluation for individualized goals  Description: Treatment/Interventions: Functional transfer training, LE strengthening/ROM, Therapeutic exercise, Endurance training, Patient/family training, Equipment eval/education, Bed mobility, Gait training, Spoke to nursing          See flowsheet documentation for full assessment, interventions and recommendations  Note: Prognosis: Fair  Problem List: Decreased strength, Impaired balance, Decreased mobility, Decreased safety awareness  Assessment: Byron Razo is a 80 y o  female admitted to [address] [employer] on 8/24/2020 for Bright red blood per rectum  Pt  has a past medical history of Ambulatory dysfunction, CHF (congestive heart failure) ([facility] ), Cholelithiasis, Chronic pain disorder, Coronary artery disease, Depression with anxiety, Diabetes mellitus ([facility] ), Glaucoma, Hypertension, Hyponatremia, Injury of finger of left hand (7/7/2019), Ischemic stroke of frontal lobe (HCC), Low back pain, Obstructive sleep apnea, PAF (paroxysmal atrial fibrillation) ([facility] ), and Vertigo    PT was consulted and pt was seen on 8/26/2020 for mobility assessment and d/c planning  Pt presents contact precautions, high fall risk, multiple lines  Pt is currently functioning at a  supervision assistance x1 level for transfers, minimum assistance x1 level for ambulation with Rolling Walker  Pt demonstrated BLE weakness, proximal >distal  Reports doing some exercise at home (floor bike) but is easily fatigued  Requires vc for safety throughout session, yong as it relates to safe use of RW- to maintain all four points of contact on ground  No obvious LOB noted  Pt will benefit from continued skilled IP PT to address the above mentioned impairments  in order to maximize recovery and increase functional independence when completing mobility and ADLs   At this time PT recommendations for d/c are home w family support and HHPT when medically stable  Barriers to Discharge: None     PT Discharge Recommendation: Return to previous environment with social support, Home with skilled therapy(HHPT for strengthening, balance)     PT - OK to Discharge: Yes    See flowsheet documentation for full assessment

## 2020-08-26 NOTE — DISCHARGE SUMMARY
Discharge- [facility] 1935, 80 y o  female MRN: [id number]  Unit/Bed#: [address] [id number] Encounter: [id number]  Primary Care Provider: Georgia Estrada MD   Date and time admitted to hospital: 8/24/2020  1:16 PM        Admitting Provider:  Hossein Titus MD  Discharge Provider:  Breanna Santiago DO  Admission Date: 8/24/2020       Discharge Date: 08/26/20   LOS: 1  Primary Care Physician at Discharge: Georgia Estrada, [address]:  [facility] is a 80 y o  female who presented to the hospital with bloody bowel movements and urinary complaints  The patient does have a history of stroke with no residual deficits and paroxysmal atrial fibrillation on Pradaxa  She noticed blood mixing with her stool which prompted emergency department evaluation  Of note she did declined colonoscopy in the past and continues to do so during this hospitalization  Pradaxa was restarted and the patient did not have any further gastrointestinal bleeding  She was seen by GI and rectal bleeding likely hemorrhoidal   She was noted to have transaminitis which may be recent use of Bactrim  Hemoglobin stable without need for transfusion and she is being discharged home with home PT  Of note with her anemia she was given Venofer x1 and being started on folic acid for deficiency  GI also recommended starting PPI in addition to cimetidine  Case discussed with daughter at time of discharge  Please see problem list listed below  DISCHARGE DIAGNOSES  * Bright red blood per rectum  Assessment & Plan  · BRBPR while on full anticoagulation with Pradaxa  · Patient was once again offered colonoscopy by GI but declined  · No further episodes even after restarting Pradaxa and advancement of diet     Hyponatremia  Assessment & Plan  · Hyponatremia present admission secondary to slight dehydration  · Resolved with IV fluids    Discontinued given history of congestive heart failure    Results from last 7 days   Lab Units 08/26/20  0504 08/25/20  0527 08/24/20  1356   SODIUM mmol/L 138 138 133*       Folate deficiency  Assessment & Plan  · Folic acid deficiency  Starting folic acid    Transaminitis  Assessment & Plan  · Transaminitis without clear cause  Hepatitis panel ordered by gastroenterology which was negative  · May be secondary to recent use of Bactrim  Patient has GI follow-up in couple weeks    Results from last 7 days   Lab Units 08/26/20  0504 08/25/20  0527 08/24/20  1356   AST U/L 110* 81* 97*   ALT U/L 173* 132* 156*   TOTAL BILIRUBIN mg/dL 0 27 0 26 0 30       Urinary tract infection symptoms  Assessment & Plan  · Frequent UTI currently on Bactrim ds  Completed course as prescribed  · Patient did require straight catheterization for PVR>300 by VNA  · No further symptoms during hospitalization     Anemia  Assessment & Plan  · Anemia appears secondary to iron and folic acid deficiency  · Patient given Venofer 200 mg x 1 during hospitalization and will be discharged with folic acid      Results from last 7 days   Lab Units 08/26/20  0504 08/25/20  0526 08/25/20  0000 08/24/20  1356   HEMOGLOBIN g/dL 9 3* 8 9* 9 1* 10 6*     Results from last 7 days   Lab Units 08/25/20  0527   VITAMIN B 12 pg/mL 865   FOLATE ng/mL 3 0*   IRON ug/dL 27*   TIBC ug/dL 335       History of CVA (cerebrovascular accident)  Assessment & Plan  · History of CVA x2 without any residual deficits    Chronic diastolic heart failure (HCC)  Assessment & Plan  Wt Readings from Last 3 Encounters:   07/31/20 70 3 kg (155 lb)   07/16/20 72 8 kg (160 lb 6 4 oz)   06/16/20 73 3 kg (161 lb 9 6 oz)     · Chronic diastolic CHF currently stable and appears to be at euvolemia without need for diuretics    Paroxysmal atrial fibrillation (HCC)  Assessment & Plan  · Paroxysmal atrial fibrillation on amiodarone  · Pradaxa currently on hold but will restart given resolution of gastrointestinal bleeding    Hypertension  Assessment & Plan  · Essential hypertension continue nifedipine hydralazine and losartan    Hyperlipidemia  Assessment & Plan  · Hyperlipidemia continue rosuvastatin after discharge    Glaucoma  Assessment & Plan  · Glaucoma continue eyedrops    Type 2 diabetes mellitus with hyperglycemia, without long-term current use of insulin [facility])  Assessment & Plan  Lab Results   Component Value Date    HGBA1C 7 9 (H) 07/18/2020     Results from last 7 days   Lab Units 08/26/20  1611 08/26/20  1102 08/26/20  0811 08/25/20  2114 08/25/20  1639   POC GLUCOSE mg/dl 116 305* 160* 137 167*      · Diabetes mellitus holding oral agents  Patient was placed on sliding scale insulin    CONSULTING PROVIDERS   IP CONSULT TO GASTROENTEROLOGY    PROCEDURES PERFORMED  * No surgery found *    RADIOLOGY RESULTS  Ct Abdomen Pelvis With Contrast  Result Date: 8/24/2020  Impression: Cholelithiasis without cholecystitis  Stable hiatal hernia  Mild thickening of the bladder wall, correlate with urinalysis   Workstation performed: [id number]       LABS  Results from last 7 days   Lab Units 08/26/20  0504 08/25/20  0526 08/25/20  0000 08/24/20  1356   WBC Thousand/uL 6 65 6 64  --  9 39   HEMOGLOBIN g/dL 9 3* 8 9* 9 1* 10 6*   HEMATOCRIT % 30 2* 29 1* 29 6* 34 5*   MCV fL 82 81*  --  81*   PLATELETS Thousands/uL 266 279  --  331   INR   --   --   --  2 46*     Results from last 7 days   Lab Units 08/26/20  0504 08/25/20  0527 08/24/20  1356   SODIUM mmol/L 138 138 133*   POTASSIUM mmol/L 4 4 4 5 4 8   CHLORIDE mmol/L 106 104 100   CO2 mmol/L 20* 23 21   BUN mg/dL 10 14 16   CREATININE mg/dL 1 16 1 13 1 35*   CALCIUM mg/dL 8 4 8 6 9 2   ALBUMIN g/dL 2 9* 2 9* 3 6   TOTAL BILIRUBIN mg/dL 0 27 0 26 0 30   ALK PHOS U/L 66 60 77   ALT U/L 173* 132* 156*   AST U/L 110* 81* 97*   EGFR ml/min/1 73sq m 43 44 36   GLUCOSE RANDOM mg/dL 120 105 156*     Results from last 7 days   Lab Units 08/24/20  1356   TROPONIN I ng/mL <0 02              Results from last 7 days   Lab Units 08/26/20  1102 08/26/20  0811 08/25/20  2114 08/25/20  1639   POC GLUCOSE mg/dl 305* 160* 137 167*         Results from last 7 days   Lab Units 08/25/20  0527   TSH 3RD GENERATON uIU/mL 6 137*   FREE T4 ng/dL 1 32               Cultures:   Results from last 7 days   Lab Units 08/24/20  1452 08/24/20  1448   COLOR UA  - Margaret   CLARITY UA   --  Clear   SPEC GRAV UA   --  1 020   PH UA   --  6 0   LEUKOCYTES UA   --  Negative   NITRITE UA   --  Negative   GLUCOSE UA mg/dl  --  Negative   KETONES UA mg/dl  --  Negative   BILIRUBIN UA   --  Negative   BLOOD UA   --  Large*      Results from last 7 days   Lab Units 08/24/20  1448   RBC UA /hpf Innumerable*   WBC UA /hpf 2-4*   EPITHELIAL CELLS WET PREP /hpf Occasional   BACTERIA UA /hpf None Seen      Results from last 7 days   Lab Units 08/20/20  0703   URINE CULTURE  No Growth <1000 cfu/mL       PHYSICAL EXAM:  Vitals:   Blood Pressure: 138/79 (08/26/20 1505)  Pulse: 61 (08/26/20 1505)  Temperature: 98 3 °F (36 8 °C) (08/26/20 1505)  Temp Source: Oral (08/26/20 1505)  Respirations: 18 (08/26/20 1505)  SpO2: 98 % (08/26/20 1505)    Physical Exam  Vitals signs reviewed  Constitutional:       General: She is not in acute distress  Appearance: Normal appearance  HENT:      Head: Atraumatic  Eyes:      General: No scleral icterus  Extraocular Movements: Extraocular movements intact  Cardiovascular:      Rate and Rhythm: Regular rhythm  Heart sounds: Normal heart sounds  Pulmonary:      Breath sounds: Normal breath sounds  No wheezing  Abdominal:      General: Bowel sounds are normal       Palpations: Abdomen is soft  Tenderness: There is no guarding or rebound  Musculoskeletal:         General: Swelling present  Comments: +1 edema lower extremities bilaterally   Skin:     General: Skin is warm  Neurological:      Mental Status: She is alert and oriented to person, place, and time  Mental status is at baseline     Psychiatric:         Mood and Affect: Mood normal        Planned Re-admission:  No  Discharge Disposition:  Home with VNA    Test Results Pending at Discharge:  None  Incidental findings:     Medications   · Discharge Medication List: See after visit summary for reconciled discharge medications  Diet restrictions:  Diabetic diet   Activity restrictions: No strenuous activity  Discharge Condition: stable    Outpatient Follow-Up and Discharge Instructions  See after visit summary section titled Discharge Instructions for information provided to patient and family  Code Status: Level 1 - Full Code  Discharge Statement   I spent 35 minutes discharging the patient  This time was spent on the day of discharge  Greater than 50% of total time was spent with the patient and / or family counseling and / or coordination of care  ** Please Note: This note has been constructed using a voice recognition system   **

## 2020-08-26 NOTE — ASSESSMENT & PLAN NOTE
Lab Results   Component Value Date    HGBA1C 7 9 (H) 07/18/2020     Results from last 7 days   Lab Units 08/26/20  1611 08/26/20  1102 08/26/20  0811 08/25/20  2114 08/25/20  1639   POC GLUCOSE mg/dl 116 305* 160* 137 167*      · Diabetes mellitus holding oral agents    Patient was placed on sliding scale insulin

## 2020-08-27 ENCOUNTER — TRANSITIONAL CARE MANAGEMENT (OUTPATIENT)
Dept: FAMILY MEDICINE CLINIC | Facility: CLINIC | Age: 85
End: 2020-08-27

## 2020-08-27 NOTE — UTILIZATION REVIEW
Notification of Discharge  This is a Notification of Discharge from our facility [address]  Please be advised that this patient has been discharge from our facility  Below you will find the admission and discharge date and time including the patients disposition  PRESENTATION DATE: 8/24/2020  1:16 PM    IP ADMISSION DATE: 8/25/20 1412   DISCHARGE DATE: 8/26/2020  3:24 PM  DISPOSITION: Home with Omar Sheikh with [address] [facility]   Admission Orders listed below:  Admission Orders (From admission, onward)     Ordered        08/25/20 1412  Inpatient Admission  Once         08/24/20 1620  Place in Observation  Once                   Please contact the UR Department if additional information is required to close this patient's authorization/case  Miky Botello  [facility] Utilization Review Department  Main: [phone] x carefully listen to the prompts  All voicemails are confidential   [email or web address]  [email or web address]  Send all requests for admission clinical reviews, approved or denied determinations and any other requests to dedicated fax number below belonging to the campus where the patient is receiving treatment   List of dedicated fax numbers:  [address] DENIALS (Administrative/Medical Necessity) [phone]   [address] (Maternity/NICU/Pediatrics) [phone]   Otoniel Gins [phone]   Cherylene Freud [phone]   [address] [place] [address] [phone]   [address] [place] [address] [address] [place] [address] [phone]   [facility] [phone]   [address] [facility]  [address] [facility] [place] [address] [facility] [phone]

## 2020-08-28 NOTE — TELEPHONE ENCOUNTER
KW, patient finished her antibiotics today  How many days would you like for her to wait to schedule her procedure?

## 2020-08-28 NOTE — TELEPHONE ENCOUNTER
Patient's son-in-law Junaa Jiménez called stating today is her last day of taking her antibiotics  She is requesting to schedule her procedure   Please advise, neetu    Call back# [phone]

## 2020-08-28 NOTE — TELEPHONE ENCOUNTER
TONNY Granados per MAYITO, advised of KW recommendations  Patient goes to see her PCP  After patient has been cleared by her PCP  will re-schedule her procedure

## 2020-08-31 ENCOUNTER — TELEPHONE (OUTPATIENT)
Dept: FAMILY MEDICINE CLINIC | Facility: CLINIC | Age: 85
End: 2020-08-31

## 2020-08-31 ENCOUNTER — TELEPHONE (OUTPATIENT)
Dept: UROLOGY | Facility: MEDICAL CENTER | Age: 85
End: 2020-08-31

## 2020-08-31 ENCOUNTER — OFFICE VISIT (OUTPATIENT)
Dept: FAMILY MEDICINE CLINIC | Facility: CLINIC | Age: 85
End: 2020-08-31
Payer: COMMERCIAL

## 2020-08-31 VITALS
HEART RATE: 56 BPM | DIASTOLIC BLOOD PRESSURE: 50 MMHG | SYSTOLIC BLOOD PRESSURE: 128 MMHG | RESPIRATION RATE: 16 BRPM | TEMPERATURE: 98 F | OXYGEN SATURATION: 98 %

## 2020-08-31 DIAGNOSIS — Z09 HOSPITAL DISCHARGE FOLLOW-UP: Primary | ICD-10-CM

## 2020-08-31 DIAGNOSIS — R31.9 HEMATURIA, UNSPECIFIED TYPE: ICD-10-CM

## 2020-08-31 DIAGNOSIS — E78.5 HYPERLIPIDEMIA, UNSPECIFIED HYPERLIPIDEMIA TYPE: Chronic | ICD-10-CM

## 2020-08-31 DIAGNOSIS — E11.65 TYPE 2 DIABETES MELLITUS WITH HYPERGLYCEMIA, WITHOUT LONG-TERM CURRENT USE OF INSULIN (HCC): ICD-10-CM

## 2020-08-31 DIAGNOSIS — R13.10 DYSPHAGIA, UNSPECIFIED TYPE: ICD-10-CM

## 2020-08-31 DIAGNOSIS — M54.40 CHRONIC BILATERAL LOW BACK PAIN WITH SCIATICA, SCIATICA LATERALITY UNSPECIFIED: ICD-10-CM

## 2020-08-31 DIAGNOSIS — I10 ESSENTIAL HYPERTENSION: Chronic | ICD-10-CM

## 2020-08-31 DIAGNOSIS — G89.29 CHRONIC BILATERAL LOW BACK PAIN WITH SCIATICA, SCIATICA LATERALITY UNSPECIFIED: ICD-10-CM

## 2020-08-31 DIAGNOSIS — D64.9 ANEMIA, UNSPECIFIED TYPE: ICD-10-CM

## 2020-08-31 DIAGNOSIS — R33.9 URINE RETENTION: ICD-10-CM

## 2020-08-31 DIAGNOSIS — I48.0 PAROXYSMAL ATRIAL FIBRILLATION (HCC): ICD-10-CM

## 2020-08-31 PROCEDURE — 99496 TRANSJ CARE MGMT HIGH F2F 7D: CPT | Performed by: FAMILY MEDICINE

## 2020-08-31 PROCEDURE — 1111F DSCHRG MED/CURRENT MED MERGE: CPT | Performed by: FAMILY MEDICINE

## 2020-08-31 NOTE — PROGRESS NOTES
Chief Complaint   Patient presents with    Transition of Care Management     Discharged 08/26/20  Assessment/Plan:     Urine retention  Pt has lisa  Will see [facility] urology on 9/3/2020  Hematuria  Pt has lisa and she is on pradaxa  Will see urology on 9/3/2020  Anemia  Pt has mild hematuria  Will recheck CBC next week  Dysphagia  [facility] on 9/2/2020 as scheduled  Continue pantoprazole 40mg QD and cimetidine 400mg bid  Chronic bilateral low back pain with sciatica  [facility] pain specialist  Plan to get injection  Type 2 diabetes mellitus with hyperglycemia, without long-term current use of insulin (HCC)    Lab Results   Component Value Date    HGBA1C 7 9 (H) 07/18/2020     Low carb diet  Continue tradjenta 5mg QD and metformin 500mg bid  Hypertension  Controlled  DASH diet  Continue losartan 50mg QD, hydralazine 75mg tid and nifedipine 30mg QD  Paroxysmal atrial fibrillation [facility])  [facility] cardiology  Continue amiodarone and pradaxa  Hyperlipidemia  Low fat diet  Continue crestor  Diagnoses and all orders for this visit:    Hospital discharge follow-up  Comments:  Reviewed hospital records  Urine retention  -     Ambulatory referral to Urology; Future    Hematuria, unspecified type    Anemia, unspecified type  -     CBC and differential; Future    Dysphagia, unspecified type    Chronic bilateral low back pain with sciatica, sciatica laterality unspecified    Type 2 diabetes mellitus with hyperglycemia, without long-term current use of insulin (HCC)    Essential hypertension    Paroxysmal atrial fibrillation (HCC)    Hyperlipidemia, unspecified hyperlipidemia type         Subjective:     Patient ID: Ivelisse Alberts is a 80 y o  female  HPI    Pt is here with her son-in-law  Was in hospital 8/24-8/26/2020 for blood in stool  Pt declined colonoscopy  CT showed cholelithiasis, stable hiatal hernia and mild thickening of bladder wall  GI consulted, maybe hemorrhoid  Recommend PPI and cimetidine  Labs showed liver enzymes elevated  Will see GI outpatient on 9/2/2020  Hg stable  Discharged home  Urine retention---Pt got bactrim last week for UTI  Pt has lisa since 3 days ago  This morning, some pinkish color in urine bag  Pt states she felt frequency  No fever  Plan to see [facility] urology on 9/3/2020  Need us to fax referral today to [phone]  Lower back pain---[facility] pain specialist  Plan to get injection        DM---7/2020 HgA1C 7 9 stable  She is on metformin 500mg QD and tradjenta 5mg QD  Denies side effects  This morning glucose 158 per pt       HTN---Stopped lisinopril because it caused her coughing  Stopped metoprolol because it gave her severe headache    Saw cardiology  She is on hydralazine to 75mg tid and nifedipine 30mg QD and losartan 50mg Qd  BP at home good  Afib---she is on amiodarone and pradaxa per cardiology  CVA---Has off balance sensation  Saw neurology before  She is on pradaxa 150mg bid      Hyperlipidemia---She is on crestor per cardiology            Review of Systems   Constitutional: Negative for appetite change, chills and fever  HENT: Negative for congestion, ear pain, sinus pain and sore throat  Eyes: Negative for discharge and itching  Respiratory: Negative for apnea, cough, chest tightness, shortness of breath and wheezing  Cardiovascular: Negative for chest pain, palpitations and leg swelling  Gastrointestinal: Negative for abdominal pain, anal bleeding, constipation, diarrhea, nausea and vomiting  Endocrine: Negative for cold intolerance, heat intolerance and polyuria  Genitourinary: Positive for frequency and hematuria  Negative for difficulty urinating and dysuria  Musculoskeletal: Negative for arthralgias, back pain and myalgias  Skin: Negative for rash  Neurological: Negative for dizziness and headaches  Psychiatric/Behavioral: Negative for agitation           Objective:     Physical Exam  Constitutional:       General: She is not in acute distress  Appearance: She is well-developed  HENT:      Head: Normocephalic  Eyes:      General:         Right eye: No discharge  Left eye: No discharge  Conjunctiva/sclera: Conjunctivae normal       Pupils: Pupils are equal, round, and reactive to light  Neck:      Musculoskeletal: Normal range of motion  Thyroid: No thyromegaly  Cardiovascular:      Rate and Rhythm: Normal rate and regular rhythm  Heart sounds: Normal heart sounds  No murmur  No friction rub  No gallop  Pulmonary:      Effort: Pulmonary effort is normal  No respiratory distress  Breath sounds: Normal breath sounds  No wheezing or rales  Chest:      Chest wall: No tenderness  Abdominal:      General: Bowel sounds are normal  There is no distension  Palpations: Abdomen is soft  There is no mass  Tenderness: There is no abdominal tenderness  There is no guarding or rebound  Musculoskeletal:         General: No swelling, tenderness or deformity  Comments: On wheelchair   Lymphadenopathy:      Cervical: No cervical adenopathy  Neurological:      Mental Status: She is alert  Vitals:    08/31/20 1402   BP: 128/50   BP Location: Left arm   Patient Position: Sitting   Cuff Size: Adult   Pulse: 56   Resp: 16   Temp: 98 °F (36 7 °C)   TempSrc: Oral   SpO2: 98%       Transitional Care Management Review:  Alyssa Encinas is a 80 y o  female here for TCM follow up       During the TCM phone call patient stated:    TCM Call (since 7/31/2020)     Hospital care reviewed  Records reviewed    [facility]    Date of Admission  08/24/20    Date of discharge  08/26/20    Diagnosis  Bright red blood per rectum    Disposition  Home    Were the patients medications reviewed and updated  Yes    Current Symptoms  None      TCM Call (since 7/31/2020)     Post hospital issues  None    Should patient be enrolled in anticoag monitoring? No    Scheduled for follow up?   Yes    Did you obtain your prescribed medications  Yes    Do you need help managing your prescriptions or medications  No    Is transportation to your appointment needed  No    I have advised the patient to call PCP with any new or worsening symptoms  Michael Larose, [address]    The type of support provided  Physical; Emotional    Do you have social support  Yes, as much as I need    Are you recieving any outpatient services  No    Are you recieving home care services  Yes    Types of home care services  Nurse visit (Comment)  Home with [facility]    Are you using any community resources  No    Current waiver services  No    Have you fallen in the last 12 months  No    Interperter language line needed  No    Counseling  Family          [facility], [place]

## 2020-08-31 NOTE — TELEPHONE ENCOUNTER
Called pt's daughter and offered her our first available appt on 9/25/20  She would like to be seen sooner in another location  Forwarding to call center

## 2020-08-31 NOTE — ASSESSMENT & PLAN NOTE
Lab Results   Component Value Date    HGBA1C 7 9 (H) 07/18/2020     Low carb diet  Continue tradjenta 5mg QD and metformin 500mg bid

## 2020-08-31 NOTE — TELEPHONE ENCOUNTER
Renetta Pillai from [facility] calling for appointment for patient for urinary retention and blood in urine  Thu  [place] requesting we contact daughter to make appointment            Do we accept the patient's insurance or is the patient Self-Pay? [employer]     Has the patient had any previous urologist(s)?no    Have patient records been requested? no     Has the patient had any outside testing done? no       What is the patients location preference for an office visit[email or web address]        Does the patient have a personal history of cancer? no

## 2020-08-31 NOTE — TELEPHONE ENCOUNTER
Patient's daughter is requesting an order for a high rise commode with legs  Please call daughter at [phone] with any questions

## 2020-09-01 DIAGNOSIS — I63.521 CEREBROVASCULAR ACCIDENT (CVA) DUE TO OCCLUSION OF RIGHT ANTERIOR CEREBRAL ARTERY (HCC): Primary | ICD-10-CM

## 2020-09-08 ENCOUNTER — TELEPHONE (OUTPATIENT)
Dept: FAMILY MEDICINE CLINIC | Facility: CLINIC | Age: 85
End: 2020-09-08

## 2020-09-08 ENCOUNTER — TELEPHONE (OUTPATIENT)
Dept: PAIN MEDICINE | Facility: MEDICAL CENTER | Age: 85
End: 2020-09-08

## 2020-09-09 ENCOUNTER — TELEPHONE (OUTPATIENT)
Dept: FAMILY MEDICINE CLINIC | Facility: CLINIC | Age: 85
End: 2020-09-09

## 2020-09-09 ENCOUNTER — APPOINTMENT (OUTPATIENT)
Dept: LAB | Facility: HOSPITAL | Age: 85
End: 2020-09-09
Payer: COMMERCIAL

## 2020-09-09 DIAGNOSIS — D64.9 ANEMIA, UNSPECIFIED TYPE: ICD-10-CM

## 2020-09-09 LAB
BASOPHILS # BLD AUTO: 0.05 THOUSANDS/ΜL (ref 0–0.1)
BASOPHILS NFR BLD AUTO: 1 % (ref 0–1)
EOSINOPHIL # BLD AUTO: 0.08 THOUSAND/ΜL (ref 0–0.61)
EOSINOPHIL NFR BLD AUTO: 1 % (ref 0–6)
ERYTHROCYTE [DISTWIDTH] IN BLOOD BY AUTOMATED COUNT: 21.2 % (ref 11.6–15.1)
EST. AVERAGE GLUCOSE BLD GHB EST-MCNC: 163 MG/DL
HBA1C MFR BLD: 7.3 %
HCT VFR BLD AUTO: 34.6 % (ref 34.8–46.1)
HGB BLD-MCNC: 10.8 G/DL (ref 11.5–15.4)
IMM GRANULOCYTES # BLD AUTO: 0.03 THOUSAND/UL (ref 0–0.2)
IMM GRANULOCYTES NFR BLD AUTO: 1 % (ref 0–2)
LYMPHOCYTES # BLD AUTO: 1.27 THOUSANDS/ΜL (ref 0.6–4.47)
LYMPHOCYTES NFR BLD AUTO: 19 % (ref 14–44)
MCH RBC QN AUTO: 26.4 PG (ref 26.8–34.3)
MCHC RBC AUTO-ENTMCNC: 31.2 G/DL (ref 31.4–37.4)
MCV RBC AUTO: 85 FL (ref 82–98)
MONOCYTES # BLD AUTO: 0.65 THOUSAND/ΜL (ref 0.17–1.22)
MONOCYTES NFR BLD AUTO: 10 % (ref 4–12)
NEUTROPHILS # BLD AUTO: 4.55 THOUSANDS/ΜL (ref 1.85–7.62)
NEUTS SEG NFR BLD AUTO: 68 % (ref 43–75)
NRBC BLD AUTO-RTO: 0 /100 WBCS
PLATELET # BLD AUTO: 255 THOUSANDS/UL (ref 149–390)
PMV BLD AUTO: 10.9 FL (ref 8.9–12.7)
RBC # BLD AUTO: 4.09 MILLION/UL (ref 3.81–5.12)
WBC # BLD AUTO: 6.63 THOUSAND/UL (ref 4.31–10.16)

## 2020-09-09 PROCEDURE — 83036 HEMOGLOBIN GLYCOSYLATED A1C: CPT

## 2020-09-09 PROCEDURE — 36415 COLL VENOUS BLD VENIPUNCTURE: CPT

## 2020-09-09 PROCEDURE — 85025 COMPLETE CBC W/AUTO DIFF WBC: CPT

## 2020-09-16 ENCOUNTER — TELEPHONE (OUTPATIENT)
Dept: FAMILY MEDICINE CLINIC | Facility: CLINIC | Age: 85
End: 2020-09-16

## 2020-09-16 NOTE — TELEPHONE ENCOUNTER
BEREKET FROM [facility]'S v[facility] CALLED TO INFORM THAT ZOFRAN IS BEING REMOVED FROM PATIENTS MEDICATION LIST AND TO CONFIRM IF IT IS OK TO ADD VITAMIN B AND D TO HER MEDICATION LIST   NUMBER TO CALL BACK IS [phone]

## 2020-10-12 ENCOUNTER — HOSPITAL ENCOUNTER (OUTPATIENT)
Dept: RADIOLOGY | Facility: MEDICAL CENTER | Age: 85
Discharge: HOME | End: 2020-10-12
Attending: PHYSICAL MEDICINE & REHABILITATION | Admitting: PHYSICAL MEDICINE & REHABILITATION
Payer: COMMERCIAL

## 2020-10-12 VITALS
OXYGEN SATURATION: 95 % | SYSTOLIC BLOOD PRESSURE: 160 MMHG | RESPIRATION RATE: 20 BRPM | TEMPERATURE: 97.6 F | HEART RATE: 66 BPM | DIASTOLIC BLOOD PRESSURE: 74 MMHG

## 2020-10-12 DIAGNOSIS — M46.1 SACROILIITIS, NOT ELSEWHERE CLASSIFIED (HCC): ICD-10-CM

## 2020-10-12 DIAGNOSIS — I10 ESSENTIAL HYPERTENSION: ICD-10-CM

## 2020-10-12 PROCEDURE — 27096 INJECT SACROILIAC JOINT: CPT | Performed by: PHYSICAL MEDICINE & REHABILITATION

## 2020-10-12 RX ORDER — BUPIVACAINE HCL/PF 2.5 MG/ML
10 VIAL (ML) INJECTION ONCE
Status: COMPLETED | OUTPATIENT
Start: 2020-10-12 | End: 2020-10-12

## 2020-10-12 RX ORDER — LIDOCAINE HYDROCHLORIDE 10 MG/ML
5 INJECTION, SOLUTION EPIDURAL; INFILTRATION; INTRACAUDAL; PERINEURAL ONCE
Status: COMPLETED | OUTPATIENT
Start: 2020-10-12 | End: 2020-10-12

## 2020-10-12 RX ORDER — METHYLPREDNISOLONE ACETATE 80 MG/ML
80 INJECTION, SUSPENSION INTRA-ARTICULAR; INTRALESIONAL; INTRAMUSCULAR; PARENTERAL; SOFT TISSUE ONCE
Status: COMPLETED | OUTPATIENT
Start: 2020-10-12 | End: 2020-10-12

## 2020-10-12 RX ADMIN — LIDOCAINE HYDROCHLORIDE 4 ML: 10 INJECTION, SOLUTION EPIDURAL; INFILTRATION; INTRACAUDAL; PERINEURAL at 11:01

## 2020-10-12 RX ADMIN — IOHEXOL 2 ML: 300 INJECTION, SOLUTION INTRAVENOUS at 11:03

## 2020-10-12 RX ADMIN — Medication 4 ML: at 11:04

## 2020-10-12 RX ADMIN — METHYLPREDNISOLONE ACETATE 80 MG: 80 INJECTION, SUSPENSION INTRA-ARTICULAR; INTRALESIONAL; INTRAMUSCULAR; PARENTERAL; SOFT TISSUE at 11:04

## 2020-10-13 RX ORDER — HYDRALAZINE HYDROCHLORIDE 25 MG/1
75 TABLET, FILM COATED ORAL 3 TIMES DAILY
Qty: 270 TABLET | Refills: 5 | Status: SHIPPED | OUTPATIENT
Start: 2020-10-13

## 2020-10-19 ENCOUNTER — TELEPHONE (OUTPATIENT)
Dept: PAIN MEDICINE | Facility: CLINIC | Age: 85
End: 2020-10-19

## 2020-10-21 ENCOUNTER — TELEPHONE (OUTPATIENT)
Dept: UROLOGY | Facility: MEDICAL CENTER | Age: 85
End: 2020-10-21

## 2020-10-21 ENCOUNTER — TELEMEDICINE (OUTPATIENT)
Dept: FAMILY MEDICINE CLINIC | Facility: CLINIC | Age: 85
End: 2020-10-21
Payer: COMMERCIAL

## 2020-10-21 ENCOUNTER — HOSPITAL ENCOUNTER (EMERGENCY)
Facility: HOSPITAL | Age: 85
Discharge: HOME | End: 2020-10-21
Attending: EMERGENCY MEDICINE | Admitting: EMERGENCY MEDICINE
Payer: COMMERCIAL

## 2020-10-21 ENCOUNTER — APPOINTMENT (EMERGENCY)
Dept: RADIOLOGY | Facility: HOSPITAL | Age: 85
End: 2020-10-21
Payer: COMMERCIAL

## 2020-10-21 ENCOUNTER — APPOINTMENT (EMERGENCY)
Dept: CT IMAGING | Facility: HOSPITAL | Age: 85
End: 2020-10-21
Payer: COMMERCIAL

## 2020-10-21 VITALS
HEART RATE: 72 BPM | TEMPERATURE: 97.2 F | WEIGHT: 151.24 LBS | BODY MASS INDEX: 32.16 KG/M2 | SYSTOLIC BLOOD PRESSURE: 154 MMHG | DIASTOLIC BLOOD PRESSURE: 67 MMHG | OXYGEN SATURATION: 97 % | RESPIRATION RATE: 18 BRPM

## 2020-10-21 DIAGNOSIS — N39.0 UTI (URINARY TRACT INFECTION): ICD-10-CM

## 2020-10-21 DIAGNOSIS — R33.9 URINARY RETENTION: ICD-10-CM

## 2020-10-21 DIAGNOSIS — M25.462 KNEE EFFUSION, LEFT: ICD-10-CM

## 2020-10-21 DIAGNOSIS — E87.1 HYPONATREMIA: ICD-10-CM

## 2020-10-21 DIAGNOSIS — S09.90XA INJURY OF HEAD, INITIAL ENCOUNTER: ICD-10-CM

## 2020-10-21 DIAGNOSIS — M25.562 ACUTE PAIN OF LEFT KNEE: ICD-10-CM

## 2020-10-21 DIAGNOSIS — R33.9 URINE RETENTION: ICD-10-CM
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
## 2020-10-21 DIAGNOSIS — R39.9 URINARY TRACT INFECTION SYMPTOMS: Primary | ICD-10-CM

## 2020-10-21 DIAGNOSIS — R26.81 UNSTEADY GAIT: ICD-10-CM

## 2020-10-21 DIAGNOSIS — S89.92XA INJURY OF LEFT KNEE, INITIAL ENCOUNTER: ICD-10-CM

## 2020-10-21 LAB
ALBUMIN SERPL BCP-MCNC: 3.4 G/DL (ref 3.5–5)
ALP SERPL-CCNC: 121 U/L (ref 46–116)
ALT SERPL W P-5'-P-CCNC: 175 U/L (ref 12–78)
ANION GAP SERPL CALCULATED.3IONS-SCNC: 13 MMOL/L (ref 4–13)
APTT PPP: 88 SECONDS (ref 23–37)
AST SERPL W P-5'-P-CCNC: 78 U/L (ref 5–45)
ATRIAL RATE: 67 BPM
BACTERIA UR QL AUTO: ABNORMAL /HPF
BASOPHILS # BLD AUTO: 0.03 THOUSANDS/ΜL (ref 0–0.1)
BASOPHILS NFR BLD AUTO: 0 % (ref 0–1)
BILIRUB SERPL-MCNC: 0.45 MG/DL (ref 0.2–1)
BILIRUB UR QL STRIP: NEGATIVE
BUN SERPL-MCNC: 41 MG/DL (ref 5–25)
CALCIUM ALBUM COR SERPL-MCNC: 9.8 MG/DL (ref 8.3–10.1)
CALCIUM SERPL-MCNC: 9.3 MG/DL (ref 8.3–10.1)
CHLORIDE SERPL-SCNC: 99 MMOL/L (ref 100–108)
CLARITY UR: ABNORMAL
CO2 SERPL-SCNC: 19 MMOL/L (ref 21–32)
COLOR UR: YELLOW
CREAT SERPL-MCNC: 1.37 MG/DL (ref 0.6–1.3)
EOSINOPHIL # BLD AUTO: 0 THOUSAND/ΜL (ref 0–0.61)
EOSINOPHIL NFR BLD AUTO: 0 % (ref 0–6)
ERYTHROCYTE [DISTWIDTH] IN BLOOD BY AUTOMATED COUNT: 18.6 % (ref 11.6–15.1)
GFR SERPL CREATININE-BSD FRML MDRD: 35 ML/MIN/1.73SQ M
GLUCOSE SERPL-MCNC: 265 MG/DL (ref 65–140)
GLUCOSE UR STRIP-MCNC: ABNORMAL MG/DL
HCT VFR BLD AUTO: 35.6 % (ref 34.8–46.1)
HGB BLD-MCNC: 11.4 G/DL (ref 11.5–15.4)
HGB UR QL STRIP.AUTO: ABNORMAL
IMM GRANULOCYTES # BLD AUTO: 0.08 THOUSAND/UL (ref 0–0.2)
IMM GRANULOCYTES NFR BLD AUTO: 1 % (ref 0–2)
INR PPP: 2.13 (ref 0.84–1.19)
KETONES UR STRIP-MCNC: NEGATIVE MG/DL
LEUKOCYTE ESTERASE UR QL STRIP: NEGATIVE
LYMPHOCYTES # BLD AUTO: 1.55 THOUSANDS/ΜL (ref 0.6–4.47)
LYMPHOCYTES NFR BLD AUTO: 14 % (ref 14–44)
MCH RBC QN AUTO: 27.9 PG (ref 26.8–34.3)
MCHC RBC AUTO-ENTMCNC: 32 G/DL (ref 31.4–37.4)
MCV RBC AUTO: 87 FL (ref 82–98)
MONOCYTES # BLD AUTO: 0.94 THOUSAND/ΜL (ref 0.17–1.22)
MONOCYTES NFR BLD AUTO: 8 % (ref 4–12)
NEUTROPHILS # BLD AUTO: 8.71 THOUSANDS/ΜL (ref 1.85–7.62)
NEUTS SEG NFR BLD AUTO: 77 % (ref 43–75)
NITRITE UR QL STRIP: NEGATIVE
NON-SQ EPI CELLS URNS QL MICRO: ABNORMAL /HPF
NRBC BLD AUTO-RTO: 0 /100 WBCS
P AXIS: 39 DEGREES
PH UR STRIP.AUTO: 5 [PH] (ref 4.5–8)
PLATELET # BLD AUTO: 277 THOUSANDS/UL (ref 149–390)
PMV BLD AUTO: 10.5 FL (ref 8.9–12.7)
POTASSIUM SERPL-SCNC: 4.8 MMOL/L (ref 3.5–5.3)
PR INTERVAL: 190 MS
PROT SERPL-MCNC: 7 G/DL (ref 6.4–8.2)
PROT UR STRIP-MCNC: ABNORMAL MG/DL
PROTHROMBIN TIME: 23.3 SECONDS (ref 11.6–14.5)
QRS AXIS: -39 DEGREES
QRSD INTERVAL: 98 MS
QT INTERVAL: 394 MS
QTC INTERVAL: 416 MS
RBC # BLD AUTO: 4.09 MILLION/UL (ref 3.81–5.12)
RBC #/AREA URNS AUTO: ABNORMAL /HPF
SODIUM SERPL-SCNC: 131 MMOL/L (ref 136–145)
SP GR UR STRIP.AUTO: 1.02 (ref 1–1.03)
T WAVE AXIS: 58 DEGREES
UROBILINOGEN UR QL STRIP.AUTO: 0.2 E.U./DL
VENTRICULAR RATE: 67 BPM
WBC # BLD AUTO: 11.31 THOUSAND/UL (ref 4.31–10.16)
WBC #/AREA URNS AUTO: ABNORMAL /HPF

## 2020-10-21 PROCEDURE — 99285 EMERGENCY DEPT VISIT HI MDM: CPT | Performed by: EMERGENCY MEDICINE

## 2020-10-21 PROCEDURE — 72170 X-RAY EXAM OF PELVIS: CPT

## 2020-10-21 PROCEDURE — 99285 EMERGENCY DEPT VISIT HI MDM: CPT

## 2020-10-21 PROCEDURE — G1004 CDSM NDSC: HCPCS

## 2020-10-21 PROCEDURE — 73564 X-RAY EXAM KNEE 4 OR MORE: CPT

## 2020-10-21 PROCEDURE — 36415 COLL VENOUS BLD VENIPUNCTURE: CPT | Performed by: EMERGENCY MEDICINE

## 2020-10-21 PROCEDURE — 72125 CT NECK SPINE W/O DYE: CPT

## 2020-10-21 PROCEDURE — 85025 COMPLETE CBC W/AUTO DIFF WBC: CPT | Performed by: EMERGENCY MEDICINE

## 2020-10-21 PROCEDURE — 71045 X-RAY EXAM CHEST 1 VIEW: CPT

## 2020-10-21 PROCEDURE — 85610 PROTHROMBIN TIME: CPT | Performed by: EMERGENCY MEDICINE

## 2020-10-21 PROCEDURE — 85730 THROMBOPLASTIN TIME PARTIAL: CPT | Performed by: EMERGENCY MEDICINE

## 2020-10-21 PROCEDURE — 87086 URINE CULTURE/COLONY COUNT: CPT

## 2020-10-21 PROCEDURE — 70450 CT HEAD/BRAIN W/O DYE: CPT

## 2020-10-21 PROCEDURE — 99214 OFFICE O/P EST MOD 30 MIN: CPT | Performed by: FAMILY MEDICINE

## 2020-10-21 PROCEDURE — 93005 ELECTROCARDIOGRAM TRACING: CPT

## 2020-10-21 PROCEDURE — 81001 URINALYSIS AUTO W/SCOPE: CPT

## 2020-10-21 PROCEDURE — 80053 COMPREHEN METABOLIC PANEL: CPT | Performed by: EMERGENCY MEDICINE

## 2020-10-21 PROCEDURE — 93010 ELECTROCARDIOGRAM REPORT: CPT

## 2020-10-21 RX ORDER — CEPHALEXIN 250 MG/1
250 CAPSULE ORAL EVERY 12 HOURS SCHEDULED
Qty: 21 CAPSULE | Refills: 0 | Status: SHIPPED | OUTPATIENT
Start: 2020-10-21 | End: 2020-10-26 | Stop reason: ALTCHOICE

## 2020-10-21 RX ORDER — CEPHALEXIN 250 MG/1
500 CAPSULE ORAL ONCE
Status: COMPLETED | OUTPATIENT
Start: 2020-10-21 | End: 2020-10-21

## 2020-10-21 RX ORDER — ACETAMINOPHEN 325 MG/1
650 TABLET ORAL ONCE
Status: COMPLETED | OUTPATIENT
Start: 2020-10-21 | End: 2020-10-21

## 2020-10-21 RX ADMIN — CEPHALEXIN 500 MG: 250 CAPSULE ORAL at 09:36

## 2020-10-21 RX ADMIN — ACETAMINOPHEN 650 MG: 325 TABLET ORAL at 07:07

## 2020-10-21 RX ADMIN — SODIUM CHLORIDE 250 ML: 0.9 INJECTION, SOLUTION INTRAVENOUS at 07:59

## 2020-10-22 ENCOUNTER — OFFICE VISIT (OUTPATIENT)
Dept: OBGYN CLINIC | Facility: MEDICAL CENTER | Age: 85
End: 2020-10-22
Payer: COMMERCIAL

## 2020-10-22 VITALS
TEMPERATURE: 97.8 F | HEART RATE: 60 BPM | WEIGHT: 151 LBS | SYSTOLIC BLOOD PRESSURE: 109 MMHG | HEIGHT: 58 IN | BODY MASS INDEX: 31.7 KG/M2 | DIASTOLIC BLOOD PRESSURE: 70 MMHG

## 2020-10-22 DIAGNOSIS — M17.12 PRIMARY OSTEOARTHRITIS OF LEFT KNEE: Primary | ICD-10-CM

## 2020-10-22 DIAGNOSIS — M25.462 EFFUSION OF LEFT KNEE: ICD-10-CM

## 2020-10-22 LAB — BACTERIA UR CULT: ABNORMAL

## 2020-10-22 PROCEDURE — 3074F SYST BP LT 130 MM HG: CPT | Performed by: ORTHOPAEDIC SURGERY

## 2020-10-22 PROCEDURE — 1036F TOBACCO NON-USER: CPT | Performed by: ORTHOPAEDIC SURGERY

## 2020-10-22 PROCEDURE — 3078F DIAST BP <80 MM HG: CPT | Performed by: ORTHOPAEDIC SURGERY

## 2020-10-22 PROCEDURE — 99204 OFFICE O/P NEW MOD 45 MIN: CPT | Performed by: ORTHOPAEDIC SURGERY

## 2020-10-22 PROCEDURE — 1160F RVW MEDS BY RX/DR IN RCRD: CPT | Performed by: ORTHOPAEDIC SURGERY

## 2020-10-23 ENCOUNTER — TELEPHONE (OUTPATIENT)
Dept: FAMILY MEDICINE CLINIC | Facility: CLINIC | Age: 85
End: 2020-10-23

## 2020-10-26 ENCOUNTER — TELEMEDICINE (OUTPATIENT)
Dept: FAMILY MEDICINE CLINIC | Facility: CLINIC | Age: 85
End: 2020-10-26
Payer: COMMERCIAL

## 2020-10-26 ENCOUNTER — TELEPHONE (OUTPATIENT)
Dept: FAMILY MEDICINE CLINIC | Facility: CLINIC | Age: 85
End: 2020-10-26

## 2020-10-26 VITALS — DIASTOLIC BLOOD PRESSURE: 84 MMHG | TEMPERATURE: 97.7 F | SYSTOLIC BLOOD PRESSURE: 120 MMHG

## 2020-10-26 DIAGNOSIS — R19.7 DIARRHEA, UNSPECIFIED TYPE: Primary | ICD-10-CM

## 2020-10-26 DIAGNOSIS — R11.2 NAUSEA AND VOMITING, INTRACTABILITY OF VOMITING NOT SPECIFIED, UNSPECIFIED VOMITING TYPE: ICD-10-CM

## 2020-10-26 PROCEDURE — 99213 OFFICE O/P EST LOW 20 MIN: CPT | Performed by: FAMILY MEDICINE

## 2020-10-26 RX ORDER — ONDANSETRON 4 MG/1
4 TABLET, FILM COATED ORAL EVERY 8 HOURS PRN
Qty: 20 TABLET | Refills: 0 | Status: SHIPPED | OUTPATIENT
Start: 2020-10-26 | End: 2020-11-04 | Stop reason: ALTCHOICE

## 2020-10-27 ENCOUNTER — TELEPHONE (OUTPATIENT)
Dept: FAMILY MEDICINE CLINIC | Facility: CLINIC | Age: 85
End: 2020-10-27

## 2020-10-28 ENCOUNTER — APPOINTMENT (EMERGENCY)
Dept: CT IMAGING | Facility: HOSPITAL | Age: 85
DRG: 683 | End: 2020-10-28
Payer: COMMERCIAL

## 2020-10-28 ENCOUNTER — TELEPHONE (OUTPATIENT)
Dept: FAMILY MEDICINE CLINIC | Facility: CLINIC | Age: 85
End: 2020-10-28

## 2020-10-28 ENCOUNTER — HOSPITAL ENCOUNTER (INPATIENT)
Facility: HOSPITAL | Age: 85
LOS: 6 days | Discharge: HOME HEALTH | DRG: 683 | End: 2020-11-03
Attending: EMERGENCY MEDICINE | Admitting: FAMILY MEDICINE
Payer: COMMERCIAL

## 2020-10-28 DIAGNOSIS — R74.01 TRANSAMINITIS: ICD-10-CM

## 2020-10-28 DIAGNOSIS — E66.9 OBESITY (BMI 30-39.9): ICD-10-CM

## 2020-10-28 DIAGNOSIS — I48.19 PERSISTENT ATRIAL FIBRILLATION (HCC): ICD-10-CM

## 2020-10-28 DIAGNOSIS — I48.0 PAF (PAROXYSMAL ATRIAL FIBRILLATION) (HCC): ICD-10-CM

## 2020-10-28 DIAGNOSIS — E86.9 VOLUME DEPLETION: ICD-10-CM

## 2020-10-28 DIAGNOSIS — Z97.8 FOLEY CATHETER IN PLACE: ICD-10-CM

## 2020-10-28 DIAGNOSIS — N30.90 CYSTITIS: ICD-10-CM

## 2020-10-28 DIAGNOSIS — I63.521 CEREBROVASCULAR ACCIDENT (CVA) DUE TO OCCLUSION OF RIGHT ANTERIOR CEREBRAL ARTERY (HCC): ICD-10-CM

## 2020-10-28 DIAGNOSIS — R79.89 INCREASED THYROID STIMULATING HORMONE (TSH) LEVEL: ICD-10-CM

## 2020-10-28 DIAGNOSIS — R11.2 NAUSEA VOMITING AND DIARRHEA: Primary | ICD-10-CM

## 2020-10-28 DIAGNOSIS — R27.0 ATAXIA: ICD-10-CM

## 2020-10-28 DIAGNOSIS — F41.8 DEPRESSION WITH ANXIETY: ICD-10-CM

## 2020-10-28 DIAGNOSIS — R19.7 NAUSEA VOMITING AND DIARRHEA: Primary | ICD-10-CM

## 2020-10-28 DIAGNOSIS — E87.1 HYPONATREMIA: ICD-10-CM

## 2020-10-28 DIAGNOSIS — N39.45 CONTINUOUS LEAKAGE OF URINE: Primary | ICD-10-CM

## 2020-10-28 PROBLEM — N17.9 AKI (ACUTE KIDNEY INJURY) (HCC): Status: ACTIVE | Noted: 2020-10-28

## 2020-10-28 LAB
ALBUMIN SERPL BCP-MCNC: 2.9 G/DL (ref 3.5–5)
ALP SERPL-CCNC: 124 U/L (ref 46–116)
ALT SERPL W P-5'-P-CCNC: 152 U/L (ref 12–78)
ANION GAP SERPL CALCULATED.3IONS-SCNC: 10 MMOL/L (ref 4–13)
AST SERPL W P-5'-P-CCNC: 74 U/L (ref 5–45)
BACTERIA UR QL AUTO: ABNORMAL /HPF
BASOPHILS # BLD AUTO: 0.03 THOUSANDS/ΜL (ref 0–0.1)
BASOPHILS NFR BLD AUTO: 0 % (ref 0–1)
BILIRUB SERPL-MCNC: 0.41 MG/DL (ref 0.2–1)
BILIRUB UR QL STRIP: NEGATIVE
BUN SERPL-MCNC: 32 MG/DL (ref 5–25)
CALCIUM ALBUM COR SERPL-MCNC: 9.4 MG/DL (ref 8.3–10.1)
CALCIUM SERPL-MCNC: 8.5 MG/DL (ref 8.3–10.1)
CHLORIDE SERPL-SCNC: 99 MMOL/L (ref 100–108)
CLARITY UR: ABNORMAL
CO2 SERPL-SCNC: 20 MMOL/L (ref 21–32)
COLOR UR: ABNORMAL
CREAT SERPL-MCNC: 1.36 MG/DL (ref 0.6–1.3)
EOSINOPHIL # BLD AUTO: 0.02 THOUSAND/ΜL (ref 0–0.61)
EOSINOPHIL NFR BLD AUTO: 0 % (ref 0–6)
ERYTHROCYTE [DISTWIDTH] IN BLOOD BY AUTOMATED COUNT: 17.9 % (ref 11.6–15.1)
GFR SERPL CREATININE-BSD FRML MDRD: 36 ML/MIN/1.73SQ M
GLUCOSE SERPL-MCNC: 132 MG/DL (ref 65–140)
GLUCOSE SERPL-MCNC: 146 MG/DL (ref 65–140)
GLUCOSE SERPL-MCNC: 204 MG/DL (ref 65–140)
GLUCOSE UR STRIP-MCNC: ABNORMAL MG/DL
HCT VFR BLD AUTO: 30.6 % (ref 34.8–46.1)
HCT VFR BLD AUTO: 33.4 % (ref 34.8–46.1)
HGB BLD-MCNC: 10.6 G/DL (ref 11.5–15.4)
HGB BLD-MCNC: 9.6 G/DL (ref 11.5–15.4)
HGB UR QL STRIP.AUTO: ABNORMAL
IMM GRANULOCYTES # BLD AUTO: 0.06 THOUSAND/UL (ref 0–0.2)
IMM GRANULOCYTES NFR BLD AUTO: 1 % (ref 0–2)
KETONES UR STRIP-MCNC: NEGATIVE MG/DL
LEUKOCYTE ESTERASE UR QL STRIP: ABNORMAL
LIPASE SERPL-CCNC: 88 U/L (ref 73–393)
LYMPHOCYTES # BLD AUTO: 1.41 THOUSANDS/ΜL (ref 0.6–4.47)
LYMPHOCYTES NFR BLD AUTO: 12 % (ref 14–44)
MCH RBC QN AUTO: 28.3 PG (ref 26.8–34.3)
MCHC RBC AUTO-ENTMCNC: 31.7 G/DL (ref 31.4–37.4)
MCV RBC AUTO: 89 FL (ref 82–98)
MONOCYTES # BLD AUTO: 0.8 THOUSAND/ΜL (ref 0.17–1.22)
MONOCYTES NFR BLD AUTO: 7 % (ref 4–12)
NEUTROPHILS # BLD AUTO: 9.19 THOUSANDS/ΜL (ref 1.85–7.62)
NEUTS SEG NFR BLD AUTO: 80 % (ref 43–75)
NITRITE UR QL STRIP: NEGATIVE
NON-SQ EPI CELLS URNS QL MICRO: ABNORMAL /HPF
NRBC BLD AUTO-RTO: 0 /100 WBCS
OSMOLALITY UR/SERPL-RTO: 283 MMOL/KG (ref 282–298)
OSMOLALITY UR: 259 MMOL/KG
PH UR STRIP.AUTO: 5 [PH] (ref 4.5–8)
PLATELET # BLD AUTO: 284 THOUSANDS/UL (ref 149–390)
PMV BLD AUTO: 11.5 FL (ref 8.9–12.7)
POTASSIUM SERPL-SCNC: 5.1 MMOL/L (ref 3.5–5.3)
PROCALCITONIN SERPL-MCNC: 0.28 NG/ML
PROT SERPL-MCNC: 6.5 G/DL (ref 6.4–8.2)
PROT UR STRIP-MCNC: >=300 MG/DL
RBC # BLD AUTO: 3.75 MILLION/UL (ref 3.81–5.12)
RBC #/AREA URNS AUTO: ABNORMAL /HPF
SODIUM 24H UR-SCNC: 34 MOL/L
SODIUM SERPL-SCNC: 129 MMOL/L (ref 136–145)
SP GR UR STRIP.AUTO: 1.01 (ref 1–1.03)
UROBILINOGEN UR QL STRIP.AUTO: 0.2 E.U./DL
WBC # BLD AUTO: 11.51 THOUSAND/UL (ref 4.31–10.16)
WBC #/AREA URNS AUTO: ABNORMAL /HPF

## 2020-10-28 PROCEDURE — 87147 CULTURE TYPE IMMUNOLOGIC: CPT

## 2020-10-28 PROCEDURE — 96361 HYDRATE IV INFUSION ADD-ON: CPT

## 2020-10-28 PROCEDURE — 87186 SC STD MICRODIL/AGAR DIL: CPT

## 2020-10-28 PROCEDURE — 99223 1ST HOSP IP/OBS HIGH 75: CPT | Performed by: FAMILY MEDICINE

## 2020-10-28 PROCEDURE — 82948 REAGENT STRIP/BLOOD GLUCOSE: CPT

## 2020-10-28 PROCEDURE — 85014 HEMATOCRIT: CPT | Performed by: FAMILY MEDICINE

## 2020-10-28 PROCEDURE — 87086 URINE CULTURE/COLONY COUNT: CPT

## 2020-10-28 PROCEDURE — 87186 SC STD MICRODIL/AGAR DIL: CPT | Performed by: FAMILY MEDICINE

## 2020-10-28 PROCEDURE — 74177 CT ABD & PELVIS W/CONTRAST: CPT

## 2020-10-28 PROCEDURE — 84300 ASSAY OF URINE SODIUM: CPT | Performed by: FAMILY MEDICINE

## 2020-10-28 PROCEDURE — 80053 COMPREHEN METABOLIC PANEL: CPT | Performed by: EMERGENCY MEDICINE

## 2020-10-28 PROCEDURE — 85025 COMPLETE CBC W/AUTO DIFF WBC: CPT | Performed by: EMERGENCY MEDICINE

## 2020-10-28 PROCEDURE — 87086 URINE CULTURE/COLONY COUNT: CPT | Performed by: FAMILY MEDICINE

## 2020-10-28 PROCEDURE — 85018 HEMOGLOBIN: CPT | Performed by: FAMILY MEDICINE

## 2020-10-28 PROCEDURE — G1004 CDSM NDSC: HCPCS

## 2020-10-28 PROCEDURE — 84145 PROCALCITONIN (PCT): CPT | Performed by: FAMILY MEDICINE

## 2020-10-28 PROCEDURE — 96374 THER/PROPH/DIAG INJ IV PUSH: CPT

## 2020-10-28 PROCEDURE — 83935 ASSAY OF URINE OSMOLALITY: CPT | Performed by: FAMILY MEDICINE

## 2020-10-28 PROCEDURE — 81001 URINALYSIS AUTO W/SCOPE: CPT

## 2020-10-28 PROCEDURE — 93005 ELECTROCARDIOGRAM TRACING: CPT

## 2020-10-28 PROCEDURE — 99285 EMERGENCY DEPT VISIT HI MDM: CPT

## 2020-10-28 PROCEDURE — 83690 ASSAY OF LIPASE: CPT | Performed by: EMERGENCY MEDICINE

## 2020-10-28 PROCEDURE — 99285 EMERGENCY DEPT VISIT HI MDM: CPT | Performed by: EMERGENCY MEDICINE

## 2020-10-28 PROCEDURE — 36415 COLL VENOUS BLD VENIPUNCTURE: CPT | Performed by: EMERGENCY MEDICINE

## 2020-10-28 PROCEDURE — 87077 CULTURE AEROBIC IDENTIFY: CPT | Performed by: FAMILY MEDICINE

## 2020-10-28 PROCEDURE — 83930 ASSAY OF BLOOD OSMOLALITY: CPT | Performed by: FAMILY MEDICINE

## 2020-10-28 PROCEDURE — 87077 CULTURE AEROBIC IDENTIFY: CPT

## 2020-10-28 RX ORDER — ONDANSETRON 2 MG/ML
4 INJECTION INTRAMUSCULAR; INTRAVENOUS ONCE AS NEEDED
Status: COMPLETED | OUTPATIENT
Start: 2020-10-28 | End: 2020-11-02

## 2020-10-28 RX ORDER — AMIODARONE HYDROCHLORIDE 200 MG/1
200 TABLET ORAL DAILY
Status: DISCONTINUED | OUTPATIENT
Start: 2020-10-29 | End: 2020-11-03

## 2020-10-28 RX ORDER — MELATONIN
1000 DAILY
Status: DISCONTINUED | OUTPATIENT
Start: 2020-10-29 | End: 2020-11-03 | Stop reason: HOSPADM

## 2020-10-28 RX ORDER — ALBUTEROL SULFATE 90 UG/1
2 AEROSOL, METERED RESPIRATORY (INHALATION) EVERY 6 HOURS PRN
Status: DISCONTINUED | OUTPATIENT
Start: 2020-10-28 | End: 2020-11-03 | Stop reason: HOSPADM

## 2020-10-28 RX ORDER — INCONTINENCE PANTS, REUSABLE
EACH MISCELLANEOUS DAILY
Qty: 20 EACH | Refills: 5 | Status: SHIPPED | OUTPATIENT
Start: 2020-10-28

## 2020-10-28 RX ORDER — FOLIC ACID 1 MG/1
1 TABLET ORAL DAILY
Status: DISCONTINUED | OUTPATIENT
Start: 2020-10-29 | End: 2020-11-03 | Stop reason: HOSPADM

## 2020-10-28 RX ORDER — ONDANSETRON 2 MG/ML
1 INJECTION INTRAMUSCULAR; INTRAVENOUS ONCE
Status: COMPLETED | OUTPATIENT
Start: 2020-10-28 | End: 2020-10-28

## 2020-10-28 RX ORDER — ATORVASTATIN CALCIUM 40 MG/1
40 TABLET, FILM COATED ORAL
Status: DISCONTINUED | OUTPATIENT
Start: 2020-10-28 | End: 2020-10-31

## 2020-10-28 RX ORDER — PANTOPRAZOLE SODIUM 40 MG/1
40 TABLET, DELAYED RELEASE ORAL
Status: DISCONTINUED | OUTPATIENT
Start: 2020-10-29 | End: 2020-11-03 | Stop reason: HOSPADM

## 2020-10-28 RX ORDER — DABIGATRAN ETEXILATE 75 MG/1
75 CAPSULE, COATED PELLETS ORAL 2 TIMES DAILY
Status: DISCONTINUED | OUTPATIENT
Start: 2020-10-28 | End: 2020-10-28

## 2020-10-28 RX ORDER — ONDANSETRON 2 MG/ML
4 INJECTION INTRAMUSCULAR; INTRAVENOUS ONCE
Status: COMPLETED | OUTPATIENT
Start: 2020-10-28 | End: 2020-10-28

## 2020-10-28 RX ORDER — TIMOLOL MALEATE 5 MG/ML
1 SOLUTION/ DROPS OPHTHALMIC DAILY
Status: DISCONTINUED | OUTPATIENT
Start: 2020-10-29 | End: 2020-11-03 | Stop reason: HOSPADM

## 2020-10-28 RX ORDER — SODIUM CHLORIDE, SODIUM GLUCONATE, SODIUM ACETATE, POTASSIUM CHLORIDE, MAGNESIUM CHLORIDE, SODIUM PHOSPHATE, DIBASIC, AND POTASSIUM PHOSPHATE .53; .5; .37; .037; .03; .012; .00082 G/100ML; G/100ML; G/100ML; G/100ML; G/100ML; G/100ML; G/100ML
75 INJECTION, SOLUTION INTRAVENOUS CONTINUOUS
Status: DISCONTINUED | OUTPATIENT
Start: 2020-10-28 | End: 2020-10-31

## 2020-10-28 RX ORDER — POTASSIUM CHLORIDE 750 MG/1
10 TABLET, EXTENDED RELEASE ORAL DAILY
Status: DISCONTINUED | OUTPATIENT
Start: 2020-10-29 | End: 2020-11-03 | Stop reason: HOSPADM

## 2020-10-28 RX ORDER — ONDANSETRON 4 MG/1
4 TABLET, ORALLY DISINTEGRATING ORAL EVERY 8 HOURS PRN
Status: DISCONTINUED | OUTPATIENT
Start: 2020-10-28 | End: 2020-11-03 | Stop reason: HOSPADM

## 2020-10-28 RX ORDER — GREEN TEA LEAF EXTRACT 250 MG
CAPSULE ORAL 3 TIMES DAILY
Qty: 96 ML | Refills: 11 | Status: SHIPPED | OUTPATIENT
Start: 2020-10-28

## 2020-10-28 RX ORDER — SODIUM CHLORIDE 9 MG/ML
125 INJECTION, SOLUTION INTRAVENOUS CONTINUOUS
Status: DISCONTINUED | OUTPATIENT
Start: 2020-10-28 | End: 2020-10-29

## 2020-10-28 RX ORDER — HYDRALAZINE HYDROCHLORIDE 25 MG/1
75 TABLET, FILM COATED ORAL 3 TIMES DAILY
Status: DISCONTINUED | OUTPATIENT
Start: 2020-10-28 | End: 2020-11-03 | Stop reason: HOSPADM

## 2020-10-28 RX ADMIN — HYDRALAZINE HYDROCHLORIDE 75 MG: 25 TABLET ORAL at 21:21

## 2020-10-28 RX ADMIN — CEFTRIAXONE 1000 MG: 1 INJECTION, POWDER, FOR SOLUTION INTRAMUSCULAR; INTRAVENOUS at 17:27

## 2020-10-28 RX ADMIN — ATORVASTATIN CALCIUM 40 MG: 40 TABLET, FILM COATED ORAL at 17:34

## 2020-10-28 RX ADMIN — HYDRALAZINE HYDROCHLORIDE 75 MG: 25 TABLET ORAL at 17:17

## 2020-10-28 RX ADMIN — SODIUM CHLORIDE 1000 ML: 0.9 INJECTION, SOLUTION INTRAVENOUS at 12:44

## 2020-10-28 RX ADMIN — IOHEXOL 100 ML: 350 INJECTION, SOLUTION INTRAVENOUS at 14:47

## 2020-10-28 RX ADMIN — SODIUM CHLORIDE 125 ML/HR: 0.9 INJECTION, SOLUTION INTRAVENOUS at 19:11

## 2020-10-28 RX ADMIN — SODIUM CHLORIDE, SODIUM GLUCONATE, SODIUM ACETATE, POTASSIUM CHLORIDE AND MAGNESIUM CHLORIDE 75 ML/HR: 526; 502; 368; 37; 30 INJECTION, SOLUTION INTRAVENOUS at 18:19

## 2020-10-28 RX ADMIN — BIMATOPROST 1 DROP: 0.1 SOLUTION/ DROPS OPHTHALMIC at 21:23

## 2020-10-28 RX ADMIN — ONDANSETRON 4 MG: 2 INJECTION INTRAMUSCULAR; INTRAVENOUS at 14:31

## 2020-10-29 LAB
ALBUMIN SERPL BCP-MCNC: 2.5 G/DL (ref 3.5–5)
ALP SERPL-CCNC: 109 U/L (ref 46–116)
ALT SERPL W P-5'-P-CCNC: 136 U/L (ref 12–78)
ANION GAP SERPL CALCULATED.3IONS-SCNC: 10 MMOL/L (ref 4–13)
ANION GAP SERPL CALCULATED.3IONS-SCNC: 8 MMOL/L (ref 4–13)
AST SERPL W P-5'-P-CCNC: 73 U/L (ref 5–45)
ATRIAL RATE: 65 BPM
BASOPHILS # BLD AUTO: 0.03 THOUSANDS/ΜL (ref 0–0.1)
BASOPHILS NFR BLD AUTO: 0 % (ref 0–1)
BILIRUB SERPL-MCNC: 0.32 MG/DL (ref 0.2–1)
BUN SERPL-MCNC: 23 MG/DL (ref 5–25)
BUN SERPL-MCNC: 25 MG/DL (ref 5–25)
CALCIUM ALBUM COR SERPL-MCNC: 9.5 MG/DL (ref 8.3–10.1)
CALCIUM SERPL-MCNC: 8 MG/DL (ref 8.3–10.1)
CALCIUM SERPL-MCNC: 8.3 MG/DL (ref 8.3–10.1)
CHLORIDE SERPL-SCNC: 104 MMOL/L (ref 100–108)
CHLORIDE SERPL-SCNC: 105 MMOL/L (ref 100–108)
CO2 SERPL-SCNC: 19 MMOL/L (ref 21–32)
CO2 SERPL-SCNC: 20 MMOL/L (ref 21–32)
CREAT SERPL-MCNC: 1.08 MG/DL (ref 0.6–1.3)
CREAT SERPL-MCNC: 1.08 MG/DL (ref 0.6–1.3)
EOSINOPHIL # BLD AUTO: 0.1 THOUSAND/ΜL (ref 0–0.61)
EOSINOPHIL NFR BLD AUTO: 1 % (ref 0–6)
ERYTHROCYTE [DISTWIDTH] IN BLOOD BY AUTOMATED COUNT: 18.4 % (ref 11.6–15.1)
GFR SERPL CREATININE-BSD FRML MDRD: 47 ML/MIN/1.73SQ M
GFR SERPL CREATININE-BSD FRML MDRD: 47 ML/MIN/1.73SQ M
GLUCOSE SERPL-MCNC: 121 MG/DL (ref 65–140)
GLUCOSE SERPL-MCNC: 123 MG/DL (ref 65–140)
GLUCOSE SERPL-MCNC: 127 MG/DL (ref 65–140)
GLUCOSE SERPL-MCNC: 143 MG/DL (ref 65–140)
GLUCOSE SERPL-MCNC: 144 MG/DL (ref 65–140)
GLUCOSE SERPL-MCNC: 162 MG/DL (ref 65–140)
HCT VFR BLD AUTO: 30.2 % (ref 34.8–46.1)
HGB BLD-MCNC: 9.5 G/DL (ref 11.5–15.4)
IMM GRANULOCYTES # BLD AUTO: 0.05 THOUSAND/UL (ref 0–0.2)
IMM GRANULOCYTES NFR BLD AUTO: 1 % (ref 0–2)
LYMPHOCYTES # BLD AUTO: 1.11 THOUSANDS/ΜL (ref 0.6–4.47)
LYMPHOCYTES NFR BLD AUTO: 12 % (ref 14–44)
MCH RBC QN AUTO: 28.5 PG (ref 26.8–34.3)
MCHC RBC AUTO-ENTMCNC: 31.5 G/DL (ref 31.4–37.4)
MCV RBC AUTO: 91 FL (ref 82–98)
MONOCYTES # BLD AUTO: 0.71 THOUSAND/ΜL (ref 0.17–1.22)
MONOCYTES NFR BLD AUTO: 8 % (ref 4–12)
NEUTROPHILS # BLD AUTO: 7.13 THOUSANDS/ΜL (ref 1.85–7.62)
NEUTS SEG NFR BLD AUTO: 78 % (ref 43–75)
NRBC BLD AUTO-RTO: 0 /100 WBCS
P AXIS: 36 DEGREES
PLATELET # BLD AUTO: 215 THOUSANDS/UL (ref 149–390)
PMV BLD AUTO: 11 FL (ref 8.9–12.7)
POTASSIUM SERPL-SCNC: 4.6 MMOL/L (ref 3.5–5.3)
POTASSIUM SERPL-SCNC: 4.6 MMOL/L (ref 3.5–5.3)
PR INTERVAL: 192 MS
PROCALCITONIN SERPL-MCNC: 0.28 NG/ML
PROT SERPL-MCNC: 5.7 G/DL (ref 6.4–8.2)
QRS AXIS: -43 DEGREES
QRSD INTERVAL: 100 MS
QT INTERVAL: 414 MS
QTC INTERVAL: 420 MS
RBC # BLD AUTO: 3.33 MILLION/UL (ref 3.81–5.12)
SODIUM SERPL-SCNC: 132 MMOL/L (ref 136–145)
SODIUM SERPL-SCNC: 134 MMOL/L (ref 136–145)
T WAVE AXIS: 87 DEGREES
VENTRICULAR RATE: 62 BPM
WBC # BLD AUTO: 9.13 THOUSAND/UL (ref 4.31–10.16)

## 2020-10-29 PROCEDURE — 84145 PROCALCITONIN (PCT): CPT | Performed by: FAMILY MEDICINE

## 2020-10-29 PROCEDURE — 85025 COMPLETE CBC W/AUTO DIFF WBC: CPT | Performed by: FAMILY MEDICINE

## 2020-10-29 PROCEDURE — 97163 PT EVAL HIGH COMPLEX 45 MIN: CPT | Performed by: PHYSICAL THERAPIST

## 2020-10-29 PROCEDURE — 82948 REAGENT STRIP/BLOOD GLUCOSE: CPT

## 2020-10-29 PROCEDURE — 80053 COMPREHEN METABOLIC PANEL: CPT | Performed by: FAMILY MEDICINE

## 2020-10-29 PROCEDURE — 80048 BASIC METABOLIC PNL TOTAL CA: CPT | Performed by: FAMILY MEDICINE

## 2020-10-29 PROCEDURE — 93010 ELECTROCARDIOGRAM REPORT: CPT

## 2020-10-29 PROCEDURE — 99232 SBSQ HOSP IP/OBS MODERATE 35: CPT | Performed by: INTERNAL MEDICINE

## 2020-10-29 PROCEDURE — 97166 OT EVAL MOD COMPLEX 45 MIN: CPT

## 2020-10-29 RX ORDER — LIDOCAINE 50 MG/G
1 PATCH TOPICAL DAILY
Status: DISCONTINUED | OUTPATIENT
Start: 2020-10-29 | End: 2020-11-03 | Stop reason: HOSPADM

## 2020-10-29 RX ORDER — DABIGATRAN ETEXILATE 75 MG/1
75 CAPSULE, COATED PELLETS ORAL 2 TIMES DAILY
Status: DISCONTINUED | OUTPATIENT
Start: 2020-10-29 | End: 2020-11-03 | Stop reason: HOSPADM

## 2020-10-29 RX ADMIN — BIMATOPROST 1 DROP: 0.1 SOLUTION/ DROPS OPHTHALMIC at 21:58

## 2020-10-29 RX ADMIN — SODIUM CHLORIDE, SODIUM GLUCONATE, SODIUM ACETATE, POTASSIUM CHLORIDE AND MAGNESIUM CHLORIDE 75 ML/HR: 526; 502; 368; 37; 30 INJECTION, SOLUTION INTRAVENOUS at 07:15

## 2020-10-29 RX ADMIN — DABIGATRAN ETEXILATE MESYLATE 75 MG: 75 CAPSULE ORAL at 12:49

## 2020-10-29 RX ADMIN — INSULIN LISPRO 1 UNITS: 100 INJECTION, SOLUTION INTRAVENOUS; SUBCUTANEOUS at 21:57

## 2020-10-29 RX ADMIN — HYDRALAZINE HYDROCHLORIDE 75 MG: 25 TABLET ORAL at 21:56

## 2020-10-29 RX ADMIN — ATORVASTATIN CALCIUM 40 MG: 40 TABLET, FILM COATED ORAL at 18:20

## 2020-10-29 RX ADMIN — AMIODARONE HYDROCHLORIDE 200 MG: 200 TABLET ORAL at 08:48

## 2020-10-29 RX ADMIN — LIDOCAINE 1 PATCH: 50 PATCH CUTANEOUS at 06:28

## 2020-10-29 RX ADMIN — FOLIC ACID 1 MG: 1 TABLET ORAL at 08:48

## 2020-10-29 RX ADMIN — TIMOLOL MALEATE 1 DROP: 5 SOLUTION/ DROPS OPHTHALMIC at 08:48

## 2020-10-29 RX ADMIN — ONDANSETRON 4 MG: 4 TABLET, ORALLY DISINTEGRATING ORAL at 17:02

## 2020-10-29 RX ADMIN — PANTOPRAZOLE SODIUM 40 MG: 40 TABLET, DELAYED RELEASE ORAL at 06:29

## 2020-10-29 RX ADMIN — CEFTRIAXONE 1000 MG: 1 INJECTION, POWDER, FOR SOLUTION INTRAMUSCULAR; INTRAVENOUS at 18:20

## 2020-10-29 RX ADMIN — POTASSIUM CHLORIDE 10 MEQ: 750 TABLET, EXTENDED RELEASE ORAL at 08:48

## 2020-10-29 RX ADMIN — HYDRALAZINE HYDROCHLORIDE 75 MG: 25 TABLET ORAL at 08:48

## 2020-10-29 RX ADMIN — DABIGATRAN ETEXILATE MESYLATE 75 MG: 75 CAPSULE ORAL at 18:21

## 2020-10-29 RX ADMIN — Medication 1000 UNITS: at 08:48

## 2020-10-29 RX ADMIN — HYDRALAZINE HYDROCHLORIDE 75 MG: 25 TABLET ORAL at 18:21

## 2020-10-29 RX ADMIN — SODIUM CHLORIDE, SODIUM GLUCONATE, SODIUM ACETATE, POTASSIUM CHLORIDE AND MAGNESIUM CHLORIDE 75 ML/HR: 526; 502; 368; 37; 30 INJECTION, SOLUTION INTRAVENOUS at 21:56

## 2020-10-30 LAB
ALBUMIN SERPL BCP-MCNC: 2.6 G/DL (ref 3.5–5)
ALP SERPL-CCNC: 130 U/L (ref 46–116)
ALT SERPL W P-5'-P-CCNC: 181 U/L (ref 12–78)
ANION GAP SERPL CALCULATED.3IONS-SCNC: 9 MMOL/L (ref 4–13)
AST SERPL W P-5'-P-CCNC: 103 U/L (ref 5–45)
BACTERIA UR CULT: ABNORMAL
BACTERIA UR CULT: ABNORMAL
BASOPHILS # BLD AUTO: 0.04 THOUSANDS/ΜL (ref 0–0.1)
BASOPHILS NFR BLD AUTO: 1 % (ref 0–1)
BILIRUB SERPL-MCNC: 0.27 MG/DL (ref 0.2–1)
BUN SERPL-MCNC: 12 MG/DL (ref 5–25)
CALCIUM ALBUM COR SERPL-MCNC: 9.2 MG/DL (ref 8.3–10.1)
CALCIUM SERPL-MCNC: 8.1 MG/DL (ref 8.3–10.1)
CHLORIDE SERPL-SCNC: 104 MMOL/L (ref 100–108)
CO2 SERPL-SCNC: 23 MMOL/L (ref 21–32)
CREAT SERPL-MCNC: 0.97 MG/DL (ref 0.6–1.3)
EOSINOPHIL # BLD AUTO: 0.08 THOUSAND/ΜL (ref 0–0.61)
EOSINOPHIL NFR BLD AUTO: 1 % (ref 0–6)
ERYTHROCYTE [DISTWIDTH] IN BLOOD BY AUTOMATED COUNT: 18.4 % (ref 11.6–15.1)
GFR SERPL CREATININE-BSD FRML MDRD: 53 ML/MIN/1.73SQ M
GLUCOSE SERPL-MCNC: 131 MG/DL (ref 65–140)
GLUCOSE SERPL-MCNC: 157 MG/DL (ref 65–140)
GLUCOSE SERPL-MCNC: 188 MG/DL (ref 65–140)
GLUCOSE SERPL-MCNC: 196 MG/DL (ref 65–140)
GLUCOSE SERPL-MCNC: 201 MG/DL (ref 65–140)
HCT VFR BLD AUTO: 31.4 % (ref 34.8–46.1)
HGB BLD-MCNC: 9.9 G/DL (ref 11.5–15.4)
IMM GRANULOCYTES # BLD AUTO: 0.03 THOUSAND/UL (ref 0–0.2)
IMM GRANULOCYTES NFR BLD AUTO: 0 % (ref 0–2)
LYMPHOCYTES # BLD AUTO: 1.13 THOUSANDS/ΜL (ref 0.6–4.47)
LYMPHOCYTES NFR BLD AUTO: 15 % (ref 14–44)
MCH RBC QN AUTO: 28 PG (ref 26.8–34.3)
MCHC RBC AUTO-ENTMCNC: 31.5 G/DL (ref 31.4–37.4)
MCV RBC AUTO: 89 FL (ref 82–98)
MONOCYTES # BLD AUTO: 0.57 THOUSAND/ΜL (ref 0.17–1.22)
MONOCYTES NFR BLD AUTO: 8 % (ref 4–12)
NEUTROPHILS # BLD AUTO: 5.78 THOUSANDS/ΜL (ref 1.85–7.62)
NEUTS SEG NFR BLD AUTO: 75 % (ref 43–75)
NRBC BLD AUTO-RTO: 0 /100 WBCS
PLATELET # BLD AUTO: 223 THOUSANDS/UL (ref 149–390)
PMV BLD AUTO: 10.6 FL (ref 8.9–12.7)
POTASSIUM SERPL-SCNC: 4.4 MMOL/L (ref 3.5–5.3)
PROT SERPL-MCNC: 6.1 G/DL (ref 6.4–8.2)
RBC # BLD AUTO: 3.54 MILLION/UL (ref 3.81–5.12)
SODIUM SERPL-SCNC: 136 MMOL/L (ref 136–145)
WBC # BLD AUTO: 7.63 THOUSAND/UL (ref 4.31–10.16)

## 2020-10-30 PROCEDURE — 85025 COMPLETE CBC W/AUTO DIFF WBC: CPT | Performed by: INTERNAL MEDICINE

## 2020-10-30 PROCEDURE — 99232 SBSQ HOSP IP/OBS MODERATE 35: CPT | Performed by: INTERNAL MEDICINE

## 2020-10-30 PROCEDURE — 82948 REAGENT STRIP/BLOOD GLUCOSE: CPT

## 2020-10-30 PROCEDURE — 80053 COMPREHEN METABOLIC PANEL: CPT | Performed by: INTERNAL MEDICINE

## 2020-10-30 RX ORDER — SENNOSIDES 8.6 MG
2 TABLET ORAL 2 TIMES DAILY
Status: DISCONTINUED | OUTPATIENT
Start: 2020-10-30 | End: 2020-11-03 | Stop reason: HOSPADM

## 2020-10-30 RX ORDER — POLYETHYLENE GLYCOL 3350 17 G/17G
17 POWDER, FOR SOLUTION ORAL DAILY
Status: DISCONTINUED | OUTPATIENT
Start: 2020-10-30 | End: 2020-11-03 | Stop reason: HOSPADM

## 2020-10-30 RX ADMIN — ATORVASTATIN CALCIUM 40 MG: 40 TABLET, FILM COATED ORAL at 16:51

## 2020-10-30 RX ADMIN — PANTOPRAZOLE SODIUM 40 MG: 40 TABLET, DELAYED RELEASE ORAL at 06:13

## 2020-10-30 RX ADMIN — TIMOLOL MALEATE 1 DROP: 5 SOLUTION/ DROPS OPHTHALMIC at 08:38

## 2020-10-30 RX ADMIN — INSULIN LISPRO 2 UNITS: 100 INJECTION, SOLUTION INTRAVENOUS; SUBCUTANEOUS at 12:04

## 2020-10-30 RX ADMIN — SENNOSIDES 17.2 MG: 8.6 TABLET, FILM COATED ORAL at 17:34

## 2020-10-30 RX ADMIN — DABIGATRAN ETEXILATE MESYLATE 75 MG: 75 CAPSULE ORAL at 08:42

## 2020-10-30 RX ADMIN — AMIODARONE HYDROCHLORIDE 200 MG: 200 TABLET ORAL at 08:42

## 2020-10-30 RX ADMIN — INSULIN LISPRO 1 UNITS: 100 INJECTION, SOLUTION INTRAVENOUS; SUBCUTANEOUS at 16:50

## 2020-10-30 RX ADMIN — SENNOSIDES 17.2 MG: 8.6 TABLET, FILM COATED ORAL at 13:46

## 2020-10-30 RX ADMIN — PIPERACILLIN AND TAZOBACTAM 3.38 G: 3; .375 INJECTION, POWDER, LYOPHILIZED, FOR SOLUTION INTRAVENOUS at 19:34

## 2020-10-30 RX ADMIN — HYDRALAZINE HYDROCHLORIDE 75 MG: 25 TABLET ORAL at 21:50

## 2020-10-30 RX ADMIN — HYDRALAZINE HYDROCHLORIDE 75 MG: 25 TABLET ORAL at 16:50

## 2020-10-30 RX ADMIN — SODIUM CHLORIDE, SODIUM GLUCONATE, SODIUM ACETATE, POTASSIUM CHLORIDE AND MAGNESIUM CHLORIDE 75 ML/HR: 526; 502; 368; 37; 30 INJECTION, SOLUTION INTRAVENOUS at 13:50

## 2020-10-30 RX ADMIN — POLYETHYLENE GLYCOL (3350) 17 G: 17 POWDER, FOR SOLUTION ORAL at 13:46

## 2020-10-30 RX ADMIN — DABIGATRAN ETEXILATE MESYLATE 75 MG: 75 CAPSULE ORAL at 17:34

## 2020-10-30 RX ADMIN — BIMATOPROST 1 DROP: 0.1 SOLUTION/ DROPS OPHTHALMIC at 21:50

## 2020-10-30 RX ADMIN — Medication 1000 UNITS: at 08:41

## 2020-10-30 RX ADMIN — PIPERACILLIN AND TAZOBACTAM 3.38 G: 3; .375 INJECTION, POWDER, LYOPHILIZED, FOR SOLUTION INTRAVENOUS at 11:05

## 2020-10-30 RX ADMIN — POTASSIUM CHLORIDE 10 MEQ: 750 TABLET, EXTENDED RELEASE ORAL at 08:42

## 2020-10-30 RX ADMIN — HYDRALAZINE HYDROCHLORIDE 75 MG: 25 TABLET ORAL at 08:41

## 2020-10-30 RX ADMIN — INSULIN LISPRO 1 UNITS: 100 INJECTION, SOLUTION INTRAVENOUS; SUBCUTANEOUS at 08:36

## 2020-10-30 RX ADMIN — INSULIN LISPRO 2 UNITS: 100 INJECTION, SOLUTION INTRAVENOUS; SUBCUTANEOUS at 21:50

## 2020-10-30 RX ADMIN — LIDOCAINE 1 PATCH: 50 PATCH CUTANEOUS at 08:41

## 2020-10-30 RX ADMIN — FOLIC ACID 1 MG: 1 TABLET ORAL at 08:42

## 2020-10-31 PROBLEM — E87.1 HYPONATREMIA: Status: RESOLVED | Noted: 2017-01-16 | Resolved: 2020-10-31

## 2020-10-31 LAB
ALBUMIN SERPL BCP-MCNC: 2.3 G/DL (ref 3.5–5)
ALP SERPL-CCNC: 157 U/L (ref 46–116)
ALT SERPL W P-5'-P-CCNC: 293 U/L (ref 12–78)
ANION GAP SERPL CALCULATED.3IONS-SCNC: 9 MMOL/L (ref 4–13)
AST SERPL W P-5'-P-CCNC: 200 U/L (ref 5–45)
BACTERIA UR CULT: ABNORMAL
BACTERIA UR CULT: ABNORMAL
BASOPHILS # BLD AUTO: 0.03 THOUSANDS/ΜL (ref 0–0.1)
BASOPHILS NFR BLD AUTO: 0 % (ref 0–1)
BILIRUB SERPL-MCNC: 0.47 MG/DL (ref 0.2–1)
BUN SERPL-MCNC: 7 MG/DL (ref 5–25)
CALCIUM ALBUM COR SERPL-MCNC: 9.3 MG/DL (ref 8.3–10.1)
CALCIUM SERPL-MCNC: 7.9 MG/DL (ref 8.3–10.1)
CHLORIDE SERPL-SCNC: 103 MMOL/L (ref 100–108)
CO2 SERPL-SCNC: 24 MMOL/L (ref 21–32)
CREAT SERPL-MCNC: 0.91 MG/DL (ref 0.6–1.3)
EOSINOPHIL # BLD AUTO: 0.07 THOUSAND/ΜL (ref 0–0.61)
EOSINOPHIL NFR BLD AUTO: 1 % (ref 0–6)
ERYTHROCYTE [DISTWIDTH] IN BLOOD BY AUTOMATED COUNT: 18.3 % (ref 11.6–15.1)
GFR SERPL CREATININE-BSD FRML MDRD: 58 ML/MIN/1.73SQ M
GLUCOSE SERPL-MCNC: 150 MG/DL (ref 65–140)
GLUCOSE SERPL-MCNC: 164 MG/DL (ref 65–140)
GLUCOSE SERPL-MCNC: 181 MG/DL (ref 65–140)
GLUCOSE SERPL-MCNC: 199 MG/DL (ref 65–140)
GLUCOSE SERPL-MCNC: 233 MG/DL (ref 65–140)
HAV IGM SER QL: NORMAL
HBV CORE IGM SER QL: NORMAL
HBV SURFACE AG SER QL: NORMAL
HCT VFR BLD AUTO: 30.5 % (ref 34.8–46.1)
HCV AB SER QL: NORMAL
HGB BLD-MCNC: 9.6 G/DL (ref 11.5–15.4)
IMM GRANULOCYTES # BLD AUTO: 0.03 THOUSAND/UL (ref 0–0.2)
IMM GRANULOCYTES NFR BLD AUTO: 0 % (ref 0–2)
LYMPHOCYTES # BLD AUTO: 1.5 THOUSANDS/ΜL (ref 0.6–4.47)
LYMPHOCYTES NFR BLD AUTO: 21 % (ref 14–44)
MCH RBC QN AUTO: 28 PG (ref 26.8–34.3)
MCHC RBC AUTO-ENTMCNC: 31.5 G/DL (ref 31.4–37.4)
MCV RBC AUTO: 89 FL (ref 82–98)
MONOCYTES # BLD AUTO: 0.53 THOUSAND/ΜL (ref 0.17–1.22)
MONOCYTES NFR BLD AUTO: 8 % (ref 4–12)
NEUTROPHILS # BLD AUTO: 4.93 THOUSANDS/ΜL (ref 1.85–7.62)
NEUTS SEG NFR BLD AUTO: 70 % (ref 43–75)
NRBC BLD AUTO-RTO: 0 /100 WBCS
PLATELET # BLD AUTO: 222 THOUSANDS/UL (ref 149–390)
PMV BLD AUTO: 11.1 FL (ref 8.9–12.7)
POTASSIUM SERPL-SCNC: 4.1 MMOL/L (ref 3.5–5.3)
PROT SERPL-MCNC: 5.6 G/DL (ref 6.4–8.2)
RBC # BLD AUTO: 3.43 MILLION/UL (ref 3.81–5.12)
SODIUM SERPL-SCNC: 136 MMOL/L (ref 136–145)
WBC # BLD AUTO: 7.09 THOUSAND/UL (ref 4.31–10.16)

## 2020-10-31 PROCEDURE — 99232 SBSQ HOSP IP/OBS MODERATE 35: CPT | Performed by: INTERNAL MEDICINE

## 2020-10-31 PROCEDURE — 99232 SBSQ HOSP IP/OBS MODERATE 35: CPT | Performed by: UROLOGY

## 2020-10-31 PROCEDURE — 85025 COMPLETE CBC W/AUTO DIFF WBC: CPT | Performed by: INTERNAL MEDICINE

## 2020-10-31 PROCEDURE — 99222 1ST HOSP IP/OBS MODERATE 55: CPT | Performed by: PHYSICIAN ASSISTANT

## 2020-10-31 PROCEDURE — 82948 REAGENT STRIP/BLOOD GLUCOSE: CPT

## 2020-10-31 PROCEDURE — 80053 COMPREHEN METABOLIC PANEL: CPT | Performed by: INTERNAL MEDICINE

## 2020-10-31 PROCEDURE — 80074 ACUTE HEPATITIS PANEL: CPT | Performed by: INTERNAL MEDICINE

## 2020-10-31 RX ORDER — LOSARTAN POTASSIUM 50 MG/1
50 TABLET ORAL DAILY
Status: DISCONTINUED | OUTPATIENT
Start: 2020-10-31 | End: 2020-11-03 | Stop reason: HOSPADM

## 2020-10-31 RX ORDER — AMOXICILLIN 875 MG/1
875 TABLET, COATED ORAL EVERY 12 HOURS SCHEDULED
Status: DISCONTINUED | OUTPATIENT
Start: 2020-10-31 | End: 2020-10-31

## 2020-10-31 RX ORDER — AMOXICILLIN 500 MG/1
500 CAPSULE ORAL EVERY 8 HOURS SCHEDULED
Status: DISCONTINUED | OUTPATIENT
Start: 2020-10-31 | End: 2020-11-03 | Stop reason: HOSPADM

## 2020-10-31 RX ADMIN — HYDRALAZINE HYDROCHLORIDE 75 MG: 25 TABLET ORAL at 16:05

## 2020-10-31 RX ADMIN — TIMOLOL MALEATE 1 DROP: 5 SOLUTION/ DROPS OPHTHALMIC at 08:29

## 2020-10-31 RX ADMIN — DABIGATRAN ETEXILATE MESYLATE 75 MG: 75 CAPSULE ORAL at 18:46

## 2020-10-31 RX ADMIN — INSULIN LISPRO 1 UNITS: 100 INJECTION, SOLUTION INTRAVENOUS; SUBCUTANEOUS at 08:17

## 2020-10-31 RX ADMIN — LOSARTAN POTASSIUM 50 MG: 50 TABLET, FILM COATED ORAL at 08:30

## 2020-10-31 RX ADMIN — DABIGATRAN ETEXILATE MESYLATE 75 MG: 75 CAPSULE ORAL at 08:07

## 2020-10-31 RX ADMIN — INSULIN LISPRO 2 UNITS: 100 INJECTION, SOLUTION INTRAVENOUS; SUBCUTANEOUS at 21:16

## 2020-10-31 RX ADMIN — PIPERACILLIN AND TAZOBACTAM 3.38 G: 3; .375 INJECTION, POWDER, LYOPHILIZED, FOR SOLUTION INTRAVENOUS at 04:02

## 2020-10-31 RX ADMIN — INSULIN LISPRO 1 UNITS: 100 INJECTION, SOLUTION INTRAVENOUS; SUBCUTANEOUS at 16:05

## 2020-10-31 RX ADMIN — Medication 1000 UNITS: at 08:09

## 2020-10-31 RX ADMIN — HYDRALAZINE HYDROCHLORIDE 75 MG: 25 TABLET ORAL at 21:17

## 2020-10-31 RX ADMIN — AMOXICILLIN 500 MG: 500 CAPSULE ORAL at 21:17

## 2020-10-31 RX ADMIN — PIPERACILLIN AND TAZOBACTAM 3.38 G: 3; .375 INJECTION, POWDER, LYOPHILIZED, FOR SOLUTION INTRAVENOUS at 12:11

## 2020-10-31 RX ADMIN — PANTOPRAZOLE SODIUM 40 MG: 40 TABLET, DELAYED RELEASE ORAL at 05:39

## 2020-10-31 RX ADMIN — HYDRALAZINE HYDROCHLORIDE 75 MG: 25 TABLET ORAL at 08:13

## 2020-10-31 RX ADMIN — FOLIC ACID 1 MG: 1 TABLET ORAL at 08:09

## 2020-10-31 RX ADMIN — LIDOCAINE 1 PATCH: 50 PATCH CUTANEOUS at 08:13

## 2020-10-31 RX ADMIN — POTASSIUM CHLORIDE 10 MEQ: 750 TABLET, EXTENDED RELEASE ORAL at 08:09

## 2020-10-31 RX ADMIN — AMOXICILLIN 500 MG: 500 CAPSULE ORAL at 16:05

## 2020-10-31 RX ADMIN — SODIUM CHLORIDE, SODIUM GLUCONATE, SODIUM ACETATE, POTASSIUM CHLORIDE AND MAGNESIUM CHLORIDE 75 ML/HR: 526; 502; 368; 37; 30 INJECTION, SOLUTION INTRAVENOUS at 04:39

## 2020-10-31 RX ADMIN — POLYETHYLENE GLYCOL (3350) 17 G: 17 POWDER, FOR SOLUTION ORAL at 08:07

## 2020-10-31 RX ADMIN — SENNOSIDES 17.2 MG: 8.6 TABLET, FILM COATED ORAL at 08:09

## 2020-10-31 RX ADMIN — BIMATOPROST 1 DROP: 0.1 SOLUTION/ DROPS OPHTHALMIC at 21:16

## 2020-10-31 RX ADMIN — AMIODARONE HYDROCHLORIDE 200 MG: 200 TABLET ORAL at 08:11

## 2020-10-31 RX ADMIN — INSULIN LISPRO 3 UNITS: 100 INJECTION, SOLUTION INTRAVENOUS; SUBCUTANEOUS at 12:05

## 2020-10-31 RX ADMIN — SENNOSIDES 17.2 MG: 8.6 TABLET, FILM COATED ORAL at 18:46

## 2020-11-01 ENCOUNTER — APPOINTMENT (INPATIENT)
Dept: ULTRASOUND IMAGING | Facility: HOSPITAL | Age: 85
DRG: 683 | End: 2020-11-01
Payer: COMMERCIAL

## 2020-11-01 LAB
ALBUMIN SERPL BCP-MCNC: 2.5 G/DL (ref 3.5–5)
ALP SERPL-CCNC: 192 U/L (ref 46–116)
ALT SERPL W P-5'-P-CCNC: 389 U/L (ref 12–78)
AST SERPL W P-5'-P-CCNC: 258 U/L (ref 5–45)
BILIRUB DIRECT SERPL-MCNC: 0.07 MG/DL (ref 0–0.2)
BILIRUB SERPL-MCNC: 0.47 MG/DL (ref 0.2–1)
GLUCOSE SERPL-MCNC: 172 MG/DL (ref 65–140)
GLUCOSE SERPL-MCNC: 190 MG/DL (ref 65–140)
GLUCOSE SERPL-MCNC: 194 MG/DL (ref 65–140)
GLUCOSE SERPL-MCNC: 236 MG/DL (ref 65–140)
PROT SERPL-MCNC: 6.2 G/DL (ref 6.4–8.2)
T4 FREE SERPL-MCNC: 1.56 NG/DL (ref 0.76–1.46)
TSH SERPL DL<=0.05 MIU/L-ACNC: 10.05 UIU/ML (ref 0.36–3.74)

## 2020-11-01 PROCEDURE — 97116 GAIT TRAINING THERAPY: CPT

## 2020-11-01 PROCEDURE — 84443 ASSAY THYROID STIM HORMONE: CPT | Performed by: INTERNAL MEDICINE

## 2020-11-01 PROCEDURE — 82948 REAGENT STRIP/BLOOD GLUCOSE: CPT

## 2020-11-01 PROCEDURE — 99232 SBSQ HOSP IP/OBS MODERATE 35: CPT | Performed by: PHYSICIAN ASSISTANT

## 2020-11-01 PROCEDURE — 80076 HEPATIC FUNCTION PANEL: CPT | Performed by: INTERNAL MEDICINE

## 2020-11-01 PROCEDURE — 84439 ASSAY OF FREE THYROXINE: CPT | Performed by: INTERNAL MEDICINE

## 2020-11-01 PROCEDURE — 97110 THERAPEUTIC EXERCISES: CPT

## 2020-11-01 PROCEDURE — 99232 SBSQ HOSP IP/OBS MODERATE 35: CPT | Performed by: INTERNAL MEDICINE

## 2020-11-01 PROCEDURE — 76705 ECHO EXAM OF ABDOMEN: CPT

## 2020-11-01 RX ADMIN — FOLIC ACID 1 MG: 1 TABLET ORAL at 08:53

## 2020-11-01 RX ADMIN — AMOXICILLIN 500 MG: 500 CAPSULE ORAL at 21:14

## 2020-11-01 RX ADMIN — LOSARTAN POTASSIUM 50 MG: 50 TABLET, FILM COATED ORAL at 08:52

## 2020-11-01 RX ADMIN — INSULIN LISPRO 3 UNITS: 100 INJECTION, SOLUTION INTRAVENOUS; SUBCUTANEOUS at 21:13

## 2020-11-01 RX ADMIN — Medication 1000 UNITS: at 08:52

## 2020-11-01 RX ADMIN — AMOXICILLIN 500 MG: 500 CAPSULE ORAL at 05:18

## 2020-11-01 RX ADMIN — POTASSIUM CHLORIDE 10 MEQ: 750 TABLET, EXTENDED RELEASE ORAL at 08:52

## 2020-11-01 RX ADMIN — DABIGATRAN ETEXILATE MESYLATE 75 MG: 75 CAPSULE ORAL at 16:55

## 2020-11-01 RX ADMIN — DABIGATRAN ETEXILATE MESYLATE 75 MG: 75 CAPSULE ORAL at 08:51

## 2020-11-01 RX ADMIN — HYDRALAZINE HYDROCHLORIDE 75 MG: 25 TABLET ORAL at 16:52

## 2020-11-01 RX ADMIN — INSULIN LISPRO 2 UNITS: 100 INJECTION, SOLUTION INTRAVENOUS; SUBCUTANEOUS at 12:44

## 2020-11-01 RX ADMIN — HYDRALAZINE HYDROCHLORIDE 75 MG: 25 TABLET ORAL at 20:47

## 2020-11-01 RX ADMIN — LIDOCAINE 1 PATCH: 50 PATCH CUTANEOUS at 08:53

## 2020-11-01 RX ADMIN — PANTOPRAZOLE SODIUM 40 MG: 40 TABLET, DELAYED RELEASE ORAL at 05:18

## 2020-11-01 RX ADMIN — TIMOLOL MALEATE 1 DROP: 5 SOLUTION/ DROPS OPHTHALMIC at 08:50

## 2020-11-01 RX ADMIN — BIMATOPROST 1 DROP: 0.1 SOLUTION/ DROPS OPHTHALMIC at 21:14

## 2020-11-01 RX ADMIN — HYDRALAZINE HYDROCHLORIDE 75 MG: 25 TABLET ORAL at 08:51

## 2020-11-01 RX ADMIN — SENNOSIDES 17.2 MG: 8.6 TABLET, FILM COATED ORAL at 08:52

## 2020-11-01 RX ADMIN — INSULIN LISPRO 1 UNITS: 100 INJECTION, SOLUTION INTRAVENOUS; SUBCUTANEOUS at 16:55

## 2020-11-01 RX ADMIN — AMOXICILLIN 500 MG: 500 CAPSULE ORAL at 16:55

## 2020-11-01 RX ADMIN — INSULIN LISPRO 2 UNITS: 100 INJECTION, SOLUTION INTRAVENOUS; SUBCUTANEOUS at 08:56

## 2020-11-01 RX ADMIN — AMIODARONE HYDROCHLORIDE 200 MG: 200 TABLET ORAL at 08:51

## 2020-11-02 ENCOUNTER — TELEPHONE (OUTPATIENT)
Dept: FAMILY MEDICINE CLINIC | Facility: CLINIC | Age: 85
End: 2020-11-02

## 2020-11-02 PROBLEM — R79.89 INCREASED THYROID STIMULATING HORMONE (TSH) LEVEL: Status: ACTIVE | Noted: 2020-11-02

## 2020-11-02 LAB
ALBUMIN SERPL BCP-MCNC: 2.5 G/DL (ref 3.5–5)
ALP SERPL-CCNC: 175 U/L (ref 46–116)
ALT SERPL W P-5'-P-CCNC: 312 U/L (ref 12–78)
ANION GAP SERPL CALCULATED.3IONS-SCNC: 6 MMOL/L (ref 4–13)
AST SERPL W P-5'-P-CCNC: 149 U/L (ref 5–45)
BASOPHILS # BLD AUTO: 0.03 THOUSANDS/ΜL (ref 0–0.1)
BASOPHILS NFR BLD AUTO: 0 % (ref 0–1)
BILIRUB DIRECT SERPL-MCNC: 0.15 MG/DL (ref 0–0.2)
BILIRUB SERPL-MCNC: 0.44 MG/DL (ref 0.2–1)
BUN SERPL-MCNC: 4 MG/DL (ref 5–25)
CALCIUM SERPL-MCNC: 8.5 MG/DL (ref 8.3–10.1)
CHLORIDE SERPL-SCNC: 99 MMOL/L (ref 100–108)
CO2 SERPL-SCNC: 29 MMOL/L (ref 21–32)
CREAT SERPL-MCNC: 0.87 MG/DL (ref 0.6–1.3)
EOSINOPHIL # BLD AUTO: 0.06 THOUSAND/ΜL (ref 0–0.61)
EOSINOPHIL NFR BLD AUTO: 1 % (ref 0–6)
ERYTHROCYTE [DISTWIDTH] IN BLOOD BY AUTOMATED COUNT: 18.1 % (ref 11.6–15.1)
GFR SERPL CREATININE-BSD FRML MDRD: 61 ML/MIN/1.73SQ M
GLUCOSE SERPL-MCNC: 155 MG/DL (ref 65–140)
GLUCOSE SERPL-MCNC: 181 MG/DL (ref 65–140)
GLUCOSE SERPL-MCNC: 185 MG/DL (ref 65–140)
GLUCOSE SERPL-MCNC: 195 MG/DL (ref 65–140)
GLUCOSE SERPL-MCNC: 262 MG/DL (ref 65–140)
HCT VFR BLD AUTO: 34.3 % (ref 34.8–46.1)
HGB BLD-MCNC: 10.8 G/DL (ref 11.5–15.4)
IMM GRANULOCYTES # BLD AUTO: 0.04 THOUSAND/UL (ref 0–0.2)
IMM GRANULOCYTES NFR BLD AUTO: 1 % (ref 0–2)
LYMPHOCYTES # BLD AUTO: 1.22 THOUSANDS/ΜL (ref 0.6–4.47)
LYMPHOCYTES NFR BLD AUTO: 16 % (ref 14–44)
MCH RBC QN AUTO: 28.1 PG (ref 26.8–34.3)
MCHC RBC AUTO-ENTMCNC: 31.5 G/DL (ref 31.4–37.4)
MCV RBC AUTO: 89 FL (ref 82–98)
MONOCYTES # BLD AUTO: 0.44 THOUSAND/ΜL (ref 0.17–1.22)
MONOCYTES NFR BLD AUTO: 6 % (ref 4–12)
NEUTROPHILS # BLD AUTO: 5.83 THOUSANDS/ΜL (ref 1.85–7.62)
NEUTS SEG NFR BLD AUTO: 76 % (ref 43–75)
NRBC BLD AUTO-RTO: 0 /100 WBCS
PLATELET # BLD AUTO: 231 THOUSANDS/UL (ref 149–390)
PMV BLD AUTO: 10.8 FL (ref 8.9–12.7)
POTASSIUM SERPL-SCNC: 3.9 MMOL/L (ref 3.5–5.3)
PROT SERPL-MCNC: 5.9 G/DL (ref 6.4–8.2)
RBC # BLD AUTO: 3.85 MILLION/UL (ref 3.81–5.12)
SODIUM SERPL-SCNC: 134 MMOL/L (ref 136–145)
WBC # BLD AUTO: 7.62 THOUSAND/UL (ref 4.31–10.16)

## 2020-11-02 PROCEDURE — 97530 THERAPEUTIC ACTIVITIES: CPT

## 2020-11-02 PROCEDURE — 85025 COMPLETE CBC W/AUTO DIFF WBC: CPT | Performed by: INTERNAL MEDICINE

## 2020-11-02 PROCEDURE — 97535 SELF CARE MNGMENT TRAINING: CPT

## 2020-11-02 PROCEDURE — 99232 SBSQ HOSP IP/OBS MODERATE 35: CPT | Performed by: NURSE PRACTITIONER

## 2020-11-02 PROCEDURE — 97110 THERAPEUTIC EXERCISES: CPT

## 2020-11-02 PROCEDURE — 80076 HEPATIC FUNCTION PANEL: CPT | Performed by: INTERNAL MEDICINE

## 2020-11-02 PROCEDURE — 97116 GAIT TRAINING THERAPY: CPT

## 2020-11-02 PROCEDURE — 82948 REAGENT STRIP/BLOOD GLUCOSE: CPT

## 2020-11-02 PROCEDURE — 80048 BASIC METABOLIC PNL TOTAL CA: CPT | Performed by: INTERNAL MEDICINE

## 2020-11-02 PROCEDURE — 99232 SBSQ HOSP IP/OBS MODERATE 35: CPT | Performed by: INTERNAL MEDICINE

## 2020-11-02 RX ORDER — HYDROXYZINE HYDROCHLORIDE 25 MG/1
25 TABLET, FILM COATED ORAL ONCE
Status: COMPLETED | OUTPATIENT
Start: 2020-11-02 | End: 2020-11-02

## 2020-11-02 RX ORDER — LEVOTHYROXINE SODIUM 0.03 MG/1
25 TABLET ORAL
Status: DISCONTINUED | OUTPATIENT
Start: 2020-11-03 | End: 2020-11-03 | Stop reason: HOSPADM

## 2020-11-02 RX ADMIN — ONDANSETRON 4 MG: 2 INJECTION INTRAMUSCULAR; INTRAVENOUS at 02:39

## 2020-11-02 RX ADMIN — INSULIN LISPRO 2 UNITS: 100 INJECTION, SOLUTION INTRAVENOUS; SUBCUTANEOUS at 21:52

## 2020-11-02 RX ADMIN — HYDRALAZINE HYDROCHLORIDE 75 MG: 25 TABLET ORAL at 18:25

## 2020-11-02 RX ADMIN — AMOXICILLIN 500 MG: 500 CAPSULE ORAL at 14:22

## 2020-11-02 RX ADMIN — DABIGATRAN ETEXILATE MESYLATE 75 MG: 75 CAPSULE ORAL at 09:23

## 2020-11-02 RX ADMIN — HYDRALAZINE HYDROCHLORIDE 75 MG: 25 TABLET ORAL at 09:23

## 2020-11-02 RX ADMIN — FOLIC ACID 1 MG: 1 TABLET ORAL at 09:23

## 2020-11-02 RX ADMIN — LIDOCAINE 1 PATCH: 50 PATCH CUTANEOUS at 09:27

## 2020-11-02 RX ADMIN — BIMATOPROST 1 DROP: 0.1 SOLUTION/ DROPS OPHTHALMIC at 21:55

## 2020-11-02 RX ADMIN — INSULIN LISPRO 1 UNITS: 100 INJECTION, SOLUTION INTRAVENOUS; SUBCUTANEOUS at 18:25

## 2020-11-02 RX ADMIN — AMIODARONE HYDROCHLORIDE 200 MG: 200 TABLET ORAL at 09:23

## 2020-11-02 RX ADMIN — INSULIN LISPRO 1 UNITS: 100 INJECTION, SOLUTION INTRAVENOUS; SUBCUTANEOUS at 09:29

## 2020-11-02 RX ADMIN — SENNOSIDES 17.2 MG: 8.6 TABLET, FILM COATED ORAL at 18:25

## 2020-11-02 RX ADMIN — SENNOSIDES 17.2 MG: 8.6 TABLET, FILM COATED ORAL at 09:23

## 2020-11-02 RX ADMIN — PANTOPRAZOLE SODIUM 40 MG: 40 TABLET, DELAYED RELEASE ORAL at 05:58

## 2020-11-02 RX ADMIN — LOSARTAN POTASSIUM 50 MG: 50 TABLET, FILM COATED ORAL at 09:23

## 2020-11-02 RX ADMIN — DABIGATRAN ETEXILATE MESYLATE 75 MG: 75 CAPSULE ORAL at 18:25

## 2020-11-02 RX ADMIN — HYDRALAZINE HYDROCHLORIDE 75 MG: 25 TABLET ORAL at 21:53

## 2020-11-02 RX ADMIN — POTASSIUM CHLORIDE 10 MEQ: 750 TABLET, EXTENDED RELEASE ORAL at 09:23

## 2020-11-02 RX ADMIN — INSULIN LISPRO 1 UNITS: 100 INJECTION, SOLUTION INTRAVENOUS; SUBCUTANEOUS at 12:30

## 2020-11-02 RX ADMIN — ALBUTEROL SULFATE 2 PUFF: 90 AEROSOL, METERED RESPIRATORY (INHALATION) at 09:23

## 2020-11-02 RX ADMIN — HYDROXYZINE HYDROCHLORIDE 25 MG: 25 TABLET, FILM COATED ORAL at 03:15

## 2020-11-02 RX ADMIN — AMOXICILLIN 500 MG: 500 CAPSULE ORAL at 05:58

## 2020-11-02 RX ADMIN — AMOXICILLIN 500 MG: 500 CAPSULE ORAL at 21:55

## 2020-11-02 RX ADMIN — TIMOLOL MALEATE 1 DROP: 5 SOLUTION/ DROPS OPHTHALMIC at 12:31

## 2020-11-02 RX ADMIN — Medication 1000 UNITS: at 09:23

## 2020-11-03 VITALS
OXYGEN SATURATION: 95 % | SYSTOLIC BLOOD PRESSURE: 160 MMHG | HEART RATE: 67 BPM | TEMPERATURE: 98.8 F | WEIGHT: 160.5 LBS | DIASTOLIC BLOOD PRESSURE: 100 MMHG | RESPIRATION RATE: 18 BRPM | BODY MASS INDEX: 34.13 KG/M2

## 2020-11-03 LAB
ALBUMIN SERPL BCP-MCNC: 2.8 G/DL (ref 3.5–5)
ALP SERPL-CCNC: 193 U/L (ref 46–116)
ALT SERPL W P-5'-P-CCNC: 292 U/L (ref 12–78)
ANION GAP SERPL CALCULATED.3IONS-SCNC: 7 MMOL/L (ref 4–13)
AST SERPL W P-5'-P-CCNC: 140 U/L (ref 5–45)
BASOPHILS # BLD AUTO: 0.03 THOUSANDS/ΜL (ref 0–0.1)
BASOPHILS NFR BLD AUTO: 0 % (ref 0–1)
BILIRUB SERPL-MCNC: 0.36 MG/DL (ref 0.2–1)
BUN SERPL-MCNC: 4 MG/DL (ref 5–25)
CALCIUM ALBUM COR SERPL-MCNC: 9.7 MG/DL (ref 8.3–10.1)
CALCIUM SERPL-MCNC: 8.7 MG/DL (ref 8.3–10.1)
CHLORIDE SERPL-SCNC: 101 MMOL/L (ref 100–108)
CO2 SERPL-SCNC: 28 MMOL/L (ref 21–32)
CREAT SERPL-MCNC: 0.85 MG/DL (ref 0.6–1.3)
EOSINOPHIL # BLD AUTO: 0.16 THOUSAND/ΜL (ref 0–0.61)
EOSINOPHIL NFR BLD AUTO: 2 % (ref 0–6)
ERYTHROCYTE [DISTWIDTH] IN BLOOD BY AUTOMATED COUNT: 18.1 % (ref 11.6–15.1)
GFR SERPL CREATININE-BSD FRML MDRD: 63 ML/MIN/1.73SQ M
GLUCOSE SERPL-MCNC: 163 MG/DL (ref 65–140)
GLUCOSE SERPL-MCNC: 167 MG/DL (ref 65–140)
GLUCOSE SERPL-MCNC: 197 MG/DL (ref 65–140)
GLUCOSE SERPL-MCNC: 224 MG/DL (ref 65–140)
HCT VFR BLD AUTO: 35.3 % (ref 34.8–46.1)
HGB BLD-MCNC: 11.2 G/DL (ref 11.5–15.4)
IMM GRANULOCYTES # BLD AUTO: 0.04 THOUSAND/UL (ref 0–0.2)
IMM GRANULOCYTES NFR BLD AUTO: 1 % (ref 0–2)
LYMPHOCYTES # BLD AUTO: 1.74 THOUSANDS/ΜL (ref 0.6–4.47)
LYMPHOCYTES NFR BLD AUTO: 21 % (ref 14–44)
MCH RBC QN AUTO: 27.9 PG (ref 26.8–34.3)
MCHC RBC AUTO-ENTMCNC: 31.7 G/DL (ref 31.4–37.4)
MCV RBC AUTO: 88 FL (ref 82–98)
MONOCYTES # BLD AUTO: 0.49 THOUSAND/ΜL (ref 0.17–1.22)
MONOCYTES NFR BLD AUTO: 6 % (ref 4–12)
NEUTROPHILS # BLD AUTO: 5.82 THOUSANDS/ΜL (ref 1.85–7.62)
NEUTS SEG NFR BLD AUTO: 70 % (ref 43–75)
NRBC BLD AUTO-RTO: 0 /100 WBCS
PLATELET # BLD AUTO: 251 THOUSANDS/UL (ref 149–390)
PMV BLD AUTO: 10.2 FL (ref 8.9–12.7)
POTASSIUM SERPL-SCNC: 3.9 MMOL/L (ref 3.5–5.3)
PROT SERPL-MCNC: 6.5 G/DL (ref 6.4–8.2)
RBC # BLD AUTO: 4.01 MILLION/UL (ref 3.81–5.12)
SODIUM SERPL-SCNC: 136 MMOL/L (ref 136–145)
WBC # BLD AUTO: 8.28 THOUSAND/UL (ref 4.31–10.16)

## 2020-11-03 PROCEDURE — 82948 REAGENT STRIP/BLOOD GLUCOSE: CPT

## 2020-11-03 PROCEDURE — 99232 SBSQ HOSP IP/OBS MODERATE 35: CPT | Performed by: NURSE PRACTITIONER

## 2020-11-03 PROCEDURE — 85025 COMPLETE CBC W/AUTO DIFF WBC: CPT | Performed by: INTERNAL MEDICINE

## 2020-11-03 PROCEDURE — 99239 HOSP IP/OBS DSCHRG MGMT >30: CPT | Performed by: INTERNAL MEDICINE

## 2020-11-03 PROCEDURE — 97535 SELF CARE MNGMENT TRAINING: CPT

## 2020-11-03 PROCEDURE — 80053 COMPREHEN METABOLIC PANEL: CPT | Performed by: INTERNAL MEDICINE

## 2020-11-03 PROCEDURE — 99223 1ST HOSP IP/OBS HIGH 75: CPT | Performed by: INTERNAL MEDICINE

## 2020-11-03 RX ORDER — AMIODARONE HYDROCHLORIDE 100 MG/1
100 TABLET ORAL DAILY
Status: DISCONTINUED | OUTPATIENT
Start: 2020-11-04 | End: 2020-11-03

## 2020-11-03 RX ORDER — AMIODARONE HYDROCHLORIDE 100 MG/1
100 TABLET ORAL DAILY
Qty: 30 TABLET | Refills: 0 | Status: SHIPPED | OUTPATIENT
Start: 2020-11-11 | End: 2020-11-23

## 2020-11-03 RX ORDER — BISOPROLOL FUMARATE 5 MG/1
2.5 TABLET ORAL DAILY
Qty: 15 TABLET | Refills: 0 | Status: CANCELLED | OUTPATIENT
Start: 2020-11-03 | End: 2020-12-03

## 2020-11-03 RX ORDER — AMOXICILLIN 500 MG/1
500 CAPSULE ORAL EVERY 8 HOURS SCHEDULED
Qty: 6 CAPSULE | Refills: 0 | Status: SHIPPED | OUTPATIENT
Start: 2020-11-03 | End: 2020-11-12 | Stop reason: ALTCHOICE

## 2020-11-03 RX ORDER — SENNOSIDES 8.6 MG
17.2 TABLET ORAL
Qty: 12 TABLET | Refills: 0 | Status: SHIPPED | OUTPATIENT
Start: 2020-11-03

## 2020-11-03 RX ORDER — LEVOTHYROXINE SODIUM 0.03 MG/1
25 TABLET ORAL
Qty: 30 TABLET | Refills: 0 | Status: SHIPPED | OUTPATIENT
Start: 2020-11-04

## 2020-11-03 RX ADMIN — DABIGATRAN ETEXILATE MESYLATE 75 MG: 75 CAPSULE ORAL at 08:32

## 2020-11-03 RX ADMIN — FOLIC ACID 1 MG: 1 TABLET ORAL at 08:33

## 2020-11-03 RX ADMIN — INSULIN LISPRO 2 UNITS: 100 INJECTION, SOLUTION INTRAVENOUS; SUBCUTANEOUS at 08:26

## 2020-11-03 RX ADMIN — POTASSIUM CHLORIDE 10 MEQ: 750 TABLET, EXTENDED RELEASE ORAL at 08:32

## 2020-11-03 RX ADMIN — Medication 1000 UNITS: at 08:32

## 2020-11-03 RX ADMIN — SENNOSIDES 17.2 MG: 8.6 TABLET, FILM COATED ORAL at 08:33

## 2020-11-03 RX ADMIN — LIDOCAINE 1 PATCH: 50 PATCH CUTANEOUS at 08:27

## 2020-11-03 RX ADMIN — PANTOPRAZOLE SODIUM 40 MG: 40 TABLET, DELAYED RELEASE ORAL at 06:09

## 2020-11-03 RX ADMIN — AMIODARONE HYDROCHLORIDE 200 MG: 200 TABLET ORAL at 08:32

## 2020-11-03 RX ADMIN — TIMOLOL MALEATE 1 DROP: 5 SOLUTION/ DROPS OPHTHALMIC at 08:27

## 2020-11-03 RX ADMIN — INSULIN LISPRO 1 UNITS: 100 INJECTION, SOLUTION INTRAVENOUS; SUBCUTANEOUS at 16:01

## 2020-11-03 RX ADMIN — AMOXICILLIN 500 MG: 500 CAPSULE ORAL at 15:00

## 2020-11-03 RX ADMIN — INSULIN LISPRO 2 UNITS: 100 INJECTION, SOLUTION INTRAVENOUS; SUBCUTANEOUS at 12:02

## 2020-11-03 RX ADMIN — LEVOTHYROXINE SODIUM 25 MCG: 25 TABLET ORAL at 06:09

## 2020-11-03 RX ADMIN — LOSARTAN POTASSIUM 50 MG: 50 TABLET, FILM COATED ORAL at 08:32

## 2020-11-03 RX ADMIN — AMOXICILLIN 500 MG: 500 CAPSULE ORAL at 06:09

## 2020-11-03 RX ADMIN — HYDRALAZINE HYDROCHLORIDE 75 MG: 25 TABLET ORAL at 08:31

## 2020-11-03 RX ADMIN — HYDRALAZINE HYDROCHLORIDE 75 MG: 25 TABLET ORAL at 15:57

## 2020-11-04 ENCOUNTER — TELEPHONE (OUTPATIENT)
Dept: FAMILY MEDICINE CLINIC | Facility: CLINIC | Age: 85
End: 2020-11-04

## 2020-11-04 ENCOUNTER — TRANSITIONAL CARE MANAGEMENT (OUTPATIENT)
Dept: FAMILY MEDICINE CLINIC | Facility: CLINIC | Age: 85
End: 2020-11-04

## 2020-11-05 ENCOUNTER — TELEPHONE (OUTPATIENT)
Dept: FAMILY MEDICINE CLINIC | Facility: CLINIC | Age: 85
End: 2020-11-05

## 2020-11-06 ENCOUNTER — TELEPHONE (OUTPATIENT)
Dept: FAMILY MEDICINE CLINIC | Facility: CLINIC | Age: 85
End: 2020-11-06

## 2020-11-06 ENCOUNTER — LAB (OUTPATIENT)
Dept: LAB | Facility: HOSPITAL | Age: 85
End: 2020-11-06
Attending: INTERNAL MEDICINE
Payer: COMMERCIAL

## 2020-11-06 DIAGNOSIS — I10 ESSENTIAL HYPERTENSION: ICD-10-CM

## 2020-11-06 DIAGNOSIS — E11.65 TYPE 2 DIABETES MELLITUS WITH HYPERGLYCEMIA, WITHOUT LONG-TERM CURRENT USE OF INSULIN (HCC): ICD-10-CM

## 2020-11-06 DIAGNOSIS — R74.01 TRANSAMINITIS: ICD-10-CM

## 2020-11-06 DIAGNOSIS — E78.5 HYPERLIPIDEMIA, UNSPECIFIED HYPERLIPIDEMIA TYPE: ICD-10-CM

## 2020-11-06 LAB
ALBUMIN SERPL BCP-MCNC: 2.9 G/DL (ref 3.5–5)
ALP SERPL-CCNC: 174 U/L (ref 46–116)
ALT SERPL W P-5'-P-CCNC: 177 U/L (ref 12–78)
ANION GAP SERPL CALCULATED.3IONS-SCNC: 8 MMOL/L (ref 4–13)
AST SERPL W P-5'-P-CCNC: 75 U/L (ref 5–45)
BASOPHILS # BLD AUTO: 0.04 THOUSANDS/ΜL (ref 0–0.1)
BASOPHILS NFR BLD AUTO: 1 % (ref 0–1)
BILIRUB SERPL-MCNC: 0.53 MG/DL (ref 0.2–1)
BUN SERPL-MCNC: 10 MG/DL (ref 5–25)
CALCIUM ALBUM COR SERPL-MCNC: 9.7 MG/DL (ref 8.3–10.1)
CALCIUM SERPL-MCNC: 8.8 MG/DL (ref 8.3–10.1)
CHLORIDE SERPL-SCNC: 98 MMOL/L (ref 100–108)
CO2 SERPL-SCNC: 27 MMOL/L (ref 21–32)
CREAT SERPL-MCNC: 1.07 MG/DL (ref 0.6–1.3)
CREAT UR-MCNC: 50.4 MG/DL
EOSINOPHIL # BLD AUTO: 0.11 THOUSAND/ΜL (ref 0–0.61)
EOSINOPHIL NFR BLD AUTO: 1 % (ref 0–6)
ERYTHROCYTE [DISTWIDTH] IN BLOOD BY AUTOMATED COUNT: 17.9 % (ref 11.6–15.1)
EST. AVERAGE GLUCOSE BLD GHB EST-MCNC: 154 MG/DL
GFR SERPL CREATININE-BSD FRML MDRD: 47 ML/MIN/1.73SQ M
GLUCOSE P FAST SERPL-MCNC: 198 MG/DL (ref 65–99)
HBA1C MFR BLD: 7 %
HCT VFR BLD AUTO: 35 % (ref 34.8–46.1)
HGB BLD-MCNC: 11.2 G/DL (ref 11.5–15.4)
IMM GRANULOCYTES # BLD AUTO: 0.04 THOUSAND/UL (ref 0–0.2)
IMM GRANULOCYTES NFR BLD AUTO: 1 % (ref 0–2)
LYMPHOCYTES # BLD AUTO: 1.46 THOUSANDS/ΜL (ref 0.6–4.47)
LYMPHOCYTES NFR BLD AUTO: 17 % (ref 14–44)
MCH RBC QN AUTO: 28.3 PG (ref 26.8–34.3)
MCHC RBC AUTO-ENTMCNC: 32 G/DL (ref 31.4–37.4)
MCV RBC AUTO: 88 FL (ref 82–98)
MICROALBUMIN UR-MCNC: 93 MG/L (ref 0–20)
MICROALBUMIN/CREAT 24H UR: 185 MG/G CREATININE (ref 0–30)
MONOCYTES # BLD AUTO: 0.45 THOUSAND/ΜL (ref 0.17–1.22)
MONOCYTES NFR BLD AUTO: 5 % (ref 4–12)
NEUTROPHILS # BLD AUTO: 6.32 THOUSANDS/ΜL (ref 1.85–7.62)
NEUTS SEG NFR BLD AUTO: 75 % (ref 43–75)
NRBC BLD AUTO-RTO: 0 /100 WBCS
PLATELET # BLD AUTO: 267 THOUSANDS/UL (ref 149–390)
PMV BLD AUTO: 10.8 FL (ref 8.9–12.7)
POTASSIUM SERPL-SCNC: 4.1 MMOL/L (ref 3.5–5.3)
PROT SERPL-MCNC: 6.6 G/DL (ref 6.4–8.2)
RBC # BLD AUTO: 3.96 MILLION/UL (ref 3.81–5.12)
SODIUM SERPL-SCNC: 133 MMOL/L (ref 136–145)
T4 FREE SERPL-MCNC: 1.59 NG/DL (ref 0.76–1.46)
TSH SERPL DL<=0.05 MIU/L-ACNC: 11.15 UIU/ML (ref 0.36–3.74)
WBC # BLD AUTO: 8.42 THOUSAND/UL (ref 4.31–10.16)

## 2020-11-06 PROCEDURE — 84439 ASSAY OF FREE THYROXINE: CPT

## 2020-11-06 PROCEDURE — 82570 ASSAY OF URINE CREATININE: CPT

## 2020-11-06 PROCEDURE — 83036 HEMOGLOBIN GLYCOSYLATED A1C: CPT

## 2020-11-06 PROCEDURE — 82043 UR ALBUMIN QUANTITATIVE: CPT

## 2020-11-06 PROCEDURE — 85025 COMPLETE CBC W/AUTO DIFF WBC: CPT

## 2020-11-06 PROCEDURE — 80053 COMPREHEN METABOLIC PANEL: CPT

## 2020-11-06 PROCEDURE — 84443 ASSAY THYROID STIM HORMONE: CPT

## 2020-11-06 PROCEDURE — 36415 COLL VENOUS BLD VENIPUNCTURE: CPT

## 2020-11-10 ENCOUNTER — TELEPHONE (OUTPATIENT)
Dept: FAMILY MEDICINE CLINIC | Facility: CLINIC | Age: 85
End: 2020-11-10

## 2020-11-10 DIAGNOSIS — M46.1 SACROILIITIS, NOT ELSEWHERE CLASSIFIED (HCC): Primary | ICD-10-CM

## 2020-11-12 ENCOUNTER — TELEPHONE (OUTPATIENT)
Dept: FAMILY MEDICINE CLINIC | Facility: CLINIC | Age: 85
End: 2020-11-12

## 2020-11-12 ENCOUNTER — OFFICE VISIT (OUTPATIENT)
Dept: FAMILY MEDICINE CLINIC | Facility: CLINIC | Age: 85
End: 2020-11-12
Payer: COMMERCIAL

## 2020-11-12 VITALS
OXYGEN SATURATION: 98 % | HEART RATE: 68 BPM | DIASTOLIC BLOOD PRESSURE: 66 MMHG | TEMPERATURE: 97.4 F | SYSTOLIC BLOOD PRESSURE: 134 MMHG | RESPIRATION RATE: 20 BRPM

## 2020-11-12 DIAGNOSIS — E03.9 HYPOTHYROIDISM, UNSPECIFIED TYPE: ICD-10-CM

## 2020-11-12 DIAGNOSIS — R74.01 TRANSAMINITIS: ICD-10-CM

## 2020-11-12 DIAGNOSIS — J40 BRONCHITIS: ICD-10-CM

## 2020-11-12 DIAGNOSIS — E78.5 HYPERLIPIDEMIA, UNSPECIFIED HYPERLIPIDEMIA TYPE: Chronic | ICD-10-CM

## 2020-11-12 DIAGNOSIS — Z09 HOSPITAL DISCHARGE FOLLOW-UP: Primary | ICD-10-CM

## 2020-11-12 DIAGNOSIS — I48.0 PAROXYSMAL ATRIAL FIBRILLATION (HCC): ICD-10-CM

## 2020-11-12 DIAGNOSIS — E11.65 TYPE 2 DIABETES MELLITUS WITH HYPERGLYCEMIA, WITHOUT LONG-TERM CURRENT USE OF INSULIN (HCC): ICD-10-CM

## 2020-11-12 DIAGNOSIS — F03.90 DEMENTIA WITHOUT BEHAVIORAL DISTURBANCE, UNSPECIFIED DEMENTIA TYPE (HCC): ICD-10-CM

## 2020-11-12 DIAGNOSIS — I10 ESSENTIAL HYPERTENSION: Chronic | ICD-10-CM

## 2020-11-12 PROCEDURE — 99495 TRANSJ CARE MGMT MOD F2F 14D: CPT | Performed by: FAMILY MEDICINE

## 2020-11-12 PROCEDURE — 1111F DSCHRG MED/CURRENT MED MERGE: CPT | Performed by: FAMILY MEDICINE

## 2020-11-12 PROCEDURE — 3288F FALL RISK ASSESSMENT DOCD: CPT | Performed by: FAMILY MEDICINE

## 2020-11-12 PROCEDURE — T1015 CLINIC SERVICE: HCPCS | Performed by: FAMILY MEDICINE

## 2020-11-12 PROCEDURE — 1101F PT FALLS ASSESS-DOCD LE1/YR: CPT | Performed by: FAMILY MEDICINE

## 2020-11-12 PROCEDURE — 1100F PTFALLS ASSESS-DOCD GE2>/YR: CPT | Performed by: FAMILY MEDICINE

## 2020-11-12 RX ORDER — ALBUTEROL SULFATE 90 UG/1
2 AEROSOL, METERED RESPIRATORY (INHALATION) EVERY 6 HOURS PRN
Qty: 8.5 G | Refills: 1 | Status: SHIPPED | OUTPATIENT
Start: 2020-11-12

## 2020-11-13 ENCOUNTER — PROCEDURE VISIT (OUTPATIENT)
Dept: UROLOGY | Facility: CLINIC | Age: 85
End: 2020-11-13
Payer: COMMERCIAL

## 2020-11-13 ENCOUNTER — TELEPHONE (OUTPATIENT)
Dept: FAMILY MEDICINE CLINIC | Facility: CLINIC | Age: 85
End: 2020-11-13

## 2020-11-13 VITALS
RESPIRATION RATE: 20 BRPM | DIASTOLIC BLOOD PRESSURE: 60 MMHG | TEMPERATURE: 96.8 F | SYSTOLIC BLOOD PRESSURE: 110 MMHG | WEIGHT: 147 LBS | HEART RATE: 72 BPM | HEIGHT: 57 IN | BODY MASS INDEX: 31.71 KG/M2

## 2020-11-13 DIAGNOSIS — F03.90 DEMENTIA WITHOUT BEHAVIORAL DISTURBANCE, UNSPECIFIED DEMENTIA TYPE (HCC): Primary | ICD-10-CM

## 2020-11-13 DIAGNOSIS — R33.9 URINARY RETENTION: Primary | ICD-10-CM

## 2020-11-13 LAB — POST-VOID RESIDUAL VOLUME, ML POC: 26 ML

## 2020-11-13 PROCEDURE — 51798 US URINE CAPACITY MEASURE: CPT

## 2020-11-15 DIAGNOSIS — E13.9 DIABETES 1.5, MANAGED AS TYPE 2 (HCC): ICD-10-CM

## 2020-11-16 RX ORDER — BLOOD SUGAR DIAGNOSTIC
STRIP MISCELLANEOUS
Qty: 200 EACH | Refills: 3 | Status: SHIPPED | OUTPATIENT
Start: 2020-11-16

## 2020-11-17 ENCOUNTER — TELEPHONE (OUTPATIENT)
Dept: PAIN MEDICINE | Facility: MEDICAL CENTER | Age: 85
End: 2020-11-17

## 2020-11-18 ENCOUNTER — TELEPHONE (OUTPATIENT)
Dept: FAMILY MEDICINE CLINIC | Facility: CLINIC | Age: 85
End: 2020-11-18

## 2020-11-23 ENCOUNTER — OFFICE VISIT (OUTPATIENT)
Dept: CARDIOLOGY CLINIC | Facility: CLINIC | Age: 85
End: 2020-11-23
Payer: COMMERCIAL

## 2020-11-23 VITALS
WEIGHT: 145 LBS | DIASTOLIC BLOOD PRESSURE: 40 MMHG | BODY MASS INDEX: 31.38 KG/M2 | SYSTOLIC BLOOD PRESSURE: 90 MMHG | TEMPERATURE: 97.2 F

## 2020-11-23 DIAGNOSIS — I10 ESSENTIAL HYPERTENSION: Chronic | ICD-10-CM

## 2020-11-23 DIAGNOSIS — I50.32 CHRONIC DIASTOLIC HEART FAILURE (HCC): Chronic | ICD-10-CM

## 2020-11-23 DIAGNOSIS — R94.31 ABNORMAL EKG: ICD-10-CM

## 2020-11-23 DIAGNOSIS — I63.521 CEREBROVASCULAR ACCIDENT (CVA) DUE TO OCCLUSION OF RIGHT ANTERIOR CEREBRAL ARTERY (HCC): ICD-10-CM

## 2020-11-23 DIAGNOSIS — E11.65 TYPE 2 DIABETES MELLITUS WITH HYPERGLYCEMIA, WITHOUT LONG-TERM CURRENT USE OF INSULIN (HCC): ICD-10-CM

## 2020-11-23 DIAGNOSIS — I48.0 PAF (PAROXYSMAL ATRIAL FIBRILLATION) (HCC): ICD-10-CM

## 2020-11-23 DIAGNOSIS — E78.5 HYPERLIPIDEMIA, UNSPECIFIED HYPERLIPIDEMIA TYPE: Chronic | ICD-10-CM

## 2020-11-23 DIAGNOSIS — I48.91 ATRIAL FIBRILLATION, UNSPECIFIED TYPE (HCC): Primary | ICD-10-CM

## 2020-11-23 DIAGNOSIS — Z95.2 S/P TAVR (TRANSCATHETER AORTIC VALVE REPLACEMENT): ICD-10-CM

## 2020-11-23 DIAGNOSIS — I48.0 PAROXYSMAL ATRIAL FIBRILLATION (HCC): ICD-10-CM

## 2020-11-23 PROCEDURE — 93000 ELECTROCARDIOGRAM COMPLETE: CPT

## 2020-11-23 PROCEDURE — 99214 OFFICE O/P EST MOD 30 MIN: CPT

## 2020-11-23 PROCEDURE — 1160F RVW MEDS BY RX/DR IN RCRD: CPT

## 2020-11-23 PROCEDURE — 1036F TOBACCO NON-USER: CPT

## 2020-11-23 RX ORDER — SULFAMETHOXAZOLE AND TRIMETHOPRIM 800; 160 MG/1; MG/1
1 TABLET ORAL 2 TIMES DAILY
COMMUNITY
Start: 2020-11-19

## 2020-12-01 ENCOUNTER — TELEPHONE (OUTPATIENT)
Dept: FAMILY MEDICINE CLINIC | Facility: CLINIC | Age: 85
End: 2020-12-01

## 2020-12-12 ENCOUNTER — TELEPHONE (OUTPATIENT)
Dept: OTHER | Facility: OTHER | Age: 85
End: 2020-12-12

## (undated) DEVICE — SUT SILK 0 CT-1 30 IN 424H

## (undated) DEVICE — 3000CC GUARDIAN II: Brand: GUARDIAN

## (undated) DEVICE — INTENDED FOR TISSUE SEPARATION, AND OTHER PROCEDURES THAT REQUIRE A SHARP SURGICAL BLADE TO PUNCTURE OR CUT.: Brand: BARD-PARKER ® CARBON RIB-BACK BLADES

## (undated) DEVICE — SCD SEQUENTIAL COMPRESSION COMFORT SLEEVE MEDIUM KNEE LENGTH: Brand: KENDALL SCD

## (undated) DEVICE — CARDIO PERI-GROIN: Brand: CONVERTORS

## (undated) DEVICE — HEAVY DUTY TABLE COVER: Brand: CONVERTORS

## (undated) DEVICE — TRAY FOLEY 16FR SURESTEP TEMP SENS URIMETER STAT LOK

## (undated) DEVICE — STERILE MAJOR GENERAL PACK: Brand: CARDINAL HEALTH

## (undated) DEVICE — 3M™ TEGADERM™ TRANSPARENT FILM DRESSING FRAME STYLE, 1626W, 4 IN X 4-3/4 IN (10 CM X 12 CM), 50/CT 4CT/CASE: Brand: 3M™ TEGADERM™

## (undated) DEVICE — X-RAY DETECTABLE SPONGES,16 PLY: Brand: VISTEC

## (undated) DEVICE — CHLORAPREP HI-LITE 26ML ORANGE

## (undated) DEVICE — GAUZE SPONGES,USP TYPE VII GAUZE, 12 PLY: Brand: CURITY

## (undated) DEVICE — CARDIOVASCULAR SPLIT DRAPE: Brand: CONVERTORS

## (undated) DEVICE — Device

## (undated) DEVICE — GLOVE INDICATOR PI UNDERGLOVE SZ 7 BLUE

## (undated) DEVICE — THERMOFLECT BLANKET, L, 25EA                               TS THERMOFLECT BLANKET, 48" X 84", SILVER, 5/BG, 5 BG/CS NW: Brand: THERMOFLECT

## (undated) DEVICE — GLOVE SRG BIOGEL ECLIPSE 7

## (undated) DEVICE — DEFIB ADULT ELECTRODE CARDINAL

## (undated) DEVICE — ADHESIVE SKN CLSR HISTOACRYL FLEX 0.5ML LF